# Patient Record
Sex: FEMALE | Race: WHITE | NOT HISPANIC OR LATINO | Employment: OTHER | URBAN - METROPOLITAN AREA
[De-identification: names, ages, dates, MRNs, and addresses within clinical notes are randomized per-mention and may not be internally consistent; named-entity substitution may affect disease eponyms.]

---

## 2017-01-05 ENCOUNTER — ANTICOAGULATION MONITORING (OUTPATIENT)
Dept: VASCULAR LAB | Facility: MEDICAL CENTER | Age: 77
End: 2017-01-05
Attending: NURSE PRACTITIONER
Payer: MEDICARE

## 2017-01-05 DIAGNOSIS — Z86.711 PERSONAL HISTORY OF PULMONARY EMBOLISM: ICD-10-CM

## 2017-01-05 LAB
INR BLD: 2.4 (ref 0.9–1.2)
INR PPP: 2.4 (ref 2–3.5)

## 2017-01-05 PROCEDURE — 99211 OFF/OP EST MAY X REQ PHY/QHP: CPT

## 2017-01-05 PROCEDURE — 85610 PROTHROMBIN TIME: CPT

## 2017-01-05 NOTE — PROGRESS NOTES
Anticoagulation Summary as of 1/5/2017     INR goal 2.0-3.0   Selected INR 2.4 (1/5/2017)   Maintenance plan 15 mg (5 mg x 3) every day   Weekly total 105 mg   Plan last modified Kwasi Lui, PHARMD (12/19/2016)   Next INR check    Target end date     Indications   Personal history of pulmonary embolism [Z86.711]         Anticoagulation Episode Summary     INR check location     Preferred lab     Send INR reminders to     Comments       Anticoagulation Care Providers     Provider Role Specialty Phone number    Ninfa Marcial M.D. Referring Internal Medicine [phone]    [facility] Anticoagulation Services Responsible  [phone]        Pt is therapeutic today. Pt is to continue with current warfarin dosing regimen.  Pt denies any unusual s/s of bleeding, bruising, clotting or any changes to diet or medications.  Follow up in 2 weeks.    Maty Perkins, PHARMD

## 2017-01-05 NOTE — MR AVS SNAPSHOT
Natalie Anaya   2017 11:45 AM   Anticoagulation Monitoring   MRN: [id number]    Department:  Vascular Medicine   Dept Phone:  [phone]    Description:  Female : 1940   Provider:  [facility] EXAM 4           Allergies as of 2017     Allergen Noted Reactions    Tape 10/21/2015   Rash    Adhesive band aids cause a rash  Paper tape ok      You were diagnosed with     Personal history of pulmonary embolism   [V12.55.ICD-9-CM]         Vital Signs     Smoking Status                   Never Smoker            Basic Information     Date Of Birth Sex Race Ethnicity Preferred Language    1940 Female White Non- English      Your appointments     2017 11:30 AM   Established Patient with [facility] EXAM 5   [facility]  (--)    [address] [facility] [id number] [phone]            2017 12:15 PM   LOWER EXTREMITY VENOUS DUPLEX with VASCULAR LAB [facility], [facility] EXAM 6   NON-INVASIVE LAB [facility] ([facility])    [address] [facility] [id number] [phone]           No prep            Mar 15, 2017 11:00 AM   Established Patient with Ninfa Marcial M.D.   [facility] [address] [place] [address] ([facility])    [address] [facility] [address] [phone]           You will be receiving a confirmation call a few days before your appointment from our automated call confirmation system.              Problem List              ICD-10-CM Priority Class Noted - Resolved    Chronic insomnia F51.04   2013 - Present    HTN (hypertension), benign I10 Low  2013 - Present    History of cataract removal with insertion of prosthetic lens Z98.49, Z96.1   7/15/2014 - Present    Seizure disorder (HCC) G40.909 Low  10/21/2015 - Present    Other specified hypothyroidism E03.8   2016 - Present    Personal history of pulmonary embolism Z86.711   2016 - Present    Iron deficiency anemia D50.9   3/4/2016 - Present    Diastolic  dysfunction I51.9   3/4/2016 - Present    Hiatal hernia K44.9   3/4/2016 - Present    Closed fracture of multiple ribs of left side S22.42XA   9/26/2016 - Present    Slow transit constipation K59.01   9/26/2016 - Present    Closed left hip fracture (HCC) S72.002A Medium  10/5/2016 - Present    Frequent falls R29.6 Medium  10/7/2016 - Present    Hyperglycemia R73.9 Medium  10/7/2016 - Present    Recurrent pulmonary embolism (HCC) I26.99 Medium  11/2/2016 - Present    Hip hematoma, left S70.02XA   11/17/2016 - Present    Hospital discharge follow-up Z09   11/30/2016 - Present    Presence of IVC filter Z95.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   12/30/2016 - Present      Health Maintenance        Date Due Completion Dates    BONE DENSITY 10/1/2019 10/1/2014 (Declined)    Override on 10/1/2014: Patient Declined    IMM DTaP/Tdap/Td Vaccine (2 - Td) 10/21/2025 10/21/2015    COLONOSCOPY 4/29/2026 4/29/2016            Results     POCT Protime      Component    INR    2.4                        Current Immunizations     13-VALENT PCV PREVNAR 8/1/2016    Hepatitis A Vaccine, Ped/Adol 10/18/2010    Hepatitis B Vaccine Non-Recombivax (Ped/Adol) 10/18/2010    INFLUENZA VACCINE H1N1 10/9/2011, 2/22/2010    Influenza TIV (IM) 9/30/2014, 9/12/2012, 10/18/2010    Influenza Vaccine Adult HD 11/22/2016  4:16 PM, 10/7/2016 12:37 PM    Influenza Vaccine Quad Inj (Pf) 9/19/2015, 9/19/2015    Pneumococcal Vaccine (UF)Historical Data 10/9/2011    Pneumococcal polysaccharide vaccine (PPSV-23) 10/18/2010    SHINGLES VACCINE 12/31/2010    Tdap Vaccine 10/21/2015      Below and/or attached are the medications your provider expects you to take. Review all of your home medications and newly ordered medications with your provider and/or pharmacist. Follow medication instructions as directed by your provider and/or pharmacist. Please keep your medication list with you and share with your provider. Update the information when medications are discontinued, doses are changed, or new  medications (including over-the-counter products) are added; and carry medication information at all times in the event of emergency situations     Allergies:  TAPE - Rash               Medications  Valid as of: January 05, 2017 - 11:45 AM    Generic Name Brand Name Tablet Size Instructions for use    Acetaminophen (Tab) TYLENOL 500 MG Take 2 Tabs by mouth every 6 hours.        Carvedilol (Tab) COREG 6.25 MG Take 6.25 mg by mouth 2 times a day, with meals.        Cholecalciferol (Cap) Vitamin D-3 1000 UNITS Take 1,000 Units by mouth every day.        Ferrous Sulfate (Tab) ferrous sulfate 325 (65 FE) MG Take 1 Tab by mouth 2 Times a Day.        Furosemide (Tab) LASIX 40 MG Take 1 Tab by mouth every day.        Levothyroxine Sodium (Tab) SYNTHROID 100 MCG Take 100 mcg by mouth Every morning on an empty stomach.        Melatonin (TABLET DISPERSIBLE) Melatonin 500 MCG Take 1,000-2,000 mcg by mouth at bedtime as needed (sleep).        Multiple Vitamins-Minerals (Tab) WOMENS MULTI VITAMIN & MINERAL  Take 1 Tab by mouth every day.        NON SPECIFIED   Take 3 Tabs by mouth every day. Unknown Potassium that she obtain from Dr. Rausch office for supplementation        Potassium Chloride Torrie CR (Tab CR) K-DUR 20 MEQ Take 1 Tab by mouth 2 times a day.        Primidone (Tab) MYSOLINE 250 MG Take 250-500 mg by mouth 2 Times a Day. 250 mg in the AM and 500 mg at HS        Sennosides-Docusate Sodium (Tab) SENOKOT-S 8.6-50 MG Take 1-2 Tabs by mouth 2 times a day as needed (constipation).        Warfarin Sodium (Tab) COUMADIN 5 MG Take 10 mg by mouth every day. 15 mg on Sat and Sun  10 mg on all other days        .                 Medicines prescribed today were sent to:     [facility]ARMACY #[id number] - [place], [place] - [address]    [address] [place] [address]    Phone: [phone] Fax: [phone]    Open 24 Hours?: No      Medication refill instructions:       If your prescription bottle indicates you have medication  refills left, it is not necessary to call your provider’s office. Please contact your pharmacy and they will refill your medication.    If your prescription bottle indicates you do not have any refills left, you may request refills at any time through one of the following ways: The online [employer] system (except Urgent Care), by calling your provider’s office, or by asking your pharmacy to contact your provider’s office with a refill request. Medication refills are processed only during regular business hours and may not be available until the next business day. Your provider may request additional information or to have a follow-up visit with you prior to refilling your medication.   *Please Note: Medication refills are assigned a new Rx number when refilled electronically. Your pharmacy may indicate that no refills were authorized even though a new prescription for the same medication is available at the pharmacy. Please request the medicine by name with the pharmacy before contacting your provider for a refill.        Other Notes About Your Plan     Please assess for the following diagnosis:    -this patient has been on Ambien for insomnia since 7/2010. ? 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 ?other convulsions, code R56.9                   Warfarin Dosing Calendar   January 2017 Details    Sun Mon Tue Wed Thu Fri Sat     1               2               3               4               5   2.4   15 mg   See details      6      15 mg         7      15 mg           8      15 mg         9      15 mg         10      15 mg         11      15 mg         12      15 mg         13      15 mg         14      15 mg           15      15 mg         16      15 mg         17      15 mg         18      15 mg         19      15 mg         20      15 mg         21      15 mg           22      15 mg         23      15 mg          24      15 mg         25      15 mg         26      15 mg         27      15 mg         28      15 mg           29      15 mg         30      15 mg         31      15 mg              Date Details   01/05 This INR check   INR: 2.4      Date of next INR: No date specified         How to take your warfarin dose     To take:  15 mg Take 3 of the 5 mg tablets.              [employer] Access Code: Activation code not generated  Current [employer] Status: Active

## 2017-01-19 ENCOUNTER — ANTICOAGULATION VISIT (OUTPATIENT)
Dept: VASCULAR LAB | Facility: MEDICAL CENTER | Age: 77
End: 2017-01-19
Attending: NURSE PRACTITIONER
Payer: MEDICARE

## 2017-01-19 ENCOUNTER — HOSPITAL ENCOUNTER (OUTPATIENT)
Dept: RADIOLOGY | Facility: MEDICAL CENTER | Age: 77
End: 2017-01-19
Attending: INTERNAL MEDICINE
Payer: MEDICARE

## 2017-01-19 DIAGNOSIS — Z86.711 PERSONAL HISTORY OF PULMONARY EMBOLISM: ICD-10-CM

## 2017-01-19 DIAGNOSIS — Z95.828 PRESENCE OF IVC FILTER: ICD-10-CM

## 2017-01-19 DIAGNOSIS — Z86.711 HISTORY OF PULMONARY EMBOLISM: ICD-10-CM

## 2017-01-19 LAB — INR PPP: 1.9 (ref 2–3.5)

## 2017-01-19 PROCEDURE — 93970 EXTREMITY STUDY: CPT | Mod: 26 | Performed by: SURGERY

## 2017-01-19 PROCEDURE — 85610 PROTHROMBIN TIME: CPT

## 2017-01-19 PROCEDURE — 99212 OFFICE O/P EST SF 10 MIN: CPT | Mod: 25

## 2017-01-19 RX ORDER — WARFARIN SODIUM 5 MG/1
TABLET ORAL
Qty: 270 TAB | Refills: 1 | Status: SHIPPED | OUTPATIENT
Start: 2017-01-19 | End: 2017-03-14 | Stop reason: SDUPTHER

## 2017-01-19 NOTE — PROGRESS NOTES
Anticoagulation Summary as of 1/19/2017     INR goal 2.0-3.0   Selected INR 1.9! (1/19/2017)   Maintenance plan 15 mg (5 mg x 3) every day   Weekly total 105 mg   Plan last modified Kwasi Lui, PHARMD (12/19/2016)   Next INR check 2/2/2017   Target end date     Indications   Personal history of pulmonary embolism [Z86.711]         Anticoagulation Episode Summary     INR check location     Preferred lab     Send INR reminders to     Comments       Anticoagulation Care Providers     Provider Role Specialty Phone number    Ninfa Marcial M.D. Referring Internal Medicine [phone]    [facility] Anticoagulation Services Responsible  [phone]        Pt missed a dose last week and is sub therapeutic today.  Will bolus dose with 20mg tonight, then resume current warfarin dosing regimen. .Pt denies any unusual s/s of bleeding, bruising, clotting or any changes to diet or medications.  Follow up in 2 weeks.    Maty Perkins, PHARMD

## 2017-01-19 NOTE — MR AVS SNAPSHOT
Natalie Anaya   2017 11:30 AM   Anticoagulation Visit   MRN: [id number]    Department:  Vascular Medicine   Dept Phone:  [phone]    Description:  Female : 1940   Provider:  [facility] EXAM 5           Allergies as of 2017     Allergen Noted Reactions    Tape 10/21/2015   Rash    Adhesive band aids cause a rash  Paper tape ok      You were diagnosed with     Personal history of pulmonary embolism   [V12.55.ICD-9-CM]         Vital Signs     Smoking Status                   Never Smoker            Basic Information     Date Of Birth Sex Race Ethnicity Preferred Language    1940 Female White Non- English      Your appointments     2017 11:30 AM   Established Patient with [facility] EXAM 5   [facility]  (--)    [address] [facility] [address]   [phone]            2017 12:15 PM   LOWER EXTREMITY VENOUS DUPLEX with VASCULAR LAB [facility], V EXAM 6   NON-INVASIVE LAB [facility] ([facility])    [address] [facility] [address]   [phone]           No prep            2017  9:30 AM   Established Patient with [facility] EXAM 4   [facility]  (--)    [address] [facility] [address]   [phone]            Mar 15, 2017 11:00 AM   Established Patient with Ninfa Marcial M.D.   [facility] [address] [place] [id number] [phone]           You will be receiving a confirmation call a few days before your appointment from our automated call confirmation system.              Problem List              ICD-10-CM Priority Class Noted - Resolved    Chronic insomnia F51.04   2013 - Present    HTN (hypertension), benign I10 Low  2013 - Present    History of cataract removal with insertion of prosthetic lens Z98.49, Z96.1   7/15/2014 - Present    Seizure disorder (HCC) G40.909 Low  10/21/2015 - Present    Other specified hypothyroidism E03.8   1/8/2016 - Present    Personal history of pulmonary embolism Z86.711   1/8/2016 - Present    Iron deficiency anemia D50.9   3/4/2016 - Present    Diastolic dysfunction I51.9   3/4/2016 - Present    Hiatal hernia K44.9   3/4/2016 - Present    Closed fracture of multiple ribs of left side S22.42XA   9/26/2016 - Present    Slow transit constipation K59.01   9/26/2016 - Present    Closed left hip fracture (CMS-HCC) S72.002A Medium  10/5/2016 - Present    Frequent falls R29.6 Medium  10/7/2016 - Present    Hyperglycemia R73.9 Medium  10/7/2016 - Present    Recurrent pulmonary embolism (CMS-HCC) I26.99 Medium  11/2/2016 - Present    Hip hematoma, left S70.02XA   11/17/2016 - Present    Hospital discharge follow-up Z09   11/30/2016 - Present    Presence of IVC filter Z95.828   12/30/2016 - Present      Health Maintenance        Date Due Completion Dates    BONE DENSITY 10/1/2019 10/1/2014 (Declined)    Override on 10/1/2014: Patient Declined    IMM DTaP/Tdap/Td Vaccine (2 - Td) 10/21/2025 10/21/2015    COLONOSCOPY 4/29/2026 4/29/2016            Results     POCT Protime      Component    INR    1.9                        Current Immunizations     13-VALENT PCV PREVNAR 8/1/2016    Hepatitis A Vaccine, Ped/Adol 10/18/2010    Hepatitis B Vaccine Non-Recombivax (Ped/Adol) 10/18/2010    INFLUENZA VACCINE H1N1 10/9/2011, 2/22/2010    Influenza TIV (IM) 9/30/2014, 9/12/2012, 10/18/2010    Influenza Vaccine Adult HD 11/22/2016  4:16 PM, 10/7/2016 12:37 PM    Influenza Vaccine Quad Inj (Pf) 9/19/2015, 9/19/2015    Pneumococcal Vaccine (UF)Historical Data 10/9/2011    Pneumococcal polysaccharide vaccine (PPSV-23) 10/18/2010    SHINGLES VACCINE 12/31/2010    Tdap Vaccine 10/21/2015      Below and/or attached are the medications your provider expects you to take. Review all of your home medications and newly ordered medications with your provider and/or pharmacist. Follow medication instructions  as directed by your provider and/or pharmacist. Please keep your medication list with you and share with your provider. Update the information when medications are discontinued, doses are changed, or new medications (including over-the-counter products) are added; and carry medication information at all times in the event of emergency situations     Allergies:  TAPE - Rash               Medications  Valid as of: January 19, 2017 - 11:06 AM    Generic Name Brand Name Tablet Size Instructions for use    Acetaminophen (Tab) TYLENOL 500 MG Take 2 Tabs by mouth every 6 hours.        Carvedilol (Tab) COREG 6.25 MG Take 6.25 mg by mouth 2 times a day, with meals.        Cholecalciferol (Cap) Vitamin D-3 1000 UNITS Take 1,000 Units by mouth every day.        Ferrous Sulfate (Tab) ferrous sulfate 325 (65 FE) MG Take 1 Tab by mouth 2 Times a Day.        Furosemide (Tab) LASIX 40 MG Take 1 Tab by mouth every day.        Levothyroxine Sodium (Tab) SYNTHROID 100 MCG Take 100 mcg by mouth Every morning on an empty stomach.        Melatonin (TABLET DISPERSIBLE) Melatonin 500 MCG Take 1,000-2,000 mcg by mouth at bedtime as needed (sleep).        Multiple Vitamins-Minerals (Tab) WOMENS MULTI VITAMIN & MINERAL  Take 1 Tab by mouth every day.        NON SPECIFIED   Take 3 Tabs by mouth every day. Unknown Potassium that she obtain from Dr. Rausch office for supplementation        Potassium Chloride Torrie CR (Tab CR) K-DUR 20 MEQ Take 1 Tab by mouth 2 times a day.        Primidone (Tab) MYSOLINE 250 MG Take 250-500 mg by mouth 2 Times a Day. 250 mg in the AM and 500 mg at HS        Sennosides-Docusate Sodium (Tab) SENOKOT-S 8.6-50 MG Take 1-2 Tabs by mouth 2 times a day as needed (constipation).        Warfarin Sodium (Tab) COUMADIN 5 MG Take 10 mg by mouth every day. 15 mg on Sat and Sun  10 mg on all other days        .                 Medicines prescribed today were sent to:     [facility]/PHARM[facility]Y #[id number] - [place], [place] - [address]     [address] [place] [address]    Phone: [phone] Fax: [phone]    Open 24 Hours?: No      Medication refill instructions:       If your prescription bottle indicates you have medication refills left, it is not necessary to call your provider’s office. Please contact your pharmacy and they will refill your medication.    If your prescription bottle indicates you do not have any refills left, you may request refills at any time through one of the following ways: The online [employer] system (except Urgent Care), by calling your provider’s office, or by asking your pharmacy to contact your provider’s office with a refill request. Medication refills are processed only during regular business hours and may not be available until the next business day. Your provider may request additional information or to have a follow-up visit with you prior to refilling your medication.   *Please Note: Medication refills are assigned a new Rx number when refilled electronically. Your pharmacy may indicate that no refills were authorized even though a new prescription for the same medication is available at the pharmacy. Please request the medicine by name with the pharmacy before contacting your provider for a refill.        Other Notes About Your Plan     Please assess for the following diagnosis:    -this patient has been on Ambien for insomnia since 7/2010. ? 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 ?other convulsions, code R56.9                   Warfarin Dosing Calendar   January 2017 Details    Sun Mon Tue Wed Thu Fri Sat     1               2               3               4               5               6               7                 8               9               10               11               12               13               14                 15               16               17               18                 19   1.9   20 mg   See details      20      15 mg         21      15 mg           22      15 mg         23      15 mg         24      15 mg         25      15 mg         26      15 mg         27      15 mg         28      15 mg           29      15 mg         30      15 mg         31      15 mg              Date Details   01/19 This INR check   INR: 1.9               How to take your warfarin dose     To take:  15 mg Take 3 of the 5 mg tablets.    To take:  20 mg Take 4 of the 5 mg tablets.           Warfarin Dosing Calendar   February 2017 Details    Sun Mon Tue Wed Thu Fri Sat        1      15 mg         2      15 mg         3               4                 5               6               7               8               9               10               11                 12               13               14               15               16               17               18                 19               20               21               22               23               24               25                 26               27               28                    Date Details   No additional details    Date of next INR:  2/2/2017         How to take your warfarin dose     To take:  15 mg Take 3 of the 5 mg tablets.              [employer]im[employer] devices Access Code: Activation code not generated  Current Minim[employer] devices Status: Active

## 2017-01-20 LAB — INR BLD: 1.9 (ref 0.9–1.2)

## 2017-01-24 ENCOUNTER — TELEPHONE (OUTPATIENT)
Dept: VASCULAR LAB | Facility: MEDICAL CENTER | Age: 77
End: 2017-01-24

## 2017-01-24 DIAGNOSIS — Z95.828 PRESENCE OF IVC FILTER: ICD-10-CM

## 2017-01-24 NOTE — TELEPHONE ENCOUNTER
Order placed for IVC filter removal in system.  Attempted to reach pt over the phone however the line was busy.  Will attempt again later today.    Cally CELESTE

## 2017-01-27 ENCOUNTER — TELEPHONE (OUTPATIENT)
Dept: VASCULAR LAB | Facility: MEDICAL CENTER | Age: 77
End: 2017-01-27

## 2017-01-27 NOTE — TELEPHONE ENCOUNTER
Left msg for pt regarding holding warfarin for her upcoming IVC filter procedure on 2/15/17.  Pt has appt on [facility]17 [facility]-- will discuss holding warfarin then.  She will need bridging instructions as she has a history of multiple PE's.      Cally CELESTE

## 2017-01-29 ENCOUNTER — PATIENT MESSAGE (OUTPATIENT)
Dept: MEDICAL GROUP | Age: 77
End: 2017-01-29

## 2017-01-29 DIAGNOSIS — I51.89 DIASTOLIC DYSFUNCTION: ICD-10-CM

## 2017-01-30 ENCOUNTER — ANTICOAGULATION VISIT (OUTPATIENT)
Dept: VASCULAR LAB | Facility: MEDICAL CENTER | Age: 77
End: 2017-01-30
Attending: NURSE PRACTITIONER
Payer: MEDICARE

## 2017-01-30 DIAGNOSIS — Z86.711 PERSONAL HISTORY OF PULMONARY EMBOLISM: ICD-10-CM

## 2017-01-30 LAB — INR PPP: 2 (ref 2–3.5)

## 2017-01-30 PROCEDURE — 85610 PROTHROMBIN TIME: CPT

## 2017-01-30 PROCEDURE — 99213 OFFICE O/P EST LOW 20 MIN: CPT | Performed by: NURSE PRACTITIONER

## 2017-01-30 RX ORDER — FUROSEMIDE 40 MG/1
40 TABLET ORAL DAILY
Qty: 90 TAB | Refills: 1 | Status: SHIPPED | OUTPATIENT
Start: 2017-01-30 | End: 2017-07-02 | Stop reason: SDUPTHER

## 2017-01-30 RX ORDER — POTASSIUM CHLORIDE 20 MEQ/1
20 TABLET, EXTENDED RELEASE ORAL 2 TIMES DAILY
Qty: 180 TAB | Refills: 1 | Status: SHIPPED | OUTPATIENT
Start: 2017-01-30 | End: 2018-01-17

## 2017-01-30 NOTE — MR AVS SNAPSHOT
Natalie Anaya   2017 10:00 AM   Anticoagulation Visit   MRN: [id number]    Department:  Vascular Medicine   Dept Phone:  [phone]    Description:  Female : 1940   Provider:  [facility] EXAM 1           Allergies as of 2017     Allergen Noted Reactions    Tape 10/21/2015   Rash    Adhesive band aids cause a rash  Paper tape ok      You were diagnosed with     Personal history of pulmonary embolism   [V12.55.ICD-9-CM]         Vital Signs     Smoking Status                   Never Smoker            Basic Information     Date Of Birth Sex Race Ethnicity Preferred Language    1940 Female White Non- English      Your appointments     Feb 15, 2017 10:00 AM   INTERVENTIONAL EXAM-2 HOURS with [facility] IR 2, RADIOLOGIST, INTERVENTIONAL, [facility], NURSE, IR IMAGING(INTERVENTIONAL)   [facility] IMAGING - INTERVENTIONAL - [facility] MEDICAL [facility])    [address] [id number] [phone]            2017  2:30 PM   Established Patient with [facility] EXAM 5   [facility]  (--)    [address] [place] [id number]   [phone]            Mar 15, 2017 11:00 AM   Established Patient with Ninfa Marcial M.D.   [facility] [address] [place] [id number] [phone]           You will be receiving a confirmation call a few days before your appointment from our automated call confirmation system.              Problem List              ICD-10-CM Priority Class Noted - Resolved    Chronic insomnia F51.04   2013 - Present    HTN (hypertension), benign I10 Low  2013 - Present    History of cataract removal with insertion of prosthetic lens Z98.49, Z96.1   7/15/2014 - Present    Seizure disorder (HCC) G40.909 Low  10/21/2015 - Present    Other specified hypothyroidism E03.8   2016 - Present    Personal history of pulmonary embolism Z86.711    1/8/2016 - Present    Iron deficiency anemia D50.9   3/4/2016 - Present    Diastolic dysfunction I51.9   3/4/2016 - Present    Hiatal hernia K44.9   3/4/2016 - Present    Closed fracture of multiple ribs of left side S22.42XA   9/26/2016 - Present    Slow transit constipation K59.01   9/26/2016 - Present    Closed left hip fracture (CMS-HCC) S72.002A Medium  10/5/2016 - Present    Frequent falls R29.6 Medium  10/7/2016 - Present    Hyperglycemia R73.9 Medium  10/7/2016 - Present    Recurrent pulmonary embolism (CMS-[facility]) I26.99 Medium  11/2/2016 - Present    Hip hematoma, left S70.02XA   11/17/2016 - Present    Hospital discharge follow-up Z09   11/30/2016 - Present    Presence of IVC filter Z95.828   12/30/2016 - Present      Health Maintenance        Date Due Completion Dates    BONE DENSITY 10/1/2019 10/1/2014 (Declined)    Override on 10/1/2014: Patient Declined    IMM DTaP/Tdap/Td Vaccine (2 - Td) 10/21/2025 10/21/2015    COLONOSCOPY 4/29/2026 4/29/2016, 4/29/2016            Results     POCT Protime      Component    INR    2.0                        Current Immunizations     13-VALENT PCV PREVNAR 8/1/2016    Hepatitis A Vaccine, Ped/Adol 10/18/2010    Hepatitis B Vaccine Non-Recombivax (Ped/Adol) 10/18/2010    INFLUENZA VACCINE H1N1 10/9/2011, 2/22/2010    Influenza TIV (IM) 9/30/2014, 9/12/2012, 10/18/2010    Influenza Vaccine Adult HD 11/22/2016  4:16 PM, 10/7/2016 12:37 PM    Influenza Vaccine Quad Inj (Pf) 9/19/2015, 9/19/2015    Pneumococcal Vaccine (UF)Historical Data 10/9/2011    Pneumococcal polysaccharide vaccine (PPSV-23) 10/18/2010    SHINGLES VACCINE 12/31/2010    Tdap Vaccine 10/21/2015      Below and/or attached are the medications your provider expects you to take. Review all of your home medications and newly ordered medications with your provider and/or pharmacist. Follow medication instructions as directed by your provider and/or pharmacist. Please keep your medication list with you and  share with your provider. Update the information when medications are discontinued, doses are changed, or new medications (including over-the-counter products) are added; and carry medication information at all times in the event of emergency situations     Allergies:  TAPE - Rash               Medications  Valid as of: January 30, 2017 - 10:22 AM    Generic Name Brand Name Tablet Size Instructions for use    Acetaminophen (Tab) TYLENOL 500 MG Take 2 Tabs by mouth every 6 hours.        Carvedilol (Tab) COREG 6.25 MG Take 6.25 mg by mouth 2 times a day, with meals.        Cholecalciferol (Cap) Vitamin D-3 1000 UNITS Take 1,000 Units by mouth every day.        Enoxaparin Sodium (Solution) LOVENOX 150 MG/ML Inject 150 mg as instructed every day.        Ferrous Sulfate (Tab) ferrous sulfate 325 (65 FE) MG Take 1 Tab by mouth 2 Times a Day.        Furosemide (Tab) LASIX 40 MG Take 1 Tab by mouth every day.        Levothyroxine Sodium (Tab) SYNTHROID 100 MCG Take 100 mcg by mouth Every morning on an empty stomach.        Melatonin (TABLET DISPERSIBLE) Melatonin 500 MCG Take 1,000-2,000 mcg by mouth at bedtime as needed (sleep).        Multiple Vitamins-Minerals (Tab) WOMENS MULTI VITAMIN & MINERAL  Take 1 Tab by mouth every day.        NON SPECIFIED   Take 3 Tabs by mouth every day. Unknown Potassium that she obtain from Dr. Rausch office for supplementation        Potassium Chloride Torrie CR (Tab CR) Kdur 20 MEQ Take 1 Tab by mouth 2 times a day.        Primidone (Tab) MYSOLINE 250 MG Take 250-500 mg by mouth 2 Times a Day. 250 mg in the AM and 500 mg at HS        Sennosides-Docusate Sodium (Tab) SENOKOT-S 8.6-50 MG Take 1-2 Tabs by mouth 2 times a day as needed (constipation).        Warfarin Sodium (Tab) COUMADIN 5 MG Take three tablets daily as directed by coumadin clininc        .                 Medicines prescribed today were sent to:     [id number] - [place], [place] - [address] [address] AT [facility]  & [address]    [address] [facility] [address]    Phone: [phone] Fax: [phone]    Open 24 Hours?: No      Medication refill instructions:       If your prescription bottle indicates you have medication refills left, it is not necessary to call your provider’s office. Please contact your pharmacy and they will refill your medication.    If your prescription bottle indicates you do not have any refills left, you may request refills at any time through one of the following ways: The online [employer] system (except Urgent Care), by calling your provider’s office, or by asking your pharmacy to contact your provider’s office with a refill request. Medication refills are processed only during regular business hours and may not be available until the next business day. Your provider may request additional information or to have a follow-up visit with you prior to refilling your medication.   *Please Note: Medication refills are assigned a new Rx number when refilled electronically. Your pharmacy may indicate that no refills were authorized even though a new prescription for the same medication is available at the pharmacy. Please request the medicine by name with the pharmacy before contacting your provider for a refill.        Other Notes About Your Plan     Please assess for the following diagnosis:    -this patient has been on Ambien for insomnia since 7/2010. ? 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 ?other convulsions, code R56.9                   Warfarin Dosing Calendar January 2017 Details    Sun Mon Tue Wed Thu Fri Sat     1               2               3               4               5               6               7                 8               9               10               11               12               13               14                 15               16               17                 18               19               20               21                 22               23               24               25               26               27               28                 29               30   2.0   15 mg   See details      31      15 mg              Date Details   01/30 This INR check   INR: 2.0               How to take your warfarin dose     To take:  15 mg Take 3 of the 5 mg tablets.           Warfarin Dosing Calendar   February 2017 Details    Sun Mon Tue Wed Thu Fri Sat        1      15 mg         2      15 mg         3      15 mg         4      15 mg           5      15 mg         6      15 mg         7      15 mg         8      15 mg         9      15 mg         10      Hold         11      Hold   See details        12      Hold   See details      13      Hold   See details      14      Hold         15      20 mg         16      20 mg   See details      17      15 mg   See details      18      15 mg   See details        19      15 mg   See details      20      15 mg   See details      21      15 mg         22               23               24               25                 26               27               28                    Date Details   02/11 Hold dose   Lovenox 150 mg injection to abdomen daily      02/12 Hold dose   Lovenox 150 mg injection to abdomen daily      02/13 Hold dose   Lovenox 150 mg injection to abdomen daily      02/16 Take 20 mg (5 mg tablets x 4)   Lovenox 150 mg injection to abdomen daily      02/17 Lovenox 150 mg injection to abdomen daily      02/18 Lovenox 150 mg injection to abdomen daily      02/19 Lovenox 150 mg injection to abdomen daily      02/20 Lovenox 150 mg injection to abdomen daily       Date of next INR:  2/21/2017         How to take your warfarin dose     To take:  15 mg Take 3 of the 5 mg tablets.    To take:  20 mg Take 4 of the 5 mg tablets.    Hold Do not take your warfarin dose. See the Details table to the right for  additional instructions.                   [employer] Access Code: Activation code not generated  Current [employer] Status: Active

## 2017-01-30 NOTE — PROGRESS NOTES
Anticoagulation Summary as of 1/30/2017     INR goal 2.0-3.0   Selected INR 2.0 (1/30/2017)   Maintenance plan 15 mg (5 mg x 3) every day   Weekly total 105 mg   Plan last modified Kwasi Lui, PHARMD (12/19/2016)   Next INR check 2/21/2017   Target end date     Indications   Personal history of pulmonary embolism [Z86.711]         Anticoagulation Episode Summary     INR check location     Preferred lab     Send INR reminders to     Comments       Anticoagulation Care Providers     Provider Role Specialty Phone number    Ninfa Marcial M.D. Referring Internal Medicine [phone]    [facility] Anticoagulation Services Responsible  [phone]        Patient is therapeutic today. Scheduled for IVC filter removal on 2/15/17 and needs to bridge with Lovenox. Wt 95 kg, cr 0.60, cr cl 83. Will dose with Lovenox 1.5 mg/kg which is 150 mg injection daily. Rx sent to [facility]. Denies any medication or diet changes. No current symptoms of bleeding or thrombosis reported. Will have pt follow bridging instructions below. Follow up in 1 week after surgery.    2/09/17 - last dose of warfarin  2/10/17 - STOP warfarin  2/11/17 - Lovenox 150 mg injection to abdomen daily  2/12/17 - Lovenox 150 mg injection to abdomen daily  2/13/17 - Lovenox 150 mg injection to abdomen daily  2/14/17 - no injection  2/15/17 (day of procedure) - restart warfarin if ok with MD - take 20 mg  2/16/17 - Lovenox 150 mg injection to abdomen daily AND take warfarin 20 mg  2/17/17 - Lovenox 150 mg injection to abdomen daily AND warfarin 15 mg  2/18/17 - Lovenox 150 mg injection to abdomen daily AND warfarin 15 mg  2/19/17 - Lovenox 150 mg injection to abdomen daily AND warfarin 15 mg  2/20/17 - Lovenox 150 mg injection to abdomen daily AND warfarin 15 mg  2/21/17 - INR check at 2:30 pm    Next Appointment: Tuesday, February 21, 2017 at 2:30 pm.     Yesika CELESTE

## 2017-01-31 LAB — INR BLD: 2 (ref 0.9–1.2)

## 2017-02-01 ENCOUNTER — TELEPHONE (OUTPATIENT)
Dept: VASCULAR LAB | Facility: MEDICAL CENTER | Age: 77
End: 2017-02-01

## 2017-02-14 DIAGNOSIS — Z01.812 PRE-PROCEDURAL LABORATORY EXAMINATION: ICD-10-CM

## 2017-02-14 LAB
CREAT SERPL-MCNC: 0.55 MG/DL (ref 0.5–1.4)
GFR SERPL CREATININE-BSD FRML MDRD: >60 ML/MIN/1.73 M 2
INR PPP: 1.08 (ref 0.87–1.13)
PLATELET # BLD AUTO: 259 K/UL (ref 164–446)
PROTHROMBIN TIME: 14.3 SEC (ref 12–14.6)

## 2017-02-14 PROCEDURE — 36415 COLL VENOUS BLD VENIPUNCTURE: CPT

## 2017-02-14 PROCEDURE — 85049 AUTOMATED PLATELET COUNT: CPT

## 2017-02-14 PROCEDURE — 82565 ASSAY OF CREATININE: CPT

## 2017-02-14 PROCEDURE — 85610 PROTHROMBIN TIME: CPT

## 2017-02-14 RX ORDER — YEAST,DRIED (S. CEREVISIAE)
1 POWDER (GRAM) ORAL DAILY
COMMUNITY

## 2017-02-14 RX ORDER — NAPROXEN SODIUM 220 MG
220 TABLET ORAL 2 TIMES DAILY WITH MEALS
COMMUNITY
End: 2017-07-20

## 2017-02-15 ENCOUNTER — HOSPITAL ENCOUNTER (OUTPATIENT)
Facility: MEDICAL CENTER | Age: 77
End: 2017-02-15
Attending: INTERNAL MEDICINE | Admitting: INTERNAL MEDICINE
Payer: MEDICARE

## 2017-02-15 ENCOUNTER — APPOINTMENT (OUTPATIENT)
Dept: RADIOLOGY | Facility: MEDICAL CENTER | Age: 77
End: 2017-02-15
Attending: INTERNAL MEDICINE
Payer: MEDICARE

## 2017-02-15 VITALS
RESPIRATION RATE: 14 BRPM | TEMPERATURE: 98 F | SYSTOLIC BLOOD PRESSURE: 112 MMHG | HEART RATE: 66 BPM | HEIGHT: 65 IN | WEIGHT: 151.68 LBS | DIASTOLIC BLOOD PRESSURE: 61 MMHG | BODY MASS INDEX: 25.27 KG/M2 | OXYGEN SATURATION: 92 %

## 2017-02-15 DIAGNOSIS — Z95.828 PRESENCE OF IVC FILTER: ICD-10-CM

## 2017-02-15 PROCEDURE — 700111 HCHG RX REV CODE 636 W/ 250 OVERRIDE (IP): Performed by: RADIOLOGY

## 2017-02-15 PROCEDURE — 99153 MOD SED SAME PHYS/QHP EA: CPT

## 2017-02-15 PROCEDURE — 37193 REM ENDOVAS VENA CAVA FILTER: CPT

## 2017-02-15 PROCEDURE — 700111 HCHG RX REV CODE 636 W/ 250 OVERRIDE (IP)

## 2017-02-15 PROCEDURE — 37191 INS ENDOVAS VENA CAVA FILTR: CPT

## 2017-02-15 PROCEDURE — 700117 HCHG RX CONTRAST REV CODE 255: Performed by: RADIOLOGY

## 2017-02-15 RX ORDER — ONDANSETRON 2 MG/ML
4 INJECTION INTRAMUSCULAR; INTRAVENOUS PRN
Status: DISCONTINUED | OUTPATIENT
Start: 2017-02-15 | End: 2017-02-15 | Stop reason: HOSPADM

## 2017-02-15 RX ORDER — MIDAZOLAM HYDROCHLORIDE 1 MG/ML
INJECTION INTRAMUSCULAR; INTRAVENOUS
Status: COMPLETED
Start: 2017-02-15 | End: 2017-02-15

## 2017-02-15 RX ORDER — MIDAZOLAM HYDROCHLORIDE 1 MG/ML
.5-2 INJECTION INTRAMUSCULAR; INTRAVENOUS PRN
Status: DISCONTINUED | OUTPATIENT
Start: 2017-02-15 | End: 2017-02-15 | Stop reason: HOSPADM

## 2017-02-15 RX ORDER — OXYCODONE HYDROCHLORIDE 5 MG/1
2.5 TABLET ORAL
Status: DISCONTINUED | OUTPATIENT
Start: 2017-02-15 | End: 2017-02-15 | Stop reason: HOSPADM

## 2017-02-15 RX ORDER — SODIUM CHLORIDE 9 MG/ML
INJECTION, SOLUTION INTRAVENOUS
Status: DISCONTINUED | OUTPATIENT
Start: 2017-02-15 | End: 2017-02-15 | Stop reason: HOSPADM

## 2017-02-15 RX ORDER — SODIUM CHLORIDE 9 MG/ML
500 INJECTION, SOLUTION INTRAVENOUS PRN
Status: DISCONTINUED | OUTPATIENT
Start: 2017-02-15 | End: 2017-02-15 | Stop reason: HOSPADM

## 2017-02-15 RX ADMIN — MIDAZOLAM HYDROCHLORIDE 1 MG: 1 INJECTION, SOLUTION INTRAMUSCULAR; INTRAVENOUS at 09:57

## 2017-02-15 RX ADMIN — SODIUM CHLORIDE: 9 INJECTION, SOLUTION INTRAVENOUS at 09:24

## 2017-02-15 RX ADMIN — IOHEXOL 68 ML: 300 INJECTION, SOLUTION INTRAVENOUS at 10:41

## 2017-02-15 RX ADMIN — FENTANYL CITRATE 25 MCG: 50 INJECTION, SOLUTION INTRAMUSCULAR; INTRAVENOUS at 10:28

## 2017-02-15 RX ADMIN — FENTANYL CITRATE 50 MCG: 50 INJECTION, SOLUTION INTRAMUSCULAR; INTRAVENOUS at 09:57

## 2017-02-15 RX ADMIN — FENTANYL CITRATE 25 MCG: 50 INJECTION, SOLUTION INTRAMUSCULAR; INTRAVENOUS at 10:10

## 2017-02-15 RX ADMIN — MIDAZOLAM HYDROCHLORIDE 1 MG: 1 INJECTION, SOLUTION INTRAMUSCULAR; INTRAVENOUS at 10:09

## 2017-02-15 RX ADMIN — MIDAZOLAM 1 MG: 1 INJECTION INTRAMUSCULAR; INTRAVENOUS at 09:57

## 2017-02-15 ASSESSMENT — PAIN SCALES - GENERAL
PAINLEVEL_OUTOF10: 0

## 2017-02-15 NOTE — IP AVS SNAPSHOT
2/15/2017          Natalie Anaya  [address] [facility]  [place] [address]    Dear Natalie:    [facility] wants to ensure your discharge home is safe and you or your loved ones have had all your questions answered regarding your care after you leave the hospital.    You may receive a telephone call within two days of your discharge.  This call is to make certain you understand your discharge instructions as well as ensure we provided you with the best care possible during your stay with us.     The call will only last approximately 3-5 minutes and will be done by a nurse.    Once again, we want to ensure your discharge home is safe and that you have a clear understanding of any next steps in your care.  If you have any questions or concerns, please do not hesitate to contact us, we are here for you.  Thank you for choosing [facility] for your healthcare needs.    Sincerely,    Francisco Go    [facility]

## 2017-02-15 NOTE — OR NURSING
1100: Patient from cath lab to PPU via gurney. Patient is awake. VSS.  R IJ incision site soft and dressing clean, dry and intact. Vascular access care management per MD order (see assessment). No c/o pain or nausea at this time. Will monitor closely.  1115: VSS. Patient wide awake.  at bedside. R IJ site soft, CDI.   1130: DC instructions given. Questions answered. Family at bedside. R IJ soft,CDI. No c/o pain or nausea. Patient wide awake. VSS. Patient met criteria for discharge.

## 2017-02-15 NOTE — DISCHARGE INSTRUCTIONS
ACTIVITY: Rest and take it easy for the first 24 hours.  A responsible adult is recommended to remain with you during that time.  It is normal to feel sleepy.  We encourage you to not do anything that requires balance, judgment or coordination.    MILD FLU-LIKE SYMPTOMS ARE NORMAL. YOU MAY EXPERIENCE GENERALIZED MUSCLE ACHES, THROAT IRRITATION, HEADACHE AND/OR SOME NAUSEA.    FOR 24 HOURS DO NOT:  Drive, operate machinery or run household appliances.  Drink beer or alcoholic beverages.   Make important decisions or sign legal documents.      DIET: To avoid nausea, slowly advance diet as tolerated, avoiding spicy or greasy foods for the first day.  Add more substantial food to your diet according to your physician's instructions.  Babies can be fed formula or breast milk as soon as they are hungry.  INCREASE FLUIDS AND FIBER TO AVOID CONSTIPATION.    SURGICAL DRESSING/BATHING:     Keep the dressing clean and dry for 24 hours.  May remove dressing tomorrow at 12 noon and shower.  Do not submerge for 1 week.  No heavy lifting.    FOLLOW-UP APPOINTMENT:  A follow-up appointment should be arranged with your doctor; call to schedule.    You should CALL YOUR PHYSICIAN if you develop:  Fever greater than 101 degrees F.  Pain not relieved by medication, or persistent nausea or vomiting.  Excessive bleeding (blood soaking through dressing) or unexpected drainage from the wound.  Extreme redness or swelling around the incision site, drainage of pus or foul smelling drainage.  Inability to urinate or empty your bladder within 8 hours.  Problems with breathing or chest pain.    You should call 911 if you develop problems with breathing or chest pain.  If you are unable to contact your doctor or surgical center, you should go to the nearest emergency room or urgent care center.      Physician's telephone #: [phone]    If any questions arise, call your doctor.  If your doctor is not available, please feel free to call the  Surgical Center at [phone].  The Center is open Monday through Friday from 7AM to 7PM.  You can also call the HEALTH HOTLINE open 24 hours/day, 7 days/week and speak to a nurse at [phone], or toll free at [phone].    A registered nurse may call you a few days after your surgery to see how you are doing after your procedure.    MEDICATIONS: Resume taking daily medication.  Take prescribed pain medication with food.  If no medication is prescribed, you may take non-aspirin pain medication if needed.  PAIN MEDICATION CAN BE VERY CONSTIPATING.  Take a stool softener or laxative such as senokot, pericolace, or milk of magnesia if needed.      If your physician has prescribed pain medication that includes Acetaminophen (Tylenol), do not take additional Acetaminophen (Tylenol) while taking the prescribed medication.    Depression / Suicide Risk    As you are discharged from this [facility] facility, it is important to learn how to keep safe from harming yourself.    Recognize the warning signs:  · Abrupt changes in personality, positive or negative- including increase in energy   · Giving away possessions  · Change in eating patterns- significant weight changes-  positive or negative  · Change in sleeping patterns- unable to sleep or sleeping all the time   · Unwillingness or inability to communicate  · Depression  · Unusual sadness, discouragement and loneliness  · Talk of wanting to die  · Neglect of personal appearance   · Rebelliousness- reckless behavior  · Withdrawal from people/activities they love  · Confusion- inability to concentrate     If you or a loved one observes any of these behaviors or has concerns about self-harm, here's what you can do:  · Talk about it- your feelings and reasons for harming yourself  · Remove any means that you might use to hurt yourself (examples: pills, rope, extension cords, firearm)  · Get professional help from the community (Mental Health, Substance Abuse,  psychological counseling)  · Do not be alone:Call your Safe Contact- someone whom you trust who will be there for you.  · Call your local CRISIS HOTLINE 404-0916 or 944-918-9555  · Call your local Children's Mobile Crisis Response Team [place] (906) 715-3885 or www.Supponor  · Call the toll free National Suicide Prevention Hotlines   · National Suicide Prevention Lifeline 005-465-QOIY (9136)  · [employer] Line Network 800-SUICIDE (462-9260)    Inferior Vena Cava Filter Removal  AFTER THE PROCEDURE    · You will stay in a recovery area until any sedation medicine you were given has worn off. Your blood pressure and  pulse will be checked.  · If there are no problems, you should be able to go home after the procedure.  · You may feel sore at the area of the needle insertion for a few days.     This information is not intended to replace advice given to you by your health care provider. Make sure you discuss any questions you have with your health care provider.

## 2017-02-15 NOTE — PROGRESS NOTES
IR NOTE:  IVC FILTER REMOVED VIA RIGHT IJ VEIN BY DR. CERVANTES, PT TOLERATED WELL AND HEMODYNAMICALLY STABLE THROUGHOUT ON 6L OXYMASK, NOW ON 2L NC, REPORT GIVEN TO PPU LAINA DEE.

## 2017-02-15 NOTE — OR SURGEON
Immediate Post- Operative Note        PostOp Diagnosis: NO LONGER REQUIRES IVC FILTER, HX PE AFTER HIP FX      Procedure(s): RIGHT INTERNAL JUGULAR PUNCTURE WITH ULTRASOUND GUIDANCE, IVC GRAM, REMOVAL OF RETRIEVABLE IVC FILTER WITH FLUOROSCOPIC GUIDANCE      Estimated Blood Loss: <20CC (FLUSHES)        Complications: NONE        2/15/2017     10:47 AM     Walt Alvarado

## 2017-02-15 NOTE — IP AVS SNAPSHOT
" Home Care Instructions                                                                                                                Name:Natalie Anaya  Medical Record Number:[id number]  CSN: [id number]    YOB: 1940   Age: 76 y.o.  Sex: female  HT:1.651 m (5' 5\") WT: 68.8 kg (151 lb 10.8 oz)          Admit Date: 2/15/2017     Discharge Date: 2/15/2017  Today's Date: 2/15/2017  Attending Doctor:  No att. providers found                  Allergies:  Tape                Discharge Instructions         ACTIVITY: Rest and take it easy for the first 24 hours.  A responsible adult is recommended to remain with you during that time.  It is normal to feel sleepy.  We encourage you to not do anything that requires balance, judgment or coordination.    MILD FLU-LIKE SYMPTOMS ARE NORMAL. YOU MAY EXPERIENCE GENERALIZED MUSCLE ACHES, THROAT IRRITATION, HEADACHE AND/OR SOME NAUSEA.    FOR 24 HOURS DO NOT:  Drive, operate machinery or run household appliances.  Drink beer or alcoholic beverages.   Make important decisions or sign legal documents.      DIET: To avoid nausea, slowly advance diet as tolerated, avoiding spicy or greasy foods for the first day.  Add more substantial food to your diet according to your physician's instructions.  Babies can be fed formula or breast milk as soon as they are hungry.  INCREASE FLUIDS AND FIBER TO AVOID CONSTIPATION.    SURGICAL DRESSING/BATHING:     Keep the dressing clean and dry for 24 hours.  May remove dressing tomorrow at 12 noon and shower.  Do not submerge for 1 week.  No heavy lifting.    FOLLOW-UP APPOINTMENT:  A follow-up appointment should be arranged with your doctor; call to schedule.    You should CALL YOUR PHYSICIAN if you develop:  Fever greater than 101 degrees F.  Pain not relieved by medication, or persistent nausea or vomiting.  Excessive bleeding (blood soaking through dressing) or unexpected drainage from the wound.  Extreme redness or swelling around " the incision site, drainage of pus or foul smelling drainage.  Inability to urinate or empty your bladder within 8 hours.  Problems with breathing or chest pain.    You should call 911 if you develop problems with breathing or chest pain.  If you are unable to contact your doctor or surgical center, you should go to the nearest emergency room or urgent care center.      Physician's telephone #: [phone]    If any questions arise, call your doctor.  If your doctor is not available, please feel free to call the Surgical Center at [phone].  The Center is open Monday through Friday from 7AM to 7PM.  You can also call the HEALTH HOTLINE open 24 hours/day, 7 days/week and speak to a nurse at [phone], or toll free at [phone].    A registered nurse may call you a few days after your surgery to see how you are doing after your procedure.    MEDICATIONS: Resume taking daily medication.  Take prescribed pain medication with food.  If no medication is prescribed, you may take non-aspirin pain medication if needed.  PAIN MEDICATION CAN BE VERY CONSTIPATING.  Take a stool softener or laxative such as senokot, pericolace, or milk of magnesia if needed.      If your physician has prescribed pain medication that includes Acetaminophen (Tylenol), do not take additional Acetaminophen (Tylenol) while taking the prescribed medication.    Depression / Suicide Risk    As you are discharged from this Critical access hospital facility, it is important to learn how to keep safe from harming yourself.    Recognize the warning signs:  · Abrupt changes in personality, positive or negative- including increase in energy   · Giving away possessions  · Change in eating patterns- significant weight changes-  positive or negative  · Change in sleeping patterns- unable to sleep or sleeping all the time   · Unwillingness or inability to communicate  · Depression  · Unusual sadness, discouragement and loneliness  · Talk of wanting to  die  · Neglect of personal appearance   · Rebelliousness- reckless behavior  · Withdrawal from people/activities they love  · Confusion- inability to concentrate     If you or a loved one observes any of these behaviors or has concerns about self-harm, here's what you can do:  · Talk about it- your feelings and reasons for harming yourself  · Remove any means that you might use to hurt yourself (examples: pills, rope, extension cords, firearm)  · Get professional help from the community (Mental Health, Substance Abuse, psychological counseling)  · Do not be alone:Call your Safe Contact- someone whom you trust who will be there for you.  · Call your local CRISIS HOTLINE 005-2663 or 196-650-0211  · Call your local Children's Mobile Crisis Response Team [place] (317) 567-0420 or www.[employer]  · Call the toll free National Suicide Prevention Hotlines   · National Suicide Prevention Lifeline 522-397-TEPV (8164)  · [employer] Line Network 800-SUICIDE (976-9672)    Inferior Vena Cava Filter Removal  AFTER THE PROCEDURE    · You will stay in a recovery area until any sedation medicine you were given has worn off. Your blood pressure and  pulse will be checked.  · If there are no problems, you should be able to go home after the procedure.  · You may feel sore at the area of the needle insertion for a few days.     This information is not intended to replace advice given to you by your health care provider. Make sure you discuss any questions you have with your health care provider.              Medication List      CHANGE how you take these medications        Instructions    ferrous sulfate 325 (65 FE) MG tablet   What changed:  when to take this    Take 1 Tab by mouth 2 Times a Day.   Dose:  325 mg       potassium chloride SA 20 MEQ Tbcr   What changed:  when to take this   Commonly known as:  Kdur    Take 1 Tab by mouth 2 times a day.   Dose:  20 mEq         CONTINUE taking these medications         Instructions    ALEVE 220 MG tablet   Generic drug:  naproxen    Take 220 mg by mouth 2 times a day, with meals.   Dose:  220 mg       carvedilol 6.25 MG Tabs   Commonly known as:  COREG    Take 6.25 mg by mouth 2 times a day, with meals.   Dose:  6.25 mg       enoxaparin 150 MG/ML Soln   Commonly known as:  LOVENOX    Inject 150 mg as instructed every day.   Dose:  150 mg       furosemide 40 MG Tabs   Commonly known as:  LASIX    Take 1 Tab by mouth every day.   Dose:  40 mg       levothyroxine 100 MCG Tabs   Commonly known as:  SYNTHROID    Take 100 mcg by mouth Every morning on an empty stomach.   Dose:  100 mcg       Melatonin 500 MCG Tbdp    Take 1,000-2,000 mcg by mouth at bedtime as needed (sleep).   Dose:  8032-9515 mcg       MOVE FREE JOINT HEALTH ADVANCE Tabs    Take  by mouth every day.       primidone 250 MG Tabs   Commonly known as:  MYSOLINE    Take 250-500 mg by mouth 2 Times a Day. 250 mg in the AM and 500 mg at HS   Dose:  250-500 mg       PROBIOTIC DAILY PO    Take  by mouth every day.       sennosides-docusate sodium 8.6-50 MG tablet   Commonly known as:  SENOKOT-S    Take 1-2 Tabs by mouth 2 times a day as needed (constipation).   Dose:  1-2 Tab       Vitamin D-3 1000 UNITS Caps    Take 1,000 Units by mouth every day.   Dose:  1000 Units       warfarin 5 MG Tabs   Commonly known as:  COUMADIN    Doctor's comments:  This rx was submitted by a pharmacist working under a collaborative practice agreement.   Take three tablets daily as directed by coumadin clininc       WOMENS MULTI VITAMIN & MINERAL Tabs    Take 1 Tab by mouth every day.   Dose:  1 Tab               Medication Information     Above and/or attached are the medications your physician expects you to take upon discharge. Review all of your home medications and newly ordered medications with your doctor and/or pharmacist. Follow medication instructions as directed by your doctor and/or pharmacist. Please keep your medication list with you  and share with your physician. Update the information when medications are discontinued, doses are changed, or new medications (including over-the-counter products) are added; and carry medication information at all times in the event of emergency situations.        Resources     Quit Smoking / Tobacco Use:    I understand the use of any tobacco products increases my chance of suffering from future heart disease or stroke and could cause other illnesses which may shorten my life. Quitting the use of tobacco products is the single most important thing I can do to improve my health. For further information on smoking / tobacco cessation call a Toll Free Quit Line at 1-293.208.9694 (*National Cancer [place]) or 1-396.421.1239 (American Lung Association) or you can access the web based program at www.lungSitemasher.org.    [place] Tobacco Users Help Line:  (142) 602-5031       Toll Free: 1-893.900.4214  Quit Tobacco Program [facility] (219)214-8192    Crisis Hotline:    [place] Crisis Hotline:  3-057-VJZJVLP or 1-963.677.6370    [place] Crisis Hotline:    1-634.516.3065 or 037-016-3829    Discharge Survey:   Thank you for choosing [facility]. We hope we did everything we could to make your hospital stay a pleasant one. You may be receiving a survey and we would appreciate your time and participation in answering the questions. Your input is very valuable to us in our efforts to improve our service to our patients and their families.            Signatures     My signature on this form indicates that:    1. I acknowledge receipt and understanding of these Home Care Instruction.  2. My questions regarding this information have been answered to my satisfaction.  3. I have formulated a plan with my discharge nurse to obtain my prescribed medications for home.    __________________________________      __________________________________                   Patient Signature                                  Guardian/Responsible Adult Signature      __________________________________                 __________       ________                       Nurse Signature                                               Date                 Time

## 2017-02-17 ENCOUNTER — TELEPHONE (OUTPATIENT)
Dept: VASCULAR LAB | Facility: MEDICAL CENTER | Age: 77
End: 2017-02-17

## 2017-02-17 ENCOUNTER — TELEPHONE (OUTPATIENT)
Dept: MEDICAL GROUP | Age: 77
End: 2017-02-17

## 2017-02-18 NOTE — TELEPHONE ENCOUNTER
Spoke with patient over the phone regarding nocturnal oxymetry result. Patient has decreased oxygen saturation at night. Patient stated that she already has oxygen at home and she will continue to use oxygen at night. She stated that she has IVC filter removed on 2/15/17 and she is doing well. I will see her again on 3/15/17.    Ninfa Marcial M.D.

## 2017-02-21 ENCOUNTER — ANTICOAGULATION VISIT (OUTPATIENT)
Dept: VASCULAR LAB | Facility: MEDICAL CENTER | Age: 77
End: 2017-02-21
Attending: INTERNAL MEDICINE
Payer: MEDICARE

## 2017-02-21 DIAGNOSIS — Z86.711 PERSONAL HISTORY OF PULMONARY EMBOLISM: ICD-10-CM

## 2017-02-21 LAB — INR PPP: 1.8 (ref 2–3.5)

## 2017-02-21 PROCEDURE — 99212 OFFICE O/P EST SF 10 MIN: CPT

## 2017-02-21 PROCEDURE — 85610 PROTHROMBIN TIME: CPT

## 2017-02-21 NOTE — PROGRESS NOTES
Anticoagulation Summary as of 2/21/2017     INR goal 2.0-3.0   Selected INR 1.8! (2/21/2017)   Maintenance plan 15 mg (5 mg x 3) every day   Weekly total 105 mg   Plan last modified MARGARET BauerD (12/19/2016)   Next INR check 2/23/2017   Target end date     Indications   Personal history of pulmonary embolism [Z86.711]         Anticoagulation Episode Summary     INR check location     Preferred lab     Send INR reminders to     Comments       Anticoagulation Care Providers     Provider Role Specialty Phone number    Ninfa Marcial M.D. Referring Internal Medicine [phone]    [facility] Responsible  [phone]        Anticoagulation Patient Findings   Negatives Missed Doses, Extra Doses, Medication Changes, Antibiotic Use, Diet Changes, Dental/Other Procedures, Hospitalization, Bleeding Gums, Nose Bleeds, Blood in Urine, Blood in Stool, Any Bruising, Other Complaints        Patient seen in clinic today, and INR was SUB-therapeutic at 1.8.  Confirmed the current warfarin dosing regimen and patient compliance.  Patient just at tail end of bridging from a procedure. Patient denies any interval changes to diet and/or medications. Patient denies any signs/symptoms of bleeding or clotting. Patient will use last shot of enoxaparin, along with bolus with 20mg TONIGHT ONLY, then will resume her normal regimen of 15mg QD. Patient will follow up in 48 hrs in [facility].    Next appt Thurs Feb 23, 2017 8:45am    Natividad GranadosD

## 2017-02-21 NOTE — MR AVS SNAPSHOT
Natalie Anaya   2017 2:30 PM   Anticoagulation Visit   MRN: [id number]    Department:  Vascular Medicine   Dept Phone:  [phone]    Description:  Female : 1940   Provider:  [facility] EXAM 5           Allergies as of 2017     Allergen Noted Reactions    Tape 10/21/2015   Rash    Adhesive band aids cause a rash  Paper tape ok      You were diagnosed with     Personal history of pulmonary embolism   [V12.55.ICD-9-CM]         Vital Signs     Smoking Status                   Never Smoker            Basic Information     Date Of Birth Sex Race Ethnicity Preferred Language    1940 Female White Non- English      Your appointments     2017  2:30 PM   Established Patient with [facility] EXAM 5   [facility]  (--)    [address] [place] [address]   [phone]            2017  8:45 AM   Anti-Coag Routine with [facility] PHARMACIST   [facility] [place] [facility]    [address]  [address]  [place] [address] [phone]            Mar 15, 2017 11:00 AM   Established Patient with Ninfa Marcial M.D.   [facility]    [address]  [place] [address] [phone]           You will be receiving a confirmation call a few days before your appointment from our automated call confirmation system.              Problem List              ICD-10-CM Priority Class Noted - Resolved    Chronic insomnia F51.04   2013 - Present    HTN (hypertension), benign I10 Low  2013 - Present    History of cataract removal with insertion of prosthetic lens Z98.49, Z96.1   7/15/2014 - Present    Seizure disorder (HCC) G40.909 Low  10/21/2015 - Present    Other specified hypothyroidism E03.8   2016 - Present    Personal history of pulmonary embolism Z86.711   2016 - Present    Iron deficiency anemia D50.9   3/4/2016 - Present    Diastolic  dysfunction I51.9   3/4/2016 - Present    Hiatal hernia K44.9   3/4/2016 - Present    Closed fracture of multiple ribs of left side S22.42XA   9/26/2016 - Present    Slow transit constipation K59.01   9/26/2016 - Present    Closed left hip fracture (CMS-HCC) S72.002A Medium  10/5/2016 - Present    Frequent falls R29.6 Medium  10/7/2016 - Present    Hyperglycemia R73.9 Medium  10/7/2016 - Present    Recurrent pulmonary embolism (CMS-[facility]) I26.99 Medium  11/2/2016 - Present    Hip hematoma, left S70.02XA   11/17/2016 - Present    Hospital discharge follow-up Z09   11/30/2016 - Present    Presence of IVC filter Z95.828   12/30/2016 - Present      Health Maintenance        Date Due Completion Dates    BONE DENSITY 10/1/2019 10/1/2014 (Declined)    Override on 10/1/2014: Patient Declined    IMM DTaP/Tdap/Td Vaccine (2 - Td) 10/21/2025 10/21/2015    COLONOSCOPY 4/29/2026 4/29/2016, 4/29/2016            Results     POCT Protime      Component    INR    1.8                        Current Immunizations     13-VALENT PCV PREVNAR 8/1/2016    Hepatitis A Vaccine, Ped/Adol 10/18/2010    Hepatitis B Vaccine Non-Recombivax (Ped/Adol) 10/18/2010    INFLUENZA VACCINE H1N1 10/9/2011, 2/22/2010    Influenza TIV (IM) 9/30/2014, 9/12/2012, 10/18/2010    Influenza Vaccine Adult HD 11/22/2016  4:16 PM, 10/7/2016 12:37 PM    Influenza Vaccine Quad Inj (Pf) 9/19/2015, 9/19/2015    Pneumococcal Vaccine (UF)Historical Data 10/9/2011    Pneumococcal polysaccharide vaccine (PPSV-23) 10/18/2010    SHINGLES VACCINE 12/31/2010    Tdap Vaccine 10/21/2015      Below and/or attached are the medications your provider expects you to take. Review all of your home medications and newly ordered medications with your provider and/or pharmacist. Follow medication instructions as directed by your provider and/or pharmacist. Please keep your medication list with you and share with your provider. Update the information when medications are discontinued, doses  are changed, or new medications (including over-the-counter products) are added; and carry medication information at all times in the event of emergency situations     Allergies:  TAPE - Rash               Medications  Valid as of: February 21, 2017 -  2:26 PM    Generic Name Brand Name Tablet Size Instructions for use    Carvedilol (Tab) COREG 6.25 MG Take 6.25 mg by mouth 2 times a day, with meals.        Cholecalciferol (Cap) Vitamin D-3 1000 UNITS Take 1,000 Units by mouth every day.        Enoxaparin Sodium (Solution) LOVENOX 150 MG/ML Inject 150 mg as instructed every day.        Ferrous Sulfate (Tab) ferrous sulfate 325 (65 FE) MG Take 1 Tab by mouth 2 Times a Day.        Furosemide (Tab) LASIX 40 MG Take 1 Tab by mouth every day.        Glucos-Chond-Hyal Ac-Ca Fructo (Tab) MOVE FREE JOINT HEALTH ADVANCE  Take  by mouth every day.        Levothyroxine Sodium (Tab) SYNTHROID 100 MCG Take 100 mcg by mouth Every morning on an empty stomach.        Melatonin (TABLET DISPERSIBLE) Melatonin 500 MCG Take 1,000-2,000 mcg by mouth at bedtime as needed (sleep).        Multiple Vitamins-Minerals (Tab) WOMENS MULTI VITAMIN & MINERAL  Take 1 Tab by mouth every day.        Naproxen Sodium (Tab) ANAPROX 220 MG Take 220 mg by mouth 2 times a day, with meals.        Potassium Chloride Torrie CR (Tab CR) Kdur 20 MEQ Take 1 Tab by mouth 2 times a day.        Primidone (Tab) MYSOLINE 250 MG Take 250-500 mg by mouth 2 Times a Day. 250 mg in the AM and 500 mg at HS        Probiotic Product   Take  by mouth every day.        Sennosides-Docusate Sodium (Tab) SENOKOT-S 8.6-50 MG Take 1-2 Tabs by mouth 2 times a day as needed (constipation).        Warfarin Sodium (Tab) COUMADIN 5 MG Take three tablets daily as directed by coumadin clininc        .                 Medicines prescribed today were sent to:     [id number] - [place] - [address] AT [address] [address] [facility] [address] [facility]    [address]  [address]    Phone: [phone] Fax: [phone]    Open 24 Hours?: No      Medication refill instructions:       If your prescription bottle indicates you have medication refills left, it is not necessary to call your provider’s office. Please contact your pharmacy and they will refill your medication.    If your prescription bottle indicates you do not have any refills left, you may request refills at any time through one of the following ways: The online [employer] system (except Urgent Care), by calling your provider’s office, or by asking your pharmacy to contact your provider’s office with a refill request. Medication refills are processed only during regular business hours and may not be available until the next business day. Your provider may request additional information or to have a follow-up visit with you prior to refilling your medication.   *Please Note: Medication refills are assigned a new Rx number when refilled electronically. Your pharmacy may indicate that no refills were authorized even though a new prescription for the same medication is available at the pharmacy. Please request the medicine by name with the pharmacy before contacting your provider for a refill.        Other Notes About Your Plan     Please assess for the following diagnosis:    -this patient has been on Ambien for insomnia since 7/2010. ? 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 ?other convulsions, code R56.9                   Warfarin Dosing Calendar   February 2017 Details    Sun Mon Tue Wed Thu Fri Sat        1               2               3               4                 5               6               7               8               9               10               11                 12               13               14               15               16               17               18                 19                20               21   1.8   20 mg   See details      22      15 mg         23      15 mg         24               25                 26               27               28                    Date Details   02/21 This INR check   INR: 1.8       Date of next INR:  2/23/2017         How to take your warfarin dose     To take:  15 mg Take 3 of the 5 mg tablets.    To take:  20 mg Take 4 of the 5 mg tablets.              [employer] Access Code: Activation code not generated  Current [employer] Status: Active

## 2017-02-23 ENCOUNTER — ANTICOAGULATION VISIT (OUTPATIENT)
Dept: MEDICAL GROUP | Facility: MEDICAL CENTER | Age: 77
End: 2017-02-23
Payer: MEDICARE

## 2017-02-23 DIAGNOSIS — Z86.711 PERSONAL HISTORY OF PULMONARY EMBOLISM: ICD-10-CM

## 2017-02-23 DIAGNOSIS — Z79.01 CHRONIC ANTICOAGULATION: ICD-10-CM

## 2017-02-23 LAB
INR BLD: 1.8 (ref 0.9–1.2)
INR PPP: 1.7 (ref 2–3.5)

## 2017-02-23 PROCEDURE — 85610 PROTHROMBIN TIME: CPT | Performed by: FAMILY MEDICINE

## 2017-02-23 NOTE — PROGRESS NOTES
Anticoagulation Summary as of 2/23/2017     INR goal 2.0-3.0   Selected INR 1.7! (2/23/2017)   Maintenance plan 20 mg (5 mg x 4) on Thu, Sat; 15 mg (5 mg x 3) all other days   Weekly total 115 mg   Plan last modified Maty Perkins, PHARMD (2/23/2017)   Next INR check 3/1/2017   Target end date     Indications   Personal history of pulmonary embolism [Z86.711]         Anticoagulation Episode Summary     INR check location     Preferred lab     Send INR reminders to     Comments       Anticoagulation Care Providers     Provider Role Specialty Phone number    Ninfa Marcial M.D. Referring Internal Medicine [phone]    [facility] Responsible  [phone]        Anticoagulation Patient Findings   Negatives Missed Doses, Extra Doses, Medication Changes, Antibiotic Use, Diet Changes, Dental/Other Procedures, Hospitalization, Bleeding Gums, Nose Bleeds, Blood in Urine, Blood in Stool, Any Bruising, Other Complaints        Pt is sub therapeutic today, despite bolus dosing.  Will increase weekly dose by 10%. Pt denies any unusual s/s of bleeding, bruising, clotting or any changes to diet or medications.    Follow up in 1 weeks.    Maty Perkins, PHARMD

## 2017-02-23 NOTE — MR AVS SNAPSHOT
Natalie Anaya   2017 8:45 AM   Anticoagulation Visit   MRN: [id number]    Department:  [facility]   Dept Phone:  [phone]    Description:  Female : 1940   Provider:  Maty KEENE Filter           Allergies as of 2017     Allergen Noted Reactions    Tape 10/21/2015   Rash    Adhesive band aids cause a rash  Paper tape ok      You were diagnosed with     Personal history of pulmonary embolism   [V12.55.ICD-9-CM]         Vital Signs     Smoking Status                   Never Smoker            Basic Information     Date Of Birth Sex Race Ethnicity Preferred Language    1940 Female White Non- English      Your appointments     2017  8:45 AM   Anti-Coag Routine with Maty KEENE Filter   [facility]    [address]  [address]  [facility] [address] [phone]            Mar 01, 2017  9:45 AM   Established Patient with [facility] EXAM 4   [facility]  (--)    [address] [facility] [address] [phone]            Mar 15, 2017 11:00 AM   Established Patient with Ninfa Marcial M.D.   [address] ([facility])    [address]  [facility] [address] [phone]           You will be receiving a confirmation call a few days before your appointment from our automated call confirmation system.              Problem List              ICD-10-CM Priority Class Noted - Resolved    Chronic insomnia F51.04   2013 - Present    HTN (hypertension), benign I10 Low  2013 - Present    History of cataract removal with insertion of prosthetic lens Z98.49, Z96.1   7/15/2014 - Present    Seizure disorder (HCC) G40.909 Low  10/21/2015 - Present    Other specified hypothyroidism E03.8   2016 - Present    Personal history of pulmonary embolism Z86.711   2016 - Present    Iron deficiency anemia D50.9   3/4/2016 - Present    Diastolic dysfunction  I51.9   3/4/2016 - Present    Hiatal hernia K44.9   3/4/2016 - Present    Closed fracture of multiple ribs of left side S22.42XA   9/26/2016 - Present    Slow transit constipation K59.01   9/26/2016 - Present    Closed left hip fracture (CMS-HCC) S72.002A Medium  10/5/2016 - Present    Frequent falls R29.6 Medium  10/7/2016 - Present    Hyperglycemia R73.9 Medium  10/7/2016 - Present    Recurrent pulmonary embolism (CMS-[facility]) I26.99 Medium  11/2/2016 - Present    Hip hematoma, left S70.02XA   11/17/2016 - Present    Hospital discharge follow-up Z09   11/30/2016 - Present    Presence of IVC filter Z95.828   12/30/2016 - Present      Health Maintenance        Date Due Completion Dates    BONE DENSITY 10/1/2019 10/1/2014 (Declined)    Override on 10/1/2014: Patient Declined    IMM DTaP/Tdap/Td Vaccine (2 - Td) 10/21/2025 10/21/2015    COLONOSCOPY 4/29/2026 4/29/2016, 4/29/2016            Results     POCT Protime      Component    INR    1.7    Comment:     144 580 11 exp 01/2017 ic valid                        Current Immunizations     13-VALENT PCV PREVNAR 8/1/2016    Hepatitis A Vaccine, Ped/Adol 10/18/2010    Hepatitis B Vaccine Non-Recombivax (Ped/Adol) 10/18/2010    INFLUENZA VACCINE H1N1 10/9/2011, 2/22/2010    Influenza TIV (IM) 9/30/2014, 9/12/2012, 10/18/2010    Influenza Vaccine Adult HD 11/22/2016  4:16 PM, 10/7/2016 12:37 PM    Influenza Vaccine Quad Inj (Pf) 9/19/2015, 9/19/2015    Pneumococcal Vaccine (UF)Historical Data 10/9/2011    Pneumococcal polysaccharide vaccine (PPSV-23) 10/18/2010    SHINGLES VACCINE 12/31/2010    Tdap Vaccine 10/21/2015      Below and/or attached are the medications your provider expects you to take. Review all of your home medications and newly ordered medications with your provider and/or pharmacist. Follow medication instructions as directed by your provider and/or pharmacist. Please keep your medication list with you and share with your provider. Update the information when  medications are discontinued, doses are changed, or new medications (including over-the-counter products) are added; and carry medication information at all times in the event of emergency situations     Allergies:  TAPE - Rash               Medications  Valid as of: February 23, 2017 -  8:40 AM    Generic Name Brand Name Tablet Size Instructions for use    Carvedilol (Tab) COREG 6.25 MG Take 6.25 mg by mouth 2 times a day, with meals.        Cholecalciferol (Cap) Vitamin D-3 1000 UNITS Take 1,000 Units by mouth every day.        Enoxaparin Sodium (Solution) LOVENOX 150 MG/ML Inject 150 mg as instructed every day.        Ferrous Sulfate (Tab) ferrous sulfate 325 (65 FE) MG Take 1 Tab by mouth 2 Times a Day.        Furosemide (Tab) LASIX 40 MG Take 1 Tab by mouth every day.        Glucos-Chond-Hyal Ac-Ca Fructo (Tab) MOVE FREE JOINT HEALTH ADVANCE  Take  by mouth every day.        Levothyroxine Sodium (Tab) SYNTHROID 100 MCG Take 100 mcg by mouth Every morning on an empty stomach.        Melatonin (TABLET DISPERSIBLE) Melatonin 500 MCG Take 1,000-2,000 mcg by mouth at bedtime as needed (sleep).        Multiple Vitamins-Minerals (Tab) WOMENS MULTI VITAMIN & MINERAL  Take 1 Tab by mouth every day.        Naproxen Sodium (Tab) ANAPROX 220 MG Take 220 mg by mouth 2 times a day, with meals.        Potassium Chloride Torrie CR (Tab CR) Kdur 20 MEQ Take 1 Tab by mouth 2 times a day.        Primidone (Tab) MYSOLINE 250 MG Take 250-500 mg by mouth 2 Times a Day. 250 mg in the AM and 500 mg at HS        Probiotic Product   Take  by mouth every day.        Sennosides-Docusate Sodium (Tab) SENOKOT-S 8.6-50 MG Take 1-2 Tabs by mouth 2 times a day as needed (constipation).        Warfarin Sodium (Tab) COUMADIN 5 MG Take three tablets daily as directed by coumadin clininc        .                 Medicines prescribed today were sent to:     [id number] - [place], [place] - [address] [facility]  [address]    [address] [address]    Phone: [phone] Fax: [phone]    Open 24 Hours?: No      Medication refill instructions:       If your prescription bottle indicates you have medication refills left, it is not necessary to call your provider’s office. Please contact your pharmacy and they will refill your medication.    If your prescription bottle indicates you do not have any refills left, you may request refills at any time through one of the following ways: The online [employer] system (except Urgent Care), by calling your provider’s office, or by asking your pharmacy to contact your provider’s office with a refill request. Medication refills are processed only during regular business hours and may not be available until the next business day. Your provider may request additional information or to have a follow-up visit with you prior to refilling your medication.   *Please Note: Medication refills are assigned a new Rx number when refilled electronically. Your pharmacy may indicate that no refills were authorized even though a new prescription for the same medication is available at the pharmacy. Please request the medicine by name with the pharmacy before contacting your provider for a refill.        Other Notes About Your Plan     Please assess for the following diagnosis:    -this patient has been on Ambien for insomnia since 7/2010. ? 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 ?other convulsions, code R56.9                   Warfarin Dosing Calendar   February 2017 Details    Sun Mon Tue Wed Thu Fri Sat        1               2               3               4                 5               6               7               8               9               10               11                 12               13               14               15               16               17                  18                 19               20               21               22               23   1.7   20 mg   See details      24      15 mg         25      20 mg           26      15 mg         27      15 mg         28      15 mg              Date Details   02/23 This INR check   INR: 1.7   144 580 11 exp 01/2017 ic valid               How to take your warfarin dose     To take:  15 mg Take 3 of the 5 mg tablets.    To take:  20 mg Take 4 of the 5 mg tablets.           Warfarin Dosing Calendar   March 2017 Details    Sun Mon Tue Wed Thu Fri Sat        1      15 mg         2               3               4                 5               6               7               8               9               10               11                 12               13               14               15               16               17               18                 19               20               21               22               23               24               25                 26               27               28               29               30               31                 Date Details   No additional details    Date of next INR:  3/1/2017         How to take your warfarin dose     To take:  15 mg Take 3 of the 5 mg tablets.              [employer] Access Code: Activation code not generated  Current [employer] Status: Active

## 2017-03-01 ENCOUNTER — ANTICOAGULATION VISIT (OUTPATIENT)
Dept: VASCULAR LAB | Facility: MEDICAL CENTER | Age: 77
End: 2017-03-01
Attending: INTERNAL MEDICINE
Payer: MEDICARE

## 2017-03-01 DIAGNOSIS — Z86.711 PERSONAL HISTORY OF PULMONARY EMBOLISM: ICD-10-CM

## 2017-03-01 DIAGNOSIS — Z79.01 CHRONIC ANTICOAGULATION: ICD-10-CM

## 2017-03-01 LAB
INR BLD: 1.9 (ref 0.9–1.2)
INR PPP: 1.9 (ref 2–3.5)

## 2017-03-01 PROCEDURE — 99212 OFFICE O/P EST SF 10 MIN: CPT | Performed by: NURSE PRACTITIONER

## 2017-03-01 PROCEDURE — 85610 PROTHROMBIN TIME: CPT

## 2017-03-01 NOTE — PROGRESS NOTES
Anticoagulation Summary as of 3/1/2017     INR goal 2.0-3.0   Selected INR 1.9! (3/1/2017)   Maintenance plan 15 mg (5 mg x 3) on Mon, Wed, Fri; 20 mg (5 mg x 4) all other days   Weekly total 125 mg   Plan last modified ED MartinsP.CAITIE (3/1/2017)   Next INR check 3/8/2017   Target end date     Indications   Personal history of pulmonary embolism [Z86.711]  Chronic anticoagulation [Z79.01]         Anticoagulation Episode Summary     INR check location     Preferred lab     Send INR reminders to     Comments       Anticoagulation Care Providers     Provider Role Specialty Phone number    Ninfa Marcial M.D. Referring Internal Medicine [phone]    [facility] Responsible  [phone]        Patient is slightly subtherapeutic today. Ran out of Lovenox.  Denies any medication or diet changes. No current symptoms of bleeding or thrombosis reported. Declines Lovenox. Will increase warfarin regimen. Follow up in 1 week.    Next Appointment: Tuesday, March 7, 2017 at 2:00 pm Arnulfo CELESTE

## 2017-03-01 NOTE — MR AVS SNAPSHOT
Natalie Anaya   3/1/2017 9:45 AM   Anticoagulation Visit   MRN: [id number]    Department:  Vascular Medicine   Dept Phone:  [phone]    Description:  Female : 1940   Provider:  [facility] EXAM 4           Allergies as of 3/1/2017     Allergen Noted Reactions    Tape 10/21/2015   Rash    Adhesive band aids cause a rash  Paper tape ok      You were diagnosed with     Chronic anticoagulation   [386347]       Personal history of pulmonary embolism   [V12.55.ICD-9-CM]         Vital Signs     Smoking Status                   Never Smoker            Basic Information     Date Of Birth Sex Race Ethnicity Preferred Language    1940 Female White Non- English      Your appointments     Mar 07, 2017  2:00 PM   Anti-Coag Routine with [facility] PAV PHARMACIST   [facility] ([place])    [address]  [address]  [place] [id number] [phone]            Mar 15, 2017 11:00 AM   Established Patient with Ninfa Marcial M.D.   [facility] [address] [place] [address])    [address]  [place] [address] [phone]           You will be receiving a confirmation call a few days before your appointment from our automated call confirmation system.              Problem List              ICD-10-CM Priority Class Noted - Resolved    Chronic insomnia F51.04   2013 - Present    HTN (hypertension), benign I10 Low  2013 - Present    History of cataract removal with insertion of prosthetic lens Z98.49, Z96.1   7/15/2014 - Present    Seizure disorder (HCC) G40.909 Low  10/21/2015 - Present    Other specified hypothyroidism E03.8   2016 - Present    Personal history of pulmonary embolism Z86.711   2016 - Present    Iron deficiency anemia D50.9   3/4/2016 - Present    Diastolic dysfunction I51.9   3/4/2016 - Present    Hiatal hernia K44.9   3/4/2016 - Present    Closed fracture of multiple ribs of left side S22.42XA   2016 - Present    Slow transit constipation K59.01   9/26/2016 - Present    Closed left hip fracture (CMS-HCC) S72.002A Medium  10/5/2016 - Present    Frequent falls R29.6 Medium  10/7/2016 - Present    Hyperglycemia R73.9 Medium  10/7/2016 - Present    Recurrent pulmonary embolism (CMS-HCC) I26.99 Medium  11/2/2016 - Present    Hip hematoma, left S70.02XA   11/17/2016 - Present    Hospital discharge follow-up Z09   11/30/2016 - Present    Presence of IVC filter Z95.828   12/30/2016 - Present    Chronic anticoagulation Z79.01   2/23/2017 - Present      Health Maintenance        Date Due Completion Dates    BONE DENSITY 10/1/2019 10/1/2014 (Declined)    Override on 10/1/2014: Patient Declined    IMM DTaP/Tdap/Td Vaccine (2 - Td) 10/21/2025 10/21/2015    COLONOSCOPY 4/29/2026 4/29/2016, 4/29/2016            Results     POCT Protime      Component    INR    1.9                        Current Immunizations     13-VALENT PCV PREVNAR 8/1/2016    Hepatitis A Vaccine, Ped/Adol 10/18/2010    Hepatitis B Vaccine Non-Recombivax (Ped/Adol) 10/18/2010    INFLUENZA VACCINE H1N1 10/9/2011, 2/22/2010    Influenza TIV (IM) 9/30/2014, 9/12/2012, 10/18/2010    Influenza Vaccine Adult HD 11/22/2016  4:16 PM, 10/7/2016 12:37 PM    Influenza Vaccine Quad Inj (Pf) 9/19/2015, 9/19/2015    Pneumococcal Vaccine (UF)Historical Data 10/9/2011    Pneumococcal polysaccharide vaccine (PPSV-23) 10/18/2010    SHINGLES VACCINE 12/31/2010    Tdap Vaccine 10/21/2015      Below and/or attached are the medications your provider expects you to take. Review all of your home medications and newly ordered medications with your provider and/or pharmacist. Follow medication instructions as directed by your provider and/or pharmacist. Please keep your medication list with you and share with your provider. Update the information when medications are discontinued, doses are changed, or new medications (including over-the-counter products) are added; and carry medication  information at all times in the event of emergency situations     Allergies:  TAPE - Rash               Medications  Valid as of: March 01, 2017 - 10:04 AM    Generic Name Brand Name Tablet Size Instructions for use    Carvedilol (Tab) COREG 6.25 MG Take 6.25 mg by mouth 2 times a day, with meals.        Cholecalciferol (Cap) Vitamin D-3 1000 UNITS Take 1,000 Units by mouth every day.        Enoxaparin Sodium (Solution) LOVENOX 150 MG/ML Inject 150 mg as instructed every day.        Ferrous Sulfate (Tab) ferrous sulfate 325 (65 FE) MG Take 1 Tab by mouth 2 Times a Day.        Furosemide (Tab) LASIX 40 MG Take 1 Tab by mouth every day.        Glucos-Chond-Hyal Ac-Ca Fructo (Tab) MOVE FREE JOINT HEALTH ADVANCE  Take  by mouth every day.        Levothyroxine Sodium (Tab) SYNTHROID 100 MCG Take 100 mcg by mouth Every morning on an empty stomach.        Melatonin (TABLET DISPERSIBLE) Melatonin 500 MCG Take 1,000-2,000 mcg by mouth at bedtime as needed (sleep).        Multiple Vitamins-Minerals (Tab) WOMENS MULTI VITAMIN & MINERAL  Take 1 Tab by mouth every day.        Naproxen Sodium (Tab) ANAPROX 220 MG Take 220 mg by mouth 2 times a day, with meals.        Potassium Chloride Torrie CR (Tab CR) Kdur 20 MEQ Take 1 Tab by mouth 2 times a day.        Primidone (Tab) MYSOLINE 250 MG Take 250-500 mg by mouth 2 Times a Day. 250 mg in the AM and 500 mg at HS        Probiotic Product   Take  by mouth every day.        Sennosides-Docusate Sodium (Tab) SENOKOT-S 8.6-50 MG Take 1-2 Tabs by mouth 2 times a day as needed (constipation).        Warfarin Sodium (Tab) COUMADIN 5 MG Take three tablets daily as directed by coumadin clininc        .                 Medicines prescribed today were sent to:     [facility] [address] [place] [address] [place] - [address] AT [facility] & [facility]    [address] [facility] [address]    Phone: [phone] Fax: [phone]    Open 24 Hours?: No      Medication refill  instructions:       If your prescription bottle indicates you have medication refills left, it is not necessary to call your provider’s office. Please contact your pharmacy and they will refill your medication.    If your prescription bottle indicates you do not have any refills left, you may request refills at any time through one of the following ways: The online [employer] system (except Urgent Care), by calling your provider’s office, or by asking your pharmacy to contact your provider’s office with a refill request. Medication refills are processed only during regular business hours and may not be available until the next business day. Your provider may request additional information or to have a follow-up visit with you prior to refilling your medication.   *Please Note: Medication refills are assigned a new Rx number when refilled electronically. Your pharmacy may indicate that no refills were authorized even though a new prescription for the same medication is available at the pharmacy. Please request the medicine by name with the pharmacy before contacting your provider for a refill.        Other Notes About Your Plan     Please assess for the following diagnosis:    -this patient has been on Ambien for insomnia since 7/2010. ? 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 ?other convulsions, code R56.9                   Warfarin Dosing Calendar   March 2017 Details    Sun Mon Tue Wed Thu Fri Sat        1   1.9   20 mg   See details      2      20 mg         3      15 mg         4      20 mg           5      20 mg         6      15 mg         7               8               9               10               11                 12               13               14               15               16               17               18                 19               20               21               22               23                24               25                 26               27               28               29               30               31                 Date Details   03/01 This INR check   INR: 1.9       Date of next INR:  3/6/2017         How to take your warfarin dose     To take:  15 mg Take 3 of the 5 mg tablets.    To take:  20 mg Take 4 of the 5 mg tablets.              [employer] Access Code: Activation code not generated  Current [employer] Status: Active

## 2017-03-07 ENCOUNTER — ANTICOAGULATION VISIT (OUTPATIENT)
Dept: MEDICAL GROUP | Facility: MEDICAL CENTER | Age: 77
End: 2017-03-07
Payer: MEDICARE

## 2017-03-07 DIAGNOSIS — Z86.711 PERSONAL HISTORY OF PULMONARY EMBOLISM: ICD-10-CM

## 2017-03-07 DIAGNOSIS — Z79.01 CHRONIC ANTICOAGULATION: ICD-10-CM

## 2017-03-07 LAB — INR PPP: 2.6 (ref 2–3.5)

## 2017-03-07 PROCEDURE — 85610 PROTHROMBIN TIME: CPT | Performed by: PHYSICIAN ASSISTANT

## 2017-03-07 NOTE — PROGRESS NOTES
Anticoagulation Summary as of 3/7/2017     INR goal 2.0-3.0   Selected INR 2.6 (3/7/2017)   Maintenance plan 15 mg (5 mg x 3) on Mon, Wed, Fri; 20 mg (5 mg x 4) all other days   Weekly total 125 mg   Plan last modified CARLIE Martins (3/1/2017)   Next INR check 3/14/2017   Target end date     Indications   Personal history of pulmonary embolism [Z86.711]  Chronic anticoagulation [Z79.01]         Anticoagulation Episode Summary     INR check location     Preferred lab     Send INR reminders to     Comments       Anticoagulation Care Providers     Provider Role Specialty Phone number    Ninfa Marcial M.D. Referring Internal Medicine [phone]    [facility] Responsible  [phone]        Anticoagulation Patient Findings    Pt is therapeutic today.  Pt is to continue with current warfarin dosing regimen.  Pt denies any unusual s/s of bleeding, bruising, clotting or any changes to diet or medications.  Follow up in 1 weeks.    Maty Perkins, PHARMD

## 2017-03-07 NOTE — MR AVS SNAPSHOT
Natalie Tete Héctor   3/7/2017 2:00 PM   Anticoagulation Visit   MRN: [id number]    Department:  [facility]   Dept Phone:  [phone]    Description:  Female : 1940   Provider:  Maty KEENE Filter           Allergies as of 3/7/2017     Allergen Noted Reactions    Tape 10/21/2015   Rash    Adhesive band aids cause a rash  Paper tape ok      You were diagnosed with     Chronic anticoagulation   [668040]       Personal history of pulmonary embolism   [V12.55.ICD-9-CM]         Vital Signs     Smoking Status                   Never Smoker            Basic Information     Date Of Birth Sex Race Ethnicity Preferred Language    1940 Female White Non- English      Your appointments     Mar 14, 2017  8:45 AM   Established Patient with [facility] EXAM 5   [facility]  (--)    [address] [facility] [address] [phone]            Mar 15, 2017 11:00 AM   Established Patient with Ninfa Marcial M.D.   [facility] [address] [place] [address] [phone]           You will be receiving a confirmation call a few days before your appointment from our automated call confirmation system.              Problem List              ICD-10-CM Priority Class Noted - Resolved    Chronic insomnia F51.04   2013 - Present    HTN (hypertension), benign I10 Low  2013 - Present    History of cataract removal with insertion of prosthetic lens Z98.49, Z96.1   7/15/2014 - Present    Seizure disorder (HCC) G40.909 Low  10/21/2015 - Present    Other specified hypothyroidism E03.8   2016 - Present    Personal history of pulmonary embolism Z86.711   2016 - Present    Iron deficiency anemia D50.9   3/4/2016 - Present    Diastolic dysfunction I51.9   3/4/2016 - Present    Hiatal hernia K44.9   3/4/2016 - Present    Closed fracture of multiple ribs of left side S22.42XA   2016 - Present    Slow  transit constipation K59.01   9/26/2016 - Present    Closed left hip fracture (CMS-HCC) S72.002A Medium  10/5/2016 - Present    Frequent falls R29.6 Medium  10/7/2016 - Present    Hyperglycemia R73.9 Medium  10/7/2016 - Present    Recurrent pulmonary embolism (CMS-HCC) I26.99 Medium  11/2/2016 - Present    Hip hematoma, left S70.02XA   11/17/2016 - Present    Hospital discharge follow-up Z09   11/30/2016 - Present    Presence of IVC filter Z95.828   12/30/2016 - Present    Chronic anticoagulation Z79.01   2/23/2017 - Present      Health Maintenance        Date Due Completion Dates    BONE DENSITY 10/1/2019 10/1/2014 (Declined)    Override on 10/1/2014: Patient Declined    IMM DTaP/Tdap/Td Vaccine (2 - Td) 10/21/2025 10/21/2015    COLONOSCOPY 4/29/2026 4/29/2016, 4/29/2016            Results     POCT Protime      Component    INR    2.6    Comment:     144 80 11 exp 01/2018 ic valid                        Current Immunizations     13-VALENT PCV PREVNAR 8/1/2016    Hepatitis A Vaccine, Ped/Adol 10/18/2010    Hepatitis B Vaccine Non-Recombivax (Ped/Adol) 10/18/2010    INFLUENZA VACCINE H1N1 10/9/2011, 2/22/2010    Influenza TIV (IM) 9/30/2014, 9/12/2012, 10/18/2010    Influenza Vaccine Adult HD 11/22/2016  4:16 PM, 10/7/2016 12:37 PM    Influenza Vaccine Quad Inj (Pf) 9/19/2015, 9/19/2015    Pneumococcal Vaccine (UF)Historical Data 10/9/2011    Pneumococcal polysaccharide vaccine (PPSV-23) 10/18/2010    SHINGLES VACCINE 12/31/2010    Tdap Vaccine 10/21/2015      Below and/or attached are the medications your provider expects you to take. Review all of your home medications and newly ordered medications with your provider and/or pharmacist. Follow medication instructions as directed by your provider and/or pharmacist. Please keep your medication list with you and share with your provider. Update the information when medications are discontinued, doses are changed, or new medications (including over-the-counter products)  are added; and carry medication information at all times in the event of emergency situations     Allergies:  TAPE - Rash               Medications  Valid as of: March 07, 2017 -  2:03 PM    Generic Name Brand Name Tablet Size Instructions for use    Carvedilol (Tab) COREG 6.25 MG Take 6.25 mg by mouth 2 times a day, with meals.        Cholecalciferol (Cap) Vitamin D-3 1000 UNITS Take 1,000 Units by mouth every day.        Ferrous Sulfate (Tab) ferrous sulfate 325 (65 FE) MG Take 1 Tab by mouth 2 Times a Day.        Furosemide (Tab) LASIX 40 MG Take 1 Tab by mouth every day.        Glucos-Chond-Hyal Ac-Ca Fructo (Tab) MOVE FREE JOINT HEALTH ADVANCE  Take  by mouth every day.        Levothyroxine Sodium (Tab) SYNTHROID 100 MCG Take 100 mcg by mouth Every morning on an empty stomach.        Melatonin (TABLET DISPERSIBLE) Melatonin 500 MCG Take 1,000-2,000 mcg by mouth at bedtime as needed (sleep).        Multiple Vitamins-Minerals (Tab) WOMENS MULTI VITAMIN & MINERAL  Take 1 Tab by mouth every day.        Naproxen Sodium (Tab) ANAPROX 220 MG Take 220 mg by mouth 2 times a day, with meals.        Potassium Chloride Torrie CR (Tab CR) Kdur 20 MEQ Take 1 Tab by mouth 2 times a day.        Primidone (Tab) MYSOLINE 250 MG Take 250-500 mg by mouth 2 Times a Day. 250 mg in the AM and 500 mg at HS        Probiotic Product   Take  by mouth every day.        Sennosides-Docusate Sodium (Tab) SENOKOT-S 8.6-50 MG Take 1-2 Tabs by mouth 2 times a day as needed (constipation).        Warfarin Sodium (Tab) COUMADIN 5 MG Take three tablets daily as directed by coumadin clininc        .                 Medicines prescribed today were sent to:     [facility] [address] [place] [address] [place], [place] - [address] [facility] AT [facility]    [address] [facility] [address]    Phone: [phone] Fax: [phone]    Open 24 Hours?: No      Medication refill instructions:       If your prescription bottle indicates you have  medication refills left, it is not necessary to call your provider’s office. Please contact your pharmacy and they will refill your medication.    If your prescription bottle indicates you do not have any refills left, you may request refills at any time through one of the following ways: The online [employer] system (except Urgent Care), by calling your provider’s office, or by asking your pharmacy to contact your provider’s office with a refill request. Medication refills are processed only during regular business hours and may not be available until the next business day. Your provider may request additional information or to have a follow-up visit with you prior to refilling your medication.   *Please Note: Medication refills are assigned a new Rx number when refilled electronically. Your pharmacy may indicate that no refills were authorized even though a new prescription for the same medication is available at the pharmacy. Please request the medicine by name with the pharmacy before contacting your provider for a refill.        Other Notes About Your Plan     Please assess for the following diagnosis:    -this patient has been on Ambien for insomnia since 7/2010. ? 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 ?other convulsions, code R56.9                   Warfarin Dosing Calendar   March 2017 Details    Sun Mon Tue Wed Thu Fri Sat        1               2               3               4                 5               6               7   2.6   20 mg   See details      8      15 mg         9      20 mg         10      15 mg         11      20 mg           12      20 mg         13      15 mg         14      20 mg         15               16               17               18                 19               20               21               22               23               24               25                 26                 27               28               29               30               31                 Date Details   03/07 This INR check   INR: 2.6   144 80 11 exp 01/2018 ic valid       Date of next INR:  3/14/2017         How to take your warfarin dose     To take:  15 mg Take 3 of the 5 mg tablets.    To take:  20 mg Take 4 of the 5 mg tablets.              [employer] Access Code: Activation code not generated  Current [employer] Status: Active

## 2017-03-08 ENCOUNTER — HOSPITAL ENCOUNTER (OUTPATIENT)
Dept: LAB | Facility: MEDICAL CENTER | Age: 77
End: 2017-03-08
Attending: INTERNAL MEDICINE
Payer: MEDICARE

## 2017-03-08 DIAGNOSIS — Z13.220 SCREENING FOR LIPID DISORDERS: ICD-10-CM

## 2017-03-08 DIAGNOSIS — I10 HTN (HYPERTENSION), BENIGN: ICD-10-CM

## 2017-03-08 DIAGNOSIS — E03.8 OTHER SPECIFIED HYPOTHYROIDISM: ICD-10-CM

## 2017-03-08 LAB
ALBUMIN SERPL BCP-MCNC: 3.9 G/DL (ref 3.2–4.9)
ALBUMIN/GLOB SERPL: 1.3 G/DL
ALP SERPL-CCNC: 145 U/L (ref 30–99)
ALT SERPL-CCNC: 18 U/L (ref 2–50)
ANION GAP SERPL CALC-SCNC: 7 MMOL/L (ref 0–11.9)
AST SERPL-CCNC: 18 U/L (ref 12–45)
BASOPHILS # BLD AUTO: 0.04 K/UL (ref 0–0.12)
BASOPHILS NFR BLD AUTO: 1 % (ref 0–1.8)
BILIRUB SERPL-MCNC: 0.3 MG/DL (ref 0.1–1.5)
BUN SERPL-MCNC: 26 MG/DL (ref 8–22)
CALCIUM SERPL-MCNC: 9.4 MG/DL (ref 8.5–10.5)
CHLORIDE SERPL-SCNC: 100 MMOL/L (ref 96–112)
CHOLEST SERPL-MCNC: 161 MG/DL (ref 100–199)
CO2 SERPL-SCNC: 34 MMOL/L (ref 20–33)
CREAT SERPL-MCNC: 0.59 MG/DL (ref 0.5–1.4)
EOSINOPHIL # BLD: 0.09 K/UL (ref 0–0.51)
EOSINOPHIL NFR BLD AUTO: 2.3 % (ref 0–6.9)
ERYTHROCYTE [DISTWIDTH] IN BLOOD BY AUTOMATED COUNT: 50.8 FL (ref 35.9–50)
GLOBULIN SER CALC-MCNC: 3.1 G/DL (ref 1.9–3.5)
GLUCOSE SERPL-MCNC: 92 MG/DL (ref 65–99)
HCT VFR BLD AUTO: 46 % (ref 37–47)
HDLC SERPL-MCNC: 47 MG/DL
HGB BLD-MCNC: 14.9 G/DL (ref 12–16)
IMM GRANULOCYTES # BLD AUTO: 0.01 K/UL (ref 0–0.11)
IMM GRANULOCYTES NFR BLD AUTO: 0.3 % (ref 0–0.9)
LDLC SERPL CALC-MCNC: 90 MG/DL
LYMPHOCYTES # BLD: 0.85 K/UL (ref 1–4.8)
LYMPHOCYTES NFR BLD AUTO: 21.6 % (ref 22–41)
MCH RBC QN AUTO: 32.2 PG (ref 27–33)
MCHC RBC AUTO-ENTMCNC: 32.4 G/DL (ref 33.6–35)
MCV RBC AUTO: 99.4 FL (ref 81.4–97.8)
MONOCYTES # BLD: 0.27 K/UL (ref 0–0.85)
MONOCYTES NFR BLD AUTO: 6.9 % (ref 0–13.4)
NEUTROPHILS # BLD: 2.67 K/UL (ref 2–7.15)
NEUTROPHILS NFR BLD AUTO: 67.9 % (ref 44–72)
NRBC # BLD AUTO: 0 K/UL
NRBC BLD-RTO: 0 /100 WBC
PLATELET # BLD AUTO: 310 K/UL (ref 164–446)
PMV BLD AUTO: 9.3 FL (ref 9–12.9)
POTASSIUM SERPL-SCNC: 4 MMOL/L (ref 3.6–5.5)
PROT SERPL-MCNC: 7 G/DL (ref 6–8.2)
RBC # BLD AUTO: 4.63 M/UL (ref 4.2–5.4)
SODIUM SERPL-SCNC: 141 MMOL/L (ref 135–145)
T4 FREE SERPL-MCNC: 0.96 NG/DL (ref 0.53–1.43)
TRIGL SERPL-MCNC: 118 MG/DL (ref 0–149)
TSH SERPL DL<=0.005 MIU/L-ACNC: 1.62 UIU/ML (ref 0.3–3.7)
WBC # BLD AUTO: 3.9 K/UL (ref 4.8–10.8)

## 2017-03-08 PROCEDURE — 85025 COMPLETE CBC W/AUTO DIFF WBC: CPT

## 2017-03-08 PROCEDURE — 36415 COLL VENOUS BLD VENIPUNCTURE: CPT

## 2017-03-08 PROCEDURE — 84443 ASSAY THYROID STIM HORMONE: CPT

## 2017-03-08 PROCEDURE — 84439 ASSAY OF FREE THYROXINE: CPT

## 2017-03-08 PROCEDURE — 80061 LIPID PANEL: CPT

## 2017-03-08 PROCEDURE — 80053 COMPREHEN METABOLIC PANEL: CPT

## 2017-03-13 RX ORDER — FERROUS SULFATE 325(65) MG
TABLET ORAL
Qty: 60 TAB | Refills: 0 | OUTPATIENT
Start: 2017-03-13

## 2017-03-13 RX ORDER — FUROSEMIDE 40 MG/1
TABLET ORAL
Refills: 0 | OUTPATIENT
Start: 2017-03-13

## 2017-03-13 NOTE — TELEPHONE ENCOUNTER
Refill for ferrous declined. We will hold off iron supplement as CBC and ferritin normal.    Ninfa Marcial M.D.

## 2017-03-14 ENCOUNTER — ANTICOAGULATION VISIT (OUTPATIENT)
Dept: VASCULAR LAB | Facility: MEDICAL CENTER | Age: 77
End: 2017-03-14
Attending: INTERNAL MEDICINE
Payer: MEDICARE

## 2017-03-14 DIAGNOSIS — Z86.711 PERSONAL HISTORY OF PULMONARY EMBOLISM: ICD-10-CM

## 2017-03-14 DIAGNOSIS — Z79.01 CHRONIC ANTICOAGULATION: ICD-10-CM

## 2017-03-14 DIAGNOSIS — Z86.711 HISTORY OF PULMONARY EMBOLISM: ICD-10-CM

## 2017-03-14 LAB — INR PPP: 2.3 (ref 2–3.5)

## 2017-03-14 PROCEDURE — 85610 PROTHROMBIN TIME: CPT

## 2017-03-14 PROCEDURE — 99211 OFF/OP EST MAY X REQ PHY/QHP: CPT

## 2017-03-14 RX ORDER — WARFARIN SODIUM 5 MG/1
TABLET ORAL
Qty: 360 TAB | Refills: 1 | Status: SHIPPED | OUTPATIENT
Start: 2017-03-14 | End: 2017-08-28 | Stop reason: SDUPTHER

## 2017-03-14 NOTE — MR AVS SNAPSHOT
Natalie Benoitgeorge DomingoHéctor   3/14/2017 8:45 AM   Anticoagulation Visit   MRN: [id number]    Department:  Vascular Medicine   Dept Phone:  [phone]    Description:  Female : 1940   Provider:  [facility] EXAM 5           Allergies as of 3/14/2017     Allergen Noted Reactions    Tape 10/21/2015   Rash    Adhesive band aids cause a rash  Paper tape ok      You were diagnosed with     Chronic anticoagulation   [308157]       Personal history of pulmonary embolism   [V12.55.ICD-9-CM]         Vital Signs     Smoking Status                   Never Smoker            Basic Information     Date Of Birth Sex Race Ethnicity Preferred Language    1940 Female White Non- English      Your appointments     Mar 14, 2017  8:45 AM   Established Patient with [facility] EXAM 5   [facility] [place] [facility]  (--)    [address] [facility] [address] [phone]            Mar 15, 2017 11:00 AM   Established Patient with Ninfa Marcial M.D.   [address]    [address] [facility] [address] [phone]           You will be receiving a confirmation call a few days before your appointment from our automated call confirmation system.            Mar 23, 2017  3:15 PM   Anti-Coag Routine with [facility] PHARMACIST   [facility]    [address]  [address]  [facility] [address] [phone]              Problem List              ICD-10-CM Priority Class Noted - Resolved    Chronic insomnia F51.04   2013 - Present    HTN (hypertension), benign I10 Low  2013 - Present    History of cataract removal with insertion of prosthetic lens Z98.49, Z96.1   7/15/2014 - Present    Seizure disorder (HCC) G40.909 Low  10/21/2015 - Present    Other specified hypothyroidism E03.8   2016 - Present    Personal history of pulmonary embolism Z86.711   2016 - Present    Iron deficiency anemia D50.9    3/4/2016 - Present    Diastolic dysfunction I51.9   3/4/2016 - Present    Hiatal hernia K44.9   3/4/2016 - Present    Closed fracture of multiple ribs of left side S22.42XA   9/26/2016 - Present    Slow transit constipation K59.01   9/26/2016 - Present    Closed left hip fracture (CMS-HCC) S72.002A Medium  10/5/2016 - Present    Frequent falls R29.6 Medium  10/7/2016 - Present    Hyperglycemia R73.9 Medium  10/7/2016 - Present    Recurrent pulmonary embolism (CMS-HCC) I26.99 Medium  11/2/2016 - Present    Hip hematoma, left S70.02XA   11/17/2016 - Present    Hospital discharge follow-up Z09   11/30/2016 - Present    Presence of IVC filter Z95.828   12/30/2016 - Present    Chronic anticoagulation Z79.01   2/23/2017 - Present      Health Maintenance        Date Due Completion Dates    BONE DENSITY 10/1/2019 10/1/2014 (Declined)    Override on 10/1/2014: Patient Declined    IMM DTaP/Tdap/Td Vaccine (2 - Td) 10/21/2025 10/21/2015    COLONOSCOPY 4/29/2026 4/29/2016, 4/29/2016            Results     POCT Protime      Component    INR    2.3                        Current Immunizations     13-VALENT PCV PREVNAR 8/1/2016    Hepatitis A Vaccine, Ped/Adol 10/18/2010    Hepatitis B Vaccine Non-Recombivax (Ped/Adol) 10/18/2010    INFLUENZA VACCINE H1N1 10/9/2011, 2/22/2010    Influenza TIV (IM) 9/30/2014, 9/12/2012, 10/18/2010    Influenza Vaccine Adult HD 11/22/2016  4:16 PM, 10/7/2016 12:37 PM    Influenza Vaccine Quad Inj (Pf) 9/19/2015, 9/19/2015    Pneumococcal Vaccine (UF)Historical Data 10/9/2011    Pneumococcal polysaccharide vaccine (PPSV-23) 10/18/2010    SHINGLES VACCINE 12/31/2010    Tdap Vaccine 10/21/2015      Below and/or attached are the medications your provider expects you to take. Review all of your home medications and newly ordered medications with your provider and/or pharmacist. Follow medication instructions as directed by your provider and/or pharmacist. Please keep your medication list with you and  share with your provider. Update the information when medications are discontinued, doses are changed, or new medications (including over-the-counter products) are added; and carry medication information at all times in the event of emergency situations     Allergies:  TAPE - Rash               Medications  Valid as of: March 14, 2017 -  8:44 AM    Generic Name Brand Name Tablet Size Instructions for use    Carvedilol (Tab) COREG 6.25 MG Take 6.25 mg by mouth 2 times a day, with meals.        Cholecalciferol (Cap) Vitamin D-3 1000 UNITS Take 1,000 Units by mouth every day.        Ferrous Sulfate (Tab) ferrous sulfate 325 (65 FE) MG Take 1 Tab by mouth 2 Times a Day.        Furosemide (Tab) LASIX 40 MG Take 1 Tab by mouth every day.        Glucos-Chond-Hyal Ac-Ca Fructo (Tab) MOVE FREE JOINT HEALTH ADVANCE  Take  by mouth every day.        Levothyroxine Sodium (Tab) SYNTHROID 100 MCG Take 100 mcg by mouth Every morning on an empty stomach.        Melatonin (TABLET DISPERSIBLE) Melatonin 500 MCG Take 1,000-2,000 mcg by mouth at bedtime as needed (sleep).        Multiple Vitamins-Minerals (Tab) WOMENS MULTI VITAMIN & MINERAL  Take 1 Tab by mouth every day.        Naproxen Sodium (Tab) ANAPROX 220 MG Take 220 mg by mouth 2 times a day, with meals.        Potassium Chloride Torrie CR (Tab CR) Kdur 20 MEQ Take 1 Tab by mouth 2 times a day.        Primidone (Tab) MYSOLINE 250 MG Take 250-500 mg by mouth 2 Times a Day. 250 mg in the AM and 500 mg at HS        Probiotic Product   Take  by mouth every day.        Sennosides-Docusate Sodium (Tab) SENOKOT-S 8.6-50 MG Take 1-2 Tabs by mouth 2 times a day as needed (constipation).        Warfarin Sodium (Tab) COUMADIN 5 MG Take three tablets daily as directed by coumadin clininc        .                 Medicines prescribed today were sent to:     [id number] - [place] - [address] [facility] AT [facility]    [address] [facility]  [address]    Phone: [phone] Fax: [phone]    Open 24 Hours?: No    [id number] - [place], [place] - [address]    [address] [place] [address]    Phone: [phone] Fax: [phone]    Open 24 Hours?: No      Medication refill instructions:       If your prescription bottle indicates you have medication refills left, it is not necessary to call your provider’s office. Please contact your pharmacy and they will refill your medication.    If your prescription bottle indicates you do not have any refills left, you may request refills at any time through one of the following ways: The online [employer] system (except Urgent Care), by calling your provider’s office, or by asking your pharmacy to contact your provider’s office with a refill request. Medication refills are processed only during regular business hours and may not be available until the next business day. Your provider may request additional information or to have a follow-up visit with you prior to refilling your medication.   *Please Note: Medication refills are assigned a new Rx number when refilled electronically. Your pharmacy may indicate that no refills were authorized even though a new prescription for the same medication is available at the pharmacy. Please request the medicine by name with the pharmacy before contacting your provider for a refill.        Other Notes About Your Plan     Please assess for the following diagnosis:    -this patient has been on Ambien for insomnia since 7/2010. ? 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 ?other convulsions, code R56.9                   Warfarin Dosing Calendar   March 2017 Details    Sun Mon Tue Wed Thu Fri Sat        1               2               3               4                 5               6               7               8               9               10                11                 12               13               14   2.3   20 mg   See details      15      15 mg         16      20 mg         17      15 mg         18      20 mg           19      20 mg         20      15 mg         21      20 mg         22      15 mg         23               24               25                 26               27               28               29               30               31                 Date Details   03/14 This INR check   INR: 2.3       Date of next INR:  3/22/2017         How to take your warfarin dose     To take:  15 mg Take 3 of the 5 mg tablets.    To take:  20 mg Take 4 of the 5 mg tablets.              [employer] Access Code: Activation code not generated  Current [employer] Status: Active

## 2017-03-14 NOTE — PROGRESS NOTES
Anticoagulation Summary as of 3/14/2017     INR goal 2.0-3.0   Selected INR 2.3 (3/14/2017)   Maintenance plan 15 mg (5 mg x 3) on Mon, Wed, Fri; 20 mg (5 mg x 4) all other days   Weekly total 125 mg   Plan last modified CARLIE Martins (3/1/2017)   Next INR check 3/23/2017   Target end date     Indications   Personal history of pulmonary embolism [Z86.711]  Chronic anticoagulation [Z79.01]         Anticoagulation Episode Summary     INR check location     Preferred lab     Send INR reminders to     Comments       Anticoagulation Care Providers     Provider Role Specialty Phone number    Ninfa Marcial M.D. Referring Internal Medicine [phone]    [facility] Responsible  [phone]        Anticoagulation Patient Findings      Natalie Anaya seen in clinic today  INR  therapeutic.    Denies signs/symptoms of bleeding and/or thrombosis.    Denies changes to diet or medications.   Follow up appointment in 1 week(s).    Continue weekly warfarin dose as noted    Anders Fallon, PHARMD

## 2017-03-15 ENCOUNTER — OFFICE VISIT (OUTPATIENT)
Dept: MEDICAL GROUP | Age: 77
End: 2017-03-15
Payer: MEDICARE

## 2017-03-15 VITALS
HEIGHT: 65 IN | BODY MASS INDEX: 36.22 KG/M2 | SYSTOLIC BLOOD PRESSURE: 128 MMHG | TEMPERATURE: 97.5 F | DIASTOLIC BLOOD PRESSURE: 70 MMHG | WEIGHT: 217.4 LBS | HEART RATE: 80 BPM | OXYGEN SATURATION: 90 %

## 2017-03-15 DIAGNOSIS — I10 HTN (HYPERTENSION), BENIGN: ICD-10-CM

## 2017-03-15 DIAGNOSIS — I26.99 RECURRENT PULMONARY EMBOLISM (HCC): ICD-10-CM

## 2017-03-15 DIAGNOSIS — G40.909 SEIZURE DISORDER (HCC): ICD-10-CM

## 2017-03-15 DIAGNOSIS — I27.20 PULMONARY HYPERTENSION (HCC): ICD-10-CM

## 2017-03-15 DIAGNOSIS — K44.9 HIATAL HERNIA: ICD-10-CM

## 2017-03-15 DIAGNOSIS — E03.8 OTHER SPECIFIED HYPOTHYROIDISM: ICD-10-CM

## 2017-03-15 DIAGNOSIS — D50.9 IRON DEFICIENCY ANEMIA, UNSPECIFIED IRON DEFICIENCY ANEMIA TYPE: ICD-10-CM

## 2017-03-15 DIAGNOSIS — Z12.31 VISIT FOR SCREENING MAMMOGRAM: ICD-10-CM

## 2017-03-15 DIAGNOSIS — Z78.0 POSTMENOPAUSAL: ICD-10-CM

## 2017-03-15 LAB — INR BLD: 2.3 (ref 0.9–1.2)

## 2017-03-15 PROCEDURE — 99215 OFFICE O/P EST HI 40 MIN: CPT | Performed by: INTERNAL MEDICINE

## 2017-03-15 PROCEDURE — 3288F FALL RISK ASSESSMENT DOCD: CPT | Performed by: INTERNAL MEDICINE

## 2017-03-15 PROCEDURE — 1036F TOBACCO NON-USER: CPT | Performed by: INTERNAL MEDICINE

## 2017-03-15 PROCEDURE — G8482 FLU IMMUNIZE ORDER/ADMIN: HCPCS | Performed by: INTERNAL MEDICINE

## 2017-03-15 PROCEDURE — 1100F PTFALLS ASSESS-DOCD GE2>/YR: CPT | Performed by: INTERNAL MEDICINE

## 2017-03-15 PROCEDURE — G8432 DEP SCR NOT DOC, RNG: HCPCS | Performed by: INTERNAL MEDICINE

## 2017-03-15 PROCEDURE — 4040F PNEUMOC VAC/ADMIN/RCVD: CPT | Performed by: INTERNAL MEDICINE

## 2017-03-15 PROCEDURE — G8419 CALC BMI OUT NRM PARAM NOF/U: HCPCS | Performed by: INTERNAL MEDICINE

## 2017-03-15 PROCEDURE — 0518F FALL PLAN OF CARE DOCD: CPT | Mod: 8P | Performed by: INTERNAL MEDICINE

## 2017-03-15 RX ORDER — OMEPRAZOLE 20 MG/1
20 CAPSULE, DELAYED RELEASE ORAL DAILY
COMMUNITY
End: 2017-07-03

## 2017-03-15 NOTE — ASSESSMENT & PLAN NOTE
Patient is taking levothyroxine 100 µg daily. She is in euthyroid state. Recent thyroid function tests within normal.  Results for DIVINA ZUNIGA (MRN [id number]) as of 3/15/2017 12:26   Ref. Range 3/8/2017 09:21   TSH Latest Ref Range: 0.300-3.700 uIU/mL 1.620   Free T-4 Latest Ref Range: 0.53-1.43 ng/dL 0.96

## 2017-03-15 NOTE — ASSESSMENT & PLAN NOTE
Patient is taking primidone to 50 mg in a.m. and 500 mg in p.m. She denies side effects from taking primidone and she has not had any recurrent symptoms with current regimens. She has not had seizure for many years. We discussed to discontinue medication. She still wanted to keep the primidone as she is concerning of recurrent seizure if she stopped taking it.

## 2017-03-15 NOTE — ASSESSMENT & PLAN NOTE
Patient was diagnosed with recurrent pulmonary embolus and in October 2016 after left hip fracture due to ground level fall. She has history of pulmonary embolus and in 2003. She was treated with Coumadin for 6 months at the time. Because of the recurrent pulmonary embolism, she is taking Coumadin chronically since October 2016. She follows with Coumadin clinic. She denied abnormal bleeding or bloody bowel movement from taking Coumadin.

## 2017-03-15 NOTE — ASSESSMENT & PLAN NOTE
Patient has chronic pulmonary hypertension. She is using 1-2 L oxygen at my as she has nocturnal hypoxia. She also has recurrent pulmonary embolisms. We did discuss lung function tests last year and the test was ordered. However, patient failed to do the test as she was hospitalized for hip fracture and pulmonary embolus. Patient wants to follow with pulmonologist for her pulmonary hypertension and sleep study. She denied short of breath or chest pain. She is taking Lasix 40 mg daily and carvedilol 6.25 mg twice a day.

## 2017-03-15 NOTE — ASSESSMENT & PLAN NOTE
CT scan chest on 9/23/16 showed Hiatal hernia. Patient reported that she is taking omeprazole 20 mg daily. She has acid reflux symptoms in 3 AM or in the midnight. She try omeprazole to control her symptom. Symptoms resolved with omeprazole, so she wants to keep taking omeprazole for now.

## 2017-03-15 NOTE — PROGRESS NOTES
Subjective:   Natalie Zuniga is a 76 y.o. female here today for evaluation and management of:      Recurrent pulmonary embolism (CMS-HCC)  Patient was diagnosed with recurrent pulmonary embolus and in October 2016 after left hip fracture due to ground level fall. She has history of pulmonary embolus and in 2003. She was treated with Coumadin for 6 months at the time. Because of the recurrent pulmonary embolism, she is taking Coumadin chronically since October 2016. She follows with Coumadin clinic. She denied abnormal bleeding or bloody bowel movement from taking Coumadin.    Other specified hypothyroidism  Patient is taking levothyroxine 100 µg daily. She is in euthyroid state. Recent thyroid function tests within normal.  Results for NATALIE ZUNIGA (MRN [id number]) as of 3/15/2017 12:26   Ref. Range 3/8/2017 09:21   TSH Latest Ref Range: 0.300-3.700 uIU/mL 1.620   Free T-4 Latest Ref Range: 0.53-1.43 ng/dL 0.96         Iron deficiency anemia  Patient has history of iron deficiency anemia and was treated with iron supplements. She was evaluated with EGD and colonoscopy with GI consultant, Dr. Stanton on 4/29/16. She has benign hyperplastic poly in the stomach, mild chronic inflammation and reactive epithelial cell at gastric antrum. H. pylori was negative. Colonoscopy showed polyps on ascending colon and diverticulosis. Patient has not done capsule endoscope. She has not seen Dr. Stanton for follow-up. Reviewed the GI consultant's office note with patient. GI consult and discussed with patient regarding the stomach polyp that has 1-20% risks of malignancy, so she is advised to repeat biopsy or remove the stomach polyp with surgeon.   Patient denied blood in the stool and hemoglobin level improved iron panel in December 2016 is back to normal. She is taking iron supplement once a day. We discussed to stop taking iron supplements and reassess blood test with iron panel in 3-4 months.    Pulmonary hypertension  (CMS-[facility])  Patient has chronic pulmonary hypertension. She is using 1-2 L oxygen at my as she has nocturnal hypoxia. She also has recurrent pulmonary embolisms. We did discuss lung function tests last year and the test was ordered. However, patient failed to do the test as she was hospitalized for hip fracture and pulmonary embolus. Patient wants to follow with pulmonologist for her pulmonary hypertension and sleep study. She denied short of breath or chest pain. She is taking Lasix 40 mg daily and carvedilol 6.25 mg twice a day.    HTN (hypertension), benign  Patient is taking carvedilol 6.25 mg twice a day and Lasix 40 mg daily with potassium 20 mEq daily. Her blood pressure is well controlled with current regimens. She still has leg swelling. On exam, she admits that she is not compliance with compression stocking. She agreed to retry compression stocking. Lung sounds clear and no signs of pulmonary edema.    Seizure disorder ([facility])  Patient is taking primidone to 50 mg in a.m. and 500 mg in p.m. She denies side effects from taking primidone and she has not had any recurrent symptoms with current regimens. She has not had seizure for many years. We discussed to discontinue medication. She still wanted to keep the primidone as she is concerning of recurrent seizure if she stopped taking it.    Hiatal hernia  CT scan chest on 9/23/16 showed Hiatal hernia. Patient reported that she is taking omeprazole 20 mg daily. She has acid reflux symptoms in 3 AM or in the midnight. She try omeprazole to control her symptom. Symptoms resolved with omeprazole, so she wants to keep taking omeprazole for now.           Current medicines (including changes today)  Current Outpatient Prescriptions   Medication Sig Dispense Refill   • omeprazole (PRILOSEC) 20 MG delayed-release capsule Take 20 mg by mouth every day.     • warfarin (COUMADIN) 5 MG Tab Take 4 tablets daily as directed by coumadin clininc 360 Tab 1   • naproxen (ALEVE)  "220 MG tablet Take 220 mg by mouth 2 times a day, with meals.     • Probiotic Product (PROBIOTIC DAILY PO) Take  by mouth every day.     • Glucos-Chond-Hyal Ac-Ca Fructo (MOVE FREE JOINT HEALTH ADVANCE) Tab Take  by mouth every day.     • potassium chloride SA (KDUR) 20 MEQ Tab CR Take 1 Tab by mouth 2 times a day. (Patient taking differently: Take 20 mEq by mouth every day.) 180 Tab 1   • furosemide (LASIX) 40 MG Tab Take 1 Tab by mouth every day. 90 Tab 1   • Multiple Vitamins-Minerals (WOMENS MULTI VITAMIN & MINERAL) Tab Take 1 Tab by mouth every day.     • sennosides-docusate sodium (SENOKOT-S) 8.6-50 MG tablet Take 1-2 Tabs by mouth 2 times a day as needed (constipation).     • Melatonin 500 MCG TABLET DISPERSIBLE Take 1,000-2,000 mcg by mouth at bedtime as needed (sleep).     • Cholecalciferol (VITAMIN D-3) 1000 UNITS Cap Take 1,000 Units by mouth every day.     • carvedilol (COREG) 6.25 MG Tab Take 6.25 mg by mouth 2 times a day, with meals.     • levothyroxine (SYNTHROID) 100 MCG Tab Take 100 mcg by mouth Every morning on an empty stomach.     • primidone (MYSOLINE) 250 MG Tab Take 250-500 mg by mouth 2 Times a Day. 250 mg in the AM and 500 mg at HS       No current facility-administered medications for this visit.     She  has a past medical history of Hypothyroid; Personal history of venous thrombosis and embolism; Seizure (CMS-HCC) (1982); Pain; Bowel habit changes; Cataract; Hypertension; Hiatus hernia syndrome; Breath shortness; and Arthritis.    ROS   No chest pain, no shortness of breath, no abdominal pain       Objective:     Blood pressure 128/70, pulse 80, temperature 36.4 °C (97.5 °F), height 1.651 m (5' 5\"), weight 98.612 kg (217 lb 6.4 oz), SpO2 90 %. Body mass index is 36.18 kg/(m^2).   Physical Exam:  General: Alert, oriented and no acute distress.  Eye contact is good, speech goal directed, affect calm  HEENT: conjunctiva non-injected, sclera non-icteric.  Oral mucous membranes pink and moist " with no lesions.  Pinna normal.   Lungs: Normal respiratory effort, clear to auscultation bilaterally with good excursion.  CV: regular rate and rhythm. No murmurs.   Abdomen: soft, non distended, nontender, Bowel sound normal.  Ext: 1+ pitting edema on bilateral LE, color normal, vascularity normal, temperature normal        Assessment and Plan:   The following treatment plan was discussed     1. Other specified hypothyroidism  - Well-controlled. Continue current regimens. Recheck lab 1-2 weeks before next follow up visit.  - CBC WITH DIFFERENTIAL; Future  - TSH; Future  - FREE THYROXINE; Future    2. HTN (hypertension), benign  - Well-controlled. Continue current regimens. Recheck lab 1-2 weeks before next follow up visit. She can continue Lasix 40 mg daily with potassium supplements 20 meq daily. Reviewed the use 6 and side effect of lasix with patient  - Recommend to wake compression stocking every day and elevate legs as possible at rest. Encourage walking exercise.    - CBC WITH DIFFERENTIAL; Future  - COMP METABOLIC PANEL; Future    3. Seizure disorder (HCC)  - Well-controlled. Continue current regimens. Recheck lab 1-2 weeks before next follow up visit.    4. Recurrent pulmonary embolism (CMS-HCC)  - Continue coumadin and follows with coumadin clinic.   - REFERRAL TO PULMONOLOGY    5. Pulmonary hypertension (CMS-HCC)  - Continue oxygen 1-2 L every night and continue lasix 40 mg daily with potassium 20 meq daily.   - Patient needs sleep study to evaluate for sleep apnea.  - Referred to pulmonologist.  - REFERRAL TO PULMONOLOGY    6. Iron deficiency anemia, unspecified iron deficiency anemia type  - Patient is advised to stop taking iron supplement and continue multivitamins. Patient has EGD and colonoscopy with GI consultant in 4/2016 and she will follow up with GI consultant, Dr Stanton  - Will recheck CBC, iron panel.   - IRON/TOTAL IRON BIND; Future  - FERRITIN; Future    7. Postmenopausal  - Ordered Dexa  scan for bone density evaluation given recent hx of left hip fracture and rib fracture last year with ground level fall.   - DS-BONE DENSITY STUDY (DEXA); Future    8. Visit for screening mammogram  - Ordered screening mammogram.   - MA-SCREEN MAMMO W/CAD-BILAT; Future    9. Hiatal hernia  - will continue omeprazole 20 mg daily.  - Advised to elevate head of bed, avoid eating late in the evening.   - Advised to follow up with GI consultant, Dr Stanton for gastric polyp.     Face-to-face time spent 40 minutes with patient and more than half of that time spent for counseling and cooperating of care for medical problems listed above.       Followup: Return in about 4 months (around 7/15/2017), or if symptoms worsen or fail to improve, for hypothyroid, hypertension, pulmonary hypertension, iron deficiency, Lab review.      Please note that this dictation was created using voice recognition software. I have made every reasonable attempt to correct obvious errors, but I expect that there may have unintended errors in text, spelling, punctuation, or grammar that I did not discover.

## 2017-03-15 NOTE — ASSESSMENT & PLAN NOTE
Patient is taking carvedilol 6.25 mg twice a day and Lasix 40 mg daily with potassium 20 mEq daily. Her blood pressure is well controlled with current regimens. She still has leg swelling. On exam, she admits that she is not compliance with compression stocking. She agreed to retry compression stocking. Lung sounds clear and no signs of pulmonary edema.

## 2017-03-15 NOTE — MR AVS SNAPSHOT
"        Natalie Anaya   3/15/2017 11:00 AM   Office Visit   MRN: [id number]    Department:  [address] [place] [address]   Dept Phone:  [phone]    Description:  Female : 1940   Provider:  Ninfa Marcial M.D.           Reason for Visit     Hyperglycemia evaluation of lab work results      Allergies as of 3/15/2017     Allergen Noted Reactions    Tape 10/21/2015   Rash    Adhesive band aids cause a rash  Paper tape ok      You were diagnosed with     Other specified hypothyroidism   [4216031]       HTN (hypertension), benign   [842826]       Seizure disorder (CMS-HCC)   [146512]       Recurrent pulmonary embolism (CMS-HCC)   [017457]       Pulmonary hypertension (CMS-HCC)   [792824]       Postmenopausal   [934169]       Visit for screening mammogram   [767954]       Iron deficiency anemia, unspecified iron deficiency anemia type   [2201747]         Vital Signs     Blood Pressure Pulse Temperature Height Weight Body Mass Index    128/70 mmHg 80 36.4 °C (97.5 °F) 1.651 m (5' 5\") 98.612 kg (217 lb 6.4 oz) 36.18 kg/m2    Oxygen Saturation Smoking Status                90% Never Smoker           Basic Information     Date Of Birth Sex Race Ethnicity Preferred Language    1940 Female White Non- English      Your appointments     Mar 23, 2017  3:15 PM   Anti-Coag Routine with [facility] PAV PHARMACIST   [facility]    [address]  [address]  [facility] [address] [phone]            2017  9:45 AM   Established Patient with [facility] EXAM 4   [facility]  (--)    [address] [facility] [address]   [phone]            2017 10:40 AM   Established Patient with Ninfa Marcial M.D.   [address]    [address] [facility] [address] [phone]           You will be receiving a confirmation call a few days before your appointment from our " automated call confirmation system.              Problem List              ICD-10-CM Priority Class Noted - Resolved    Chronic insomnia F51.04   1/9/2013 - Present    HTN (hypertension), benign I10 Low  1/9/2013 - Present    History of cataract removal with insertion of prosthetic lens Z98.49, Z96.1   7/15/2014 - Present    Seizure disorder (HCC) G40.909 Low  10/21/2015 - Present    Other specified hypothyroidism E03.8   1/8/2016 - Present    Personal history of pulmonary embolism Z86.711   1/8/2016 - Present    Iron deficiency anemia D50.9   3/4/2016 - Present    Diastolic dysfunction I51.9   3/4/2016 - Present    Hiatal hernia K44.9   3/4/2016 - Present    Closed fracture of multiple ribs of left side S22.42XA   9/26/2016 - Present    Slow transit constipation K59.01   9/26/2016 - Present    Closed left hip fracture (CMS-HCC) S72.002A Medium  10/5/2016 - Present    Frequent falls R29.6 Medium  10/7/2016 - Present    Recurrent pulmonary embolism (CMS-HCC) I26.99 Medium  11/2/2016 - Present    Chronic anticoagulation Z79.01   2/23/2017 - Present    Pulmonary hypertension (CMS-HCC) I27.2   3/15/2017 - Present      Health Maintenance        Date Due Completion Dates    BONE DENSITY 10/1/2019 10/1/2014 (Declined)    Override on 10/1/2014: Patient Declined    IMM DTaP/Tdap/Td Vaccine (2 - Td) 10/21/2025 10/21/2015    COLONOSCOPY 4/29/2026 4/29/2016, 4/29/2016            Current Immunizations     13-VALENT PCV PREVNAR 8/1/2016    Hepatitis A Vaccine, Ped/Adol 10/18/2010    Hepatitis B Vaccine Non-Recombivax (Ped/Adol) 10/18/2010    INFLUENZA VACCINE H1N1 10/9/2011, 2/22/2010    Influenza TIV (IM) 9/30/2014, 9/12/2012, 10/18/2010    Influenza Vaccine Adult HD 11/22/2016  4:16 PM, 10/7/2016 12:37 PM    Influenza Vaccine Quad Inj (Pf) 9/19/2015, 9/19/2015    Pneumococcal Vaccine (UF)Historical Data 10/9/2011    Pneumococcal polysaccharide vaccine (PPSV-23) 10/18/2010    SHINGLES VACCINE 12/31/2010    Tdap Vaccine  10/21/2015      Below and/or attached are the medications your provider expects you to take. Review all of your home medications and newly ordered medications with your provider and/or pharmacist. Follow medication instructions as directed by your provider and/or pharmacist. Please keep your medication list with you and share with your provider. Update the information when medications are discontinued, doses are changed, or new medications (including over-the-counter products) are added; and carry medication information at all times in the event of emergency situations     Allergies:  TAPE - Rash               Medications  Valid as of: March 15, 2017 - 12:23 PM    Generic Name Brand Name Tablet Size Instructions for use    Carvedilol (Tab) COREG 6.25 MG Take 6.25 mg by mouth 2 times a day, with meals.        Cholecalciferol (Cap) Vitamin D-3 1000 UNITS Take 1,000 Units by mouth every day.        Furosemide (Tab) LASIX 40 MG Take 1 Tab by mouth every day.        Glucos-Chond-Hyal Ac-Ca Fructo (Tab) MOVE FREE JOINT HEALTH ADVANCE  Take  by mouth every day.        Levothyroxine Sodium (Tab) SYNTHROID 100 MCG Take 100 mcg by mouth Every morning on an empty stomach.        Melatonin (TABLET DISPERSIBLE) Melatonin 500 MCG Take 1,000-2,000 mcg by mouth at bedtime as needed (sleep).        Multiple Vitamins-Minerals (Tab) WOMENS MULTI VITAMIN & MINERAL  Take 1 Tab by mouth every day.        Naproxen Sodium (Tab) ANAPROX 220 MG Take 220 mg by mouth 2 times a day, with meals.        Potassium Chloride Torrie CR (Tab CR) Kdur 20 MEQ Take 1 Tab by mouth 2 times a day.        Primidone (Tab) MYSOLINE 250 MG Take 250-500 mg by mouth 2 Times a Day. 250 mg in the AM and 500 mg at HS        Probiotic Product   Take  by mouth every day.        Sennosides-Docusate Sodium (Tab) SENOKOT-S 8.6-50 MG Take 1-2 Tabs by mouth 2 times a day as needed (constipation).        Warfarin Sodium (Tab) COUMADIN 5 MG Take 4 tablets daily as directed by  coumadin clininc        .                 Medicines prescribed today were sent to:     [id number] - [place], [place] - [address] AT [facility]    [address] [place] [address]    Phone: [phone] Fax: [phone]    Open 24 Hours?: No    [id number] - [place], [place] - [address]    [address] [place] [address]    Phone: [phone] Fax: [phone]    Open 24 Hours?: No      Medication refill instructions:       If your prescription bottle indicates you have medication refills left, it is not necessary to call your provider’s office. Please contact your pharmacy and they will refill your medication.    If your prescription bottle indicates you do not have any refills left, you may request refills at any time through one of the following ways: The online Notice Kiosk system (except Urgent Care), by calling your provider’s office, or by asking your pharmacy to contact your provider’s office with a refill request. Medication refills are processed only during regular business hours and may not be available until the next business day. Your provider may request additional information or to have a follow-up visit with you prior to refilling your medication.   *Please Note: Medication refills are assigned a new Rx number when refilled electronically. Your pharmacy may indicate that no refills were authorized even though a new prescription for the same medication is available at the pharmacy. Please request the medicine by name with the pharmacy before contacting your provider for a refill.        Your To Do List     Future Labs/Procedures Complete By Expires    CBC WITH DIFFERENTIAL  As directed 3/16/2018    COMP METABOLIC PANEL  As directed 3/16/2018    DS-BONE DENSITY STUDY (DEXA)  As directed 9/15/2017    FERRITIN  As directed 3/16/2018    FREE THYROXINE  As directed 3/16/2018    IRON/TOTAL IRON BIND  As directed 3/16/2018    MA-SCREEN MAMMO  W/CAD-BILAT  As directed 4/16/2018    TSH  As directed 3/16/2018      Referral     A referral request has been sent to our patient care coordination department. Please allow 3-5 business days for us to process this request and contact you either by phone or mail. If you do not hear from us by the 5th business day, please call us at [phone].        Other Notes About Your Plan     Please assess for the following diagnosis:    -this patient has been on Ambien for insomnia since 7/2010. ? 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 ?other convulsions, code R56.9                      MyChart Access Code: Activation code not generated  Current [place] Status: Active

## 2017-03-23 ENCOUNTER — ANTICOAGULATION VISIT (OUTPATIENT)
Dept: MEDICAL GROUP | Facility: MEDICAL CENTER | Age: 77
End: 2017-03-23
Payer: MEDICARE

## 2017-03-23 VITALS — HEART RATE: 75 BPM | DIASTOLIC BLOOD PRESSURE: 66 MMHG | SYSTOLIC BLOOD PRESSURE: 132 MMHG

## 2017-03-23 DIAGNOSIS — Z86.711 PERSONAL HISTORY OF PULMONARY EMBOLISM: ICD-10-CM

## 2017-03-23 DIAGNOSIS — Z79.01 CHRONIC ANTICOAGULATION: ICD-10-CM

## 2017-03-23 LAB — INR PPP: 2.4 (ref 2–3.5)

## 2017-03-23 PROCEDURE — 85610 PROTHROMBIN TIME: CPT | Performed by: PHYSICIAN ASSISTANT

## 2017-03-23 NOTE — MR AVS SNAPSHOT
Natalie Anaya   3/23/2017 3:15 PM   Anticoagulation Visit   MRN: [id number]    Department:  [place] [address]   Dept Phone:  [phone]    Description:  Female : 1940   Provider:  Maty KEENE Filter           Allergies as of 3/23/2017     Allergen Noted Reactions    Tape 10/21/2015   Rash    Adhesive band aids cause a rash  Paper tape ok      You were diagnosed with     Chronic anticoagulation   [750563]       Personal history of pulmonary embolism   [V12.55.ICD-9-CM]         Vital Signs     Blood Pressure Pulse Smoking Status             132/66 mmHg 75 Never Smoker          Basic Information     Date Of Birth Sex Race Ethnicity Preferred Language    1940 Female White Non- English      Your appointments     2017  8:45 AM   Established Patient with IHVH EXAM 5   [facility]  (--)    [address] [address]   [phone]            2017  9:45 AM   Established Patient with IHVH EXAM 4   [facility]  (--)    [address] [address]   [phone]            May 17, 2017 11:15 AM   BONE DENSITY (DEXASCAN) with S CHRYSTALAN BD 1   [facility]    [facility] [place]  [address]  [address]  [facility] [id number] [phone]           No calcium supplements 24 hours prior to exam, including antacids or multivitamins w/calcium.  Pt may eat and drink normally.  No injection procedures prior to Dexa on the same day.  No barium contrast, no CTs with IV or oral contrast, no Pet/CTs and no Nuc Med injections for 7 days prior to a Dexa (including Barium Swallow, Upper GI, Small Bowel Series).  If scheduling a Dexa on the same day as a CT with IV or oral contrast, any test with barium, Pet/CT or a Nuc Med with injection, always schedule the Dexa before the other study.            May 17, 2017 11:50 AM   MA  SCRN10 with S LENNY MG 1   [facility] ([place])    [address]  [place] [address] [phone]           No deodorant, powder, perfume or lotion under the arm or breast area.            Jul 06, 2017 10:00 AM   Pulmonary Function Test with PFT-RM3   [facility] (--)    [address]  [address]  [place] [address] [phone]            Jul 06, 2017 11:00 AM   New Patient Pulmonary with A Rotation   [facility] (--)    [address]  [address]  [place] [address] [phone]            Jul 14, 2017 10:40 AM   Established Patient with Ninfa Marcial M.D.   [facility] [address] ([facility])    [address]  [place] [address] [phone]           You will be receiving a confirmation call a few days before your appointment from our automated call confirmation system.              Problem List              ICD-10-CM Priority Class Noted - Resolved    Chronic insomnia F51.04   1/9/2013 - Present    HTN (hypertension), benign I10 Low  1/9/2013 - Present    History of cataract removal with insertion of prosthetic lens Z98.49, Z96.1   7/15/2014 - Present    Seizure disorder (HCC) G40.909 Low  10/21/2015 - Present    Other specified hypothyroidism E03.8   1/8/2016 - Present    Personal history of pulmonary embolism Z86.711   1/8/2016 - Present    Iron deficiency anemia D50.9   3/4/2016 - Present    Diastolic dysfunction I51.9   3/4/2016 - Present    Hiatal hernia K44.9   3/4/2016 - Present    Closed fracture of multiple ribs of left side S22.42XA   9/26/2016 - Present    Slow transit constipation K59.01   9/26/2016 - Present    History of fracture of left hip Z87.81 Medium  10/5/2016 - Present    Recurrent pulmonary embolism (CMS-HCC) I26.99 Medium  11/2/2016 - Present    Chronic anticoagulation Z79.01   2/23/2017 - Present    Pulmonary hypertension (CMS-HCC) I27.2   3/15/2017 - Present        Health Maintenance        Date Due Completion Dates    BONE DENSITY 10/1/2019 10/1/2014 (Declined)    Override on 10/1/2014: Patient Declined    IMM DTaP/Tdap/Td Vaccine (2 - Td) 10/21/2025 10/21/2015    COLONOSCOPY 4/29/2026 4/29/2016, 4/29/2016            Results     POCT Protime      Component    INR    2.4    Comment:     139 816 11 exp 11/2017 ic valid                        Current Immunizations     13-VALENT PCV PREVNAR 8/1/2016    Hepatitis A Vaccine, Ped/Adol 10/18/2010    Hepatitis B Vaccine Non-Recombivax (Ped/Adol) 10/18/2010    INFLUENZA VACCINE H1N1 10/9/2011, 2/22/2010    Influenza TIV (IM) 9/30/2014, 9/12/2012, 10/18/2010    Influenza Vaccine Adult HD 11/22/2016  4:16 PM, 10/7/2016 12:37 PM    Influenza Vaccine Quad Inj (Pf) 9/19/2015, 9/19/2015    Pneumococcal Vaccine (UF)Historical Data 10/9/2011    Pneumococcal polysaccharide vaccine (PPSV-23) 10/18/2010    SHINGLES VACCINE 12/31/2010    Tdap Vaccine 10/21/2015      Below and/or attached are the medications your provider expects you to take. Review all of your home medications and newly ordered medications with your provider and/or pharmacist. Follow medication instructions as directed by your provider and/or pharmacist. Please keep your medication list with you and share with your provider. Update the information when medications are discontinued, doses are changed, or new medications (including over-the-counter products) are added; and carry medication information at all times in the event of emergency situations     Allergies:  TAPE - Rash               Medications  Valid as of: March 23, 2017 -  3:20 PM    Generic Name Brand Name Tablet Size Instructions for use    Carvedilol (Tab) COREG 6.25 MG Take 6.25 mg by mouth 2 times a day, with meals.        Cholecalciferol (Cap) Vitamin D-3 1000 UNITS Take 1,000 Units by mouth every day.        Furosemide (Tab) LASIX 40 MG Take 1 Tab by mouth every day.        Glucos-Chond-Hyal Ac-Ca Fructo  (Tab) MOVE FREE JOINT HEALTH ADVANCE  Take  by mouth every day.        Levothyroxine Sodium (Tab) SYNTHROID 100 MCG Take 100 mcg by mouth Every morning on an empty stomach.        Melatonin (TABLET DISPERSIBLE) Melatonin 500 MCG Take 1,000-2,000 mcg by mouth at bedtime as needed (sleep).        Multiple Vitamins-Minerals (Tab) WOMENS MULTI VITAMIN & MINERAL  Take 1 Tab by mouth every day.        Naproxen Sodium (Tab) ANAPROX 220 MG Take 220 mg by mouth 2 times a day, with meals.        Omeprazole (CAPSULE DELAYED RELEASE) PRILOSEC 20 MG Take 20 mg by mouth every day.        Potassium Chloride Torrie CR (Tab CR) Kdur 20 MEQ Take 1 Tab by mouth 2 times a day.        Primidone (Tab) MYSOLINE 250 MG Take 250-500 mg by mouth 2 Times a Day. 250 mg in the AM and 500 mg at HS        Probiotic Product   Take  by mouth every day.        Sennosides-Docusate Sodium (Tab) SENOKOT-S 8.6-50 MG Take 1-2 Tabs by mouth 2 times a day as needed (constipation).        Warfarin Sodium (Tab) COUMADIN 5 MG Take 4 tablets daily as directed by coumadin clininc        .                 Medicines prescribed today were sent to:     [facility] [address] [place] [address] [address] [facility]    [address] [address]    Phone: [phone] Fax: [phone]    Open 24 Hours?: No    [id number] - [place], [place] - [address]    [address] [facility] [address]    Phone: [phone] Fax: [phone]    Open 24 Hours?: No      Medication refill instructions:       If your prescription bottle indicates you have medication refills left, it is not necessary to call your provider’s office. Please contact your pharmacy and they will refill your medication.    If your prescription bottle indicates you do not have any refills left, you may request refills at any time through one of the following ways: The online [employer] system (except Urgent Care), by calling your provider’s office, or  by asking your pharmacy to contact your provider’s office with a refill request. Medication refills are processed only during regular business hours and may not be available until the next business day. Your provider may request additional information or to have a follow-up visit with you prior to refilling your medication.   *Please Note: Medication refills are assigned a new Rx number when refilled electronically. Your pharmacy may indicate that no refills were authorized even though a new prescription for the same medication is available at the pharmacy. Please request the medicine by name with the pharmacy before contacting your provider for a refill.        Other Notes About Your Plan     Please assess for the following diagnosis:    -this patient has been on Ambien for insomnia since 7/2010. ? 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 ?other convulsions, code R56.9                   Warfarin Dosing Calendar   March 2017 Details    Sun Mon Tue Wed Thu Fri Sat        1               2               3               4                 5               6               7               8               9               10               11                 12               13               14               15               16               17               18                 19               20               21               22               23   2.4   20 mg   See details      24      15 mg         25      20 mg           26      20 mg         27      15 mg         28      20 mg         29      15 mg         30      20 mg         31      15 mg           Date Details   03/23 This INR check   INR: 2.4   139 816 11 exp 11/2017 ic valid               How to take your warfarin dose     To take:  15 mg Take 3 of the 5 mg tablets.    To take:  20 mg Take 4 of the 5 mg tablets.           Warfarin Dosing Calendar   April 2017  Details    Sun Mon Tue Wed Thu Fri Sat           1      20 mg           2      20 mg         3      15 mg         4               5               6               7               8                 9               10               11               12               13               14               15                 16               17               18               19               20               21               22                 23               24               25               26               27               28               29                 30                      Date Details   No additional details    Date of next INR:  4/3/2017         How to take your warfarin dose     To take:  15 mg Take 3 of the 5 mg tablets.    To take:  20 mg Take 4 of the 5 mg tablets.              [employer] Access Code: Activation code not generated  Current [employer] Status: Active

## 2017-03-23 NOTE — PROGRESS NOTES
Anticoagulation Summary as of 3/23/2017     INR goal 2.0-3.0   Selected INR 2.4 (3/23/2017)   Maintenance plan 15 mg (5 mg x 3) on Mon, Wed, Fri; 20 mg (5 mg x 4) all other days   Weekly total 125 mg   Plan last modified CARLIE Martins (3/1/2017)   Next INR check 4/3/2017   Target end date     Indications   Personal history of pulmonary embolism [Z86.711]  Chronic anticoagulation [Z79.01]         Anticoagulation Episode Summary     INR check location     Preferred lab     Send INR reminders to     Comments       Anticoagulation Care Providers     Provider Role Specialty Phone number    Ninfa Marcial M.D. Referring Internal Medicine [phone]    [facility] Responsible  [phone]        Anticoagulation Patient Findings   Negatives Missed Doses, Extra Doses, Medication Changes, Antibiotic Use, Diet Changes, Dental/Other Procedures, Hospitalization, Bleeding Gums, Nose Bleeds, Blood in Urine, Blood in Stool, Any Bruising, Other Complaints        Pt remains therapeutic today. Pt is to continue with current warfarin dosing regimen.  Pt denies any unusual s/s of bleeding, bruising, clotting or any changes to diet or medications.  Follow up in 2 weeks.    Maty Perkins, PHARMD

## 2017-04-04 ENCOUNTER — ANTICOAGULATION VISIT (OUTPATIENT)
Dept: VASCULAR LAB | Facility: MEDICAL CENTER | Age: 77
End: 2017-04-04
Attending: INTERNAL MEDICINE
Payer: MEDICARE

## 2017-04-04 DIAGNOSIS — Z79.01 CHRONIC ANTICOAGULATION: ICD-10-CM

## 2017-04-04 DIAGNOSIS — Z86.711 PERSONAL HISTORY OF PULMONARY EMBOLISM: ICD-10-CM

## 2017-04-04 LAB — INR PPP: 2 (ref 2–3.5)

## 2017-04-04 PROCEDURE — 99212 OFFICE O/P EST SF 10 MIN: CPT

## 2017-04-04 PROCEDURE — 85610 PROTHROMBIN TIME: CPT

## 2017-04-04 NOTE — PROGRESS NOTES
Anticoagulation Summary as of 4/4/2017     INR goal 2.0-3.0   Selected INR 2.0 (4/4/2017)   Maintenance plan 15 mg (5 mg x 3) on Mon, Fri; 20 mg (5 mg x 4) all other days   Weekly total 130 mg   Plan last modified Anders Fallon, PHARMD (4/4/2017)   Next INR check 4/25/2017   Target end date     Indications   Personal history of pulmonary embolism [Z86.711]  Chronic anticoagulation [Z79.01]         Anticoagulation Episode Summary     INR check location     Preferred lab     Send INR reminders to     Comments       Anticoagulation Care Providers     Provider Role Specialty Phone number    Ninfa Marcial M.D. Referring Internal Medicine [phone]    [facility] Responsible  [phone]        Anticoagulation Patient Findings      Natalie Anaya seen in clinic today  INR  therapeutic.    Denies signs/symptoms of bleeding and/or thrombosis.    Denies changes to diet or medications.   Follow up appointment in 3 week(s).    Increase weekly warfarin dose as noted per pt.     Anders Fallon, PHARMD

## 2017-04-04 NOTE — MR AVS SNAPSHOT
Natalie Benoitgeorge DomingoBombay   2017 8:45 AM   Anticoagulation Visit   MRN: [id number]    Department:  Vascular Medicine   Dept Phone:  [phone]    Description:  Female : 1940   Provider:  [facility] EXAM 5           Allergies as of 2017     Allergen Noted Reactions    Tape 10/21/2015   Rash    Adhesive band aids cause a rash  Paper tape ok      You were diagnosed with     Chronic anticoagulation   [238769]       Personal history of pulmonary embolism   [V12.55.ICD-9-CM]         Vital Signs     Smoking Status                   Never Smoker            Basic Information     Date Of Birth Sex Race Ethnicity Preferred Language    1940 Female White Non- English      Your appointments     2017  9:45 AM   Established Patient with V EXAM 4   [facility]  (--)    [address] [facility] [address]   [phone]            2017 10:00 AM   Established Patient with IHV EXAM 5   [facility]  (--)    [address] [facility] [address]   [phone]            May 17, 2017 11:15 AM   BONE DENSITY (DEXASCAN) with S JOSEARRAN BD 1   [facility]    [facility] [place]  [address]  [address]  [facility] [address] [phone]           No calcium supplements 24 hours prior to exam, including antacids or multivitamins w/calcium.  Pt may eat and drink normally.  No injection procedures prior to Dexa on the same day.  No barium contrast, no CTs with IV or oral contrast, no Pet/CTs and no Nuc Med injections for 7 days prior to a Dexa (including Barium Swallow, Upper GI, Small Bowel Series).  If scheduling a Dexa on the same day as a CT with IV or oral contrast, any test with barium, Pet/CT or a Nuc Med with injection, always schedule the Dexa before the other study.            May 17, 2017 11:50 AM   MA SCRN10 with S MCCARRAN MG 1   [facility] ([place])    [address]  [place] [address] [phone]           No deodorant, powder, perfume or lotion under the arm or breast area.            Jul 06, 2017 10:00 AM   Pulmonary Function Test with PFT-RM3   [facility] (--)    [address]  [address]  [place] [address] [phone]            Jul 06, 2017 11:00 AM   New Patient Pulmonary with A Rotation   [facility] (--)    [address]  [address]  [place] [address] [phone]            Jul 14, 2017 10:40 AM   Established Patient with Ninfa Marcial M.D.   [facility] [address] ([facility])    [address]  [place] [address] [phone]           You will be receiving a confirmation call a few days before your appointment from our automated call confirmation system.              Problem List              ICD-10-CM Priority Class Noted - Resolved    Chronic insomnia F51.04   1/9/2013 - Present    HTN (hypertension), benign I10 Low  1/9/2013 - Present    History of cataract removal with insertion of prosthetic lens Z98.49, Z96.1   7/15/2014 - Present    Seizure disorder (HCC) G40.909 Low  10/21/2015 - Present    Other specified hypothyroidism E03.8   1/8/2016 - Present    Personal history of pulmonary embolism Z86.711   1/8/2016 - Present    Iron deficiency anemia D50.9   3/4/2016 - Present    Diastolic dysfunction I51.9   3/4/2016 - Present    Hiatal hernia K44.9   3/4/2016 - Present    Closed fracture of multiple ribs of left side S22.42XA   9/26/2016 - Present    Slow transit constipation K59.01   9/26/2016 - Present    History of fracture of left hip Z87.81 Medium  10/5/2016 - Present    Recurrent pulmonary embolism (CMS-HCC) I26.99 Medium  11/2/2016 - Present    Chronic anticoagulation Z79.01   2/23/2017 - Present    Pulmonary hypertension (CMS-HCC) I27.2   3/15/2017 - Present      Health Maintenance        Date  Due Completion Dates    BONE DENSITY 10/1/2019 10/1/2014 (Declined)    Override on 10/1/2014: Patient Declined    IMM DTaP/Tdap/Td Vaccine (2 - Td) 10/21/2025 10/21/2015    COLONOSCOPY 4/29/2026 4/29/2016, 4/29/2016            Results     POCT Protime      Component    INR    2.0                        Current Immunizations     13-VALENT PCV PREVNAR 8/1/2016    Hepatitis A Vaccine, Ped/Adol 10/18/2010    Hepatitis B Vaccine Non-Recombivax (Ped/Adol) 10/18/2010    INFLUENZA VACCINE H1N1 10/9/2011, 2/22/2010    Influenza TIV (IM) 9/30/2014, 9/12/2012, 10/18/2010    Influenza Vaccine Adult HD 11/22/2016  4:16 PM, 10/7/2016 12:37 PM    Influenza Vaccine Quad Inj (Pf) 9/19/2015, 9/19/2015    Pneumococcal Vaccine (UF)Historical Data 10/9/2011    Pneumococcal polysaccharide vaccine (PPSV-23) 10/18/2010    SHINGLES VACCINE 12/31/2010    Tdap Vaccine 10/21/2015      Below and/or attached are the medications your provider expects you to take. Review all of your home medications and newly ordered medications with your provider and/or pharmacist. Follow medication instructions as directed by your provider and/or pharmacist. Please keep your medication list with you and share with your provider. Update the information when medications are discontinued, doses are changed, or new medications (including over-the-counter products) are added; and carry medication information at all times in the event of emergency situations     Allergies:  TAPE - Rash               Medications  Valid as of: April 04, 2017 -  9:14 AM    Generic Name Brand Name Tablet Size Instructions for use    Carvedilol (Tab) COREG 6.25 MG Take 6.25 mg by mouth 2 times a day, with meals.        Cholecalciferol (Cap) Vitamin D-3 1000 UNITS Take 1,000 Units by mouth every day.        Furosemide (Tab) LASIX 40 MG Take 1 Tab by mouth every day.        Glucos-Chond-Hyal Ac-Ca Fructo (Tab) MOVE FREE JOINT HEALTH ADVANCE  Take  by mouth every day.        Levothyroxine  Sodium (Tab) SYNTHROID 100 MCG Take 100 mcg by mouth Every morning on an empty stomach.        Melatonin (TABLET DISPERSIBLE) Melatonin 500 MCG Take 1,000-2,000 mcg by mouth at bedtime as needed (sleep).        Multiple Vitamins-Minerals (Tab) WOMENS MULTI VITAMIN & MINERAL  Take 1 Tab by mouth every day.        Naproxen Sodium (Tab) ANAPROX 220 MG Take 220 mg by mouth 2 times a day, with meals.        Omeprazole (CAPSULE DELAYED RELEASE) PRILOSEC 20 MG Take 20 mg by mouth every day.        Potassium Chloride Torrie CR (Tab CR) Kdur 20 MEQ Take 1 Tab by mouth 2 times a day.        Primidone (Tab) MYSOLINE 250 MG Take 250-500 mg by mouth 2 Times a Day. 250 mg in the AM and 500 mg at HS        Probiotic Product   Take  by mouth every day.        Sennosides-Docusate Sodium (Tab) SENOKOT-S 8.6-50 MG Take 1-2 Tabs by mouth 2 times a day as needed (constipation).        Warfarin Sodium (Tab) COUMADIN 5 MG Take 4 tablets daily as directed by coumadin clininc        .                 Medicines prescribed today were sent to:     [facility] [address] [place], [place] - [address] [facility]    [address] [address]    Phone: [phone] Fax: [phone]    Open 24 Hours?: No    [id number] - [place], [place] - [address]    [address] [facility] [address]    Phone: [phone] Fax: [phone]    Open 24 Hours?: No      Medication refill instructions:       If your prescription bottle indicates you have medication refills left, it is not necessary to call your provider’s office. Please contact your pharmacy and they will refill your medication.    If your prescription bottle indicates you do not have any refills left, you may request refills at any time through one of the following ways: The online [employer] system (except Urgent Care), by calling your provider’s office, or by asking your pharmacy to contact your provider’s office with a refill request.  Medication refills are processed only during regular business hours and may not be available until the next business day. Your provider may request additional information or to have a follow-up visit with you prior to refilling your medication.   *Please Note: Medication refills are assigned a new Rx number when refilled electronically. Your pharmacy may indicate that no refills were authorized even though a new prescription for the same medication is available at the pharmacy. Please request the medicine by name with the pharmacy before contacting your provider for a refill.        Other Notes About Your Plan     Please assess for the following diagnosis:    -this patient has been on Ambien for insomnia since 7/2010. ? 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 ?other convulsions, code R56.9                   Warfarin Dosing Calendar   April 2017 Details    Sun Mon Tue Wed Thu Fri Sat           1                 2               3               4   2.0   20 mg   See details      5      20 mg         6      20 mg         7      15 mg         8      20 mg           9      20 mg         10      15 mg         11      20 mg         12      20 mg         13      20 mg         14      15 mg         15      20 mg           16      20 mg         17      15 mg         18      20 mg         19      20 mg         20      20 mg         21      15 mg         22      20 mg           23      20 mg         24      15 mg         25      20 mg         26               27               28               29                 30                      Date Details   04/04 This INR check   INR: 2.0       Date of next INR:  4/25/2017         How to take your warfarin dose     To take:  15 mg Take 3 of the 5 mg tablets.    To take:  20 mg Take 4 of the 5 mg tablets.              [employer] Access Code: Activation code not generated  Current [employer]  Status: Active

## 2017-04-07 LAB — INR BLD: 2 (ref 0.9–1.2)

## 2017-04-11 ENCOUNTER — ANTICOAGULATION VISIT (OUTPATIENT)
Dept: VASCULAR LAB | Facility: MEDICAL CENTER | Age: 77
End: 2017-04-11
Attending: INTERNAL MEDICINE
Payer: MEDICARE

## 2017-04-11 VITALS — HEART RATE: 65 BPM | SYSTOLIC BLOOD PRESSURE: 135 MMHG | DIASTOLIC BLOOD PRESSURE: 61 MMHG

## 2017-04-11 DIAGNOSIS — Z79.01 LONG TERM (CURRENT) USE OF ANTICOAGULANTS: ICD-10-CM

## 2017-04-11 DIAGNOSIS — Z79.01 CHRONIC ANTICOAGULATION: ICD-10-CM

## 2017-04-11 DIAGNOSIS — Z86.711 PERSONAL HISTORY OF PULMONARY EMBOLISM: ICD-10-CM

## 2017-04-11 LAB — INR PPP: 1.9 (ref 2–3.5)

## 2017-04-11 PROCEDURE — 99212 OFFICE O/P EST SF 10 MIN: CPT

## 2017-04-11 PROCEDURE — 85610 PROTHROMBIN TIME: CPT

## 2017-04-11 NOTE — MR AVS SNAPSHOT
Natalie Benoitgeorge DomingoVallejo   2017 9:45 AM   Anticoagulation Visit   MRN: [id number]    Department:  Vascular Medicine   Dept Phone:  [phone]    Description:  Female : 1940   Provider:  [facility] EXAM 4           Allergies as of 2017     Allergen Noted Reactions    Tape 10/21/2015   Rash    Adhesive band aids cause a rash  Paper tape ok      You were diagnosed with     Chronic anticoagulation   [326216]       Personal history of pulmonary embolism   [V12.55.ICD-9-CM]       Long term (current) use of anticoagulants   [V58.61.ICD-9-CM]         Vital Signs     Blood Pressure Pulse Smoking Status             135/61 mmHg 65 Never Smoker          Basic Information     Date Of Birth Sex Race Ethnicity Preferred Language    1940 Female White Non- English      Your appointments     2017  9:45 AM   Established Patient with [facility] EXAM 4   [facility]  (--)    [address] [facility] [address]   [phone]            2017 10:00 AM   Established Patient with V EXAM 5   [facility]  (--)    [address] [facility] [address]   [phone]            May 17, 2017 11:15 AM   BONE DENSITY (DEXASCAN) with S LENNY BD 1   [facility]    [facility] [place]  [address] [place] [address] [address]vd  [address]  [facility] [address]   [phone]           No calcium supplements 24 hours prior to exam, including antacids or multivitamins w/calcium.  Pt may eat and drink normally.  No injection procedures prior to Dexa on the same day.  No barium contrast, no CTs with IV or oral contrast, no Pet/CTs and no Nuc Med injections for 7 days prior to a Dexa (including Barium Swallow, Upper GI, Small Bowel Series).  If scheduling a Dexa on the same day as a CT with IV or oral contrast, any test with barium, Pet/CT or a Nuc Med with injection, always schedule the Dexa  before the other study.            May 17, 2017 11:50 AM   MA SCRN10 with S LENNY MG 1   [facility] IM[facility] MAMMOGRAPHY ([place])    [address]  [place] [address] [phone]           No deodorant, powder, perfume or lotion under the arm or breast area.            Jul 06, 2017 10:00 AM   Pulmonary Function Test with PFT-RM3   [facility] Pulmonary Medicine (--)    [address]  [address]  [place] [id number] [phone]            Jul 06, 2017 11:00 AM   New Patient Pulmonary with A Rotation   [facility] (--)    [address]  [address]  [place] [address] [phone]            Jul 14, 2017 10:40 AM   Established Patient with Ninfa Marcial M.D.   [facility] [address] ([facility])    [address]  [place] [address] [phone]           You will be receiving a confirmation call a few days before your appointment from our automated call confirmation system.              Problem List              ICD-10-CM Priority Class Noted - Resolved    Chronic insomnia F51.04   1/9/2013 - Present    HTN (hypertension), benign I10 Low  1/9/2013 - Present    History of cataract removal with insertion of prosthetic lens Z98.49, Z96.1   7/15/2014 - Present    Seizure disorder (HCC) G40.909 Low  10/21/2015 - Present    Other specified hypothyroidism E03.8   1/8/2016 - Present    Personal history of pulmonary embolism Z86.711   1/8/2016 - Present    Iron deficiency anemia D50.9   3/4/2016 - Present    Diastolic dysfunction I51.9   3/4/2016 - Present    Hiatal hernia K44.9   3/4/2016 - Present    Closed fracture of multiple ribs of left side S22.42XA   9/26/2016 - Present    Slow transit constipation K59.01   9/26/2016 - Present    History of fracture of left hip Z87.81 Medium  10/5/2016 - Present    Recurrent pulmonary embolism (CMS-HCC) I26.99 Medium  11/2/2016 - Present    Chronic anticoagulation Z79.01   2/23/2017 - Present     Pulmonary hypertension (CMS-[facility]) I27.2   3/15/2017 - Present    Long term (current) use of anticoagulants [Z79.01] Z79.01   4/11/2017 - Present      Health Maintenance        Date Due Completion Dates    BONE DENSITY 10/1/2019 10/1/2014 (Declined)    Override on 10/1/2014: Patient Declined    IMM DTaP/Tdap/Td Vaccine (2 - Td) 10/21/2025 10/21/2015    COLONOSCOPY 4/29/2026 4/29/2016, 4/29/2016            Results     POCT Protime      Component    INR    1.9                        Current Immunizations     13-VALENT PCV PREVNAR 8/1/2016    Hepatitis A Vaccine, Ped/Adol 10/18/2010    Hepatitis B Vaccine Non-Recombivax (Ped/Adol) 10/18/2010    INFLUENZA VACCINE H1N1 10/9/2011, 2/22/2010    Influenza TIV (IM) 9/30/2014, 9/12/2012, 10/18/2010    Influenza Vaccine Adult HD 11/22/2016  4:16 PM, 10/7/2016 12:37 PM    Influenza Vaccine Quad Inj (Pf) 9/19/2015, 9/19/2015    Pneumococcal Vaccine (UF)Historical Data 10/9/2011    Pneumococcal polysaccharide vaccine (PPSV-23) 10/18/2010    SHINGLES VACCINE 12/31/2010    Tdap Vaccine 10/21/2015      Below and/or attached are the medications your provider expects you to take. Review all of your home medications and newly ordered medications with your provider and/or pharmacist. Follow medication instructions as directed by your provider and/or pharmacist. Please keep your medication list with you and share with your provider. Update the information when medications are discontinued, doses are changed, or new medications (including over-the-counter products) are added; and carry medication information at all times in the event of emergency situations     Allergies:  TAPE - Rash               Medications  Valid as of: April 11, 2017 -  9:39 AM    Generic Name Brand Name Tablet Size Instructions for use    Carvedilol (Tab) COREG 6.25 MG Take 6.25 mg by mouth 2 times a day, with meals.        Cholecalciferol (Cap) Vitamin D-3 1000 UNITS Take 1,000 Units by mouth every day.         Furosemide (Tab) LASIX 40 MG Take 1 Tab by mouth every day.        Glucos-Chond-Hyal Ac-Ca Fructo (Tab) MOVE FREE JOINT HEALTH ADVANCE  Take  by mouth every day.        Levothyroxine Sodium (Tab) SYNTHROID 100 MCG Take 100 mcg by mouth Every morning on an empty stomach.        Melatonin (TABLET DISPERSIBLE) Melatonin 500 MCG Take 1,000-2,000 mcg by mouth at bedtime as needed (sleep).        Multiple Vitamins-Minerals (Tab) WOMENS MULTI VITAMIN & MINERAL  Take 1 Tab by mouth every day.        Naproxen Sodium (Tab) ANAPROX 220 MG Take 220 mg by mouth 2 times a day, with meals.        Omeprazole (CAPSULE DELAYED RELEASE) PRILOSEC 20 MG Take 20 mg by mouth every day.        Potassium Chloride Torrie CR (Tab CR) Kdur 20 MEQ Take 1 Tab by mouth 2 times a day.        Primidone (Tab) MYSOLINE 250 MG Take 250-500 mg by mouth 2 Times a Day. 250 mg in the AM and 500 mg at HS        Probiotic Product   Take  by mouth every day.        Sennosides-Docusate Sodium (Tab) SENOKOT-S 8.6-50 MG Take 1-2 Tabs by mouth 2 times a day as needed (constipation).        Warfarin Sodium (Tab) COUMADIN 5 MG Take 4 tablets daily as directed by coumadin clininc        .                 Medicines prescribed today were sent to:     [id number]  [place] - [address] [facility]    [address] [facility] [address]    Phone: [phone] Fax: [phone]    Open 24 Hours?: No    [id number] [place] [facility] [address]    [address] [facility] [address]    Phone: [phone] Fax: [phone]    Open 24 Hours?: No      Medication refill instructions:       If your prescription bottle indicates you have medication refills left, it is not necessary to call your provider’s office. Please contact your pharmacy and they will refill your medication.    If your prescription bottle indicates you do not have any refills left, you may request refills at any time through one of the  following ways: The online IPPLEX system (except Urgent Care), by calling your provider’s office, or by asking your pharmacy to contact your provider’s office with a refill request. Medication refills are processed only during regular business hours and may not be available until the next business day. Your provider may request additional information or to have a follow-up visit with you prior to refilling your medication.   *Please Note: Medication refills are assigned a new Rx number when refilled electronically. Your pharmacy may indicate that no refills were authorized even though a new prescription for the same medication is available at the pharmacy. Please request the medicine by name with the pharmacy before contacting your provider for a refill.        Other Notes About Your Plan     Please assess for the following diagnosis:    -this patient has been on Ambien for insomnia since 7/2010. ? 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 ?other convulsions, code R56.9                   Warfarin Dosing Calendar   April 2017 Details    Sun Mon Tue Wed Thu Fri Sat           1                 2               3               4               5               6               7               8                 9               10               11   1.9   20 mg   See details      12      20 mg         13      20 mg         14      20 mg         15      20 mg           16      20 mg         17      20 mg         18      20 mg         19      20 mg         20      20 mg         21      20 mg         22      20 mg           23      20 mg         24      20 mg         25      20 mg         26               27               28               29                 30                      Date Details   04/11 This INR check   INR: 1.9       Date of next INR:  4/25/2017         How to take your warfarin dose     To take:  20 mg Take 4  of the 5 mg tablets.              [employer] Access Code: Activation code not generated  Current [employer] Status: Active

## 2017-04-11 NOTE — PROGRESS NOTES
Anticoagulation Summary as of 4/11/2017     INR goal 2.0-3.0   Selected INR 1.9! (4/11/2017)   Maintenance plan 20 mg (5 mg x 4) every day   Weekly total 140 mg   Plan last modified Anders Fallon, PHARMD (4/11/2017)   Next INR check 4/25/2017   Target end date     Indications   Personal history of pulmonary embolism [Z86.711]  Chronic anticoagulation [Z79.01]         Anticoagulation Episode Summary     INR check location     Preferred lab     Send INR reminders to     Comments       Anticoagulation Care Providers     Provider Role Specialty Phone number    Ninfa Marcial M.D. Referring Internal Medicine [phone]    [facility] Responsible  [phone]        Anticoagulation Patient Findings      Natalie Anaya seen in clinic today  INR  sub-therapeutic.    Denies signs/symptoms of bleeding and/or thrombosis.    Denies changes to diet or medications.   Follow up appointment in 2 week(s).    Increase weekly warfarin dose as noted    Anders Fallon, PHARMD

## 2017-04-12 LAB — INR BLD: 1.9 (ref 0.9–1.2)

## 2017-04-25 ENCOUNTER — ANTICOAGULATION VISIT (OUTPATIENT)
Dept: VASCULAR LAB | Facility: MEDICAL CENTER | Age: 77
End: 2017-04-25
Attending: INTERNAL MEDICINE
Payer: MEDICARE

## 2017-04-25 DIAGNOSIS — Z79.01 LONG TERM (CURRENT) USE OF ANTICOAGULANTS: ICD-10-CM

## 2017-04-25 DIAGNOSIS — Z79.01 CHRONIC ANTICOAGULATION: ICD-10-CM

## 2017-04-25 DIAGNOSIS — Z86.711 PERSONAL HISTORY OF PULMONARY EMBOLISM: ICD-10-CM

## 2017-04-25 LAB — INR PPP: 2.6 (ref 2–3.5)

## 2017-04-25 PROCEDURE — 85610 PROTHROMBIN TIME: CPT

## 2017-04-25 PROCEDURE — 99211 OFF/OP EST MAY X REQ PHY/QHP: CPT

## 2017-04-25 NOTE — PROGRESS NOTES
Anticoagulation Summary as of 4/25/2017     INR goal 2.0-3.0   Selected INR 2.6 (4/25/2017)   Maintenance plan 20 mg (5 mg x 4) every day   Weekly total 140 mg   Plan last modified Anders Fallon, PHARMD (4/11/2017)   Next INR check 5/17/2017   Target end date     Indications   Chronic anticoagulation [Z79.01]  Personal history of pulmonary embolism [Z86.711]  Long term (current) use of anticoagulants [Z79.01] [Z79.01]         Anticoagulation Episode Summary     INR check location     Preferred lab     Send INR reminders to     Comments       Anticoagulation Care Providers     Provider Role Specialty Phone number    Ninfa Marcial M.D. Referring Internal Medicine [phone]    [facility] Anticoagulation Services Responsible  [phone]        Anticoagulation Patient Findings      Natalie Anaya seen in clinic today  INR  therapeutic.    Denies signs/symptoms of bleeding and/or thrombosis.    Denies changes to diet or medications.   Follow up appointment in 3 week(s).      Continue weekly warfarin dose as noted    Anders Fallon, PHARMD

## 2017-04-25 NOTE — MR AVS SNAPSHOT
Natalie Tete Anaya   2017 10:00 AM   Anticoagulation Visit   MRN: [id number]    Department:  Vascular Medicine   Dept Phone:  [phone]    Description:  Female : 1940   Provider:  [facility] EXAM 5           Allergies as of 2017     Allergen Noted Reactions    Tape 10/21/2015   Rash    Adhesive band aids cause a rash  Paper tape ok      You were diagnosed with     Long term (current) use of anticoagulants   [V58.61.ICD-9-CM]       Chronic anticoagulation   [284225]       Personal history of pulmonary embolism   [V12.55.ICD-9-CM]         Vital Signs     Smoking Status                   Never Smoker            Basic Information     Date Of Birth Sex Race Ethnicity Preferred Language    1940 Female White Non- English      Your appointments     2017 10:00 AM   Established Patient with [facility] EXAM 5   [facility] for Heart and Vascular Health  (--)    [address] [facility] [address]   [phone]            May 17, 2017 11:15 AM   BONE DENSITY (DEXASCAN) with SANTO GALVEZ BD 1   [facility] ([place])    [facility]  [address]  [address]  [facility] [address] [phone]           No calcium supplements 24 hours prior to exam, including antacids or multivitamins w/calcium.  Pt may eat and drink normally.  No injection procedures prior to Dexa on the same day.  No barium contrast, no CTs with IV or oral contrast, no Pet/CTs and no Nuc Med injections for 7 days prior to a Dexa (including Barium Swallow, Upper GI, Small Bowel Series).  If scheduling a Dexa on the same day as a CT with IV or oral contrast, any test with barium, Pet/CT or a Nuc Med with injection, always schedule the Dexa before the other study.            May 17, 2017 11:50 AM   MA SCRN10 with SANTO GALVEZ MG 1   [facility] MAMMOGRAPHY ([place])    [address]  [facility] [address] [phone]           No deodorant, powder, perfume or lotion under the arm or breast area.            May 18, 2017 10:15 AM   Established Patient with [facility] EXAM 5   [facility] for [facility] and Vascular Health  (--)    [address]  [place] [address]   [phone]            Jul 06, 2017 10:00 AM   Pulmonary Function Test with PFT-RM3   [facility] (--)    [address]  [address]  [place] [address] [phone]            Jul 06, 2017 11:00 AM   New Patient Pulmonary with A Rotation   [facility] (--)    [address]  [address]  [place] [address] [phone]            Jul 14, 2017 10:40 AM   Established Patient with Ninfa Marcial M.D.   [facility] [address] ([facility])    [address]  [place] [address] [phone]           You will be receiving a confirmation call a few days before your appointment from our automated call confirmation system.              Problem List              ICD-10-CM Priority Class Noted - Resolved    Chronic insomnia F51.04   1/9/2013 - Present    HTN (hypertension), benign I10 Low  1/9/2013 - Present    History of cataract removal with insertion of prosthetic lens Z98.49, Z96.1   7/15/2014 - Present    Seizure disorder (HCC) G40.909 Low  10/21/2015 - Present    Other specified hypothyroidism E03.8   1/8/2016 - Present    Personal history of pulmonary embolism Z86.711   1/8/2016 - Present    Iron deficiency anemia D50.9   3/4/2016 - Present    Diastolic dysfunction I51.9   3/4/2016 - Present    Hiatal hernia K44.9   3/4/2016 - Present    Closed fracture of multiple ribs of left side S22.42XA   9/26/2016 - Present    Slow transit constipation K59.01   9/26/2016 - Present    History of fracture of left hip Z87.81 Medium  10/5/2016 - Present    Recurrent pulmonary embolism (CMS-HCC) I26.99 Medium  11/2/2016 - Present    Chronic anticoagulation Z79.01   2/23/2017 - Present    Pulmonary hypertension  (CMS-[facility]) I27.2   3/15/2017 - Present    Long term (current) use of anticoagulants [Z79.01] Z79.01   4/11/2017 - Present      Health Maintenance        Date Due Completion Dates    BONE DENSITY 10/1/2019 10/1/2014 (Declined)    Override on 10/1/2014: Patient Declined    IMM DTaP/Tdap/Td Vaccine (2 - Td) 10/21/2025 10/21/2015    COLONOSCOPY 4/29/2026 4/29/2016, 4/29/2016            Results     POCT Protime      Component    INR    2.6                        Current Immunizations     13-VALENT PCV PREVNAR 8/1/2016    Hepatitis A Vaccine, Ped/Adol 10/18/2010    Hepatitis B Vaccine Non-Recombivax (Ped/Adol) 10/18/2010    INFLUENZA VACCINE H1N1 10/9/2011, 2/22/2010    Influenza TIV (IM) 9/30/2014, 9/12/2012, 10/18/2010    Influenza Vaccine Adult HD 11/22/2016  4:16 PM, 10/7/2016 12:37 PM    Influenza Vaccine Quad Inj (Pf) 9/19/2015, 9/19/2015    Pneumococcal Vaccine (UF)Historical Data 10/9/2011    Pneumococcal polysaccharide vaccine (PPSV-23) 10/18/2010    SHINGLES VACCINE 12/31/2010    Tdap Vaccine 10/21/2015      Below and/or attached are the medications your provider expects you to take. Review all of your home medications and newly ordered medications with your provider and/or pharmacist. Follow medication instructions as directed by your provider and/or pharmacist. Please keep your medication list with you and share with your provider. Update the information when medications are discontinued, doses are changed, or new medications (including over-the-counter products) are added; and carry medication information at all times in the event of emergency situations     Allergies:  TAPE - Rash               Medications  Valid as of: April 25, 2017 -  9:56 AM    Generic Name Brand Name Tablet Size Instructions for use    Carvedilol (Tab) COREG 6.25 MG Take 6.25 mg by mouth 2 times a day, with meals.        Cholecalciferol (Cap) Vitamin D-3 1000 UNITS Take 1,000 Units by mouth every day.        Furosemide (Tab) LASIX 40 MG  Take 1 Tab by mouth every day.        Glucos-Chond-Hyal Ac-Ca Fructo (Tab) MOVE FREE JOINT HEALTH ADVANCE  Take  by mouth every day.        Levothyroxine Sodium (Tab) SYNTHROID 100 MCG Take 100 mcg by mouth Every morning on an empty stomach.        Melatonin (TABLET DISPERSIBLE) Melatonin 500 MCG Take 1,000-2,000 mcg by mouth at bedtime as needed (sleep).        Multiple Vitamins-Minerals (Tab) WOMENS MULTI VITAMIN & MINERAL  Take 1 Tab by mouth every day.        Naproxen Sodium (Tab) ANAPROX 220 MG Take 220 mg by mouth 2 times a day, with meals.        Omeprazole (CAPSULE DELAYED RELEASE) PRILOSEC 20 MG Take 20 mg by mouth every day.        Potassium Chloride Torrie CR (Tab CR) Kdur 20 MEQ Take 1 Tab by mouth 2 times a day.        Primidone (Tab) MYSOLINE 250 MG Take 250-500 mg by mouth 2 Times a Day. 250 mg in the AM and 500 mg at HS        Probiotic Product   Take  by mouth every day.        Sennosides-Docusate Sodium (Tab) SENOKOT-S 8.6-50 MG Take 1-2 Tabs by mouth 2 times a day as needed (constipation).        Warfarin Sodium (Tab) COUMADIN 5 MG Take 4 tablets daily as directed by coumadin clininc        .                 Medicines prescribed today were sent to:     [email or web address] [id number] [place] [address] [facility]    [address] [facility] [address]    Phone: [phone] Fax: [phone]    Open 24 Hours?: No    [id number] - [place] - [address]    [address] [facility] [address]    Phone: [phone] Fax: [phone]    Open 24 Hours?: No      Medication refill instructions:       If your prescription bottle indicates you have medication refills left, it is not necessary to call your provider’s office. Please contact your pharmacy and they will refill your medication.    If your prescription bottle indicates you do not have any refills left, you may request refills at any time through one of the following ways: The online [employer]  system (except Urgent Care), by calling your provider’s office, or by asking your pharmacy to contact your provider’s office with a refill request. Medication refills are processed only during regular business hours and may not be available until the next business day. Your provider may request additional information or to have a follow-up visit with you prior to refilling your medication.   *Please Note: Medication refills are assigned a new Rx number when refilled electronically. Your pharmacy may indicate that no refills were authorized even though a new prescription for the same medication is available at the pharmacy. Please request the medicine by name with the pharmacy before contacting your provider for a refill.        Other Notes About Your Plan     Please assess for the following diagnosis:    -this patient has been on Ambien for insomnia since 7/2010. ? 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 ?other convulsions, code R56.9                   Warfarin Dosing Calendar   April 2017 Details    Sun Mon Tue Wed Thu Fri Sat           1                 2               3               4               5               6               7               8                 9               10               11               12               13               14               15                 16               17               18               19               20               21               22                 23               24               25   2.6   20 mg   See details      26      20 mg         27      20 mg         28      20 mg         29      20 mg           30      20 mg                Date Details   04/25 This INR check   INR: 2.6               How to take your warfarin dose     To take:  20 mg Take 4 of the 5 mg tablets.           Warfarin Dosing Calendar   May 2017 Details    Sun Mon Tue Wed Thu Fri Sat       1      20 mg         2      20 mg         3      20 mg         4      20 mg         5      20 mg         6      20 mg           7      20 mg         8      20 mg         9      20 mg         10      20 mg         11      20 mg         12      20 mg         13      20 mg           14      20 mg         15      20 mg         16      20 mg         17      20 mg         18      20 mg         19               20                 21               22               23               24               25               26               27                 28               29               30               31                   Date Details   No additional details    Date of next INR:  5/18/2017         How to take your warfarin dose     To take:  20 mg Take 4 of the 5 mg tablets.              [email or web address] Access Code: Activation code not generated  Current [employer] Status: Active

## 2017-04-27 ENCOUNTER — HOSPITAL ENCOUNTER (EMERGENCY)
Facility: MEDICAL CENTER | Age: 77
End: 2017-04-27
Attending: EMERGENCY MEDICINE
Payer: MEDICARE

## 2017-04-27 ENCOUNTER — HOSPITAL ENCOUNTER (EMERGENCY)
Facility: MEDICAL CENTER | Age: 77
End: 2017-04-27
Payer: MEDICARE

## 2017-04-27 ENCOUNTER — APPOINTMENT (OUTPATIENT)
Dept: RADIOLOGY | Facility: MEDICAL CENTER | Age: 77
End: 2017-04-27
Attending: EMERGENCY MEDICINE
Payer: MEDICARE

## 2017-04-27 VITALS
BODY MASS INDEX: 37.05 KG/M2 | WEIGHT: 217 LBS | DIASTOLIC BLOOD PRESSURE: 65 MMHG | TEMPERATURE: 99.9 F | SYSTOLIC BLOOD PRESSURE: 152 MMHG | HEART RATE: 63 BPM | OXYGEN SATURATION: 98 % | HEIGHT: 64 IN | RESPIRATION RATE: 14 BRPM

## 2017-04-27 DIAGNOSIS — S70.02XA HIP HEMATOMA, LEFT, INITIAL ENCOUNTER: ICD-10-CM

## 2017-04-27 LAB
ALBUMIN SERPL BCP-MCNC: 3.7 G/DL (ref 3.2–4.9)
ALBUMIN/GLOB SERPL: 1.4 G/DL
ALP SERPL-CCNC: 109 U/L (ref 30–99)
ALT SERPL-CCNC: 23 U/L (ref 2–50)
ANION GAP SERPL CALC-SCNC: 6 MMOL/L (ref 0–11.9)
AST SERPL-CCNC: 19 U/L (ref 12–45)
BASOPHILS # BLD AUTO: 0.4 % (ref 0–1.8)
BASOPHILS # BLD: 0.03 K/UL (ref 0–0.12)
BILIRUB SERPL-MCNC: 0.3 MG/DL (ref 0.1–1.5)
BUN SERPL-MCNC: 28 MG/DL (ref 8–22)
CALCIUM SERPL-MCNC: 8.8 MG/DL (ref 8.5–10.5)
CHLORIDE SERPL-SCNC: 104 MMOL/L (ref 96–112)
CO2 SERPL-SCNC: 31 MMOL/L (ref 20–33)
CREAT SERPL-MCNC: 0.5 MG/DL (ref 0.5–1.4)
EOSINOPHIL # BLD AUTO: 0.07 K/UL (ref 0–0.51)
EOSINOPHIL NFR BLD: 0.9 % (ref 0–6.9)
ERYTHROCYTE [DISTWIDTH] IN BLOOD BY AUTOMATED COUNT: 47.1 FL (ref 35.9–50)
GFR SERPL CREATININE-BSD FRML MDRD: >60 ML/MIN/1.73 M 2
GLOBULIN SER CALC-MCNC: 2.7 G/DL (ref 1.9–3.5)
GLUCOSE SERPL-MCNC: 96 MG/DL (ref 65–99)
HCT VFR BLD AUTO: 39.5 % (ref 37–47)
HGB BLD-MCNC: 12.9 G/DL (ref 12–16)
IMM GRANULOCYTES # BLD AUTO: 0.03 K/UL (ref 0–0.11)
IMM GRANULOCYTES NFR BLD AUTO: 0.4 % (ref 0–0.9)
INR BLD: 2.6 (ref 0.9–1.2)
INR PPP: 2.47 (ref 0.87–1.13)
LYMPHOCYTES # BLD AUTO: 0.73 K/UL (ref 1–4.8)
LYMPHOCYTES NFR BLD: 9.3 % (ref 22–41)
MCH RBC QN AUTO: 31.9 PG (ref 27–33)
MCHC RBC AUTO-ENTMCNC: 32.7 G/DL (ref 33.6–35)
MCV RBC AUTO: 97.5 FL (ref 81.4–97.8)
MONOCYTES # BLD AUTO: 0.48 K/UL (ref 0–0.85)
MONOCYTES NFR BLD AUTO: 6.1 % (ref 0–13.4)
NEUTROPHILS # BLD AUTO: 6.51 K/UL (ref 2–7.15)
NEUTROPHILS NFR BLD: 82.9 % (ref 44–72)
NRBC # BLD AUTO: 0 K/UL
NRBC BLD AUTO-RTO: 0 /100 WBC
PLATELET # BLD AUTO: 255 K/UL (ref 164–446)
PMV BLD AUTO: 8.9 FL (ref 9–12.9)
POTASSIUM SERPL-SCNC: 4.4 MMOL/L (ref 3.6–5.5)
PROT SERPL-MCNC: 6.4 G/DL (ref 6–8.2)
PROTHROMBIN TIME: 27.5 SEC (ref 12–14.6)
RBC # BLD AUTO: 4.05 M/UL (ref 4.2–5.4)
SODIUM SERPL-SCNC: 141 MMOL/L (ref 135–145)
WBC # BLD AUTO: 7.9 K/UL (ref 4.8–10.8)

## 2017-04-27 PROCEDURE — 85025 COMPLETE CBC W/AUTO DIFF WBC: CPT

## 2017-04-27 PROCEDURE — 99284 EMERGENCY DEPT VISIT MOD MDM: CPT

## 2017-04-27 PROCEDURE — 72193 CT PELVIS W/DYE: CPT

## 2017-04-27 PROCEDURE — 85610 PROTHROMBIN TIME: CPT

## 2017-04-27 PROCEDURE — 700117 HCHG RX CONTRAST REV CODE 255: Performed by: EMERGENCY MEDICINE

## 2017-04-27 PROCEDURE — 73701 CT LOWER EXTREMITY W/DYE: CPT | Mod: LT

## 2017-04-27 PROCEDURE — 80053 COMPREHEN METABOLIC PANEL: CPT

## 2017-04-27 RX ADMIN — IOHEXOL 100 ML: 350 INJECTION, SOLUTION INTRAVENOUS at 16:57

## 2017-04-27 ASSESSMENT — PAIN SCALES - GENERAL: PAINLEVEL_OUTOF10: 4

## 2017-04-27 NOTE — ED AVS SNAPSHOT
Froont Access Code: Activation code not generated  Current Froont Status: Active    Dashwirehart  A secure, online tool to manage your health information     [employer]’s Froont® is a secure, online tool that connects you to your personalized health information from the privacy of your home -- day or night - making it very easy for you to manage your healthcare. Once the activation process is completed, you can even access your medical information using the Froont wili, which is available for free in the Apple Wili store or Google Play store.     Froont provides the following levels of access (as shown below):   My Chart Features   [facility] Primary Care Doctor [facility]  Specialists [facility]  Urgent  Care Non-[facility]  Primary Care  Doctor   Email your healthcare team securely and privately 24/7 X X X X   Manage appointments: schedule your next appointment; view details of past/upcoming appointments X      Request prescription refills. X      View recent personal medical records, including lab and immunizations X X X X   View health record, including health history, allergies, medications X X X X   Read reports about your outpatient visits, procedures, consult and ER notes X X X X   See your discharge summary, which is a recap of your hospital and/or ER visit that includes your diagnosis, lab results, and care plan. X X       How to register for Froont:  1. Go to  [email or web address].  2. Click on the Sign Up Now box, which takes you to the New Member Sign Up page. You will need to provide the following information:  a. Enter your Froont Access Code exactly as it appears at the top of this page. (You will not need to use this code after you’ve completed the sign-up process. If you do not sign up before the expiration date, you must request a new code.)   b. Enter your date of birth.   c. Enter your home email address.   d. Click Submit, and follow the next screen’s instructions.  3. Create a Froont ID. This will  be your GlobalLogic login ID and cannot be changed, so think of one that is secure and easy to remember.  4. Create a GlobalLogic password. You can change your password at any time.  5. Enter your Password Reset Question and Answer. This can be used at a later time if you forget your password.   6. Enter your e-mail address. This allows you to receive e-mail notifications when new information is available in GlobalLogic.  7. Click Sign Up. You can now view your health information.    For assistance activating your GlobalLogic account, call [phone]

## 2017-04-27 NOTE — ED AVS SNAPSHOT
Home Care Instructions                                                                                                                Natalie Anaya   MRN: [id number]    Department:  [facility], Emergency Dept   Date of Visit:  4/27/2017            [facility], Emergency Dept    [address]    [facility] [address]    Phone:  [phone]      You were seen by     Castillo Funes M.D.      Your Diagnosis Was     Hip hematoma, left, initial encounter     S70.02XA       These are the medications you received during your hospitalization from 04/27/2017 1322 to 04/27/2017 1810     Date/Time Order Dose Route Action    04/27/2017 1657 iohexol (OMNIPAQUE) 350 mg/mL 100 mL Intravenous Given      Follow-up Information     1. Follow up with Walt Nguyen M.D. In 1 week.    Specialty:  Orthopaedics    Why:  for follow up    Contact information    [address]  [address]  [facility] [address]  [phone]        Medication Information     Review all of your home medications and newly ordered medications with your primary doctor and/or pharmacist as soon as possible. Follow medication instructions as directed by your doctor and/or pharmacist.     Please keep your complete medication list with you and share with your physician. Update the information when medications are discontinued, doses are changed, or new medications (including over-the-counter products) are added; and carry medication information at all times in the event of emergency situations.               Medication List      ASK your doctor about these medications        Instructions    Morning Afternoon Evening Bedtime    ALEVE 220 MG tablet   Generic drug:  naproxen        Take 220 mg by mouth 2 times a day, with meals.   Dose:  220 mg                        carvedilol 6.25 MG Tabs   Commonly known as:  COREG        Take 6.25 mg by mouth 2 times a day, with meals.   Dose:  6.25 mg                        furosemide 40 MG  Tabs   Commonly known as:  LASIX        Take 1 Tab by mouth every day.   Dose:  40 mg                        levothyroxine 100 MCG Tabs   Commonly known as:  SYNTHROID        Take 100 mcg by mouth Every morning on an empty stomach.   Dose:  100 mcg                        Melatonin 500 MCG Tbdp        Take 1,000-2,000 mcg by mouth at bedtime as needed (sleep).   Dose:  0079-9613 mcg                        MOVE FREE JOINT HEALTH ADVANCE Tabs        Take  by mouth every day.                        omeprazole 20 MG delayed-release capsule   Commonly known as:  PRILOSEC        Take 20 mg by mouth every day.   Dose:  20 mg                        potassium chloride SA 20 MEQ Tbcr   Commonly known as:  Kdur        Take 1 Tab by mouth 2 times a day.   Dose:  20 mEq                        primidone 250 MG Tabs   Commonly known as:  MYSOLINE        Take 250-500 mg by mouth 2 Times a Day. 250 mg in the AM and 500 mg at HS   Dose:  250-500 mg                        PROBIOTIC DAILY PO        Take  by mouth every day.                        sennosides-docusate sodium 8.6-50 MG tablet   Commonly known as:  SENOKOT-S        Take 1-2 Tabs by mouth 2 times a day as needed (constipation).   Dose:  1-2 Tab                        Vitamin D-3 1000 UNITS Caps        Take 1,000 Units by mouth every day.   Dose:  1000 Units                        warfarin 5 MG Tabs   Commonly known as:  COUMADIN        Doctor's comments:  This rx was submitted by a pharmacist working under a collaborative practice agreement.   Take 4 tablets daily as directed by coumadin clininc                        WOMENS MULTI VITAMIN & MINERAL Tabs        Take 1 Tab by mouth every day.   Dose:  1 Tab                                Procedures and tests performed during your visit     CBC WITH DIFFERENTIAL    COMP METABOLIC PANEL    CONSENT FOR CONTRAST INJECTION    CT-HIP WITH PLUS RECONS No    CT-PELVIS WITH    ESTIMATED GFR    PROTHROMBIN TIME    SALINE LOCK             Discharge Instructions       Hematoma  A hematoma is a collection of blood. The collection of blood can turn into a hard, painful lump under the skin. Your skin may turn blue or yellow if the hematoma is close to the surface of the skin. Most hematomas get better in a few days to weeks. Some hematomas are serious and need medical care. Hematomas can be very small or very big.  HOME CARE  · Apply ice to the injured area:  ¨ Put ice in a plastic bag.  ¨ Place a towel between your skin and the bag.  ¨ Leave the ice on for 20 minutes, 2-3 times a day for the first 1 to 2 days.  · After the first 2 days, switch to using warm packs on the injured area.  · Raise (elevate) the injured area to lessen pain and puffiness (swelling). You may also wrap the area with an elastic bandage. Make sure the bandage is not wrapped too tight.  · If you have a painful hematoma on your leg or foot, you may use crutches for a couple days.  · Only take medicines as told by your doctor.  GET HELP RIGHT AWAY IF:   · Your pain gets worse.  · Your pain is not controlled with medicine.  · You have a fever.  · Your puffiness gets worse.  · Your skin turns more blue or yellow.  · Your skin over the hematoma breaks or starts bleeding.  · Your hematoma is in your chest or belly (abdomen) and you are short of breath, feel weak, or have a change in consciousness.  · Your hematoma is on your scalp and you have a headache that gets worse or a change in alertness or consciousness.  MAKE SURE YOU:   · Understand these instructions.  · Will watch your condition.  · Will get help right away if you are not doing well or get worse.     This information is not intended to replace advice given to you by your health care provider. Make sure you discuss any questions you have with your health care provider.     Document Released: 01/25/2006 Document Revised: 08/20/2014 Document Reviewed: 05/28/2014  Elsevier Interactive Patient Education ©2016 Elsevier  Inc.            Patient Information     Patient Information    Following emergency treatment: all patient requiring follow-up care must return either to a private physician or a clinic if your condition worsens before you are able to obtain further medical attention, please return to the emergency room.     Billing Information    At [facility], we work to make the billing process streamlined for our patients.  Our Representatives are here to answer any questions you may have regarding your hospital bill.  If you have insurance coverage and have supplied your insurance information to us, we will submit a claim to your insurer on your behalf.  Should you have any questions regarding your bill, we can be reached online or by phone as follows:  Online: You are able pay your bills online or live chat with our representatives about any billing questions you may have. We are here to help Monday - Friday from 8:00am to 7:30pm and 9:00am - 12:00pm on Saturdays.  Please visit [email or web address]  for more information.   Phone:  [phone] or [phone]    Please note that your emergency physician, surgeon, pathologist, radiologist, anesthesiologist, and other specialists are not employed by [facility] and will therefore bill separately for their services.  Please contact them directly for any questions concerning their bills at the numbers below:     Emergency Physician Services:  [phone]  [facility]:  [phone]  [facility]:  [phone]  [facility]:  [phone]    1. Your final bill may vary from the amount quoted upon discharge if all procedures are not complete at that time, or if your doctor has additional procedures of which we are not aware. You will receive an additional bill if you return to the Emergency Department at [facility] for suture removal regardless of the facility of which the sutures were placed.      2. Please arrange for settlement of this account at the emergency registration.    3. All self-pay accounts are due in full at the time of treatment.  If you are unable to meet this obligation then payment is expected within 4-5 days.     4. If you have had radiology studies (CT, X-ray, Ultrasound, MRI), you have received a preliminary result during your emergency department visit. Please contact the radiology department [phone] to receive a copy of your final result. Please discuss the Final result with your primary physician or with the follow up physician provided.     Crisis Hotline:  [place] Crisis Hotline:  9-873-GXDSGTI or 1-255.275.6155  [place] Crisis Hotline:    1-863.175.7418 or 774-183-7504         ED Discharge Follow Up Questions    1. In order to provide you with very good care, we would like to follow up with a phone call in the next few days.  May we have your permission to contact you?     YES /  NO    2. What is the best phone number to call you? (       )_____-__________    3. What is the best time to call you?      Morning  /  Afternoon  /  Evening                   Patient Signature:  ____________________________________________________________    Date:  ____________________________________________________________      Your appointments     May 17, 2017 11:15 AM   BONE DENSITY (DEXASCAN) with SANTO BRENNER BD 1   [facility]    [facility]  [address] [facility]  [address]  [place] [address]   [phone]           No calcium supplements 24 hours prior to exam, including antacids or multivitamins w/calcium.  Pt may eat and drink normally.  No injection procedures prior to Dexa on the same day.  No barium contrast, no CTs with IV or oral contrast, no Pet/CTs and no Nuc Med injections for 7 days prior to a Dexa (including Barium Swallow, Upper GI, Small Bowel Series).  If scheduling a Dexa on the same day as a CT with IV or oral contrast, any test  with barium, Pet/CT or a Nuc Med with injection, always schedule the Dexa before the other study.            May 17, 2017 11:50 AM   MA SCRN10 with S ELIDIA MG 1   [facility] ([place])    [address]  [facility] [id number] [phone]           No deodorant, powder, perfume or lotion under the arm or breast area.            May 18, 2017 10:15 AM   Established Patient with [facility] EXAM 5   [facility]  (--)    [address] [facility] [address]   [phone]            Jul 06, 2017 10:00 AM   Pulmonary Function Test with PFT-RM3   [facility] (--)    [address] [facility] [address]  [facility] [address] [phone]            Jul 06, 2017 11:00 AM   New Patient Pulmonary with A Rotation   [facility] (--)    [address] [place] [address] [address]  [facility] [id number] [phone]            Jul 14, 2017 10:40 AM   Established Patient with Ninfa Marcial M.D.   [facility] [address] ([facility])    [address]  [facility] [address] [phone]           You will be receiving a confirmation call a few days before your appointment from our automated call confirmation system.

## 2017-04-27 NOTE — ED NOTES
Lab from Lab called with critical result of CO2-46 at 1540. Critical lab result read back to Lab.   Dr. Ley notified of critical lab result at 1540.  Critical lab result read back by Dr. Ley.

## 2017-04-27 NOTE — ED NOTES
Pt c/o left hip pain and buttocks pain. Hx of hip fx in the past. Pt states she has been complaining at PT about her pain and they told her it was muscular. Pt was able to walk with a walker.

## 2017-04-27 NOTE — ED AVS SNAPSHOT
4/27/2017    Natalie Anaya  [address] [place] [address]    Dear Natalie:    [facility] wants to ensure your discharge home is safe and you or your loved ones have had all of your questions answered regarding your care after you leave the hospital.    Below is a list of resources and contact information should you have any questions regarding your hospital stay, follow-up instructions, or active medical symptoms.    Questions or Concerns Regarding… Contact   Medical Questions Related to Your Discharge  (7 days a week, 8am-5pm) Contact a Nurse Care Coordinator   [phone]   Medical Questions Not Related to Your Discharge  (24 hours a day / 7 days a week)  Contact the Nurse Health Line   [phone]    Medications or Discharge Instructions Refer to your discharge packet   or contact your [facility] Primary Care Provider   [phone]   Follow-up Appointment(s) Schedule your appointment via [email or web address]   or contact Scheduling [phone]   Billing Review your statement via [email or web address]  or contact Billing [phone]   Medical Records Review your records via [email or web address]   or contact Medical Records [phone]     You may receive a telephone call within two days of discharge. This call is to make certain you understand your discharge instructions and have the opportunity to have any questions answered. You can also easily access your medical information, test results and upcoming appointments via the [email or web address] free online health management tool. You can learn more and sign up at [email or web address]. For assistance setting up your [email or web address] account, please call [phone].    Once again, we want to ensure your discharge home is safe and that you have a clear understanding of any next steps in your care. If you have any questions or concerns, please do not hesitate to contact us, we are here for you. Thank you for choosing [facility] for your healthcare needs.    Sincerely,    Your [facility] Healthcare Team

## 2017-04-27 NOTE — ED NOTES
[facility] Pt bears full weight in bilateral legs. Reports right leg pain. Pt in hospital gown and on monitor. Chart up for ERP.

## 2017-04-28 NOTE — DISCHARGE INSTRUCTIONS
Hematoma  A hematoma is a collection of blood. The collection of blood can turn into a hard, painful lump under the skin. Your skin may turn blue or yellow if the hematoma is close to the surface of the skin. Most hematomas get better in a few days to weeks. Some hematomas are serious and need medical care. Hematomas can be very small or very big.  HOME CARE  · Apply ice to the injured area:  ¨ Put ice in a plastic bag.  ¨ Place a towel between your skin and the bag.  ¨ Leave the ice on for 20 minutes, 2-3 times a day for the first 1 to 2 days.  · After the first 2 days, switch to using warm packs on the injured area.  · Raise (elevate) the injured area to lessen pain and puffiness (swelling). You may also wrap the area with an elastic bandage. Make sure the bandage is not wrapped too tight.  · If you have a painful hematoma on your leg or foot, you may use crutches for a couple days.  · Only take medicines as told by your doctor.  GET HELP RIGHT AWAY IF:   · Your pain gets worse.  · Your pain is not controlled with medicine.  · You have a fever.  · Your puffiness gets worse.  · Your skin turns more blue or yellow.  · Your skin over the hematoma breaks or starts bleeding.  · Your hematoma is in your chest or belly (abdomen) and you are short of breath, feel weak, or have a change in consciousness.  · Your hematoma is on your scalp and you have a headache that gets worse or a change in alertness or consciousness.  MAKE SURE YOU:   · Understand these instructions.  · Will watch your condition.  · Will get help right away if you are not doing well or get worse.     This information is not intended to replace advice given to you by your health care provider. Make sure you discuss any questions you have with your health care provider.     Document Released: 01/25/2006 Document Revised: 08/20/2014 Document Reviewed: 05/28/2014  [employer] Interactive Patient Education ©2016 [employer]

## 2017-04-28 NOTE — ED NOTES
IV D/C'd.  Discharge instructions provided to pt.  Pt states understanding.  Pt states all questions have been answered.  Copy of discharge provided to pt.  Signed copy in chart. Pt states that all personal belongings are in possession.  Pt escorted off unit with assistance from 20 in  without incident.

## 2017-05-01 ENCOUNTER — HOSPITAL ENCOUNTER (OUTPATIENT)
Dept: LAB | Facility: MEDICAL CENTER | Age: 77
End: 2017-05-01
Attending: ORTHOPAEDIC SURGERY
Payer: MEDICARE

## 2017-05-01 LAB
CRP SERPL HS-MCNC: 0.28 MG/DL (ref 0–0.75)
ERYTHROCYTE [SEDIMENTATION RATE] IN BLOOD BY WESTERGREN METHOD: 17 MM/HOUR (ref 0–30)

## 2017-05-01 PROCEDURE — 86140 C-REACTIVE PROTEIN: CPT

## 2017-05-01 PROCEDURE — 36415 COLL VENOUS BLD VENIPUNCTURE: CPT

## 2017-05-01 PROCEDURE — 85652 RBC SED RATE AUTOMATED: CPT

## 2017-05-17 ENCOUNTER — HOSPITAL ENCOUNTER (OUTPATIENT)
Dept: RADIOLOGY | Facility: MEDICAL CENTER | Age: 77
End: 2017-05-17
Attending: INTERNAL MEDICINE
Payer: MEDICARE

## 2017-05-17 DIAGNOSIS — Z12.31 VISIT FOR SCREENING MAMMOGRAM: ICD-10-CM

## 2017-05-17 DIAGNOSIS — Z78.0 POSTMENOPAUSAL: ICD-10-CM

## 2017-05-17 PROCEDURE — 77080 DXA BONE DENSITY AXIAL: CPT

## 2017-05-17 PROCEDURE — 77063 BREAST TOMOSYNTHESIS BI: CPT

## 2017-05-17 NOTE — PROGRESS NOTES
Quick Note:    The result was discussed with patient via My Chart.    Ninfa Marcial M.D.    ______

## 2017-05-18 ENCOUNTER — ANTICOAGULATION VISIT (OUTPATIENT)
Dept: VASCULAR LAB | Facility: MEDICAL CENTER | Age: 77
End: 2017-05-18
Attending: INTERNAL MEDICINE
Payer: MEDICARE

## 2017-05-18 DIAGNOSIS — Z86.711 PERSONAL HISTORY OF PULMONARY EMBOLISM: ICD-10-CM

## 2017-05-18 DIAGNOSIS — Z79.01 CHRONIC ANTICOAGULATION: ICD-10-CM

## 2017-05-18 DIAGNOSIS — Z79.01 LONG TERM (CURRENT) USE OF ANTICOAGULANTS: ICD-10-CM

## 2017-05-18 LAB
INR BLD: 3.1 (ref 0.9–1.2)
INR PPP: 3.1 (ref 2–3.5)

## 2017-05-18 PROCEDURE — 99212 OFFICE O/P EST SF 10 MIN: CPT

## 2017-05-18 NOTE — MR AVS SNAPSHOT
Natalie Anaya   2017 10:15 AM   Anticoagulation Visit   MRN: [id number]    Department:  Vascular Medicine   Dept Phone:  [phone]    Description:  Female : 1940   Provider:  [facility]           Allergies as of 2017     Allergen Noted Reactions    Tape 10/21/2015   Rash    Adhesive band aids cause a rash  Paper tape ok      You were diagnosed with     Long term (current) use of anticoagulants   [V58.61.ICD-9-CM]       Chronic anticoagulation   [449232]       Personal history of pulmonary embolism   [V12.55.ICD-9-CM]         Vital Signs     Smoking Status                   Never Smoker            Basic Information     Date Of Birth Sex Race Ethnicity Preferred Language    1940 Female White Non- English      Your appointments     May 30, 2017  4:00 PM   MR SP WO 30 with [facility]   [facility]    [address]  [place] [address] [phone]            May 30, 2017  4:30 PM   MR PELVIS WITH/WITHOUT 60 with [facility]   [facility]    [address]  [place] [address] [phone]            2017 10:30 AM   Anti-Coag Routine with [facility] PAV PHARMACIST   [facility]    [address]  [address]  [place] [address] [phone]            2017 10:00 AM   Pulmonary Function Test with PFT-RM3   [facility] (--)    [address]  [address]  [place] [address] [phone]            2017 11:00 AM   New Patient Pulmonary with A Rotation   [facility] (--)    [address]  [address]  [place] [address] [phone]            2017 10:40 AM   Established Patient with Ninfa Marcial M.D.   [facility] [address] [place] [address]    [address] [facility] [address] [phone]           You will be receiving a confirmation call a few days before your appointment from our automated call  confirmation system.              Problem List              ICD-10-CM Priority Class Noted - Resolved    Chronic insomnia F51.04   1/9/2013 - Present    HTN (hypertension), benign I10 Low  1/9/2013 - Present    History of cataract removal with insertion of prosthetic lens Z98.49, Z96.1   7/15/2014 - Present    Seizure disorder (HCC) G40.909 Low  10/21/2015 - Present    Other specified hypothyroidism E03.8   1/8/2016 - Present    Personal history of pulmonary embolism Z86.711   1/8/2016 - Present    Iron deficiency anemia D50.9   3/4/2016 - Present    Diastolic dysfunction I51.9   3/4/2016 - Present    Hiatal hernia K44.9   3/4/2016 - Present    Closed fracture of multiple ribs of left side S22.42XA   9/26/2016 - Present    Slow transit constipation K59.01   9/26/2016 - Present    History of fracture of left hip Z87.81 Medium  10/5/2016 - Present    Recurrent pulmonary embolism (CMS-HCC) I26.99 Medium  11/2/2016 - Present    Chronic anticoagulation Z79.01   2/23/2017 - Present    Pulmonary hypertension (CMS-HCC) I27.2   3/15/2017 - Present    Long term (current) use of anticoagulants [Z79.01] Z79.01   4/11/2017 - Present      Health Maintenance        Date Due Completion Dates    BONE DENSITY 5/17/2022 5/17/2017, 10/1/2014 (Declined)    Override on 10/1/2014: Patient Declined    IMM DTaP/Tdap/Td Vaccine (2 - Td) 10/21/2025 10/21/2015    COLONOSCOPY 4/29/2026 4/29/2016, 4/29/2016            Results     PROTHROMBIN TIME      Component    INR    3.1                        Current Immunizations     13-VALENT PCV PREVNAR 8/1/2016    Hepatitis A Vaccine, Ped/Adol 10/18/2010    Hepatitis B Vaccine Non-Recombivax (Ped/Adol) 10/18/2010    INFLUENZA VACCINE H1N1 10/9/2011, 2/22/2010    Influenza TIV (IM) 9/30/2014, 9/12/2012, 10/18/2010    Influenza Vaccine Adult HD 11/22/2016  4:16 PM, 10/7/2016 12:37 PM    Influenza Vaccine Quad Inj (Pf) 9/19/2015, 9/19/2015    Pneumococcal Vaccine (UF)Historical Data 10/9/2011     Pneumococcal polysaccharide vaccine (PPSV-23) 10/18/2010    SHINGLES VACCINE 12/31/2010    Tdap Vaccine 10/21/2015      Below and/or attached are the medications your provider expects you to take. Review all of your home medications and newly ordered medications with your provider and/or pharmacist. Follow medication instructions as directed by your provider and/or pharmacist. Please keep your medication list with you and share with your provider. Update the information when medications are discontinued, doses are changed, or new medications (including over-the-counter products) are added; and carry medication information at all times in the event of emergency situations     Allergies:  TAPE - Rash               Medications  Valid as of: May 18, 2017 - 10:27 AM    Generic Name Brand Name Tablet Size Instructions for use    Carvedilol (Tab) COREG 6.25 MG Take 6.25 mg by mouth 2 times a day, with meals.        Cholecalciferol (Cap) Vitamin D-3 1000 UNITS Take 1,000 Units by mouth every day.        Furosemide (Tab) LASIX 40 MG Take 1 Tab by mouth every day.        Glucos-Chond-Hyal Ac-Ca Fructo (Tab) MOVE FREE JOINT HEALTH ADVANCE  Take  by mouth every day.        Levothyroxine Sodium (Tab) SYNTHROID 100 MCG Take 100 mcg by mouth Every morning on an empty stomach.        Melatonin (TABLET DISPERSIBLE) Melatonin 500 MCG Take 1,000-2,000 mcg by mouth at bedtime as needed (sleep).        Multiple Vitamins-Minerals (Tab) WOMENS MULTI VITAMIN & MINERAL  Take 1 Tab by mouth every day.        Naproxen Sodium (Tab) ANAPROX 220 MG Take 220 mg by mouth 2 times a day, with meals.        Omeprazole (CAPSULE DELAYED RELEASE) PRILOSEC 20 MG Take 20 mg by mouth every day.        Potassium Chloride Torrie CR (Tab CR) Kdur 20 MEQ Take 1 Tab by mouth 2 times a day.        Primidone (Tab) MYSOLINE 250 MG Take 250-500 mg by mouth 2 Times a Day. 250 mg in the AM and 500 mg at HS        Probiotic Product   Take  by mouth every day.         Sennosides-Docusate Sodium (Tab) SENOKOT-S 8.6-50 MG Take 1-2 Tabs by mouth 2 times a day as needed (constipation).        Warfarin Sodium (Tab) COUMADIN 5 MG Take 4 tablets daily as directed by coumadin clininc        .                 Medicines prescribed today were sent to:     [id number] - [place], [place] - [address] AT [facility] & [facility]    [address] [facility] [place] [address]    Phone: [phone] Fax: [phone]    Open 24 Hours?: No    [id number] [place] [place] - [address]    [address] [place] [address]    Phone: [phone] Fax: [phone]    Open 24 Hours?: No      Medication refill instructions:       If your prescription bottle indicates you have medication refills left, it is not necessary to call your provider’s office. Please contact your pharmacy and they will refill your medication.    If your prescription bottle indicates you do not have any refills left, you may request refills at any time through one of the following ways: The online [employer] system (except Urgent Care), by calling your provider’s office, or by asking your pharmacy to contact your provider’s office with a refill request. Medication refills are processed only during regular business hours and may not be available until the next business day. Your provider may request additional information or to have a follow-up visit with you prior to refilling your medication.   *Please Note: Medication refills are assigned a new Rx number when refilled electronically. Your pharmacy may indicate that no refills were authorized even though a new prescription for the same medication is available at the pharmacy. Please request the medicine by name with the pharmacy before contacting your provider for a refill.        Other Notes About Your Plan     Please assess for the following diagnosis:    -this patient has been on Ambien for insomnia since 7/2010. ? 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 ?other convulsions, code R56.9                   Warfarin Dosing Calendar   May 2017 Details    Sun Mon Tue Wed Thu Fri Sat      1               2               3               4               5               6                 7               8               9               10               11               12               13                 14               15               16               17               18   3.1   15 mg   See details      19      20 mg         20      20 mg           21      20 mg         22      20 mg         23      20 mg         24      20 mg         25      20 mg         26      20 mg         27      20 mg           28      20 mg         29      20 mg         30      20 mg         31      20 mg             Date Details   05/18 This INR check   INR: 3.1      Date of next INR: No date specified         How to take your warfarin dose     To take:  15 mg Take 3 of the 5 mg tablets.    To take:  20 mg Take 4 of the 5 mg tablets.              [employer] Access Code: Activation code not generated  Current [employer] Status: Active

## 2017-05-18 NOTE — PROGRESS NOTES
OP Anticoagulation Service Note    Date: 5/18/2017    Anticoagulation Summary as of 5/18/2017     INR goal 2.0-3.0   Selected INR 3.1! (5/18/2017)   Maintenance plan 20 mg (5 mg x 4) every day   Weekly total 140 mg   Plan last modified Anders Fallon, PHARMD (4/11/2017)   Next INR check 6/8/2017   Target end date     Indications   Chronic anticoagulation [Z79.01]  Personal history of pulmonary embolism [Z86.711]  Long term (current) use of anticoagulants [Z79.01] [Z79.01]         Anticoagulation Episode Summary     INR check location     Preferred lab     Send INR reminders to     Comments       Anticoagulation Care Providers     Provider Role Specialty Phone number    Ninfa Marcial M.D. Referring Internal Medicine [phone]    [facility] Anticoagulation Services Responsible  [phone]        Anticoagulation Patient Findings      Plan:  INR is high today. Confirmed dosing regimen. No missed or extra doses taken. Patient denies sign/symptoms of bleeding/clotting. No recent medication changes and patient has been eating a consistent diet. Instructed pt to call clinic with any concerns of bleeding or thrombosis. Instructed pt to take 15 mg tonight only then resume usual regimen as outlined.  Follow up in 3 weeks      Moira Sheridan, MARGARETD

## 2017-05-30 ENCOUNTER — HOSPITAL ENCOUNTER (OUTPATIENT)
Dept: RADIOLOGY | Facility: MEDICAL CENTER | Age: 77
End: 2017-05-30
Attending: ORTHOPAEDIC SURGERY
Payer: MEDICARE

## 2017-05-30 DIAGNOSIS — M25.552 LEFT HIP PAIN: ICD-10-CM

## 2017-05-30 PROCEDURE — A9579 GAD-BASE MR CONTRAST NOS,1ML: HCPCS | Performed by: ORTHOPAEDIC SURGERY

## 2017-05-30 PROCEDURE — 72148 MRI LUMBAR SPINE W/O DYE: CPT

## 2017-05-30 PROCEDURE — 72197 MRI PELVIS W/O & W/DYE: CPT

## 2017-05-30 PROCEDURE — 700117 HCHG RX CONTRAST REV CODE 255: Performed by: ORTHOPAEDIC SURGERY

## 2017-05-30 RX ADMIN — GADODIAMIDE 20 ML: 287 INJECTION INTRAVENOUS at 17:38

## 2017-06-07 DIAGNOSIS — G40.909 SEIZURE DISORDER (HCC): ICD-10-CM

## 2017-06-07 RX ORDER — PRIMIDONE 250 MG/1
TABLET ORAL
Qty: 270 TAB | Refills: 3 | Status: SHIPPED | OUTPATIENT
Start: 2017-06-07 | End: 2018-05-25 | Stop reason: SDUPTHER

## 2017-06-08 ENCOUNTER — ANTICOAGULATION VISIT (OUTPATIENT)
Dept: MEDICAL GROUP | Facility: MEDICAL CENTER | Age: 77
End: 2017-06-08
Payer: MEDICARE

## 2017-06-08 VITALS — DIASTOLIC BLOOD PRESSURE: 66 MMHG | HEART RATE: 75 BPM | SYSTOLIC BLOOD PRESSURE: 119 MMHG

## 2017-06-08 DIAGNOSIS — Z86.711 PERSONAL HISTORY OF PULMONARY EMBOLISM: ICD-10-CM

## 2017-06-08 DIAGNOSIS — Z79.01 LONG TERM (CURRENT) USE OF ANTICOAGULANTS: ICD-10-CM

## 2017-06-08 DIAGNOSIS — Z79.01 CHRONIC ANTICOAGULATION: ICD-10-CM

## 2017-06-08 LAB — INR PPP: 2.6 (ref 2–3.5)

## 2017-06-08 NOTE — MR AVS SNAPSHOT
Natalie Tete DomingoHéctor   2017 10:30 AM   Anticoagulation Visit   MRN: [id number]    Department:  [facility]   Dept Phone:  [phone]    Description:  Female : 1940   Provider:  [facility] PHARMACIST           Allergies as of 2017     Allergen Noted Reactions    Tape 10/21/2015   Rash    Adhesive band aids cause a rash  Paper tape ok      You were diagnosed with     Long term (current) use of anticoagulants   [V58.61.ICD-9-CM]       Chronic anticoagulation   [102359]       Personal history of pulmonary embolism   [V12.55.ICD-9-CM]         Vital Signs     Blood Pressure Pulse Smoking Status             119/66 mmHg 75 Never Smoker          Basic Information     Date Of Birth Sex Race Ethnicity Preferred Language    1940 Female White Non- English      Your appointments     2017 10:00 AM   Pulmonary Function Test with PFT-RM3   [facility] (--)    [address]  [facility] [address] [phone]            2017 11:00 AM   New Patient Pulmonary with A Rotation   [facility] (--)    [address]  [address]  [facility] [address] [phone]            2017 10:45 AM   Anti-Coag Routine with [facility] PHARMACIST   [facility]    [address]  [address]  [facility] [address] [phone]            2017  1:40 PM   Established Patient with Ninfa Marcial M.D.   [facility] [address] [place] [address] [phone]           You will be receiving a confirmation call a few days before your appointment from our automated call confirmation system.              Problem List              ICD-10-CM Priority Class Noted - Resolved    Chronic insomnia F51.04   2013 - Present    HTN (hypertension), benign I10 Low  2013 - Present    History of cataract removal with insertion of  prosthetic lens Z98.49, Z96.1   7/15/2014 - Present    Seizure disorder (HCC) G40.909 Low  10/21/2015 - Present    Other specified hypothyroidism E03.8   1/8/2016 - Present    Personal history of pulmonary embolism Z86.711   1/8/2016 - Present    Iron deficiency anemia D50.9   3/4/2016 - Present    Diastolic dysfunction I51.9   3/4/2016 - Present    Hiatal hernia K44.9   3/4/2016 - Present    Closed fracture of multiple ribs of left side S22.42XA   9/26/2016 - Present    Slow transit constipation K59.01   9/26/2016 - Present    History of fracture of left hip Z87.81 Medium  10/5/2016 - Present    Recurrent pulmonary embolism (CMS-HCC) I26.99 Medium  11/2/2016 - Present    Chronic anticoagulation Z79.01   2/23/2017 - Present    Pulmonary hypertension (CMS-HCC) I27.2   3/15/2017 - Present    Long term (current) use of anticoagulants [Z79.01] Z79.01   4/11/2017 - Present      Health Maintenance        Date Due Completion Dates    BONE DENSITY 5/17/2022 5/17/2017, 5/17/2017, 10/1/2014 (Declined)    Override on 10/1/2014: Patient Declined    IMM DTaP/Tdap/Td Vaccine (2 - Td) 10/21/2025 10/21/2015    COLONOSCOPY 4/29/2026 4/29/2016, 4/29/2016            Results     POCT Protime      Component    INR    2.6                        Current Immunizations     13-VALENT PCV PREVNAR 8/1/2016    Hepatitis A Vaccine, Ped/Adol 10/18/2010    Hepatitis B Vaccine Non-Recombivax (Ped/Adol) 10/18/2010    INFLUENZA VACCINE H1N1 10/9/2011, 2/22/2010    Influenza TIV (IM) 9/30/2014, 9/12/2012, 10/18/2010    Influenza Vaccine Adult HD 11/22/2016  4:16 PM, 10/7/2016 12:37 PM    Influenza Vaccine Quad Inj (Pf) 9/19/2015, 9/19/2015    Pneumococcal Vaccine (UF)Historical Data 10/9/2011    Pneumococcal polysaccharide vaccine (PPSV-23) 10/18/2010    SHINGLES VACCINE 12/31/2010    Tdap Vaccine 10/21/2015      Below and/or attached are the medications your provider expects you to take. Review all of your home medications and newly ordered  medications with your provider and/or pharmacist. Follow medication instructions as directed by your provider and/or pharmacist. Please keep your medication list with you and share with your provider. Update the information when medications are discontinued, doses are changed, or new medications (including over-the-counter products) are added; and carry medication information at all times in the event of emergency situations     Allergies:  TAPE - Rash               Medications  Valid as of: June 08, 2017 - 10:41 AM    Generic Name Brand Name Tablet Size Instructions for use    Carvedilol (Tab) COREG 6.25 MG Take 6.25 mg by mouth 2 times a day, with meals.        Cholecalciferol (Cap) Vitamin D-3 1000 UNITS Take 1,000 Units by mouth every day.        Furosemide (Tab) LASIX 40 MG Take 1 Tab by mouth every day.        Glucos-Chond-Hyal Ac-Ca Fructo (Tab) MOVE FREE JOINT HEALTH ADVANCE  Take  by mouth every day.        Levothyroxine Sodium (Tab) SYNTHROID 100 MCG Take 100 mcg by mouth Every morning on an empty stomach.        Melatonin (TABLET DISPERSIBLE) Melatonin 500 MCG Take 1,000-2,000 mcg by mouth at bedtime as needed (sleep).        Multiple Vitamins-Minerals (Tab) WOMENS MULTI VITAMIN & MINERAL  Take 1 Tab by mouth every day.        Naproxen Sodium (Tab) ANAPROX 220 MG Take 220 mg by mouth 2 times a day, with meals.        Omeprazole (CAPSULE DELAYED RELEASE) PRILOSEC 20 MG Take 20 mg by mouth every day.        Potassium Chloride Torrie CR (Tab CR) Kdur 20 MEQ Take 1 Tab by mouth 2 times a day.        Primidone (Tab) MYSOLINE 250 MG TAKE 1 TABLET BY MOUTH THREE TIMES DAILY        Probiotic Product   Take  by mouth every day.        Sennosides-Docusate Sodium (Tab) SENOKOT-S 8.6-50 MG Take 1-2 Tabs by mouth 2 times a day as needed (constipation).        Warfarin Sodium (Tab) COUMADIN 5 MG Take 4 tablets daily as directed by coumadin clininc        .                 Medicines prescribed today were sent to:      [id number] - [place], [place] - [address] AT [facility] & [facility]    [address] [place] [address]    Phone: [phone] Fax: [phone]    Open 24 Hours?: No    [id number] - [place] [place] - [address]    [address] [place] [address]    Phone: [phone] Fax: [phone]    Open 24 Hours?: No      Medication refill instructions:       If your prescription bottle indicates you have medication refills left, it is not necessary to call your provider’s office. Please contact your pharmacy and they will refill your medication.    If your prescription bottle indicates you do not have any refills left, you may request refills at any time through one of the following ways: The online [facility] system (except Urgent Care), by calling your provider’s office, or by asking your pharmacy to contact your provider’s office with a refill request. Medication refills are processed only during regular business hours and may not be available until the next business day. Your provider may request additional information or to have a follow-up visit with you prior to refilling your medication.   *Please Note: Medication refills are assigned a new Rx number when refilled electronically. Your pharmacy may indicate that no refills were authorized even though a new prescription for the same medication is available at the pharmacy. Please request the medicine by name with the pharmacy before contacting your provider for a refill.        Other Notes About Your Plan     Please assess for the following diagnosis:    -this patient has been on Ambien for insomnia since 7/2010. ? 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 ?other convulsions, code R56.9                   Warfarin Dosing Calendar   June 2017 Details    Sun Mon Tue Wed Thu Fri Sat         1               2                3                 4               5               6               7               8   2.6   20 mg   See details      9      20 mg         10      20 mg           11      20 mg         12      20 mg         13      20 mg         14      20 mg         15      20 mg         16      20 mg         17      20 mg           18      20 mg         19      20 mg         20      20 mg         21      20 mg         22      20 mg         23      20 mg         24      20 mg           25      20 mg         26      20 mg         27      20 mg         28      20 mg         29      20 mg         30      20 mg           Date Details   06/08 This INR check   INR: 2.6               How to take your warfarin dose     To take:  20 mg Take 4 of the 5 mg tablets.           Warfarin Dosing Calendar   July 2017 Details    Sun Mon Tue Wed Thu Fri Sat           1      20 mg           2      20 mg         3      20 mg         4      20 mg         5      20 mg         6      20 mg         7               8                 9               10               11               12               13               14               15                 16               17               18               19               20               21               22                 23               24               25               26               27               28               29                 30               31                     Date Details   No additional details    Date of next INR:  7/6/2017         How to take your warfarin dose     To take:  20 mg Take 4 of the 5 mg tablets.              [employer] Access Code: Activation code not generated  Current [employer] Status: Active

## 2017-06-08 NOTE — PROGRESS NOTES
OP Anticoagulation Service Note    Date: 6/8/2017    Anticoagulation Summary as of 6/8/2017     INR goal 2.0-3.0   Selected INR 2.6 (6/8/2017)   Maintenance plan 20 mg (5 mg x 4) every day   Weekly total 140 mg   Plan last modified Anders Fallon, PHARMD (4/11/2017)   Next INR check 7/6/2017   Target end date     Indications   Chronic anticoagulation [Z79.01]  Personal history of pulmonary embolism [Z86.711]  Long term (current) use of anticoagulants [Z79.01] [Z79.01]         Anticoagulation Episode Summary     INR check location     Preferred lab     Send INR reminders to     Comments       Anticoagulation Care Providers     Provider Role Specialty Phone number    Ninfa Marcial M.D. Referring Internal Medicine [phone]    [facility] Anticoagulation Services Responsible  [phone]        Anticoagulation Patient Findings      Plan:  Patient is therapeutic today. Confirmed dosing regimen. Patient denies any changes in medications or diet. Patient denies any signs or symptoms of bleeding or clotting. Instructed patient to call clinic if any unusual bleeding or bruising occurs. Will continue dosing as outlined. Will follow-up with patient in 4 week(s).      Moira Sheridan, MARGARETD

## 2017-06-13 ENCOUNTER — TELEPHONE (OUTPATIENT)
Dept: VASCULAR LAB | Facility: MEDICAL CENTER | Age: 77
End: 2017-06-13

## 2017-06-13 DIAGNOSIS — Z86.711 HISTORY OF PULMONARY EMBOLISM: ICD-10-CM

## 2017-06-13 NOTE — TELEPHONE ENCOUNTER
Spoke with Natalie to discuss Lovenox bridging for upcoming invasive procedure in IR.  Stated weight 215 lbs  ClCr 41-54ml/min  Lovenox dose 150mg sub q daily    June 14- last dose of warfarin   Maren 15 - NO Warfarin, Lovenox 150mg sub q at dinner time  June 16- NO Warfarin, Lovenox 150mg sub q at dinner time  June 17- NO Warfarin, Lovenox 150mg sub q at dinner time  June 18-  No blood thinners  June 19-  Procedure day - may begin warfarin AFTER procedure  June 20 - Warfarin 20 mg AND Lovenox 150mg sub q at dinner time  June 21- Warfarin 20 mg AND Lovenox 150mg sub q at dinner time  June 22- test INR    Maty Perkins, MARGARETD

## 2017-06-15 DIAGNOSIS — Z01.812 PRE-OPERATIVE LABORATORY EXAMINATION: ICD-10-CM

## 2017-06-15 LAB
INR PPP: 2.71 (ref 0.87–1.13)
PLATELET # BLD AUTO: 336 K/UL (ref 164–446)
PROTHROMBIN TIME: 29.6 SEC (ref 12–14.6)

## 2017-06-15 PROCEDURE — 85610 PROTHROMBIN TIME: CPT

## 2017-06-15 PROCEDURE — 85049 AUTOMATED PLATELET COUNT: CPT

## 2017-06-15 PROCEDURE — 36415 COLL VENOUS BLD VENIPUNCTURE: CPT

## 2017-06-19 ENCOUNTER — APPOINTMENT (OUTPATIENT)
Dept: RADIOLOGY | Facility: MEDICAL CENTER | Age: 77
End: 2017-06-19
Attending: ORTHOPAEDIC SURGERY
Payer: MEDICARE

## 2017-06-19 ENCOUNTER — HOSPITAL ENCOUNTER (OUTPATIENT)
Facility: MEDICAL CENTER | Age: 77
End: 2017-06-19
Attending: ORTHOPAEDIC SURGERY | Admitting: ORTHOPAEDIC SURGERY
Payer: MEDICARE

## 2017-06-19 VITALS
DIASTOLIC BLOOD PRESSURE: 47 MMHG | RESPIRATION RATE: 18 BRPM | HEART RATE: 60 BPM | SYSTOLIC BLOOD PRESSURE: 113 MMHG | TEMPERATURE: 98.6 F | WEIGHT: 215 LBS | HEIGHT: 65 IN | OXYGEN SATURATION: 92 % | BODY MASS INDEX: 35.82 KG/M2

## 2017-06-19 DIAGNOSIS — M25.552 LEFT HIP PAIN: ICD-10-CM

## 2017-06-19 LAB
APPEARANCE FLD: NORMAL
BODY FLD TYPE: NORMAL
COLOR FLD: NORMAL
CSF COMMENTS 1658: NORMAL
EOSINOPHIL NFR FLD: 2 %
INR PPP: 1.05 (ref 0.87–1.13)
LYMPHOCYTES NFR FLD: 27 %
MONONUC CELLS NFR FLD: 10 %
NEUTROPHILS NFR FLD: 61 %
PROTHROMBIN TIME: 14 SEC (ref 12–14.6)
RBC # FLD: NORMAL CELLS/UL
WBC # FLD: 183 CELLS/UL

## 2017-06-19 PROCEDURE — 87075 CULTR BACTERIA EXCEPT BLOOD: CPT

## 2017-06-19 PROCEDURE — 87070 CULTURE OTHR SPECIMN AEROBIC: CPT

## 2017-06-19 PROCEDURE — 160002 HCHG RECOVERY MINUTES (STAT)

## 2017-06-19 PROCEDURE — 20610 DRAIN/INJ JOINT/BURSA W/O US: CPT

## 2017-06-19 PROCEDURE — 700111 HCHG RX REV CODE 636 W/ 250 OVERRIDE (IP)

## 2017-06-19 PROCEDURE — 85610 PROTHROMBIN TIME: CPT

## 2017-06-19 PROCEDURE — 87205 SMEAR GRAM STAIN: CPT

## 2017-06-19 PROCEDURE — 89051 BODY FLUID CELL COUNT: CPT

## 2017-06-19 RX ORDER — OXYCODONE HYDROCHLORIDE 5 MG/1
5 TABLET ORAL
Status: DISCONTINUED | OUTPATIENT
Start: 2017-06-19 | End: 2017-06-19 | Stop reason: HOSPADM

## 2017-06-19 RX ORDER — OXYCODONE HYDROCHLORIDE 5 MG/1
2.5 TABLET ORAL
Status: DISCONTINUED | OUTPATIENT
Start: 2017-06-19 | End: 2017-06-19

## 2017-06-19 RX ORDER — SODIUM CHLORIDE 9 MG/ML
500 INJECTION, SOLUTION INTRAVENOUS
Status: DISCONTINUED | OUTPATIENT
Start: 2017-06-19 | End: 2017-06-19 | Stop reason: HOSPADM

## 2017-06-19 RX ORDER — OXYCODONE HYDROCHLORIDE 5 MG/1
2.5 TABLET ORAL
Status: DISCONTINUED | OUTPATIENT
Start: 2017-06-19 | End: 2017-06-19 | Stop reason: HOSPADM

## 2017-06-19 RX ORDER — MIDAZOLAM HYDROCHLORIDE 1 MG/ML
.5-2 INJECTION INTRAMUSCULAR; INTRAVENOUS PRN
Status: DISCONTINUED | OUTPATIENT
Start: 2017-06-19 | End: 2017-06-19 | Stop reason: HOSPADM

## 2017-06-19 RX ORDER — NALOXONE HYDROCHLORIDE 0.4 MG/ML
INJECTION, SOLUTION INTRAMUSCULAR; INTRAVENOUS; SUBCUTANEOUS
Status: COMPLETED
Start: 2017-06-19 | End: 2017-06-19

## 2017-06-19 RX ORDER — ONDANSETRON 2 MG/ML
4 INJECTION INTRAMUSCULAR; INTRAVENOUS PRN
Status: DISCONTINUED | OUTPATIENT
Start: 2017-06-19 | End: 2017-06-19 | Stop reason: HOSPADM

## 2017-06-19 RX ORDER — OXYCODONE HYDROCHLORIDE 5 MG/1
5 TABLET ORAL
Status: DISCONTINUED | OUTPATIENT
Start: 2017-06-19 | End: 2017-06-19

## 2017-06-19 RX ORDER — MIDAZOLAM HYDROCHLORIDE 1 MG/ML
INJECTION INTRAMUSCULAR; INTRAVENOUS
Status: COMPLETED
Start: 2017-06-19 | End: 2017-06-19

## 2017-06-19 RX ADMIN — MIDAZOLAM HYDROCHLORIDE 2 MG: 1 INJECTION INTRAMUSCULAR; INTRAVENOUS at 12:22

## 2017-06-19 RX ADMIN — MIDAZOLAM 2 MG: 1 INJECTION INTRAMUSCULAR; INTRAVENOUS at 12:22

## 2017-06-19 RX ADMIN — FENTANYL CITRATE 25 MCG: 50 INJECTION, SOLUTION INTRAMUSCULAR; INTRAVENOUS at 12:22

## 2017-06-19 ASSESSMENT — PAIN SCALES - GENERAL
PAINLEVEL_OUTOF10: 0

## 2017-06-19 NOTE — IP AVS SNAPSHOT
6/19/2017    Natalie Anaya  [address] [place] [address]    Dear Natalie:    [facility] wants to ensure your discharge home is safe and you or your loved ones have had all of your questions answered regarding your care after you leave the hospital.    Below is a list of resources and contact information should you have any questions regarding your hospital stay, follow-up instructions, or active medical symptoms.    Questions or Concerns Regarding… Contact   Medical Questions Related to Your Discharge  (7 days a week, 8am-5pm) Contact a Nurse Care Coordinator   [phone]   Medical Questions Not Related to Your Discharge  (24 hours a day / 7 days a week)  Contact the Nurse Health Line   [phone]    Medications or Discharge Instructions Refer to your discharge packet   or contact your [facility] Primary Care Provider   [phone]   Follow-up Appointment(s) Schedule your appointment via [employer]   or contact Scheduling [phone]   Billing Review your statement via [employer]  or contact Billing [phone]   Medical Records Review your records via [employer]   or contact Medical Records [phone]     You may receive a telephone call within two days of discharge. This call is to make certain you understand your discharge instructions and have the opportunity to have any questions answered. You can also easily access your medical information, test results and upcoming appointments via the [employer] free online health management tool. You can learn more and sign up at [employer]/Inks[employer]. For assistance setting up your [employer] account, please call [phone].    Once again, we want to ensure your discharge home is safe and that you have a clear understanding of any next steps in your care. If you have any questions or concerns, please do not hesitate to contact us, we are here for you. Thank you for choosing [facility] for your healthcare needs.    Sincerely,    Your [facility] Healthcare Team

## 2017-06-19 NOTE — IP AVS SNAPSHOT
" Home Care Instructions                                                                                                                Name:Natalie Anaya  Medical Record Number:[id number]  CSN: [id number]    YOB: 1940   Age: 76 y.o.  Sex: female  HT:1.651 m (5' 5\") WT: 97.523 kg (215 lb)          Admit Date: 6/19/2017     Discharge Date:   Today's Date: 6/19/2017  Attending Doctor:  Walt Nguyen M.D.                  Allergies:  Tape                Discharge Instructions         ACTIVITY: Rest and take it easy for the first 24 hours.  A responsible adult is recommended to remain with you during that time.  It is normal to feel sleepy.  We encourage you to not do anything that requires balance, judgment or coordination.    MILD FLU-LIKE SYMPTOMS ARE NORMAL. YOU MAY EXPERIENCE GENERALIZED MUSCLE ACHES, THROAT IRRITATION, HEADACHE AND/OR SOME NAUSEA.    FOR 24 HOURS DO NOT:  Drive, operate machinery or run household appliances.  Drink beer or alcoholic beverages.   Make important decisions or sign legal documents.      SPECIAL INSTRUCTIONS: FNA - FINE NEEDLE ASPIRATION  1. You may experience bruising or tenderness in the biopsy area.  This may last for a few days.  2. Follow up with your doctor.  Your results will come from your physician who ordered the test.  3. Resume regular diet and medications.      DIET: To avoid nausea, slowly advance diet as tolerated, avoiding spicy or greasy foods for the first day.  Add more substantial food to your diet according to your physician's instructions.  Babies can be fed formula or breast milk as soon as they are hungry.  INCREASE FLUIDS AND FIBER TO AVOID CONSTIPATION.    SURGICAL DRESSING/BATHING: May remove bandaid in 24 hours. May shower in 24 hours. Do not submerge site for 3 days.     FOLLOW-UP APPOINTMENT:  A follow-up appointment should be arranged with your doctor; call to schedule.    You should CALL YOUR PHYSICIAN if you develop:  Fever greater " than 101 degrees F.  Pain not relieved by medication, or persistent nausea or vomiting.  Excessive bleeding (blood soaking through dressing) or unexpected drainage from the wound.  Extreme redness or swelling around the incision site, drainage of pus or foul smelling drainage.  Inability to urinate or empty your bladder within 8 hours.  Problems with breathing or chest pain.    You should call 911 if you develop problems with breathing or chest pain.  If you are unable to contact your doctor or surgical center, you should go to the nearest emergency room or urgent care center.  Physician's telephone #: [phone]    If any questions arise, call your doctor.  If your doctor is not available, please feel free to call the Surgical Center at [phone].  The Center is open Monday through Friday from 7AM to 7PM.  You can also call the [facility] HOTLINE open 24 hours/day, 7 days/week and speak to a nurse at [phone], or toll free at [phone].    A registered nurse may call you a few days after your surgery to see how you are doing after your procedure.    MEDICATIONS: Resume taking daily medication.  Take prescribed pain medication with food.  If no medication is prescribed, you may take non-aspirin pain medication if needed.  PAIN MEDICATION CAN BE VERY CONSTIPATING.  Take a stool softener or laxative such as senokot, pericolace, or milk of magnesia if needed.    If your physician has prescribed pain medication that includes Acetaminophen (Tylenol), do not take additional Acetaminophen (Tylenol) while taking the prescribed medication.    Depression / Suicide Risk    As you are discharged from this [facility] facility, it is important to learn how to keep safe from harming yourself.    Recognize the warning signs:  · Abrupt changes in personality, positive or negative- including increase in energy   · Giving away possessions  · Change in eating patterns- significant weight changes-  positive or  negative  · Change in sleeping patterns- unable to sleep or sleeping all the time   · Unwillingness or inability to communicate  · Depression  · Unusual sadness, discouragement and loneliness  · Talk of wanting to die  · Neglect of personal appearance   · Rebelliousness- reckless behavior  · Withdrawal from people/activities they love  · Confusion- inability to concentrate     If you or a loved one observes any of these behaviors or has concerns about self-harm, here's what you can do:  · Talk about it- your feelings and reasons for harming yourself  · Remove any means that you might use to hurt yourself (examples: pills, rope, extension cords, firearm)  · Get professional help from the community (Mental Health, Substance Abuse, psychological counseling)  · Do not be alone:Call your Safe Contact- someone whom you trust who will be there for you.  · Call your local CRISIS HOTLINE 978-0012 or 484-889-0920  · Call your local Children's Mobile Crisis Response Team [place] (797) 409-8231 or www.[place]  · Call the toll free National Suicide Prevention Hotlines   · National Suicide Prevention Lifeline 417-607-TLOO (5126)  · National Hope Line Network 800-SUICIDE (141-3666)        I acknowledge receipt and understanding of these Home Care Instructions.           Medication List      CHANGE how you take these medications        Instructions    Morning Afternoon Evening Bedtime    potassium chloride SA 20 MEQ Tbcr   What changed:  when to take this   Commonly known as:  Kdur        Take 1 Tab by mouth 2 times a day.   Dose:  20 mEq                          CONTINUE taking these medications        Instructions    Morning Afternoon Evening Bedtime    ALEVE 220 MG tablet   Generic drug:  naproxen        Take 220 mg by mouth 2 times a day, with meals.   Dose:  220 mg                        carvedilol 6.25 MG Tabs   Commonly known as:  COREG        Take 6.25 mg by mouth 2 times a day, with meals.   Dose:  6.25 mg                         enoxaparin 150 MG/ML Soln   Commonly known as:  LOVENOX        Doctor's comments:  This rx was submitted by a pharmacist working under a collaborative practice agreement.   Inject 150 mg as instructed every day.   Dose:  150 mg                        furosemide 40 MG Tabs   Commonly known as:  LASIX        Take 1 Tab by mouth every day.   Dose:  40 mg                        levothyroxine 100 MCG Tabs   Commonly known as:  SYNTHROID        Take 100 mcg by mouth Every morning on an empty stomach.   Dose:  100 mcg                        Melatonin 500 MCG Tbdp        Take 1,000-2,000 mcg by mouth at bedtime as needed (sleep).   Dose:  6043-4114 mcg                        MOVE FREE JOINT HEALTH ADVANCE Tabs        Take  by mouth every day.                        omeprazole 20 MG delayed-release capsule   Commonly known as:  PRILOSEC        Take 20 mg by mouth every day.   Dose:  20 mg                        primidone 250 MG Tabs   Commonly known as:  MYSOLINE        TAKE 1 TABLET BY MOUTH THREE TIMES DAILY                        PROBIOTIC DAILY PO        Take  by mouth every day.                        Vitamin D-3 1000 UNITS Caps        Take 1,000 Units by mouth every day.   Dose:  1000 Units                        warfarin 5 MG Tabs   Commonly known as:  COUMADIN        Doctor's comments:  This rx was submitted by a pharmacist working under a collaborative practice agreement.   Take 4 tablets daily as directed by coumadin clininc                        WOMENS MULTI VITAMIN & MINERAL Tabs        Take 1 Tab by mouth every day.   Dose:  1 Tab                                Medication Information     Above and/or attached are the medications your physician expects you to take upon discharge. Review all of your home medications and newly ordered medications with your doctor and/or pharmacist. Follow medication instructions as directed by your doctor and/or pharmacist. Please keep your medication list  with you and share with your physician. Update the information when medications are discontinued, doses are changed, or new medications (including over-the-counter products) are added; and carry medication information at all times in the event of emergency situations.        Resources     Quit Smoking / Tobacco Use:    I understand the use of any tobacco products increases my chance of suffering from future heart disease or stroke and could cause other illnesses which may shorten my life. Quitting the use of tobacco products is the single most important thing I can do to improve my health. For further information on smoking / tobacco cessation call a Toll Free Quit Line at 1-513.341.9534 (*National Cancer [place]) or 1-508.116.4528 (American Lung Association) or you can access the web based program at www.lungusa.org.    [place] Tobacco Users Help Line:  (573) 720-6469       Toll Free: 1-565.276.1086  Quit Tobacco Program [facility] (511)360-9191    Crisis Hotline:    [place] Crisis Hotline:  1-837-WQGWRAX or 1-766.346.4125    [place] Crisis Hotline:    1-443.456.2414 or 131-665-8085    Discharge Survey:   Thank you for choosing [facility]. We hope we did everything we could to make your hospital stay a pleasant one. You may be receiving a survey and we would appreciate your time and participation in answering the questions. Your input is very valuable to us in our efforts to improve our service to our patients and their families.            Signatures     My signature on this form indicates that:    1. I acknowledge receipt and understanding of these Home Care Instruction.  2. My questions regarding this information have been answered to my satisfaction.  3. I have formulated a plan with my discharge nurse to obtain my prescribed medications for home.    __________________________________      __________________________________                   Patient Signature                                  Guardian/Responsible Adult Signature      __________________________________                 __________       ________                       Nurse Signature                                               Date                 Time

## 2017-06-19 NOTE — DISCHARGE INSTRUCTIONS
ACTIVITY: Rest and take it easy for the first 24 hours.  A responsible adult is recommended to remain with you during that time.  It is normal to feel sleepy.  We encourage you to not do anything that requires balance, judgment or coordination.    MILD FLU-LIKE SYMPTOMS ARE NORMAL. YOU MAY EXPERIENCE GENERALIZED MUSCLE ACHES, THROAT IRRITATION, HEADACHE AND/OR SOME NAUSEA.    FOR 24 HOURS DO NOT:  Drive, operate machinery or run household appliances.  Drink beer or alcoholic beverages.   Make important decisions or sign legal documents.      SPECIAL INSTRUCTIONS: FNA - FINE NEEDLE ASPIRATION  1. You may experience bruising or tenderness in the biopsy area.  This may last for a few days.  2. Follow up with your doctor.  Your results will come from your physician who ordered the test.  3. Resume regular diet and medications.      DIET: To avoid nausea, slowly advance diet as tolerated, avoiding spicy or greasy foods for the first day.  Add more substantial food to your diet according to your physician's instructions.  Babies can be fed formula or breast milk as soon as they are hungry.  INCREASE FLUIDS AND FIBER TO AVOID CONSTIPATION.    SURGICAL DRESSING/BATHING: May remove bandaid in 24 hours. May shower in 24 hours. Do not submerge site for 3 days.     FOLLOW-UP APPOINTMENT:  A follow-up appointment should be arranged with your doctor; call to schedule.    You should CALL YOUR PHYSICIAN if you develop:  Fever greater than 101 degrees F.  Pain not relieved by medication, or persistent nausea or vomiting.  Excessive bleeding (blood soaking through dressing) or unexpected drainage from the wound.  Extreme redness or swelling around the incision site, drainage of pus or foul smelling drainage.  Inability to urinate or empty your bladder within 8 hours.  Problems with breathing or chest pain.    You should call 911 if you develop problems with breathing or chest pain.  If you are unable to contact your doctor or  surgical center, you should go to the nearest emergency room or urgent care center.  Physician's telephone #: [phone]    If any questions arise, call your doctor.  If your doctor is not available, please feel free to call the Surgical Center at [phone].  The Center is open Monday through Friday from 7AM to 7PM.  You can also call the HEALTH HOTLINE open 24 hours/day, 7 days/week and speak to a nurse at [phone], or toll free at [phone].    A registered nurse may call you a few days after your surgery to see how you are doing after your procedure.    MEDICATIONS: Resume taking daily medication.  Take prescribed pain medication with food.  If no medication is prescribed, you may take non-aspirin pain medication if needed.  PAIN MEDICATION CAN BE VERY CONSTIPATING.  Take a stool softener or laxative such as senokot, pericolace, or milk of magnesia if needed.    If your physician has prescribed pain medication that includes Acetaminophen (Tylenol), do not take additional Acetaminophen (Tylenol) while taking the prescribed medication.    Depression / Suicide Risk    As you are discharged from this [facility] facility, it is important to learn how to keep safe from harming yourself.    Recognize the warning signs:  · Abrupt changes in personality, positive or negative- including increase in energy   · Giving away possessions  · Change in eating patterns- significant weight changes-  positive or negative  · Change in sleeping patterns- unable to sleep or sleeping all the time   · Unwillingness or inability to communicate  · Depression  · Unusual sadness, discouragement and loneliness  · Talk of wanting to die  · Neglect of personal appearance   · Rebelliousness- reckless behavior  · Withdrawal from people/activities they love  · Confusion- inability to concentrate     If you or a loved one observes any of these behaviors or has concerns about self-harm, here's what you can do:  · Talk about it-  your feelings and reasons for harming yourself  · Remove any means that you might use to hurt yourself (examples: pills, rope, extension cords, firearm)  · Get professional help from the community (Mental Health, Substance Abuse, psychological counseling)  · Do not be alone:Call your Safe Contact- someone whom you trust who will be there for you.  · Call your local CRISIS HOTLINE 929-7169 or 485-862-3829  · Call your local Children's Mobile Crisis Response Team [place] (936) 864-2583 or wwwAudiam  · Call the toll free National Suicide Prevention Hotlines   · National Suicide Prevention Lifeline 364-885-NSZT (2403)  · National Hope Line Network 800-SUICIDE (576-2698)        I acknowledge receipt and understanding of these Home Care Instructions.

## 2017-06-19 NOTE — PROGRESS NOTES
Report received and Pt admitted to [facility] 6 s/p left hip fluid aspiration. Site CDI with no s/s of bleeding or hematoma. Pt attached to all leads and monitors. VSS with no complaints of pain or nausea. Fluids and food offered. Orders reviewed.  Family at bedside.

## 2017-06-19 NOTE — PROGRESS NOTES
Pt. Left hip aspiration site CDI with no s/s of bleeding or hematoma. Pt. Meets discharge criteria. Pt. And responsible adult given discharge instructions. Pt. And responsible adult educated and all questions answered. Signed copy on chart. All lines and drains DC'd. Pt. Belongings with Pt and Pt. Transported via wheel chair to car with escort.

## 2017-06-19 NOTE — PROGRESS NOTES
Patient underwent a left hip aspiration by Dr. Verdin. Pt remained hemodynamically stable during procedure. Report called to Jennifer MARINA. Pt transported by gurney to PPU. 5cc bloody aspirate hand delivered to lab.

## 2017-06-19 NOTE — OR SURGEON
Immediate Post- Operative Note        PostOp Diagnosis: Left greater trochanteric fluid collection    Procedure(s): CT guided aspiration      Estimated Blood Loss: Less than 5 ml        Complications: None            6/19/2017     12:33 PM     Ajay Dyer

## 2017-06-20 LAB
GRAM STN SPEC: NORMAL
SIGNIFICANT IND 70042: NORMAL
SITE SITE: NORMAL
SOURCE SOURCE: NORMAL

## 2017-06-22 ENCOUNTER — ANTICOAGULATION VISIT (OUTPATIENT)
Dept: MEDICAL GROUP | Facility: MEDICAL CENTER | Age: 77
End: 2017-06-22
Payer: MEDICARE

## 2017-06-22 DIAGNOSIS — Z79.01 LONG TERM (CURRENT) USE OF ANTICOAGULANTS: ICD-10-CM

## 2017-06-22 DIAGNOSIS — Z79.01 CHRONIC ANTICOAGULATION: ICD-10-CM

## 2017-06-22 DIAGNOSIS — Z86.711 PERSONAL HISTORY OF PULMONARY EMBOLISM: ICD-10-CM

## 2017-06-22 LAB
BACTERIA WND AEROBE CULT: NORMAL
GRAM STN SPEC: NORMAL
INR PPP: 1.5 (ref 2–3.5)
SIGNIFICANT IND 70042: NORMAL
SITE SITE: NORMAL
SOURCE SOURCE: NORMAL

## 2017-06-22 NOTE — PROGRESS NOTES
OP Anticoagulation Service Note    Date: 6/22/2017    Anticoagulation Summary as of 6/22/2017     INR goal 2.0-3.0   Selected INR 1.5! (6/22/2017)   Maintenance plan 20 mg (5 mg x 4) every day   Weekly total 140 mg   Plan last modified Anders Fallon, PHARMD (4/11/2017)   Next INR check 6/26/2017   Target end date     Indications   Chronic anticoagulation [Z79.01]  Personal history of pulmonary embolism [Z86.711]  Long term (current) use of anticoagulants [Z79.01] [Z79.01]         Anticoagulation Episode Summary     INR check location     Preferred lab     Send INR reminders to     Comments       Anticoagulation Care Providers     Provider Role Specialty Phone number    Ninfa Marcial M.D. Referring Internal Medicine [phone]    [facility] Anticoagulation Services Responsible  [phone]        Plan:  INR is subtherapeutic. Pt is currently bridging post procedure. Instructed pt to continue Lovenox and her usual dose of warfarin as INR is trending up. Follow up 4 days.       Moira Sheridan, MARGARETD

## 2017-06-22 NOTE — MR AVS SNAPSHOT
Natalie Tete Anaya   2017 10:45 AM   Anticoagulation Visit   MRN: [id number]    Department:  [facility]   Dept Phone:  [phone]    Description:  Female : 1940   Provider:  [facility]           Allergies as of 2017     Allergen Noted Reactions    Tape 10/21/2015   Rash    Adhesive band aids cause a rash  Paper tape ok      You were diagnosed with     Long term (current) use of anticoagulants   [V58.61.ICD-9-CM]       Chronic anticoagulation   [435750]       Personal history of pulmonary embolism   [V12.55.ICD-9-CM]         Vital Signs     Smoking Status                   Never Smoker            Basic Information     Date Of Birth Sex Race Ethnicity Preferred Language    1940 Female White Non- English      Your appointments     2017 11:45 AM   Anti-Coag Routine with [facility] PHARMACIST   [facility]    [address]  [address]  [facility] [id number] [phone]            2017 10:00 AM   Pulmonary Function Test with PFT-RM3   [facility] (--)    [address]  [address]  [place] [address] [phone]            2017 11:00 AM   New Patient Pulmonary with A Rotation   [facility] (--)    [address]  [address]  [place] [address]   [phone]            2017  1:40 PM   Established Patient with Ninfa Marcial M.D.   [address] [address] [phone]           You will be receiving a confirmation call a few days before your appointment from our automated call confirmation system.              Problem List              ICD-10-CM Priority Class Noted - Resolved    Chronic insomnia F51.04   2013 - Present    HTN (hypertension), benign I10 Low  2013 - Present    History of cataract removal with insertion of prosthetic lens Z98.49, Z96.1    7/15/2014 - Present    Seizure disorder (HCC) G40.909 Low  10/21/2015 - Present    Other specified hypothyroidism E03.8   1/8/2016 - Present    Personal history of pulmonary embolism Z86.711   1/8/2016 - Present    Iron deficiency anemia D50.9   3/4/2016 - Present    Diastolic dysfunction I51.9   3/4/2016 - Present    Hiatal hernia K44.9   3/4/2016 - Present    Closed fracture of multiple ribs of left side S22.42XA   9/26/2016 - Present    Slow transit constipation K59.01   9/26/2016 - Present    History of fracture of left hip Z87.81 Medium  10/5/2016 - Present    Recurrent pulmonary embolism (CMS-HCC) I26.99 Medium  11/2/2016 - Present    Chronic anticoagulation Z79.01   2/23/2017 - Present    Pulmonary hypertension (CMS-HCC) I27.2   3/15/2017 - Present    Long term (current) use of anticoagulants [Z79.01] Z79.01   4/11/2017 - Present    Left hip pain M25.552   6/19/2017 - Present      Health Maintenance        Date Due Completion Dates    BONE DENSITY 5/17/2022 5/17/2017, 5/17/2017, 10/1/2014 (Declined)    Override on 10/1/2014: Patient Declined    IMM DTaP/Tdap/Td Vaccine (2 - Td) 10/21/2025 10/21/2015    COLONOSCOPY 4/29/2026 4/29/2016, 4/29/2016            Results     POCT Protime      Component    INR    1.5                        Current Immunizations     13-VALENT PCV PREVNAR 8/1/2016    Hepatitis A Vaccine, Ped/Adol 10/18/2010    Hepatitis B Vaccine Non-Recombivax (Ped/Adol) 10/18/2010    INFLUENZA VACCINE H1N1 10/9/2011, 2/22/2010    Influenza TIV (IM) 9/30/2014, 9/12/2012, 10/18/2010    Influenza Vaccine Adult HD 11/22/2016  4:16 PM, 10/7/2016 12:37 PM    Influenza Vaccine Quad Inj (Pf) 9/19/2015, 9/19/2015    Pneumococcal Vaccine (UF)Historical Data 10/9/2011    Pneumococcal polysaccharide vaccine (PPSV-23) 10/18/2010    SHINGLES VACCINE 12/31/2010    Tdap Vaccine 10/21/2015      Below and/or attached are the medications your provider expects you to take. Review all of your home medications and newly  ordered medications with your provider and/or pharmacist. Follow medication instructions as directed by your provider and/or pharmacist. Please keep your medication list with you and share with your provider. Update the information when medications are discontinued, doses are changed, or new medications (including over-the-counter products) are added; and carry medication information at all times in the event of emergency situations     Allergies:  TAPE - Rash               Medications  Valid as of: June 22, 2017 - 10:55 AM    Generic Name Brand Name Tablet Size Instructions for use    Carvedilol (Tab) COREG 6.25 MG Take 6.25 mg by mouth 2 times a day, with meals.        Cholecalciferol (Cap) Vitamin D-3 1000 UNITS Take 1,000 Units by mouth every day.        Enoxaparin Sodium (Solution) LOVENOX 150 MG/ML Inject 150 mg as instructed every day.        Furosemide (Tab) LASIX 40 MG Take 1 Tab by mouth every day.        Glucos-Chond-Hyal Ac-Ca Fructo (Tab) MOVE FREE JOINT HEALTH ADVANCE  Take  by mouth every day.        Levothyroxine Sodium (Tab) SYNTHROID 100 MCG Take 100 mcg by mouth Every morning on an empty stomach.        Melatonin (TABLET DISPERSIBLE) Melatonin 500 MCG Take 1,000-2,000 mcg by mouth at bedtime as needed (sleep).        Multiple Vitamins-Minerals (Tab) WOMENS MULTI VITAMIN & MINERAL  Take 1 Tab by mouth every day.        Naproxen Sodium (Tab) ANAPROX 220 MG Take 220 mg by mouth 2 times a day, with meals.        Omeprazole (CAPSULE DELAYED RELEASE) PRILOSEC 20 MG Take 20 mg by mouth every day.        Potassium Chloride Torrie CR (Tab CR) Kdur 20 MEQ Take 1 Tab by mouth 2 times a day.        Primidone (Tab) MYSOLINE 250 MG TAKE 1 TABLET BY MOUTH THREE TIMES DAILY        Probiotic Product   Take  by mouth every day.        Warfarin Sodium (Tab) COUMADIN 5 MG Take 4 tablets daily as directed by coumadin clininc        .                 Medicines prescribed today were sent to:     [facility] [id number]  - [place], [place] - [address] AT [facility]    [address] [place] [address]    Phone: [phone] Fax: [phone]    Open 24 Hours?: No      Medication refill instructions:       If your prescription bottle indicates you have medication refills left, it is not necessary to call your provider’s office. Please contact your pharmacy and they will refill your medication.    If your prescription bottle indicates you do not have any refills left, you may request refills at any time through one of the following ways: The online [employer] system (except Urgent Care), by calling your provider’s office, or by asking your pharmacy to contact your provider’s office with a refill request. Medication refills are processed only during regular business hours and may not be available until the next business day. Your provider may request additional information or to have a follow-up visit with you prior to refilling your medication.   *Please Note: Medication refills are assigned a new Rx number when refilled electronically. Your pharmacy may indicate that no refills were authorized even though a new prescription for the same medication is available at the pharmacy. Please request the medicine by name with the pharmacy before contacting your provider for a refill.        Other Notes About Your Plan     Please assess for the following diagnosis:    -this patient has been on Ambien for insomnia since 7/2010. ? 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 ?other convulsions, code R56.9                   Warfarin Dosing Calendar   June 2017 Details    Sun Mon Tue Wed Thu Fri Sat         1               2               3                 4               5               6               7               8               9               10                 11               12               13                14               15               16               17                 18               19               20               21               22   1.5   20 mg   See details      23      20 mg         24      20 mg           25      20 mg         26      20 mg         27               28               29               30                 Date Details   06/22 This INR check   Continue Lovenox   INR: 1.5       Date of next INR:  6/26/2017         How to take your warfarin dose     To take:  20 mg Take 4 of the 5 mg tablets.              [email or web address] Access Code: Activation code not generated  Current [email or web address] Status: Active

## 2017-06-24 LAB
BACTERIA SPEC ANAEROBE CULT: NORMAL
SIGNIFICANT IND 70042: NORMAL
SITE SITE: NORMAL
SOURCE SOURCE: NORMAL

## 2017-06-26 ENCOUNTER — ANTICOAGULATION VISIT (OUTPATIENT)
Dept: MEDICAL GROUP | Facility: MEDICAL CENTER | Age: 77
End: 2017-06-26
Payer: MEDICARE

## 2017-06-26 ENCOUNTER — TELEPHONE (OUTPATIENT)
Dept: SLEEP MEDICINE | Facility: MEDICAL CENTER | Age: 77
End: 2017-06-26

## 2017-06-26 VITALS — HEART RATE: 75 BPM | DIASTOLIC BLOOD PRESSURE: 70 MMHG | SYSTOLIC BLOOD PRESSURE: 132 MMHG

## 2017-06-26 DIAGNOSIS — Z79.01 LONG TERM (CURRENT) USE OF ANTICOAGULANTS: ICD-10-CM

## 2017-06-26 DIAGNOSIS — Z86.711 PERSONAL HISTORY OF PULMONARY EMBOLISM: ICD-10-CM

## 2017-06-26 DIAGNOSIS — R06.02 SOB (SHORTNESS OF BREATH): ICD-10-CM

## 2017-06-26 DIAGNOSIS — Z79.01 CHRONIC ANTICOAGULATION: ICD-10-CM

## 2017-06-26 LAB — INR PPP: 2 (ref 2–3.5)

## 2017-06-26 NOTE — TELEPHONE ENCOUNTER
Need order for PFT       Next OV: 07/06/2017 - A rotation    Please sign for PFT for NP appointment.

## 2017-06-26 NOTE — PROGRESS NOTES
Anticoagulation Summary as of 6/26/2017     INR goal 2.0-3.0   Selected INR 2.0 (6/26/2017)   Maintenance plan 20 mg (5 mg x 4) every day   Weekly total 140 mg   Plan last modified Anders Fallon, PHARMD (4/11/2017)   Next INR check 7/10/2017   Target end date     Indications   Chronic anticoagulation [Z79.01]  Personal history of pulmonary embolism [Z86.711]  Long term (current) use of anticoagulants [Z79.01] [Z79.01]         Anticoagulation Episode Summary     INR check location     Preferred lab     Send INR reminders to     Comments       Anticoagulation Care Providers     Provider Role Specialty Phone number    Ninfa Marcial M.D. Referring Internal Medicine [phone]    [facility] Responsible  [phone]        Anticoagulation Patient Findings      Current Outpatient Prescriptions on File Prior to Visit   Medication Sig Dispense Refill   • enoxaparin (LOVENOX) 150 MG/ML Solution Inject 150 mg as instructed every day. 10 Syringe 1   • primidone (MYSOLINE) 250 MG Tab TAKE 1 TABLET BY MOUTH THREE TIMES DAILY 270 Tab 3   • omeprazole (PRILOSEC) 20 MG delayed-release capsule Take 20 mg by mouth every day.     • warfarin (COUMADIN) 5 MG Tab Take 4 tablets daily as directed by coumadin clininc 360 Tab 1   • naproxen (ALEVE) 220 MG tablet Take 220 mg by mouth 2 times a day, with meals.     • Probiotic Product (PROBIOTIC DAILY PO) Take  by mouth every day.     • Glucos-Chond-Hyal Ac-Ca Fructo (MOVE FREE [facility] ADVANCE) Tab Take  by mouth every day.     • potassium chloride SA (KDUR) 20 MEQ Tab CR Take 1 Tab by mouth 2 times a day. (Patient taking differently: Take 20 mEq by mouth every day.) 180 Tab 1   • furosemide (LASIX) 40 MG Tab Take 1 Tab by mouth every day. 90 Tab 1   • Multiple Vitamins-Minerals (WOMENS MULTI VITAMIN & MINERAL) Tab Take 1 Tab by mouth every day.     • Melatonin 500 MCG TABLET DISPERSIBLE Take 1,000-2,000 mcg by mouth at bedtime as needed (sleep).     •  Cholecalciferol (VITAMIN D-3) 1000 UNITS Cap Take 1,000 Units by mouth every day.     • carvedilol (COREG) 6.25 MG Tab Take 6.25 mg by mouth 2 times a day, with meals.     • levothyroxine (SYNTHROID) 100 MCG Tab Take 100 mcg by mouth Every morning on an empty stomach.       No current facility-administered medications on file prior to visit.       Lab Results   Component Value Date/Time    SODIUM 141 04/27/2017 02:42 PM    POTASSIUM 4.4 04/27/2017 02:42 PM    CHLORIDE 104 04/27/2017 02:42 PM    CO2 31 04/27/2017 02:42 PM    GLUCOSE 96 04/27/2017 02:42 PM    BUN 28* 04/27/2017 02:42 PM    CREATININE 0.50 04/27/2017 02:42 PM          Natalie Anaya seen in clinic today  INR  therapeutic.    Denies signs/symptoms of bleeding and/or thrombosis.    Denies changes to diet or medications.   Follow up appointment in 2 week(s).      Continue weekly warfarin dose as noted       Anders Fallon, MARGARETD

## 2017-06-26 NOTE — MR AVS SNAPSHOT
Natalie Benoite Héctor   2017 11:45 AM   Anticoagulation Visit   MRN: [id number]    Department:  [facility]   Dept Phone:  [phone]    Description:  Female : 1940   Provider:  [facility] PHARMACIST           Allergies as of 2017     Allergen Noted Reactions    Tape 10/21/2015   Rash    Adhesive band aids cause a rash  Paper tape ok      You were diagnosed with     Long term (current) use of anticoagulants   [V58.61.ICD-9-CM]       Chronic anticoagulation   [447419]       Personal history of pulmonary embolism   [V12.55.ICD-9-CM]         Vital Signs     Smoking Status                   Never Smoker            Basic Information     Date Of Birth Sex Race Ethnicity Preferred Language    1940 Female White Non- English      Your appointments     2017 11:45 AM   Anti-Coag Routine with [facility] PHARMACIST   [facility]    [address]  [address]  [place] [id number] [phone]            2017 10:00 AM   Pulmonary Function Test with PFT-RM3   [facility] (--)    [address]  [place] [address] [phone]            2017 11:00 AM   New Patient Pulmonary with A Rotation   [facility] (--)    [address]  [address]  [place] [address]   [phone]            2017  9:15 AM   Anti-Coag Routine with [facility] PAV PHARMACIST   [facility] ([place])    [address]  [address]  [place] [id number] [phone]            2017  1:40 PM   Established Patient with Ninfa Mracial M.D.   [facility]    [address]  [place] [address] [phone]           You will be receiving a confirmation call a few days before your appointment from our automated call confirmation system.              Problem List              ICD-10-CM Priority  Class Noted - Resolved    Chronic insomnia F51.04   1/9/2013 - Present    HTN (hypertension), benign I10 Low  1/9/2013 - Present    History of cataract removal with insertion of prosthetic lens Z98.49, Z96.1   7/15/2014 - Present    Seizure disorder (HCC) G40.909 Low  10/21/2015 - Present    Other specified hypothyroidism E03.8   1/8/2016 - Present    Personal history of pulmonary embolism Z86.711   1/8/2016 - Present    Iron deficiency anemia D50.9   3/4/2016 - Present    Diastolic dysfunction I51.9   3/4/2016 - Present    Hiatal hernia K44.9   3/4/2016 - Present    Closed fracture of multiple ribs of left side S22.42XA   9/26/2016 - Present    Slow transit constipation K59.01   9/26/2016 - Present    History of fracture of left hip Z87.81 Medium  10/5/2016 - Present    Recurrent pulmonary embolism (CMS-HCC) I26.99 Medium  11/2/2016 - Present    Chronic anticoagulation Z79.01   2/23/2017 - Present    Pulmonary hypertension (CMS-HCC) I27.2   3/15/2017 - Present    Long term (current) use of anticoagulants [Z79.01] Z79.01   4/11/2017 - Present    Left hip pain M25.552   6/19/2017 - Present      Health Maintenance        Date Due Completion Dates    BONE DENSITY 5/17/2022 5/17/2017, 5/17/2017, 10/1/2014 (Declined)    Override on 10/1/2014: Patient Declined    IMM DTaP/Tdap/Td Vaccine (2 - Td) 10/21/2025 10/21/2015    COLONOSCOPY 4/29/2026 4/29/2016, 4/29/2016            Results     POCT Protime      Component    INR    2.0    Comment:     ic valid [id number] 160 exp 03/2018                        Current Immunizations     13-VALENT PCV PREVNAR 8/1/2016    Hepatitis A Vaccine, Ped/Adol 10/18/2010    Hepatitis B Vaccine Non-Recombivax (Ped/Adol) 10/18/2010    INFLUENZA VACCINE H1N1 10/9/2011, 2/22/2010    Influenza TIV (IM) 9/30/2014, 9/12/2012, 10/18/2010    Influenza Vaccine Adult HD 11/22/2016  4:16 PM, 10/7/2016 12:37 PM    Influenza Vaccine Quad Inj (Pf) 9/19/2015, 9/19/2015    Pneumococcal Vaccine (UF)Historical  Data 10/9/2011    Pneumococcal polysaccharide vaccine (PPSV-23) 10/18/2010    SHINGLES VACCINE 12/31/2010    Tdap Vaccine 10/21/2015      Below and/or attached are the medications your provider expects you to take. Review all of your home medications and newly ordered medications with your provider and/or pharmacist. Follow medication instructions as directed by your provider and/or pharmacist. Please keep your medication list with you and share with your provider. Update the information when medications are discontinued, doses are changed, or new medications (including over-the-counter products) are added; and carry medication information at all times in the event of emergency situations     Allergies:  TAPE - Rash               Medications  Valid as of: June 26, 2017 - 11:43 AM    Generic Name Brand Name Tablet Size Instructions for use    Carvedilol (Tab) COREG 6.25 MG Take 6.25 mg by mouth 2 times a day, with meals.        Cholecalciferol (Cap) Vitamin D-3 1000 UNITS Take 1,000 Units by mouth every day.        Enoxaparin Sodium (Solution) LOVENOX 150 MG/ML Inject 150 mg as instructed every day.        Furosemide (Tab) LASIX 40 MG Take 1 Tab by mouth every day.        Glucos-Chond-Hyal Ac-Ca Fructo (Tab) MOVE FREE JOINT HEALTH ADVANCE  Take  by mouth every day.        Levothyroxine Sodium (Tab) SYNTHROID 100 MCG Take 100 mcg by mouth Every morning on an empty stomach.        Melatonin (TABLET DISPERSIBLE) Melatonin 500 MCG Take 1,000-2,000 mcg by mouth at bedtime as needed (sleep).        Multiple Vitamins-Minerals (Tab) WOMENS MULTI VITAMIN & MINERAL  Take 1 Tab by mouth every day.        Naproxen Sodium (Tab) ANAPROX 220 MG Take 220 mg by mouth 2 times a day, with meals.        Omeprazole (CAPSULE DELAYED RELEASE) PRILOSEC 20 MG Take 20 mg by mouth every day.        Potassium Chloride Torrie CR (Tab CR) Kdur 20 MEQ Take 1 Tab by mouth 2 times a day.        Primidone (Tab) MYSOLINE 250 MG TAKE 1 TABLET BY MOUTH  THREE TIMES DAILY        Probiotic Product   Take  by mouth every day.        Warfarin Sodium (Tab) COUMADIN 5 MG Take 4 tablets daily as directed by coumadin clininc        .                 Medicines prescribed today were sent to:     [id number] - [place], [place] - [address] AT [facility]    [address] [place] [address]    Phone: [phone] Fax: [phone]    Open 24 Hours?: No      Medication refill instructions:       If your prescription bottle indicates you have medication refills left, it is not necessary to call your provider’s office. Please contact your pharmacy and they will refill your medication.    If your prescription bottle indicates you do not have any refills left, you may request refills at any time through one of the following ways: The online [employer] system (except Urgent Care), by calling your provider’s office, or by asking your pharmacy to contact your provider’s office with a refill request. Medication refills are processed only during regular business hours and may not be available until the next business day. Your provider may request additional information or to have a follow-up visit with you prior to refilling your medication.   *Please Note: Medication refills are assigned a new Rx number when refilled electronically. Your pharmacy may indicate that no refills were authorized even though a new prescription for the same medication is available at the pharmacy. Please request the medicine by name with the pharmacy before contacting your provider for a refill.        Other Notes About Your Plan     Please assess for the following diagnosis:    -this patient has been on Ambien for insomnia since 7/2010. ? 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 ?other convulsions, code R56.9                   Warfarin Dosing Calendar   June 2017  Details    Sun Mon Tue Wed Thu Fri Sat         1               2               3                 4               5               6               7               8               9               10                 11               12               13               14               15               16               17                 18               19               20               21               22               23               24                 25               26   2.0   20 mg   See details      27      20 mg         28      20 mg         29      20 mg         30      20 mg           Date Details   06/26 This INR check   INR: 2.0   ic valid [id number] 160 exp 03/2018               How to take your warfarin dose     To take:  20 mg Take 4 of the 5 mg tablets.           Warfarin Dosing Calendar   July 2017 Details    Sun Mon Tue Wed Thu Fri Sat           1      20 mg           2      20 mg         3      20 mg         4      20 mg         5      20 mg         6      20 mg         7      20 mg         8      20 mg           9      20 mg         10      20 mg         11      20 mg         12      20 mg         13      20 mg         14               15                 16               17               18               19               20               21               22                 23               24               25               26               27               28               29                 30               31                     Date Details   No additional details    Date of next INR:  7/13/2017         How to take your warfarin dose     To take:  20 mg Take 4 of the 5 mg tablets.              [employer] Access Code: Activation code not generated  Current [employer] Status: Active

## 2017-07-02 DIAGNOSIS — I51.89 DIASTOLIC DYSFUNCTION: ICD-10-CM

## 2017-07-02 DIAGNOSIS — K21.9 GASTROESOPHAGEAL REFLUX DISEASE WITHOUT ESOPHAGITIS: ICD-10-CM

## 2017-07-03 RX ORDER — FUROSEMIDE 40 MG/1
TABLET ORAL
Qty: 90 TAB | Refills: 1 | Status: SHIPPED | OUTPATIENT
Start: 2017-07-03 | End: 2018-01-12 | Stop reason: SDUPTHER

## 2017-07-03 RX ORDER — OMEPRAZOLE 40 MG/1
CAPSULE, DELAYED RELEASE ORAL
Qty: 90 CAP | Refills: 1 | Status: SHIPPED | OUTPATIENT
Start: 2017-07-03 | End: 2017-12-25 | Stop reason: SDUPTHER

## 2017-07-06 ENCOUNTER — OFFICE VISIT (OUTPATIENT)
Dept: PULMONOLOGY | Facility: HOSPICE | Age: 77
End: 2017-07-06
Payer: MEDICARE

## 2017-07-06 ENCOUNTER — NON-PROVIDER VISIT (OUTPATIENT)
Dept: PULMONOLOGY | Facility: HOSPICE | Age: 77
End: 2017-07-06
Payer: MEDICARE

## 2017-07-06 VITALS — HEIGHT: 62 IN | WEIGHT: 215 LBS | BODY MASS INDEX: 39.56 KG/M2

## 2017-07-06 VITALS
OXYGEN SATURATION: 93 % | SYSTOLIC BLOOD PRESSURE: 140 MMHG | WEIGHT: 215 LBS | BODY MASS INDEX: 39.56 KG/M2 | HEIGHT: 62 IN | TEMPERATURE: 98.3 F | HEART RATE: 70 BPM | DIASTOLIC BLOOD PRESSURE: 90 MMHG

## 2017-07-06 DIAGNOSIS — I26.99 RECURRENT PULMONARY EMBOLISM (HCC): ICD-10-CM

## 2017-07-06 DIAGNOSIS — I27.20 PULMONARY HYPERTENSION (HCC): ICD-10-CM

## 2017-07-06 DIAGNOSIS — G47.33 OSA (OBSTRUCTIVE SLEEP APNEA): ICD-10-CM

## 2017-07-06 DIAGNOSIS — Z79.01 CHRONIC ANTICOAGULATION: ICD-10-CM

## 2017-07-06 DIAGNOSIS — R06.02 SOB (SHORTNESS OF BREATH): ICD-10-CM

## 2017-07-06 DIAGNOSIS — R09.02 HYPOXEMIA: ICD-10-CM

## 2017-07-06 DIAGNOSIS — S22.42XS CLOSED FRACTURE OF MULTIPLE RIBS OF LEFT SIDE, SEQUELA: ICD-10-CM

## 2017-07-06 PROCEDURE — 94726 PLETHYSMOGRAPHY LUNG VOLUMES: CPT | Performed by: INTERNAL MEDICINE

## 2017-07-06 PROCEDURE — 94060 EVALUATION OF WHEEZING: CPT | Performed by: INTERNAL MEDICINE

## 2017-07-06 PROCEDURE — 94729 DIFFUSING CAPACITY: CPT | Performed by: INTERNAL MEDICINE

## 2017-07-06 PROCEDURE — 99204 OFFICE O/P NEW MOD 45 MIN: CPT | Performed by: INTERNAL MEDICINE

## 2017-07-06 ASSESSMENT — PULMONARY FUNCTION TESTS
FVC: 1.67
FEV1/FVC_PERCENT_CHANGE: 0
FEV1/FVC_PERCENT_PREDICTED: 75
FEV1_PERCENT_PREDICTED: 68
FEV1/FVC: 83
FEV1_PERCENT_CHANGE: 0
FVC_PREDICTED: 2.71
FEV1_PREDICTED: 2.02
FVC_PERCENT_PREDICTED: 61
FVC: 1.67
FVC_PERCENT_PREDICTED: 61
FEV1_PERCENT_CHANGE: 0
FEV1/FVC: 82.63
FEV1/FVC_PERCENT_PREDICTED: 111
FEV1_PERCENT_PREDICTED: 68
FEV1/FVC_PERCENT_PREDICTED: 111
FEV1: 1.38
FEV1: 1.38

## 2017-07-06 NOTE — MR AVS SNAPSHOT
"        Natalie Anaya   2017 10:00 AM   Non-Provider Visit   MRN: [id number]    Department:  [facility]   Dept Phone:  [phone]    Description:  Female : 1940   Provider:  Franc Bower M.D.           Reason for Visit     Shortness of Breath           Allergies as of 2017     Allergen Noted Reactions    Tape 10/21/2015   Rash    Adhesive band aids cause a rash  Paper tape ok      You were diagnosed with     SOB (shortness of breath)   [818474]         Vital Signs     Height Weight Body Mass Index Smoking Status          1.575 m (5' 2.01\") 97.523 kg (215 lb) 39.31 kg/m2 Never Smoker         Basic Information     Date Of Birth Sex Race Ethnicity Preferred Language    1940 Female White Non- English      Your appointments     2017 11:00 AM   New Patient Pulmonary with A Rotation   [facility] (--)    [address]  [address]  [place] [address] [phone]            2017  9:15 AM   Anti-Coag Routine with [facility] PAV PHARMACIST   [facility]    [address]  [address]  [place] [address] [phone]            2017  1:40 PM   Established Patient with Ninfa Marcial M.D.   [facility] [address] [place] [address]    [address]  [facility] [address] [phone]           You will be receiving a confirmation call a few days before your appointment from our automated call confirmation system.              Problem List              ICD-10-CM Priority Class Noted - Resolved    Chronic insomnia F51.04   2013 - Present    HTN (hypertension), benign I10 Low  2013 - Present    History of cataract removal with insertion of prosthetic lens Z98.49, Z96.1   7/15/2014 - Present    Seizure disorder (HCC) G40.909 Low  10/21/2015 - Present    Other specified hypothyroidism E03.8   2016 - Present    Personal history of pulmonary embolism Z86.711   2016 " - Present    Iron deficiency anemia D50.9   3/4/2016 - Present    Diastolic dysfunction I51.9   3/4/2016 - Present    Hiatal hernia K44.9   3/4/2016 - Present    Closed fracture of multiple ribs of left side S22.42XA   9/26/2016 - Present    Slow transit constipation K59.01   9/26/2016 - Present    History of fracture of left hip Z87.81 Medium  10/5/2016 - Present    Recurrent pulmonary embolism (CMS-HCC) I26.99 Medium  11/2/2016 - Present    Chronic anticoagulation Z79.01   2/23/2017 - Present    Pulmonary hypertension (CMS-HCC) I27.2   3/15/2017 - Present    Long term (current) use of anticoagulants [Z79.01] Z79.01   4/11/2017 - Present    Left hip pain M25.552   6/19/2017 - Present      Health Maintenance        Date Due Completion Dates    IMM INFLUENZA (1) 9/1/2017 11/22/2016, 10/7/2016, 9/19/2015, 9/30/2014, 9/12/2012, 10/18/2010    BONE DENSITY 5/17/2022 5/17/2017, 5/17/2017, 10/1/2014 (Declined)    Override on 10/1/2014: Patient Declined    IMM DTaP/Tdap/Td Vaccine (2 - Td) 10/21/2025 10/21/2015    COLONOSCOPY 4/29/2026 4/29/2016, 4/29/2016            Current Immunizations     13-VALENT PCV PREVNAR 8/1/2016    Hepatitis A Vaccine, Ped/Adol 10/18/2010    Hepatitis B Vaccine Non-Recombivax (Ped/Adol) 10/18/2010    INFLUENZA VACCINE H1N1 10/9/2011, 2/22/2010    Influenza TIV (IM) 9/30/2014, 9/12/2012, 10/18/2010    Influenza Vaccine Adult HD 11/22/2016  4:16 PM, 10/7/2016 12:37 PM    Influenza Vaccine Quad Inj (Pf) 9/19/2015, 9/19/2015    Pneumococcal Vaccine (UF)Historical Data 10/9/2011    Pneumococcal polysaccharide vaccine (PPSV-23) 10/18/2010    SHINGLES VACCINE 12/31/2010    Tdap Vaccine 10/21/2015      Below and/or attached are the medications your provider expects you to take. Review all of your home medications and newly ordered medications with your provider and/or pharmacist. Follow medication instructions as directed by your provider and/or pharmacist. Please keep your medication list with you and  share with your provider. Update the information when medications are discontinued, doses are changed, or new medications (including over-the-counter products) are added; and carry medication information at all times in the event of emergency situations     Allergies:  TAPE - Rash               Medications  Valid as of: July 06, 2017 - 10:39 AM    Generic Name Brand Name Tablet Size Instructions for use    Carvedilol (Tab) COREG 6.25 MG Take 6.25 mg by mouth 2 times a day, with meals.        Cholecalciferol (Cap) Vitamin D-3 1000 UNITS Take 1,000 Units by mouth every day.        Enoxaparin Sodium (Solution) LOVENOX 150 MG/ML Inject 150 mg as instructed every day.        Furosemide (Tab) LASIX 40 MG TAKE 1 TABLET BY MOUTH EVERY DAY        Glucos-Chond-Hyal Ac-Ca Fructo (Tab) MOVE FREE JOINT HEALTH ADVANCE  Take  by mouth every day.        Levothyroxine Sodium (Tab) SYNTHROID 100 MCG Take 100 mcg by mouth Every morning on an empty stomach.        Melatonin (TABLET DISPERSIBLE) Melatonin 500 MCG Take 1,000-2,000 mcg by mouth at bedtime as needed (sleep).        Multiple Vitamins-Minerals (Tab) WOMENS MULTI VITAMIN & MINERAL  Take 1 Tab by mouth every day.        Naproxen Sodium (Tab) ANAPROX 220 MG Take 220 mg by mouth 2 times a day, with meals.        Omeprazole (CAPSULE DELAYED RELEASE) PRILOSEC 40 MG TAKE 1 CAPSULE BY MOUTH EVERY DAY        Potassium Chloride Torrie CR (Tab CR) Kdur 20 MEQ Take 1 Tab by mouth 2 times a day.        Primidone (Tab) MYSOLINE 250 MG TAKE 1 TABLET BY MOUTH THREE TIMES DAILY        Probiotic Product   Take  by mouth every day.        Warfarin Sodium (Tab) COUMADIN 5 MG Take 4 tablets daily as directed by coumadin clininc        .                 Medicines prescribed today were sent to:     [id number] - [place], [place] - [address] [facility]    [address] [address]    Phone: [phone] Fax: [phone]    Open 24 Hours?: No        Medication refill instructions:       If your prescription bottle indicates you have medication refills left, it is not necessary to call your provider’s office. Please contact your pharmacy and they will refill your medication.    If your prescription bottle indicates you do not have any refills left, you may request refills at any time through one of the following ways: The online [employer] system (except Urgent Care), by calling your provider’s office, or by asking your pharmacy to contact your provider’s office with a refill request. Medication refills are processed only during regular business hours and may not be available until the next business day. Your provider may request additional information or to have a follow-up visit with you prior to refilling your medication.   *Please Note: Medication refills are assigned a new Rx number when refilled electronically. Your pharmacy may indicate that no refills were authorized even though a new prescription for the same medication is available at the pharmacy. Please request the medicine by name with the pharmacy before contacting your provider for a refill.        Other Notes About Your Plan     Please assess for the following diagnosis:    -this patient has been on Ambien for insomnia since 7/2010. ? 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 ?other convulsions, code R56.9                      [employer] Access Code: Activation code not generated  Current [employer] Status: Active

## 2017-07-06 NOTE — MR AVS SNAPSHOT
"        aNtalie Anaya   2017 11:00 AM   Office Visit   MRN: [id number]    Department:  [facility]   Dept Phone:  [phone]    Description:  Female : 1940   Provider:  Franc Bower M.D.           Reason for Visit     New Patient Pulmonary Hypertension      Allergies as of 2017     Allergen Noted Reactions    Tape 10/21/2015   Rash    Adhesive band aids cause a rash  Paper tape ok      You were diagnosed with     Pulmonary hypertension (CMS-HCC)   [621462]       Recurrent pulmonary embolism (CMS-HCC)   [887799]       Chronic anticoagulation   [148105]       Closed fracture of multiple ribs of left side, sequela   [9007024]       AILYN (obstructive sleep apnea)   [256897]       Hypoxemia   [799.02.ICD-9-CM]         Vital Signs     Blood Pressure Pulse Temperature Height Weight Body Mass Index    140/90 mmHg 70 36.8 °C (98.3 °F) 1.577 m (5' 2.09\") 97.523 kg (215 lb) 39.21 kg/m2    Oxygen Saturation Smoking Status                93% Never Smoker           Basic Information     Date Of Birth Sex Race Ethnicity Preferred Language    1940 Female White Non- English      Your appointments     2017  9:15 AM   Anti-Coag Routine with [facility] PAV PHARMACIST   [facility]    [address]  [address]  [facility] [address] [phone]            2017  1:40 PM   Established Patient with Ninfa Marcial M.D.   [address] [address] [phone]           You will be receiving a confirmation call a few days before your appointment from our automated call confirmation system.            Sep 13, 2017 12:40 PM   Established Patient Pul with A Rotation   [facility] (--)    [address]  [address]  [facility] [address] [phone]              Problem List              ICD-10-CM Priority Class Noted - Resolved    Chronic insomnia " F51.04   1/9/2013 - Present    HTN (hypertension), benign I10 Low  1/9/2013 - Present    History of cataract removal with insertion of prosthetic lens Z98.49, Z96.1   7/15/2014 - Present    Seizure disorder (HCC) G40.909 Low  10/21/2015 - Present    Other specified hypothyroidism E03.8   1/8/2016 - Present    Personal history of pulmonary embolism Z86.711   1/8/2016 - Present    Iron deficiency anemia D50.9   3/4/2016 - Present    Diastolic dysfunction I51.9   3/4/2016 - Present    Hiatal hernia K44.9   3/4/2016 - Present    Closed fracture of multiple ribs of left side S22.42XA   9/26/2016 - Present    Slow transit constipation K59.01   9/26/2016 - Present    History of fracture of left hip Z87.81 Medium  10/5/2016 - Present    Recurrent pulmonary embolism (CMS-HCC) I26.99 Medium  11/2/2016 - Present    Chronic anticoagulation Z79.01   2/23/2017 - Present    Pulmonary hypertension (CMS-HCC) I27.2   3/15/2017 - Present    Long term (current) use of anticoagulants [Z79.01] Z79.01   4/11/2017 - Present    Left hip pain M25.552   6/19/2017 - Present      Health Maintenance        Date Due Completion Dates    IMM INFLUENZA (1) 9/1/2017 11/22/2016, 10/7/2016, 9/19/2015, 9/30/2014, 9/12/2012, 10/18/2010    BONE DENSITY 5/17/2022 5/17/2017, 5/17/2017, 10/1/2014 (Declined)    Override on 10/1/2014: Patient Declined    IMM DTaP/Tdap/Td Vaccine (2 - Td) 10/21/2025 10/21/2015    COLONOSCOPY 4/29/2026 4/29/2016, 4/29/2016            Current Immunizations     13-VALENT PCV PREVNAR 8/1/2016    Hepatitis A Vaccine, Ped/Adol 10/18/2010    Hepatitis B Vaccine Non-Recombivax (Ped/Adol) 10/18/2010    INFLUENZA VACCINE H1N1 10/9/2011, 2/22/2010    Influenza TIV (IM) 9/30/2014, 9/12/2012, 10/18/2010    Influenza Vaccine Adult HD 11/22/2016  4:16 PM, 10/7/2016 12:37 PM    Influenza Vaccine Quad Inj (Pf) 9/19/2015, 9/19/2015    Pneumococcal Vaccine (UF)Historical Data 10/9/2011    Pneumococcal polysaccharide vaccine (PPSV-23) 10/18/2010       SHINGLES VACCINE 12/31/2010    Tdap Vaccine 10/21/2015      Below and/or attached are the medications your provider expects you to take. Review all of your home medications and newly ordered medications with your provider and/or pharmacist. Follow medication instructions as directed by your provider and/or pharmacist. Please keep your medication list with you and share with your provider. Update the information when medications are discontinued, doses are changed, or new medications (including over-the-counter products) are added; and carry medication information at all times in the event of emergency situations     Allergies:  TAPE - Rash               Medications  Valid as of: July 06, 2017 - 11:59 AM    Generic Name Brand Name Tablet Size Instructions for use    Carvedilol (Tab) COREG 6.25 MG Take 6.25 mg by mouth 2 times a day, with meals.        Cholecalciferol (Cap) Vitamin D-3 1000 UNITS Take 1,000 Units by mouth every day.        Enoxaparin Sodium (Solution) LOVENOX 150 MG/ML Inject 150 mg as instructed every day.        Furosemide (Tab) LASIX 40 MG TAKE 1 TABLET BY MOUTH EVERY DAY        Glucos-Chond-Hyal Ac-Ca Fructo (Tab) MOVE FREE JOINT HEALTH ADVANCE  Take  by mouth every day.        Levothyroxine Sodium (Tab) SYNTHROID 100 MCG Take 100 mcg by mouth Every morning on an empty stomach.        Melatonin (TABLET DISPERSIBLE) Melatonin 500 MCG Take 1,000-2,000 mcg by mouth at bedtime as needed (sleep).        Multiple Vitamins-Minerals (Tab) WOMENS MULTI VITAMIN & MINERAL  Take 1 Tab by mouth every day.        Naproxen Sodium (Tab) ANAPROX 220 MG Take 220 mg by mouth 2 times a day, with meals.        Omeprazole (CAPSULE DELAYED RELEASE) PRILOSEC 40 MG TAKE 1 CAPSULE BY MOUTH EVERY DAY        Potassium Chloride Torrie CR (Tab CR) Kdur 20 MEQ Take 1 Tab by mouth 2 times a day.        Primidone (Tab) MYSOLINE 250 MG TAKE 1 TABLET BY MOUTH THREE TIMES DAILY        Probiotic Product   Take  by mouth every day.         Warfarin Sodium (Tab) COUMADIN 5 MG Take 4 tablets daily as directed by coumadin clininc        .                 Medicines prescribed today were sent to:     [id number] - [place], [place] - [address] [facility]    [address] [place] [address]    Phone: [phone] Fax: [phone]    Open 24 Hours?: No      Medication refill instructions:       If your prescription bottle indicates you have medication refills left, it is not necessary to call your provider’s office. Please contact your pharmacy and they will refill your medication.    If your prescription bottle indicates you do not have any refills left, you may request refills at any time through one of the following ways: The online [employer] system (except Urgent Care), by calling your provider’s office, or by asking your pharmacy to contact your provider’s office with a refill request. Medication refills are processed only during regular business hours and may not be available until the next business day. Your provider may request additional information or to have a follow-up visit with you prior to refilling your medication.   *Please Note: Medication refills are assigned a new Rx number when refilled electronically. Your pharmacy may indicate that no refills were authorized even though a new prescription for the same medication is available at the pharmacy. Please request the medicine by name with the pharmacy before contacting your provider for a refill.        Your To Do List     Future Labs/Procedures Complete By Expires    DX-CHEST-2 VIEWS  As directed 7/6/2018    ECHOCARDIOGRAM COMP W/O CONT  As directed 7/6/2018    Comments:    Dyspnea on exertion, eval for pulmonary hypertension    NM-LUNG VENT/PERF IMAGING  As directed 7/6/2018    OVERNIGHT OXIMETRY  As directed 7/6/2018    Comments:    O2 2LPM      Other Notes About Your Plan     Please assess for the following diagnosis:    -this  patient has been on Ambien for insomnia since 7/2010. ? 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 ?other convulsions, code R56.9                      MyChart Access Code: Activation code not generated  Current MyChart Status: Active

## 2017-07-06 NOTE — PROGRESS NOTES
Chief Complaint   Patient presents with   • New Patient     Pulmonary Hypertension       HPI:  The patient is a 76-year-old woman who had a history of fall that resulted in multiple rib fractures on the left side in September 2016. Because of pain issues she was placed on oxycodone. She fell again which caused a left intertrochanteric hip fracture. When she presented to the emergency room she was short of breath. A CT angiogram diagnosed bilateral pulmonary emboli. Her echocardiogram showed right heart strain. She did have a right ventricular systolic pressure of 65 mmHg. Of note in April 2003 the patient had an echocardiogram which demonstrated an RVSP of 60 mmHg. The patient also had evidence of diastolic dysfunction grade 2. The patient did have a remote pulmonary embolism many years ago. Because of her right ventricular strain the patient had a inferior vena cava filter placed. The filter was removed in January 2017. The patient continues on warfarin for anticoagulation. The patient does have chronic dyspnea. Her CT scan and echocardiograms were reviewed. The patient does have a BMI of 39.21. Pulmonary function testing today shows an FEV1 of 1.38 L which is 68% of predicted. The FEV1 FVC ratio is 83%. There is no response to inhaled bronchodilator. Total lung capacity is 79% of predicted. However, her DLCO is normal at 106% of predicted. Her restriction may be due to her elevated BMI. Currently she is on supplemental oxygen at night at 2 L/m. Overnight oximetry performed in January 2017 showed a baseline saturation of 78.6% with almost continuous saturations less than 90% and a low saturation of 56%. The patient has been on supplemental oxygen at night at 2 L/m. In the office she is 93% on room air. The patient's lower extremity ultrasound in January 2017 did show a left lower extremity chronic venous thrombosis.    Past Medical History   Diagnosis Date   • Hypothyroid    • Pain      knees   • Bowel habit  changes      Constipation   • Cataract      Bilat IOL   • Hypertension    • Hiatus hernia syndrome    • Breath shortness    • Hemorrhagic disorder (CMS-HCC)    • Arthritis      Knees.   • Seizure (CMS-HCC) 1982   • Personal history of venous thrombosis and embolism      PE from Right Shouler FX 2003.   • Hyperthyroidism    • Eye drainage    • Ringing in ears    • Constipation    • Sore muscles    • Epilepsy (CMS-HCC)    • Influenza        ROS:   Constitutional: Denies fevers, chills, night sweats, fatigue or weight loss  Eyes: Denies vision loss, pain, drainage, double vision  Ears, Nose, Throat: Denies earache, tinnitus, hoarseness  Cardiovascular: Denies chest pain, tightness, palpitations  Respiratory: See history of present illness  Sleep: Denies, snoring, apnea. On oxygen at night.  GI: Denies abdominal pain, nausea, vomiting, diarrhea  : Denies frequent urination, hematuria, painful urination  Musculoskeletal: Denies back pain, painful joints, sore muscles  Neurological: Denies headaches, seizures  Skin: Denies rashes, color changes  Psychiatric: Denies depression or thoughts of suicide  Hematologic: Chronic anticoagulation  Allergic/Immunologic: Denies rhinitis, skin sensitivity    Social History     Social History   • Marital Status:      Spouse Name: N/A   • Number of Children: N/A   • Years of Education: N/A     Occupational History   • Not on file.     Social History Main Topics   • Smoking status: Never Smoker    • Smokeless tobacco: Never Used   • Alcohol Use: Yes      Comment: Very Very Very Rarely   • Drug Use: No   • Sexual Activity: No     Other Topics Concern   • Not on file     Social History Narrative     Tape  Current Outpatient Prescriptions on File Prior to Visit   Medication Sig Dispense Refill   • furosemide (LASIX) 40 MG Tab TAKE 1 TABLET BY MOUTH EVERY DAY 90 Tab 1   • omeprazole (PRILOSEC) 40 MG delayed-release capsule TAKE 1 CAPSULE BY MOUTH EVERY DAY 90 Cap 1   • primidone  "(MYSOLINE) 250 MG Tab TAKE 1 TABLET BY MOUTH THREE TIMES DAILY 270 Tab 3   • warfarin (COUMADIN) 5 MG Tab Take 4 tablets daily as directed by coumadin clininc 360 Tab 1   • naproxen (ALEVE) 220 MG tablet Take 220 mg by mouth 2 times a day, with meals.     • Probiotic Product (PROBIOTIC DAILY PO) Take  by mouth every day.     • Glucos-Chond-Hyal Ac-Ca Fructo (MOVE FREE JOINT HEALTH ADVANCE) Tab Take  by mouth every day.     • potassium chloride SA (KDUR) 20 MEQ Tab CR Take 1 Tab by mouth 2 times a day. (Patient taking differently: Take 20 mEq by mouth every day.) 180 Tab 1   • Multiple Vitamins-Minerals (WOMENS MULTI VITAMIN & MINERAL) Tab Take 1 Tab by mouth every day.     • Melatonin 500 MCG TABLET DISPERSIBLE Take 1,000-2,000 mcg by mouth at bedtime as needed (sleep).     • Cholecalciferol (VITAMIN D-3) 1000 UNITS Cap Take 1,000 Units by mouth every day.     • carvedilol (COREG) 6.25 MG Tab Take 6.25 mg by mouth 2 times a day, with meals.     • levothyroxine (SYNTHROID) 100 MCG Tab Take 100 mcg by mouth Every morning on an empty stomach.     • enoxaparin (LOVENOX) 150 MG/ML Solution Inject 150 mg as instructed every day. (Patient not taking: Reported on 7/6/2017) 10 Syringe 1     No current facility-administered medications on file prior to visit.     Blood pressure 140/90, pulse 70, temperature 36.8 °C (98.3 °F), height 1.577 m (5' 2.09\"), weight 97.523 kg (215 lb), SpO2 93 %.  Family History   Problem Relation Age of Onset   • Cancer Mother 67     Ovarian   • Alcohol/Drug Father 60     Appenditis   • Heart Disease Father    • Cancer Maternal Grandmother      colon cancer   • Heart Disease Maternal Grandfather    • Cancer Daughter 44     melonoma       Physical Exam:   BMI is 39.21  HEENT: PERRLA, EOMI, no scleral icterus, no nasal or oral lesions  Neck: No thyromegaly, no adenopathy, no bruits  Mallampatti: Grade III  Lungs: Equal breath sounds, no wheezes or crackles  Heart: Regular rate and rhythm, no gallops " or murmurs  Abdomen: Soft, benign, no organomegaly  Extremities: No clubbing, cyanosis, or edema  Neurologic: Cranial nerve, motor, and sensory exam are normal    1. Pulmonary hypertension (CMS-HCC)    2. Recurrent pulmonary embolism (CMS-HCC)    3. Chronic anticoagulation    4. Closed fracture of multiple ribs of left side, sequela    5. AILYN (obstructive sleep apnea)    6. Hypoxemia        This woman has evidence of pulmonary hypertension dating back to 2003. She most likely has chronic hypoxemia at night and underlying obstructive sleep apnea.  In addition she may have a component of chronic thromboembolic disease.  She is having trouble tolerating her oxygen at night I doubt that she would tolerate a CPAP mask.  We will hold off on the sleep study for now.  We will repeat overnight oximetry to make sure that 2 L/m is adequate for her needs.  We will also repeat her echocardiogram to see if it has improved at all now that she is on anticoagulation and oxygen.  We will also obtain a VQ scan to see if there is a component of unresolved thromboembolic disease. However, at her age I do not think she will be a candidate for pulmonary thromboendarterectomy.  She will return in several weeks after the above testing.  Her pulmonary function tests are consistent with her BMI. The normal DLCO makes chronic thromboembolic disease much less likely.

## 2017-07-06 NOTE — PROCEDURES
Technician: SHARON Longo    Technician Comment:  Good patient effort & cooperation.  The results of this test meet the ATS/ERS standards for acceptability & reproducibility.  Test was performed on the [facility] Body Plethysmograph-Elite DX system.  Predicted values were [facility]-3 for spirometry, [facility] for DLCO, ITS for Lung Volumes.  The DLCO was uncorrected for Hgb.  A bronchodilator of Ventolin HFA -2puffs via spacer administered.    Interpretation:    FEV1 is 1.38 L which is reduced at 68% of predicted. FEV1 FVC ratio was 83%. MVV is mildly reduced at 71% of predicted.  There is no response to an inhaled bronchodilator.  DLCO is normal at 106% of predicted.  Airways resistance is normal    The reduction in FEV1, normal FEV1 FVC ratio, and mild reduction in total lung capacity indicating a restrictive process. This may be consistent with the patient's BMI of 39.21.  Normal diffusing capacity also  suggests that the patient's BMI may be the cause of the restriction.

## 2017-07-12 ENCOUNTER — TELEPHONE (OUTPATIENT)
Dept: MEDICAL GROUP | Age: 77
End: 2017-07-12

## 2017-07-12 DIAGNOSIS — I27.20 PULMONARY HYPERTENSION (HCC): ICD-10-CM

## 2017-07-12 DIAGNOSIS — R09.02 HYPOXEMIA: ICD-10-CM

## 2017-07-12 DIAGNOSIS — I26.99 RECURRENT PULMONARY EMBOLISM (HCC): ICD-10-CM

## 2017-07-12 NOTE — TELEPHONE ENCOUNTER
Phone Number Called: [phone] (home)     Message: Spoke with patient and she stated she uses an oxygen concentrator 2 L, only at night time.     Left Message for patient to call back: no

## 2017-07-12 NOTE — TELEPHONE ENCOUNTER
Regarding: Prescription Question  Contact: [phone]  ----- Message from Kenneth Shin Ass't sent at 7/11/2017  4:23 PM PDT -----       ----- Message from Natalie Anaya to Ninfa Marcial M.D. sent at 7/11/2017  1:45 PM -----   [facility] has dropped [facility] and contracted with [facility].  IN Speaking with them today, I was told they need a new prescription for the concentrator.  Your assistance is appreciated, inasmuch they need to deliver the overnight oximetry.

## 2017-07-12 NOTE — TELEPHONE ENCOUNTER
Please confirm with patient what kind of device she needs and how much oxygen she is using. For example 3 L oxygen at night and 2 L oxygen at daytime. Please also see and attach Dr Bower,Pulmonologist's note on 7/6/17 regarding oxygen requirement.    Thanks!    Ninfa Marcial M.D.

## 2017-07-13 ENCOUNTER — ANTICOAGULATION VISIT (OUTPATIENT)
Dept: MEDICAL GROUP | Facility: MEDICAL CENTER | Age: 77
End: 2017-07-13
Payer: MEDICARE

## 2017-07-13 DIAGNOSIS — Z79.01 CHRONIC ANTICOAGULATION: ICD-10-CM

## 2017-07-13 DIAGNOSIS — Z86.711 PERSONAL HISTORY OF PULMONARY EMBOLISM: ICD-10-CM

## 2017-07-13 DIAGNOSIS — Z79.01 LONG TERM (CURRENT) USE OF ANTICOAGULANTS: ICD-10-CM

## 2017-07-13 LAB — INR PPP: 2.4 (ref 2–3.5)

## 2017-07-13 NOTE — TELEPHONE ENCOUNTER
Please fax prescription of oxygen concentrator, 2 L at night to [facility]. Please also fax pulmonologist, Dr Bower's note on 7/5/17 and our office visit note from 3/15/17.    Thanks!  Ninfa Marcial M.D.

## 2017-07-13 NOTE — MR AVS SNAPSHOT
Natalie Tete Anaya   2017 9:15 AM   Anticoagulation Visit   MRN: [id number]    Department:  [facility]   Dept Phone:  [phone]    Description:  Female : 1940   Provider:  [facility] PHARMACIST           Allergies as of 2017     Allergen Noted Reactions    Tape 10/21/2015   Rash    Adhesive band aids cause a rash  Paper tape ok      You were diagnosed with     Long term (current) use of anticoagulants   [V58.61.ICD-9-CM]       Chronic anticoagulation   [661797]       Personal history of pulmonary embolism   [V12.55.ICD-9-CM]         Vital Signs     Smoking Status                   Never Smoker            Basic Information     Date Of Birth Sex Race Ethnicity Preferred Language    1940 Female White Non- English      Your appointments     2017 10:15 AM   Adult Draw/Collection with [facility]   [facility])    [facility] [address]   [phone]            2017  1:40 PM   Established Patient with Ninfa Marcial M.D.   [address]    [address] [facility] [address] [phone]           You will be receiving a confirmation call a few days before your appointment from our automated call confirmation system.            2017  1:00 PM   NM AEROSOL/PERFUSION with [facility] NM ECAM   [facility] IMAGING - [facility])    [address]  [place] [address]   [phone]            2017  2:45 PM   ECHO with ECHO [facility] RM2   ECHOCARDIOLOGY [facility])    [address]  [place] [address]   [phone]            Aug 10, 2017 10:00 AM   Anti-Coag Routine with [facility] PHARMACIST   [facility]    [address]  [address]  [place] [address] [phone]            Sep 13, 2017 12:40 PM   Established Patient Pul with A Rotation   [facility] (--)    [address]  [address]  [place] [address]   [phone]              Problem List              ICD-10-CM Priority Class Noted - Resolved    Chronic insomnia F51.04   1/9/2013 - Present    HTN (hypertension), benign I10 Low  1/9/2013 - Present    History of cataract removal with insertion of prosthetic lens Z98.49, Z96.1   7/15/2014 - Present    Seizure disorder (HCC) G40.909 Low  10/21/2015 - Present    Other specified hypothyroidism E03.8   1/8/2016 - Present    Personal history of pulmonary embolism Z86.711   1/8/2016 - Present    Iron deficiency anemia D50.9   3/4/2016 - Present    Diastolic dysfunction I51.9   3/4/2016 - Present    Hiatal hernia K44.9   3/4/2016 - Present    Closed fracture of multiple ribs of left side S22.42XA   9/26/2016 - Present    Slow transit constipation K59.01   9/26/2016 - Present    History of fracture of left hip Z87.81 Medium  10/5/2016 - Present    Recurrent pulmonary embolism (CMS-HCC) I26.99 Medium  11/2/2016 - Present    Chronic anticoagulation Z79.01   2/23/2017 - Present    Pulmonary hypertension (CMS-HCC) I27.2   3/15/2017 - Present    Long term (current) use of anticoagulants [Z79.01] Z79.01   4/11/2017 - Present    Left hip pain M25.552   6/19/2017 - Present      Health Maintenance        Date Due Completion Dates    IMM INFLUENZA (1) 9/1/2017 11/22/2016, 10/7/2016, 9/19/2015, 9/30/2014, 9/12/2012, 10/18/2010    BONE DENSITY 5/17/2022 5/17/2017, 5/17/2017, 10/1/2014 (Declined)    Override on 10/1/2014: Patient Declined    IMM DTaP/Tdap/Td Vaccine (2 - Td) 10/21/2025 10/21/2015    COLONOSCOPY 4/29/2026 4/29/2016, 4/29/2016            Results     POCT Protime      Component    INR    2.4                        Current Immunizations     13-VALENT PCV PREVNAR 8/1/2016    Hepatitis A Vaccine, Ped/Adol 10/18/2010    Hepatitis B Vaccine Non-Recombivax (Ped/Adol) 10/18/2010    INFLUENZA VACCINE H1N1 10/9/2011, 2/22/2010    Influenza TIV (IM) 9/30/2014, 9/12/2012, 10/18/2010    Influenza Vaccine  Adult HD 11/22/2016  4:16 PM, 10/7/2016 12:37 PM    Influenza Vaccine Quad Inj (Pf) 9/19/2015, 9/19/2015    Pneumococcal Vaccine (UF)Historical Data 10/9/2011    Pneumococcal polysaccharide vaccine (PPSV-23) 10/18/2010    SHINGLES VACCINE 12/31/2010    Tdap Vaccine 10/21/2015      Below and/or attached are the medications your provider expects you to take. Review all of your home medications and newly ordered medications with your provider and/or pharmacist. Follow medication instructions as directed by your provider and/or pharmacist. Please keep your medication list with you and share with your provider. Update the information when medications are discontinued, doses are changed, or new medications (including over-the-counter products) are added; and carry medication information at all times in the event of emergency situations     Allergies:  TAPE - Rash               Medications  Valid as of: July 13, 2017 - 10:37 AM    Generic Name Brand Name Tablet Size Instructions for use    Carvedilol (Tab) COREG 6.25 MG Take 6.25 mg by mouth 2 times a day, with meals.        Cholecalciferol (Cap) Vitamin D-3 1000 UNITS Take 1,000 Units by mouth every day.        Enoxaparin Sodium (Solution) LOVENOX 150 MG/ML Inject 150 mg as instructed every day.        Furosemide (Tab) LASIX 40 MG TAKE 1 TABLET BY MOUTH EVERY DAY        Glucos-Chond-Hyal Ac-Ca Fructo (Tab) MOVE FREE JOINT HEALTH ADVANCE  Take  by mouth every day.        Levothyroxine Sodium (Tab) SYNTHROID 100 MCG Take 100 mcg by mouth Every morning on an empty stomach.        Melatonin (TABLET DISPERSIBLE) Melatonin 500 MCG Take 1,000-2,000 mcg by mouth at bedtime as needed (sleep).        Misc. Devices (Misc) Misc. Devices  Please provide the oxygen concentrator 2 L at night time.        Multiple Vitamins-Minerals (Tab) WOMENS MULTI VITAMIN & MINERAL  Take 1 Tab by mouth every day.        Naproxen Sodium (Tab) ANAPROX 220 MG Take 220 mg by mouth 2 times a day, with  meals.        Omeprazole (CAPSULE DELAYED RELEASE) PRILOSEC 40 MG TAKE 1 CAPSULE BY MOUTH EVERY DAY        Potassium Chloride Torrie CR (Tab CR) Kdur 20 MEQ Take 1 Tab by mouth 2 times a day.        Primidone (Tab) MYSOLINE 250 MG TAKE 1 TABLET BY MOUTH THREE TIMES DAILY        Probiotic Product   Take  by mouth every day.        Warfarin Sodium (Tab) COUMADIN 5 MG Take 4 tablets daily as directed by coumadin clininc        .                 Medicines prescribed today were sent to:     [facility] [address] [place] [address] [facility]    [address] [facility] [place] [address]    Phone: [phone] Fax: [phone]    Open 24 Hours?: No      Medication refill instructions:       If your prescription bottle indicates you have medication refills left, it is not necessary to call your provider’s office. Please contact your pharmacy and they will refill your medication.    If your prescription bottle indicates you do not have any refills left, you may request refills at any time through one of the following ways: The online [employer] system (except Urgent Care), by calling your provider’s office, or by asking your pharmacy to contact your provider’s office with a refill request. Medication refills are processed only during regular business hours and may not be available until the next business day. Your provider may request additional information or to have a follow-up visit with you prior to refilling your medication.   *Please Note: Medication refills are assigned a new Rx number when refilled electronically. Your pharmacy may indicate that no refills were authorized even though a new prescription for the same medication is available at the pharmacy. Please request the medicine by name with the pharmacy before contacting your provider for a refill.        Other Notes About Your Plan     Please assess for the following diagnosis:    -this patient has been on Ambien for  insomnia since 7/2010. ? 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 ?other convulsions, code R56.9                   Warfarin Dosing Calendar   July 2017 Details    Sun Mon Tue Wed Thu Fri Sat           1                 2               3               4               5               6               7               8                 9               10               11               12               13   2.4   20 mg   See details      14      20 mg         15      20 mg           16      20 mg         17      20 mg         18      20 mg         19      20 mg         20      20 mg         21      20 mg         22      20 mg           23      20 mg         24      20 mg         25      20 mg         26      20 mg         27      20 mg         28      20 mg         29      20 mg           30      20 mg         31      20 mg               Date Details   07/13 This INR check   INR: 2.4               How to take your warfarin dose     To take:  20 mg Take 4 of the 5 mg tablets.           Warfarin Dosing Calendar   August 2017 Details    Sun Mon Tue Wed Thu Fri Sat       1      20 mg         2      20 mg         3      20 mg         4      20 mg         5      20 mg           6      20 mg         7      20 mg         8      20 mg         9      20 mg         10      20 mg         11               12                 13               14               15               16               17               18               19                 20               21               22               23               24               25               26                 27               28               29               30               31                  Date Details   No additional details    Date of next INR:  8/10/2017         How to take your warfarin dose     To take:  20 mg Take 4 of the 5 mg tablets.              MyChart Access  Code: Activation code not generated  Current MyChart Status: Active

## 2017-07-13 NOTE — PROGRESS NOTES
OP Anticoagulation Service Note    Date: 7/13/2017    Anticoagulation Summary as of 7/13/2017     INR goal 2.0-3.0   Selected INR 2.4 (7/13/2017)   Maintenance plan 20 mg (5 mg x 4) every day   Weekly total 140 mg   Plan last modified MARGARET LoboD (4/11/2017)   Next INR check 8/10/2017   Target end date     Indications   Chronic anticoagulation [Z79.01]  Personal history of pulmonary embolism [Z86.711]  Long term (current) use of anticoagulants [Z79.01] [Z79.01]         Anticoagulation Episode Summary     INR check location     Preferred lab     Send INR reminders to     Comments       Anticoagulation Care Providers     Provider Role Specialty Phone number    Ninfa Marcial M.D. Referring Internal Medicine [phone]    [facility] Anticoagulation Services Responsible  [phone]        Anticoagulation Patient Findings      Plan:  Patient is therapeutic today. Confirmed dosing regimen. Patient denies any changes in medications or diet. She does report she has been taking Aleve twice daily for months. Discussed with pt to stop Aleve d/t increased risk of bleeding, she will try APAP and if this does not help will discuss Celebrex at upcoming visit.  Patient denies any signs or symptoms of bleeding or clotting. Instructed patient to call clinic if any unusual bleeding or bruising occurs. Will continue dosing as outlined. Will follow-up with patient in 4 week(s).      Moira Sheridan, Pharm D

## 2017-07-14 ENCOUNTER — HOSPITAL ENCOUNTER (OUTPATIENT)
Dept: LAB | Facility: MEDICAL CENTER | Age: 77
End: 2017-07-14
Attending: INTERNAL MEDICINE
Payer: MEDICARE

## 2017-07-14 DIAGNOSIS — E03.8 OTHER SPECIFIED HYPOTHYROIDISM: ICD-10-CM

## 2017-07-14 DIAGNOSIS — I10 HTN (HYPERTENSION), BENIGN: ICD-10-CM

## 2017-07-14 DIAGNOSIS — D50.9 IRON DEFICIENCY ANEMIA, UNSPECIFIED IRON DEFICIENCY ANEMIA TYPE: ICD-10-CM

## 2017-07-14 LAB
ALBUMIN SERPL BCP-MCNC: 4 G/DL (ref 3.2–4.9)
ALBUMIN/GLOB SERPL: 1.4 G/DL
ALP SERPL-CCNC: 111 U/L (ref 30–99)
ALT SERPL-CCNC: 31 U/L (ref 2–50)
ANION GAP SERPL CALC-SCNC: 5 MMOL/L (ref 0–11.9)
AST SERPL-CCNC: 21 U/L (ref 12–45)
BASOPHILS # BLD AUTO: 1 % (ref 0–1.8)
BASOPHILS # BLD: 0.04 K/UL (ref 0–0.12)
BILIRUB SERPL-MCNC: 0.3 MG/DL (ref 0.1–1.5)
BUN SERPL-MCNC: 21 MG/DL (ref 8–22)
CALCIUM SERPL-MCNC: 9.6 MG/DL (ref 8.5–10.5)
CHLORIDE SERPL-SCNC: 103 MMOL/L (ref 96–112)
CO2 SERPL-SCNC: 32 MMOL/L (ref 20–33)
CREAT SERPL-MCNC: 0.56 MG/DL (ref 0.5–1.4)
EOSINOPHIL # BLD AUTO: 0.06 K/UL (ref 0–0.51)
EOSINOPHIL NFR BLD: 1.5 % (ref 0–6.9)
ERYTHROCYTE [DISTWIDTH] IN BLOOD BY AUTOMATED COUNT: 47 FL (ref 35.9–50)
FERRITIN SERPL-MCNC: 20.3 NG/ML (ref 10–291)
GFR SERPL CREATININE-BSD FRML MDRD: >60 ML/MIN/1.73 M 2
GLOBULIN SER CALC-MCNC: 2.8 G/DL (ref 1.9–3.5)
GLUCOSE SERPL-MCNC: 90 MG/DL (ref 65–99)
HCT VFR BLD AUTO: 42.4 % (ref 37–47)
HGB BLD-MCNC: 14.3 G/DL (ref 12–16)
IMM GRANULOCYTES # BLD AUTO: 0 K/UL (ref 0–0.11)
IMM GRANULOCYTES NFR BLD AUTO: 0 % (ref 0–0.9)
IRON SATN MFR SERPL: 29 % (ref 15–55)
IRON SERPL-MCNC: 85 UG/DL (ref 40–170)
LYMPHOCYTES # BLD AUTO: 0.98 K/UL (ref 1–4.8)
LYMPHOCYTES NFR BLD: 23.9 % (ref 22–41)
MCH RBC QN AUTO: 33.3 PG (ref 27–33)
MCHC RBC AUTO-ENTMCNC: 33.7 G/DL (ref 33.6–35)
MCV RBC AUTO: 98.6 FL (ref 81.4–97.8)
MONOCYTES # BLD AUTO: 0.33 K/UL (ref 0–0.85)
MONOCYTES NFR BLD AUTO: 8 % (ref 0–13.4)
NEUTROPHILS # BLD AUTO: 2.69 K/UL (ref 2–7.15)
NEUTROPHILS NFR BLD: 65.6 % (ref 44–72)
NRBC # BLD AUTO: 0 K/UL
NRBC BLD AUTO-RTO: 0 /100 WBC
PLATELET # BLD AUTO: 294 K/UL (ref 164–446)
PMV BLD AUTO: 9.2 FL (ref 9–12.9)
POTASSIUM SERPL-SCNC: 4 MMOL/L (ref 3.6–5.5)
PROT SERPL-MCNC: 6.8 G/DL (ref 6–8.2)
RBC # BLD AUTO: 4.3 M/UL (ref 4.2–5.4)
SODIUM SERPL-SCNC: 140 MMOL/L (ref 135–145)
T4 FREE SERPL-MCNC: 1.19 NG/DL (ref 0.53–1.43)
TIBC SERPL-MCNC: 297 UG/DL (ref 250–450)
TSH SERPL DL<=0.005 MIU/L-ACNC: 0.56 UIU/ML (ref 0.3–3.7)
WBC # BLD AUTO: 4.1 K/UL (ref 4.8–10.8)

## 2017-07-14 PROCEDURE — 82728 ASSAY OF FERRITIN: CPT

## 2017-07-14 PROCEDURE — 80053 COMPREHEN METABOLIC PANEL: CPT

## 2017-07-14 PROCEDURE — 84439 ASSAY OF FREE THYROXINE: CPT

## 2017-07-14 PROCEDURE — 85025 COMPLETE CBC W/AUTO DIFF WBC: CPT

## 2017-07-14 PROCEDURE — 83540 ASSAY OF IRON: CPT

## 2017-07-14 PROCEDURE — 36415 COLL VENOUS BLD VENIPUNCTURE: CPT

## 2017-07-14 PROCEDURE — 83550 IRON BINDING TEST: CPT

## 2017-07-14 PROCEDURE — 84443 ASSAY THYROID STIM HORMONE: CPT

## 2017-07-16 NOTE — PROGRESS NOTES
Quick Note:    I will discuss the result in upcoming appointment on 7/20/17.     Ninfa Marcial MD    ______

## 2017-07-19 ENCOUNTER — TELEPHONE (OUTPATIENT)
Dept: MEDICAL GROUP | Age: 77
End: 2017-07-19

## 2017-07-19 NOTE — TELEPHONE ENCOUNTER
ESTABLISHED PATIENT PRE-VISIT PLANNING     Note: Patient will not be contacted if there is no indication to call.     1.  Reviewed notes from the last few office visits within the medical group: Yes    2.  If any orders were placed at last visit or intended to be done for this visit (i.e. 6 mos follow-up), do we have Results/Consult Notes?        •  Labs - Labs ordered, completed and results are in chart.       •  Imaging - Imaging was not ordered at last office visit.       •  Referrals - No referrals were ordered at last office visit.    3. Is this appointment scheduled as a Hospital Follow-Up? No    4.  Immunizations were updated in [employer] using [employer]?: Yes       •  [employer] Recommendations: HEPATITIS A , HEPATITIS B and TD    5.  Patient is due for the following Health Maintenance Topics:   There are no preventive care reminders to display for this patient.    - Patient has completed PNEUMOVAX (PPSV23), PREVNAR (PCV13) , TDAP and ZOSTAVAX (Shingles) Immunization(s) per Web[employer]. Chart has been updated.    6.  Patient was NOT informed to arrive 15 min prior to their scheduled appointment and bring in their medication bottles.

## 2017-07-20 ENCOUNTER — OFFICE VISIT (OUTPATIENT)
Dept: MEDICAL GROUP | Age: 77
End: 2017-07-20
Payer: MEDICARE

## 2017-07-20 VITALS
TEMPERATURE: 98.1 F | BODY MASS INDEX: 39.01 KG/M2 | SYSTOLIC BLOOD PRESSURE: 130 MMHG | HEART RATE: 82 BPM | HEIGHT: 62 IN | WEIGHT: 212 LBS | OXYGEN SATURATION: 94 % | DIASTOLIC BLOOD PRESSURE: 70 MMHG

## 2017-07-20 DIAGNOSIS — E03.9 ACQUIRED HYPOTHYROIDISM: ICD-10-CM

## 2017-07-20 DIAGNOSIS — I27.20 PULMONARY HYPERTENSION (HCC): ICD-10-CM

## 2017-07-20 DIAGNOSIS — M85.80 OSTEOPENIA WITH HIGH RISK OF FRACTURE: ICD-10-CM

## 2017-07-20 DIAGNOSIS — E66.9 OBESITY (BMI 35.0-39.9 WITHOUT COMORBIDITY): ICD-10-CM

## 2017-07-20 DIAGNOSIS — D50.9 IRON DEFICIENCY ANEMIA, UNSPECIFIED IRON DEFICIENCY ANEMIA TYPE: ICD-10-CM

## 2017-07-20 DIAGNOSIS — I10 HTN (HYPERTENSION), BENIGN: ICD-10-CM

## 2017-07-20 PROCEDURE — 99215 OFFICE O/P EST HI 40 MIN: CPT | Performed by: INTERNAL MEDICINE

## 2017-07-20 RX ORDER — ALENDRONATE SODIUM 70 MG/1
70 TABLET ORAL
Qty: 12 TAB | Refills: 3 | Status: SHIPPED | OUTPATIENT
Start: 2017-07-20 | End: 2018-02-22

## 2017-07-20 ASSESSMENT — PAIN SCALES - GENERAL: PAINLEVEL: NO PAIN

## 2017-07-20 NOTE — PATIENT INSTRUCTIONS
Current outpatient prescriptions:   •  alendronate, 70 mg, Oral, Q7 DAYS  •  Misc. Devices, Please provide the oxygen concentrator 2 L at night time.  •  furosemide, TAKE 1 TABLET BY MOUTH EVERY DAY  •  omeprazole, TAKE 1 CAPSULE BY MOUTH EVERY DAY  •  primidone, TAKE 1 TABLET BY MOUTH THREE TIMES DAILY  •  warfarin, Take 4 tablets daily as directed by coumadin clininc, 20mg QHS  •  Probiotic Product (PROBIOTIC DAILY PO), Take  by mouth every day.  •  MOVE FREE JOINT HEALTH ADVANCE, Take  by mouth every day.  •  potassium chloride SA, 20 mEq, Oral, BID (Patient taking differently: 20 mEq, Oral, DAILY)  •  WOMENS MULTI VITAMIN & MINERAL, 1 Tab, Oral, DAILY  •  Melatonin, 1,000-2,000 mcg, Oral, QHS PRN  •  Vitamin D-3, 1,000 Units, Oral, DAILY  •  carvedilol, 6.25 mg, Oral, BID WITH MEALS  •  levothyroxine, 100 mcg, Oral, AM ES

## 2017-07-20 NOTE — Clinical Note
July 20, 2017        Natalie Anaya  [address] [facility] [address]        Dear Natalie:      Current outpatient prescriptions:   •  alendronate, 70 mg, Oral, Q7 DAYS  •  Misc. Devices, Please provide the oxygen concentrator 2 L at night time.  •  furosemide, TAKE 1 TABLET BY MOUTH EVERY DAY  •  omeprazole, TAKE 1 CAPSULE BY MOUTH EVERY DAY  •  primidone, TAKE 1 TABLET BY MOUTH THREE TIMES DAILY  •  warfarin, Take 4 tablets daily as directed by coumadin clininc, 20mg QHS  •  Probiotic Product (PROBIOTIC DAILY PO), Take  by mouth every day.  •  MOVE FREE JOINT HEALTH ADVANCE, Take  by mouth every day.  •  potassium chloride SA, 20 mEq, Oral, BID (Patient taking differently: 20 mEq, Oral, DAILY)  •  WOMENS MULTI VITAMIN & MINERAL, 1 Tab, Oral, DAILY  •  Melatonin, 1,000-2,000 mcg, Oral, QHS PRN  •  Vitamin D-3, 1,000 Units, Oral, DAILY  •  carvedilol, 6.25 mg, Oral, BID WITH MEALS  •  levothyroxine, 100 mcg, Oral, AM ES

## 2017-07-20 NOTE — MR AVS SNAPSHOT
"        Natalie Anaya   2017 1:40 PM   Office Visit   MRN: [id number]    Department:  [address] [place] [address]   Dept Phone:  [phone]    Description:  Female : 1940   Provider:  Ninfa Marcial M.D.           Reason for Visit     Hypertension lab review      Allergies as of 2017     Allergen Noted Reactions    Tape 10/21/2015   Rash    Adhesive band aids cause a rash  Paper tape ok      You were diagnosed with     HTN (hypertension), benign   [992030]       Acquired hypothyroidism   [0107730]       Osteopenia with high risk of fracture   [3779923]       Obesity (BMI 35.0-39.9 without comorbidity) ([facility])   [010998]         Vital Signs     Blood Pressure Pulse Temperature Height Weight Body Mass Index    130/70 mmHg 82 36.7 °C (98.1 °F) 1.575 m (5' 2\") 96.163 kg (212 lb) 38.77 kg/m2    Oxygen Saturation Breastfeeding? Smoking Status             94% No Never Smoker          Basic Information     Date Of Birth Sex Race Ethnicity Preferred Language    1940 Female White Non- English      Your appointments     2017  1:00 PM   NM AEROSOL/PERFUSION with [facility] NM ECAM   [facility] - [facility] - [facility]    [address]  [place] [address] [phone]            2017  2:45 PM   ECHO with ECHO [facility] RM2   ECHOCARDIOLOGY [facility] ([facility])    [address]  [place] [address]   [phone]            Aug 10, 2017 10:00 AM   Anti-Coag Routine with [facility] PAV PHARMACIST   [facility] ([place])    [address]  [address]  [place] [address] [phone]            Sep 13, 2017 12:40 PM   Established Patient Pul with A Rotation   [facility] Pulmonary Medicine (--)    [address]  [address]  [place] [id number] [phone]            2018  1:40 PM   Established Patient with Ninfa Marcial M.D.   [address]    [address]  [place] [address] "   [phone]           You will be receiving a confirmation call a few days before your appointment from our automated call confirmation system.              Problem List              ICD-10-CM Priority Class Noted - Resolved    Chronic insomnia F51.04   1/9/2013 - Present    HTN (hypertension), benign I10 Low  1/9/2013 - Present    History of cataract removal with insertion of prosthetic lens Z98.49, Z96.1   7/15/2014 - Present    Seizure disorder (HCC) G40.909 Low  10/21/2015 - Present    Acquired hypothyroidism E03.9   1/8/2016 - Present    Personal history of pulmonary embolism Z86.711   1/8/2016 - Present    Iron deficiency anemia D50.9   3/4/2016 - Present    Diastolic dysfunction I51.9   3/4/2016 - Present    Hiatal hernia K44.9   3/4/2016 - Present    Closed fracture of multiple ribs of left side S22.42XA   9/26/2016 - Present    History of fracture of left hip Z87.81 Medium  10/5/2016 - Present    Recurrent pulmonary embolism (CMS-HCC) I26.99 Medium  11/2/2016 - Present    Chronic anticoagulation Z79.01   2/23/2017 - Present    Pulmonary hypertension (CMS-HCC) I27.2   3/15/2017 - Present    Long term (current) use of anticoagulants [Z79.01] Z79.01   4/11/2017 - Present    Left hip pain M25.552   6/19/2017 - Present    Osteopenia with high risk of fracture M85.80   7/20/2017 - Present      Health Maintenance        Date Due Completion Dates    IMM INFLUENZA (1) 9/1/2017 11/22/2016, 10/7/2016, 9/19/2015, 9/30/2014, 9/12/2012, 10/18/2010    BONE DENSITY 5/17/2022 5/17/2017, 5/17/2017, 10/1/2014 (Declined)    Override on 10/1/2014: Patient Declined    IMM DTaP/Tdap/Td Vaccine (2 - Td) 10/21/2025 10/21/2015    COLONOSCOPY 4/29/2026 4/29/2016, 4/29/2016            Current Immunizations     13-VALENT PCV PREVNAR 8/1/2016    Hepatitis A Vaccine, Ped/Adol 10/18/2010    Hepatitis B Vaccine Non-Recombivax (Ped/Adol) 10/18/2010    INFLUENZA VACCINE H1N1 10/9/2011, 2/22/2010    Influenza TIV (IM) 9/30/2014, 9/12/2012,  10/18/2010    Influenza Vaccine Adult HD 11/22/2016  4:16 PM, 10/7/2016 12:37 PM    Influenza Vaccine Quad Inj (Pf) 9/19/2015, 9/19/2015    Pneumococcal Vaccine (UF)Historical Data 10/9/2011    Pneumococcal polysaccharide vaccine (PPSV-23) 10/18/2010    SHINGLES VACCINE 12/31/2010    Tdap Vaccine 10/21/2015      Below and/or attached are the medications your provider expects you to take. Review all of your home medications and newly ordered medications with your provider and/or pharmacist. Follow medication instructions as directed by your provider and/or pharmacist. Please keep your medication list with you and share with your provider. Update the information when medications are discontinued, doses are changed, or new medications (including over-the-counter products) are added; and carry medication information at all times in the event of emergency situations     Allergies:  TAPE - Rash               Medications  Valid as of: July 20, 2017 -  3:23 PM    Generic Name Brand Name Tablet Size Instructions for use    Alendronate Sodium (Tab) FOSAMAX 70 MG Take 1 Tab by mouth every 7 days.        Carvedilol (Tab) COREG 6.25 MG Take 6.25 mg by mouth 2 times a day, with meals.        Cholecalciferol (Cap) Vitamin D-3 1000 UNITS Take 1,000 Units by mouth every day.        Furosemide (Tab) LASIX 40 MG TAKE 1 TABLET BY MOUTH EVERY DAY        Glucos-Chond-Hyal Ac-Ca Fructo (Tab) MOVE FREE JOINT HEALTH ADVANCE  Take  by mouth every day.        Levothyroxine Sodium (Tab) SYNTHROID 100 MCG Take 100 mcg by mouth Every morning on an empty stomach.        Melatonin (TABLET DISPERSIBLE) Melatonin 500 MCG Take 1,000-2,000 mcg by mouth at bedtime as needed (sleep).        Misc. Devices (Misc) Misc. Devices  Please provide the oxygen concentrator 2 L at night time.        Multiple Vitamins-Minerals (Tab) WOMENS MULTI VITAMIN & MINERAL  Take 1 Tab by mouth every day.        Omeprazole (CAPSULE DELAYED RELEASE) PRILOSEC 40 MG TAKE 1  CAPSULE BY MOUTH EVERY DAY        Potassium Chloride Torrie CR (Tab CR) Kdur 20 MEQ Take 1 Tab by mouth 2 times a day.        Primidone (Tab) MYSOLINE 250 MG TAKE 1 TABLET BY MOUTH THREE TIMES DAILY        Probiotic Product   Take  by mouth every day.        Warfarin Sodium (Tab) COUMADIN 5 MG Take 4 tablets daily as directed by coumadin clininc        .                 Medicines prescribed today were sent to:     [facility] [address] [place] [address] [place] - [address] [facility]    [address] [facility] [place] [address]    Phone: [phone] Fax: [phone]    Open 24 Hours?: No      Medication refill instructions:       If your prescription bottle indicates you have medication refills left, it is not necessary to call your provider’s office. Please contact your pharmacy and they will refill your medication.    If your prescription bottle indicates you do not have any refills left, you may request refills at any time through one of the following ways: The online [employer] system (except Urgent Care), by calling your provider’s office, or by asking your pharmacy to contact your provider’s office with a refill request. Medication refills are processed only during regular business hours and may not be available until the next business day. Your provider may request additional information or to have a follow-up visit with you prior to refilling your medication.   *Please Note: Medication refills are assigned a new Rx number when refilled electronically. Your pharmacy may indicate that no refills were authorized even though a new prescription for the same medication is available at the pharmacy. Please request the medicine by name with the pharmacy before contacting your provider for a refill.        Your To Do List     Future Labs/Procedures Complete By Expires    CBC WITH DIFFERENTIAL  As directed 7/21/2018    COMP METABOLIC PANEL  As directed 7/21/2018    FREE THYROXINE  As directed  7/21/2018    LIPID PROFILE  As directed 7/21/2018    TSH  As directed 7/21/2018      Instructions      Current outpatient prescriptions:   •  alendronate, 70 mg, Oral, Q7 DAYS  •  Misc. Devices, Please provide the oxygen concentrator 2 L at night time.  •  furosemide, TAKE 1 TABLET BY MOUTH EVERY DAY  •  omeprazole, TAKE 1 CAPSULE BY MOUTH EVERY DAY  •  primidone, TAKE 1 TABLET BY MOUTH THREE TIMES DAILY  •  warfarin, Take 4 tablets daily as directed by coumadin clininc, 20mg QHS  •  Probiotic Product (PROBIOTIC DAILY PO), Take  by mouth every day.  •  MOVE FREE JOINT HEALTH ADVANCE, Take  by mouth every day.  •  potassium chloride SA, 20 mEq, Oral, BID (Patient taking differently: 20 mEq, Oral, DAILY)  •  WOMENS MULTI VITAMIN & MINERAL, 1 Tab, Oral, DAILY  •  Melatonin, 1,000-2,000 mcg, Oral, QHS PRN  •  Vitamin D-3, 1,000 Units, Oral, DAILY  •  carvedilol, 6.25 mg, Oral, BID WITH MEALS  •  levothyroxine, 100 mcg, Oral, AM ES         Other Notes About Your Plan     Please assess for the following diagnosis:    -this patient has been on Ambien for insomnia since 7/2010. ? 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 ?other convulsions, code R56.9                      MyChart Access Code: Activation code not generated  Current [place] Status: Active

## 2017-07-21 NOTE — ASSESSMENT & PLAN NOTE
Patient has osteopenia with increased fracture risk on recent DEXA scan done in May 2017. She already has left hip fracture and left side multiple ribs fracture. We discussed to take bisphosphonates to prevent progression of osteopenia. Patient agreed to take medication. We discussed to take Fosamax. Patient has denture appointment coming up in a few week. I advised patient to discuss with her dentist and do detailed dental exam before taking Fosamax. Patient understands the risks of Fosamax and agrees with the plan.

## 2017-07-21 NOTE — ASSESSMENT & PLAN NOTE
Patient is taking levothyroxine 100 µg every morning. She denies side effects from taking levothyroxine. Recent thyroid function tests within normal.    Results for NADIADIVINA DANILO (MRN [id number]) as of 7/20/2017 19:30   Ref. Range 7/14/2017 09:55   TSH Latest Ref Range: 0.300-3.700 uIU/mL 0.560   Free T-4 Latest Ref Range: 0.53-1.43 ng/dL 1.19

## 2017-07-21 NOTE — ASSESSMENT & PLAN NOTE
Patient is taking carvedilol 6.25 mg twice a day and Lasix 40 mg daily with potassium 20 mEq daily. Her blood pressure is well controlled with current regimen. She still has mild leg swelling which is much improved with Lasix. Her kidney function and electrolytes on 7/14 the 17th are within normal.

## 2017-07-21 NOTE — PROGRESS NOTES
Subjective:   Natalie Zuniga is a 76 y.o. female here today for evaluation and management of:      Osteopenia with high risk of fracture  Patient has osteopenia with increased fracture risk on recent DEXA scan done in May 2017. She already has left hip fracture and left side multiple ribs fracture. We discussed to take bisphosphonates to prevent progression of osteopenia. Patient agreed to take medication. We discussed to take Fosamax. Patient has denture appointment coming up in a few week. I advised patient to discuss with her dentist and do detailed dental exam before taking Fosamax. Patient understands the risks of Fosamax and agrees with the plan.    Pulmonary hypertension (CMS-HCC)  Patient is using 2 L oxygen at night. She is taking Lasix 40 mg daily and carvedilol 6.25 mg twice a day currently. She does not have shortness of breath or chest pain. She has VQ scan, chest x-ray, echocardiogram ordered by pulmonologist and she will follow with pulmonologist in September after the tests.    HTN (hypertension), benign  Patient is taking carvedilol 6.25 mg twice a day and Lasix 40 mg daily with potassium 20 mEq daily. Her blood pressure is well controlled with current regimen. She still has mild leg swelling which is much improved with Lasix. Her kidney function and electrolytes on 7/14 the 17th are within normal.    Acquired hypothyroidism  Patient is taking levothyroxine 100 µg every morning. She denies side effects from taking levothyroxine. Recent thyroid function tests within normal.    Results for NATALIE ZUNIGA (MRN [id number]) as of 7/20/2017 19:30   Ref. Range 7/14/2017 09:55   TSH Latest Ref Range: 0.300-3.700 uIU/mL 0.560   Free T-4 Latest Ref Range: 0.53-1.43 ng/dL 1.19       Iron deficiency anemia  Patient does not have anemia anymore. Hemoglobin back to normal. Ferritin is normal on recent blood tests. She is taking multivitamins. She does not require to take iron supplement anymore.          Current medicines (including changes today)  Current Outpatient Prescriptions   Medication Sig Dispense Refill   • alendronate (FOSAMAX) 70 MG Tab Take 1 Tab by mouth every 7 days. 12 Tab 3   • Misc. Devices Misc Please provide the oxygen concentrator 2 L at night time. 1 Device 0   • furosemide (LASIX) 40 MG Tab TAKE 1 TABLET BY MOUTH EVERY DAY 90 Tab 1   • omeprazole (PRILOSEC) 40 MG delayed-release capsule TAKE 1 CAPSULE BY MOUTH EVERY DAY 90 Cap 1   • primidone (MYSOLINE) 250 MG Tab TAKE 1 TABLET BY MOUTH THREE TIMES DAILY 270 Tab 3   • warfarin (COUMADIN) 5 MG Tab Take 4 tablets daily as directed by coumadin clininc 360 Tab 1   • Probiotic Product (PROBIOTIC DAILY PO) Take  by mouth every day.     • Glucos-Chond-Hyal Ac-Ca Fructo (MOVE FREE JOINT [facility] ADVANCE) Tab Take  by mouth every day.     • potassium chloride SA (KDUR) 20 MEQ Tab CR Take 1 Tab by mouth 2 times a day. (Patient taking differently: Take 20 mEq by mouth every day.) 180 Tab 1   • Multiple Vitamins-Minerals (WOMENS MULTI VITAMIN & MINERAL) Tab Take 1 Tab by mouth every day.     • Melatonin 500 MCG TABLET DISPERSIBLE Take 1,000-2,000 mcg by mouth at bedtime as needed (sleep).     • Cholecalciferol (VITAMIN D-3) 1000 UNITS Cap Take 1,000 Units by mouth every day.     • carvedilol (COREG) 6.25 MG Tab Take 6.25 mg by mouth 2 times a day, with meals.     • levothyroxine (SYNTHROID) 100 MCG Tab Take 100 mcg by mouth Every morning on an empty stomach.       No current facility-administered medications for this visit.     She  has a past medical history of Hypothyroid; Pain; Bowel habit changes; Cataract; Hypertension; Hiatus hernia syndrome; Breath shortness; Hemorrhagic disorder (CMS-[facility]); Arthritis; Seizure (CMS-[facility]) (1982); Personal history of venous thrombosis and embolism; Hyperthyroidism; Eye drainage; Ringing in ears; Constipation; Sore muscles; Epilepsy (CMS-[facility]); and Influenza.    ROS   No chest pain, no shortness of breath, no  "abdominal pain       Objective:     Blood pressure 130/70, pulse 82, temperature 36.7 °C (98.1 °F), height 1.575 m (5' 2\"), weight 96.163 kg (212 lb), SpO2 94 %, not currently breastfeeding. Body mass index is 38.77 kg/(m^2).   Physical Exam:  General: Alert, oriented and no acute distress.  Eye contact is good, speech goal directed, affect calm  HEENT: conjunctiva non-injected, sclera non-icteric.  Oral mucous membranes pink and moist with no lesions.  Pinna normal.   Lungs: Normal respiratory effort, clear to auscultation bilaterally with good excursion.  CV: regular rate and rhythm. No murmurs.  Abdomen: soft, non distended, nontender, Bowel sound normal.  Ext: mild pitting edema, color normal, vascularity normal, temperature normal        Assessment and Plan:   The following treatment plan was discussed     1. HTN (hypertension), benign  - Well-controlled. Continue current regimens with carvedilol 6.25 mg twice a day and Lasix 40 mg daily with potassium 20 mEq once a day. Recheck lab 1-2 weeks before next follow up visit.  - Recommend to monitor blood pressure and heart rate at home.  - CBC WITH DIFFERENTIAL; Future  - COMP METABOLIC PANEL; Future  - LIPID PROFILE; Future    2. Acquired hypothyroidism  - Well-controlled. Continue current regimen, levothyroxine 100 µg every morning. Recheck lab 1-2 weeks before next follow up visit.  - LIPID PROFILE; Future  - TSH; Future  - FREE THYROXINE; Future    3. Osteopenia with high risk of fracture  - Patient agreed to try Fosamax. She will have a dental appointment and has detailed dental exam before starting taking Fosamax.  - Counseling for side effect of Fosamax.  - Advised to take Fosamax with plenty of water and to stay upright for at least 2 hour after taking medication to prevent esophagitis.   - alendronate (FOSAMAX) 70 MG Tab; Take 1 Tab by mouth every 7 days.  Dispense: 12 Tab; Refill: 3  - CBC WITH DIFFERENTIAL; Future  - COMP METABOLIC PANEL; Future    4. " Pulmonary hypertension (CMS-[facility])  - Continue to use oxygen 2 L at home. She received the new oxygen concentrator. She will follow with pulmonologist in September after finishing the tests ordered by pulmonologist.    5. Iron deficiency anemia, unspecified iron deficiency anemia type  - Iron level and hemoglobin are back to normal even after stopping iron supplement. She will continue multivitamin.    6. Obesity (BMI 35.0-39.9 without comorbidity) ([facility])  - Counseled for healthy diet and regular physical exercise to lose weight.   - Patient identified as having weight management issue.  Appropriate orders and counseling given.    7. Patient reported that she has generalized aching and pain on her joint. She has regular orthopedist, and she was told that she may need to do knee replacement on the right knee in the future. She is taking Aleve from over-the-counter. She is taking Coumadin chronically for DVT and PE. I discussed with patient not to take NSAIDs as she is on Coumadin. Patient stated that she tried Tylenol without significant improvement on her pain. We discussed to try Tylenol plus topical anti-inflammation. Patient stated that she wants to try topical icy hot. I also suggest her to try Blue stop gel. Patient agreed not to take NSAIDs as she is concerning of bleeding risks. I also advised patient to do gentle stretching exercises and ice pack as needed. Patient does not want to take any narcotic as she has side effects of severe dizziness and constipation from taking narcotic pain medication. Patient stated that she has injection on the right knee and pain seems to improve after right knee injection.      Face-to-face time spent 40 minutes with patient and more than half of that time spent for counseling and cooperating of care for medical problems listed above.       Followup: Return in about 6 months (around 1/20/2018), or if symptoms worsen or fail to improve, for hypertension, pulmonary hypertension,  osteopenia with high wrist fracture, hypothyroid, lab review.      Please note that this dictation was created using voice recognition software. I have made every reasonable attempt to correct obvious errors, but I expect that there may have unintended errors in text, spelling, punctuation, or grammar that I did not discover.

## 2017-07-21 NOTE — ASSESSMENT & PLAN NOTE
Patient does not have anemia anymore. Hemoglobin back to normal. Ferritin is normal on recent blood tests. She is taking multivitamins. She does not require to take iron supplement anymore.

## 2017-07-21 NOTE — ASSESSMENT & PLAN NOTE
Patient is using 2 L oxygen at night. She is taking Lasix 40 mg daily and carvedilol 6.25 mg twice a day currently. She does not have shortness of breath or chest pain. She has VQ scan, chest x-ray, echocardiogram ordered by pulmonologist and she will follow with pulmonologist in September after the tests.

## 2017-07-27 ENCOUNTER — HOSPITAL ENCOUNTER (OUTPATIENT)
Dept: RADIOLOGY | Facility: MEDICAL CENTER | Age: 77
End: 2017-07-27
Attending: INTERNAL MEDICINE
Payer: MEDICARE

## 2017-07-27 ENCOUNTER — HOSPITAL ENCOUNTER (OUTPATIENT)
Dept: CARDIOLOGY | Facility: MEDICAL CENTER | Age: 77
End: 2017-07-27
Attending: INTERNAL MEDICINE
Payer: MEDICARE

## 2017-07-27 DIAGNOSIS — I27.20 PULMONARY HYPERTENSION (HCC): ICD-10-CM

## 2017-07-27 LAB
LV EJECT FRACT  99904: 60
LV EJECT FRACT MOD 2C 99903: 54.24
LV EJECT FRACT MOD 4C 99902: 40.34
LV EJECT FRACT MOD BP 99901: 44.56

## 2017-07-27 PROCEDURE — 93306 TTE W/DOPPLER COMPLETE: CPT

## 2017-07-27 PROCEDURE — 71020 DX-CHEST-2 VIEWS: CPT

## 2017-07-27 PROCEDURE — A9567 TECHNETIUM TC-99M AEROSOL: HCPCS

## 2017-08-10 ENCOUNTER — ANTICOAGULATION VISIT (OUTPATIENT)
Dept: MEDICAL GROUP | Facility: MEDICAL CENTER | Age: 77
End: 2017-08-10
Payer: MEDICARE

## 2017-08-10 VITALS — SYSTOLIC BLOOD PRESSURE: 115 MMHG | DIASTOLIC BLOOD PRESSURE: 50 MMHG | HEART RATE: 76 BPM

## 2017-08-10 DIAGNOSIS — Z86.711 PERSONAL HISTORY OF PULMONARY EMBOLISM: ICD-10-CM

## 2017-08-10 DIAGNOSIS — Z79.01 LONG TERM (CURRENT) USE OF ANTICOAGULANTS: ICD-10-CM

## 2017-08-10 DIAGNOSIS — Z79.01 CHRONIC ANTICOAGULATION: ICD-10-CM

## 2017-08-10 LAB — INR PPP: 2.5 (ref 2–3.5)

## 2017-08-10 NOTE — PROGRESS NOTES
OP Anticoagulation Service Note    Date: 8/10/2017    Anticoagulation Summary as of 8/10/2017     INR goal 2.0-3.0   Selected INR 2.5 (8/10/2017)   Maintenance plan 20 mg (5 mg x 4) every day   Weekly total 140 mg   Plan last modified Anders Fallon, PHARMD (4/11/2017)   Next INR check 9/21/2017   Target end date     Indications   Chronic anticoagulation [Z79.01]  Personal history of pulmonary embolism [Z86.711]  Long term (current) use of anticoagulants [Z79.01] [Z79.01]         Anticoagulation Episode Summary     INR check location     Preferred lab     Send INR reminders to     Comments       Anticoagulation Care Providers     Provider Role Specialty Phone number    Ninfa Marcial M.D. Referring Internal Medicine [phone]    [facility] Responsible  [phone]        Anticoagulation Patient Findings      Plan:  Patient is therapeutic today. Confirmed dosing regimen. Patient denies any changes in medications or diet. Patient denies any signs or symptoms of bleeding or clotting. Instructed patient to call clinic if any unusual bleeding or bruising occurs. Will continue dosing as outlined. Will follow-up with patient in 6 week(s).      Moira Sheridan, Pharm D

## 2017-08-10 NOTE — MR AVS SNAPSHOT
Natalie Anaya   8/10/2017 10:00 AM   Anticoagulation Visit   MRN: [id number]    Department:  [facility]   Dept Phone:  [phone]    Description:  Female : 1940   Provider:  [facility]           Allergies as of 8/10/2017     Allergen Noted Reactions    Tape 10/21/2015   Rash    Adhesive band aids cause a rash  Paper tape ok      You were diagnosed with     Long term (current) use of anticoagulants   [V58.61.ICD-9-CM]       Chronic anticoagulation   [793180]       Personal history of pulmonary embolism   [V12.55.ICD-9-CM]         Vital Signs     Blood Pressure Pulse Smoking Status             115/50 mmHg 76 Never Smoker          Basic Information     Date Of Birth Sex Race Ethnicity Preferred Language    1940 Female White Non- English      Your appointments     Sep 13, 2017 12:40 PM   Established Patient Pul with A Rotation   [facility] (--)    [address]  [address]  [place] [address] [phone]            Sep 21, 2017 10:15 AM   Anti-Coag Routine with [facility]ARMAC[facility]   [facility]    [address]  [address]  [place] [address] [phone]            2018  1:40 PM   Established Patient with Ninfa Marcial M.D.   [facility] [address] [place] [address] [phone]           You will be receiving a confirmation call a few days before your appointment from our automated call confirmation system.              Problem List              ICD-10-CM Priority Class Noted - Resolved    Chronic insomnia F51.04   2013 - Present    HTN (hypertension), benign I10 Low  2013 - Present    History of cataract removal with insertion of prosthetic lens Z98.49, Z96.1   7/15/2014 - Present    Seizure disorder (HCC) G40.909 Low  10/21/2015 - Present    Acquired hypothyroidism E03.9   2016 - Present    Personal  history of pulmonary embolism Z86.711   1/8/2016 - Present    Iron deficiency anemia D50.9   3/4/2016 - Present    Diastolic dysfunction I51.9   3/4/2016 - Present    Hiatal hernia K44.9   3/4/2016 - Present    Closed fracture of multiple ribs of left side S22.42XA   9/26/2016 - Present    History of fracture of left hip Z87.81 Medium  10/5/2016 - Present    Recurrent pulmonary embolism (CMS-HCC) I26.99 Medium  11/2/2016 - Present    Chronic anticoagulation Z79.01   2/23/2017 - Present    Pulmonary hypertension (CMS-HCC) I27.2   3/15/2017 - Present    Long term (current) use of anticoagulants [Z79.01] Z79.01   4/11/2017 - Present    Left hip pain M25.552   6/19/2017 - Present    Osteopenia with high risk of fracture M85.80   7/20/2017 - Present      Health Maintenance        Date Due Completion Dates    IMM INFLUENZA (1) 9/1/2017 11/22/2016, 10/7/2016, 9/19/2015, 9/30/2014, 9/12/2012, 10/18/2010    BONE DENSITY 5/17/2022 5/17/2017, 5/17/2017, 10/1/2014 (Declined)    Override on 10/1/2014: Patient Declined    IMM DTaP/Tdap/Td Vaccine (2 - Td) 10/21/2025 10/21/2015    COLONOSCOPY 4/29/2026 4/29/2016, 4/29/2016            Results     POCT Protime      Component    INR    2.5                        Current Immunizations     13-VALENT PCV PREVNAR 8/1/2016    Hepatitis A Vaccine, Ped/Adol 10/18/2010    Hepatitis B Vaccine Non-Recombivax (Ped/Adol) 10/18/2010    INFLUENZA VACCINE H1N1 10/9/2011, 2/22/2010    Influenza TIV (IM) 9/30/2014, 9/12/2012, 10/18/2010    Influenza Vaccine Adult HD 11/22/2016  4:16 PM, 10/7/2016 12:37 PM    Influenza Vaccine Quad Inj (Pf) 9/19/2015, 9/19/2015    Pneumococcal Vaccine (UF)Historical Data 10/9/2011    Pneumococcal polysaccharide vaccine (PPSV-23) 10/18/2010    SHINGLES VACCINE 12/31/2010    Tdap Vaccine 10/21/2015      Below and/or attached are the medications your provider expects you to take. Review all of your home medications and newly ordered medications with your provider and/or  pharmacist. Follow medication instructions as directed by your provider and/or pharmacist. Please keep your medication list with you and share with your provider. Update the information when medications are discontinued, doses are changed, or new medications (including over-the-counter products) are added; and carry medication information at all times in the event of emergency situations     Allergies:  TAPE - Rash               Medications  Valid as of: August 10, 2017 - 10:11 AM    Generic Name Brand Name Tablet Size Instructions for use    Alendronate Sodium (Tab) FOSAMAX 70 MG Take 1 Tab by mouth every 7 days.        Carvedilol (Tab) COREG 6.25 MG Take 6.25 mg by mouth 2 times a day, with meals.        Cholecalciferol (Cap) Vitamin D-3 1000 UNITS Take 1,000 Units by mouth every day.        Furosemide (Tab) LASIX 40 MG TAKE 1 TABLET BY MOUTH EVERY DAY        Glucos-Chond-Hyal Ac-Ca Fructo (Tab) MOVE FREE JOINT HEALTH ADVANCE  Take  by mouth every day.        Levothyroxine Sodium (Tab) SYNTHROID 100 MCG Take 100 mcg by mouth Every morning on an empty stomach.        Melatonin (TABLET DISPERSIBLE) Melatonin 500 MCG Take 1,000-2,000 mcg by mouth at bedtime as needed (sleep).        Misc. Devices (Misc) Misc. Devices  Please provide the oxygen concentrator 2 L at night time.        Multiple Vitamins-Minerals (Tab) WOMENS MULTI VITAMIN & MINERAL  Take 1 Tab by mouth every day.        Omeprazole (CAPSULE DELAYED RELEASE) PRILOSEC 40 MG TAKE 1 CAPSULE BY MOUTH EVERY DAY        Potassium Chloride Torrie CR (Tab CR) Kdur 20 MEQ Take 1 Tab by mouth 2 times a day.        Primidone (Tab) MYSOLINE 250 MG TAKE 1 TABLET BY MOUTH THREE TIMES DAILY        Probiotic Product   Take  by mouth every day.        Warfarin Sodium (Tab) COUMADIN 5 MG Take 4 tablets daily as directed by coumadin clininc        .                 Medicines prescribed today were sent to:     [id number] - [place], [place] - [address] AT [facility] OF  [facility]    [address] [facility] [place] [address]    Phone: [phone] Fax: [phone]    Open 24 Hours?: No      Medication refill instructions:       If your prescription bottle indicates you have medication refills left, it is not necessary to call your provider’s office. Please contact your pharmacy and they will refill your medication.    If your prescription bottle indicates you do not have any refills left, you may request refills at any time through one of the following ways: The online Bearch system (except Urgent Care), by calling your provider’s office, or by asking your pharmacy to contact your provider’s office with a refill request. Medication refills are processed only during regular business hours and may not be available until the next business day. Your provider may request additional information or to have a follow-up visit with you prior to refilling your medication.   *Please Note: Medication refills are assigned a new Rx number when refilled electronically. Your pharmacy may indicate that no refills were authorized even though a new prescription for the same medication is available at the pharmacy. Please request the medicine by name with the pharmacy before contacting your provider for a refill.        Other Notes About Your Plan     Please assess for the following diagnosis:    -this patient has been on Ambien for insomnia since 7/2010. ? 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 ?other convulsions, code R56.9                   Warfarin Dosing Calendar August 2017 Details    Sun Mon Tue Wed Thu Fri Sat       1               2               3               4               5                 6               7               8               9               10   2.5   20 mg   See details      11      20 mg         12      20 mg           13      20 mg         14      20  mg         15      20 mg         16      20 mg         17      20 mg         18      20 mg         19      20 mg           20      20 mg         21      20 mg         22      20 mg         23      20 mg         24      20 mg         25      20 mg         26      20 mg           27      20 mg         28      20 mg         29      20 mg         30      20 mg         31      20 mg            Date Details   08/10 This INR check   INR: 2.5               How to take your warfarin dose     To take:  20 mg Take 4 of the 5 mg tablets.           Warfarin Dosing Calendar   September 2017 Details    Sun Mon Tue Wed Thu Fri Sat          1      20 mg         2      20 mg           3      20 mg         4      20 mg         5      20 mg         6      20 mg         7      20 mg         8      20 mg         9      20 mg           10      20 mg         11      20 mg         12      20 mg         13      20 mg         14      20 mg         15      20 mg         16      20 mg           17      20 mg         18      20 mg         19      20 mg         20      20 mg         21      20 mg         22               23                 24               25               26               27               28               29               30                Date Details   No additional details    Date of next INR:  9/21/2017         How to take your warfarin dose     To take:  20 mg Take 4 of the 5 mg tablets.              Medifyhart Access Code: Activation code not generated  Current MEDOP SERVICESt Status: Active

## 2017-08-28 DIAGNOSIS — Z86.711 HISTORY OF PULMONARY EMBOLISM: ICD-10-CM

## 2017-08-28 RX ORDER — WARFARIN SODIUM 5 MG/1
TABLET ORAL
Qty: 360 TAB | Refills: 1 | Status: SHIPPED | OUTPATIENT
Start: 2017-08-28 | End: 2018-02-22

## 2017-09-07 ENCOUNTER — PATIENT OUTREACH (OUTPATIENT)
Dept: HEALTH INFORMATION MANAGEMENT | Facility: OTHER | Age: 77
End: 2017-09-07

## 2017-09-13 ENCOUNTER — OFFICE VISIT (OUTPATIENT)
Dept: PULMONOLOGY | Facility: HOSPICE | Age: 77
End: 2017-09-13
Payer: MEDICARE

## 2017-09-13 VITALS
OXYGEN SATURATION: 90 % | BODY MASS INDEX: 39.56 KG/M2 | TEMPERATURE: 98.1 F | DIASTOLIC BLOOD PRESSURE: 75 MMHG | SYSTOLIC BLOOD PRESSURE: 122 MMHG | WEIGHT: 215 LBS | HEIGHT: 62 IN | RESPIRATION RATE: 15 BRPM | HEART RATE: 81 BPM

## 2017-09-13 DIAGNOSIS — Z86.711 PERSONAL HISTORY OF PULMONARY EMBOLISM: ICD-10-CM

## 2017-09-13 DIAGNOSIS — G47.33 OBSTRUCTIVE SLEEP APNEA: ICD-10-CM

## 2017-09-13 DIAGNOSIS — Z79.01 CHRONIC ANTICOAGULATION: ICD-10-CM

## 2017-09-13 DIAGNOSIS — I26.99 RECURRENT PULMONARY EMBOLISM (HCC): ICD-10-CM

## 2017-09-13 DIAGNOSIS — I27.20 PULMONARY HYPERTENSION (HCC): ICD-10-CM

## 2017-09-13 PROCEDURE — 99214 OFFICE O/P EST MOD 30 MIN: CPT | Performed by: INTERNAL MEDICINE

## 2017-09-13 NOTE — PATIENT INSTRUCTIONS
Recent VQ scan did not show suspicion for new pulmonary emboli.  Echocardiogram shows elevated right heart pressures but improved from prior   Her overnight oxygen tested show clustered desaturation and saturations below 90 the latter part of the night suggesting there could be sleep-disordered breathing perhaps REM related, may require further evaluation  Chest x-ray does not show any acute process  Long term and lifetime anticoagulation for pulmonary emboli, inferior vena cava filter, chronic left lower extremity venous thrombosis

## 2017-09-13 NOTE — PROGRESS NOTES
Natalie Anaya is a 76 y.o. female here for pulmonary hypertension with chronic thromboembolic disease and suspicion of sleep-disordered breathing . Patient was referred by her primary care doctor.    History of Present Illness:      This lady is here for evaluation of pulmonary hypertension, chronic thromboembolic disease, recurrent emboli. Recent workups include a repeat V/Q lung scan with low probability for new process. Recent echocardiogram showed improvement in the right heart pressures. She continues to wear oxygen at night. There is no new swelling and the ecchymosis from Coumadin was aggravated by a recent move, she relocated from [facility], but this has settled back down. She understands the need for lifetime Coumadin.    I reviewed the overnight oximetry done on room air, it shows a clustered pattern and in the latter portion of the night she had desaturations. Her son has sleep apnea and wears CPAP. She states she would be unable to spend the night in the sleep center, cares for her elderly , and we will try a home sleep study, and decide if home auto CPAP would be appropriate. Follow-up after this is accomplished. She would prefer to return to the main pulmonary office, given logistics and this will be accommodated.    Constitutional ROS: No unexpected change in weight, No unexplained fevers  Eyes: No change in vision or blurring or double vision  Mouth/Throat ROS: No sore throat, No recent change in voice or hoarseness  Pulmonary ROS: See present history for pertinent positives  Cardiovascular ROS: No chest pain to suggest acute coronary syndrome  Gastrointestinal ROS: No abdominal pain to suggest peptic disease  Musculoskeletal/Extremities ROS: no acute artritis or unusual swelling  Hematologic/Lymphatic ROS: No easy bleeding or unusual lymph node swelling  Neurologic ROS: No new or unusual weakness  Psychiatric ROS: No hallucinations  Allergic/Immunologic: No  urticaria or allergic  rash      Current Outpatient Prescriptions   Medication Sig Dispense Refill   • warfarin (COUMADIN) 5 MG Tab Take 4 tablets daily as directed by coumadin clininc 360 Tab 1   • alendronate (FOSAMAX) 70 MG Tab Take 1 Tab by mouth every 7 days. 12 Tab 3   • Misc. Devices Misc Please provide the oxygen concentrator 2 L at night time. 1 Device 0   • furosemide (LASIX) 40 MG Tab TAKE 1 TABLET BY MOUTH EVERY DAY 90 Tab 1   • omeprazole (PRILOSEC) 40 MG delayed-release capsule TAKE 1 CAPSULE BY MOUTH EVERY DAY 90 Cap 1   • primidone (MYSOLINE) 250 MG Tab TAKE 1 TABLET BY MOUTH THREE TIMES DAILY 270 Tab 3   • Probiotic Product (PROBIOTIC DAILY PO) Take  by mouth every day.     • Glucos-Chond-Hyal Ac-Ca Fructo (MOVE FREE JOINT HEALTH ADVANCE) Tab Take  by mouth every day.     • potassium chloride SA (KDUR) 20 MEQ Tab CR Take 1 Tab by mouth 2 times a day. (Patient taking differently: Take 20 mEq by mouth every day.) 180 Tab 1   • Multiple Vitamins-Minerals (WOMENS MULTI VITAMIN & MINERAL) Tab Take 1 Tab by mouth every day.     • Melatonin 500 MCG TABLET DISPERSIBLE Take 1,000-2,000 mcg by mouth at bedtime as needed (sleep).     • Cholecalciferol (VITAMIN D-3) 1000 UNITS Cap Take 1,000 Units by mouth every day.     • carvedilol (COREG) 6.25 MG Tab Take 6.25 mg by mouth 2 times a day, with meals.     • levothyroxine (SYNTHROID) 100 MCG Tab Take 100 mcg by mouth Every morning on an empty stomach.       No current facility-administered medications for this visit.        Social History   Substance Use Topics   • Smoking status: Never Smoker   • Smokeless tobacco: Never Used   • Alcohol use Yes      Comment: Very Very Very Rarely        Past Medical History:   Diagnosis Date   • Seizure (CMS-[facility]) 1982   • Arthritis     Knees.   • Bowel habit changes     Constipation   • Breath shortness    • Cataract     Bilat IOL   • Constipation    • Epilepsy (CMS-HCC)    • Eye drainage    • Hemorrhagic disorder (CMS-[facility])    • Hiatus hernia  "syndrome    • Hypertension    • Hyperthyroidism    • Hypothyroid    • Influenza    • Pain     knees   • Personal history of venous thrombosis and embolism     PE from Right Shouler FX 2003.   • Ringing in ears    • Sore muscles        Past Surgical History:   Procedure Laterality Date   • IRRIGATION & DEBRIDEMENT HIP Left 11/17/2016    Procedure: IRRIGATION & DEBRIDEMENT HIP;  Surgeon: Maximo Garnica M.D.;  Location: SURGERY [facility];  Service:    • HIP NAILING INTRAMEDULLARY Left 10/6/2016    Procedure: HIP NAILING INTRAMEDULLARY;  Surgeon: Walt Nguyen M.D.;  Location: SURGERY [facility];  Service:    • GASTROSCOPY-ENDO N/A 4/29/2016    Procedure: GASTROSCOPY-ENDO;  Surgeon: Mikey Stanton M.D.;  Location: [facility];  Service:    • COLONOSCOPY-FLEXIBLE N/A 4/29/2016    Procedure: COLONOSCOPY-FLEXIBLE;  Surgeon: Mikey Stanton M.D.;  Location: [facility];  Service:    • CATARACT PHACO WITH IOL  8/5/2014    Performed by Lorenzo Bello M.D. at [facility]   • CATARACT PHACO WITH IOL  7/15/2014    Performed by Lorenzo Bello M.D. at [facility]   • KNEE ARTHROPLASTY TOTAL  7/9/2012    Performed by DENIZ FREDERICK at [facility]   • JOSE BY LAPAROSCOPY  9/30/2011    Performed by JOHNATHAN CRISOSTOMO at [facility]   • ARTHROSCOPY, KNEE     • CARPAL TUNNEL RELEASE     • PB REMV 2ND CATARACT,CORN-SCLER SECTN     • TONSILLECTOMY     • TUBAL LIGATION         Allergies: Tape    Family History   Problem Relation Age of Onset   • Cancer Mother 67     Ovarian   • Alcohol/Drug Father 60     Appenditis   • Heart Disease Father    • Cancer Maternal Grandmother      colon cancer   • Heart Disease Maternal Grandfather    • Cancer Daughter 44     melonoma       Physical Examination    Vitals:    09/13/17 1336   Height: 1.575 m (5' 2\")   Weight: 97.5 kg (215 lb)   Weight % change since last entry.: 0 %   BP: 122/75   Pulse: 81   BMI " (Calculated): 39.32   Resp: 15   Temp: 36.7 °C (98.1 °F)       General Appearance: alert, no distress  Skin: Skin color, texture, turgor normal. No rashes or lesions.  Eyes: negative  Oropharynx: Lips, mucosa, and tongue normal. Teeth and gums normal. Oropharynx moist and without lesion  Lungs: positive findings:Remarkably clear  Heart: negative. RRR without murmur, gallop, or rubs.  No ectopy.  Abdomen: Abdomen soft, non-tender. . No masses,  No organomegaly  Extremities:  No deformities, edema, left lower extremity is chronic  Joints: No acute arthritis  Peripheral Pulses:perfused  Neurologic: intact grossly  Ecchymoses on her forearms    III (soft palate, base of uvula visible)    Imaging: Described above    PFTS: Previously noted      Assessment and Plan  1. Chronic anticoagulation    2. Personal history of pulmonary embolism    3. Pulmonary hypertension (CMS-HCC)  - OVERNIGHT HOME SLEEP STUDY; Future    4. Recurrent pulmonary embolism (CMS-HCC)    5. Obstructive sleep apnea, presumptive with nocturnal hypoxemia  - OVERNIGHT HOME SLEEP STUDY; Future      Followup Return in about 6 weeks (around 10/25/2017) for follow up visit with pulmonary physician.

## 2017-09-18 NOTE — PROGRESS NOTES
Outcome: No Answer    Please transfer to Patient Outreach Team at [phone] when patient returns call.    Attempt # 2

## 2017-09-19 NOTE — PROGRESS NOTES
Attempt #:3    WebIZ Checked & Epic Updated: yes  HealthConnect Verified: yes  Verify PCP: yes    Communication Preference Obtained: yes     Review Care Team: yes    Annual Wellness Visit Scheduling  1. Scheduling Status:Scheduled     Care Gap Scheduling (Attempt to Schedule EACH Overdue Care Gap!)     Health Maintenance Due   Topic Date Due   • IMM INFLUENZA (1) Informed         My eStore App Activation: already active  My eStore App Wili: no  Virtual Visits: no  Opt In to Text Messages: no

## 2017-09-21 ENCOUNTER — ANTICOAGULATION VISIT (OUTPATIENT)
Dept: MEDICAL GROUP | Facility: MEDICAL CENTER | Age: 77
End: 2017-09-21
Payer: MEDICARE

## 2017-09-21 VITALS — SYSTOLIC BLOOD PRESSURE: 131 MMHG | DIASTOLIC BLOOD PRESSURE: 65 MMHG | HEART RATE: 81 BPM

## 2017-09-21 DIAGNOSIS — Z79.01 LONG TERM (CURRENT) USE OF ANTICOAGULANTS: ICD-10-CM

## 2017-09-21 DIAGNOSIS — Z79.01 CHRONIC ANTICOAGULATION: ICD-10-CM

## 2017-09-21 DIAGNOSIS — Z86.711 PERSONAL HISTORY OF PULMONARY EMBOLISM: ICD-10-CM

## 2017-09-21 LAB — INR PPP: 2.9 (ref 2–3.5)

## 2017-09-21 PROCEDURE — 99211 OFF/OP EST MAY X REQ PHY/QHP: CPT | Performed by: FAMILY MEDICINE

## 2017-09-21 PROCEDURE — 85610 PROTHROMBIN TIME: CPT | Performed by: FAMILY MEDICINE

## 2017-09-21 NOTE — PROGRESS NOTES
OP Anticoagulation Service Note    Date: 9/21/2017  Vitals:    09/21/17 1024   BP: 131/65   Pulse: 81       Anticoagulation Summary  As of 9/21/2017    INR goal:   2.0-3.0   TTR:   64.5 % (10.2 mo)   Today's INR:   2.9   Maintenance plan:   20 mg (5 mg x 4) every day   Weekly total:   140 mg   Plan last modified:   Anders Fallon, PharmD (4/11/2017)   Next INR check:   11/2/2017   Target end date:       Indications    Chronic anticoagulation [Z79.01]  Personal history of pulmonary embolism [Z86.711]  Long term (current) use of anticoagulants [Z79.01] [Z79.01]             Anticoagulation Episode Summary     INR check location:       Preferred lab:       Send INR reminders to:       Comments:         Anticoagulation Care Providers     Provider Role Specialty Phone number    Ninfa Marcial M.D. Referring Internal Medicine [phone]    [facility] Responsible  [phone]        Anticoagulation Patient Findings  Patient Findings     Positives:   Bruising    Negatives:   Signs/symptoms of thrombosis, Signs/symptoms of bleeding, Laboratory test error suspected, Change in health, Change in alcohol use, Change in activity, Upcoming invasive procedure, Emergency department visit, Upcoming dental procedure, Missed doses, Extra doses, Change in medications, Change in diet/appetite, Hospital admission, Other complaints    Comments:   Pt fell about 3 weeks ago. Has some bruising. She did not hit her head. She is having some pain in her hip.           HPI:   Natalie [place] seen in clinic today, on anticoagulation therapy with warfarin for hx of PE  Confirmed warfarin dosing regimen  Changes to current medical/health status since last appt:   She fell since last visit, see above for details  Denies any interval changes to diet  Denies any interval changes to medications since last appt.   Denies any complications or cost restrictions with current therapy.       A/P   INR  therapeutic.   Continue  current regimen    Follow up appointment in 6 week(s).  Moira Sheridan, Pharm D

## 2017-10-03 DIAGNOSIS — E03.9 ACQUIRED HYPOTHYROIDISM: ICD-10-CM

## 2017-10-03 RX ORDER — LEVOTHYROXINE SODIUM 0.1 MG/1
TABLET ORAL
Qty: 90 TAB | Refills: 3 | Status: SHIPPED | OUTPATIENT
Start: 2017-10-03 | End: 2018-10-13 | Stop reason: SDUPTHER

## 2017-10-04 ENCOUNTER — HOME STUDY (OUTPATIENT)
Dept: SLEEP MEDICINE | Facility: MEDICAL CENTER | Age: 77
End: 2017-10-04
Attending: INTERNAL MEDICINE
Payer: MEDICARE

## 2017-10-04 DIAGNOSIS — I27.20 PULMONARY HYPERTENSION (HCC): ICD-10-CM

## 2017-10-04 DIAGNOSIS — G47.33 OBSTRUCTIVE SLEEP APNEA: ICD-10-CM

## 2017-10-04 PROCEDURE — G0399 HOME SLEEP TEST/TYPE 3 PORTA: HCPCS | Performed by: FAMILY MEDICINE

## 2017-10-09 NOTE — PROCEDURES
DIAGNOSTIC HOME SLEEP TEST (HST) REPORT       PATIENT ID:  NAME:  Natalie Anaya  MRN:               [id number]  YOB: 1940  DATE OF STUDY: 10/4/17      Impression:     This study shows evidence of:    1.Sever obstructive sleep apnea with  Respiratory Event Index (POLO) of 39.7 per hour . These findings are based on the recording time (flow evaluation time). It is not possible with this device to determine a traditional apnea+hypopnea index (AHI) for total sleep time since EEG channels are not available.   2. Sleep Hypoxia: O2 Sat. debbi was 54% and mean O2 sat was 79% and baseline O2 at 91 %. O2 sat was below 88% for 100% of the flow evaluation time. Oxygen Desaturation (>=4%) Index was elevated at 37.2/hr.       General sleep summary: . Total recording time is 8 hours and 36 minutes and total flow evaluation time is 8 hours and 24 minutes. The patient spent 8 hours and 24 minutes in the supine position and 0 hours and 0 minutes in the nonsupine position.    Recommendations:    1.    Auto CPAP vs CPAP titration study. Meanwhile supplemental oxygen can be ordered at 2L/min to improve sleep related hypoxia.   2.   In general patients with sleep apnea are advised to avoid alcohol and sedatives and to not operate a motor vehicle while drowsy. In some cases alternative treatment options may prove effective in resolving sleep apnea in these options include upper airway surgery, the use of a dental orthotic or weight loss and positional therapy. Clinical correlation is required.         Please see the attached report for further details.     Chandana Castillo MD                                  HOME SLEEP TEST REPORT          TEST PERFORMED: Diagnostic home sleep test     INDICATION: Overnight oximetry performed in January 2017 showed a baseline saturation of 78.6% with almost continuous saturations less than 90% and a low saturation of 56%. The patient has been on supplemental oxygen at night at 2  L/m      TECHNICAL DESCRIPTION:  ResMed Device used was a type-III home study device. Home sleep study recording included: Airflow recording by nasal pressure transducer; Respiratory Effort by abdominal Respiratory Bands; O2 by finger oximetry. A position sensor and a snore channel was also used.    Scoring Criteria: A modification of the the AASM Manual for the Scoring of Sleep and Associated Events, 2012, was used.   Obstructive apnea was scored by cessation of airflow for at least 10 seconds with continuing respiratory effort.  Central apnea was scored by cessation of airflow for at least 10 seconds with no effort.  Hypopnea was scored by a 30% or more reduction in airflow for at least 10 seconds accompanied by an arterial oxygen desaturation of 3% or more.  (For Medicare patients, hypopneas were scored by a 30% or more reduction in airflow for at least 10 seconds accompanied by an arterial oxygen saturation of 4% or more, as required by their insurance, CMS.    SUMMARY:    Total recording time: 8 hr 386 min   Flow evaluation time: 8 hr 24 min  Respiratory events:     Apneas: 142 (Obstructive apnea index 16.5/hr, Central apnea index 0.4 /hr, mixed 0 /hour)  Hypopneas: 192  apnea+hypopnea or Respiratory Event Index (POLO): 39.7    O2 Sat. debbi was 54 % and mean O2 sat was 79% and baseline O2 at 91%. O2 sat was below 90% for 100% of the flow evaluation time. Oxygen Desaturation (>=4%) Index was 37 hr.       Heart rate:    Average: 81bpm  Minimum: 56bpm  Maximum: 139 bpm        Chandana Castillo MD

## 2017-10-17 ENCOUNTER — TELEPHONE (OUTPATIENT)
Dept: PULMONOLOGY | Facility: HOSPICE | Age: 77
End: 2017-10-17

## 2017-10-17 ENCOUNTER — OFFICE VISIT (OUTPATIENT)
Dept: PULMONOLOGY | Facility: HOSPICE | Age: 77
End: 2017-10-17
Payer: MEDICARE

## 2017-10-17 VITALS
HEIGHT: 62 IN | TEMPERATURE: 98.5 F | RESPIRATION RATE: 16 BRPM | DIASTOLIC BLOOD PRESSURE: 80 MMHG | HEART RATE: 74 BPM | BODY MASS INDEX: 39.2 KG/M2 | WEIGHT: 213 LBS | SYSTOLIC BLOOD PRESSURE: 140 MMHG | OXYGEN SATURATION: 94 %

## 2017-10-17 DIAGNOSIS — I26.99 RECURRENT PULMONARY EMBOLISM (HCC): ICD-10-CM

## 2017-10-17 DIAGNOSIS — Z79.01 CHRONIC ANTICOAGULATION: ICD-10-CM

## 2017-10-17 DIAGNOSIS — G47.33 OBSTRUCTIVE SLEEP APNEA: ICD-10-CM

## 2017-10-17 DIAGNOSIS — I27.20 PULMONARY HYPERTENSION (HCC): ICD-10-CM

## 2017-10-17 PROCEDURE — 99214 OFFICE O/P EST MOD 30 MIN: CPT | Performed by: INTERNAL MEDICINE

## 2017-10-17 RX ORDER — ZOLPIDEM TARTRATE 5 MG/1
5 TABLET ORAL NIGHTLY PRN
Qty: 3 TAB | Refills: 0 | Status: SHIPPED | OUTPATIENT
Start: 2017-10-17 | End: 2017-12-14

## 2017-10-17 RX ORDER — INFLUENZA A VIRUS A/MICHIGAN/45/2015 X-275 (H1N1) ANTIGEN (FORMALDEHYDE INACTIVATED), INFLUENZA A VIRUS A/SINGAPORE/INFIMH-16-0019/2016 IVR-186 (H3N2) ANTIGEN (FORMALDEHYDE INACTIVATED), AND INFLUENZA B VIRUS B/MARYLAND/15/2016 BX-69A (A B/COLORADO/6/2017-LIKE VIRUS) ANTIGEN (FORMALDEHYDE INACTIVATED) 60; 60; 60 UG/.5ML; UG/.5ML; UG/.5ML
INJECTION, SUSPENSION INTRAMUSCULAR
COMMUNITY
End: 2017-12-14

## 2017-10-17 NOTE — TELEPHONE ENCOUNTER
Called rx ambien into the local pharmacy on file, spoke w/ Angel.  Pt notified to bring ambien to sleep study in 1/2/18.

## 2017-10-17 NOTE — PROGRESS NOTES
"Natalie Anaya is a 77 y.o. female here for hypertension with recurrent pulmonary emboli and suspected sleep apnea. Patient was referred by her primary care doctor.    History of Present Illness:      This lady has significant pulmonary hypertension, recurrent pulmonary emboli, and is been on oxygen at night for over a year.    At our last visit, we were concerned that she might have sleep-disordered breathing, and did a home sleep study. Results were dramatic, her apnea-hypopnea index is 40, there is marked clustered desaturation, and saturations are below 90% entire time monitored. They reach the 70s frequently. I show this to her and her  Marlo, they've been  62 years. He does indicate that she snores, and sometimes \"mumbles\" at night. I explained to her the need for airway pressurization, and I strongly suspect she will need oxygen bleed in. In view of this, she will need an in-house titration, CPAP plus oxygen very likely needed given her a BMI of 39, significant oropharyngeal crowding and she agrees to proceed. We did give her Ambien to help initiate sleep during the study, and I've asked her  to drive her home in the morning.    She is a nonsmoker, is counseled to never start smoking, and laughingly agrees to this. Also she has had both the Prevnar 13, Pneumovax 23, and flu vaccine was administered 3 days ago.    We will see her back in the sleep center once the titration and oxygen bleed in titration is accomplished    Constitutional ROS: No unexpected change in weight, No unexplained fevers  Eyes: No change in vision or blurring or double vision  Mouth/Throat ROS: No sore throat, No recent change in voice or hoarseness  Pulmonary ROS: See present history for pertinent positives  Cardiovascular ROS: No chest pain to suggest acute coronary syndrome  Gastrointestinal ROS: No abdominal pain to suggest peptic disease  Musculoskeletal/Extremities ROS: no acute artritis or unusual " swelling  Hematologic/Lymphatic ROS: No easy bleeding or unusual lymph node swelling  Neurologic ROS: No new or unusual weakness  Psychiatric ROS: No hallucinations  Allergic/Immunologic: No  urticaria or allergic rash      Current Outpatient Prescriptions   Medication Sig Dispense Refill   • zolpidem (AMBIEN) 5 MG Tab Take 1 Tab by mouth at bedtime as needed for Sleep. 3 Tab 0   • levothyroxine (SYNTHROID) 100 MCG Tab TAKE 1 TABLET BY MOUTH EVERY DAY 90 Tab 3   • warfarin (COUMADIN) 5 MG Tab Take 4 tablets daily as directed by coumadin clininc 360 Tab 1   • furosemide (LASIX) 40 MG Tab TAKE 1 TABLET BY MOUTH EVERY DAY 90 Tab 1   • omeprazole (PRILOSEC) 40 MG delayed-release capsule TAKE 1 CAPSULE BY MOUTH EVERY DAY 90 Cap 1   • primidone (MYSOLINE) 250 MG Tab TAKE 1 TABLET BY MOUTH THREE TIMES DAILY 270 Tab 3   • Probiotic Product (PROBIOTIC DAILY PO) Take  by mouth every day.     • Glucos-Chond-Hyal Ac-Ca Fructo (MOVE FREE [facility] HEALTH ADVANCE) Tab Take  by mouth every day.     • potassium chloride SA (KDUR) 20 MEQ Tab CR Take 1 Tab by mouth 2 times a day. (Patient taking differently: Take 20 mEq by mouth every day.) 180 Tab 1   • Multiple Vitamins-Minerals (WOMENS MULTI VITAMIN & MINERAL) Tab Take 1 Tab by mouth every day.     • Melatonin 500 MCG TABLET DISPERSIBLE Take 1,000-2,000 mcg by mouth at bedtime as needed (sleep).     • Cholecalciferol (VITAMIN D-3) 1000 UNITS Cap Take 1,000 Units by mouth every day.     • carvedilol (COREG) 6.25 MG Tab Take 6.25 mg by mouth 2 times a day, with meals.     • FLUZONE HIGH-DOSE 0.5 ML Suspension Prefilled Syringe injection ADM 0.5ML IM UTD     • alendronate (FOSAMAX) 70 MG Tab Take 1 Tab by mouth every 7 days. 12 Tab 3   • Misc. Devices Misc Please provide the oxygen concentrator 2 L at night time. 1 Device 0     No current facility-administered medications for this visit.        Social History   Substance Use Topics   • Smoking status: Never Smoker   • Smokeless tobacco:  Never Used   • Alcohol use Yes      Comment: Very Very Very Rarely        Past Medical History:   Diagnosis Date   • Seizure (CMS-HCC) 1982   • Arthritis     Knees.   • Bowel habit changes     Constipation   • Breath shortness    • Cataract     Bilat IOL   • Constipation    • Epilepsy (CMS-HCC)    • Eye drainage    • Hemorrhagic disorder (CMS-HCC)    • Hiatus hernia syndrome    • Hypertension    • Hyperthyroidism    • Hypothyroid    • Influenza    • Pain     knees   • Personal history of venous thrombosis and embolism     PE from Right Shouler FX 2003.   • Ringing in ears    • Sore muscles        Past Surgical History:   Procedure Laterality Date   • IRRIGATION & DEBRIDEMENT HIP Left 11/17/2016    Procedure: IRRIGATION & DEBRIDEMENT HIP;  Surgeon: Maximo Garnica M.D.;  Location: SURGERY [facility];  Service:    • HIP NAILING INTRAMEDULLARY Left 10/6/2016    Procedure: HIP NAILING INTRAMEDULLARY;  Surgeon: Walt Nguyen M.D.;  Location: [facility];  Service:    • GASTROSCOPY-ENDO N/A 4/29/2016    Procedure: GASTROSCOPY-ENDO;  Surgeon: Mikey Stanton M.D.;  Location: [facility];  Service:    • COLONOSCOPY-FLEXIBLE N/A 4/29/2016    Procedure: COLONOSCOPY-FLEXIBLE;  Surgeon: Mikey Stanton M.D.;  Location: [facility];  Service:    • CATARACT PHACO WITH IOL  8/5/2014    Performed by Lorenzo Bello M.D. at [facility]   • CATARACT PHACO WITH IOL  7/15/2014    Performed by Loernzo Bello M.D. at [facility]   • KNEE ARTHROPLASTY TOTAL  7/9/2012    Performed by DENIZ FREDERICK at [facility]   • JOSE BY LAPAROSCOPY  9/30/2011    Performed by JOHNATHAN CRISOSTOMO at [facility]   • ARTHROSCOPY, KNEE     • CARPAL TUNNEL RELEASE     • PB REMV 2ND CATARACT,CORN-SCLER SECTN     • TONSILLECTOMY     • TUBAL LIGATION         Allergies: Tape    Family History   Problem Relation Age of Onset   • Cancer Mother 67     Ovarian   •  "Alcohol/Drug Father 60     Appenditis   • Heart Disease Father    • Cancer Maternal Grandmother      colon cancer   • Heart Disease Maternal Grandfather    • Cancer Daughter 44     melonoma       Physical Examination    Vitals:    10/17/17 1043   Height: 1.575 m (5' 2\")   Weight: 96.6 kg (213 lb)   Weight % change since last entry.: 0 %   BP: 140/80   Pulse: 74   BMI (Calculated): 38.96   Resp: 16   Temp: 36.9 °C (98.5 °F)       General Appearance: alert, no distress  Skin: Skin color, texture, turgor normal. No rashes or lesions.  Eyes: negative  Oropharynx: Lips, mucosa, and tongue normal. Teeth and gums normal. Oropharynx moist and without lesion  Lungs: positive findings:Clear and quiet  Heart: negative. RRR without murmur, gallop, or rubs.  No ectopy.  Abdomen: Abdomen soft, non-tender. . No masses,  No organomegaly  Extremities:  No deformities, edema, or skin discoloration  Joints: No acute arthritis  Peripheral Pulses:perfused  Neurologic: intact grossly  Marked oropharyngeal crowding without pathology    III (soft palate, base of uvula visible)    Imaging: None presently    PFTS: None presently      Assessment and Plan  1. Recurrent pulmonary embolism (CMS-HCC)  - POLYSOMNOGRAPHY TITRATION; Future    2. Pulmonary hypertension  - POLYSOMNOGRAPHY TITRATION; Future    3. Obstructive sleep apnea, severe with nocturnal hypoxemia AHI 40 ,sats under 90 entire night  - POLYSOMNOGRAPHY TITRATION; Future    4. Chronic anticoagulation    This lady has significant pulmonary hypertension, recurrent pulmonary emboli, and is been on oxygen at night for over a year.    At our last visit, we were concerned that she might have sleep-disordered breathing, and did a home sleep study. Results were dramatic, her apnea-hypopnea index is 40, there is marked clustered desaturation, and saturations are below 90% entire time monitored. They reach the 70s frequently. I show this to her and her  Marlo, they've been  62 " "years. He does indicate that she snores, and sometimes \"mumbles\" at night. I explained to her the need for airway pressurization, and I strongly suspect she will need oxygen bleed in. In view of this, she will need an in-house titration, CPAP plus oxygen very likely needed given her a BMI of 39, significant oropharyngeal crowding and she agrees to proceed. We did give her Ambien to help initiate sleep during the study, and I've asked her  to drive her home in the morning.    She is a nonsmoker, is counseled to never start smoking, and laughingly agrees to this. Also she has had both the Prevnar 13, Pneumovax 23, and flu vaccine was administered 3 days ago.    We will see her back in the sleep center once the titration and oxygen bleed in titration is accomplished  Followup Return in about 6 weeks (around 11/28/2017) for follow up visit with sleep provider, after sleep study.    "

## 2017-10-17 NOTE — PATIENT INSTRUCTIONS
"This lady has significant pulmonary hypertension, recurrent pulmonary emboli, and is been on oxygen at night for over a year.    At our last visit, we were concerned that she might have sleep-disordered breathing, and did a home sleep study. Results were dramatic, her apnea-hypopnea index is 40, there is marked clustered desaturation, and saturations are below 90% entire time monitored. They reach the 70s frequently. I show this to her and her  Marlo, they've been  62 years. He does indicate that she snores, and sometimes \"mumbles\" at night. I explained to her the need for airway pressurization, and I strongly suspect she will need oxygen bleed in. In view of this, she will need an in-house titration, CPAP plus oxygen very likely needed given her a BMI of 39, significant oropharyngeal crowding and she agrees to proceed. We did give her Ambien to help initiate sleep during the study, and I've asked her  to drive her home in the morning.    She is a nonsmoker, is counseled to never start smoking, and laughingly agrees to this. Also she has had both the Prevnar 13, Pneumovax 23, and flu vaccine was administered 3 days ago.    We will see her back in the sleep center once the titration and oxygen bleed in titration is accomplished  "

## 2017-10-20 DIAGNOSIS — I10 HTN (HYPERTENSION), BENIGN: ICD-10-CM

## 2017-10-21 RX ORDER — CARVEDILOL 6.25 MG/1
TABLET ORAL
Qty: 360 TAB | Refills: 3 | Status: SHIPPED | OUTPATIENT
Start: 2017-10-21 | End: 2019-01-07 | Stop reason: SDUPTHER

## 2017-11-02 ENCOUNTER — ANTICOAGULATION VISIT (OUTPATIENT)
Dept: MEDICAL GROUP | Facility: MEDICAL CENTER | Age: 77
End: 2017-11-02
Payer: MEDICARE

## 2017-11-02 VITALS — HEART RATE: 79 BPM | DIASTOLIC BLOOD PRESSURE: 66 MMHG | SYSTOLIC BLOOD PRESSURE: 132 MMHG

## 2017-11-02 DIAGNOSIS — Z79.01 CHRONIC ANTICOAGULATION: ICD-10-CM

## 2017-11-02 DIAGNOSIS — Z79.01 LONG TERM CURRENT USE OF ANTICOAGULANT THERAPY: ICD-10-CM

## 2017-11-02 DIAGNOSIS — Z86.711 PERSONAL HISTORY OF PULMONARY EMBOLISM: ICD-10-CM

## 2017-11-02 LAB — INR PPP: 4.4 (ref 2–3.5)

## 2017-11-02 PROCEDURE — 85610 PROTHROMBIN TIME: CPT | Performed by: FAMILY MEDICINE

## 2017-11-02 PROCEDURE — 99211 OFF/OP EST MAY X REQ PHY/QHP: CPT | Performed by: FAMILY MEDICINE

## 2017-11-02 NOTE — PROGRESS NOTES
OP Anticoagulation Service Note    Date: 11/2/2017  Vitals:    11/02/17 1036   BP: 132/66   Pulse: 79       Anticoagulation Summary  As of 11/2/2017    INR goal:   2.0-3.0   TTR:   57.5 % (11.6 mo)   Today's INR:   4.4!   Maintenance plan:   15 mg (5 mg x 3) on Sun, Thu; 20 mg (5 mg x 4) all other days   Weekly total:   130 mg   Plan last modified:   Moira Sheridan, PharmD (11/2/2017)   Next INR check:   11/16/2017   Target end date:       Indications    Chronic anticoagulation [Z79.01]  Personal history of pulmonary embolism [Z86.711]  Long term (current) use of anticoagulants [Z79.01] [Z79.01]             Anticoagulation Episode Summary     INR check location:       Preferred lab:       Send INR reminders to:       Comments:         Anticoagulation Care Providers     Provider Role Specialty Phone number    Ninfa Marcial M.D. Referring Internal Medicine [phone]    [facility] Anticoagulation Services Responsible  [phone]        Anticoagulation Patient Findings      HPI:   Natalie Tete Anaya seen in clinic today, on anticoagulation therapy with warfarin for PE   Confirmed warfarin dosing regimen  Changes to current medical/health status since last appt:   Denies signs/symptoms of bleeding and/or thrombosis since the last appt.     she does report weight loss, no intentional and no change to her diet.   Denies any interval changes to medications since last appt.   Denies any complications or cost restrictions with current therapy.       A/P   INR  supra-therapeutic.   Tonight no warfarin then begin reduced weekly regimen    Follow up appointment in 2 week(s).  Moira Sheridan, DarwinD

## 2017-11-09 ENCOUNTER — TELEPHONE (OUTPATIENT)
Dept: VASCULAR LAB | Facility: MEDICAL CENTER | Age: 77
End: 2017-11-09

## 2017-11-09 DIAGNOSIS — Z86.711 PERSONAL HISTORY OF PULMONARY EMBOLISM: ICD-10-CM

## 2017-11-16 ENCOUNTER — ANTICOAGULATION VISIT (OUTPATIENT)
Dept: MEDICAL GROUP | Facility: MEDICAL CENTER | Age: 77
End: 2017-11-16
Payer: MEDICARE

## 2017-11-16 DIAGNOSIS — Z79.01 LONG TERM CURRENT USE OF ANTICOAGULANT THERAPY: ICD-10-CM

## 2017-11-16 DIAGNOSIS — Z86.711 PERSONAL HISTORY OF PULMONARY EMBOLISM: ICD-10-CM

## 2017-11-16 DIAGNOSIS — Z79.01 CHRONIC ANTICOAGULATION: ICD-10-CM

## 2017-11-16 LAB — INR PPP: 2 (ref 2–3.5)

## 2017-11-16 PROCEDURE — 99211 OFF/OP EST MAY X REQ PHY/QHP: CPT | Performed by: FAMILY MEDICINE

## 2017-11-16 PROCEDURE — 85610 PROTHROMBIN TIME: CPT | Performed by: FAMILY MEDICINE

## 2017-11-16 NOTE — PROGRESS NOTES
OP Anticoagulation Service Note    Date: 11/16/2017  There were no vitals filed for this visit.    Anticoagulation Summary  As of 11/16/2017    INR goal:   2.0-3.0   TTR:   56.9 % (1 y)   Today's INR:   2.0   Maintenance plan:   15 mg (5 mg x 3) on Sun, Thu; 20 mg (5 mg x 4) all other days   Weekly total:   130 mg   Plan last modified:   Darwin CouchD (11/2/2017)   Next INR check:   12/14/2017   Target end date:       Indications    Chronic anticoagulation [Z79.01]  Personal history of pulmonary embolism [Z86.711]  Long term (current) use of anticoagulants [Z79.01] [Z79.01]             Anticoagulation Episode Summary     INR check location:       Preferred lab:       Send INR reminders to:       Comments:         Anticoagulation Care Providers     Provider Role Specialty Phone number    Ninfa Marcial M.D. Referring Internal Medicine [phone]    [facility] Anticoagulation Services Responsible  [phone]        Anticoagulation Patient Findings      HPI:   Natalie Anaya seen in clinic today, on anticoagulation therapy with warfarin (a high risk medication) for pulmonary embolism      Pt is here today for INR check as previous INR was supratherapeutic.  j  Previous INR was 4.4 on 11-2-17,     Confirmed warfarin dosing regimen, she missed her dose on Sunday  Diet has been consistent with foods rich in vitamin K: Yes  Changes in ETOH:  No  Changes in smoking status: No  Changes in medication: No   Cost restriction: No  S/s of bleeding:  No    Signs/symptoms  thrombosis since the last appt: No    A/P   INR  therapeutic today, will require closer follow up as previous INR was out of range.  Continue current regimen     Pt educated to contact our clinic with any changes in medications or s/s of bleeding or thrombosis    Follow up appointment in 4 week(s) to reduce risk of adverse events from warfarin   Moira Sheridan, PharmD

## 2017-12-06 ENCOUNTER — SLEEP STUDY (OUTPATIENT)
Dept: SLEEP MEDICINE | Facility: MEDICAL CENTER | Age: 77
End: 2017-12-06
Attending: INTERNAL MEDICINE
Payer: MEDICARE

## 2017-12-06 DIAGNOSIS — I26.99 RECURRENT PULMONARY EMBOLISM (HCC): ICD-10-CM

## 2017-12-06 DIAGNOSIS — I27.20 PULMONARY HYPERTENSION (HCC): ICD-10-CM

## 2017-12-06 DIAGNOSIS — G47.33 OBSTRUCTIVE SLEEP APNEA: ICD-10-CM

## 2017-12-06 PROCEDURE — 95811 POLYSOM 6/>YRS CPAP 4/> PARM: CPT | Performed by: INTERNAL MEDICINE

## 2017-12-07 ENCOUNTER — TELEPHONE (OUTPATIENT)
Dept: MEDICAL GROUP | Age: 77
End: 2017-12-07

## 2017-12-07 NOTE — PROCEDURES
Clinical Comments:  The patient underwent a comprehensive polysomnogram using the standard montage for measurement of parameters of sleep, respiratory events, movement abnormalities, heart rate and rhythm. A microphone was used to monitor snoring.      INTERPRETATION:  The total recording time was 488.0 minutes with a sleep period of 411.7 minutes and the total sleep time was 325.7 minutes with a sleep efficiency of 66.7%.  The sleep latency was 76.3 minutes, and REM latency was 208.0 minutes.  The patient experienced 84 arousals in total, for an arousal index of 15.5    RESPIRATORY: The patient had 17 apneas in total.  Of these, 7 were obstructive apneas, and 10 were central apneas.  This resulted in an apnea index (AI) of 3.1.  The patient had 73 hypopneas, for a hypopnea index of 13.4.  The overall AHI was 16.6, while the AHI during Stage R sleep was 25.9.  AHI while supine was 21.7.    OXIMETRY: Oxygen saturation monitoring showed a mean SpO2 of 87.8%, with a minimum oxygen saturation of 68.0%.  Oxygen saturations were less than or = 89% for 215.9 minutes of sleep time.    CARDIAC: The highest heart rate during the recording was 143.0 beats per minute.  The average heart rate during sleep was 76.3 bpm.    LIMB MOVEMENTS: There were a total of 0 PLMs during sleep, of which 0 were PLMs arousals.  This resulted in a PLMS index of 0.0.    CPAP was tried from 5 to 12 cm H2O.    The sleep efficiency on CPAP with 67%. Sleep architecture showed reduced stage III sleep with preserved stage I, stage II and REM sleep. The sleep latency was prolonged at 76 minutes.    No EKG or EMG abnormalities were observed.    CPAP was titrated from 5 cm of water to 13 cm of water. CPAP at 12 cm of water resulted in AHI 1.6 with mean oximetry 90%.    Interpretation:  Successful CPAP titration at 12 cm of water, with resolved AILYN and hypoxia.    Recommendations:  CPAP: 12 cm of water using a small Simplus fullface mask with heated  humidification.  Weight loss recommended secondary to BMI of 39.  Avoidance of alcohol, sedatives, muscle relaxants, as these can exacerbate sleep disordered breathing.

## 2017-12-07 NOTE — TELEPHONE ENCOUNTER
ANNUAL WELLNESS VISIT PRE-VISIT PLANNING     1.  Reviewed note from last office visit with PCP: YES    2.  If any orders were placed at last visit, do we have Results/Consult Notes?        •  Labs - Labs were not ordered at last office visit.   Note: If patient appointment is for lab review and patient did not complete labs, check with provider if OK to reschedule patient until labs completed.       •  Imaging - Imaging was not ordered at last office visit.       •  Referrals - Referral ordered, patient was seen and consult notes are in chart. Care Teams updated  YES.    3.  Immunizations were updated in Epic using WebIZ?: Epic matches WebIZ       •  WebIZ Recommendations: Patient is up to date on all vaccines       •  Is patient due for Tdap? NO       •  Is patient due for Shingles? NO     4.  Patient is due for the following Health Maintenance Topics:   There are no preventive care reminders to display for this patient.    - Patient is up-to-date on all Health Maintenance topics. No records have been requested at this time.    5.  Reviewed/Updated the following with patient:       •   Preferred Pharmacy? YES       •   Preferred Lab? YES       •   Preferred Communication? YES       •   Allergies? YES       •   Medications? YES. Was Abstract Encounter opened and chart updated? YES       •   Social History? YES. Was Abstract Encounter opened and chart updated? YES       •   Family History (document living status of immediate family members and if + hx of cancer, diabetes, hypertension, hyperlipidemia, heart attack, stroke) YES. Was Abstract Encounter opened and chart updated? YES    6.  Care Team Updated:       •   DME Company (gait device, O2, CPAP, etc.): YES       •   Other Specialists (eye doctor, derm, GYN, cardiology, endo, etc): YES    7.  Patient has the following Care Path diagnoses on Problem List:  NONE    8.  Specialty Comments was updated with diagnosis information provided by [facility]: YES    9.  Patient was  advised: “This is a free wellness visit. The provider will screen for medical conditions to help you stay healthy. If you have other concerns to address you may be asked to discuss these at a separate visit or there may be an additional fee.”      6.  Patient was informed to arrive 15 min prior to their scheduled appointment and bring in their medication bottles.        11/30/17 This patient has an appt on 12-, but I was not notified. Last AWV was 12-.     In your medical judgement, are the following conditions valid-present for 2017? If so, would you please assess and document?     Should BMI be <40, this is consistent with morbid obesity and BMI is a secondary supporting code.   Patient has been on long term oxygen for nocturnal use, this is consistent with chronic respiratory failure: J96.1X.   Previously coded with hemorrhagic condition by Dr. Cisneros: D69.9.

## 2017-12-13 PROBLEM — J96.11 CHRONIC RESPIRATORY FAILURE WITH HYPOXIA (HCC): Status: ACTIVE | Noted: 2017-12-13

## 2017-12-14 ENCOUNTER — ANTICOAGULATION VISIT (OUTPATIENT)
Dept: MEDICAL GROUP | Facility: MEDICAL CENTER | Age: 77
End: 2017-12-14
Payer: MEDICARE

## 2017-12-14 ENCOUNTER — OFFICE VISIT (OUTPATIENT)
Dept: MEDICAL GROUP | Age: 77
End: 2017-12-14
Payer: MEDICARE

## 2017-12-14 VITALS
BODY MASS INDEX: 37.56 KG/M2 | DIASTOLIC BLOOD PRESSURE: 58 MMHG | OXYGEN SATURATION: 94 % | TEMPERATURE: 98.8 F | WEIGHT: 212 LBS | HEIGHT: 63 IN | SYSTOLIC BLOOD PRESSURE: 112 MMHG | HEART RATE: 74 BPM

## 2017-12-14 VITALS — HEART RATE: 76 BPM | DIASTOLIC BLOOD PRESSURE: 64 MMHG | SYSTOLIC BLOOD PRESSURE: 131 MMHG

## 2017-12-14 DIAGNOSIS — I51.89 DIASTOLIC DYSFUNCTION: ICD-10-CM

## 2017-12-14 DIAGNOSIS — Z79.01 LONG TERM CURRENT USE OF ANTICOAGULANT THERAPY: ICD-10-CM

## 2017-12-14 DIAGNOSIS — I27.20 PULMONARY HYPERTENSION (HCC): ICD-10-CM

## 2017-12-14 DIAGNOSIS — G40.909 SEIZURE DISORDER (HCC): ICD-10-CM

## 2017-12-14 DIAGNOSIS — Z86.711 PERSONAL HISTORY OF PULMONARY EMBOLISM: ICD-10-CM

## 2017-12-14 DIAGNOSIS — Z79.01 CHRONIC ANTICOAGULATION: ICD-10-CM

## 2017-12-14 DIAGNOSIS — F51.04 CHRONIC INSOMNIA: ICD-10-CM

## 2017-12-14 DIAGNOSIS — D50.9 IRON DEFICIENCY ANEMIA, UNSPECIFIED IRON DEFICIENCY ANEMIA TYPE: ICD-10-CM

## 2017-12-14 DIAGNOSIS — K44.9 HIATAL HERNIA: ICD-10-CM

## 2017-12-14 DIAGNOSIS — Z00.00 MEDICARE ANNUAL WELLNESS VISIT, SUBSEQUENT: ICD-10-CM

## 2017-12-14 DIAGNOSIS — G47.33 OBSTRUCTIVE SLEEP APNEA: ICD-10-CM

## 2017-12-14 DIAGNOSIS — M85.80 OSTEOPENIA WITH HIGH RISK OF FRACTURE: ICD-10-CM

## 2017-12-14 DIAGNOSIS — Z91.81 RISK FOR FALLS: ICD-10-CM

## 2017-12-14 DIAGNOSIS — E03.9 ACQUIRED HYPOTHYROIDISM: ICD-10-CM

## 2017-12-14 DIAGNOSIS — I26.99 RECURRENT PULMONARY EMBOLISM (HCC): ICD-10-CM

## 2017-12-14 DIAGNOSIS — I10 HTN (HYPERTENSION), BENIGN: ICD-10-CM

## 2017-12-14 DIAGNOSIS — J96.11 CHRONIC RESPIRATORY FAILURE WITH HYPOXIA (HCC): ICD-10-CM

## 2017-12-14 PROBLEM — M25.552 LEFT HIP PAIN: Status: RESOLVED | Noted: 2017-06-19 | Resolved: 2017-12-14

## 2017-12-14 LAB — INR PPP: 3.5 (ref 2–3.5)

## 2017-12-14 PROCEDURE — G0439 PPPS, SUBSEQ VISIT: HCPCS | Performed by: INTERNAL MEDICINE

## 2017-12-14 PROCEDURE — 85610 PROTHROMBIN TIME: CPT | Performed by: INTERNAL MEDICINE

## 2017-12-14 ASSESSMENT — PATIENT HEALTH QUESTIONNAIRE - PHQ9: CLINICAL INTERPRETATION OF PHQ2 SCORE: 0

## 2017-12-14 NOTE — PROGRESS NOTES
OP Anticoagulation Service Note    Date: 12/14/2017  Vitals:    12/14/17 1116   BP: 131/64   Pulse: 76       Anticoagulation Summary  As of 12/14/2017    INR goal:   2.0-3.0   TTR:   57.6 % (1.1 y)   Today's INR:   3.5!   Maintenance plan:   15 mg (5 mg x 3) on Sun, Thu; 20 mg (5 mg x 4) all other days   Weekly total:   130 mg   Plan last modified:   Moira Sheridan, PharmD (11/2/2017)   Next INR check:   1/11/2018   Target end date:       Indications    Chronic anticoagulation [Z79.01]  Personal history of pulmonary embolism [Z86.711]  Long term (current) use of anticoagulants [Z79.01] [Z79.01]             Anticoagulation Episode Summary     INR check location:       Preferred lab:       Send INR reminders to:       Comments:         Anticoagulation Care Providers     Provider Role Specialty Phone number    Ninfa Marcial M.D. Referring Internal Medicine [phone]    [facility] Anticoagulation Services Responsible  [phone]        Anticoagulation Patient Findings  Patient Findings     Positives:   Change in health    Negatives:   Signs/symptoms of thrombosis, Signs/symptoms of bleeding, Laboratory test error suspected, Change in alcohol use, Change in activity, Upcoming invasive procedure, Emergency department visit, Upcoming dental procedure, Missed doses, Extra doses, Change in medications, Change in diet/appetite, Hospital admission, Bruising, Other complaints          HPI:   Natalie Anaya seen in clinic today, on anticoagulation therapy with warfarin (a high risk medication) for PE      Pt is here today to evaluate anticoagulation therapy    Previous INR was 2.0 on 11-16-17, pt was instructed to continue current regime as she was in range    Confirmed warfarin dosing regimen, denies missed or extra doses of coumadin.   Diet has been consistent with foods rich in vitamin K: Yes  Changes in ETOH:  No  Changes in smoking status: No  Changes in medication: No   Cost restriction: No  S/s of bleeding:   No  Signs/symptoms  thrombosis since the last appt: No  Pt has had diarrhea x 5 days which is likely why INR is high today. She reports she is feeling better.   A/P   INR  supra-therapeutic today, will require dose adjust ment today to prevent bleeding complications  and closer follow up.   Tonight take 5 mg then resume usual regimen     Pt educated to contact our clinic with any changes in medications or s/s of bleeding or thrombosis    Follow up appointment in 3 week(s) to reduce risk of adverse events from warfarin   Moira Sheridan, Pharm D

## 2017-12-14 NOTE — PROGRESS NOTES
Chief Complaint   Patient presents with   • Annual Wellness Visit     SCP         HPI:  Natalie is a 77 y.o. here for Medicare Annual Wellness Visit        Patient Active Problem List    Diagnosis Date Noted   • Recurrent pulmonary embolism (CMS-HCC) 11/02/2016     Priority: Medium   • History of fracture of left hip 10/05/2016     Priority: Medium   • Seizure disorder (HCC) 10/21/2015     Priority: Low   • HTN (hypertension), benign 01/09/2013     Priority: Low   • Class 2 obesity due to excess calories with serious comorbidity and body mass index (BMI) of 38.0 to 38.9 in adult 12/14/2017   • Chronic respiratory failure with hypoxia (CMS-HCC) 12/13/2017   • Obstructive sleep apnea, presumptive with nocturnal hypoxemia 09/13/2017   • Osteopenia with high risk of fracture 07/20/2017   • Long term (current) use of anticoagulants [Z79.01] 04/11/2017   • Pulmonary hypertension 03/15/2017   • Chronic anticoagulation 02/23/2017   • History of left rib fracture 09/26/2016   • Iron deficiency anemia 03/04/2016   • Diastolic dysfunction 03/04/2016   • Hiatal hernia 03/04/2016   • Acquired hypothyroidism 01/08/2016   • Personal history of pulmonary embolism 01/08/2016   • History of cataract removal with insertion of prosthetic lens 07/15/2014   • Chronic insomnia 01/09/2013       Current Outpatient Prescriptions   Medication Sig Dispense Refill   • carvedilol (COREG) 6.25 MG Tab TAKE 1 TABLET BY MOUTH TWICE DAILY 360 Tab 3   • levothyroxine (SYNTHROID) 100 MCG Tab TAKE 1 TABLET BY MOUTH EVERY DAY 90 Tab 3   • warfarin (COUMADIN) 5 MG Tab Take 4 tablets daily as directed by coumadin clininc 360 Tab 1   • alendronate (FOSAMAX) 70 MG Tab Take 1 Tab by mouth every 7 days. 12 Tab 3   • Misc. Devices Misc Please provide the oxygen concentrator 2 L at night time. 1 Device 0   • furosemide (LASIX) 40 MG Tab TAKE 1 TABLET BY MOUTH EVERY DAY 90 Tab 1   • omeprazole (PRILOSEC) 40 MG delayed-release capsule TAKE 1 CAPSULE BY MOUTH EVERY  DAY 90 Cap 1   • primidone (MYSOLINE) 250 MG Tab TAKE 1 TABLET BY MOUTH THREE TIMES DAILY 270 Tab 3   • Probiotic Product (PROBIOTIC DAILY PO) Take  by mouth every day.     • Glucos-Chond-Hyal Ac-Ca Fructo (MOVE FREE JOINT HEALTH ADVANCE) Tab Take  by mouth every day.     • potassium chloride SA (KDUR) 20 MEQ Tab CR Take 1 Tab by mouth 2 times a day. (Patient taking differently: Take 20 mEq by mouth every day.) 180 Tab 1   • Multiple Vitamins-Minerals (WOMENS MULTI VITAMIN & MINERAL) Tab Take 1 Tab by mouth every day.     • Melatonin 500 MCG TABLET DISPERSIBLE Take 1,000-2,000 mcg by mouth at bedtime as needed (sleep).     • Cholecalciferol (VITAMIN D-3) 1000 UNITS Cap Take 1,000 Units by mouth every day.       No current facility-administered medications for this visit.         Patient is taking medications as noted in medication list.  Current supplements as per medication list.     Allergies: Tape    Current social contact/activities: go out to dinner/bridge club     Is patient current with immunizations? Yes.    She  reports that she has never smoked. She has never used smokeless tobacco. She reports that she drinks alcohol. She reports that she does not use drugs.  Counseling given: Yes        DPA/Advanced directive: Patient has Advanced Directive on file.     ROS:    Gait: Uses a cane   Ostomy: no   Other tubes: no   Amputations: no   Chronic oxygen use yes   2 l nasal  Last eye exam 2017   Wears hearing aids: no   : Reports urinary leakage during the last 6 months that has not interfered at all with their daily activities or sleep.      Screening:        Depression Screening    Little interest or pleasure in doing things?  0 - not at all  Feeling down, depressed, or hopeless? 0 - not at all  Patient Health Questionnaire Score: 0    If depressive symptoms identified deferred to follow up visit unless specifically addressed in assessment and plan.    Interpretation of PHQ-9 Total Score   Score Severity   1-4  No Depression   5-9 Mild Depression   10-14 Moderate Depression   15-19 Moderately Severe Depression   20-27 Severe Depression    Screening for Cognitive Impairment    Three Minute Recall (apple, watch, favio)  2/3 Table leadership sunset  2/3  Draw clock face with all 12 numbers set to the hand to show 10 minutes past 11 o'clock  0 4/5  If cognitive concerns identified, deferred for follow up unless specifically addressed in assessment and plan.    Fall Risk Assessment    Has the patient had two or more falls in the last year or any fall with injury in the last year?  Yes  If fall risk identified, deferred for follow up unless specifically addressed in assessment and plan.    Safety Assessment    Throw rugs on floor.  No  Handrails on all stairs.  No  Good lighting in all hallways.  Yes  Difficulty hearing.  No  Patient counseled about all safety risks that were identified.    Functional Assessment ADLs    Are there any barriers preventing you from cooking for yourself or meeting nutritional needs?  No.    Are there any barriers preventing you from driving safely or obtaining transportation?  No.    Are there any barriers preventing you from using a telephone or calling for help?  No.    Are there any barriers preventing you from shopping?  No.    Are there any barriers preventing you from taking care of your own finances?  No.    Are there any barriers preventing you from managing your medications?  No.    Are you currently engaging any exercise or physical activity?  No.       Health Maintenance Summary                Annual Wellness Visit Next Due 12/16/2017      Done 12/15/2016 Visit Dx: Medicare annual wellness visit, subsequent     Patient has more history with this topic...    BONE DENSITY Next Due 5/17/2022      Done 5/17/2017 DS-BONE DENSITY STUDY (DEXA)     Patient has more history with this topic...    IMM DTaP/Tdap/Td Vaccine Next Due 10/21/2025      Done 10/21/2015 Imm Admin: Tdap Vaccine     COLONOSCOPY Next Due 4/29/2026      Done 4/29/2016 AMB REFERRAL TO GI FOR COLONOSCOPY     Patient has more history with this topic...          Patient Care Team:  Ninfa Marcial M.D. as PCP - General (Internal Medicine)  [facility] as Consulting Physician (Ophthalmology)  Walt Nguyen M.D. (Orthopaedics)  [facility]  Isabella Irvin M.D. as Consulting Physician (Pulmonary Medicine)  Isabella Irvin M.D. as Consulting Physician (Pulmonary Medicine)  Michael J Bloch, M.D. as Consulting Physician (Internal Medicine)    Social History   Substance Use Topics   • Smoking status: Never Smoker   • Smokeless tobacco: Never Used   • Alcohol use Yes      Comment: Very Very Very Rarely     Family History   Problem Relation Age of Onset   • Cancer Mother 67     Ovarian   • Alcohol/Drug Father 60     Appenditis   • Heart Disease Father    • Cancer Maternal Grandmother      colon cancer   • Heart Disease Maternal Grandfather    • Cancer Daughter 44     melonoma     She  has a past medical history of Arthritis; Bowel habit changes; Breath shortness; Cataract; Constipation; Epilepsy (CMS-[facility]); Eye drainage; Hemorrhagic disorder (CMS-[facility]); Hiatus hernia syndrome; Hypertension; Hyperthyroidism; Hypothyroid; Influenza; Pain; Personal history of venous thrombosis and embolism; Ringing in ears; Seizure (CMS-HCC) (1982); and Sore muscles.   Past Surgical History:   Procedure Laterality Date   • IRRIGATION & DEBRIDEMENT HIP Left 11/17/2016    Procedure: IRRIGATION & DEBRIDEMENT HIP;  Surgeon: Maximo Garnica M.D.;  Location: [facility];  Service:    • HIP NAILING INTRAMEDULLARY Left 10/6/2016    Procedure: HIP NAILING INTRAMEDULLARY;  Surgeon: Walt Nguyen M.D.;  Location: [facility];  Service:    • GASTROSCOPY-ENDO N/A 4/29/2016    Procedure: GASTROSCOPY-ENDO;  Surgeon: Mikey Stanton M.D.;  Location: [facility];  Service:    • COLONOSCOPY-FLEXIBLE N/A 4/29/2016    Procedure:  "COLONOSCOPY-FLEXIBLE;  Surgeon: Mikey Stanton M.D.;  Location: [facility];  Service:    • CATARACT PHACO WITH IOL  8/5/2014    Performed by Lorenzo Bello M.D. at [facility]   • CATARACT PHACO WITH IOL  7/15/2014    Performed by Lorenzo Bello M.D. at [facility]   • KNEE ARTHROPLASTY TOTAL  7/9/2012    Performed by DENIZ FREDERICK at [facility]   • JOSE BY LAPAROSCOPY  9/30/2011    Performed by JOHNATHAN CRISOSTOMO at [facility]   • ARTHROSCOPY, KNEE     • CARPAL TUNNEL RELEASE     • PB REMV 2ND CATARACT,CORN-SCLER SECTN     • TONSILLECTOMY     • TUBAL LIGATION             Exam:     Blood pressure 112/58, pulse 74, temperature 37.1 °C (98.8 °F), height 1.588 m (5' 2.5\"), weight 96.2 kg (212 lb), SpO2 94 %. Body mass index is 38.16 kg/m².    Hearing good.    Dentition good  Alert, oriented in no acute distress.  Eye contact is good, speech goal directed, affect calm      Assessment and Plan. The following treatment and monitoring plan is recommended:    1. Medicare annual wellness visit, subsequent       2. Chronic respiratory failure with hypoxia (CMS-HCC)      Patient is using oxygen 2L at night. She has underlying pulmonary hypertension, possible due to obstructive sleep apnea and recurrent pulmonary embolusim.     3. Seizure disorder (HCC)      Well-controlled. No recurrent seizure. Continue primidone 250 mg in a.m. and 500 mg in p.m. Reviewed potential side effects of primidone with patient.     4. Recurrent pulmonary embolism (CMS-HCC)      Stable. Continue Coumadin as managed by anticoagulation clinic. Patient denied abnormal bleeding or blood in stool from taking Coumadin. She does not have any bleeding disorder currently.     5. Pulmonary hypertension      Stable. Continue carvedilol 6.25 mg twice a day and Lasix 40 mg daily with potassium supplement.]     6. Osteopenia with high risk of fracture      Stable. Advised to start taking " Fosamax 70 mg once a week. Advised to continue vitamin D and calcium supplements daily.  Counseling for side effect of Fosamax.  Advised to take Fosamax with plenty of water and to stay upright for at least 2 hour after taking medication to prevent esophagitis.      7. Obstructive sleep apnea, presumptive with nocturnal hypoxemia      Patient refused to use CPAP as she is not able to tolerate CPAP machine. She is using oxygen 2 L at night.     8. Long term (current) use of anticoagulants [Z79.01]      Patient follows with anticoagulation clinic for chronic anticoagulation treatment for recurrent pulmonary embolism. She is taking Coumadin as managed by coumadin clinic. Patient does not have any bleeding disorder currently.      9. Iron deficiency anemia, unspecified iron deficiency anemia type      Iron panel and Hemoglobin improved to normal. Discontinue iron treatment. Patient had EGD and colonoscopy workup with GI. No active bleeding was found on EGD and colonoscopy. Continue to monitor.     10. HTN (hypertension), benign      Well-controlled. Continue current regimens, carvedilol 6.25 mg twice a day and Lasix 40 mg daily. Recheck lab 1-2 weeks before next follow up visit.     11. Hiatal hernia      Well-controlled. Continue omeprazole 40 mg daily. Reviewed potential side effects from taking omeprazole. Advised to take vitamin D, magnesium, and vitamin B12.     12. Diastolic dysfunction      Stable. Continue carvedilol 6.25 mg twice a day and Lasix 40 mg daily with potassium supplements daily. Advised to check blood pressure and heart rate at home.     13. Chronic insomnia      Chronic and stable. Patient is taking melatonin at bedtime. She is doing well with melatonin and sleeps better. Reviewed potential side effects of Melatonin with patient.     14. Acquired hypothyroidism      Well-controlled. Continue levothyroxine 100 µg every morning. Advised to take Levothyroxine with empty stomach.  Patient will have  follow-up blood test in January 2018 before her follow-up visit in office.      15. Class 2 obesity due to excess calories with serious comorbidity and body mass index (BMI) of 38.0 to 38.9 in adult      Counseled for healthy diet and regular physical exercise to lose weight.      16. Risk for falls  Patient identified as fall risk.  Appropriate orders and counseling given.    Counseling for use a cane all the time, getting up slowly, turn on the light when she gets up at night, installing nightlight at home.  Review side effects of medications with patient. Recommend to avoid sedating medication as much as possible.           Services suggested: No services needed at this time  Health Care Screening recommendations as per orders if indicated.  Referrals offered: PT/OT/Nutrition counseling/Behavioral Health/Smoking cessation as per orders if indicated.    Discussion today about general wellness and lifestyle habits:    · Prevent falls and reduce trip hazards; Cautioned about securing or removing rugs.  · Have a working fire alarm and carbon monoxide detector;   · Engage in regular physical activity and social activities       Follow-up: Return in about 4 weeks (around 1/11/2018), or if symptoms worsen or fail to improve, for hypothyroid, hypertension, osteopenia, seizure, lab review.

## 2017-12-25 DIAGNOSIS — K21.9 GASTROESOPHAGEAL REFLUX DISEASE WITHOUT ESOPHAGITIS: ICD-10-CM

## 2017-12-26 RX ORDER — OMEPRAZOLE 40 MG/1
CAPSULE, DELAYED RELEASE ORAL
Qty: 90 CAP | Refills: 0 | Status: SHIPPED | OUTPATIENT
Start: 2017-12-26 | End: 2018-01-17 | Stop reason: CLARIF

## 2018-01-04 ENCOUNTER — APPOINTMENT (OUTPATIENT)
Dept: MEDICAL GROUP | Facility: MEDICAL CENTER | Age: 78
End: 2018-01-04
Payer: MEDICARE

## 2018-01-05 ENCOUNTER — PATIENT OUTREACH (OUTPATIENT)
Dept: HEALTH INFORMATION MANAGEMENT | Facility: OTHER | Age: 78
End: 2018-01-05

## 2018-01-05 NOTE — PROGRESS NOTES
Outbound call to Natalie for medication review. Patient declines to complete review. She states that she was in recently to see PCP and has follow-up scheduled. She denies any questions/ concerns regarding her medications.     Shari Dey, PharmD

## 2018-01-11 ENCOUNTER — ANTICOAGULATION VISIT (OUTPATIENT)
Dept: MEDICAL GROUP | Facility: MEDICAL CENTER | Age: 78
End: 2018-01-11
Payer: MEDICARE

## 2018-01-11 DIAGNOSIS — Z86.711 PERSONAL HISTORY OF PULMONARY EMBOLISM: ICD-10-CM

## 2018-01-11 DIAGNOSIS — Z79.01 CHRONIC ANTICOAGULATION: ICD-10-CM

## 2018-01-11 DIAGNOSIS — Z79.01 LONG TERM CURRENT USE OF ANTICOAGULANT THERAPY: ICD-10-CM

## 2018-01-11 LAB — INR PPP: 2.5 (ref 2–3.5)

## 2018-01-11 PROCEDURE — 99999 PR NO CHARGE: CPT | Performed by: INTERNAL MEDICINE

## 2018-01-11 PROCEDURE — 85610 PROTHROMBIN TIME: CPT | Performed by: INTERNAL MEDICINE

## 2018-01-12 DIAGNOSIS — I51.89 DIASTOLIC DYSFUNCTION: ICD-10-CM

## 2018-01-12 NOTE — PROGRESS NOTES
Anticoagulation Summary  As of 1/11/2018    INR goal:   2.0-3.0   TTR:   57.1 % (1.1 y)   Today's INR:   2.5   Maintenance plan:   10 mg (5 mg x 2) on Sun, Thu; 20 mg (5 mg x 4) all other days   Weekly total:   120 mg   Plan last modified:   Maty Perkins PharmD (1/11/2018)   Next INR check:   2/5/2018   Target end date:       Indications    Chronic anticoagulation [Z79.01]  Personal history of pulmonary embolism [Z86.711]  Long term (current) use of anticoagulants [Z79.01] [Z79.01]             Anticoagulation Episode Summary     INR check location:       Preferred lab:       Send INR reminders to:       Comments:         Anticoagulation Care Providers     Provider Role Specialty Phone number    Ninfa Marcial M.D. Referring Internal Medicine [phone]    [facility] Responsible  [phone]        Anticoagulation Patient Findings   History of Present Illness: follow up appointment for chronic anticoagulation with the high risk medication, warfarin for pe.    Last INR was out of range, dosage adjusted: PT dose was reduced.  Pt is now therapeutic today. Pt is to continue with current warfarin dosing regimen.  Follow up in 4 weeks, to reduce risk of adverse events related to this high risk medication,  Warfarin.    Maty Perkins PharmD    Pt declines vitals

## 2018-01-15 ENCOUNTER — HOSPITAL ENCOUNTER (OUTPATIENT)
Dept: LAB | Facility: MEDICAL CENTER | Age: 78
End: 2018-01-15
Attending: INTERNAL MEDICINE
Payer: MEDICARE

## 2018-01-15 DIAGNOSIS — M85.80 OSTEOPENIA WITH HIGH RISK OF FRACTURE: ICD-10-CM

## 2018-01-15 DIAGNOSIS — E03.9 ACQUIRED HYPOTHYROIDISM: ICD-10-CM

## 2018-01-15 DIAGNOSIS — I10 HTN (HYPERTENSION), BENIGN: ICD-10-CM

## 2018-01-15 LAB
ALBUMIN SERPL BCP-MCNC: 3.9 G/DL (ref 3.2–4.9)
ALBUMIN/GLOB SERPL: 1.4 G/DL
ALP SERPL-CCNC: 101 U/L (ref 30–99)
ALT SERPL-CCNC: 32 U/L (ref 2–50)
ANION GAP SERPL CALC-SCNC: 6 MMOL/L (ref 0–11.9)
AST SERPL-CCNC: 24 U/L (ref 12–45)
BASOPHILS # BLD AUTO: 1.2 % (ref 0–1.8)
BASOPHILS # BLD: 0.05 K/UL (ref 0–0.12)
BILIRUB SERPL-MCNC: 0.2 MG/DL (ref 0.1–1.5)
BUN SERPL-MCNC: 14 MG/DL (ref 8–22)
CALCIUM SERPL-MCNC: 8.2 MG/DL (ref 8.5–10.5)
CHLORIDE SERPL-SCNC: 104 MMOL/L (ref 96–112)
CHOLEST SERPL-MCNC: 145 MG/DL (ref 100–199)
CO2 SERPL-SCNC: 30 MMOL/L (ref 20–33)
CREAT SERPL-MCNC: 0.59 MG/DL (ref 0.5–1.4)
EOSINOPHIL # BLD AUTO: 0.09 K/UL (ref 0–0.51)
EOSINOPHIL NFR BLD: 2.2 % (ref 0–6.9)
ERYTHROCYTE [DISTWIDTH] IN BLOOD BY AUTOMATED COUNT: 44.7 FL (ref 35.9–50)
GLOBULIN SER CALC-MCNC: 2.7 G/DL (ref 1.9–3.5)
GLUCOSE SERPL-MCNC: 104 MG/DL (ref 65–99)
HCT VFR BLD AUTO: 37.1 % (ref 37–47)
HDLC SERPL-MCNC: 48 MG/DL
HGB BLD-MCNC: 12.1 G/DL (ref 12–16)
IMM GRANULOCYTES # BLD AUTO: 0.01 K/UL (ref 0–0.11)
IMM GRANULOCYTES NFR BLD AUTO: 0.2 % (ref 0–0.9)
LDLC SERPL CALC-MCNC: 78 MG/DL
LYMPHOCYTES # BLD AUTO: 1.05 K/UL (ref 1–4.8)
LYMPHOCYTES NFR BLD: 25.9 % (ref 22–41)
MCH RBC QN AUTO: 30.6 PG (ref 27–33)
MCHC RBC AUTO-ENTMCNC: 32.6 G/DL (ref 33.6–35)
MCV RBC AUTO: 93.7 FL (ref 81.4–97.8)
MONOCYTES # BLD AUTO: 0.32 K/UL (ref 0–0.85)
MONOCYTES NFR BLD AUTO: 7.9 % (ref 0–13.4)
NEUTROPHILS # BLD AUTO: 2.53 K/UL (ref 2–7.15)
NEUTROPHILS NFR BLD: 62.6 % (ref 44–72)
NRBC # BLD AUTO: 0 K/UL
NRBC BLD-RTO: 0 /100 WBC
PLATELET # BLD AUTO: 314 K/UL (ref 164–446)
PMV BLD AUTO: 9 FL (ref 9–12.9)
POTASSIUM SERPL-SCNC: 4.2 MMOL/L (ref 3.6–5.5)
PROT SERPL-MCNC: 6.6 G/DL (ref 6–8.2)
RBC # BLD AUTO: 3.96 M/UL (ref 4.2–5.4)
SODIUM SERPL-SCNC: 140 MMOL/L (ref 135–145)
T4 FREE SERPL-MCNC: 0.88 NG/DL (ref 0.53–1.43)
TRIGL SERPL-MCNC: 97 MG/DL (ref 0–149)
TSH SERPL DL<=0.005 MIU/L-ACNC: 1.38 UIU/ML (ref 0.38–5.33)
WBC # BLD AUTO: 4.1 K/UL (ref 4.8–10.8)

## 2018-01-15 PROCEDURE — 80053 COMPREHEN METABOLIC PANEL: CPT

## 2018-01-15 PROCEDURE — 80061 LIPID PANEL: CPT

## 2018-01-15 PROCEDURE — 36415 COLL VENOUS BLD VENIPUNCTURE: CPT

## 2018-01-15 PROCEDURE — 84443 ASSAY THYROID STIM HORMONE: CPT

## 2018-01-15 PROCEDURE — 84439 ASSAY OF FREE THYROXINE: CPT

## 2018-01-15 PROCEDURE — 85025 COMPLETE CBC W/AUTO DIFF WBC: CPT

## 2018-01-16 ENCOUNTER — TELEPHONE (OUTPATIENT)
Dept: MEDICAL GROUP | Age: 78
End: 2018-01-16

## 2018-01-16 RX ORDER — FUROSEMIDE 40 MG/1
TABLET ORAL
Qty: 90 TAB | Refills: 3 | Status: SHIPPED | OUTPATIENT
Start: 2018-01-16 | End: 2019-01-07 | Stop reason: SDUPTHER

## 2018-01-16 NOTE — TELEPHONE ENCOUNTER
ESTABLISHED PATIENT PRE-VISIT PLANNING     Note: Patient will not be contacted if there is no indication to call.     1.  Reviewed notes from the last few office visits within the medical group: Yes    2.  If any orders were placed at last visit or intended to be done for this visit (i.e. 6 mos follow-up), do we have Results/Consult Notes?        •  Labs - Labs ordered, completed on 1/15/18 and results are in chart.   Note: If patient appointment is for lab review and patient did not complete labs, check with provider if OK to reschedule patient until labs completed.       •  Imaging - Imaging was not ordered at last office visit.       •  Referrals - Referral ordered, patient has NOT been seen.    3. Is this appointment scheduled as a Hospital Follow-Up? No    4.  Immunizations were updated in Epic using WebIZ?: Epic matches WebIZ       •  Web Iz Recommendations: Patient is up to date on all vaccines    5.  Patient is due for the following Health Maintenance Topics:   There are no preventive care reminders to display for this patient.    - Patient is up-to-date on all Health Maintenance topics. No records have been requested at this time.    6.  Patient was NOT informed to arrive 15 min prior to their scheduled appointment and bring in their medication bottles.

## 2018-01-17 ENCOUNTER — OFFICE VISIT (OUTPATIENT)
Dept: MEDICAL GROUP | Age: 78
End: 2018-01-17
Payer: MEDICARE

## 2018-01-17 VITALS
DIASTOLIC BLOOD PRESSURE: 60 MMHG | HEART RATE: 60 BPM | OXYGEN SATURATION: 91 % | HEIGHT: 63 IN | WEIGHT: 216.2 LBS | SYSTOLIC BLOOD PRESSURE: 126 MMHG | TEMPERATURE: 97.7 F | BODY MASS INDEX: 38.31 KG/M2

## 2018-01-17 DIAGNOSIS — M85.80 OSTEOPENIA WITH HIGH RISK OF FRACTURE: ICD-10-CM

## 2018-01-17 DIAGNOSIS — I26.99 RECURRENT PULMONARY EMBOLISM (HCC): ICD-10-CM

## 2018-01-17 DIAGNOSIS — J96.11 CHRONIC RESPIRATORY FAILURE WITH HYPOXIA (HCC): ICD-10-CM

## 2018-01-17 DIAGNOSIS — I10 HTN (HYPERTENSION), BENIGN: ICD-10-CM

## 2018-01-17 DIAGNOSIS — R73.01 IFG (IMPAIRED FASTING GLUCOSE): ICD-10-CM

## 2018-01-17 DIAGNOSIS — I51.89 DIASTOLIC DYSFUNCTION: ICD-10-CM

## 2018-01-17 DIAGNOSIS — G47.33 OBSTRUCTIVE SLEEP APNEA: ICD-10-CM

## 2018-01-17 DIAGNOSIS — E03.9 ACQUIRED HYPOTHYROIDISM: ICD-10-CM

## 2018-01-17 DIAGNOSIS — G40.909 SEIZURE DISORDER (HCC): ICD-10-CM

## 2018-01-17 DIAGNOSIS — K21.9 GASTROESOPHAGEAL REFLUX DISEASE WITHOUT ESOPHAGITIS: ICD-10-CM

## 2018-01-17 PROCEDURE — 99215 OFFICE O/P EST HI 40 MIN: CPT | Performed by: INTERNAL MEDICINE

## 2018-01-17 RX ORDER — POTASSIUM CHLORIDE 20 MEQ/1
20 TABLET, EXTENDED RELEASE ORAL DAILY
Qty: 90 TAB | Refills: 3 | Status: SHIPPED | OUTPATIENT
Start: 2018-01-17 | End: 2019-02-06 | Stop reason: SDUPTHER

## 2018-01-17 RX ORDER — OMEPRAZOLE 20 MG/1
20 CAPSULE, DELAYED RELEASE ORAL DAILY
Qty: 90 CAP | Refills: 3 | Status: SHIPPED | OUTPATIENT
Start: 2018-01-17 | End: 2019-03-16 | Stop reason: SDUPTHER

## 2018-01-17 NOTE — ASSESSMENT & PLAN NOTE
Patient is taking Fosamax 70 mg every weekly and vitamin D 1000 units daily. She has slightly low calcium at 8.2 on recent blood test on 1/15/18. We discussed to take calcium carbonate 1000 mg twice a day. We also discussed to take Metamucil or stool softener if she has constipation from taking calcium.

## 2018-01-17 NOTE — ASSESSMENT & PLAN NOTE
Patient is taking carvedilol 6.25 mg twice a day and Lasix 40 mg daily with potassium chloride 20 mEq daily. Her recent CMP shows stable kidney function and normal potassium.

## 2018-01-17 NOTE — ASSESSMENT & PLAN NOTE
Patient does not have any shortness of breath or chest pain. She is taking Coumadin as managed by Coumadin clinic. Patient is on Coumadin, which is restarted since October 2016.. She denied abnormal bleeding or blood in stool or blood in urine from taking Coumadin.

## 2018-01-17 NOTE — ASSESSMENT & PLAN NOTE
Patient has follow-up appointment with pulmonologist for sleep apnea. She has not with his CPAP machine yet. She stated that if she needs to use CPAP machine, she will consider it.

## 2018-01-17 NOTE — ASSESSMENT & PLAN NOTE
Patient is taking omeprazole 40 mg daily. She used to have constant dry cough, which is improved with omeprazole. She has EGD in 2016 and showed that she has hiatal hernia. We discussed to consider decreased the dose of omeprazole to 20 mg daily and patient agreed with the plan. I discussed the risks and benefits of chronic use of omeprazole or other PPI. Patient is advised to take vitamin B12, magnesium, calcium, vitamin D, if she needs to continue PPI chronically for her symptom. Patient understands and agrees.

## 2018-01-17 NOTE — ASSESSMENT & PLAN NOTE
Patient is taking primidone 250 mg in the morning and 500 mg in the evening. She denies side effects from taking medication and she has not had any recurrent seizure for about 20 years. Patient would like to keep taking primidone.

## 2018-01-17 NOTE — PROGRESS NOTES
Subjective:   Natalie Zuniga is a 77 y.o. female here today for evaluation and management of:      HTN (hypertension), benign  Patient is taking carvedilol 6.25 mg twice a day and Lasix 40 mg daily with potassium chloride 20 mEq daily. Her recent CMP shows stable kidney function and normal potassium.    Acquired hypothyroidism  She is taking levothyroxine and her it micrograms every morning. Her thyroid function test was stable.    Results for NATALIE ZUNIGA (MRN [id number]) as of 1/16/2018 21:00   Ref. Range 1/15/2018 08:38   TSH Latest Ref Range: 0.380 - 5.330 uIU/mL 1.380   Free T-4 Latest Ref Range: 0.53 - 1.43 ng/dL 0.88       Osteopenia with high risk of fracture  Patient is taking Fosamax 70 mg every weekly and vitamin D 1000 units daily. She has slightly low calcium at 8.2 on recent blood test on 1/15/18. We discussed to take calcium carbonate 1000 mg twice a day. We also discussed to take Metamucil or stool softener if she has constipation from taking calcium.    Obstructive sleep apnea, presumptive with nocturnal hypoxemia  Patient has follow-up appointment with pulmonologist for sleep apnea. She has not with his CPAP machine yet. She stated that if she needs to use CPAP machine, she will consider it.    Diastolic dysfunction  Patient does not have shortness of breath or chest pain or palpitation. Her leg swelling improved with Lasix 40 mg daily and potassium chloride 20 mg daily. Her recent kidney function and electrolytes are within normal. She still has mild pitting swelling on both lower extremities. Patient is advised to use compression stocking, but she is noncompliance with compression stocking.    Gastroesophageal reflux disease without esophagitis  Patient is taking omeprazole 40 mg daily. She used to have constant dry cough, which is improved with omeprazole. She has EGD in 2016 and showed that she has hiatal hernia. We discussed to consider decreased the dose of omeprazole to 20 mg  daily and patient agreed with the plan. I discussed the risks and benefits of chronic use of omeprazole or other PPI. Patient is advised to take vitamin B12, magnesium, calcium, vitamin D, if she needs to continue PPI chronically for her symptom. Patient understands and agrees.    Recurrent pulmonary embolism (CMS-HCC)  Patient does not have any shortness of breath or chest pain. She is taking Coumadin as managed by Coumadin clinic. Patient is on Coumadin, which is restarted since October 2016.. She denied abnormal bleeding or blood in stool or blood in urine from taking Coumadin.    Seizure disorder (HCC)  Patient is taking primidone 250 mg in the morning and 500 mg in the evening. She denies side effects from taking medication and she has not had any recurrent seizure for about 20 years. Patient would like to keep taking primidone.    Chronic respiratory failure with hypoxia (CMS-HCC)  Patient has recurrent pulmonary embolism and pulmonary hypertension. Her pulmonary hypertension likely related to sleep apnea and recurrent pulmonary embolism. Patient has appointment to follow with pulmonologist for sleep apnea treatment. She is using oxygen 1 L at night. She does not have shortness of breath or chest pain. Her symptom is stable and well-controlled currently.         Current medicines (including changes today)  Current Outpatient Prescriptions   Medication Sig Dispense Refill   • potassium chloride SA (KDUR) 20 MEQ Tab CR Take 1 Tab by mouth every day. 90 Tab 3   • omeprazole (PRILOSEC) 20 MG delayed-release capsule Take 1 Cap by mouth every day. 90 Cap 3   • furosemide (LASIX) 40 MG Tab TAKE 1 TABLET BY MOUTH EVERY DAY 90 Tab 3   • carvedilol (COREG) 6.25 MG Tab TAKE 1 TABLET BY MOUTH TWICE DAILY 360 Tab 3   • levothyroxine (SYNTHROID) 100 MCG Tab TAKE 1 TABLET BY MOUTH EVERY DAY 90 Tab 3   • warfarin (COUMADIN) 5 MG Tab Take 4 tablets daily as directed by coumadin clininc 360 Tab 1   • alendronate (FOSAMAX) 70 MG  "Tab Take 1 Tab by mouth every 7 days. 12 Tab 3   • Misc. Devices Misc Please provide the oxygen concentrator 2 L at night time. 1 Device 0   • primidone (MYSOLINE) 250 MG Tab TAKE 1 TABLET BY MOUTH THREE TIMES DAILY 270 Tab 3   • Probiotic Product (PROBIOTIC DAILY PO) Take  by mouth every day.     • Glucos-Chond-Hyal Ac-Ca Fructo (MOVE FREE JOINT HEALTH ADVANCE) Tab Take  by mouth every day.     • Multiple Vitamins-Minerals (WOMENS MULTI VITAMIN & MINERAL) Tab Take 1 Tab by mouth every day.     • Melatonin 500 MCG TABLET DISPERSIBLE Take 1,000-2,000 mcg by mouth at bedtime as needed (sleep).     • Cholecalciferol (VITAMIN D-3) 1000 UNITS Cap Take 2,000 Units by mouth every day.       No current facility-administered medications for this visit.      She  has a past medical history of Arthritis; Bowel habit changes; Breath shortness; Cataract; Constipation; Epilepsy (CMS-HCC); Eye drainage; Hemorrhagic disorder (CMS-HCC); Hiatus hernia syndrome; Hypertension; Hyperthyroidism; Hypothyroid; Influenza; Pain; Personal history of venous thrombosis and embolism; Ringing in ears; Seizure (CMS-[facility]) (1982); and Sore muscles.    ROS   No chest pain, no shortness of breath, no abdominal pain       Objective:     Blood pressure 126/60, pulse 60, temperature 36.5 °C (97.7 °F), height 1.588 m (5' 2.5\"), weight 98.1 kg (216 lb 3.2 oz), SpO2 91 %. Body mass index is 38.91 kg/m².   Physical Exam:  General: Alert, oriented and no acute distress.  Eye contact is good, speech goal directed, affect calm  HEENT: conjunctiva non-injected, sclera non-icteric.  Oral mucous membranes pink and moist with no lesions.  Pinna normal.   Lungs: Normal respiratory effort, clear to auscultation bilaterally with good excursion.  CV: regular rate and rhythm. No murmurs.   Abdomen: soft, non distended, nontender, No CVAT, Bowel sound normal.  Ext: Mild edema on both lower extremities, color normal, vascularity normal, temperature normal        Assessment " and Plan:   The following treatment plan was discussed     1. HTN (hypertension), benign  - Well-controlled. Continue current regimens, Lasix 40 mg daily and Coreg 6.25 mg daily. Recheck lab 1-2 weeks before next follow up visit.  - Recommend to monitor blood pressure and heart rate at home.  - COMP METABOLIC PANEL; Future    2. Acquired hypothyroidism  - Well-controlled. Continue current regimens. Recheck lab 1-2 weeks before next follow up visit.  - TSH; Future  - FREE THYROXINE; Future    3. Osteopenia with high risk of fracture  - Continue Fosamax once a week. Counseling for side effect of Fosamax.  - Advised to take Fosamax with plenty of water and to stay upright for at least 2 hour after taking medication to prevent esophagitis.   - Recommend to take calcium 1000 mg twice a day. Advised to take Metamucil, high-fiber diet and adequate water intake to prevent constipation. Patient can try Colace if she has constipation.  - VITAMIN D,25 HYDROXY; Future    4. Diastolic dysfunction  - Well-controlled. Continue current regimens, Coreg 6.25 mg twice a day Lasix 40 mg daily and potassium 20 mEq daily. Recheck lab 1-2 weeks before next follow up visit.  - potassium chloride SA (KDUR) 20 MEQ Tab CR; Take 1 Tab by mouth every day.  Dispense: 90 Tab; Refill: 3  - COMP METABOLIC PANEL; Future    5. Gastroesophageal reflux disease without esophagitis  - Well-controlled. We discussed to cut down omeprazole to 20 mg daily. Advised to take vitamin D, calcium, magnesium and vitamin B12 supplements. Reviewed the use and side effects of omeprazole.  - omeprazole (PRILOSEC) 20 MG delayed-release capsule; Take 1 Cap by mouth every day.  Dispense: 90 Cap; Refill: 3    6. Obstructive sleep apnea, presumptive with nocturnal hypoxemia  - Counseling for importance of treatment of sleep apnea and complications of sleep apnea.  - Patient is advised to follow with pulmonologist as scheduled.    7. Recurrent pulmonary embolism (CMS-HCC)  -  Well-controlled. Continue Coumadin as managed by anticoagulation clinic.    8. Seizure disorder (HCC)  - Well-controlled. Continue current regimens. Recheck lab 1-2 weeks before next follow up visit.  - Review potential side effects of primidone with patient.    9. Chronic respiratory failure with hypoxia (CMS-HCC)  - Chronic and stable well-controlled. Patient will follow with pulmonologist.    10. IFG (impaired fasting glucose)  - Counseling  for Compliance with low carbohydrate diet and encouraged to exercise regularly.  - COMP METABOLIC PANEL; Future  - HEMOGLOBIN A1C; Future    11. Class 2 obesity due to excess calories with serious comorbidity and body mass index (BMI) of 38.0 to 38.9 in adult  - Counseled for healthy diet and regular physical exercise to lose weight.   - Patient identified as having weight management issue.  Appropriate orders and counseling given.    12. Health Maintenance   - Immunizations are up-to-date.    Face-to-face time spent 40 minutes with patient and more than half of that time spent for counseling and cooperating of care for medical problems listed above.       Followup: Return in about 6 months (around 7/17/2018), or if symptoms worsen or fail to improve, for hypertension, GERD, diastolic dysfunction, osteopenia, hypothyroid, lab review.      Please note that this dictation was created using voice recognition software. I have made every reasonable attempt to correct obvious errors, but I expect that there may have unintended errors in text, spelling, punctuation, or grammar that I did not discover.

## 2018-01-17 NOTE — ASSESSMENT & PLAN NOTE
Patient does not have shortness of breath or chest pain or palpitation. Her leg swelling improved with Lasix 40 mg daily and potassium chloride 20 mg daily. Her recent kidney function and electrolytes are within normal. She still has mild pitting swelling on both lower extremities. Patient is advised to use compression stocking, but she is noncompliance with compression stocking.

## 2018-01-17 NOTE — ASSESSMENT & PLAN NOTE
Patient has recurrent pulmonary embolism and pulmonary hypertension. Her pulmonary hypertension likely related to sleep apnea and recurrent pulmonary embolism. Patient has appointment to follow with pulmonologist for sleep apnea treatment. She is using oxygen 1 L at night. She does not have shortness of breath or chest pain. Her symptom is stable and well-controlled currently.

## 2018-01-17 NOTE — ASSESSMENT & PLAN NOTE
She is taking levothyroxine and her it micrograms every morning. Her thyroid function test was stable.    Results for DIVINA ZUNIGA (MRN [id number]) as of 1/16/2018 21:00   Ref. Range 1/15/2018 08:38   TSH Latest Ref Range: 0.380 - 5.330 uIU/mL 1.380   Free T-4 Latest Ref Range: 0.53 - 1.43 ng/dL 0.88

## 2018-01-24 ENCOUNTER — OFFICE VISIT (OUTPATIENT)
Dept: PULMONOLOGY | Facility: HOSPICE | Age: 78
End: 2018-01-24
Payer: MEDICARE

## 2018-01-24 VITALS
DIASTOLIC BLOOD PRESSURE: 80 MMHG | OXYGEN SATURATION: 93 % | HEART RATE: 74 BPM | SYSTOLIC BLOOD PRESSURE: 148 MMHG | WEIGHT: 213 LBS | RESPIRATION RATE: 16 BRPM | BODY MASS INDEX: 37.74 KG/M2 | HEIGHT: 63 IN | TEMPERATURE: 97.9 F

## 2018-01-24 DIAGNOSIS — G47.33 OBSTRUCTIVE SLEEP APNEA: ICD-10-CM

## 2018-01-24 DIAGNOSIS — I26.99 RECURRENT PULMONARY EMBOLISM (HCC): ICD-10-CM

## 2018-01-24 DIAGNOSIS — Z79.01 LONG TERM CURRENT USE OF ANTICOAGULANT THERAPY: ICD-10-CM

## 2018-01-24 DIAGNOSIS — I27.20 PULMONARY HYPERTENSION (HCC): ICD-10-CM

## 2018-01-24 PROCEDURE — 99214 OFFICE O/P EST MOD 30 MIN: CPT | Performed by: INTERNAL MEDICINE

## 2018-01-24 NOTE — PATIENT INSTRUCTIONS
This lady has significant nocturnal hypoxemia, prior pulmonary emboli, documented sleep apnea and underwent a sleep study that demonstrated good response to CPAP at 12 cm. Oxygen desaturation was still evident in spite of correcting the sleep apnea, airway pressures A patient's settings were identified at 12 cm and she will need the supplemental oxygen continued at 2 L.    I reviewed expectations with her, her , arranged follow-up in the sleep center with chip downloaded compliance data to be reviewed. It may be valuable in the future to monitor home oximetry on CPAP plus oxygen to make certain we have fully corrected her sleep-disordered breathing as well as nocturnal hypoxemia. Prior pulmonary emboli and pulmonary hypertension are noted with lifetime anticoagulation planned

## 2018-01-24 NOTE — PROGRESS NOTES
Natalie Anaya is a 77 y.o. female here for hypoxemia with pulmonary emboli, hypertension, and sleep apnea with CPAP titration. Patient was referred by her primary care doctor and cardiologist.    History of Present Illness:      This lady has significant nocturnal hypoxemia, prior pulmonary emboli, documented sleep apnea and underwent a sleep study that demonstrated good response to CPAP at 12 cm. Oxygen desaturation was still evident in spite of correcting the sleep apnea, airway pressures A patient's settings were identified at 12 cm and she will need the supplemental oxygen continued at 2 L.    I reviewed expectations with her, her , arranged follow-up in the sleep center with chip downloaded compliance data to be reviewed. It may be valuable in the future to monitor home oximetry on CPAP plus oxygen to make certain we have fully corrected her sleep-disordered breathing as well as nocturnal hypoxemia. Prior pulmonary emboli and pulmonary hypertension are noted with lifetime anticoagulation planned    Constitutional ROS: No unexpected change in weight, No unexplained fevers  Eyes: No change in vision or blurring or double vision  Mouth/Throat ROS: No sore throat, No recent change in voice or hoarseness  Pulmonary ROS: See present history for pertinent positives  Cardiovascular ROS: No chest pain to suggest acute coronary syndrome  Gastrointestinal ROS: No abdominal pain to suggest peptic disease  Musculoskeletal/Extremities ROS: no acute artritis or unusual swelling  Hematologic/Lymphatic ROS: No easy bleeding or unusual lymph node swelling  Neurologic ROS: No new or unusual weakness  Psychiatric ROS: No hallucinations  Allergic/Immunologic: No  urticaria or allergic rash      Current Outpatient Prescriptions   Medication Sig Dispense Refill   • furosemide (LASIX) 40 MG Tab TAKE 1 TABLET BY MOUTH EVERY DAY 90 Tab 3   • carvedilol (COREG) 6.25 MG Tab TAKE 1 TABLET BY MOUTH TWICE DAILY 360 Tab 3   •  levothyroxine (SYNTHROID) 100 MCG Tab TAKE 1 TABLET BY MOUTH EVERY DAY 90 Tab 3   • warfarin (COUMADIN) 5 MG Tab Take 4 tablets daily as directed by coumadin clininc 360 Tab 1   • alendronate (FOSAMAX) 70 MG Tab Take 1 Tab by mouth every 7 days. 12 Tab 3   • Misc. Devices Misc Please provide the oxygen concentrator 2 L at night time. 1 Device 0   • primidone (MYSOLINE) 250 MG Tab TAKE 1 TABLET BY MOUTH THREE TIMES DAILY 270 Tab 3   • Probiotic Product (PROBIOTIC DAILY PO) Take  by mouth every day.     • Glucos-Chond-Hyal Ac-Ca Fructo (MOVE FREE JOINT HEALTH ADVANCE) Tab Take  by mouth every day.     • Multiple Vitamins-Minerals (WOMENS MULTI VITAMIN & MINERAL) Tab Take 1 Tab by mouth every day.     • Melatonin 500 MCG TABLET DISPERSIBLE Take 1,000-2,000 mcg by mouth at bedtime as needed (sleep).     • Cholecalciferol (VITAMIN D-3) 1000 UNITS Cap Take 2,000 Units by mouth every day.     • potassium chloride SA (KDUR) 20 MEQ Tab CR Take 1 Tab by mouth every day. 90 Tab 3   • omeprazole (PRILOSEC) 20 MG delayed-release capsule Take 1 Cap by mouth every day. 90 Cap 3     No current facility-administered medications for this visit.        Social History   Substance Use Topics   • Smoking status: Never Smoker   • Smokeless tobacco: Never Used   • Alcohol use Yes      Comment: Very Very Very Rarely        Past Medical History:   Diagnosis Date   • Arthritis     Knees.   • Bowel habit changes     Constipation   • Breath shortness    • Cataract     Bilat IOL   • Constipation    • Epilepsy (CMS-HCC)    • Eye drainage    • Hemorrhagic disorder (CMS-HCC)    • Hiatus hernia syndrome    • Hypertension    • Hyperthyroidism    • Hypothyroid    • Influenza    • Pain     knees   • Personal history of venous thrombosis and embolism     PE from Right Shouler FX 2003.   • Ringing in ears    • Seizure (CMS-HCC) 1982   • Sore muscles        Past Surgical History:   Procedure Laterality Date   • IRRIGATION & DEBRIDEMENT HIP Left 11/17/2016  "   Procedure: IRRIGATION & DEBRIDEMENT HIP;  Surgeon: Maximo Garnica M.D.;  Location: SURGERY [facility];  Service:    • HIP NAILING INTRAMEDULLARY Left 10/6/2016    Procedure: HIP NAILING INTRAMEDULLARY;  Surgeon: Walt Nguyen M.D.;  Location: SURGERY [facility];  Service:    • GASTROSCOPY-ENDO N/A 4/29/2016    Procedure: GASTROSCOPY-ENDO;  Surgeon: Mikey Stanton M.D.;  Location: [facility];  Service:    • COLONOSCOPY-FLEXIBLE N/A 4/29/2016    Procedure: COLONOSCOPY-FLEXIBLE;  Surgeon: Mikey Stanton M.D.;  Location: [facility];  Service:    • CATARACT PHACO WITH IOL  8/5/2014    Performed by Lorenzo Bello M.D. at [facility]   • CATARACT PHACO WITH IOL  7/15/2014    Performed by Lorenzo Bello M.D. at [facility]   • KNEE ARTHROPLASTY TOTAL  7/9/2012    Performed by DENIZ FREDERICK at [facility]   • JOSE BY LAPAROSCOPY  9/30/2011    Performed by JOHNATHAN CRISOSTOMO at [facility]   • ARTHROSCOPY, KNEE     • CARPAL TUNNEL RELEASE     • PB REMV 2ND CATARACT,CORN-SCLER SECTN     • TONSILLECTOMY     • TUBAL LIGATION         Allergies: Tape    Family History   Problem Relation Age of Onset   • Cancer Mother 67     Ovarian   • Alcohol/Drug Father 60     Appenditis   • Heart Disease Father    • Cancer Maternal Grandmother      colon cancer   • Heart Disease Maternal Grandfather    • Cancer Daughter 44     melonoma       Physical Examination    Vitals:    01/24/18 1048   Height: 1.588 m (5' 2.5\")   Weight: 96.6 kg (213 lb)   Weight % change since last entry.: 0 %   BP: 148/80   Pulse: 74   BMI (Calculated): 38.34   Resp: 16   Temp: 36.6 °C (97.9 °F)       General Appearance: alert, no distress  Skin: Skin color, texture, turgor normal. No rashes or lesions.  Eyes: negative  Oropharynx: Lips, mucosa, and tongue normal. Teeth and gums normal. Oropharynx moist and without lesion  Lungs: positive findings: Clear  Heart: " negative. RRR without murmur, gallop, or rubs.  No ectopy.  Abdomen: Abdomen soft, non-tender. . No masses,  No organomegaly  Extremities:  No deformities, edema, or skin discoloration  Joints: No acute arthritis  Peripheral Pulses:perfused  Neurologic: intact grossly  No oral pharyngeal pathology    III (soft palate, base of uvula visible)    Imaging: None presently    PFTS: None presently      Assessment and Plan  1. Recurrent pulmonary embolism (CMS-HCC)    2. Pulmonary hypertension  - DME CPAP    3. Obstructive sleep apnea, presumptive with nocturnal hypoxemia  - DME CPAP    4. Long term (current) use of anticoagulants [Z79.01]    This lady has significant nocturnal hypoxemia, prior pulmonary emboli, documented sleep apnea and underwent a sleep study that demonstrated good response to CPAP at 12 cm. Oxygen desaturation was still evident in spite of correcting the sleep apnea, airway pressures A patient's settings were identified at 12 cm and she will need the supplemental oxygen continued at 2 L.    I reviewed expectations with her, her , arranged follow-up in the sleep center with chip downloaded compliance data to be reviewed. It may be valuable in the future to monitor home oximetry on CPAP plus oxygen to make certain we have fully corrected her sleep-disordered breathing as well as nocturnal hypoxemia. Prior pulmonary emboli and pulmonary hypertension are noted with lifetime anticoagulation planned  Followup Return in about 6 weeks (around 3/7/2018) for follow up visit with sleep provider, With MD or APRN.

## 2018-02-05 ENCOUNTER — ANTICOAGULATION VISIT (OUTPATIENT)
Dept: MEDICAL GROUP | Facility: MEDICAL CENTER | Age: 78
End: 2018-02-05
Payer: MEDICARE

## 2018-02-05 VITALS — SYSTOLIC BLOOD PRESSURE: 141 MMHG | DIASTOLIC BLOOD PRESSURE: 60 MMHG | HEART RATE: 77 BPM

## 2018-02-05 DIAGNOSIS — Z86.711 PERSONAL HISTORY OF PULMONARY EMBOLISM: ICD-10-CM

## 2018-02-05 DIAGNOSIS — Z79.01 LONG TERM CURRENT USE OF ANTICOAGULANT THERAPY: ICD-10-CM

## 2018-02-05 DIAGNOSIS — Z79.01 CHRONIC ANTICOAGULATION: ICD-10-CM

## 2018-02-05 LAB — INR PPP: 2.5 (ref 2–3.5)

## 2018-02-05 PROCEDURE — 99999 PR NO CHARGE: CPT | Performed by: INTERNAL MEDICINE

## 2018-02-05 PROCEDURE — 85610 PROTHROMBIN TIME: CPT | Performed by: INTERNAL MEDICINE

## 2018-02-06 NOTE — PROGRESS NOTES
OP Anticoagulation Service Note    Date: 2/5/2018  Vitals:    02/05/18 1559   BP: 141/60   Pulse: 77       Anticoagulation Summary  As of 2/5/2018    INR goal:   2.0-3.0   TTR:   59.5 % (1.2 y)   Today's INR:   2.5   Maintenance plan:   10 mg (5 mg x 2) on Sun, Thu; 20 mg (5 mg x 4) all other days   Weekly total:   120 mg   Plan last modified:   Darwin BurtD (1/11/2018)   Next INR check:   3/5/2018   Target end date:       Indications    Chronic anticoagulation [Z79.01]  Personal history of pulmonary embolism [Z86.711]  Long term (current) use of anticoagulants [Z79.01] [Z79.01]             Anticoagulation Episode Summary     INR check location:       Preferred lab:       Send INR reminders to:       Comments:         Anticoagulation Care Providers     Provider Role Specialty Phone number    Ninfa Marcial M.D. Referring Internal Medicine [phone]    [facility] Responsible  [phone]        Anticoagulation Patient Findings      HPI:   Natalie Anaya seen in clinic today, on anticoagulation therapy with warfarin (a high risk medication) for hx of PE      Pt is here today to evaluate anticoagulation therapy    Previous INR was 2.5 on 1-11-18, pt was instructed to continue current warfarin regimen    Confirmed warfarin dosing regimen, denies missed or extra doses of coumadin.   Diet has been consistent with foods rich in vitamin K: Yes  Changes in ETOH:  No  Changes in smoking status: No  Changes in medication: No   Cost restriction: No  S/s of bleeding:  No  Signs/symptoms  thrombosis since the last appt: No    A/P   INR  therapeutic today,    Continue current warfarin regimen     Pt educated to contact our clinic with any changes in medications or s/s of bleeding or thrombosis    Follow up appointment in 4 week(s) to reduce risk of adverse events from warfarin  Moira Sheridan, Pharm D

## 2018-02-22 ENCOUNTER — OFFICE VISIT (OUTPATIENT)
Dept: MEDICAL GROUP | Age: 78
End: 2018-02-22
Payer: MEDICARE

## 2018-02-22 ENCOUNTER — HOSPITAL ENCOUNTER (EMERGENCY)
Facility: MEDICAL CENTER | Age: 78
End: 2018-02-22
Attending: EMERGENCY MEDICINE
Payer: MEDICARE

## 2018-02-22 VITALS
BODY MASS INDEX: 40.3 KG/M2 | OXYGEN SATURATION: 95 % | HEIGHT: 62 IN | WEIGHT: 219 LBS | TEMPERATURE: 98.7 F | HEART RATE: 65 BPM | RESPIRATION RATE: 18 BRPM | SYSTOLIC BLOOD PRESSURE: 121 MMHG | DIASTOLIC BLOOD PRESSURE: 60 MMHG

## 2018-02-22 DIAGNOSIS — T21.22XA: ICD-10-CM

## 2018-02-22 DIAGNOSIS — R69 DIAGNOSIS UNKNOWN: ICD-10-CM

## 2018-02-22 PROCEDURE — 700102 HCHG RX REV CODE 250 W/ 637 OVERRIDE(OP)

## 2018-02-22 PROCEDURE — 99284 EMERGENCY DEPT VISIT MOD MDM: CPT

## 2018-02-22 PROCEDURE — A9270 NON-COVERED ITEM OR SERVICE: HCPCS

## 2018-02-22 RX ORDER — ALENDRONATE SODIUM 70 MG/1
70 TABLET ORAL
Status: SHIPPED | COMMUNITY
End: 2018-07-30

## 2018-02-22 RX ORDER — CALCIUM POLYCARBOPHIL 625 MG 625 MG/1
625 TABLET ORAL DAILY
Status: SHIPPED | COMMUNITY
End: 2018-02-22

## 2018-02-22 RX ORDER — WARFARIN SODIUM 10 MG/1
10-20 TABLET ORAL DAILY
Status: SHIPPED | COMMUNITY
End: 2018-07-20

## 2018-02-22 RX ORDER — CEPHALEXIN 500 MG/1
500 CAPSULE ORAL 3 TIMES DAILY
Qty: 21 CAP | Refills: 0 | Status: SHIPPED | OUTPATIENT
Start: 2018-02-22 | End: 2018-03-01

## 2018-02-22 RX ADMIN — SILVER SULFADIAZINE 1 G: 10 CREAM TOPICAL at 10:00

## 2018-02-22 RX ADMIN — Medication 1 G: at 10:00

## 2018-02-22 ASSESSMENT — PAIN SCALES - GENERAL: PAINLEVEL_OUTOF10: 7

## 2018-02-22 NOTE — ED NOTES
"Chief Complaint   Patient presents with   • Burn     spilled coffee on lab last Friday     /63   Pulse 82   Temp 37.1 °C (98.7 °F)   Resp 16   Ht 1.575 m (5' 2\")   Wt 99.3 kg (219 lb)   SpO2 93%   BMI 40.06 kg/m²     "

## 2018-02-22 NOTE — ED NOTES
Silvadene applied and wound dressed with gauze. Pt discharged in good condition with 2 prescriptions and follow up instructions. Pt verbalizes understanding, will follow up with PMD on Monday as scheduled and will return for any signs of infection.

## 2018-02-22 NOTE — DISCHARGE INSTRUCTIONS
Burn Care  Your skin is a natural barrier to infection. It is the largest organ of your body. Burns damage this natural protection. To help prevent infection, it is very important to follow your caregiver's instructions in the care of your burn.  Burns are classified as:  · First degree. There is only redness of the skin (erythema). No scarring is expected.  · Second degree. There is blistering of the skin. Scarring may occur with deeper burns.  · Third degree. All layers of the skin are injured, and scarring is expected.  HOME CARE INSTRUCTIONS   · Wash your hands well before changing your bandage.  · Change your bandage as often as directed by your caregiver.  ¨ Remove the old bandage. If the bandage sticks, you may soak it off with cool, clean water.  ¨ Cleanse the burn thoroughly but gently with mild soap and water.  ¨ Pat the area dry with a clean, dry cloth.  ¨ Apply a thin layer of antibacterial cream to the burn.  ¨ Apply a clean bandage as instructed by your caregiver.  ¨ Keep the bandage as clean and dry as possible.  · Elevate the affected area for the first 24 hours, then as instructed by your caregiver.  · Only take over-the-counter or prescription medicines for pain, discomfort, or fever as directed by your caregiver.  SEEK IMMEDIATE MEDICAL CARE IF:   · You develop excessive pain.  · You develop redness, tenderness, swelling, or red streaks near the burn.  · The burned area develops yellowish-white fluid (pus) or a bad smell.  · You have a fever.  MAKE SURE YOU:   · Understand these instructions.  · Will watch your condition.  · Will get help right away if you are not doing well or get worse.     This information is not intended to replace advice given to you by your health care provider. Make sure you discuss any questions you have with your health care provider.     Document Released: 12/18/2006 Document Revised: 03/11/2013 Document Reviewed: 05/09/2012  [employer] Interactive Patient Education  ©2016 [employer]  Second-Degree Burn  A second-degree burn affects the 2 outer layers of skin. The outer layer (epidermis) and the layer underneath it (dermis) are both burned. Another name for this type of burn is a partial thickness burn. A second-degree burn may be called minor or major. This depends on the size of the burn. It also depends on what parts of the skin are burned. Minor burns may be treated with first aid. Major burns are a medical emergency.  A second-degree burn is worse than a first-degree burn, but not as bad as a third-degree burn. A first-degree burn affects only the epidermis. A third-degree burn goes through all the layers of skin. A second-degree burn usually heals in 3 to 4 weeks. A minor second-degree burn usually does not leave a scar. Deeper second-degree burns may lead to scarring of the skin or contractures over joints. Contractures are scars that form over joints and may lead to reduced mobility at those joints.  CAUSES  · Heat (thermal) injury. This happens when skin comes in contact with something very hot. It could be a flame, a hot object, hot liquid, or steam. Most second-degree burns are thermal injuries.  · Radiation. Sunlight is one type of radiation that can burn the skin. Another type of radiation is used to heat food. Radiation is also used to treat some diseases, such as cancer. All types of radiation can burn the skin. Sunlight usually causes a first-degree burn. Radiation used for heating food or treating a disease can cause a second-degree burn.  · Electricity. Electrical burns can cause more damage under the skin than on the surface. They should always be treated as major burns.  · Chemicals. Many chemicals can burn the skin. The burn should be flushed with cool water and checked by an emergency caregiver.  SYMPTOMS  Symptoms of second-degree burns include:  · Severe pain.  · Extreme tenderness.  · Deep redness.  · Blistered skin.  · Skin that has changed color. It  might look blotchy, wet, or shiny.  · Swelling.  TREATMENT  Some second-degree burns may need to be treated in a hospital. These include major burns, electrical burns, and chemical burns. Many other second-degree burns can be treated with regular first aid, such as:  · Cooling the burn. Use cool, germ-free (sterile) salt water. Place the burned area of skin into a tub of water, or cover the burned area with clean, wet towels.  · Taking pain medicine.  · Removing the dead skin from broken blisters. A trained caregiver may do this. Do not pop blisters.  · Gently washing your skin with mild soap.  · Covering the burned area with a cream. Silver sulfadiazine is a cream for burns. An antibiotic cream, such as bacitracin, may also be used to fight infection. Do not use other ointments or creams unless your caregiver says it is okay.  · Protecting the burn with a sterile, non-sticky bandage.  · Bandaging fingers and toes separately. This keeps them from sticking together.  · Taking an antibiotic. This can help prevent infection.  · Getting a tetanus shot.  HOME CARE INSTRUCTIONS  Medication  · Take any medicine prescribed by your caregiver. Follow the directions carefully.  · Ask your caregiver if you can take over-the-counter medicine to relieve pain and swelling. Do not give aspirin to children.  · Make sure your caregiver knows about all other medicines you take. This includes over-the-counter medicines.  Burn care  · You will need to change the bandage on your burn. You may need to do this 2 or 3 times each day.  ¨ Gently clean the burned area.  ¨ Put ointment on it.  ¨ Cover the burn with a sterile bandage.  · For some deeper burns or burns that cover a large area, compression garments may be prescribed. These garments can help minimize scarring and protect your mobility.  · Do not put butter or oil on your skin. Use only the cream prescribed by your caregiver.  · Do not put ice on your burn.  · Do not break blisters  on your skin.  · Keep the bandaged area dry. You might need to take a sponge bath for awhile. Ask your caregiver when you can take a shower or a tub bath again.  · Do not scratch an itchy burn. Your caregiver may give you medicine to relieve very bad itching.  · Infection is a big danger after a second-degree burn. Tell your caregiver right away if you have signs of infection, such as:  ¨ Redness or changing color in the burned area.  ¨ Fluid leaking from the burn.  ¨ Swelling in the burn area.  ¨ A bad smell coming from the wound.  Follow-up  · Keep all follow-up appointments. This is important. This is how your caregiver can tell if your treatment is working.  · Protect your burn from sunlight. Use sunscreen whenever you go outside. Burned areas may be sensitive to the sun for up to 1 year. Exposure to the sun may also cause permanent darkening of scars.  SEEK MEDICAL CARE IF:  · You have any questions about medicines.  · You have any questions about your treatment.  · You wonder if it is okay to do a particular activity.  · You develop a fever of more than 100.5° F (38.1° C).  SEEK IMMEDIATE MEDICAL CARE IF:  · You think your burn might be infected. It may change color, become red, leak fluid, swell, or smell bad.  · You develop a fever of more than 102° F (38.9° C).     This information is not intended to replace advice given to you by your health care provider. Make sure you discuss any questions you have with your health care provider.     Document Released: 05/21/2012 Document Revised: 03/11/2013 Document Reviewed: 05/21/2012  [employer] Interactive Patient Education ©2016 [employer]
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
## 2018-02-23 ENCOUNTER — PATIENT OUTREACH (OUTPATIENT)
Dept: HEALTH INFORMATION MANAGEMENT | Facility: OTHER | Age: 78
End: 2018-02-23

## 2018-02-24 PROBLEM — T21.22XA: Status: ACTIVE | Noted: 2018-02-24
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
## 2018-02-26 ENCOUNTER — OFFICE VISIT (OUTPATIENT)
Dept: MEDICAL GROUP | Age: 78
End: 2018-02-26
Payer: MEDICARE

## 2018-02-26 VITALS
SYSTOLIC BLOOD PRESSURE: 145 MMHG | TEMPERATURE: 98.5 F | HEART RATE: 93 BPM | BODY MASS INDEX: 36.96 KG/M2 | DIASTOLIC BLOOD PRESSURE: 70 MMHG | HEIGHT: 65 IN | OXYGEN SATURATION: 90 % | WEIGHT: 221.8 LBS

## 2018-02-26 DIAGNOSIS — T21.22XS: ICD-10-CM

## 2018-02-26 DIAGNOSIS — Z91.81 RISK FOR FALLS: ICD-10-CM

## 2018-02-26 PROCEDURE — 99214 OFFICE O/P EST MOD 30 MIN: CPT | Performed by: INTERNAL MEDICINE

## 2018-02-26 ASSESSMENT — ACTIVITIES OF DAILY LIVING (ADL): BATHING_REQUIRES_ASSISTANCE: 0

## 2018-02-26 ASSESSMENT — PATIENT HEALTH QUESTIONNAIRE - PHQ9: CLINICAL INTERPRETATION OF PHQ2 SCORE: 0
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
## 2018-02-26 NOTE — ASSESSMENT & PLAN NOTE
Patient is advised to try her best to eat better and walking exercise if she feels better on her wound to lose weight. Patient agrees with the plan.

## 2018-02-28 ENCOUNTER — TELEPHONE (OUTPATIENT)
Dept: PULMONOLOGY | Facility: HOSPICE | Age: 78
End: 2018-02-28

## 2018-02-28 NOTE — TELEPHONE ENCOUNTER
Pt called in regards to her Cpap  Order. She has not heard from [facility] and her order was sent 1/24/18. I called [facility] and they stated that they had attempted to call pt multiple times. I verified the phone number. One of which was incorrect. They have stated they will call again. I have called pt back and relayed message. I also gave her their number if they dont hear back from them by the end of the day

## 2018-03-05 ENCOUNTER — ANTICOAGULATION VISIT (OUTPATIENT)
Dept: MEDICAL GROUP | Facility: MEDICAL CENTER | Age: 78
End: 2018-03-05
Payer: MEDICARE

## 2018-03-05 ENCOUNTER — PATIENT MESSAGE (OUTPATIENT)
Dept: PULMONOLOGY | Facility: HOSPICE | Age: 78
End: 2018-03-05

## 2018-03-05 VITALS — DIASTOLIC BLOOD PRESSURE: 59 MMHG | HEART RATE: 79 BPM | HEIGHT: 65 IN | SYSTOLIC BLOOD PRESSURE: 131 MMHG

## 2018-03-05 DIAGNOSIS — Z86.711 PERSONAL HISTORY OF PULMONARY EMBOLISM: ICD-10-CM

## 2018-03-05 DIAGNOSIS — Z79.01 LONG TERM CURRENT USE OF ANTICOAGULANT THERAPY: ICD-10-CM

## 2018-03-05 DIAGNOSIS — Z79.01 CHRONIC ANTICOAGULATION: ICD-10-CM

## 2018-03-05 LAB — INR PPP: 1.8 (ref 2–3.5)

## 2018-03-05 PROCEDURE — 85610 PROTHROMBIN TIME: CPT | Performed by: INTERNAL MEDICINE

## 2018-03-05 PROCEDURE — 99211 OFF/OP EST MAY X REQ PHY/QHP: CPT | Performed by: INTERNAL MEDICINE

## 2018-03-05 NOTE — TELEPHONE ENCOUNTER
From: Natalie Anaya  To: Isabella Irvin M.D.  Sent: 3/5/2018 2:19 PM PST  Subject: Procedure Question    I HAVE BEEN UNABLE TO GET IN TOUCH WITH [facility]. I SAW YOU THE 14TH OF JANUARY AND HAVE NOT HEARD FROM THEM. IF I NEED A CPAC MACHINE, SHOULDN`T I HAVE IT BY NOW. PLEASE GIVE THEM MY HOME PHONE NUMBER [phone]. I DO NOT USE MY CELL PHONE. THANK YOU.

## 2018-03-05 NOTE — PROGRESS NOTES
Anticoagulation Summary  As of 3/5/2018    INR goal:   2.0-3.0   TTR:   60.2 % (1.3 y)   Today's INR:   1.8!   Maintenance plan:   10 mg (5 mg x 2) on Sun, Thu; 20 mg (5 mg x 4) all other days   Weekly total:   120 mg   Plan last modified:   Darwin BurtD (1/11/2018)   Next INR check:   4/2/2018   Target end date:       Indications    Chronic anticoagulation [Z79.01]  Personal history of pulmonary embolism [Z86.711]  Long term (current) use of anticoagulants [Z79.01] [Z79.01]             Anticoagulation Episode Summary     INR check location:       [facility]:       Send INR reminders to:       Comments:         Anticoagulation Care Providers     Provider Role Specialty Phone number    Ninfa Marcial M.D. Referring Internal Medicine [phone]    [facility] Anticoagulation Services Responsible  [phone]        Anticoagulation Patient Findings    History of Present Illness: follow up appointment for chronic anticoagulation with the high risk medication, warfarin for pulmonary embolism.    Pt was at goal, she may have missed a dose, so we will bolus does with 25mg tonight, then resume current dosing regimen. Follow up in 4 weeks, to reduce risk of adverse events related to this high risk medication,  Warfarin.    Maty Perkins, DarwinD

## 2018-03-07 ENCOUNTER — TELEPHONE (OUTPATIENT)
Dept: PULMONOLOGY | Facility: HOSPICE | Age: 78
End: 2018-03-07

## 2018-03-07 DIAGNOSIS — G47.33 OBSTRUCTIVE SLEEP APNEA: ICD-10-CM

## 2018-03-08 NOTE — TELEPHONE ENCOUNTER
Order faxed to [facility]  Notified patient via [employer] that [facility] now has everything they need including the correct contact #   Also gave her [facility] # to call if she does not hear anything

## 2018-03-21 ENCOUNTER — APPOINTMENT (OUTPATIENT)
Dept: WOUND CARE | Facility: MEDICAL CENTER | Age: 78
End: 2018-03-21
Attending: INTERNAL MEDICINE
Payer: MEDICARE

## 2018-04-02 ENCOUNTER — ANTICOAGULATION VISIT (OUTPATIENT)
Dept: MEDICAL GROUP | Facility: MEDICAL CENTER | Age: 78
End: 2018-04-02
Payer: MEDICARE

## 2018-04-02 VITALS — SYSTOLIC BLOOD PRESSURE: 138 MMHG | DIASTOLIC BLOOD PRESSURE: 56 MMHG | HEART RATE: 75 BPM

## 2018-04-02 DIAGNOSIS — Z79.01 CHRONIC ANTICOAGULATION: ICD-10-CM

## 2018-04-02 DIAGNOSIS — Z86.711 PERSONAL HISTORY OF PULMONARY EMBOLISM: ICD-10-CM

## 2018-04-02 DIAGNOSIS — Z79.01 LONG TERM CURRENT USE OF ANTICOAGULANT THERAPY: ICD-10-CM

## 2018-04-02 LAB — INR PPP: 2.5 (ref 2–3.5)

## 2018-04-02 PROCEDURE — 85610 PROTHROMBIN TIME: CPT | Performed by: FAMILY MEDICINE

## 2018-04-02 PROCEDURE — 99211 OFF/OP EST MAY X REQ PHY/QHP: CPT | Performed by: FAMILY MEDICINE

## 2018-04-02 NOTE — PROGRESS NOTES
OP Anticoagulation Service Note    Date: 4/2/2018  Vitals:    04/02/18 1527   BP: 138/56   Pulse: 75         Anticoagulation Summary  As of 4/2/2018    INR goal:   2.0-3.0   TTR:   60.9 % (1.4 y)   Today's INR:   2.5   Maintenance plan:   10 mg (5 mg x 2) on Sun, Thu; 20 mg (5 mg x 4) all other days   Weekly total:   120 mg   Plan last modified:   Darwin BurtD (1/11/2018)   Next INR check:   5/7/2018   Target end date:       Indications    Chronic anticoagulation [Z79.01]  Personal history of pulmonary embolism [Z86.711]  Long term (current) use of anticoagulants [Z79.01] [Z79.01]             Anticoagulation Episode Summary     INR check location:       Preferred lab:       Send INR reminders to:       Comments:         Anticoagulation Care Providers     Provider Role Specialty Phone number    Ninfa Marcial M.D. Referring Internal Medicine [phone]    [facility] Anticoagulation Services Responsible  [phone]        Anticoagulation Patient Findings      HPI:   Natalie Anaya seen in clinic today, on anticoagulation therapy with warfarin (a high risk medication) for hx of PE      Pt is here today to evaluate anticoagulation therapy    Previous INR was  1.8 on 3-5-18    Pt was instructed to  Increase x 1 dose then increase weekly regimen    Confirmed warfarin dosing regimen, denies missed or extra doses of coumadin.   Diet has been consistent with foods rich in vitamin K: Yes  Changes in ETOH:  No  Changes in smoking status: No  Changes in medication: No   Cost restriction: No  S/s of bleeding:  No  Signs/symptoms  thrombosis since the last appt: No    A/P   INR  therapeutic today, will require close follow up as previous INR was sub-therapeutic   Continue current warfarin regimen        Pt educated to contact our clinic with any changes in medications or s/s of bleeding or thrombosis    Follow up appointment in 4 week(s) to reduce risk of adverse events from warfarin  Moira Sheridan, Pharm  D

## 2018-04-12 DIAGNOSIS — Z86.711 HISTORY OF PULMONARY EMBOLISM: ICD-10-CM

## 2018-04-12 RX ORDER — WARFARIN SODIUM 5 MG/1
TABLET ORAL
Qty: 360 TAB | Refills: 0 | Status: SHIPPED | OUTPATIENT
Start: 2018-04-12 | End: 2018-07-20 | Stop reason: SDUPTHER

## 2018-05-07 ENCOUNTER — ANTICOAGULATION VISIT (OUTPATIENT)
Dept: MEDICAL GROUP | Facility: MEDICAL CENTER | Age: 78
End: 2018-05-07
Payer: MEDICARE

## 2018-05-07 DIAGNOSIS — Z79.01 CHRONIC ANTICOAGULATION: ICD-10-CM

## 2018-05-07 DIAGNOSIS — Z79.01 LONG TERM CURRENT USE OF ANTICOAGULANT THERAPY: Primary | ICD-10-CM

## 2018-05-07 DIAGNOSIS — Z86.711 PERSONAL HISTORY OF PULMONARY EMBOLISM: ICD-10-CM

## 2018-05-07 LAB — INR PPP: 2.2 (ref 2–3.5)

## 2018-05-07 PROCEDURE — 99999 PR NO CHARGE: CPT | Performed by: FAMILY MEDICINE

## 2018-05-07 PROCEDURE — 85610 PROTHROMBIN TIME: CPT | Performed by: FAMILY MEDICINE

## 2018-05-07 NOTE — PROGRESS NOTES
Anticoagulation Summary  As of 5/7/2018    INR goal:   2.0-3.0   TTR:   --   Today's INR:   2.2   Warfarin maintenance plan:   10 mg (5 mg x 2) on Sun, Thu; 20 mg (5 mg x 4) all other days   Weekly warfarin total:   120 mg   Plan last modified:   Maty Perkins, PharmD (1/11/2018)   Next INR check:   5/21/2018   Target end date:       Indications    Chronic anticoagulation [Z79.01]  Personal history of pulmonary embolism [Z86.711]  Long term (current) use of anticoagulants [Z79.01] [Z79.01]             Anticoagulation Episode Summary     INR check location:       Preferred lab:       Send INR reminders to:       Comments:         Anticoagulation Care Providers     Provider Role Specialty Phone number    Ninfa Marcial M.D. Referring Internal Medicine [phone]    [facility] Responsible  [phone]        Anticoagulation Patient Findings      HPI:  Natalie Anaya seen in clinic today, on anticoagulation therapy with warfarin for Hx of PE  Reason for today's visit (per our collaborative practice policy) is because their last INR was 2.5 on 4/2/18. Intervention at the last visit: continue with same dosing  Changes to current medical/health status since last appt:  denies  No - signs/symptoms of bleeding and/or thrombosis since the last appt.    No - interval changes to diet or any interval changes to medications since last appt.   No - complications or cost restrictions with current therapy.   BP declined.     A/P   INR  is-therapeutic.   Possible reason(s) INR is not in range today: NA  Will have pt continue with the same warfarin dosing.     Follow up appointment in 2 weeks to reduce risk of adverse events related to this high risk medication, Warfarin.      Other info:  Pt educated to contact our clinic with any changes in medications or s/s of bleeding or thrombosis  CHEST guidelines recommend frequent INR monitoring at regular intervals (a few days up to a max of 12 weeks) to  ensure they are on the proper dose of warfarin and not having any complications from therapy. INRs can dramatically change over a short time period due to diet, medications, and medical conditions.     Jesica Muir, PharmD

## 2018-05-10 NOTE — PROGRESS NOTES
1. Attempt #: FINAL    2. [employer] Verified: no    3. Verify PCP: yes    4. Care Team Updated:       •   DME Company (gait device, O2, CPAP, etc.): YES/[facility]        •   Other Specialists (eye doctor, derm, GYN, cardiology, endo, etc): YES    5.  Reviewed/Updated the following with patient:       •   Communication Preference Obtained? YES       •   Preferred Pharmacy? YES       •   Preferred Lab? YES       •   Family History (document living status of immediate family members and if + hx of cancer, diabetes, hypertension, hyperlipidemia, heart attack, stroke) YES. Was Abstract Encounter opened and chart updated? NO    6. [employer] Activation: already active    7. [employer] Wili: yes    8. Annual Wellness Visit Scheduling  Scheduling Status:Scheduled      9. Care Gap Scheduling (Attempt to Schedule EACH Overdue Care Gap!)     There are no preventive care reminders to display for this patient.     Scheduled patient for Annual Wellness Visit    10. Patient was advised: “This is a free wellness visit. The provider will screen for medical conditions to help you stay healthy. If you have other concerns to address you may be asked to discuss these at a separate visit or there may be an additional fee.”     11. Patient was informed to arrive 15 min prior to their scheduled appointment and bring in their medication bottles.

## 2018-05-11 ENCOUNTER — SLEEP CENTER VISIT (OUTPATIENT)
Dept: SLEEP MEDICINE | Facility: MEDICAL CENTER | Age: 78
End: 2018-05-11
Payer: MEDICARE

## 2018-05-11 VITALS
BODY MASS INDEX: 36.49 KG/M2 | HEIGHT: 65 IN | HEART RATE: 66 BPM | RESPIRATION RATE: 15 BRPM | SYSTOLIC BLOOD PRESSURE: 128 MMHG | OXYGEN SATURATION: 95 % | DIASTOLIC BLOOD PRESSURE: 78 MMHG | WEIGHT: 219 LBS

## 2018-05-11 DIAGNOSIS — I26.99 RECURRENT PULMONARY EMBOLISM (HCC): ICD-10-CM

## 2018-05-11 DIAGNOSIS — Z79.01 CHRONIC ANTICOAGULATION: ICD-10-CM

## 2018-05-11 DIAGNOSIS — J96.11 CHRONIC RESPIRATORY FAILURE WITH HYPOXIA (HCC): ICD-10-CM

## 2018-05-11 DIAGNOSIS — G47.33 OSA (OBSTRUCTIVE SLEEP APNEA): ICD-10-CM

## 2018-05-11 PROCEDURE — 99213 OFFICE O/P EST LOW 20 MIN: CPT | Performed by: NURSE PRACTITIONER

## 2018-05-11 NOTE — PROGRESS NOTES
"Chief Complaint   Patient presents with   • Follow-Up     1st Compliance Check          HPI: This patient is a 77 y.o. female, who presents for follow-up of AILYN.     Home sleep study October 2017 indicates severe obstructive sleep apnea with an POLO of 39, minimum oxygen saturation of 79 %. she is compliant with CPAP 12 cm H2O with 2 L O2 bleed.  She was initiated on this after her last visit in January.  She tells me today she is doing \"terrible\" with her machine compliance download over the past 30 days indicates 100 % compliance, average use of 6 hours 55 minutes per night, AHI of 1.6.  Her mask and headgear are uncomfortable.  She is tolerating the pressure but frequently has to use the ramp-up function although this is improving.  She has not noticed any improvement in the quality of her sleep.  She feels more fatigued with CPAP use than without it.  She reports that she is fighting with the mask all night long.  She reports dry mouth.    She has a history of prior PE ×2 now chronically anticoagulated.  She tells me that both of her PE occurred after falls.  Former PFT indicate FEV1 of 1.38 L 68% of predicted, FEV1 FVC ratio of 83.  TLC 79% predicted.  DLCO is normal at 106% predicted.  Mild restrictive changes thought related to BMI.  She is scheduled to see our pulmonary physicians May 23.    Past Medical History:   Diagnosis Date   • Arthritis     Knees.   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   • Ringing in ears    • Seizure (HCC) 1982   • Sore muscles        Social History   Substance Use Topics   • Smoking status: Never Smoker   • Smokeless tobacco: Never Used   • " Alcohol use Yes       Family History   Problem Relation Age of Onset   • Cancer Mother 67     Ovarian   • Alcohol/Drug Father 60     Appenditis   • Heart Disease Father    • Cancer Maternal Grandmother      colon cancer   • Heart Disease Maternal Grandfather    • Cancer Daughter 44     melonoma       Current medications as of today   Current Outpatient Prescriptions   Medication Sig Dispense Refill   • warfarin (COUMADIN) 5 MG Tab TAKE 4 TABLETS BY MOUTH DAILY AS DIRECTED BY COUMADIN CLINIC 360 Tab 0   • silver sulfADIAZINE (SILVADENE) 1 % Cream Apply to the affected area 1 Each 3   • alendronate (FOSAMAX) 70 MG Tab Take 70 mg by mouth every Saturday.     • silver sulfADIAZINE (SILVADENE) 1 % Cream Apply to the affected area 1 Each 3   • CALCIUM PO Take 1,000 mg by mouth every day.     • warfarin (COUMADIN) 10 MG Tab Take 10-20 mg by mouth every day. 10 mg Thursday Saturday  20 mg Monday Tuesday Wednesday Friday Sunday     • potassium chloride SA (KDUR) 20 MEQ Tab CR Take 1 Tab by mouth every day. 90 Tab 3   • omeprazole (PRILOSEC) 20 MG delayed-release capsule Take 1 Cap by mouth every day. 90 Cap 3   • furosemide (LASIX) 40 MG Tab TAKE 1 TABLET BY MOUTH EVERY DAY 90 Tab 3   • carvedilol (COREG) 6.25 MG Tab TAKE 1 TABLET BY MOUTH TWICE DAILY 360 Tab 3   • levothyroxine (SYNTHROID) 100 MCG Tab TAKE 1 TABLET BY MOUTH EVERY DAY 90 Tab 3   • primidone (MYSOLINE) 250 MG Tab TAKE 1 TABLET BY MOUTH THREE TIMES DAILY 270 Tab 3   • Probiotic Product (PROBIOTIC DAILY PO) Take  by mouth every day.     • Glucos-Chond-Hyal Ac-Ca Fructo (MOVE FREE JOINT HEALTH ADVANCE) Tab Take 1 Tab by mouth every day.     • Multiple Vitamins-Minerals (WOMENS MULTI VITAMIN & MINERAL) Tab Take 1 Tab by mouth every day.     • Melatonin 500 MCG TABLET DISPERSIBLE Take 1,000-2,000 mcg by mouth at bedtime as needed (sleep).     • Cholecalciferol (VITAMIN D-3) 1000 UNITS Cap Take 2,000 Units by mouth every day.       No current facility-administered  "medications for this visit.        Allergies: Tape    Blood pressure 128/78, pulse 66, resp. rate 15, height 1.651 m (5' 5\"), weight 99.3 kg (219 lb), SpO2 95 %.      ROS: As per HPI and otherwise negative if not stated.      Physical exam:   Constitutional: Well-nourished, well-developed, in no acute distress  Eyes: PERRL  Neck: supple, trachea midline  Respiratory: no intercostal retractions or accessory muscle use   Musculoskeletal: no clubbing or cyanosis, unsteady gait uses cane  Neuro: No focal deficit noted  Psychiatric: Oriented to time, person and place.     Diagnosis:  1. Recurrent pulmonary embolism (HCC)     2. Chronic anticoagulation     3. Chronic respiratory failure with hypoxia (HCC)     4. AILYN (obstructive sleep apnea)  DME OTHER       Plan:  I reviewed with the patient the pathophysiology of obstructive sleep apnea, as well as potential cardiac and neurologic risks associated with untreated sleep apnea.  She is encouraged to continue with CPAP, she understands it can take some time to acclimate.  However if she remains intolerant or if CPAP is more disruptive than beneficial she may consider resuming nocturnal O2 alone.    1.  Mask fitting has been completed, will order a dreamwear mask  2.  She feels her current pressures are comfortable and declines pressure adjustment today.  3.  Continue CPAP nightly, may use while awake to help acclimate  4.  Follow-up in May as scheduled      "

## 2018-05-21 ENCOUNTER — ANTICOAGULATION VISIT (OUTPATIENT)
Dept: MEDICAL GROUP | Facility: MEDICAL CENTER | Age: 78
End: 2018-05-21
Payer: MEDICARE

## 2018-05-21 VITALS — SYSTOLIC BLOOD PRESSURE: 131 MMHG | HEART RATE: 80 BPM | DIASTOLIC BLOOD PRESSURE: 44 MMHG

## 2018-05-21 DIAGNOSIS — Z79.01 CHRONIC ANTICOAGULATION: ICD-10-CM

## 2018-05-21 DIAGNOSIS — Z79.01 LONG TERM CURRENT USE OF ANTICOAGULANT THERAPY: Primary | ICD-10-CM

## 2018-05-21 DIAGNOSIS — Z86.711 PERSONAL HISTORY OF PULMONARY EMBOLISM: ICD-10-CM

## 2018-05-21 LAB — INR PPP: 2.4 (ref 2–3.5)

## 2018-05-21 PROCEDURE — 85610 PROTHROMBIN TIME: CPT | Performed by: FAMILY MEDICINE

## 2018-05-21 PROCEDURE — 99999 PR NO CHARGE: CPT | Performed by: FAMILY MEDICINE

## 2018-05-22 NOTE — PROGRESS NOTES
OP Anticoagulation Service Note    Date: 5/21/2018  Vitals:    05/21/18 1542   BP: 131/44   Pulse: 80         Anticoagulation Summary  As of 5/21/2018    INR goal:   2.0-3.0   TTR:   64.4 % (1.5 y)   Today's INR:   2.4   Warfarin maintenance plan:   10 mg (5 mg x 2) on Sun, Thu; 20 mg (5 mg x 4) all other days   Weekly warfarin total:   120 mg   Plan last modified:   Maty Perkins, PharmD (1/11/2018)   Next INR check:   6/18/2018   Target end date:       Indications    Chronic anticoagulation [Z79.01]  Personal history of pulmonary embolism [Z86.711]  Long term (current) use of anticoagulants [Z79.01] [Z79.01]             Anticoagulation Episode Summary     INR check location:       Preferred lab:       Send INR reminders to:       Comments:         Anticoagulation Care Providers     Provider Role Specialty Phone number    Ninfa Marcial M.D. Referring Internal Medicine [phone]    [facility] Anticoagulation Services Responsible  [phone]        Anticoagulation Patient Findings      HPI:   Natalie Anaya seen in clinic today, on anticoagulation therapy with warfarin (a high risk medication) for hx of PE       Pt is here today to evaluate anticoagulation therapy    Previous INR was  2.2 on 5-7-18    Pt was instructed to Continue current warfarin regimen       Confirmed warfarin dosing regimen, denies missed or extra doses of coumadin.   Diet has been consistent with foods rich in vitamin K: Yes  Changes in ETOH:  No  Changes in smoking status: No  Changes in medication: No   Cost restriction: No  S/s of bleeding:  No  Signs/symptoms  thrombosis since the last appt: No    A/P   INR  therapeutic today,   Continue current warfarin regimen          Pt educated to contact our clinic with any changes in medications or s/s of bleeding or thrombosis. Pt is aware to seek immediate medical attention for falls, head injury or deep cuts    Follow up appointment in 4 week(s) to reduce risk of adverse events from  warfarin  Moira Sheridan, Pharm D

## 2018-05-23 ENCOUNTER — OFFICE VISIT (OUTPATIENT)
Dept: PULMONOLOGY | Facility: HOSPICE | Age: 78
End: 2018-05-23
Payer: MEDICARE

## 2018-05-23 VITALS
BODY MASS INDEX: 36.32 KG/M2 | RESPIRATION RATE: 16 BRPM | DIASTOLIC BLOOD PRESSURE: 80 MMHG | OXYGEN SATURATION: 96 % | SYSTOLIC BLOOD PRESSURE: 138 MMHG | TEMPERATURE: 98.7 F | HEIGHT: 65 IN | HEART RATE: 76 BPM | WEIGHT: 218 LBS

## 2018-05-23 DIAGNOSIS — Z86.711 PERSONAL HISTORY OF PULMONARY EMBOLISM: ICD-10-CM

## 2018-05-23 DIAGNOSIS — I27.20 PULMONARY HYPERTENSION (HCC): ICD-10-CM

## 2018-05-23 DIAGNOSIS — I26.99 RECURRENT PULMONARY EMBOLISM (HCC): ICD-10-CM

## 2018-05-23 DIAGNOSIS — J96.11 CHRONIC RESPIRATORY FAILURE WITH HYPOXIA (HCC): ICD-10-CM

## 2018-05-23 DIAGNOSIS — G47.33 OSA (OBSTRUCTIVE SLEEP APNEA): ICD-10-CM

## 2018-05-23 PROCEDURE — 99214 OFFICE O/P EST MOD 30 MIN: CPT | Performed by: INTERNAL MEDICINE

## 2018-05-23 NOTE — PROGRESS NOTES
Natalie Anaya is a 77 y.o. female here for pulmonary emboli, chronic hypoxemia, sleep apnea on complex treatment. Patient was referred by her primary care doctor.    History of Present Illness:      This lady was recently in the sleep center, they adjusted her mask to dream wear, she complains of significant leak. She wants to give up the system entirely.    I spent an  extensive amount of time discussing this with her and her , her hypoxemia is not corrected with overnight oxygen alone. She has polycythemia and prior pulmonary emboli. I explained to her that it is extremely important that she developed tolerance to the current system, 6 hours of sleep with an index of 1.6 were identified on recent download and compliance data.    She now agrees to try again, will visit the sleep center for mask fitting, the current complaint of leak will need to be assessed with her CHP, but also an adjustment in the mask is important. Her prior pulmonary history of pulmonary emboli as noted, remote, pulmonary hypertension are significant and she remains on lifetime anticoagulation, revisit the sleep center and we will follow her pulmonary status, hopefully the sleep center can achieve some balance with her CPAP oxygen and mask fitting      Constitutional ROS: No unexpected change in weight, No unexplained fevers  Eyes: No change in vision or blurring or double vision  Mouth/Throat ROS: No sore throat, No recent change in voice or hoarseness  Pulmonary ROS: See present history for pertinent positives  Cardiovascular ROS: No chest pain to suggest acute coronary syndrome  Gastrointestinal ROS: No abdominal pain to suggest peptic disease  Musculoskeletal/Extremities ROS: no acute artritis or unusual swelling  Hematologic/Lymphatic ROS: No easy bleeding or unusual lymph node swelling  Neurologic ROS: No new or unusual weakness  Psychiatric ROS: No hallucinations  Allergic/Immunologic: No  urticaria or allergic  rash      Current Outpatient Prescriptions   Medication Sig Dispense Refill   • warfarin (COUMADIN) 5 MG Tab TAKE 4 TABLETS BY MOUTH DAILY AS DIRECTED BY COUMADIN CLINIC 360 Tab 0   • silver sulfADIAZINE (SILVADENE) 1 % Cream Apply to the affected area 1 Each 3   • alendronate (FOSAMAX) 70 MG Tab Take 70 mg by mouth every Saturday.     • silver sulfADIAZINE (SILVADENE) 1 % Cream Apply to the affected area 1 Each 3   • CALCIUM PO Take 1,000 mg by mouth every day.     • warfarin (COUMADIN) 10 MG Tab Take 10-20 mg by mouth every day. 10 mg Thursday Saturday  20 mg Monday Tuesday Wednesday Friday Sunday     • potassium chloride SA (KDUR) 20 MEQ Tab CR Take 1 Tab by mouth every day. 90 Tab 3   • omeprazole (PRILOSEC) 20 MG delayed-release capsule Take 1 Cap by mouth every day. 90 Cap 3   • furosemide (LASIX) 40 MG Tab TAKE 1 TABLET BY MOUTH EVERY DAY 90 Tab 3   • carvedilol (COREG) 6.25 MG Tab TAKE 1 TABLET BY MOUTH TWICE DAILY 360 Tab 3   • levothyroxine (SYNTHROID) 100 MCG Tab TAKE 1 TABLET BY MOUTH EVERY DAY 90 Tab 3   • primidone (MYSOLINE) 250 MG Tab TAKE 1 TABLET BY MOUTH THREE TIMES DAILY 270 Tab 3   • Probiotic Product (PROBIOTIC DAILY PO) Take  by mouth every day.     • Glucos-Chond-Hyal Ac-Ca Fructo (MOVE FREE JOINT HEALTH ADVANCE) Tab Take 1 Tab by mouth every day.     • Multiple Vitamins-Minerals (WOMENS MULTI VITAMIN & MINERAL) Tab Take 1 Tab by mouth every day.     • Melatonin 500 MCG TABLET DISPERSIBLE Take 1,000-2,000 mcg by mouth at bedtime as needed (sleep).     • Cholecalciferol (VITAMIN D-3) 1000 UNITS Cap Take 2,000 Units by mouth every day.       No current facility-administered medications for this visit.        Social History   Substance Use Topics   • Smoking status: Never Smoker   • Smokeless tobacco: Never Used   • Alcohol use Yes        Past Medical History:   Diagnosis Date   • Arthritis     Knees.   • Bowel habit changes     Constipation   • Breath shortness    • Cataract     Bilat IOL   •  Constipation    • Epilepsy (HCC)    • Eye drainage    • Hemorrhagic disorder (HCC)    • Hiatus hernia syndrome    • Hypertension    • Hyperthyroidism    • Hypothyroid    • Influenza    • Pain     knees   • Personal history of venous thrombosis and embolism     PE from Right Shouler FX 2003.   • Ringing in ears    • Seizure (HCC) 1982   • Sore muscles        Past Surgical History:   Procedure Laterality Date   • IRRIGATION & DEBRIDEMENT HIP Left 11/17/2016    Procedure: IRRIGATION & DEBRIDEMENT HIP;  Surgeon: Maximo Garnica M.D.;  Location: SURGERY [facility];  Service:    • HIP NAILING INTRAMEDULLARY Left 10/6/2016    Procedure: HIP NAILING INTRAMEDULLARY;  Surgeon: Walt Nguyen M.D.;  Location: SURGERY [facility];  Service:    • GASTROSCOPY-ENDO N/A 4/29/2016    Procedure: GASTROSCOPY-ENDO;  Surgeon: Mikey Stanton M.D.;  Location: [facility];  Service:    • COLONOSCOPY-FLEXIBLE N/A 4/29/2016    Procedure: COLONOSCOPY-FLEXIBLE;  Surgeon: Mikey Stanton M.D.;  Location: [facility];  Service:    • CATARACT PHACO WITH IOL  8/5/2014    Performed by Lorenzo Bello M.D. at [facility]   • CATARACT PHACO WITH IOL  7/15/2014    Performed by Lorenzo Bello M.D. at [facility]   • KNEE ARTHROPLASTY TOTAL  7/9/2012    Performed by DENIZ FREDERICK at [facility]   • JOSE BY LAPAROSCOPY  9/30/2011    Performed by JOHNATHAN CRISOSTOMO at [facility]   • ARTHROSCOPY, KNEE     • CARPAL TUNNEL RELEASE     • PB REMV 2ND CATARACT,CORN-SCLER SECTN     • TONSILLECTOMY     • TUBAL LIGATION         Allergies: Tape    Family History   Problem Relation Age of Onset   • Cancer Mother 67     Ovarian   • Alcohol/Drug Father 60     Appenditis   • Heart Disease Father    • Cancer Maternal Grandmother      colon cancer   • Heart Disease Maternal Grandfather    • Cancer Daughter 44     melonoma       Physical Examination    Vitals:    05/23/18 1437  "  Height: 1.651 m (5' 5\")   Weight: 98.9 kg (218 lb)   Weight % change since last entry.: 0 %   BP: 138/80   Pulse: 76   BMI (Calculated): 36.28   Resp: 16   Temp: 37.1 °C (98.7 °F)       General Appearance: alert, no distress  Skin: Skin color, texture, turgor normal. No rashes or lesions.  Eyes: negative  Oropharynx: Lips, mucosa, and tongue normal. Teeth and gums normal. Oropharynx moist and without lesion  Lungs: positive findings: Clear and quiet  Heart: negative. RRR without murmur, gallop, or rubs.  No ectopy.  Abdomen: Abdomen soft, non-tender. . No masses,  No organomegaly  Extremities:  No deformities, edema, or skin discoloration  Joints: No acute arthritis  Peripheral Pulses:perfused  Neurologic: intact grossly  No clubbing or cyanosis    III (soft palate, base of uvula visible)    Imaging: Described above    PFTS: None presently      Assessment and Plan  1. Recurrent pulmonary embolism (HCC)    2. Pulmonary hypertension (HCC)    3. Personal history of pulmonary embolism    4. AILYN (obstructive sleep apnea)  - MASK FITTING; Future    5. Chronic respiratory failure with hypoxia (HCC)  - MASK FITTING; Future    6. BMI 36.0-36.9,adult  Portion control and gentle exercise advised  This lady was recently in the sleep center, they adjusted her mask to dream wear, she complains of significant leak. She wants to give up the system entirely.    I spent an  extensive amount of time discussing this with her and her , her hypoxemia is not corrected with overnight oxygen alone. She has polycythemia and prior pulmonary emboli. I explained to her that it is extremely important that she developed tolerance to the current system, 6 hours of sleep with an index of 1.6 were identified on recent download and compliance data.    She now agrees to try again, will visit the sleep center for mask fitting, the current complaint of leak will need to be assessed with her CHP, but also an adjustment in the mask is important. " Her prior pulmonary history of pulmonary emboli as noted, remote, pulmonary hypertension are significant and she remains on lifetime anticoagulation, revisit the sleep center and we will follow her pulmonary status, hopefully the sleep center can achieve some balance with her CPAP oxygen and mask fitting      Followup Return in about 6 months (around 11/23/2018) for follow up visit with Dr. Isabella Irvin.

## 2018-05-23 NOTE — PATIENT INSTRUCTIONS
This lady was recently in the sleep center, they adjusted her mask to dream wear, she complains of significant leak. She wants to give up the system entirely.    I spent an  extensive amount of time discussing this with her and her , her hypoxemia is not corrected with overnight oxygen alone. She has polycythemia and prior pulmonary emboli. I explained to her that it is extremely important that she developed tolerance to the current system, 6 hours of sleep with an index of 1.6 were identified on recent download and compliance data.    She now agrees to try again, will visit the sleep center for mask fitting, the current complaint of leak will need to be assessed with her CHP, but also an adjustment in the mask is important. Her prior pulmonary history of pulmonary emboli as noted, remote, pulmonary hypertension are significant and she remains on lifetime anticoagulation, revisit the sleep center and we will follow her pulmonary status, hopefully the sleep center can achieve some balance with her CPAP oxygen and mask fitting

## 2018-05-25 DIAGNOSIS — G40.909 SEIZURE DISORDER (HCC): ICD-10-CM

## 2018-05-30 RX ORDER — PRIMIDONE 250 MG/1
TABLET ORAL
Qty: 90 TAB | Refills: 0 | Status: SHIPPED | OUTPATIENT
Start: 2018-05-30 | End: 2018-07-26 | Stop reason: SDUPTHER

## 2018-06-18 ENCOUNTER — ANTICOAGULATION VISIT (OUTPATIENT)
Dept: MEDICAL GROUP | Facility: MEDICAL CENTER | Age: 78
End: 2018-06-18
Payer: MEDICARE

## 2018-06-18 ENCOUNTER — TELEPHONE (OUTPATIENT)
Dept: SLEEP MEDICINE | Facility: MEDICAL CENTER | Age: 78
End: 2018-06-18

## 2018-06-18 VITALS — DIASTOLIC BLOOD PRESSURE: 40 MMHG | SYSTOLIC BLOOD PRESSURE: 131 MMHG

## 2018-06-18 DIAGNOSIS — Z86.711 PERSONAL HISTORY OF PULMONARY EMBOLISM: ICD-10-CM

## 2018-06-18 DIAGNOSIS — Z79.01 LONG TERM CURRENT USE OF ANTICOAGULANT THERAPY: ICD-10-CM

## 2018-06-18 DIAGNOSIS — G47.33 OSA (OBSTRUCTIVE SLEEP APNEA): ICD-10-CM

## 2018-06-18 DIAGNOSIS — Z79.01 CHRONIC ANTICOAGULATION: Primary | ICD-10-CM

## 2018-06-18 LAB — INR PPP: 1.8 (ref 2–3.5)

## 2018-06-18 PROCEDURE — 99211 OFF/OP EST MAY X REQ PHY/QHP: CPT | Performed by: FAMILY MEDICINE

## 2018-06-18 PROCEDURE — 85610 PROTHROMBIN TIME: CPT | Performed by: FAMILY MEDICINE

## 2018-06-18 NOTE — TELEPHONE ENCOUNTER
M for pt to give us a call back so we can discussed what is going on with her mask.  Pt stated on the VM she left at the [facility] that she was in need of an new order for supplies. Pt also stated that both her masks are leaking and are irritating her nose.

## 2018-06-18 NOTE — PROGRESS NOTES
OP Anticoagulation Service Note    Date: 6/18/2018  Vitals:    06/18/18 1540   BP: 131/40         Anticoagulation Summary  As of 6/18/2018    INR goal:   2.0-3.0   TTR:   64.5 % (1.6 y)   Today's INR:   1.8!   Warfarin maintenance plan:   10 mg (5 mg x 2) on Sun, Thu; 20 mg (5 mg x 4) all other days   Weekly warfarin total:   120 mg   Plan last modified:   Maty Perkins, PharmD (1/11/2018)   Next INR check:   7/2/2018   Target end date:       Indications    Chronic anticoagulation [Z79.01]  Personal history of pulmonary embolism [Z86.711]  Long term (current) use of anticoagulants [Z79.01] [Z79.01]             Anticoagulation Episode Summary     INR check location:       Preferred lab:       Send INR reminders to:       Comments:         Anticoagulation Care Providers     Provider Role Specialty Phone number    Ninfa Marcial M.D. Referring Internal Medicine [phone]    [facility] Anticoagulation Services Responsible  [phone]        Anticoagulation Patient Findings      HPI:   Natalie Anaya seen in clinic today, on anticoagulation therapy with warfarin (a high risk medication) for hx of PE     Pt is here today to evaluate anticoagulation therapy    Previous INR was  2.4 on 5-21-18    Pt was instructed to Continue current warfarin regimen       Confirmed warfarin dosing regimen, Reports missed dose about 2 weeks ago  Diet has been consistent with foods rich in vitamin K: Yes  Changes in ETOH:  No  Changes in smoking status: No  Changes in medication: No   Cost restriction: No  S/s of bleeding:  No  Signs/symptoms  thrombosis since the last appt: No    A/P   INR  sub-therapeutic today, will require dose adjust ment today to prevent recurrence of thrombosis  and closer follow up.   Tonight take 25 mg then resume usual regimen        check referral    Pt educated to contact our clinic with any changes in medications or s/s of bleeding or thrombosis. Pt is aware to seek immediate medical attention  for falls, head injury or deep cuts    Follow up appointment in 2 week(s) to reduce risk of adverse events from warfarin  Moira Sheridan, PharmD

## 2018-06-22 NOTE — TELEPHONE ENCOUNTER
Called pt and spoke to her  Darryn. I just wanted to her know that I faxed over the order for her cushion to DME:  [facility] / ph [phone] / fax [phone].

## 2018-07-02 ENCOUNTER — ANTICOAGULATION VISIT (OUTPATIENT)
Dept: MEDICAL GROUP | Facility: MEDICAL CENTER | Age: 78
End: 2018-07-02
Payer: MEDICARE

## 2018-07-02 VITALS — DIASTOLIC BLOOD PRESSURE: 49 MMHG | SYSTOLIC BLOOD PRESSURE: 119 MMHG | HEART RATE: 83 BPM

## 2018-07-02 DIAGNOSIS — Z86.711 PERSONAL HISTORY OF PULMONARY EMBOLISM: ICD-10-CM

## 2018-07-02 DIAGNOSIS — Z79.01 LONG TERM CURRENT USE OF ANTICOAGULANT THERAPY: ICD-10-CM

## 2018-07-02 DIAGNOSIS — Z79.01 CHRONIC ANTICOAGULATION: Primary | ICD-10-CM

## 2018-07-02 LAB — INR PPP: 2.3 (ref 2–3.5)

## 2018-07-02 PROCEDURE — 99999 PR NO CHARGE: CPT | Performed by: PHYSICIAN ASSISTANT

## 2018-07-02 PROCEDURE — 85610 PROTHROMBIN TIME: CPT | Performed by: PHYSICIAN ASSISTANT

## 2018-07-02 NOTE — PROGRESS NOTES
OP Anticoagulation Service Note    Date: 7/2/2018  Vitals:    07/02/18 1536   BP: 119/49   Pulse: 83       Anticoagulation Summary  As of 7/2/2018    INR goal:   2.0-3.0   TTR:   64.4 % (1.6 y)   Today's INR:   2.3   Warfarin maintenance plan:   10 mg (5 mg x 2) on Sun, Thu; 20 mg (5 mg x 4) all other days   Weekly warfarin total:   120 mg   Plan last modified:   Maty Perkins, PharmD (1/11/2018)   Next INR check:   7/30/2018   Target end date:       Indications    Chronic anticoagulation [Z79.01]  Personal history of pulmonary embolism [Z86.711]  Long term (current) use of anticoagulants [Z79.01] [Z79.01]             Anticoagulation Episode Summary     INR check location:       Preferred lab:       Send INR reminders to:       Comments:         Anticoagulation Care Providers     Provider Role Specialty Phone number    Ninfa Marcial M.D. Referring Internal Medicine [phone]    [facility] Anticoagulation Services Responsible  [phone]        Anticoagulation Patient Findings      HPI:   Natalie Anaya seen in clinic today, on anticoagulation therapy with warfarin (a high risk medication) for hx of PE      Pt is here today to evaluate anticoagulation therapy    Previous INR was  1.8 on 6-    Pt was instructed to increase x 1 dose then resume usual regimen    Confirmed warfarin dosing regimen, denies missed or extra doses of coumadin.   Diet has been consistent with foods rich in vitamin K: Yes  Changes in ETOH:  No  Changes in smoking status: No  Changes in medication: No   Cost restriction: No  S/s of bleeding:  No  Signs/symptoms  thrombosis since the last appt: No    A/P   INR  therapeutic today, will require close follow up as previous INR was sub-therapeutic   Continue current warfarin regimen           check referral    Pt educated to contact our clinic with any changes in medications or s/s of bleeding or thrombosis. Pt is aware to seek immediate medical attention for falls, head  injury or deep cuts    Follow up appointment in 4 week(s) to reduce risk of adverse events from warfarin   Moira Sheridan, PharmD

## 2018-07-16 ENCOUNTER — HOSPITAL ENCOUNTER (OUTPATIENT)
Dept: LAB | Facility: MEDICAL CENTER | Age: 78
End: 2018-07-16
Attending: INTERNAL MEDICINE
Payer: MEDICARE

## 2018-07-16 ENCOUNTER — OFFICE VISIT (OUTPATIENT)
Dept: MEDICAL GROUP | Age: 78
End: 2018-07-16
Payer: MEDICARE

## 2018-07-16 VITALS
HEART RATE: 65 BPM | WEIGHT: 220 LBS | BODY MASS INDEX: 38.98 KG/M2 | HEIGHT: 63 IN | DIASTOLIC BLOOD PRESSURE: 78 MMHG | SYSTOLIC BLOOD PRESSURE: 128 MMHG | OXYGEN SATURATION: 96 % | TEMPERATURE: 98.7 F

## 2018-07-16 DIAGNOSIS — R19.5 DARK STOOLS: ICD-10-CM

## 2018-07-16 DIAGNOSIS — I27.21 SECONDARY PULMONARY ARTERIAL HYPERTENSION (HCC): ICD-10-CM

## 2018-07-16 DIAGNOSIS — R19.5 LOOSE STOOLS: ICD-10-CM

## 2018-07-16 DIAGNOSIS — J96.11 CHRONIC RESPIRATORY FAILURE WITH HYPOXIA (HCC): ICD-10-CM

## 2018-07-16 DIAGNOSIS — S32.010D COMPRESSION FRACTURE OF FIRST LUMBAR VERTEBRA WITH ROUTINE HEALING: ICD-10-CM

## 2018-07-16 DIAGNOSIS — G40.909 SEIZURE DISORDER (HCC): ICD-10-CM

## 2018-07-16 DIAGNOSIS — S22.000D CLOSED COMPRESSION FRACTURE OF THORACIC VERTEBRA WITH ROUTINE HEALING, SUBSEQUENT ENCOUNTER: ICD-10-CM

## 2018-07-16 DIAGNOSIS — G47.33 OBSTRUCTIVE SLEEP APNEA: ICD-10-CM

## 2018-07-16 DIAGNOSIS — I26.99 RECURRENT PULMONARY EMBOLISM (HCC): ICD-10-CM

## 2018-07-16 PROBLEM — S22.000A CLOSED COMPRESSION FRACTURE OF THORACIC VERTEBRA (HCC): Status: ACTIVE | Noted: 2018-07-16

## 2018-07-16 PROBLEM — I27.29 OTHER SECONDARY PULMONARY HYPERTENSION (HCC): Status: ACTIVE | Noted: 2017-03-15

## 2018-07-16 PROCEDURE — 36415 COLL VENOUS BLD VENIPUNCTURE: CPT

## 2018-07-16 PROCEDURE — 80048 BASIC METABOLIC PNL TOTAL CA: CPT

## 2018-07-16 PROCEDURE — 85025 COMPLETE CBC W/AUTO DIFF WBC: CPT

## 2018-07-16 PROCEDURE — 99214 OFFICE O/P EST MOD 30 MIN: CPT | Performed by: INTERNAL MEDICINE

## 2018-07-17 ENCOUNTER — TELEPHONE (OUTPATIENT)
Dept: MEDICAL GROUP | Age: 78
End: 2018-07-17

## 2018-07-17 DIAGNOSIS — D64.9 NORMOCYTIC ANEMIA: ICD-10-CM

## 2018-07-17 DIAGNOSIS — R19.5 LOOSE STOOLS: ICD-10-CM

## 2018-07-17 DIAGNOSIS — R19.5 DARK STOOLS: ICD-10-CM

## 2018-07-17 LAB
ANION GAP SERPL CALC-SCNC: 10 MMOL/L (ref 0–11.9)
BASOPHILS # BLD AUTO: 0.9 % (ref 0–1.8)
BASOPHILS # BLD: 0.04 K/UL (ref 0–0.12)
BUN SERPL-MCNC: 15 MG/DL (ref 8–22)
CALCIUM SERPL-MCNC: 9 MG/DL (ref 8.5–10.5)
CHLORIDE SERPL-SCNC: 99 MMOL/L (ref 96–112)
CO2 SERPL-SCNC: 30 MMOL/L (ref 20–33)
CREAT SERPL-MCNC: 0.55 MG/DL (ref 0.5–1.4)
EOSINOPHIL # BLD AUTO: 0.14 K/UL (ref 0–0.51)
EOSINOPHIL NFR BLD: 3.2 % (ref 0–6.9)
ERYTHROCYTE [DISTWIDTH] IN BLOOD BY AUTOMATED COUNT: 46.8 FL (ref 35.9–50)
GLUCOSE SERPL-MCNC: 99 MG/DL (ref 65–99)
HCT VFR BLD AUTO: 32.8 % (ref 37–47)
HGB BLD-MCNC: 10.5 G/DL (ref 12–16)
IMM GRANULOCYTES # BLD AUTO: 0.01 K/UL (ref 0–0.11)
IMM GRANULOCYTES NFR BLD AUTO: 0.2 % (ref 0–0.9)
LYMPHOCYTES # BLD AUTO: 1.12 K/UL (ref 1–4.8)
LYMPHOCYTES NFR BLD: 25.6 % (ref 22–41)
MCH RBC QN AUTO: 26.7 PG (ref 27–33)
MCHC RBC AUTO-ENTMCNC: 32 G/DL (ref 33.6–35)
MCV RBC AUTO: 83.5 FL (ref 81.4–97.8)
MONOCYTES # BLD AUTO: 0.4 K/UL (ref 0–0.85)
MONOCYTES NFR BLD AUTO: 9.2 % (ref 0–13.4)
NEUTROPHILS # BLD AUTO: 2.66 K/UL (ref 2–7.15)
NEUTROPHILS NFR BLD: 60.9 % (ref 44–72)
NRBC # BLD AUTO: 0 K/UL
NRBC BLD-RTO: 0 /100 WBC
PLATELET # BLD AUTO: 355 K/UL (ref 164–446)
PMV BLD AUTO: 8.9 FL (ref 9–12.9)
POTASSIUM SERPL-SCNC: 3.8 MMOL/L (ref 3.6–5.5)
RBC # BLD AUTO: 3.93 M/UL (ref 4.2–5.4)
SODIUM SERPL-SCNC: 139 MMOL/L (ref 135–145)
WBC # BLD AUTO: 4.4 K/UL (ref 4.8–10.8)

## 2018-07-17 NOTE — TELEPHONE ENCOUNTER
Phone Number Called: [phone] (home)       Message: Patient informed.    Left Message for patient to call back: no

## 2018-07-17 NOTE — ASSESSMENT & PLAN NOTE
Patient stated that she used CPAP machine since March 2018.  She did not like CPAP machine but she tries to wear that every night.  She also uses oxygen 2 L at night with CPAP machine as prescribed by pulmonologist.

## 2018-07-17 NOTE — ASSESSMENT & PLAN NOTE
Patient has old first lumbar vertebral fracture found in MRI lumbar spine on 5/30/17.  She does not have any acute pain currently.  She takes Tylenol 500 mg 2 tablets daily as needed if she has pain.

## 2018-07-17 NOTE — PROGRESS NOTES
Subjective:   Natalie Anaya is a 77 y.o. female here today for evaluation and management of:      Loose stools  Patient reported that she started having watery stool since last Wednesday.  She took amoxicillin 500 mg 4 tablets prior to dental procedure couple weeks ago.  She denied traveling out of state or eating unusual food or sick contacts at home.  She stated that she used to constipate as she started having loose stool 3 times a day for about a week.  She denied abdominal pain or nausea or vomiting or decreased appetite.  She reported that her stool color is very dark black but she did not notice bright red blood.  She is taking Coumadin for recurrent pulmonary embolisms.  She denied dizziness or low blood pressure or palpitation or chest pain or shortness of breath.  She tried Pepto-Bismol from over-the-counter but her symptoms did not improve.  She is advised to stop taking Pepto-Bismol as she is taking Coumadin.    Seizure disorder (HCC)  Patient is taking primidone 250 mg daily.  She does not have any recurrent seizure.  She denies side effects from taking primidone.    Recurrent pulmonary embolism (CMS-HCC)  Patient has recurrent pulmonary embolus and she needs to take Coumadin chronically.  Patient denied side effects from taking Coumadin.  She has loose stool started 1 week ago with dark black color.  We discussed to check stool fit test to see any blood inside her stool.    Secondary pulmonary arterial hypertension (HCC)  Patient is taking Lasix 40 mg daily and carvedilol 6.25 mg twice daily.  Her symptom is stable and well controlled.  She denied shortness of breath or chest discomfort.    Obstructive sleep apnea, presumptive with nocturnal hypoxemia  Patient stated that she used CPAP machine since March 2018.  She did not like CPAP machine but she tries to wear that every night.  She also uses oxygen 2 L at night with CPAP machine as prescribed by pulmonologist.    Class 2 obesity due to  excess calories with serious comorbidity and body mass index (BMI) of 38.0 to 38.9 in adult  Patient is not able to do regular physical exercise.  She stated that she tried to eat healthy but is not very compliance with diet.  She is advised to do walking exercise with a walker or a cane as tolerated.    Chronic respiratory failure with hypoxia (CMS-HCC) (HCC)  Patient has pulmonary hypertension and chronic respiratory failure with hypoxia.  She is using oxygen 2 L every night as prescribed by pulmonologist.  She denied shortness of breath or chest tightness or chest pain or chest congestion.    Compression fracture of first lumbar vertebra with routine healing  Patient has old first lumbar vertebral fracture found in MRI lumbar spine on 5/30/17.  She does not have any acute pain currently.  She takes Tylenol 500 mg 2 tablets daily as needed if she has pain.    Closed compression fracture of thoracic vertebra ([facility])  Patient has old wedge thoracic spine fracture found on CT chest done on 9/23/16.  She does not have any acute pain currently.  Patient takes Tylenol 2 tablets once a day as needed  if she has joint pain or back pain.         Current medicines (including changes today)  Current Outpatient Prescriptions   Medication Sig Dispense Refill   • primidone (MYSOLINE) 250 MG Tab TAKE 1 TABLET BY MOUTH THREE TIMES DAILY 90 Tab 0   • warfarin (COUMADIN) 5 MG Tab TAKE 4 TABLETS BY MOUTH DAILY AS DIRECTED BY COUMADIN CLINIC 360 Tab 0   • alendronate (FOSAMAX) 70 MG Tab Take 70 mg by mouth every Sunday.     • CALCIUM PO Take 1,000 mg by mouth every day.     • warfarin (COUMADIN) 10 MG Tab Take 10-20 mg by mouth every day. 10 mg Thursday Saturday  20 mg Monday Tuesday Wednesday Friday Sunday     • potassium chloride SA (KDUR) 20 MEQ Tab CR Take 1 Tab by mouth every day. 90 Tab 3   • omeprazole (PRILOSEC) 20 MG delayed-release capsule Take 1 Cap by mouth every day. 90 Cap 3   • furosemide (LASIX) 40 MG Tab TAKE 1 TABLET  "BY MOUTH EVERY DAY 90 Tab 3   • carvedilol (COREG) 6.25 MG Tab TAKE 1 TABLET BY MOUTH TWICE DAILY 360 Tab 3   • levothyroxine (SYNTHROID) 100 MCG Tab TAKE 1 TABLET BY MOUTH EVERY DAY 90 Tab 3   • Probiotic Product (PROBIOTIC DAILY PO) Take  by mouth every day.     • Glucos-Chond-Hyal Ac-Ca Fructo (MOVE FREE JOINT HEALTH ADVANCE) Tab Take 1 Tab by mouth every day.     • Multiple Vitamins-Minerals (WOMENS MULTI VITAMIN & MINERAL) Tab Take 1 Tab by mouth every day.     • Melatonin 500 MCG TABLET DISPERSIBLE Take 1,000-2,000 mcg by mouth at bedtime as needed (sleep).     • Cholecalciferol (VITAMIN D-3) 1000 UNITS Cap Take 2,000 Units by mouth every day.       No current facility-administered medications for this visit.      She  has a past medical history of Arthritis; Bowel habit changes; Breath shortness; Cataract; Constipation; Epilepsy (HCC); Eye drainage; Hemorrhagic disorder (HCC); Hiatus hernia syndrome; Hypertension; Hyperthyroidism; Hypothyroid; Influenza; Pain; Personal history of venous thrombosis and embolism; Ringing in ears; Seizure ([facility]) (1982); and Sore muscles.    ROS   No chest pain, no shortness of breath, no abdominal pain       Objective:     Blood pressure 128/78, pulse 65, temperature 37.1 °C (98.7 °F), height 1.608 m (5' 3.3\"), weight 99.8 kg (220 lb), SpO2 96 %. Body mass index is 38.6 kg/m².   Physical Exam:  General: Alert, oriented and no acute distress.  Eye contact is good, speech goal directed, affect calm  HEENT: conjunctiva non-injected, sclera non-icteric.  Oral mucous membranes pink and moist with no lesions.  Pinna normal.  Lungs: Normal respiratory effort, clear to auscultation bilaterally with good excursion.  CV: regular rate and rhythm. No murmurs.  Abdomen: soft, non distended, nontender, Bowel sound normal.  Ext: no edema, color normal, vascularity normal, temperature normal        Assessment and Plan:   The following treatment plan was discussed     1. Loose stools  - No signs " of acute abdomen on exam.  Recommend to stop taking Pepto-Bismol.  Advised to try BRAT diet.  Recommend to increase water intake and try Pedialyte to keep electrolytes balanced and hydration.  - Ordered stool test for further evaluation.  Advised to do CBC and BMP to follow-up on electrolytes and kidney function as well as to rule out anemia or leukocytosis.  - Patient is advised to go to ER if she has worsening diarrhea or abdominal pain or blood in stool or fever or chills or nausea or vomiting.  - Will advise for blood test results and stool test results.-   - CDIFF BY PCR; Future  - CULTURE STOOL; Future  - STOOL WBC'S; Future  - OCCULT BLOOD FECES IMMUNOASSAY; Future  - CBC WITH DIFFERENTIAL; Future  - BASIC METABOLIC PANEL; Future    2. Dark stools  - Patient reported having dark black stool.  She is taking Coumadin currently.  She denies taking NSAIDs.  She denied abdominal pain.  Ordered stool fit test to evaluate for GI bleed.  - OCCULT BLOOD FECES IMMUNOASSAY; Future  - CBC WITH DIFFERENTIAL; Future  - BASIC METABOLIC PANEL; Future    3. Obstructive sleep apnea, presumptive with nocturnal hypoxemia  -Stable.  Continue CPAP machine at home.    4. Chronic respiratory failure with hypoxia (HCC)  - Chronic and stable.  Continue oxygen 2 L at night as prescribed by pulmonologist.    5. Seizure disorder (HCC)  - Well-controlled. Continue current regimens, primidone 250 mg once a day.    6. Class 2 obesity due to excess calories with serious comorbidity and body mass index (BMI) of 38.0 to 38.9 in adult  - Counseled for healthy diet and regular physical exercise to lose weight.  - Patient identified as having weight management issue.  Appropriate orders and counseling given.    7. Recurrent pulmonary embolism (HCC)  - Chronic and stable.  Continue Coumadin as instructed by anticoagulation clinic.     8. Secondary pulmonary arterial hypertension (HCC)  - Well-controlled.  Continue carvedilol 6.25 mg twice daily  and Lasix 40 mg daily.  Advised to stop taking Lasix if she has low blood pressure or worsening diarrhea.  Patient states that she still want to continue Lasix as she has normal blood pressure and she still has swelling on the lower extremity.  Patient is advised to take potassium 20 mEq twice a day.  Patient is advised to keep well hydration with fluid, Pedialyte and water.    9. Compression fracture of first lumbar vertebra with routine healing  - Chronic and asymptomatic.  Continue to monitor.  Patient can take Tylenol arthritis 1 tablet to 2 tablets once a day as needed.    10. Closed compression fracture of thoracic vertebra with routine healing, subsequent encounter  - Chronic and asymptomatic.  Continue to monitor.  Patient may take Tylenol arthritis 1-2 tablets once a day as needed.    Face-to-face time spent 30 minutes with patient and more than half of that time spent for counseling and cooperating of care for medical problems listed above.     Followup: Return in about 2 weeks (around 8/1/2018) for Diarrhea, hypertension,.      Please note that this dictation was created using voice recognition software. I have made every reasonable attempt to correct obvious errors, but I expect that there may have unintended errors in text, spelling, punctuation, or grammar that I did not discover.

## 2018-07-17 NOTE — TELEPHONE ENCOUNTER
----- Message from Ninfa Marcial M.D. sent at 7/17/2018  9:13 AM PDT -----  Please call to inform patient that her recent blood tests showed that she has slightly anemia with hemoglobin at 10.5.  She has dark blood stool for 1 week and she is taking Coumadin.  I concerned that she I may have bleeding in her stomach.  I will refer her to GI.  Please advise patient to do stool study as ordered.  Please advise patient to go to ER if she feels worsening diarrhea or low blood pressure or dizziness or chest pain or shortness of breath or bright red blood per rectum.  It is better to go to ER quickly if she has any worsening symptoms or concerning of blood in stool or bleeding from stomach.    Referral to GI was placed gently.  She will receive a call from referral coordinator in 1 week.    I will recommend her to repeat CBC to follow-up on her anemia in 1-2 weeks.      Ninfa Marcial M.D.

## 2018-07-17 NOTE — ASSESSMENT & PLAN NOTE
Patient is not able to do regular physical exercise.  She stated that she tried to eat healthy but is not very compliance with diet.  She is advised to do walking exercise with a walker or a cane as tolerated.

## 2018-07-17 NOTE — ASSESSMENT & PLAN NOTE
Patient is taking primidone 250 mg daily.  She does not have any recurrent seizure.  She denies side effects from taking primidone.

## 2018-07-17 NOTE — ASSESSMENT & PLAN NOTE
Patient has old wedge thoracic spine fracture found on CT chest done on 9/23/16.  She does not have any acute pain currently.  Patient takes Tylenol 2 tablets once a day as needed  if she has joint pain or back pain.

## 2018-07-17 NOTE — ASSESSMENT & PLAN NOTE
Patient reported that she started having watery stool since last Wednesday.  She took amoxicillin 500 mg 4 tablets prior to dental procedure couple weeks ago.  She denied traveling out of state or eating unusual food or sick contacts at home.  She stated that she used to constipate as she started having loose stool 3 times a day for about a week.  She denied abdominal pain or nausea or vomiting or decreased appetite.  She reported that her stool color is very dark black but she did not notice bright red blood.  She is taking Coumadin for recurrent pulmonary embolisms.  She denied dizziness or low blood pressure or palpitation or chest pain or shortness of breath.  She tried Pepto-Bismol from over-the-counter but her symptoms did not improve.  She is advised to stop taking Pepto-Bismol as she is taking Coumadin.

## 2018-07-17 NOTE — ASSESSMENT & PLAN NOTE
Patient has pulmonary hypertension and chronic respiratory failure with hypoxia.  She is using oxygen 2 L every night as prescribed by pulmonologist.  She denied shortness of breath or chest tightness or chest pain or chest congestion.

## 2018-07-17 NOTE — ASSESSMENT & PLAN NOTE
Patient has recurrent pulmonary embolus and she needs to take Coumadin chronically.  Patient denied side effects from taking Coumadin.  She has loose stool started 1 week ago with dark black color.  We discussed to check stool fit test to see any blood inside her stool.

## 2018-07-17 NOTE — ASSESSMENT & PLAN NOTE
Patient is taking Lasix 40 mg daily and carvedilol 6.25 mg twice daily.  Her symptom is stable and well controlled.  She denied shortness of breath or chest discomfort.

## 2018-07-17 NOTE — TELEPHONE ENCOUNTER
Phone Number Called: [phone] (home)       Message: Left message with  to have her call us back at [phone].    Left Message for patient to call back: yes

## 2018-07-19 ENCOUNTER — HOSPITAL ENCOUNTER (OUTPATIENT)
Facility: MEDICAL CENTER | Age: 78
End: 2018-07-19
Attending: INTERNAL MEDICINE
Payer: MEDICARE

## 2018-07-19 DIAGNOSIS — R19.5 LOOSE STOOLS: ICD-10-CM

## 2018-07-19 LAB
C DIFF DNA SPEC QL NAA+PROBE: NEGATIVE
C DIFF TOX GENS STL QL NAA+PROBE: NEGATIVE
WBC STL QL MICRO: NORMAL

## 2018-07-19 PROCEDURE — 87045 FECES CULTURE AEROBIC BACT: CPT

## 2018-07-19 PROCEDURE — 87899 AGENT NOS ASSAY W/OPTIC: CPT

## 2018-07-19 PROCEDURE — 82274 ASSAY TEST FOR BLOOD FECAL: CPT

## 2018-07-19 PROCEDURE — 89055 LEUKOCYTE ASSESSMENT FECAL: CPT

## 2018-07-19 PROCEDURE — 87046 STOOL CULTR AEROBIC BACT EA: CPT

## 2018-07-19 PROCEDURE — 87493 C DIFF AMPLIFIED PROBE: CPT

## 2018-07-20 DIAGNOSIS — Z86.711 HISTORY OF PULMONARY EMBOLISM: ICD-10-CM

## 2018-07-20 LAB
E COLI SXT1+2 STL IA: NORMAL
SIGNIFICANT IND 70042: NORMAL
SITE SITE: NORMAL
SOURCE SOURCE: NORMAL

## 2018-07-20 RX ORDER — WARFARIN SODIUM 5 MG/1
TABLET ORAL
Qty: 360 TAB | Refills: 1 | Status: SHIPPED | OUTPATIENT
Start: 2018-07-20 | End: 2018-08-27

## 2018-07-22 DIAGNOSIS — R19.5 LOOSE STOOLS: ICD-10-CM

## 2018-07-22 DIAGNOSIS — R19.5 DARK STOOLS: ICD-10-CM

## 2018-07-22 LAB
BACTERIA STL CULT: NORMAL
E COLI SXT1+2 STL IA: NORMAL
SIGNIFICANT IND 70042: NORMAL
SITE SITE: NORMAL
SOURCE SOURCE: NORMAL

## 2018-07-23 LAB — HEMOCCULT STL QL IA: NEGATIVE

## 2018-07-24 ENCOUNTER — TELEPHONE (OUTPATIENT)
Dept: MEDICAL GROUP | Age: 78
End: 2018-07-24

## 2018-07-24 DIAGNOSIS — D64.9 NORMOCYTIC ANEMIA: ICD-10-CM

## 2018-07-24 DIAGNOSIS — D50.9 IRON DEFICIENCY ANEMIA, UNSPECIFIED IRON DEFICIENCY ANEMIA TYPE: ICD-10-CM

## 2018-07-24 NOTE — TELEPHONE ENCOUNTER
----- Message from Ninfa Marcial M.D. sent at 7/23/2018  2:03 PM PDT -----  Please inform patient that her stool tests are negative for infection.   Stool fit test is still pending. So we do not know that she has blood in stool or not.  Recent blood test showed anemia as I concerned that she may bleed in bowel. If  She still has black stool or dark stool, please see GI as soon as possible. I also placed a referral to GI consultant. She can call [phone] to schedule with GI consultant.   If she has worsening symptoms or abdominal pain or blood in stool, she needs to go to ER as she is taking coumadin for blood thinner.    Thanks!  Ninfa Marcial M.D.

## 2018-07-24 NOTE — TELEPHONE ENCOUNTER
Phone Number Called: [phone] (home)       Message[facility]    Left Message for patient to call back: no

## 2018-07-26 DIAGNOSIS — G40.909 SEIZURE DISORDER (HCC): ICD-10-CM

## 2018-07-26 RX ORDER — PRIMIDONE 250 MG/1
TABLET ORAL
Qty: 90 TAB | Refills: 3 | Status: SHIPPED | OUTPATIENT
Start: 2018-07-26 | End: 2018-11-14 | Stop reason: SDUPTHER

## 2018-07-27 ENCOUNTER — HOSPITAL ENCOUNTER (OUTPATIENT)
Dept: LAB | Facility: MEDICAL CENTER | Age: 78
End: 2018-07-27
Attending: INTERNAL MEDICINE
Payer: MEDICARE

## 2018-07-27 DIAGNOSIS — R19.5 LOOSE STOOLS: ICD-10-CM

## 2018-07-27 DIAGNOSIS — I51.89 DIASTOLIC DYSFUNCTION: ICD-10-CM

## 2018-07-27 DIAGNOSIS — R73.01 IFG (IMPAIRED FASTING GLUCOSE): ICD-10-CM

## 2018-07-27 DIAGNOSIS — E03.9 ACQUIRED HYPOTHYROIDISM: ICD-10-CM

## 2018-07-27 DIAGNOSIS — D50.9 IRON DEFICIENCY ANEMIA, UNSPECIFIED IRON DEFICIENCY ANEMIA TYPE: ICD-10-CM

## 2018-07-27 DIAGNOSIS — I10 HTN (HYPERTENSION), BENIGN: ICD-10-CM

## 2018-07-27 DIAGNOSIS — R19.5 DARK STOOLS: ICD-10-CM

## 2018-07-27 DIAGNOSIS — D64.9 NORMOCYTIC ANEMIA: ICD-10-CM

## 2018-07-27 DIAGNOSIS — M85.80 OSTEOPENIA WITH HIGH RISK OF FRACTURE: ICD-10-CM

## 2018-07-27 LAB
25(OH)D3 SERPL-MCNC: 38 NG/ML (ref 30–100)
ALBUMIN SERPL BCP-MCNC: 4.3 G/DL (ref 3.2–4.9)
ALBUMIN/GLOB SERPL: 1.7 G/DL
ALP SERPL-CCNC: 71 U/L (ref 30–99)
ALT SERPL-CCNC: 24 U/L (ref 2–50)
ANION GAP SERPL CALC-SCNC: 8 MMOL/L (ref 0–11.9)
AST SERPL-CCNC: 17 U/L (ref 12–45)
BILIRUB SERPL-MCNC: 0.3 MG/DL (ref 0.1–1.5)
BUN SERPL-MCNC: 13 MG/DL (ref 8–22)
CALCIUM SERPL-MCNC: 9.2 MG/DL (ref 8.5–10.5)
CHLORIDE SERPL-SCNC: 97 MMOL/L (ref 96–112)
CO2 SERPL-SCNC: 30 MMOL/L (ref 20–33)
CREAT SERPL-MCNC: 0.58 MG/DL (ref 0.5–1.4)
ERYTHROCYTE [DISTWIDTH] IN BLOOD BY AUTOMATED COUNT: 47.3 FL (ref 35.9–50)
FERRITIN SERPL-MCNC: 8.5 NG/ML (ref 10–291)
FOLATE SERPL-MCNC: 7.3 NG/ML
GLOBULIN SER CALC-MCNC: 2.6 G/DL (ref 1.9–3.5)
GLUCOSE SERPL-MCNC: 105 MG/DL (ref 65–99)
HCT VFR BLD AUTO: 34.4 % (ref 37–47)
HGB BLD-MCNC: 10.9 G/DL (ref 12–16)
HGB RETIC QN AUTO: 25.4 PG/CELL (ref 29–35)
IMM RETICS NFR: 24.8 % (ref 9.3–17.4)
IRON SATN MFR SERPL: 8 % (ref 15–55)
IRON SERPL-MCNC: 30 UG/DL (ref 40–170)
LDH SERPL L TO P-CCNC: 192 U/L (ref 107–266)
MCH RBC QN AUTO: 26 PG (ref 27–33)
MCHC RBC AUTO-ENTMCNC: 31.7 G/DL (ref 33.6–35)
MCV RBC AUTO: 82.1 FL (ref 81.4–97.8)
PLATELET # BLD AUTO: 439 K/UL (ref 164–446)
PMV BLD AUTO: 8.6 FL (ref 9–12.9)
POTASSIUM SERPL-SCNC: 4.2 MMOL/L (ref 3.6–5.5)
PROT SERPL-MCNC: 6.9 G/DL (ref 6–8.2)
RBC # BLD AUTO: 4.19 M/UL (ref 4.2–5.4)
RETICS # AUTO: 0.06 M/UL (ref 0.04–0.06)
RETICS/RBC NFR: 1.4 % (ref 0.8–2.1)
SODIUM SERPL-SCNC: 135 MMOL/L (ref 135–145)
T4 FREE SERPL-MCNC: 1.01 NG/DL (ref 0.53–1.43)
TIBC SERPL-MCNC: 357 UG/DL (ref 250–450)
TSH SERPL DL<=0.005 MIU/L-ACNC: 0.72 UIU/ML (ref 0.38–5.33)
VIT B12 SERPL-MCNC: 337 PG/ML (ref 211–911)
WBC # BLD AUTO: 4.4 K/UL (ref 4.8–10.8)

## 2018-07-27 PROCEDURE — 85046 RETICYTE/HGB CONCENTRATE: CPT

## 2018-07-27 PROCEDURE — 82728 ASSAY OF FERRITIN: CPT

## 2018-07-27 PROCEDURE — 82306 VITAMIN D 25 HYDROXY: CPT

## 2018-07-27 PROCEDURE — 83615 LACTATE (LD) (LDH) ENZYME: CPT

## 2018-07-27 PROCEDURE — 82607 VITAMIN B-12: CPT

## 2018-07-27 PROCEDURE — 83550 IRON BINDING TEST: CPT

## 2018-07-27 PROCEDURE — 85027 COMPLETE CBC AUTOMATED: CPT

## 2018-07-27 PROCEDURE — 82746 ASSAY OF FOLIC ACID SERUM: CPT

## 2018-07-27 PROCEDURE — 80053 COMPREHEN METABOLIC PANEL: CPT

## 2018-07-27 PROCEDURE — 83036 HEMOGLOBIN GLYCOSYLATED A1C: CPT

## 2018-07-27 PROCEDURE — 84439 ASSAY OF FREE THYROXINE: CPT

## 2018-07-27 PROCEDURE — 84443 ASSAY THYROID STIM HORMONE: CPT

## 2018-07-27 PROCEDURE — 83540 ASSAY OF IRON: CPT

## 2018-07-27 PROCEDURE — 36415 COLL VENOUS BLD VENIPUNCTURE: CPT

## 2018-07-28 DIAGNOSIS — M85.80 OSTEOPENIA WITH HIGH RISK OF FRACTURE: ICD-10-CM

## 2018-07-30 ENCOUNTER — ANTICOAGULATION VISIT (OUTPATIENT)
Dept: MEDICAL GROUP | Facility: MEDICAL CENTER | Age: 78
End: 2018-07-30
Payer: MEDICARE

## 2018-07-30 VITALS — SYSTOLIC BLOOD PRESSURE: 115 MMHG | HEART RATE: 64 BPM | DIASTOLIC BLOOD PRESSURE: 61 MMHG

## 2018-07-30 DIAGNOSIS — Z79.01 CHRONIC ANTICOAGULATION: ICD-10-CM

## 2018-07-30 DIAGNOSIS — Z79.01 LONG TERM (CURRENT) USE OF ANTICOAGULANTS: Primary | ICD-10-CM

## 2018-07-30 DIAGNOSIS — Z86.711 PERSONAL HISTORY OF PULMONARY EMBOLISM: ICD-10-CM

## 2018-07-30 LAB — INR PPP: 2.5 (ref 2–3.5)

## 2018-07-30 PROCEDURE — 85610 PROTHROMBIN TIME: CPT | Performed by: PHYSICIAN ASSISTANT

## 2018-07-30 PROCEDURE — 99999 PR NO CHARGE: CPT | Performed by: PHYSICIAN ASSISTANT

## 2018-07-30 RX ORDER — ALENDRONATE SODIUM 70 MG/1
TABLET ORAL
Qty: 12 TAB | Refills: 3 | Status: SHIPPED | OUTPATIENT
Start: 2018-07-30 | End: 2019-07-24 | Stop reason: SDUPTHER

## 2018-07-30 NOTE — PROGRESS NOTES
OP Anticoagulation Service Note    Date: 7/30/2018  Vitals:    07/30/18 1537   BP: 115/61   Pulse: 64       Anticoagulation Summary  As of 7/30/2018    INR goal:   2.0-3.0   TTR:   66.0 % (1.7 y)   Today's INR:   2.5   Warfarin maintenance plan:   10 mg (5 mg x 2) on Sun, Thu; 20 mg (5 mg x 4) all other days   Weekly warfarin total:   120 mg   Plan last modified:   Maty Perkins, PharmD (1/11/2018)   Next INR check:   8/27/2018   Target end date:       Indications    Chronic anticoagulation [Z79.01]  Personal history of pulmonary embolism [Z86.711]  Long term (current) use of anticoagulants [Z79.01] [Z79.01]             Anticoagulation Episode Summary     INR check location:       Preferred lab:       Send INR reminders to:       Comments:         Anticoagulation Care Providers     Provider Role Specialty Phone number    Ninfa Marcial M.D. Referring Internal Medicine [phone]    [facility] Anticoagulation Services Responsible  [phone]        Anticoagulation Patient Findings      HPI:   Natalie Anaya seen in clinic today, on anticoagulation therapy with warfarin (a high risk medication) for hx of PE       Pt is here today to evaluate anticoagulation therapy    Previous INR was  2.3 on 7-2-208    Pt was instructed to Continue current warfarin regimen       Confirmed warfarin dosing regimen, denies missed or extra doses of coumadin.   Diet has been consistent with foods rich in vitamin K: Yes  Changes in ETOH:  No  Changes in smoking status: No  Changes in medication: No   Cost restriction: No  S/s of bleeding:  No  Signs/symptoms  thrombosis since the last appt: No    A/P   INR  therapeutic today,  Continue current warfarin regimen        check referral    Pt educated to contact our clinic with any changes in medications or s/s of bleeding or thrombosis. Pt is aware to seek immediate medical attention for falls, head injury or deep cuts    Follow up appointment in 4 week(s) to reduce risk of  adverse events from warfarin  Moira Sheridan, PharmD

## 2018-07-30 NOTE — TELEPHONE ENCOUNTER
Was the patient seen in the last year in this department? Yes    Does patient have an active prescription for medications requested? No     Received Request Via: Pharmacy

## 2018-07-31 ENCOUNTER — TELEPHONE (OUTPATIENT)
Dept: MEDICAL GROUP | Age: 78
End: 2018-07-31

## 2018-07-31 NOTE — TELEPHONE ENCOUNTER
Future Appointments       Provider Department Center    8/1/2018 1:40 PM Ninfa Marcial M.D. [facility] Ortiz    8/27/2018 3:45 PM [facility] PHARMACIST [facility] [place]    9/7/2018 11:20 AM KAI [facility]and [facility] Sleep Medicine     9/12/2018 1:40 PM Ninfa Marcial M.D.; [facility]  [facility] Cristel Ortiz    11/14/2018 2:30 PM [facility] Pulmonary Medicine         ESTABLISHED PATIENT PRE-VISIT PLANNING     Note: Patient will not be contacted if there is no indication to call.     1.  Reviewed notes from the last few office visits within the medical group: Yes    2.  If any orders were placed at last visit or intended to be done for this visit (i.e. 6 mos follow-up), do we have Results/Consult Notes?        •  Labs - Labs ordered, completed on 7/27/18 and results are in chart.   Note: If patient appointment is for lab review and patient did not complete labs, check with provider if OK to reschedule patient until labs completed.       •  Imaging - Imaging was not ordered at last office visit.       •  Referrals - Referral ordered, patient was seen and consult notes are in chart. Care Teams updated  YES.    3. Is this appointment scheduled as a Hospital Follow-Up? No    4.  Immunizations were updated in Epic using WebIZ?: Epic matches WebIZ       •  Web Iz Recommendations: ZOSTAVAX (Shingles)    5.  Patient is due for the following Health Maintenance Topics:   Health Maintenance Due   Topic Date Due   • IMM ZOSTER VACCINES (2 of 3) 02/25/2011       - Patient is up-to-date on all Health Maintenance topics. No records have been requested at this time.    6.  MDX printed for Provider? NO    7.  Patient was NOT informed to arrive 15 min prior to their scheduled appointment and bring in their medication bottles.

## 2018-08-01 ENCOUNTER — OFFICE VISIT (OUTPATIENT)
Dept: MEDICAL GROUP | Age: 78
End: 2018-08-01
Payer: MEDICARE

## 2018-08-01 VITALS
SYSTOLIC BLOOD PRESSURE: 142 MMHG | WEIGHT: 221.8 LBS | BODY MASS INDEX: 39.3 KG/M2 | DIASTOLIC BLOOD PRESSURE: 70 MMHG | HEART RATE: 68 BPM | TEMPERATURE: 98.4 F | OXYGEN SATURATION: 96 % | HEIGHT: 63 IN

## 2018-08-01 DIAGNOSIS — K21.9 GASTROESOPHAGEAL REFLUX DISEASE WITHOUT ESOPHAGITIS: ICD-10-CM

## 2018-08-01 DIAGNOSIS — D50.9 IRON DEFICIENCY ANEMIA, UNSPECIFIED IRON DEFICIENCY ANEMIA TYPE: ICD-10-CM

## 2018-08-01 DIAGNOSIS — E03.9 ACQUIRED HYPOTHYROIDISM: ICD-10-CM

## 2018-08-01 DIAGNOSIS — M85.80 OSTEOPENIA WITH HIGH RISK OF FRACTURE: ICD-10-CM

## 2018-08-01 DIAGNOSIS — I10 HTN (HYPERTENSION), BENIGN: ICD-10-CM

## 2018-08-01 LAB
EST. AVERAGE GLUCOSE BLD GHB EST-MCNC: 126 MG/DL
HBA1C MFR BLD: 6 % (ref 0–5.6)

## 2018-08-01 PROCEDURE — 99214 OFFICE O/P EST MOD 30 MIN: CPT | Performed by: INTERNAL MEDICINE

## 2018-08-01 RX ORDER — LANOLIN ALCOHOL/MO/W.PET/CERES
325 CREAM (GRAM) TOPICAL 2 TIMES DAILY WITH MEALS
Qty: 180 TAB | Refills: 3 | Status: SHIPPED | OUTPATIENT
Start: 2018-08-01 | End: 2019-02-27

## 2018-08-01 NOTE — ASSESSMENT & PLAN NOTE
Patient has chronic anemia with iron deficiency in the past.  She used to take iron supplements daily and she stopped taking iron for almost a year.  She was seen in clinic on 7/16/18 for loose stools and dark stool.  Stool studies showed negative for C. difficile infection and negative for WBC.  Stool fit test was also negative.  She did not have any abdominal pain and stated that her bowel movement are back to normal and constipated again.  She usually has bowel movement once every 2 days.  Patient denied taking NSAIDs.  She did not notice any bright red blood in her stool.  She is taking Coumadin for pulmonary embolus.     Her hemoglobin decreased to 10.5-10.9 recently.  Patient has follow-up appointment with GI consultant in next week.

## 2018-08-01 NOTE — ASSESSMENT & PLAN NOTE
Stable. Currently taking levothyroxine 100 mcg daily as prescribed.  Denies palpitations, skin changes, temperature intolerance, or changes in bowel habits    Results for NADIAKIMBERLEYSTAN CARRASCO (MRN [id number]) as of 8/1/2018 15:57   Ref. Range 7/27/2018 09:03   TSH Latest Ref Range: 0.380 - 5.330 uIU/mL 0.720   Free T-4 Latest Ref Range: 0.53 - 1.43 ng/dL 1.01

## 2018-08-01 NOTE — ASSESSMENT & PLAN NOTE
Patient is taking omeprazole 20 mg 1 tablets daily.  She denies side effects from taking it.  Her acid reflux is stable and well controlled.

## 2018-08-01 NOTE — ASSESSMENT & PLAN NOTE
Patient is taking Fosamax 70 mg once a week and she is taking vitamin D and calcium supplements daily.  She denies side effects from taking Fosamax.

## 2018-08-01 NOTE — ASSESSMENT & PLAN NOTE
Patient is taking carvedilol 6.25 mg twice daily and Lasix 40 mg daily with potassium 20 mEq daily.  She denies side effects from taking Lasix and potassium.

## 2018-08-01 NOTE — PROGRESS NOTES
Subjective:   Natalie Zuniga is a 77 y.o. female here today for evaluation and management of:      Iron deficiency anemia  Patient has chronic anemia with iron deficiency in the past.  She used to take iron supplements daily and she stopped taking iron for almost a year.  She was seen in clinic on 7/16/18 for loose stools and dark stool.  Stool studies showed negative for C. difficile infection and negative for WBC.  Stool fit test was also negative.  She did not have any abdominal pain and stated that her bowel movement are back to normal and constipated again.  She usually has bowel movement once every 2 days.  Patient denied taking NSAIDs.  She did not notice any bright red blood in her stool.  She is taking Coumadin for pulmonary embolus.     Her hemoglobin decreased to 10.5-10.9 recently.  Patient has follow-up appointment with GI consultant in next week.    Osteopenia with high risk of fracture  Patient is taking Fosamax 70 mg once a week and she is taking vitamin D and calcium supplements daily.  She denies side effects from taking Fosamax.       HTN (hypertension), benign  Patient is taking carvedilol 6.25 mg twice daily and Lasix 40 mg daily with potassium 20 mEq daily.  She denies side effects from taking Lasix and potassium.    Gastroesophageal reflux disease without esophagitis  Patient is taking omeprazole 20 mg 1 tablets daily.  She denies side effects from taking it.  Her acid reflux is stable and well controlled.    Acquired hypothyroidism  Stable. Currently taking levothyroxine 100 mcg daily as prescribed.  Denies palpitations, skin changes, temperature intolerance, or changes in bowel habits    Results for NATALIE ZUNIGA (MRN [id number]) as of 8/1/2018 15:57   Ref. Range 7/27/2018 09:03   TSH Latest Ref Range: 0.380 - 5.330 uIU/mL 0.720   Free T-4 Latest Ref Range: 0.53 - 1.43 ng/dL 1.01          Current medicines (including changes today)  Current Outpatient Prescriptions   Medication  Sig Dispense Refill   • ferrous sulfate 325 (65 Fe) MG EC tablet Take 1 Tab by mouth 2 times a day, with meals. 180 Tab 3   • alendronate (FOSAMAX) 70 MG Tab TAKE 1 TABLET BY MOUTH EVERY 7 DAYS 12 Tab 3   • primidone (MYSOLINE) 250 MG Tab TAKE 1 TABLET BY MOUTH THREE TIMES DAILY 90 Tab 3   • warfarin (COUMADIN) 5 MG Tab Take 2-4 tablets by mouth daily as directed by Anticoagulation Program 360 Tab 1   • CALCIUM PO Take 1,000 mg by mouth every day.     • potassium chloride SA (KDUR) 20 MEQ Tab CR Take 1 Tab by mouth every day. 90 Tab 3   • omeprazole (PRILOSEC) 20 MG delayed-release capsule Take 1 Cap by mouth every day. 90 Cap 3   • furosemide (LASIX) 40 MG Tab TAKE 1 TABLET BY MOUTH EVERY DAY 90 Tab 3   • carvedilol (COREG) 6.25 MG Tab TAKE 1 TABLET BY MOUTH TWICE DAILY 360 Tab 3   • levothyroxine (SYNTHROID) 100 MCG Tab TAKE 1 TABLET BY MOUTH EVERY DAY 90 Tab 3   • Probiotic Product (PROBIOTIC DAILY PO) Take  by mouth every day.     • Glucos-Chond-Hyal Ac-Ca Fructo (MOVE FREE JOINT HEALTH ADVANCE) Tab Take 1 Tab by mouth every day.     • Multiple Vitamins-Minerals (WOMENS MULTI VITAMIN & MINERAL) Tab Take 1 Tab by mouth every day.     • Melatonin 500 MCG TABLET DISPERSIBLE Take 1,000-2,000 mcg by mouth at bedtime as needed (sleep).     • Cholecalciferol (VITAMIN D-3) 1000 UNITS Cap Take 2,000 Units by mouth every day.       No current facility-administered medications for this visit.      She  has a past medical history of Arthritis; Bowel habit changes; Breath shortness; Cataract; Constipation; Epilepsy ([facility]); Eye drainage; Hemorrhagic disorder ([facility]); Hiatus hernia syndrome; Hypertension; Hyperthyroidism; Hypothyroid; Influenza; Pain; Personal history of venous thrombosis and embolism; Ringing in ears; Seizure ([facility]) (1982); and Sore muscles.    ROS   No chest pain, no shortness of breath, no abdominal pain       Objective:     Blood pressure 142/70, pulse 68, temperature 36.9 °C (98.4 °F), height 1.608 m (5'  "3.3\"), weight 100.6 kg (221 lb 12.8 oz), SpO2 96 %, not currently breastfeeding. Body mass index is 38.92 kg/m².   Physical Exam:  General: Alert, oriented and no acute distress.  Eye contact is good, speech goal directed, affect calm  HEENT: conjunctiva non-injected, sclera non-icteric.  Oral mucous membranes pink and moist with no lesions.  Pinna normal.  Lungs: Normal respiratory effort, clear to auscultation bilaterally with good excursion.  CV: regular rate and rhythm. No murmurs.   Abdomen: soft, non distended, nontender, Bowel sound normal.  Ext: Bilateral pitting edema, color normal, vascularity normal, temperature normal      Assessment and Plan:   The following treatment plan was discussed     1. Iron deficiency anemia, unspecified iron deficiency anemia type  - Recommend to restart ferrous sulfate 325 mg 1 tablets twice daily with meal.  Advised to increase fiber intake and take stool softener as needed if she has constipation from taking iron.  Patient is advised to follow-up with GI as scheduled.  - ferrous sulfate 325 (65 Fe) MG EC tablet; Take 1 Tab by mouth 2 times a day, with meals.  Dispense: 180 Tab; Refill: 3  - CBC WITH DIFFERENTIAL; Future  - FERRITIN; Future  - IRON/TOTAL IRON BIND; Future    2. Osteopenia with high risk of fracture  - Continue Fosamax 70 mg 1 tablets once a week.  Continue vitamin D and calcium supplements daily.  Continue weightbearing exercise regularly.  - Counseling for side effect of Fosamax.  - Advised to take Fosamax with plenty of water and to stay upright for at least 2 hour after taking medication to prevent esophagitis.    3. HTN (hypertension), benign  - Well-controlled. Continue current regimens, carvedilol 6.25 mg twice daily and Lasix 40 mg daily with potassium 20 mEq daily. Recheck lab 1-2 weeks before next follow up visit.  - Reviewed the risks and benefits as well as potential side effects of medications with patient.  - Discussed to eat low-sodium diet and " encouraged to do regular physical exercise.  - Recommend to monitor blood pressure and heart rate at home.  - CBC WITH DIFFERENTIAL; Future  - COMP METABOLIC PANEL; Future    4. Gastroesophageal reflux disease without esophagitis  - Stable.  Continue omeprazole 20 mg 1 tablets every morning.  Advised to avoid acidic food, spicy food, alcohol, NSAIDs.    5. Acquired hypothyroidism  - Well-controlled. Continue current regimens. Recheck lab 1-2 weeks before next follow up visit.      Followup: Return in about 3 months (around 11/1/2018), or if symptoms worsen or fail to improve, for Anemia, Hypertension, Hypothyroid, Lab review.      Please note that this dictation was created using voice recognition software. I have made every reasonable attempt to correct obvious errors, but I expect that there may have unintended errors in text, spelling, punctuation, or grammar that I did not discover.

## 2018-08-27 ENCOUNTER — ANTICOAGULATION VISIT (OUTPATIENT)
Dept: MEDICAL GROUP | Facility: MEDICAL CENTER | Age: 78
End: 2018-08-27
Payer: MEDICARE

## 2018-08-27 VITALS — SYSTOLIC BLOOD PRESSURE: 128 MMHG | DIASTOLIC BLOOD PRESSURE: 51 MMHG | HEART RATE: 66 BPM

## 2018-08-27 DIAGNOSIS — Z86.711 PERSONAL HISTORY OF PULMONARY EMBOLISM: ICD-10-CM

## 2018-08-27 DIAGNOSIS — Z79.01 CHRONIC ANTICOAGULATION: ICD-10-CM

## 2018-08-27 DIAGNOSIS — Z79.01 LONG TERM (CURRENT) USE OF ANTICOAGULANTS: Primary | ICD-10-CM

## 2018-08-27 LAB — INR PPP: 1.7 (ref 2–3.5)

## 2018-08-27 PROCEDURE — 85610 PROTHROMBIN TIME: CPT | Performed by: FAMILY MEDICINE

## 2018-08-27 PROCEDURE — 99211 OFF/OP EST MAY X REQ PHY/QHP: CPT | Performed by: FAMILY MEDICINE

## 2018-08-27 RX ORDER — WARFARIN SODIUM 10 MG/1
TABLET ORAL
Qty: 180 TAB | Refills: 1 | Status: SHIPPED | OUTPATIENT
Start: 2018-08-27 | End: 2019-06-02 | Stop reason: SDUPTHER

## 2018-08-27 NOTE — PROGRESS NOTES
OP Anticoagulation Service Note    Date: 8/27/2018  Vitals:    08/27/18 1544   BP: 128/51   Pulse: 66         Anticoagulation Summary  As of 8/27/2018    INR goal:   2.0-3.0   TTR:   65.8 % (1.8 y)   Today's INR:   1.7!   Warfarin maintenance plan:   10 mg (10 mg x 1) on Sun, Thu; 20 mg (10 mg x 2) all other days   Weekly warfarin total:   120 mg   Plan last modified:   Moira Sheridan, PharmD (8/27/2018)   Next INR check:   9/24/2018   Target end date:       Indications    Chronic anticoagulation [Z79.01]  Personal history of pulmonary embolism [Z86.711]  Long term (current) use of anticoagulants [Z79.01] [Z79.01]             Anticoagulation Episode Summary     INR check location:       Preferred lab:       Send INR reminders to:       Comments:         Anticoagulation Care Providers     Provider Role Specialty Phone number    Ninfa Marcial M.D. Referring Internal Medicine [phone]    [facility] Anticoagulation [facility] Responsible  [phone]        Anticoagulation Patient Findings      HPI:   Natalie Anaya seen in clinic today, on anticoagulation therapy with warfarin (a high risk medication) for hx of PE    Pt is here today to evaluate anticoagulation therapy    Previous INR was  2.5 on 7-    Pt was instructed to Continue current warfarin regimen       Confirmed warfarin dosing regimen, denies missed or extra doses of coumadin.   Diet has been consistent with foods rich in vitamin K: Yes  Changes in ETOH:  No  Changes in smoking status: No  Changes in medication: No   Cost restriction: No  S/s of bleeding:  No  Signs/symptoms  thrombosis since the last appt: No    A/P   INR  therapeutic today, will require dose adjust ment today to stroke and closer follow up.   Tonight 25 mg then resume usual regimen    Pt would like to switch warfarin 10 mg tablets. Sent in new rx.         check referral    Pt educated to contact our clinic with any changes in medications or s/s of bleeding or  thrombosis. Pt is aware to seek immediate medical attention for falls, head injury or deep cuts    Follow up appointment in 4 week(s) to reduce risk of adverse events from warfarin  Moira Sheridan, PharmD

## 2018-09-06 ENCOUNTER — TELEPHONE (OUTPATIENT)
Dept: PULMONOLOGY | Facility: HOSPICE | Age: 78
End: 2018-09-06

## 2018-09-06 DIAGNOSIS — I27.20 PULMONARY HYPERTENSION (HCC): ICD-10-CM

## 2018-09-06 DIAGNOSIS — G47.33 OSA (OBSTRUCTIVE SLEEP APNEA): ICD-10-CM

## 2018-09-07 ENCOUNTER — SLEEP CENTER VISIT (OUTPATIENT)
Dept: SLEEP MEDICINE | Facility: MEDICAL CENTER | Age: 78
End: 2018-09-07
Payer: MEDICARE

## 2018-09-07 VITALS
DIASTOLIC BLOOD PRESSURE: 76 MMHG | RESPIRATION RATE: 16 BRPM | BODY MASS INDEX: 37.73 KG/M2 | HEART RATE: 62 BPM | SYSTOLIC BLOOD PRESSURE: 130 MMHG | OXYGEN SATURATION: 91 % | HEIGHT: 64 IN | WEIGHT: 221 LBS | TEMPERATURE: 97.5 F

## 2018-09-07 DIAGNOSIS — G47.33 OBSTRUCTIVE SLEEP APNEA: ICD-10-CM

## 2018-09-07 DIAGNOSIS — J96.11 CHRONIC RESPIRATORY FAILURE WITH HYPOXIA (HCC): ICD-10-CM

## 2018-09-07 DIAGNOSIS — G47.00 INSOMNIA, UNSPECIFIED TYPE: ICD-10-CM

## 2018-09-07 PROCEDURE — 99214 OFFICE O/P EST MOD 30 MIN: CPT | Performed by: NURSE PRACTITIONER

## 2018-09-07 ASSESSMENT — LIFESTYLE VARIABLES: SUBSTANCE_ABUSE: 0

## 2018-09-07 ASSESSMENT — ENCOUNTER SYMPTOMS
CARDIOVASCULAR NEGATIVE: 1
MUSCULOSKELETAL NEGATIVE: 1
BRUISES/BLEEDS EASILY: 0
GASTROINTESTINAL NEGATIVE: 1
MEMORY LOSS: 0
CHILLS: 0
DIAPHORESIS: 0
NERVOUS/ANXIOUS: 0
WEIGHT LOSS: 0
HALLUCINATIONS: 0
EYE PAIN: 0
INSOMNIA: 1
RESPIRATORY NEGATIVE: 1
NEUROLOGICAL NEGATIVE: 1
FEVER: 0
EYE DISCHARGE: 0
WEAKNESS: 0

## 2018-09-07 NOTE — PROGRESS NOTES
Chief Complaint   Patient presents with   • Apnea     last seen 5/23/18          HPI: This patient is a 77 y.o. female, who presents for follow-up of AILYN and compliance check.    She was last seen with Dr. Irvin May 23.  Please see his note for details.    To reiterate, home sleep study October 2017 indicates severe obstructive sleep apnea with an POLO of 39, minimum oxygen saturation of 79 %. She is using with CPAP 12 cm H2O with 2 L O2 bleed.   She has had a very difficult time adjusting to therapy.  Compliance report shows 100% use however, average use 6 hours 5 minutes per night with a normal AHI of 0.8.  Patient was fit for a new full facemask today at her request.  She continues to feel CPAP has interfered with her sleep more than it has helped.  She understands the importance of therapy and will continue with this.  She has a difficult time falling asleep and staying asleep.  She reports she had problems with this prior to starting CPAP but not nearly as bad as it is now.  She uses melatonin before sleep which does help.     She has a history of prior PE ×2 now chronically anticoagulated.  She tells me that both of her PE occurred after falls.  Former PFT indicate FEV1 of 1.38 L 68% of predicted, FEV1 FVC ratio of 83.  TLC 79% predicted.  DLCO is normal at 106% predicted.  Mild restrictive changes thought related to BMI.        Past Medical History:   Diagnosis Date   • Arthritis     Knees.   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   • Ringing in ears    • Seizure (HCC) 1982   • Sore muscles        Social History   Substance Use Topics   • Smoking status: Never Smoker   •  Smokeless tobacco: Never Used   • Alcohol use Yes       Family History   Problem Relation Age of Onset   • Cancer Mother 67        Ovarian   • Alcohol/Drug Father 60        Appenditis   • Heart Disease Father    • Cancer Maternal Grandmother         colon cancer   • Heart Disease Maternal Grandfather    • Cancer Daughter 44        melonoma       Immunization History   Administered Date(s) Administered   • Hepatitis A Vaccine, Adult 10/18/2010   • Hepatitis A Vaccine, Ped/Adol 10/18/2010   • Hepatitis B Vaccine Non-Recombivax (Ped/Adol) 10/18/2010   • Hepatitis B Vaccine Recombivax (Adol/Adult) 10/18/2010   • Influenza (IM) Preservative Free 08/02/2011, 09/19/2015   • Influenza Seasonal Injectable 09/12/2012, 09/05/2013, 10/15/2017   • Influenza TIV (IM) 10/18/2010, 09/12/2012, 10/15/2017   • Influenza Vaccine Adult HD 10/07/2016, 11/22/2016, 10/15/2017   • Influenza Vaccine H1N1 02/22/2010, 10/09/2011   • Influenza Vaccine Pediatric Split 10/18/2010   • Influenza Vaccine Quad Inj (Pf) 09/19/2015, 09/19/2015   • Pneumococcal Conjugate Vaccine (Prevnar/PCV-13) 08/01/2016   • Pneumococcal Vaccine (UF)Historical Data 10/09/2011   • Pneumococcal polysaccharide vaccine (PPSV-23) 10/18/2010   • Tdap Vaccine 10/21/2015   • Zoster Vaccine Live (ZVL) (Zostavax) 12/31/2010       Current medications as of today   Current Outpatient Prescriptions   Medication Sig Dispense Refill   • warfarin (COUMADIN) 10 MG Tab Take 1 to 2 tablets by mouth daily as directed by the coumadin clinic (Patient taking differently: Take 20 mg by mouth every day. Take 1 to 2 tablets by mouth daily as directed by the coumadin clinic) 180 Tab 1   • ferrous sulfate 325 (65 Fe) MG EC tablet Take 1 Tab by mouth 2 times a day, with meals. 180 Tab 3   • primidone (MYSOLINE) 250 MG Tab TAKE 1 TABLET BY MOUTH THREE TIMES DAILY 90 Tab 3   • potassium chloride SA (KDUR) 20 MEQ Tab CR Take 1 Tab by mouth every day. 90 Tab 3   • omeprazole (PRILOSEC) 20 MG  "delayed-release capsule Take 1 Cap by mouth every day. 90 Cap 3   • furosemide (LASIX) 40 MG Tab TAKE 1 TABLET BY MOUTH EVERY DAY 90 Tab 3   • carvedilol (COREG) 6.25 MG Tab TAKE 1 TABLET BY MOUTH TWICE DAILY 360 Tab 3   • levothyroxine (SYNTHROID) 100 MCG Tab TAKE 1 TABLET BY MOUTH EVERY DAY 90 Tab 3   • Probiotic Product (PROBIOTIC DAILY PO) Take  by mouth every day.     • Glucos-Chond-Hyal Ac-Ca Fructo (MOVE FREE JOINT HEALTH ADVANCE) Tab Take 1 Tab by mouth every day.     • Multiple Vitamins-Minerals (WOMENS MULTI VITAMIN & MINERAL) Tab Take 1 Tab by mouth every day.     • Melatonin 500 MCG TABLET DISPERSIBLE Take 1,000-2,000 mcg by mouth at bedtime as needed (sleep).     • Cholecalciferol (VITAMIN D-3) 1000 UNITS Cap Take 2,000 Units by mouth every day.     • alendronate (FOSAMAX) 70 MG Tab TAKE 1 TABLET BY MOUTH EVERY 7 DAYS 12 Tab 3   • CALCIUM PO Take 1,000 mg by mouth every day.       No current facility-administered medications for this visit.        Allergies: Tape    Blood pressure 130/76, pulse 62, temperature 36.4 °C (97.5 °F), resp. rate 16, height 1.626 m (5' 4\"), weight 100.2 kg (221 lb), SpO2 91 %.      Review of Systems   Constitutional: Positive for malaise/fatigue. Negative for chills, diaphoresis, fever and weight loss.   HENT: Negative.    Eyes: Negative for pain and discharge.   Respiratory: Negative.    Cardiovascular: Negative.    Gastrointestinal: Negative.    Musculoskeletal: Negative.    Skin: Negative.    Neurological: Negative.  Negative for weakness.   Endo/Heme/Allergies: Negative for environmental allergies. Does not bruise/bleed easily.   Psychiatric/Behavioral: Negative for hallucinations, memory loss, substance abuse and suicidal ideas. The patient has insomnia. The patient is not nervous/anxious.        Physical Exam   Constitutional: She is oriented to person, place, and time and well-developed, well-nourished, and in no distress.   HENT:   Head: Normocephalic and atraumatic. "   Eyes: Pupils are equal, round, and reactive to light.   Neck: Normal range of motion. Neck supple. No tracheal deviation present.   Cardiovascular: Normal rate, regular rhythm and normal heart sounds.    Pulmonary/Chest: Effort normal and breath sounds normal.   Musculoskeletal: Normal range of motion.   Neurological: She is alert and oriented to person, place, and time.   Skin: Skin is warm and dry.   Psychiatric: Mood, memory, affect and judgment normal.   Vitals reviewed.      Diagnoses/Plan:    1. Obstructive sleep apnea  Continue CPAP therapy nightly.  Mask fitting performed today.  I will send an order for dream wear fullface mask to preferred home care.    - MASK FITTING; Future  - DME MASK AND SUPPLIES    2. Chronic respiratory failure with hypoxia (HCC)  Continue oxygen with CPAP therapy at night    3. Insomnia, unspecified type  Continue melatonin nightly as needed for insomnia.     F/U here in 4 months    F/U at pulmonary as scheduled in November    This dictation was created using voice recognition software. The accuracy of the dictation is limited to the abilities of the software. I expect there may be some errors of grammar and possibly content.

## 2018-09-24 ENCOUNTER — ANTICOAGULATION VISIT (OUTPATIENT)
Dept: MEDICAL GROUP | Facility: MEDICAL CENTER | Age: 78
End: 2018-09-24
Payer: MEDICARE

## 2018-09-24 VITALS — SYSTOLIC BLOOD PRESSURE: 135 MMHG | DIASTOLIC BLOOD PRESSURE: 56 MMHG | HEART RATE: 72 BPM

## 2018-09-24 DIAGNOSIS — Z79.01 CHRONIC ANTICOAGULATION: ICD-10-CM

## 2018-09-24 DIAGNOSIS — Z86.711 PERSONAL HISTORY OF PULMONARY EMBOLISM: ICD-10-CM

## 2018-09-24 DIAGNOSIS — Z79.01 LONG TERM (CURRENT) USE OF ANTICOAGULANTS: Primary | ICD-10-CM

## 2018-09-24 LAB — INR PPP: 2.6 (ref 2–3.5)

## 2018-09-24 PROCEDURE — G0008 ADMIN INFLUENZA VIRUS VAC: HCPCS | Performed by: FAMILY MEDICINE

## 2018-09-24 PROCEDURE — 90662 IIV NO PRSV INCREASED AG IM: CPT | Performed by: FAMILY MEDICINE

## 2018-09-24 PROCEDURE — 85610 PROTHROMBIN TIME: CPT | Performed by: FAMILY MEDICINE

## 2018-09-24 PROCEDURE — 99999 PR NO CHARGE: CPT | Performed by: FAMILY MEDICINE

## 2018-09-24 NOTE — PROGRESS NOTES
OP Anticoagulation Service Note    Date: 9/24/2018  Vitals:    09/24/18 1541   BP: 135/56   Pulse: 72       Anticoagulation Summary  As of 9/24/2018    INR goal:   2.0-3.0   TTR:   65.9 % (1.8 y)   Today's INR:   2.6   Warfarin maintenance plan:   10 mg (10 mg x 1) on Sun, Thu; 20 mg (10 mg x 2) all other days   Weekly warfarin total:   120 mg   Plan last modified:   Moira Sheridan, PharmD (8/27/2018)   Next INR check:   11/5/2018   Target end date:       Indications    Chronic anticoagulation [Z79.01]  Personal history of pulmonary embolism [Z86.711]  Long term (current) use of anticoagulants [Z79.01] [Z79.01]             Anticoagulation Episode Summary     INR check location:       Preferred lab:       Send INR reminders to:       Comments:         Anticoagulation Care Providers     Provider Role Specialty Phone number    Ninfa Marcial M.D. Referring Internal Medicine [phone]    [facility] Anticoagulation Services Responsible  [phone]        Anticoagulation Patient Findings      HPI:   Natalie Anaya seen in clinic today, on anticoagulation therapy with warfarin (a high risk medication) for hx of PE     Pt is here today to evaluate anticoagulation therapy    Previous INR was  1.7 on 8-    Pt was instructed to take 25 mg then resume usual regimen    Confirmed warfarin dosing regimen, denies missed or extra doses of coumadin.   Diet has been consistent with foods rich in vitamin K: Yes  Changes in ETOH:  No  Changes in smoking status: No  Changes in medication: No   Cost restriction: No  S/s of bleeding:  No  Signs/symptoms  thrombosis since the last appt: No    A/P   INR  therapeutic today,  Continue current warfarin regimen           check referral    Pt educated to contact our clinic with any changes in medications or s/s of bleeding or thrombosis. Pt is aware to seek immediate medical attention for falls, head injury or deep cuts    Follow up appointment in 6 week(s) to reduce risk  of adverse events from warfarin   Moira Sheridan, PharmD

## 2018-10-10 ENCOUNTER — PATIENT OUTREACH (OUTPATIENT)
Dept: HEALTH INFORMATION MANAGEMENT | Facility: OTHER | Age: 78
End: 2018-10-10

## 2018-10-10 ENCOUNTER — TELEPHONE (OUTPATIENT)
Dept: HEALTH INFORMATION MANAGEMENT | Facility: OTHER | Age: 78
End: 2018-10-10

## 2018-10-10 NOTE — TELEPHONE ENCOUNTER
Hi Dr. Marcial,    I spoke to this patient today. She stated she did not know her 3 mo follow up visit had been canceled for November 7th. She would like to know if she needs to reschedule this appointment. She states she was to completed labs before November 7.    Please advice.    Thank you

## 2018-10-10 NOTE — PROGRESS NOTES
Outcome: Scheduled Appointment - unable to verify any other information pt had to leave for an appt    Please transfer to Patient Outreach Team at [phone] when patient returns call.    WebIZ Checked & Epic Updated:  yes    [facility] Verified: yes    Attempt # 1

## 2018-10-11 NOTE — TELEPHONE ENCOUNTER
Phone Number Called: [phone] (home)       Message: called patient and informed her that the November appointment was to check up on her anemia. We didn't know why her appointment was originally rescheduled.    Left Message for patient to call back: no

## 2018-10-11 NOTE — TELEPHONE ENCOUNTER
Please reschedule patient in November 2018 for follow-up appointment.  I did not know why her November appointment was canceled.  She has appointment in February 6, 2019, but we can switch to November 2018.  She can do her fasting blood test 1 week before next follow-up in November.    Ninfa Marcial M.D.

## 2018-10-13 DIAGNOSIS — E03.9 ACQUIRED HYPOTHYROIDISM: ICD-10-CM

## 2018-10-15 RX ORDER — LEVOTHYROXINE SODIUM 0.1 MG/1
TABLET ORAL
Qty: 90 TAB | Refills: 3 | Status: SHIPPED | OUTPATIENT
Start: 2018-10-15 | End: 2019-09-26 | Stop reason: SDUPTHER

## 2018-10-31 ENCOUNTER — HOSPITAL ENCOUNTER (OUTPATIENT)
Dept: LAB | Facility: MEDICAL CENTER | Age: 78
End: 2018-10-31
Attending: INTERNAL MEDICINE
Payer: MEDICARE

## 2018-10-31 DIAGNOSIS — I10 HTN (HYPERTENSION), BENIGN: ICD-10-CM

## 2018-10-31 DIAGNOSIS — D50.9 IRON DEFICIENCY ANEMIA, UNSPECIFIED IRON DEFICIENCY ANEMIA TYPE: ICD-10-CM

## 2018-10-31 LAB
BASOPHILS # BLD AUTO: 0.8 % (ref 0–1.8)
BASOPHILS # BLD: 0.04 K/UL (ref 0–0.12)
EOSINOPHIL # BLD AUTO: 0.1 K/UL (ref 0–0.51)
EOSINOPHIL NFR BLD: 2 % (ref 0–6.9)
ERYTHROCYTE [DISTWIDTH] IN BLOOD BY AUTOMATED COUNT: 64.6 FL (ref 35.9–50)
HCT VFR BLD AUTO: 42.8 % (ref 37–47)
HGB BLD-MCNC: 14 G/DL (ref 12–16)
IMM GRANULOCYTES # BLD AUTO: 0.01 K/UL (ref 0–0.11)
IMM GRANULOCYTES NFR BLD AUTO: 0.2 % (ref 0–0.9)
LYMPHOCYTES # BLD AUTO: 1.24 K/UL (ref 1–4.8)
LYMPHOCYTES NFR BLD: 24.9 % (ref 22–41)
MCH RBC QN AUTO: 30.8 PG (ref 27–33)
MCHC RBC AUTO-ENTMCNC: 32.7 G/DL (ref 33.6–35)
MCV RBC AUTO: 94.3 FL (ref 81.4–97.8)
MONOCYTES # BLD AUTO: 0.4 K/UL (ref 0–0.85)
MONOCYTES NFR BLD AUTO: 8 % (ref 0–13.4)
NEUTROPHILS # BLD AUTO: 3.18 K/UL (ref 2–7.15)
NEUTROPHILS NFR BLD: 64.1 % (ref 44–72)
NRBC # BLD AUTO: 0 K/UL
NRBC BLD-RTO: 0 /100 WBC
PLATELET # BLD AUTO: 281 K/UL (ref 164–446)
PMV BLD AUTO: 9.3 FL (ref 9–12.9)
RBC # BLD AUTO: 4.54 M/UL (ref 4.2–5.4)
WBC # BLD AUTO: 5 K/UL (ref 4.8–10.8)

## 2018-10-31 PROCEDURE — 80053 COMPREHEN METABOLIC PANEL: CPT

## 2018-10-31 PROCEDURE — 85025 COMPLETE CBC W/AUTO DIFF WBC: CPT

## 2018-10-31 PROCEDURE — 82728 ASSAY OF FERRITIN: CPT

## 2018-10-31 PROCEDURE — 83540 ASSAY OF IRON: CPT

## 2018-10-31 PROCEDURE — 36415 COLL VENOUS BLD VENIPUNCTURE: CPT

## 2018-10-31 PROCEDURE — 83550 IRON BINDING TEST: CPT

## 2018-11-01 LAB
ALBUMIN SERPL BCP-MCNC: 3.9 G/DL (ref 3.2–4.9)
ALBUMIN/GLOB SERPL: 1.4 G/DL
ALP SERPL-CCNC: 71 U/L (ref 30–99)
ALT SERPL-CCNC: 21 U/L (ref 2–50)
ANION GAP SERPL CALC-SCNC: 5 MMOL/L (ref 0–11.9)
AST SERPL-CCNC: 18 U/L (ref 12–45)
BILIRUB SERPL-MCNC: 0.3 MG/DL (ref 0.1–1.5)
BUN SERPL-MCNC: 21 MG/DL (ref 8–22)
CALCIUM SERPL-MCNC: 9.2 MG/DL (ref 8.5–10.5)
CHLORIDE SERPL-SCNC: 107 MMOL/L (ref 96–112)
CO2 SERPL-SCNC: 30 MMOL/L (ref 20–33)
CREAT SERPL-MCNC: 0.59 MG/DL (ref 0.5–1.4)
FERRITIN SERPL-MCNC: 20.2 NG/ML (ref 10–291)
GLOBULIN SER CALC-MCNC: 2.8 G/DL (ref 1.9–3.5)
GLUCOSE SERPL-MCNC: 102 MG/DL (ref 65–99)
IRON SATN MFR SERPL: 48 % (ref 15–55)
IRON SERPL-MCNC: 125 UG/DL (ref 40–170)
POTASSIUM SERPL-SCNC: 4.3 MMOL/L (ref 3.6–5.5)
PROT SERPL-MCNC: 6.7 G/DL (ref 6–8.2)
SODIUM SERPL-SCNC: 142 MMOL/L (ref 135–145)
TIBC SERPL-MCNC: 262 UG/DL (ref 250–450)

## 2018-11-05 ENCOUNTER — APPOINTMENT (OUTPATIENT)
Dept: MEDICAL GROUP | Facility: MEDICAL CENTER | Age: 78
End: 2018-11-05

## 2018-11-07 ENCOUNTER — OFFICE VISIT (OUTPATIENT)
Dept: MEDICAL GROUP | Age: 78
End: 2018-11-07
Payer: MEDICARE

## 2018-11-07 VITALS
TEMPERATURE: 98.4 F | HEART RATE: 70 BPM | WEIGHT: 224.2 LBS | BODY MASS INDEX: 39.73 KG/M2 | DIASTOLIC BLOOD PRESSURE: 54 MMHG | OXYGEN SATURATION: 92 % | SYSTOLIC BLOOD PRESSURE: 116 MMHG | HEIGHT: 63 IN

## 2018-11-07 DIAGNOSIS — M85.80 OSTEOPENIA WITH HIGH RISK OF FRACTURE: ICD-10-CM

## 2018-11-07 DIAGNOSIS — J96.11 CHRONIC RESPIRATORY FAILURE WITH HYPOXIA (HCC): ICD-10-CM

## 2018-11-07 DIAGNOSIS — I10 HTN (HYPERTENSION), BENIGN: ICD-10-CM

## 2018-11-07 DIAGNOSIS — E03.9 ACQUIRED HYPOTHYROIDISM: ICD-10-CM

## 2018-11-07 DIAGNOSIS — D50.9 IRON DEFICIENCY ANEMIA, UNSPECIFIED IRON DEFICIENCY ANEMIA TYPE: ICD-10-CM

## 2018-11-07 PROBLEM — R19.5 LOOSE STOOLS: Status: RESOLVED | Noted: 2018-07-16 | Resolved: 2018-11-07

## 2018-11-07 PROCEDURE — 99214 OFFICE O/P EST MOD 30 MIN: CPT | Performed by: INTERNAL MEDICINE

## 2018-11-07 RX ORDER — FERROUS SULFATE 325(65) MG
TABLET ORAL
Refills: 3 | COMMUNITY
Start: 2018-10-13 | End: 2018-11-07

## 2018-11-07 NOTE — ASSESSMENT & PLAN NOTE
Patient has chronic respiratory failure with hypoxia and sleep apnea.  She is using oxygen with CPAP machine 2 L every night.  She needs to have a letter to carry oxygen concentrator on a flight to [place].

## 2018-11-07 NOTE — ASSESSMENT & PLAN NOTE
She is taking Lasix 40 mg daily and carvedilol 6.25 mg twice daily.  Her blood pressure and pulse are stable and well controlled.  She denies side effects from taking Lasix and carvedilol.

## 2018-11-07 NOTE — ASSESSMENT & PLAN NOTE
She is taking Fosamax 70 mg once a week, vitamin D and calcium supplement daily.  She denies side effects from taking Fosamax.  Vitamin D level is adequate with current supplements.

## 2018-11-07 NOTE — ASSESSMENT & PLAN NOTE
Her anemia level improved on recent blood test and iron panel showed improvement as well.  I discussed blood test result with her in clinic today.  Patient is currently taking ferrous sulfate 325 mg twice daily with meals.  She denies side effects from taking iron.  She denies abdominal pain or black tarry stool or blood in stool.    Results for DIVINA ZUNIGA (MRN [id number]) as of 11/7/2018 15:20   Ref. Range 7/16/2018 16:22 7/27/2018 09:03 10/31/2018 08:39   WBC Latest Ref Range: 4.8 - 10.8 K/uL 4.4 (L) 4.4 (L) 5.0   RBC Latest Ref Range: 4.20 - 5.40 M/uL 3.93 (L) 4.19 (L) 4.54   Hemoglobin Latest Ref Range: 12.0 - 16.0 g/dL 10.5 (L) 10.9 (L) 14.0   Hematocrit Latest Ref Range: 37.0 - 47.0 % 32.8 (L) 34.4 (L) 42.8   MCV Latest Ref Range: 81.4 - 97.8 fL 83.5 82.1 94.3   MCH Latest Ref Range: 27.0 - 33.0 pg 26.7 (L) 26.0 (L) 30.8   MCHC Latest Ref Range: 33.6 - 35.0 g/dL 32.0 (L) 31.7 (L) 32.7 (L)   RDW Latest Ref Range: 35.9 - 50.0 fL 46.8 47.3 64.6 (H)   Platelet Count Latest Ref Range: 164 - 446 K/uL 355 439 281   MPV Latest Ref Range: 9.0 - 12.9 fL 8.9 (L) 8.6 (L) 9.3     Results for DIVINA ZUNIGA (MRN [id number]) as of 11/7/2018 15:20   Ref. Range 7/27/2018 09:03 10/31/2018 08:39   Iron Latest Ref Range: 40 - 170 ug/dL 30 (L) 125   Total Iron Binding Latest Ref Range: 250 - 450 ug/dL 357 262   % Saturation Latest Ref Range: 15 - 55 % 8 (L) 48   Results for DIVINA ZUNIGA (MRN [id number]) as of 11/7/2018 15:20   Ref. Range 7/27/2018 09:03 10/31/2018 08:39   Ferritin Latest Ref Range: 10.0 - 291.0 ng/mL 8.5 (L) 20.2

## 2018-11-07 NOTE — ASSESSMENT & PLAN NOTE
She is taking levothyroxine 100 mcg daily.  She tolerates medication without side effects.  Last thyroid function test on 7/27/2018 was normal.  She does not have any signs or symptoms of hypo-or hyperthyroid.

## 2018-11-07 NOTE — LETTER
November 7, 2018      Re:  Natalie Anaya  [address] [facility] [address]        To whom it may concern,    Mrs. Natalie Anaya is using oxygen concentrator 2 L every night with CPAP machine.       If you have any questions or concerns, please don't hesitate to call.        Sincerely,        Ninfa Marcial M.D.    Electronically Signed

## 2018-11-08 ENCOUNTER — ANTICOAGULATION VISIT (OUTPATIENT)
Dept: MEDICAL GROUP | Facility: MEDICAL CENTER | Age: 78
End: 2018-11-08
Payer: MEDICARE

## 2018-11-08 DIAGNOSIS — Z79.01 LONG TERM (CURRENT) USE OF ANTICOAGULANTS: ICD-10-CM

## 2018-11-08 DIAGNOSIS — Z86.711 PERSONAL HISTORY OF PULMONARY EMBOLISM: ICD-10-CM

## 2018-11-08 DIAGNOSIS — Z79.01 CHRONIC ANTICOAGULATION: ICD-10-CM

## 2018-11-08 LAB — INR PPP: 4.8 (ref 2–3.5)

## 2018-11-08 PROCEDURE — 99211 OFF/OP EST MAY X REQ PHY/QHP: CPT | Performed by: PHYSICIAN ASSISTANT

## 2018-11-08 PROCEDURE — 85610 PROTHROMBIN TIME: CPT | Performed by: PHYSICIAN ASSISTANT

## 2018-11-08 NOTE — PROGRESS NOTES
Anticoagulation Summary  As of 11/8/2018    INR goal:   2.0-3.0   TTR:   62.9 % (2 y)   Today's INR:   4.8!   Warfarin maintenance plan:   10 mg (10 mg x 1) on Sun, Thu; 20 mg (10 mg x 2) all other days   Weekly warfarin total:   120 mg   Plan last modified:   Moira Sheridan, PharmD (8/27/2018)   Next INR check:      Target end date:       Indications    Chronic anticoagulation [Z79.01]  Personal history of pulmonary embolism [Z86.711]  Long term (current) use of anticoagulants [Z79.01] [Z79.01]             Anticoagulation Episode Summary     INR check location:       Preferred lab:       Send INR reminders to:       Comments:         Anticoagulation Care Providers     Provider Role Specialty Phone number    Ninfa Marcial M.D. Referring Internal Medicine [phone]    [facility] Anticoagulation Services Responsible  [phone]        Anticoagulation Patient Findings   History of Present Illness: follow up appointment for chronic anticoagulation with the high risk medication, warfarin for PE    Last INR was out of range, dosage adjusted: pt is now supra therapeutic,  Will HOLD warfarin for 2 days. Continue current dosing regimen.  Follow up in 1 weeks, to reduce the risk of adverse events related to this high risk medication, warfarin.    Maty Perkins, Clinical Pharmacist  Pt declines vitals today

## 2018-11-08 NOTE — PROGRESS NOTES
Subjective:   Natalie Zuniga is a 78 y.o. female here today for evaluation and management of:      Iron deficiency anemia  Her anemia level improved on recent blood test and iron panel showed improvement as well.  I discussed blood test result with her in clinic today.  Patient is currently taking ferrous sulfate 325 mg twice daily with meals.  She denies side effects from taking iron.  She denies abdominal pain or black tarry stool or blood in stool.    Results for NATALIE ZUNIGA (MRN [id number]) as of 11/7/2018 15:20   Ref. Range 7/16/2018 16:22 7/27/2018 09:03 10/31/2018 08:39   WBC Latest Ref Range: 4.8 - 10.8 K/uL 4.4 (L) 4.4 (L) 5.0   RBC Latest Ref Range: 4.20 - 5.40 M/uL 3.93 (L) 4.19 (L) 4.54   Hemoglobin Latest Ref Range: 12.0 - 16.0 g/dL 10.5 (L) 10.9 (L) 14.0   Hematocrit Latest Ref Range: 37.0 - 47.0 % 32.8 (L) 34.4 (L) 42.8   MCV Latest Ref Range: 81.4 - 97.8 fL 83.5 82.1 94.3   MCH Latest Ref Range: 27.0 - 33.0 pg 26.7 (L) 26.0 (L) 30.8   MCHC Latest Ref Range: 33.6 - 35.0 g/dL 32.0 (L) 31.7 (L) 32.7 (L)   RDW Latest Ref Range: 35.9 - 50.0 fL 46.8 47.3 64.6 (H)   Platelet Count Latest Ref Range: 164 - 446 K/uL 355 439 281   MPV Latest Ref Range: 9.0 - 12.9 fL 8.9 (L) 8.6 (L) 9.3     Results for NATALIE ZUNIGA (MRN [id number]) as of 11/7/2018 15:20   Ref. Range 7/27/2018 09:03 10/31/2018 08:39   Iron Latest Ref Range: 40 - 170 ug/dL 30 (L) 125   Total Iron Binding Latest Ref Range: 250 - 450 ug/dL 357 262   % Saturation Latest Ref Range: 15 - 55 % 8 (L) 48   Results for NATALIE ZUNIGA (MRN [id number]) as of 11/7/2018 15:20   Ref. Range 7/27/2018 09:03 10/31/2018 08:39   Ferritin Latest Ref Range: 10.0 - 291.0 ng/mL 8.5 (L) 20.2       HTN (hypertension), benign  She is taking Lasix 40 mg daily and carvedilol 6.25 mg twice daily.  Her blood pressure and pulse are stable and well controlled.  She denies side effects from taking Lasix and carvedilol.    Acquired hypothyroidism  She is  taking levothyroxine 100 mcg daily.  She tolerates medication without side effects.  Last thyroid function test on 7/27/2018 was normal.  She does not have any signs or symptoms of hypo-or hyperthyroid.    Chronic respiratory failure with hypoxia (CMS-HCC) (HCC)  Patient has chronic respiratory failure with hypoxia and sleep apnea.  She is using oxygen with CPAP machine 2 L every night.  She needs to have a letter to carry oxygen concentrator on a flight to [place].    Osteopenia with high risk of fracture  She is taking Fosamax 70 mg once a week, vitamin D and calcium supplement daily.  She denies side effects from taking Fosamax.  Vitamin D level is adequate with current supplements.         Current medicines (including changes today)  Current Outpatient Prescriptions   Medication Sig Dispense Refill   • levothyroxine (SYNTHROID) 100 MCG Tab TAKE 1 TABLET BY MOUTH EVERY DAY 90 Tab 3   • warfarin (COUMADIN) 10 MG Tab Take 1 to 2 tablets by mouth daily as directed by the coumadin clinic (Patient taking differently: Take 20 mg by mouth every day. Take 1 to 2 tablets by mouth daily as directed by the coumadin clinic) 180 Tab 1   • ferrous sulfate 325 (65 Fe) MG EC tablet Take 1 Tab by mouth 2 times a day, with meals. 180 Tab 3   • alendronate (FOSAMAX) 70 MG Tab TAKE 1 TABLET BY MOUTH EVERY 7 DAYS 12 Tab 3   • primidone (MYSOLINE) 250 MG Tab TAKE 1 TABLET BY MOUTH THREE TIMES DAILY 90 Tab 3   • CALCIUM PO Take 1,000 mg by mouth every day.     • potassium chloride SA (KDUR) 20 MEQ Tab CR Take 1 Tab by mouth every day. 90 Tab 3   • omeprazole (PRILOSEC) 20 MG delayed-release capsule Take 1 Cap by mouth every day. 90 Cap 3   • furosemide (LASIX) 40 MG Tab TAKE 1 TABLET BY MOUTH EVERY DAY 90 Tab 3   • carvedilol (COREG) 6.25 MG Tab TAKE 1 TABLET BY MOUTH TWICE DAILY 360 Tab 3   • Probiotic Product (PROBIOTIC DAILY PO) Take  by mouth every day.     • Glucos-Chond-Hyal Ac-Ca Fructo (MOVE FREE [facility] ADVANCE) Tab  "Take 1 Tab by mouth every day.     • Multiple Vitamins-Minerals (WOMENS MULTI VITAMIN & MINERAL) Tab Take 1 Tab by mouth every day.     • Melatonin 500 MCG TABLET DISPERSIBLE Take 1,000-2,000 mcg by mouth at bedtime as needed (sleep).     • Cholecalciferol (VITAMIN D-3) 1000 UNITS Cap Take 2,000 Units by mouth every day.       No current facility-administered medications for this visit.      She  has a past medical history of Arthritis; Bowel habit changes; Breath shortness; Cataract; Constipation; Epilepsy (HCC); Eye drainage; Hemorrhagic disorder (HCC); Hiatus hernia syndrome; Hypertension; Hyperthyroidism; Hypothyroid; Influenza; Pain; Personal history of venous thrombosis and embolism; Ringing in ears; Seizure (HCC) (1982); and Sore muscles.    ROS   No chest pain, no shortness of breath, no abdominal pain       Objective:     Blood pressure 116/54, pulse 70, temperature 36.9 °C (98.4 °F), temperature source Temporal, height 1.608 m (5' 3.3\"), weight 101.7 kg (224 lb 3.2 oz), SpO2 92 %, not currently breastfeeding. Body mass index is 39.34 kg/m².   Physical Exam:  General: Alert, oriented and no acute distress.  Eye contact is good, speech goal directed, affect calm  HEENT: conjunctiva non-injected, sclera non-icteric.  Oral mucous membranes pink and moist with no lesions.  Pinna normal.   Lungs: Normal respiratory effort, clear to auscultation bilaterally with good excursion.  CV: regular rate and rhythm. No murmurs.   Abdomen: soft, non distended, nontender, Bowel sound normal.  Ext: no edema, color normal, vascularity normal, temperature normal        Assessment and Plan:   The following treatment plan was discussed     1. Iron deficiency anemia, unspecified iron deficiency anemia type  - Improved.  Continue ferrous sulfate 325 mg twice daily.  Advised to increase water intake and fiber intake to prevent constipation.  - CBC WITH DIFFERENTIAL; Future  - COMP METABOLIC PANEL; Future    2. HTN (hypertension), " benign  - Well-controlled. Continue current regimens, carvedilol 6.25 mg twice daily and Lasix 40 mg daily with potassium chloride 20 mEq daily. Recheck lab 1-2 weeks before next follow up visit.  - CBC WITH DIFFERENTIAL; Future  - COMP METABOLIC PANEL; Future    3. Acquired hypothyroidism  - Well-controlled. Continue current regimens, levothyroxine 100 mcg every morning on empty stomach. Recheck lab 1-2 weeks before next follow up visit.  - TSH; Future  - FREE THYROXINE; Future    4. Osteopenia with high risk of fracture  - Well-controlled. Continue current regimens, Fosamax 70 mg once a week and vitamin D and calcium supplements daily.  Advised to do regular weightbearing exercise as tolerated.  Recheck lab 1-2 weeks before next follow up visit.  - Counseling for side effect of Fosamax.  - Advised to take Fosamax with plenty of water and to stay upright for at least 2 hour after taking medication to prevent esophagitis.  - VITAMIN D,25 HYDROXY; Future    5. Chronic respiratory failure with hypoxia (HCC)  - Well-controlled.  Continue oxygen 2 L at night with CPAP machine.  Provide a letter for patient to carry oxygen on a flight to [place].        Followup: Return in about 3 months (around 2/7/2019), or if symptoms worsen or fail to improve, for Hypertension, Anemia, Osteopenia, Hypothyroid, Lab review.      Please note that this dictation was created using voice recognition software. I have made every reasonable attempt to correct obvious errors, but I expect that there may have unintended errors in text, spelling, punctuation, or grammar that I did not discover.

## 2018-11-12 ENCOUNTER — ANTICOAGULATION VISIT (OUTPATIENT)
Dept: MEDICAL GROUP | Facility: MEDICAL CENTER | Age: 78
End: 2018-11-12
Payer: MEDICARE

## 2018-11-12 DIAGNOSIS — Z86.711 PERSONAL HISTORY OF PULMONARY EMBOLISM: ICD-10-CM

## 2018-11-12 DIAGNOSIS — Z79.01 LONG TERM (CURRENT) USE OF ANTICOAGULANTS: Primary | ICD-10-CM

## 2018-11-12 DIAGNOSIS — Z79.01 CHRONIC ANTICOAGULATION: ICD-10-CM

## 2018-11-12 LAB — INR PPP: 1.4 (ref 2–3.5)

## 2018-11-12 PROCEDURE — 85610 PROTHROMBIN TIME: CPT | Performed by: INTERNAL MEDICINE

## 2018-11-12 PROCEDURE — 99211 OFF/OP EST MAY X REQ PHY/QHP: CPT | Performed by: INTERNAL MEDICINE

## 2018-11-12 NOTE — PROGRESS NOTES
OP Anticoagulation Service Note    Date: 11/12/2018  There were no vitals filed for this visit.  The pt declined vitals today    Anticoagulation Summary  As of 11/12/2018    INR goal:   2.0-3.0   TTR:   62.7 % (2 y)   Today's INR:   1.4!   Warfarin maintenance plan:   10 mg (10 mg x 1) on Sun, Thu; 20 mg (10 mg x 2) all other days   Weekly warfarin total:   120 mg   Plan last modified:   Moira Sheridan, PharmD (8/27/2018)   Next INR check:   11/26/2018   Target end date:       Indications    Chronic anticoagulation [Z79.01]  Personal history of pulmonary embolism [Z86.711]  Long term (current) use of anticoagulants [Z79.01] [Z79.01]             Anticoagulation Episode Summary     INR check location:       Preferred lab:       Send INR reminders to:       Comments:         Anticoagulation Care Providers     Provider Role Specialty Phone number    Ninfa Marcial M.D. Referring Internal Medicine [phone]    [facility] Anticoagulation Services Responsible  [phone]        Anticoagulation Patient Findings      HPI:   Natalie Benoite Héctor seen in clinic today, on anticoagulation therapy with warfarin (a high risk medication) for PE..    Pt is here today to evaluate anticoagulation therapy    Previous INR was  4.8 on 11/8/18    Pt was instructed to HOLD x 2 doses, then resume her current regimen.    Confirmed warfarin dosing regimen, denies any missed doses, but she did take only 10mg on Sat because she had diarrhea again.  Diet has been consistent with foods rich in vitamin K: Yes  Changes in ETOH:  No  Changes in smoking status: No  Changes in medication: No   Cost restriction: No  S/s of bleeding:  No  Signs/symptoms  thrombosis since the last appt: No    A/P   INR is SUB-therapeutic today, AT 1.4, which will require dose adjustment today to prevent recurrence of thrombosis or stroke.  Patient will bolus tonight, then resume her current regimen. Patient will follow up again in 2 weeks.    11/2018 check  referral    Pt educated to contact our clinic with any changes in medications or s/s of bleeding or thrombosis. Pt is aware to seek immediate medical attention for falls, head injury or deep cuts    Follow up appointment in 2 week(s) to reduce risk of adverse events from warfarin    Next appt: Monday Nov 26, 2018 3:45pm    Natividad Capellan PharmD

## 2018-11-14 ENCOUNTER — PATIENT MESSAGE (OUTPATIENT)
Dept: MEDICAL GROUP | Age: 78
End: 2018-11-14

## 2018-11-14 ENCOUNTER — OFFICE VISIT (OUTPATIENT)
Dept: PULMONOLOGY | Facility: HOSPICE | Age: 78
End: 2018-11-14
Payer: MEDICARE

## 2018-11-14 VITALS
SYSTOLIC BLOOD PRESSURE: 122 MMHG | RESPIRATION RATE: 16 BRPM | HEART RATE: 87 BPM | HEIGHT: 63 IN | BODY MASS INDEX: 38.45 KG/M2 | WEIGHT: 217 LBS | DIASTOLIC BLOOD PRESSURE: 70 MMHG | TEMPERATURE: 98.6 F | OXYGEN SATURATION: 93 %

## 2018-11-14 DIAGNOSIS — G47.33 OBSTRUCTIVE SLEEP APNEA: ICD-10-CM

## 2018-11-14 DIAGNOSIS — J96.11 CHRONIC RESPIRATORY FAILURE WITH HYPOXIA (HCC): ICD-10-CM

## 2018-11-14 DIAGNOSIS — I27.21 SECONDARY PULMONARY ARTERIAL HYPERTENSION (HCC): ICD-10-CM

## 2018-11-14 DIAGNOSIS — G40.909 SEIZURE DISORDER (HCC): ICD-10-CM

## 2018-11-14 DIAGNOSIS — I26.99 RECURRENT PULMONARY EMBOLISM (HCC): ICD-10-CM

## 2018-11-14 DIAGNOSIS — Z79.01 LONG TERM (CURRENT) USE OF ANTICOAGULANTS: ICD-10-CM

## 2018-11-14 PROCEDURE — 99214 OFFICE O/P EST MOD 30 MIN: CPT | Performed by: INTERNAL MEDICINE

## 2018-11-14 RX ORDER — PRIMIDONE 250 MG/1
250 TABLET ORAL 3 TIMES DAILY
Qty: 270 TAB | Refills: 3 | Status: SHIPPED | OUTPATIENT
Start: 2018-11-14 | End: 2019-06-12

## 2018-11-14 NOTE — PROGRESS NOTES
Natalie Anaya is a 78 y.o. female here for pulmonary emboli and sleep apnea. Patient was referred by her primary care doctor.    History of Present Illness:      This lady has a remarkable history of pulmonary emboli pulmonary hypertension and is on lifetime anticoagulation.  INR levels have been somewhat variable but are carefully monitored.    Elevated body mass index at 38, portion control and gentle exercise recommended, she is quite active and loves to travel.    Her sleep apnea is problematic, mask fit is problematic, but download and compliance data looks very good.  Her apnea-hypopnea index was only 0.5, CPAP is at 12 cm, average usage is over 6 hours.  Although she is encouraged by the results and clinically feels good, she is not certain that it makes a huge difference in her daytime function    She is also having trouble with tasks and just approached her durable medical equipment company about changing one.  She is working with them on the guidelines for replacement    I will see her in 6 months on a pulmonary basis, she has follow-up in the sleep center in January as well    Constitutional ROS: No unexpected change in weight, No unexplained fevers  Eyes: No change in vision or blurring or double vision  Mouth/Throat ROS: No sore throat, No recent change in voice or hoarseness  Pulmonary ROS: See present history for pertinent positives  Cardiovascular ROS: No chest pain to suggest acute coronary syndrome  Gastrointestinal ROS: No abdominal pain to suggest peptic disease  Musculoskeletal/Extremities ROS: no acute artritis or unusual swelling  Hematologic/Lymphatic ROS: No easy bleeding or unusual lymph node swelling  Neurologic ROS: No new or unusual weakness  Psychiatric ROS: No hallucinations  Allergic/Immunologic: No  urticaria or allergic rash      Current Outpatient Prescriptions   Medication Sig Dispense Refill   • primidone (MYSOLINE) 250 MG Tab Take 1 Tab by mouth 3 times a day. 270 Tab 3    • levothyroxine (SYNTHROID) 100 MCG Tab TAKE 1 TABLET BY MOUTH EVERY DAY 90 Tab 3   • warfarin (COUMADIN) 10 MG Tab Take 1 to 2 tablets by mouth daily as directed by the coumadin clinic (Patient taking differently: Take 20 mg by mouth every day. Take 1 to 2 tablets by mouth daily as directed by the coumadin clinic) 180 Tab 1   • ferrous sulfate 325 (65 Fe) MG EC tablet Take 1 Tab by mouth 2 times a day, with meals. 180 Tab 3   • alendronate (FOSAMAX) 70 MG Tab TAKE 1 TABLET BY MOUTH EVERY 7 DAYS 12 Tab 3   • CALCIUM PO Take 1,000 mg by mouth every day.     • potassium chloride SA (KDUR) 20 MEQ Tab CR Take 1 Tab by mouth every day. 90 Tab 3   • omeprazole (PRILOSEC) 20 MG delayed-release capsule Take 1 Cap by mouth every day. 90 Cap 3   • furosemide (LASIX) 40 MG Tab TAKE 1 TABLET BY MOUTH EVERY DAY 90 Tab 3   • carvedilol (COREG) 6.25 MG Tab TAKE 1 TABLET BY MOUTH TWICE DAILY 360 Tab 3   • Probiotic Product (PROBIOTIC DAILY PO) Take  by mouth every day.     • Glucos-Chond-Hyal Ac-Ca Fructo (MOVE FREE [facility] ADVANCE) Tab Take 1 Tab by mouth every day.     • Multiple Vitamins-Minerals (WOMENS MULTI VITAMIN & MINERAL) Tab Take 1 Tab by mouth every day.     • Melatonin 500 MCG TABLET DISPERSIBLE Take 1,000-2,000 mcg by mouth at bedtime as needed (sleep).     • Cholecalciferol (VITAMIN D-3) 1000 UNITS Cap Take 2,000 Units by mouth every day.       No current facility-administered medications for this visit.        Social History   Substance Use Topics   • Smoking status: Never Smoker   • Smokeless tobacco: Never Used   • Alcohol use Yes        Past Medical History:   Diagnosis Date   • Arthritis     Knees.   • Bowel habit changes     Constipation   • Breath shortness    • Cataract     Bilat IOL   • Constipation    • Epilepsy (HCC)    • Eye drainage    • Hemorrhagic disorder (HCC)    • Hiatus hernia syndrome    • Hypertension    • Hyperthyroidism    • Hypothyroid    • Influenza    • Pain     knees   • Personal  "history of venous thrombosis and embolism     PE from Right Shouler FX 2003.   • Ringing in ears    • Seizure (HCC) 1982   • Sore muscles        Past Surgical History:   Procedure Laterality Date   • IRRIGATION & DEBRIDEMENT HIP Left 11/17/2016    Procedure: IRRIGATION & DEBRIDEMENT HIP;  Surgeon: Maximo Garnica M.D.;  Location: SURGERY [facility];  Service:    • HIP NAILING INTRAMEDULLARY Left 10/6/2016    Procedure: HIP NAILING INTRAMEDULLARY;  Surgeon: Walt Nguyen M.D.;  Location: SURGERY [facility];  Service:    • GASTROSCOPY-ENDO N/A 4/29/2016    Procedure: GASTROSCOPY-ENDO;  Surgeon: Mikey Stanton M.D.;  Location: [facility];  Service:    • COLONOSCOPY-FLEXIBLE N/A 4/29/2016    Procedure: COLONOSCOPY-FLEXIBLE;  Surgeon: Mikey Stanton M.D.;  Location: [facility];  Service:    • CATARACT PHACO WITH IOL  8/5/2014    Performed by Lorenzo Bello M.D. at [facility]   • CATARACT PHACO WITH IOL  7/15/2014    Performed by Lorenzo Bello M.D. at [facility]   • KNEE ARTHROPLASTY TOTAL  7/9/2012    Performed by DENIZ FREDERICK at [facility]   • JOSE BY LAPAROSCOPY  9/30/2011    Performed by JOHNATHAN CRISOSTOMO at [facility]   • ARTHROSCOPY, KNEE     • CARPAL TUNNEL RELEASE     • PB REMV 2ND CATARACT,CORN-SCLER SECTN     • TONSILLECTOMY     • TUBAL LIGATION         Allergies: Tape    Family History   Problem Relation Age of Onset   • Cancer Mother 67        Ovarian   • Alcohol/Drug Father 60        Appenditis   • Heart Disease Father    • Cancer Maternal Grandmother         colon cancer   • Heart Disease Maternal Grandfather    • Cancer Daughter 44        melonoma       Physical Examination    Vitals:    11/14/18 1425   Height: 1.6 m (5' 3\")   Weight: 98.4 kg (217 lb)   Weight % change since last entry.: 0 %   BP: 122/70   Pulse: 87   BMI (Calculated): 38.44   Resp: 16   Temp: 37 °C (98.6 °F)       General Appearance: " alert, no distress  Skin: Skin color, texture, turgor normal. No rashes or lesions.  Eyes: negative  Oropharynx: Lips, mucosa, and tongue normal. Teeth and gums normal. Oropharynx moist and without lesion  Lungs: positive findings: Clear and quiet without wheezes or rhonchi  Heart: negative. RRR without murmur, gallop, or rubs.  No ectopy.  Abdomen: Abdomen soft, non-tender. . No masses,  No organomegaly  Extremities:  No deformities, edema, or skin discoloration  Joints: No acute arthritis  Peripheral Pulses:perfused  Neurologic: intact grossly  No pretibial edema    III (soft palate, base of uvula visible)    Imaging: None presently    PFTS: None presently      Assessment and Plan  1. Recurrent pulmonary embolism (HCC)  Lifetime anticoagulation    2. Secondary pulmonary arterial hypertension (HCC)  Prevent pulmonary emboli and correct sleep apnea    3. Long term (current) use of anticoagulants [Z79.01]    4. Chronic respiratory failure with hypoxia (HCC)    5. BMI 39.0-39.9,adult  - OBESITY COUNSELING (No Charge): Patient identified as having weight management issue.  Appropriate orders and counseling given.    6. Obstructive sleep apnea    This lady has a remarkable history of pulmonary emboli pulmonary hypertension and is on lifetime anticoagulation.  INR levels have been somewhat variable but are carefully monitored.    Elevated body mass index at 38, portion control and gentle exercise recommended, she is quite active and loves to travel.    Her sleep apnea is problematic, mask fit is problematic, but download and compliance data looks very good.  Her apnea-hypopnea index was only 0.5, CPAP is at 12 cm, average usage is over 6 hours.  Although she is encouraged by the results and clinically feels good, she is not certain that it makes a huge difference in her daytime function    She is also having trouble with tasks and just approached her durable medical equipment company about changing one.  She is working  with them on the guidelines for replacement    I will see her in 6 months on a pulmonary basis, she has follow-up in the sleep center in January as well  Followup Return in about 6 months (around 5/14/2019) for follow up visit with Dr. Isaeblla Irvin.

## 2018-11-14 NOTE — PATIENT INSTRUCTIONS
This lady has a remarkable history of pulmonary emboli pulmonary hypertension and is on lifetime anticoagulation.  INR levels have been somewhat variable but are carefully monitored.    Elevated body mass index at 38, portion control and gentle exercise recommended, she is quite active and loves to travel.    Her sleep apnea is problematic, mask fit is problematic, but download and compliance data looks very good.  Her apnea-hypopnea index was only 0.5, CPAP is at 12 cm, average usage is over 6 hours.  Although she is encouraged by the results and clinically feels good, she is not certain that it makes a huge difference in her daytime function    She is also having trouble with tasks and just approached her durable medical equipment company about changing one.  She is working with them on the guidelines for replacement    I will see her in 6 months on a pulmonary basis, she has follow-up in the sleep center in January as well

## 2018-11-26 ENCOUNTER — ANTICOAGULATION VISIT (OUTPATIENT)
Dept: MEDICAL GROUP | Facility: MEDICAL CENTER | Age: 78
End: 2018-11-26
Payer: MEDICARE

## 2018-11-26 VITALS — SYSTOLIC BLOOD PRESSURE: 127 MMHG | DIASTOLIC BLOOD PRESSURE: 63 MMHG | HEART RATE: 75 BPM

## 2018-11-26 DIAGNOSIS — Z86.711 PERSONAL HISTORY OF PULMONARY EMBOLISM: ICD-10-CM

## 2018-11-26 DIAGNOSIS — Z79.01 LONG TERM (CURRENT) USE OF ANTICOAGULANTS: Primary | ICD-10-CM

## 2018-11-26 DIAGNOSIS — Z79.01 CHRONIC ANTICOAGULATION: ICD-10-CM

## 2018-11-26 LAB — INR PPP: 2.1 (ref 2–3.5)

## 2018-11-26 PROCEDURE — 85610 PROTHROMBIN TIME: CPT | Performed by: FAMILY MEDICINE

## 2018-11-26 PROCEDURE — 99999 PR NO CHARGE: CPT | Performed by: FAMILY MEDICINE

## 2018-11-27 NOTE — PROGRESS NOTES
OP Anticoagulation Service Note    Date: 11/26/2018  Vitals:    11/26/18 1541   BP: 127/63   Pulse: 75     Anticoagulation Summary  As of 11/26/2018    INR goal:   2.0-3.0   TTR:   61.8 % (2 y)   Today's INR:   2.1   Warfarin maintenance plan:   10 mg (10 mg x 1) on Sun, Thu; 20 mg (10 mg x 2) all other days   Weekly warfarin total:   120 mg   Plan last modified:   Moira Sheridan, PharmD (8/27/2018)   Next INR check:   12/10/2018   Target end date:       Indications    Chronic anticoagulation [Z79.01]  Personal history of pulmonary embolism [Z86.711]  Long term (current) use of anticoagulants [Z79.01] [Z79.01]             Anticoagulation Episode Summary     INR check location:       Preferred lab:       Send INR reminders to:       Comments:         Anticoagulation Care Providers     Provider Role Specialty Phone number    Ninfa Marcial M.D. Referring Internal Medicine [phone]    [facility] Anticoagulation Services Responsible  [phone]        Anticoagulation Patient Findings  Patient Findings     Negatives:   Signs/symptoms of thrombosis, Signs/symptoms of bleeding, Laboratory test error suspected, Change in health, Change in alcohol use, Change in activity, Upcoming invasive procedure, Emergency department visit, Upcoming dental procedure, Missed doses, Extra doses, Change in medications, Change in diet/appetite, Hospital admission, Bruising, Other complaints          HPI:   Natalie Anaya seen in clinic today, on anticoagulation therapy with warfarin (a high risk medication) for hx of PE    Pt is here today to evaluate anticoagulation therapy    Previous INR was  1.4 on 11/12/18    Pt was instructed to bolus, then resume her current regimen.     Confirmed warfarin dosing regimen, 0 missed nor extra doses of coumadin.   Diet has been consistent with foods rich in vitamin K: Yes  Changes in ETOH:  No  Changes in smoking status: No  Changes in medication: No   Cost restriction: No  S/s of bleeding:   No  Signs/symptoms  thrombosis since the last appt: No    A/P   INR therapeutic today, at 2.1          Patient instructed to continue with the current warfarin dosing regimen, and asked to follow up again in 2 weeks.    11/2019 check referral    Pt educated to contact our clinic with any changes in medications or s/s of bleeding or thrombosis. Pt is aware to seek immediate medical attention for falls, head injury or deep cuts    Follow up appointment in 2 week(s) to reduce risk of adverse events from warfarin    Next appt: Monday, Dec 10, 2018 3:45pm    Natividad Capellan PharmD

## 2018-12-05 ENCOUNTER — OFFICE VISIT (OUTPATIENT)
Dept: MEDICAL GROUP | Age: 78
End: 2018-12-05

## 2018-12-05 VITALS
BODY MASS INDEX: 39.48 KG/M2 | OXYGEN SATURATION: 92 % | HEIGHT: 63 IN | HEART RATE: 62 BPM | WEIGHT: 222.8 LBS | DIASTOLIC BLOOD PRESSURE: 64 MMHG | TEMPERATURE: 98.5 F | SYSTOLIC BLOOD PRESSURE: 124 MMHG

## 2018-12-05 DIAGNOSIS — J96.11 CHRONIC RESPIRATORY FAILURE WITH HYPOXIA (HCC): ICD-10-CM

## 2018-12-05 DIAGNOSIS — K21.9 GASTROESOPHAGEAL REFLUX DISEASE WITHOUT ESOPHAGITIS: ICD-10-CM

## 2018-12-05 DIAGNOSIS — M85.80 OSTEOPENIA WITH HIGH RISK OF FRACTURE: ICD-10-CM

## 2018-12-05 DIAGNOSIS — S32.010D COMPRESSION FRACTURE OF FIRST LUMBAR VERTEBRA WITH ROUTINE HEALING: ICD-10-CM

## 2018-12-05 DIAGNOSIS — I51.89 DIASTOLIC DYSFUNCTION: ICD-10-CM

## 2018-12-05 DIAGNOSIS — G47.33 OBSTRUCTIVE SLEEP APNEA: ICD-10-CM

## 2018-12-05 DIAGNOSIS — I27.21 SECONDARY PULMONARY ARTERIAL HYPERTENSION (HCC): ICD-10-CM

## 2018-12-05 DIAGNOSIS — G40.909 SEIZURE DISORDER (HCC): ICD-10-CM

## 2018-12-05 DIAGNOSIS — S22.000D CLOSED COMPRESSION FRACTURE OF THORACIC VERTEBRA WITH ROUTINE HEALING, SUBSEQUENT ENCOUNTER: ICD-10-CM

## 2018-12-05 DIAGNOSIS — E03.9 ACQUIRED HYPOTHYROIDISM: ICD-10-CM

## 2018-12-05 DIAGNOSIS — I26.99 RECURRENT PULMONARY EMBOLISM (HCC): ICD-10-CM

## 2018-12-05 DIAGNOSIS — Z00.00 MEDICARE ANNUAL WELLNESS VISIT, SUBSEQUENT: ICD-10-CM

## 2018-12-05 DIAGNOSIS — D50.9 IRON DEFICIENCY ANEMIA, UNSPECIFIED IRON DEFICIENCY ANEMIA TYPE: ICD-10-CM

## 2018-12-05 DIAGNOSIS — F51.04 CHRONIC INSOMNIA: ICD-10-CM

## 2018-12-05 DIAGNOSIS — I10 HTN (HYPERTENSION), BENIGN: ICD-10-CM

## 2018-12-05 DIAGNOSIS — K44.9 HIATAL HERNIA: ICD-10-CM

## 2018-12-05 PROBLEM — T21.22XA: Status: RESOLVED | Noted: 2018-02-24 | Resolved: 2018-12-05

## 2018-12-05 ASSESSMENT — ACTIVITIES OF DAILY LIVING (ADL): BATHING_REQUIRES_ASSISTANCE: 0

## 2018-12-05 ASSESSMENT — PATIENT HEALTH QUESTIONNAIRE - PHQ9: CLINICAL INTERPRETATION OF PHQ2 SCORE: 0

## 2018-12-05 ASSESSMENT — ENCOUNTER SYMPTOMS: GENERAL WELL-BEING: GOOD

## 2018-12-05 NOTE — PROGRESS NOTES
Chief Complaint   Patient presents with   • Annual Wellness Visit         HPI:  Natalie is a 78 y.o. here for Medicare Annual Wellness Visit        Patient Active Problem List    Diagnosis Date Noted   • Recurrent pulmonary embolism (HCC) 11/02/2016     Priority: Medium   • History of fracture of left hip 10/05/2016     Priority: Medium   • Seizure disorder (HCC) 10/21/2015     Priority: Low   • HTN (hypertension), benign 01/09/2013     Priority: Low   • BMI 39.0-39.9,adult 11/14/2018   • Compression fracture of first lumbar vertebra with routine healing 07/16/2018   • Closed compression fracture of thoracic vertebra (HCC) 07/16/2018   • Gastroesophageal reflux disease without esophagitis 01/17/2018   • Chronic respiratory failure with hypoxia (HCC) 12/13/2017   • Obstructive sleep apnea 09/13/2017   • Osteopenia with high risk of fracture 07/20/2017   • Long term (current) use of anticoagulants [Z79.01] 04/11/2017   • Secondary pulmonary arterial hypertension (HCC) 03/15/2017   • Chronic anticoagulation 02/23/2017   • History of left rib fracture 09/26/2016   • Iron deficiency anemia 03/04/2016   • Diastolic dysfunction 03/04/2016   • Hiatal hernia 03/04/2016   • Acquired hypothyroidism 01/08/2016   • Personal history of pulmonary embolism 01/08/2016   • History of cataract removal with insertion of prosthetic lens 07/15/2014   • Chronic insomnia 01/09/2013       Current Outpatient Prescriptions   Medication Sig Dispense Refill   • primidone (MYSOLINE) 250 MG Tab Take 1 Tab by mouth 3 times a day. 270 Tab 3   • levothyroxine (SYNTHROID) 100 MCG Tab TAKE 1 TABLET BY MOUTH EVERY DAY 90 Tab 3   • warfarin (COUMADIN) 10 MG Tab Take 1 to 2 tablets by mouth daily as directed by the coumadin clinic (Patient taking differently: Take 20 mg by mouth every day. Take 1 to 2 tablets by mouth daily as directed by the coumadin clinic) 180 Tab 1   • ferrous sulfate 325 (65 Fe) MG EC tablet Take 1 Tab by mouth 2 times a day, with  meals. 180 Tab 3   • alendronate (FOSAMAX) 70 MG Tab TAKE 1 TABLET BY MOUTH EVERY 7 DAYS 12 Tab 3   • CALCIUM PO Take 1,000 mg by mouth every day.     • potassium chloride SA (KDUR) 20 MEQ Tab CR Take 1 Tab by mouth every day. 90 Tab 3   • omeprazole (PRILOSEC) 20 MG delayed-release capsule Take 1 Cap by mouth every day. 90 Cap 3   • furosemide (LASIX) 40 MG Tab TAKE 1 TABLET BY MOUTH EVERY DAY 90 Tab 3   • carvedilol (COREG) 6.25 MG Tab TAKE 1 TABLET BY MOUTH TWICE DAILY 360 Tab 3   • Probiotic Product (PROBIOTIC DAILY PO) Take  by mouth every day.     • Glucos-Chond-Hyal Ac-Ca Fructo (MOVE FREE JOINT HEALTH ADVANCE) Tab Take 1 Tab by mouth every day.     • Multiple Vitamins-Minerals (WOMENS MULTI VITAMIN & MINERAL) Tab Take 1 Tab by mouth every day.     • Melatonin 500 MCG TABLET DISPERSIBLE Take 1,000-2,000 mcg by mouth at bedtime as needed (sleep).     • Cholecalciferol (VITAMIN D-3) 1000 UNITS Cap Take 2,000 Units by mouth every day.       No current facility-administered medications for this visit.         Patient is taking medications as noted in medication list.  Current supplements as per medication list.     Allergies: Tape    Current social contact/activities: play cards/spend time with friends     Is patient current with immunizations? Yes.    She  reports that she has never smoked. She has never used smokeless tobacco. She reports that she drinks alcohol. She reports that she does not use drugs.  Counseling given: Yes        DPA/Advanced directive: Patient has Advanced Directive on file.     ROS:    Gait: Uses a cane   Ostomy: No   Other tubes: No   Amputations: No   Chronic oxygen use Yes   Last eye exam 11/18   Wears hearing aids: No   : Denies any urinary leakage during the last 6 months      Screening:        Depression Screening    Little interest or pleasure in doing things?  0 - not at all  Feeling down, depressed, or hopeless? 0 - not at all  Patient Health Questionnaire Score: 0    If  depressive symptoms identified deferred to follow up visit unless specifically addressed in assessment and plan.    Interpretation of PHQ-9 Total Score   Score Severity   1-4 No Depression   5-9 Mild Depression   10-14 Moderate Depression   15-19 Moderately Severe Depression   20-27 Severe Depression    Screening for Cognitive Impairment    Three Minute Recall (leader, season, table)  2/3    Brian clock face with all 12 numbers and set the hands to show 10 past 11.  Yes    If cognitive concerns identified, deferred for follow up unless specifically addressed in assessment and plan.    Fall Risk Assessment    Has the patient had two or more falls in the last year or any fall with injury in the last year?  No  If fall risk identified, deferred for follow up unless specifically addressed in assessment and plan.    Safety Assessment    Throw rugs on floor.  No  Handrails on all stairs.  No  Good lighting in all hallways.  Yes  Difficulty hearing.  No  Patient counseled about all safety risks that were identified.    Functional Assessment ADLs    Are there any barriers preventing you from cooking for yourself or meeting nutritional needs?  No.    Are there any barriers preventing you from driving safely or obtaining transportation?  No.    Are there any barriers preventing you from using a telephone or calling for help?  No.    Are there any barriers preventing you from shopping?  No.    Are there any barriers preventing you from taking care of your own finances?  No.    Are there any barriers preventing you from managing your medications?  No.    Are there any barriers preventing you from showering, bathing or dressing yourself?  No.    Are you currently engaging in any exercise or physical activity?  No.     What is your perception of your health?  Good.    Health Maintenance Summary                IMM ZOSTER VACCINES Overdue 2/25/2011      Done 12/31/2010 Imm Admin: Zoster Vaccine Live (ZVL) (Zostavax)    Annual  Wellness Visit Next Due 12/15/2018      Done 12/14/2017 Visit Dx: Medicare annual wellness visit, subsequent     Patient has more history with this topic...    BONE DENSITY Next Due 5/17/2022      Done 5/17/2017 DS-BONE DENSITY STUDY (DEXA)     Patient has more history with this topic...    IMM DTaP/Tdap/Td Vaccine Next Due 10/21/2025      Done 10/21/2015 Imm Admin: Tdap Vaccine    COLONOSCOPY Next Due 4/29/2026      Done 4/29/2016 AMB REFERRAL TO GI FOR COLONOSCOPY     Patient has more history with this topic...          Patient Care Team:  Ninfa Marcial M.D. as PCP - General (Internal Medicine)  [facility] as Consulting Physician (Ophthalmology)  Walt Nguyen M.D. (Orthopaedics)  [facility]  Isabella Irvin M.D. as Consulting Physician (Pulmonary Medicine)  Michael J Bloch, M.D. as Consulting Physician (Internal Medicine)  [facility]  as Medical Home Care Manager (Infusion Therapy)  Mikey Stanton M.D. (Gastroenterology)    Social History   Substance Use Topics   • Smoking status: Never Smoker   • Smokeless tobacco: Never Used   • Alcohol use Yes     Family History   Problem Relation Age of Onset   • Cancer Mother 67        Ovarian   • Alcohol/Drug Father 60        Appenditis   • Heart Disease Father    • Cancer Maternal Grandmother         colon cancer   • Heart Disease Maternal Grandfather    • Cancer Daughter 44        melonoma     She  has a past medical history of Arthritis; Bowel habit changes; Breath shortness; Cataract; Constipation; Epilepsy (HCC); Eye drainage; Hemorrhagic disorder (HCC); Hiatus hernia syndrome; Hypertension; Hyperthyroidism; Hypothyroid; Influenza; Pain; Personal history of venous thrombosis and embolism; Ringing in ears; Seizure (HCC) (1982); and Sore muscles.   Past Surgical History:   Procedure Laterality Date   • IRRIGATION & DEBRIDEMENT HIP Left 11/17/2016    Procedure: IRRIGATION & DEBRIDEMENT HIP;  Surgeon: Maximo Garnica M.D.;  Location:  "[facility];  Service:    • HIP NAILING INTRAMEDULLARY Left 10/6/2016    Procedure: HIP NAILING INTRAMEDULLARY;  Surgeon: Walt Nguyen M.D.;  Location: [facility];  Service:    • GASTROSCOPY-ENDO N/A 4/29/2016    Procedure: GASTROSCOPY-ENDO;  Surgeon: Mikey Stanton M.D.;  Location: [facility];  Service:    • COLONOSCOPY-FLEXIBLE N/A 4/29/2016    Procedure: COLONOSCOPY-FLEXIBLE;  Surgeon: Mikey Stanton M.D.;  Location: [facility];  Service:    • CATARACT PHACO WITH IOL  8/5/2014    Performed by Lorenzo Bello M.D. at [facility]   • CATARACT PHACO WITH IOL  7/15/2014    Performed by Lorenzo Bello M.D. at [facility]   • KNEE ARTHROPLASTY TOTAL  7/9/2012    Performed by DENIZ FREDERICK at [facility]   • JOSE BY LAPAROSCOPY  9/30/2011    Performed by JOHNATHAN CRISOSTOMO at [facility]   • ARTHROSCOPY, KNEE     • CARPAL TUNNEL RELEASE     • PB REMV 2ND CATARACT,CORN-SCLER SECTN     • TONSILLECTOMY     • TUBAL LIGATION             Exam:     Blood pressure 124/64, pulse 62, temperature 36.9 °C (98.5 °F), height 1.6 m (5' 3\"), weight 101.1 kg (222 lb 12.8 oz), SpO2 92 %, not currently breastfeeding. Body mass index is 39.47 kg/m².    Hearing good.    Dentition good  Alert, oriented in no acute distress.  Eye contact is good, speech goal directed, affect calm      Assessment and Plan. The following treatment and monitoring plan is recommended:    1. Medicare annual wellness visit, subsequent     2. Seizure disorder (HCC)      Well-controlled.  Continue primidone 250 mg 1 tab in the morning and 2 tabs in the evening.  She denies side effects from taking it.  No recurrent seizure.     3. Secondary pulmonary arterial hypertension (HCC)      Stable.  Continue Lasix 40 mg daily with potassium supplements, carvedilol 6.25 mg twice daily.  Follow-up with pulmonologist regularly.     4. Recurrent pulmonary embolism " (HCC)      Stable.  Continue Coumadin as managed by anticoagulation clinic.  She denies side effects from taking Coumadin.  INR at goal.     5. Osteopenia with high risk of fracture      Stable. Continue Fosamax 70 mg once a week and calcium and vitamin D supplements daily.  Encouraged to do weightbearing exercise regularly.     6. Obstructive sleep apnea      Stable.  Continue CPAP machine every night.  Follow with pulmonary sleep medicine regularly.     7. Iron deficiency anemia, unspecified iron deficiency anemia type      Improved. Continue ferrous sulfate 325 mg twice daily.  Advised to increase water intake and fiber intake to prevent constipation.     8. HTN (hypertension), benign      Well-controlled. Continue carvedilol 6.25 mg twice daily and Lasix 40 mg daily with potassium 20 mEq daily.  Advised to monitor blood pressure and pulse at home.     9. Hiatal hernia      Chronic and stable.  Continue to control with diet and omeprazole 20 mg daily as needed.     10. Gastroesophageal reflux disease without esophagitis      Well-controlled. Continue omeprazole 20 mg 1 tablet every morning before breakfast.  She is advised to avoid acidic food, spicy food or fatty meal.     11. Diastolic dysfunction      Well-controlled.  Continue Lasix 40 mg daily and potassium chloride 20 mEq daily.     12. Closed compression fracture of thoracic vertebra with routine healing, subsequent encounter      Asymptomatic.  Healing well.  Continue to monitor.     13. Compression fracture of first lumbar vertebra with routine healing      Stable.  Asymptomatic.  Continue to monitor.     14. Chronic respiratory failure with hypoxia (HCC)      Well-controlled.  Continue oxygen 2 L at night.     15. Chronic insomnia      Stable.  Continue melatonin 2000 mcg nightly as needed.     16. Acquired hypothyroidism      Well-controlled. Continue levothyroxine 100 mcg every morning on empty stomach.     17. BMI 39.0-39.9,adult      Counseled for  healthy diet and regular physical exercise to lose weight.            Services suggested: No services needed at this time  Health Care Screening recommendations as per orders if indicated.  Referrals offered: PT/OT/Nutrition counseling/Behavioral Health/Smoking cessation as per orders if indicated.    Discussion today about general wellness and lifestyle habits:    · Prevent falls and reduce trip hazards; Cautioned about securing or removing rugs.  · Have a working fire alarm and carbon monoxide detector;   · Engage in regular physical activity and social activities       Follow-up: Return in about 3 months (around 2/27/2019), or if symptoms worsen or fail to improve, for Hypothyroid, Hypertension, Anemia, GERD, Lab review.

## 2018-12-10 ENCOUNTER — ANTICOAGULATION VISIT (OUTPATIENT)
Dept: MEDICAL GROUP | Facility: MEDICAL CENTER | Age: 78
End: 2018-12-10
Payer: MEDICARE

## 2018-12-10 DIAGNOSIS — Z79.01 CHRONIC ANTICOAGULATION: ICD-10-CM

## 2018-12-10 DIAGNOSIS — Z86.711 PERSONAL HISTORY OF PULMONARY EMBOLISM: ICD-10-CM

## 2018-12-10 DIAGNOSIS — Z79.01 LONG TERM (CURRENT) USE OF ANTICOAGULANTS: Primary | ICD-10-CM

## 2018-12-10 LAB — INR PPP: 3.1 (ref 2–3.5)

## 2018-12-10 PROCEDURE — 99211 OFF/OP EST MAY X REQ PHY/QHP: CPT | Performed by: PHYSICIAN ASSISTANT

## 2018-12-10 PROCEDURE — 85610 PROTHROMBIN TIME: CPT | Performed by: PHYSICIAN ASSISTANT

## 2018-12-11 NOTE — PROGRESS NOTES
OP Anticoagulation Service Note    Date: 12/10/2018  There were no vitals filed for this visit.  The pt declined vitals    Anticoagulation Summary  As of 12/10/2018    INR goal:   2.0-3.0   TTR:   62.3 % (2.1 y)   Today's INR:   3.1!   Warfarin maintenance plan:   10 mg (10 mg x 1) on Sun, Tue; 20 mg (10 mg x 2) all other days   Weekly warfarin total:   120 mg   Plan last modified:   Bianca Capellan (12/10/2018)   Next INR check:   1/3/2019   Target end date:       Indications    Chronic anticoagulation [Z79.01]  Personal history of pulmonary embolism [Z86.711]  Long term (current) use of anticoagulants [Z79.01] [Z79.01]             Anticoagulation Episode Summary     INR check location:       Preferred lab:       Send INR reminders to:       Comments:         Anticoagulation Care Providers     Provider Role Specialty Phone number    Ninfa Marcial M.D. Referring Internal Medicine [phone]    [facility]tico[facility] Responsible  [phone]        Anticoagulation Patient Findings  Patient Findings     Negatives:   Signs/symptoms of thrombosis, Signs/symptoms of bleeding, Laboratory test error suspected, Change in health, Change in alcohol use, Change in activity, Upcoming invasive procedure, Emergency department visit, Upcoming dental procedure, Missed doses, Extra doses, Change in medications, Change in diet/appetite, Hospital admission, Bruising, Other complaints          HPI:   Natalie Anaya seen in clinic today, on anticoagulation therapy with warfarin (a high risk medication) for hx of PE.    Pt is here today to evaluate anticoagulation therapy    Previous INR was  2.1 on 11/26/18    Pt was instructed to continue with the current dosing regimen    Confirmed warfarin dosing regimen, 0 missed nor extra doses of coumadin.   Diet has been consistent with foods rich in vitamin K: Yes  Changes in ETOH:  No  Changes in smoking status: No  Changes in medication: No   Cost restriction:  No  S/s of bleeding:  No  Signs/symptoms  thrombosis since the last appt: No    A/P   INR slightly SUPRA-therapeutic today, at 3.1   Patient instructed to decrease dose to 15mg TONIGHT ONLY, then to resume her current regimen. Patient will follow up again in 3 weeks, when they are back in [place].    11/2019 check referral    Pt educated to contact our clinic with any changes in medications or s/s of bleeding or thrombosis. Pt is aware to seek immediate medical attention for falls, head injury or deep cuts    Follow up appointment in 3 week(s) to reduce risk of adverse events from warfarin    Next appt: Monday, Jan 7, 2019 3:30pm    Natividad Capellan PharmD

## 2019-01-07 ENCOUNTER — ANTICOAGULATION VISIT (OUTPATIENT)
Dept: MEDICAL GROUP | Facility: MEDICAL CENTER | Age: 79
End: 2019-01-07
Payer: MEDICARE

## 2019-01-07 DIAGNOSIS — Z79.01 LONG TERM (CURRENT) USE OF ANTICOAGULANTS: ICD-10-CM

## 2019-01-07 DIAGNOSIS — I51.89 DIASTOLIC DYSFUNCTION: ICD-10-CM

## 2019-01-07 DIAGNOSIS — Z79.01 CHRONIC ANTICOAGULATION: Primary | ICD-10-CM

## 2019-01-07 DIAGNOSIS — I10 HTN (HYPERTENSION), BENIGN: ICD-10-CM

## 2019-01-07 DIAGNOSIS — Z86.711 PERSONAL HISTORY OF PULMONARY EMBOLISM: ICD-10-CM

## 2019-01-07 LAB — INR PPP: 2.4 (ref 2–3.5)

## 2019-01-07 PROCEDURE — 99999 PR NO CHARGE: CPT | Performed by: FAMILY MEDICINE

## 2019-01-07 PROCEDURE — 85610 PROTHROMBIN TIME: CPT | Performed by: FAMILY MEDICINE

## 2019-01-07 RX ORDER — CARVEDILOL 6.25 MG/1
TABLET ORAL
Qty: 180 TAB | Refills: 3 | Status: SHIPPED | OUTPATIENT
Start: 2019-01-07 | End: 2019-12-09 | Stop reason: SDUPTHER

## 2019-01-07 RX ORDER — FUROSEMIDE 40 MG/1
TABLET ORAL
Qty: 90 TAB | Refills: 3 | Status: SHIPPED | OUTPATIENT
Start: 2019-01-07 | End: 2019-12-17 | Stop reason: SDUPTHER

## 2019-01-07 NOTE — PROGRESS NOTES
OP Anticoagulation Service Note    Date: 1/7/2019  There were no vitals filed for this visit.  The pt declined vitals    Anticoagulation Summary  As of 1/7/2019    INR goal:   2.0-3.0   TTR:   63.2 % (2.1 y)   Today's INR:   2.4   Warfarin maintenance plan:   10 mg (10 mg x 1) on Sun, Tue; 20 mg (10 mg x 2) all other days   Weekly warfarin total:   120 mg   No change documented:   Bianca Capellan   Plan last modified:   Bianca Capellan (12/10/2018)   Next INR check:   1/28/2019   Target end date:       Indications    Chronic anticoagulation [Z79.01]  Personal history of pulmonary embolism [Z86.711]  Long term (current) use of anticoagulants [Z79.01] [Z79.01]             Anticoagulation Episode Summary     INR check location:       Preferred lab:       Send INR reminders to:       Comments:         Anticoagulation Care Providers     Provider Role Specialty Phone number    Ninfa Marcial M.D. Referring Internal Medicine [phone]    [facility] Anticoagulation Services Responsible  [phone]        Anticoagulation Patient Findings  Patient Findings     Negatives:   Signs/symptoms of thrombosis, Signs/symptoms of bleeding, Laboratory test error suspected, Change in health, Change in alcohol use, Change in activity, Upcoming invasive procedure, Emergency department visit, Upcoming dental procedure, Missed doses, Extra doses, Change in medications, Change in diet/appetite, Hospital admission, Bruising, Other complaints          HPI:   Natalie Anaya seen in clinic today, on anticoagulation therapy with warfarin (a high risk medication) for hx of PE.    Pt is here today to evaluate anticoagulation therapy    Previous INR was  3.1 on 12//10/18    Pt was instructed to reduce dose x 1 then resume current dosing.    Confirmed warfarin dosing regimen, 0 missed 0 extra doses of coumadin.   Diet has been consistent with foods rich in vitamin K: Yes  Changes in ETOH:  No  Changes in smoking status:  No  Changes in medication: No   Cost restriction: No  S/s of bleeding:  No  Signs/symptoms  thrombosis since the last appt: No    A/P   INR therapeutic today, at 2.4.     Patient instructed to continue with the current warfarin dosing regimen, and asked to follow up again in 3 weeks.    11/2019 check referral    Pt educated to contact our clinic with any changes in medications or s/s of bleeding or thrombosis. Pt is aware to seek immediate medical attention for falls, head injury or deep cuts    Follow up appointment in 3 week(s) to reduce risk of adverse events from warfarin    Next appt: Monday, Jan 28, 2019  3:30pm    Natividad Capellan PharmD

## 2019-01-10 ENCOUNTER — SLEEP CENTER VISIT (OUTPATIENT)
Dept: SLEEP MEDICINE | Facility: MEDICAL CENTER | Age: 79
End: 2019-01-10
Payer: MEDICARE

## 2019-01-10 VITALS
WEIGHT: 223 LBS | DIASTOLIC BLOOD PRESSURE: 70 MMHG | HEIGHT: 63 IN | RESPIRATION RATE: 18 BRPM | BODY MASS INDEX: 39.51 KG/M2 | OXYGEN SATURATION: 94 % | HEART RATE: 67 BPM | SYSTOLIC BLOOD PRESSURE: 120 MMHG

## 2019-01-10 DIAGNOSIS — J96.11 CHRONIC RESPIRATORY FAILURE WITH HYPOXIA (HCC): ICD-10-CM

## 2019-01-10 DIAGNOSIS — G47.33 OBSTRUCTIVE SLEEP APNEA: ICD-10-CM

## 2019-01-10 PROCEDURE — 99213 OFFICE O/P EST LOW 20 MIN: CPT | Performed by: NURSE PRACTITIONER

## 2019-01-10 RX ORDER — CHOLECALCIFEROL (VITAMIN D3) 125 MCG
25 CAPSULE ORAL
COMMUNITY

## 2019-01-10 ASSESSMENT — ENCOUNTER SYMPTOMS
BRUISES/BLEEDS EASILY: 0
INSOMNIA: 1
NECK PAIN: 0
NERVOUS/ANXIOUS: 0
MYALGIAS: 0
BACK PAIN: 1
CONSTITUTIONAL NEGATIVE: 1
NEUROLOGICAL NEGATIVE: 1
MEMORY LOSS: 0
CARDIOVASCULAR NEGATIVE: 1
FALLS: 0
EYE DISCHARGE: 0
DEPRESSION: 0
EYE PAIN: 0
HALLUCINATIONS: 0
GASTROINTESTINAL NEGATIVE: 1

## 2019-01-10 ASSESSMENT — LIFESTYLE VARIABLES: SUBSTANCE_ABUSE: 0

## 2019-01-10 NOTE — PROGRESS NOTES
Chief Complaint   Patient presents with   • Follow-Up     AILYN         HPI: This patient is a 78 y.o. female, who presents for follow-up of AILYN.     She is followed through our pulmonary clinic for history of PE x2, pulmonary hypertension on lifetime anticoagulation.  She was last seen with Dr. Irvin November 14, 2018.  Please see his note for details.    In regards to sleep apnea, home sleep study October 2017 indicates severe obstructive sleep apnea with an POLO of 39, minimum oxygen saturation of 79 %. She is using with CPAP 12 cm H2O with 2 L O2 bleed.  Compliance download over the past 30 days indicates 73 % compliance, average use of 5 hours 15 minutes per night, AHI of 1.6. Patient reports she is gone back to using just nasal pillows without chinstrap.  Unfortunately for she finds no benefit with CPAP but understands the importance of use.  She uses melatonin for insomnia and is able to sleep well despite CPAP use.  She tends to take her machine off in the early morning hours and does not but this back on.  She denies a.m. headache or excessive daytime fatigue.      Past Medical History:   Diagnosis Date   • Arthritis     Knees.   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   • Ringing in ears    • Seizure (HCC) 1982   • Sore muscles        Social History   Substance Use Topics   • Smoking status: Never Smoker   • Smokeless tobacco: Never Used   • Alcohol use Yes       Family History   Problem Relation Age of Onset   • Cancer Mother 67        Ovarian   • Alcohol/Drug Father 60        Appenditis   • Heart Disease Father    • Cancer Maternal Grandmother         colon cancer   •  Heart Disease Maternal Grandfather    • Cancer Daughter 44        ron       Immunization History   Administered Date(s) Administered   • Hepatitis A Vaccine, Adult 10/18/2010   • Hepatitis A Vaccine, Ped/Adol 10/18/2010   • Hepatitis B Vaccine Non-Recombivax (Ped/Adol) 10/18/2010   • Hepatitis B Vaccine Recombivax (Adol/Adult) 10/18/2010   • Influenza (IM) Preservative Free 08/02/2011, 09/19/2015   • Influenza Seasonal Injectable 09/12/2012, 09/05/2013, 10/15/2017   • Influenza TIV (IM) 10/18/2010, 09/12/2012, 10/15/2017   • Influenza Vaccine Adult HD 10/07/2016, 11/22/2016, 10/15/2017, 09/24/2018   • Influenza Vaccine H1N1 02/22/2010, 10/09/2011   • Influenza Vaccine Pediatric Split 10/18/2010   • Influenza Vaccine Quad Inj (Pf) 09/19/2015, 09/19/2015   • Pneumococcal Conjugate Vaccine (Prevnar/PCV-13) 08/01/2016   • Pneumococcal Vaccine (UF)Historical Data 10/09/2011   • Pneumococcal polysaccharide vaccine (PPSV-23) 10/18/2010   • Tdap Vaccine 10/21/2015   • Zoster Vaccine Live (ZVL) (Zostavax) 12/31/2010       Current medications as of today   Current Outpatient Prescriptions   Medication Sig Dispense Refill   • Melatonin 10 MG Tab Take  by mouth.     • carvedilol (COREG) 6.25 MG Tab TAKE 1 TABLET BY MOUTH TWICE DAILY 180 Tab 3   • furosemide (LASIX) 40 MG Tab TAKE 1 TABLET BY MOUTH EVERY DAY 90 Tab 3   • primidone (MYSOLINE) 250 MG Tab Take 1 Tab by mouth 3 times a day. 270 Tab 3   • levothyroxine (SYNTHROID) 100 MCG Tab TAKE 1 TABLET BY MOUTH EVERY DAY 90 Tab 3   • warfarin (COUMADIN) 10 MG Tab Take 1 to 2 tablets by mouth daily as directed by the coumadin clinic (Patient taking differently: Take 20 mg by mouth every day. Take 1 to 2 tablets by mouth daily as directed by the coumadin clinic) 180 Tab 1   • ferrous sulfate 325 (65 Fe) MG EC tablet Take 1 Tab by mouth 2 times a day, with meals. (Patient taking differently: Take 325 mg by mouth every day.) 180 Tab 3   • alendronate (FOSAMAX) 70 MG Tab TAKE 1  "TABLET BY MOUTH EVERY 7 DAYS 12 Tab 3   • CALCIUM PO Take 1,000 mg by mouth every day.     • potassium chloride SA (KDUR) 20 MEQ Tab CR Take 1 Tab by mouth every day. 90 Tab 3   • omeprazole (PRILOSEC) 20 MG delayed-release capsule Take 1 Cap by mouth every day. 90 Cap 3   • Probiotic Product (PROBIOTIC DAILY PO) Take  by mouth every day.     • Glucos-Chond-Hyal Ac-Ca Fructo (MOVE FREE [facility] ADVANCE) Tab Take 1 Tab by mouth every day.     • Multiple Vitamins-Minerals (WOMENS MULTI VITAMIN & MINERAL) Tab Take 1 Tab by mouth every day.     • Cholecalciferol (VITAMIN D-3) 1000 UNITS Cap Take 2,000 Units by mouth every day.       No current facility-administered medications for this visit.        Allergies: Tape    Blood pressure 120/70, pulse 67, resp. rate 18, height 1.6 m (5' 3\"), weight 101.2 kg (223 lb), SpO2 94 %, not currently breastfeeding.      Review of Systems   Constitutional: Negative.    HENT: Negative.    Eyes: Negative for pain and discharge.   Cardiovascular: Negative.    Gastrointestinal: Negative.    Musculoskeletal: Positive for back pain and joint pain. Negative for falls, myalgias and neck pain.   Skin: Negative.    Neurological: Negative.    Endo/Heme/Allergies: Negative for environmental allergies. Does not bruise/bleed easily.   Psychiatric/Behavioral: Negative for depression, hallucinations, memory loss, substance abuse and suicidal ideas. The patient has insomnia. The patient is not nervous/anxious.        Physical Exam   Constitutional: She is oriented to person, place, and time.   No acute distress   HENT:   Head: Normocephalic and atraumatic.   Eyes: Pupils are equal, round, and reactive to light.   Neck: Normal range of motion. Neck supple. No tracheal deviation present.   Cardiovascular: Normal rate, regular rhythm and normal heart sounds.    Pulmonary/Chest: Effort normal and breath sounds normal. No respiratory distress. She has no wheezes. She has no rales.   Musculoskeletal: " Normal range of motion.   Neurological: She is alert and oriented to person, place, and time.   Unsteady gait, ambulates with cane   Skin: Skin is warm and dry.   Psychiatric: Mood, memory, affect and judgment normal.       Diagnoses/Plan:    1. Obstructive sleep apnea   Continue CPAP nightly, Clean mask & tubing weekly, Replace supplies as insurance will allow, RX for new supplies to DME      - DME Mask and Supplies    2. Chronic respiratory failure with hypoxia (HCC)  Continue oxygen nocturnally with CPAP    She will follow-up at the pulmonary clinic as scheduled with Dr. Irvin.    Follow-up at the sleep clinic annually with myself.        This dictation was created using voice recognition software. The accuracy of the dictation is limited to the abilities of the software. I expect there may be some errors of grammar and possibly content.

## 2019-01-28 ENCOUNTER — ANTICOAGULATION VISIT (OUTPATIENT)
Dept: MEDICAL GROUP | Facility: MEDICAL CENTER | Age: 79
End: 2019-01-28
Payer: MEDICARE

## 2019-01-28 DIAGNOSIS — Z79.01 CHRONIC ANTICOAGULATION: Primary | ICD-10-CM

## 2019-01-28 DIAGNOSIS — Z79.01 LONG TERM (CURRENT) USE OF ANTICOAGULANTS: ICD-10-CM

## 2019-01-28 DIAGNOSIS — Z86.711 PERSONAL HISTORY OF PULMONARY EMBOLISM: ICD-10-CM

## 2019-01-28 LAB — INR PPP: 2.2 (ref 2–3.5)

## 2019-01-28 PROCEDURE — 93793 ANTICOAG MGMT PT WARFARIN: CPT | Performed by: INTERNAL MEDICINE

## 2019-01-28 PROCEDURE — 85610 PROTHROMBIN TIME: CPT | Performed by: FAMILY MEDICINE

## 2019-01-28 NOTE — PROGRESS NOTES
OP Anticoagulation Service Note    Date: 1/28/2019  There were no vitals filed for this visit.  The pt declined vitals    Anticoagulation Summary  As of 1/28/2019    INR goal:   2.0-3.0   TTR:   64.1 % (2.2 y)   Today's INR:   2.2   Warfarin maintenance plan:   10 mg (10 mg x 1) on Sun, Tue; 20 mg (10 mg x 2) all other days   Weekly warfarin total:   120 mg   Plan last modified:   Bianca Capellan (12/10/2018)   Next INR check:   2/25/2019   Target end date:       Indications    Chronic anticoagulation [Z79.01]  Personal history of pulmonary embolism [Z86.711]  Long term (current) use of anticoagulants [Z79.01] [Z79.01]             Anticoagulation Episode Summary     INR check location:       Preferred lab:       Send INR reminders to:       Comments:         Anticoagulation Care Providers     Provider Role Specialty Phone number    Ninfa Marcial M.D. Referring Internal Medicine [phone]    [facility] Anticoagulation Services Responsible  [phone]        Anticoagulation Patient Findings  Patient Findings     Negatives:   Signs/symptoms of thrombosis, Signs/symptoms of bleeding, Laboratory test error suspected, Change in health, Change in alcohol use, Change in activity, Upcoming invasive procedure, Emergency department visit, Upcoming dental procedure, Missed doses, Extra doses, Change in medications, Change in diet/appetite, Hospital admission, Bruising, Other complaints          HPI:   Natalie Anaya seen in clinic today, on anticoagulation therapy with warfarin (a high risk medication) for hx of PE.     Pt is here today to evaluate anticoagulation therapy    Previous INR was  2.4 on 1/7/18    Pt was instructed to continue with the current regimen.    Confirmed warfarin dosing regimen, 0 missed doses of warfarin  0 extra doses of warfarin.  Diet has been consistent with foods rich in vitamin K: Yes  Changes in ETOH:  No  Changes in smoking status: No  Changes in medication: No   Cost  restriction: No  S/s of bleeding:  No  Signs/symptoms  thrombosis since the last appt: No    A/P   INR therapeutic today, at 2.2.    Patient instructed to continue with the current warfarin dosing regimen, and asked to follow up again in 4 weeks.    11/2019 check referral    Pt educated to contact our clinic with any changes in medications or s/s of bleeding or thrombosis. Pt is aware to seek immediate medical attention for falls, head injury or deep cuts    Follow up appointment in 4 week(s) to reduce risk of adverse events from warfarin    Next appt: Monday, Feb 25, 2019  4pm    Natividad Capellan PharmD

## 2019-02-06 DIAGNOSIS — I51.89 DIASTOLIC DYSFUNCTION: ICD-10-CM

## 2019-02-08 RX ORDER — POTASSIUM CHLORIDE 20 MEQ/1
TABLET, EXTENDED RELEASE ORAL
Qty: 90 TAB | Refills: 0 | Status: SHIPPED | OUTPATIENT
Start: 2019-02-08 | End: 2019-05-05 | Stop reason: SDUPTHER

## 2019-02-19 ENCOUNTER — HOSPITAL ENCOUNTER (OUTPATIENT)
Dept: LAB | Facility: MEDICAL CENTER | Age: 79
End: 2019-02-19
Attending: INTERNAL MEDICINE
Payer: MEDICARE

## 2019-02-19 DIAGNOSIS — D50.9 IRON DEFICIENCY ANEMIA, UNSPECIFIED IRON DEFICIENCY ANEMIA TYPE: ICD-10-CM

## 2019-02-19 DIAGNOSIS — M85.80 OSTEOPENIA WITH HIGH RISK OF FRACTURE: ICD-10-CM

## 2019-02-19 DIAGNOSIS — I10 HTN (HYPERTENSION), BENIGN: ICD-10-CM

## 2019-02-19 DIAGNOSIS — E03.9 ACQUIRED HYPOTHYROIDISM: ICD-10-CM

## 2019-02-19 LAB
25(OH)D3 SERPL-MCNC: 50 NG/ML (ref 30–100)
ALBUMIN SERPL BCP-MCNC: 4.1 G/DL (ref 3.2–4.9)
ALBUMIN/GLOB SERPL: 1.8 G/DL
ALP SERPL-CCNC: 59 U/L (ref 30–99)
ALT SERPL-CCNC: 25 U/L (ref 2–50)
ANION GAP SERPL CALC-SCNC: 7 MMOL/L (ref 0–11.9)
AST SERPL-CCNC: 21 U/L (ref 12–45)
BASOPHILS # BLD AUTO: 0.7 % (ref 0–1.8)
BASOPHILS # BLD: 0.03 K/UL (ref 0–0.12)
BILIRUB SERPL-MCNC: 0.3 MG/DL (ref 0.1–1.5)
BUN SERPL-MCNC: 22 MG/DL (ref 8–22)
CALCIUM SERPL-MCNC: 9 MG/DL (ref 8.5–10.5)
CHLORIDE SERPL-SCNC: 104 MMOL/L (ref 96–112)
CO2 SERPL-SCNC: 30 MMOL/L (ref 20–33)
CREAT SERPL-MCNC: 0.6 MG/DL (ref 0.5–1.4)
EOSINOPHIL # BLD AUTO: 0.08 K/UL (ref 0–0.51)
EOSINOPHIL NFR BLD: 1.8 % (ref 0–6.9)
ERYTHROCYTE [DISTWIDTH] IN BLOOD BY AUTOMATED COUNT: 46.5 FL (ref 35.9–50)
FASTING STATUS PATIENT QL REPORTED: NORMAL
GLOBULIN SER CALC-MCNC: 2.3 G/DL (ref 1.9–3.5)
GLUCOSE SERPL-MCNC: 102 MG/DL (ref 65–99)
HCT VFR BLD AUTO: 43.8 % (ref 37–47)
HGB BLD-MCNC: 14.8 G/DL (ref 12–16)
IMM GRANULOCYTES # BLD AUTO: 0.01 K/UL (ref 0–0.11)
IMM GRANULOCYTES NFR BLD AUTO: 0.2 % (ref 0–0.9)
LYMPHOCYTES # BLD AUTO: 1.12 K/UL (ref 1–4.8)
LYMPHOCYTES NFR BLD: 25.5 % (ref 22–41)
MCH RBC QN AUTO: 34.3 PG (ref 27–33)
MCHC RBC AUTO-ENTMCNC: 33.8 G/DL (ref 33.6–35)
MCV RBC AUTO: 101.6 FL (ref 81.4–97.8)
MONOCYTES # BLD AUTO: 0.39 K/UL (ref 0–0.85)
MONOCYTES NFR BLD AUTO: 8.9 % (ref 0–13.4)
NEUTROPHILS # BLD AUTO: 2.77 K/UL (ref 2–7.15)
NEUTROPHILS NFR BLD: 62.9 % (ref 44–72)
NRBC # BLD AUTO: 0 K/UL
NRBC BLD-RTO: 0 /100 WBC
PLATELET # BLD AUTO: 242 K/UL (ref 164–446)
PMV BLD AUTO: 9.3 FL (ref 9–12.9)
POTASSIUM SERPL-SCNC: 4.2 MMOL/L (ref 3.6–5.5)
PROT SERPL-MCNC: 6.4 G/DL (ref 6–8.2)
RBC # BLD AUTO: 4.31 M/UL (ref 4.2–5.4)
SODIUM SERPL-SCNC: 141 MMOL/L (ref 135–145)
T4 FREE SERPL-MCNC: 0.9 NG/DL (ref 0.53–1.43)
TSH SERPL DL<=0.005 MIU/L-ACNC: 1.19 UIU/ML (ref 0.38–5.33)
WBC # BLD AUTO: 4.4 K/UL (ref 4.8–10.8)

## 2019-02-19 PROCEDURE — 82306 VITAMIN D 25 HYDROXY: CPT

## 2019-02-19 PROCEDURE — 84443 ASSAY THYROID STIM HORMONE: CPT

## 2019-02-19 PROCEDURE — 85025 COMPLETE CBC W/AUTO DIFF WBC: CPT

## 2019-02-19 PROCEDURE — 80053 COMPREHEN METABOLIC PANEL: CPT

## 2019-02-19 PROCEDURE — 84439 ASSAY OF FREE THYROXINE: CPT

## 2019-02-19 PROCEDURE — 36415 COLL VENOUS BLD VENIPUNCTURE: CPT

## 2019-02-25 ENCOUNTER — ANTICOAGULATION VISIT (OUTPATIENT)
Dept: MEDICAL GROUP | Facility: MEDICAL CENTER | Age: 79
End: 2019-02-25
Payer: MEDICARE

## 2019-02-25 VITALS — HEART RATE: 79 BPM | SYSTOLIC BLOOD PRESSURE: 124 MMHG | DIASTOLIC BLOOD PRESSURE: 68 MMHG

## 2019-02-25 DIAGNOSIS — Z79.01 LONG TERM (CURRENT) USE OF ANTICOAGULANTS: Primary | ICD-10-CM

## 2019-02-25 DIAGNOSIS — Z86.711 PERSONAL HISTORY OF PULMONARY EMBOLISM: ICD-10-CM

## 2019-02-25 DIAGNOSIS — Z79.01 CHRONIC ANTICOAGULATION: ICD-10-CM

## 2019-02-25 LAB — INR PPP: 4.1 (ref 2–3.5)

## 2019-02-25 PROCEDURE — 99211 OFF/OP EST MAY X REQ PHY/QHP: CPT | Performed by: PHYSICIAN ASSISTANT

## 2019-02-25 PROCEDURE — 85610 PROTHROMBIN TIME: CPT | Performed by: PHYSICIAN ASSISTANT

## 2019-02-25 NOTE — PROGRESS NOTES
OP Anticoagulation Service Note    Date: 2019  There were no vitals filed for this visit.    Anticoagulation Summary  As of 2019    INR goal:   2.0-3.0   TTR:   63.4 % (2.3 y)   INR used for dosin.1! (2019)   Warfarin maintenance plan:   10 mg (10 mg x 1) every Sun, Tue; 20 mg (10 mg x 2) all other days   Weekly warfarin total:   120 mg   Plan last modified:   Bianca Capellan (12/10/2018)   Next INR check:   3/11/2019   Target end date:       Indications    Chronic anticoagulation [Z79.01]  Personal history of pulmonary embolism [Z86.711]  Long term (current) use of anticoagulants [Z79.01] [Z79.01]             Anticoagulation Episode Summary     INR check location:       Preferred lab:       Send INR reminders to:       Comments:         Anticoagulation Care Providers     Provider Role Specialty Phone number    Ninfa Marcial M.D. Referring Internal Medicine [phone]    [facility] Anticoagulation Services Responsible  [phone]        Anticoagulation Patient Findings      HPI:   Natalie Anaya seen in clinic today, on anticoagulation therapy with warfarin (a high risk medication) for hx of PE.     Pt is here today to evaluate anticoagulation therapy    Previous INR was  2.2 on 19    Pt was instructed to continue regimen    Confirmed warfarin dosing regimen, denies missed or extra doses of coumadin.   Diet has been consistent with foods rich in vitamin K: Yes  Changes in ETOH:  No  Changes in smoking status: No  Changes in medication: No   Cost restriction: No  S/s of bleeding:  No  Signs/symptoms  thrombosis since the last appt: No    A/P   INR  supra-therapeutic today, will require dose adjust ment today to prevent bleeding  Instructed pt to hold today's dose and resume normal dosing        check referral    Pt educated to contact our clinic with any changes in medications or s/s of bleeding or thrombosis. Pt is aware to seek immediate medical attention for falls,  head injury or deep cuts    Follow up appointment in 2 week(s) to reduce risk of adverse events from warfarin  Kera Hackett, PharmD

## 2019-02-26 ENCOUNTER — TELEPHONE (OUTPATIENT)
Dept: MEDICAL GROUP | Age: 79
End: 2019-02-26

## 2019-02-26 NOTE — TELEPHONE ENCOUNTER
ESTABLISHED PATIENT PRE-VISIT PLANNING     Patient was NOT contacted to complete PVP.     Note: Patient will not be contacted if there is no indication to call.     1.  Reviewed notes from the last few office visits within the medical group: Yes    2.  If any orders were placed at last visit or intended to be done for this visit (i.e. 6 mos follow-up), do we have Results/Consult Notes?        •  Labs - Labs ordered, completed on 2/19/19 and results are in chart.   Note: If patient appointment is for lab review and patient did not complete labs, check with provider if OK to reschedule patient until labs completed.       •  Imaging - Imaging was not ordered at last office visit.       •  Referrals - No referrals were ordered at last office visit.    3. Is this appointment scheduled as a Hospital Follow-Up? No    4.  Immunizations were updated in Epic using WebIZ?: Epic matches WebIZ       •  Web Iz Recommendations: SHINGRIX (Shingles)    5.  Patient is due for the following Health Maintenance Topics:   Health Maintenance Due   Topic Date Due   • IMM ZOSTER VACCINES (2 of 3) 02/25/2011       - Patient is up-to-date on all Health Maintenance topics. No records have been requested at this time.    6. Orders for overdue Health Maintenance topics pended in Pre-Charting? N\A    7.  AHA (MDX) form printed for Provider? YES    8.  Patient was NOT informed to arrive 15 min prior to their scheduled appointment and bring in their medication bottles.

## 2019-02-27 ENCOUNTER — OFFICE VISIT (OUTPATIENT)
Dept: MEDICAL GROUP | Age: 79
End: 2019-02-27
Payer: MEDICARE

## 2019-02-27 VITALS
DIASTOLIC BLOOD PRESSURE: 68 MMHG | SYSTOLIC BLOOD PRESSURE: 132 MMHG | HEART RATE: 66 BPM | HEIGHT: 63 IN | OXYGEN SATURATION: 93 % | BODY MASS INDEX: 40.18 KG/M2 | WEIGHT: 226.8 LBS | TEMPERATURE: 98.6 F

## 2019-02-27 DIAGNOSIS — E03.9 ACQUIRED HYPOTHYROIDISM: ICD-10-CM

## 2019-02-27 DIAGNOSIS — D50.9 IRON DEFICIENCY ANEMIA, UNSPECIFIED IRON DEFICIENCY ANEMIA TYPE: ICD-10-CM

## 2019-02-27 DIAGNOSIS — I26.99 RECURRENT PULMONARY EMBOLISM (HCC): ICD-10-CM

## 2019-02-27 DIAGNOSIS — G40.909 SEIZURE DISORDER (HCC): ICD-10-CM

## 2019-02-27 DIAGNOSIS — M85.80 OSTEOPENIA WITH HIGH RISK OF FRACTURE: ICD-10-CM

## 2019-02-27 DIAGNOSIS — I10 HTN (HYPERTENSION), BENIGN: ICD-10-CM

## 2019-02-27 DIAGNOSIS — J96.11 CHRONIC RESPIRATORY FAILURE WITH HYPOXIA (HCC): ICD-10-CM

## 2019-02-27 DIAGNOSIS — I27.21 SECONDARY PULMONARY ARTERIAL HYPERTENSION (HCC): ICD-10-CM

## 2019-02-27 DIAGNOSIS — E66.01 MORBID OBESITY WITH BMI OF 40.0-44.9, ADULT (HCC): ICD-10-CM

## 2019-02-27 PROCEDURE — 99215 OFFICE O/P EST HI 40 MIN: CPT | Performed by: INTERNAL MEDICINE

## 2019-02-27 PROCEDURE — 8041 PR SCP AHA: Performed by: INTERNAL MEDICINE

## 2019-02-27 RX ORDER — LANOLIN ALCOHOL/MO/W.PET/CERES
325 CREAM (GRAM) TOPICAL DAILY
Qty: 90 TAB | Refills: 3 | Status: SHIPPED | OUTPATIENT
Start: 2019-02-27 | End: 2019-10-09

## 2019-02-27 ASSESSMENT — PATIENT HEALTH QUESTIONNAIRE - PHQ9: CLINICAL INTERPRETATION OF PHQ2 SCORE: 0

## 2019-02-27 NOTE — PROGRESS NOTES
Annual Health Assessment Questions:    1.  Are you currently engaging in any exercise or physical activity? No    2.  How would you describe your mood or emotional well-being today? good    3.  Have you had any falls in the last year? No    4.  Have you noticed any problems with your balance or had difficulty walking? Yes    5.  In the last six months have you experienced any leakage of urine? Yes    6. DPA/Advanced Directive: Patient has Advanced Directive on file.

## 2019-02-28 NOTE — ASSESSMENT & PLAN NOTE
Her anemic level improved to normal.  Patient cut down the dose of iron sulfate 325 mg to once a day now.  She denied constipation from taking iron sulfate.  Her recent CBC showed normal hemoglobin on 2/19/19.

## 2019-02-28 NOTE — ASSESSMENT & PLAN NOTE
Patient is taking Coumadin as instructed by anticoagulation clinic.  She denies side effects from taking Coumadin and denies abnormal bleeding.

## 2019-02-28 NOTE — ASSESSMENT & PLAN NOTE
She has chronic pulmonary hypertension.  Her symptom is stable.  She is using oxygen 2 L at night and use CPAP machine at night for sleep apnea.  She is taking carvedilol 6.25 mg twice daily and Lasix 40 mg daily.  She does not have chest pain or shortness of breath.  She still has swelling on both lower extremities.

## 2019-02-28 NOTE — ASSESSMENT & PLAN NOTE
Patient is taking Fosamax 70 mg once a week.  She is taking calcium vitamin D supplements daily.  She does not have regular physical exercise.  Patient is encouraged her to weightbearing exercise regularly.  She started taking Fosamax in January 2018.

## 2019-02-28 NOTE — ASSESSMENT & PLAN NOTE
Patient has chronic respiratory failure and secondary pulmonary hypertension.  She is using oxygen 2 L at night and use CPAP machine for sleep apnea.  She follows with Dr. Irvin, pulmonologist regularly.

## 2019-02-28 NOTE — PROGRESS NOTES
Subjective:   Natalie Anaya is a 78 y.o. female here today for evaluation and management of:      Seizure disorder (HCC)  Patient is taking primidone 250 mg 3 tablets a day.  She is taking primidone to 50 mg 1 tab in the morning and 2 tabs in the evening time.  She does not have side effects from taking primidone.  She does not have a recurrent seizure.    Secondary pulmonary arterial hypertension (HCC)  She has chronic pulmonary hypertension.  Her symptom is stable.  She is using oxygen 2 L at night and use CPAP machine at night for sleep apnea.  She is taking carvedilol 6.25 mg twice daily and Lasix 40 mg daily.  She does not have chest pain or shortness of breath.  She still has swelling on both lower extremities.    Recurrent pulmonary embolism (HCC)  Patient is taking Coumadin as instructed by anticoagulation clinic.  She denies side effects from taking Coumadin and denies abnormal bleeding.      Osteopenia with high risk of fracture  Patient is taking Fosamax 70 mg once a week.  She is taking calcium vitamin D supplements daily.  She does not have regular physical exercise.  Patient is encouraged her to weightbearing exercise regularly.  She started taking Fosamax in January 2018.    Morbid obesity with BMI of 40.0-44.9, adult (HCC)  Patient does not have regular physical exercise.  She does not have any specific diet to lose weight.  However she tries to eat more vegetables and tries to eat less carbohydrate.  Patient reported that her weight increased during wintertime as she does not have exercise.    Iron deficiency anemia  Her anemic level improved to normal.  Patient cut down the dose of iron sulfate 325 mg to once a day now.  She denied constipation from taking iron sulfate.  Her recent CBC showed normal hemoglobin on 2/19/19.    HTN (hypertension), benign  Patient is taking Lasix 40 mg daily with potassium chloride 20 mEq daily, carvedilol 6.25 mg twice daily.  Her blood pressure is stable and  well controlled.  She does not have side effects from taking Lasix and carvedilol.    Chronic respiratory failure with hypoxia (CMS-HCC) (HCC)  Patient has chronic respiratory failure and secondary pulmonary hypertension.  She is using oxygen 2 L at night and use CPAP machine for sleep apnea.  She follows with Dr. Irvin, pulmonologist regularly.    Acquired hypothyroidism  Patient is taking levothyroxine 100 mcg every morning on empty stomach.  She does not have side effects from taking it.  Her recent thyroid function test within normal.    Results for DIVINA ZUNIAG (MRN [id number]) as of 2/27/2019 19:22   Ref. Range 2/19/2019 07:50   TSH Latest Ref Range: 0.380 - 5.330 uIU/mL 1.190   Free T-4 Latest Ref Range: 0.53 - 1.43 ng/dL 0.90          Current medicines (including changes today)  Current Outpatient Prescriptions   Medication Sig Dispense Refill   • ferrous sulfate 325 (65 Fe) MG EC tablet Take 1 Tab by mouth every day. 90 Tab 3   • potassium chloride SA (KDUR) 20 MEQ Tab CR TAKE 1 TABLET BY MOUTH EVERY DAY 90 Tab 0   • Melatonin 10 MG Tab Take  by mouth.     • carvedilol (COREG) 6.25 MG Tab TAKE 1 TABLET BY MOUTH TWICE DAILY 180 Tab 3   • furosemide (LASIX) 40 MG Tab TAKE 1 TABLET BY MOUTH EVERY DAY 90 Tab 3   • primidone (MYSOLINE) 250 MG Tab Take 1 Tab by mouth 3 times a day. 270 Tab 3   • levothyroxine (SYNTHROID) 100 MCG Tab TAKE 1 TABLET BY MOUTH EVERY DAY 90 Tab 3   • warfarin (COUMADIN) 10 MG Tab Take 1 to 2 tablets by mouth daily as directed by the coumadin clinic (Patient taking differently: Take 20 mg by mouth every day. Take 1 to 2 tablets by mouth daily as directed by the coumadin clinic) 180 Tab 1   • alendronate (FOSAMAX) 70 MG Tab TAKE 1 TABLET BY MOUTH EVERY 7 DAYS 12 Tab 3   • CALCIUM PO Take 1,000 mg by mouth every day.     • omeprazole (PRILOSEC) 20 MG delayed-release capsule Take 1 Cap by mouth every day. 90 Cap 3   • Probiotic Product (PROBIOTIC DAILY PO) Take  by mouth every day.   "   • Glucos-Chond-Hyal Ac-Ca Fructo (MOVE FREE [facility] ADVANCE) Tab Take 1 Tab by mouth every day.     • Multiple Vitamins-Minerals (WOMENS MULTI VITAMIN & MINERAL) Tab Take 1 Tab by mouth every day.     • Cholecalciferol (VITAMIN D-3) 1000 UNITS Cap Take 2,000 Units by mouth every day.       No current facility-administered medications for this visit.      She  has a past medical history of Arthritis; Bowel habit changes; Breath shortness; Cataract; Constipation; Epilepsy ([facility]); Eye drainage; Hemorrhagic disorder ([facility]); Hiatus hernia syndrome; Hypertension; Hyperthyroidism; Hypothyroid; Influenza; Pain; Personal history of venous thrombosis and embolism; Ringing in ears; Seizure ([facility]) (1982); and Sore muscles.    ROS   No chest pain, no shortness of breath, no abdominal pain       Objective:     Blood pressure 132/68, pulse 66, temperature 37 °C (98.6 °F), height 1.6 m (5' 3\"), weight 102.9 kg (226 lb 12.8 oz), SpO2 93 %, not currently breastfeeding. Body mass index is 40.18 kg/m².   Physical Exam:  General: Alert, oriented and no acute distress.  Eye contact is good, speech goal directed, affect calm  HEENT: conjunctiva non-injected, sclera non-icteric.  Oral mucous membranes pink and moist with no lesions.  Pinna normal.   Lungs: Normal respiratory effort, clear to auscultation bilaterally with good excursion.  CV: regular rate and rhythm. No murmurs.   Abdomen: soft, non distended, nontender, Bowel sound normal.  Ext: no edema, color normal, vascularity normal, temperature normal      Assessment and Plan:   The following treatment plan was discussed     1. Osteopenia with high risk of fracture  - Stable.  Continue Fosamax 70 mg once a week.  Discussed to continue vitamin D and calcium supplements daily.  Advised to do regular weightbearing exercise.  - Counseling for side effect of Fosamax.  - Advised to take Fosamax with plenty of water and to stay upright for at least 2 hour after taking medication to " prevent esophagitis.  - Comp Metabolic Panel; Future    2. Iron deficiency anemia, unspecified iron deficiency anemia type  - Stable.  Continue ferrous sulfate 325 mg 1 tablet daily.  Advised to increase water intake and fiber intake to prevent constipation.  - ferrous sulfate 325 (65 Fe) MG EC tablet; Take 1 Tab by mouth every day.  Dispense: 90 Tab; Refill: 3    3. Seizure disorder (HCC)  - Well-controlled. Continue current regimens, primidone 250 mg 1 tab in a.m and 2 tabs in p.m. Recheck lab 1-2 weeks before next follow up visit.  Reviewed potential side effects of primidone with patient.    4. Recurrent pulmonary embolism (HCC)  - Stable.  Continue Coumadin as managed by anticoagulation clinic.  No recurrent symptoms.    5. Secondary pulmonary arterial hypertension (HCC)  - Well-controlled.  Continue carvedilol 6.25 mg twice daily and furosemide 40 mg daily with potassium chloride 20 mEq daily.  Patient is advised to elevate legs at rest and wear compression stocking for bilateral legs swelling.  - Lipid Profile; Future    6. Chronic respiratory failure with hypoxia (HCC)  - Chronic and stable.  Continue oxygen 2 L at night.    7. Morbid obesity with BMI of 40.0-44.9, adult ([facility])  - Counseled for healthy diet and regular physical exercise to lose weight.  - Lipid Profile; Future    8. Acquired hypothyroidism  - Well-controlled. Continue current regimens, levothyroxine 100 mcg every morning on empty stomach. Recheck lab 1-2 weeks before next follow up visit.  - TSH; Future  - FREE THYROXINE; Future    9. HTN (hypertension), benign  - Well-controlled. Continue current regimens, carvedilol 6.25 mg twice daily and Lasix 40 mg daily. Recheck lab 1-2 weeks before next follow up visit.  - Reviewed the risks and benefits as well as potential side effects of medications with patient.  - Discussed to eat low-sodium diet and encouraged to do regular physical exercise.  - Recommend to monitor blood pressure and heart rate  at home.  - Lipid Profile; Future    Face-to-face time spent 40 minutes with patient and more than 50% of that time spent for counseling, discussing problems documented above, coordinating care for medical problems listed above, and answering patient's questions by provider.    AHA and MDX form are reviewed and completed in clinic today.  Appropriate discussion and counseling are provided based on annual health assessment questions.         Followup: Return in about 6 months (around 8/27/2019), or if symptoms worsen or fail to improve, for Hypertension, Hypothyroid, Osteopenia, iron deficiency, pulmonary hypertension, Lab review.      Please note that this dictation was created using voice recognition software. I have made every reasonable attempt to correct obvious errors, but I expect that there may have unintended errors in text, spelling, punctuation, or grammar that I did not discover.

## 2019-02-28 NOTE — ASSESSMENT & PLAN NOTE
Patient is taking levothyroxine 100 mcg every morning on empty stomach.  She does not have side effects from taking it.  Her recent thyroid function test within normal.    Results for DIVINA ZUNIGA (MRN [id number]) as of 2/27/2019 19:22   Ref. Range 2/19/2019 07:50   TSH Latest Ref Range: 0.380 - 5.330 uIU/mL 1.190   Free T-4 Latest Ref Range: 0.53 - 1.43 ng/dL 0.90

## 2019-02-28 NOTE — ASSESSMENT & PLAN NOTE
Patient is taking Lasix 40 mg daily with potassium chloride 20 mEq daily, carvedilol 6.25 mg twice daily.  Her blood pressure is stable and well controlled.  She does not have side effects from taking Lasix and carvedilol.

## 2019-03-11 ENCOUNTER — ANTICOAGULATION VISIT (OUTPATIENT)
Dept: MEDICAL GROUP | Facility: MEDICAL CENTER | Age: 79
End: 2019-03-11
Payer: MEDICARE

## 2019-03-11 VITALS — SYSTOLIC BLOOD PRESSURE: 129 MMHG | HEART RATE: 67 BPM | DIASTOLIC BLOOD PRESSURE: 56 MMHG

## 2019-03-11 DIAGNOSIS — Z79.01 CHRONIC ANTICOAGULATION: ICD-10-CM

## 2019-03-11 DIAGNOSIS — Z86.711 PERSONAL HISTORY OF PULMONARY EMBOLISM: ICD-10-CM

## 2019-03-11 DIAGNOSIS — Z79.01 LONG TERM (CURRENT) USE OF ANTICOAGULANTS: Primary | ICD-10-CM

## 2019-03-11 LAB — INR PPP: 3.2 (ref 2–3.5)

## 2019-03-11 PROCEDURE — 85610 PROTHROMBIN TIME: CPT | Performed by: FAMILY MEDICINE

## 2019-03-11 PROCEDURE — 99211 OFF/OP EST MAY X REQ PHY/QHP: CPT | Performed by: FAMILY MEDICINE

## 2019-03-11 NOTE — PROGRESS NOTES
OP Anticoagulation Service Note    Date: 3/11/2019  Vitals:    03/11/19 1517   BP: 129/56   Pulse: 67         Anticoagulation Summary  As of 3/11/2019    INR goal:   2.0-3.0   TTR:   62.3 % (2.3 y)   INR used for dosing:   3.2! (3/11/2019)   Warfarin maintenance plan:   10 mg (10 mg x 1) every Sun, Tue; 20 mg (10 mg x 2) all other days   Weekly warfarin total:   120 mg   Plan last modified:   Bianca Capellan (12/10/2018)   Next INR check:   3/25/2019   Target end date:       Indications    Chronic anticoagulation [Z79.01]  Personal history of pulmonary embolism [Z86.711]  Long term (current) use of anticoagulants [Z79.01] [Z79.01]             Anticoagulation Episode Summary     INR check location:       Preferred lab:       Send INR reminders to:       Comments:         Anticoagulation Care Providers     Provider Role Specialty Phone number    Ninfa Marcial M.D. Referring Internal Medicine [phone]    [facility] Anticoagulation Services Responsible  [phone]        Anticoagulation Patient Findings      HPI:   Natalie Anaya seen in clinic today, on anticoagulation therapy with warfarin (a high risk medication) for hx of PE      Pt is here today to evaluate anticoagulation therapy    Previous INR was  4.1 on 2-    Pt was instructed to HOLD then resume usual regimen    Confirmed warfarin dosing regimen, denies missed or extra doses of coumadin.   Diet has been consistent with foods rich in vitamin K: No- less green vegetables since its winter  Changes in ETOH:  No  Changes in smoking status: No  Changes in medication: No   Cost restriction: No  S/s of bleeding:  No  Falls or accidents since last visit No  Signs/symptoms  thrombosis since the last appt: No    A/P   INR  supratherapeutic today, will require dose adjust ment today to prevent bleeding complications  and closer follow up.   Tonight 10 mg then Continue current warfarin regimen. Increase green vegetables.         check  referral    Pt educated to contact our clinic with any changes in medications or s/s of bleeding or thrombosis. Pt is aware to seek immediate medical attention for falls, head injury or deep cuts    Follow up appointment in 2 week(s) to reduce risk of adverse events from warfarin   Moira Sheridan, PharmD

## 2019-03-13 ENCOUNTER — HOSPITAL ENCOUNTER (OUTPATIENT)
Dept: LAB | Facility: MEDICAL CENTER | Age: 79
End: 2019-03-13
Attending: FAMILY MEDICINE
Payer: MEDICARE

## 2019-03-13 LAB
CHOLEST SERPL-MCNC: 150 MG/DL (ref 100–199)
FASTING STATUS PATIENT QL REPORTED: NORMAL
HDLC SERPL-MCNC: 52 MG/DL
LDLC SERPL CALC-MCNC: 76 MG/DL
TRIGL SERPL-MCNC: 108 MG/DL (ref 0–149)

## 2019-03-13 PROCEDURE — 80061 LIPID PANEL: CPT

## 2019-03-13 PROCEDURE — 36415 COLL VENOUS BLD VENIPUNCTURE: CPT

## 2019-03-16 DIAGNOSIS — K21.9 GASTROESOPHAGEAL REFLUX DISEASE WITHOUT ESOPHAGITIS: ICD-10-CM

## 2019-03-18 RX ORDER — OMEPRAZOLE 20 MG/1
CAPSULE, DELAYED RELEASE ORAL
Qty: 90 CAP | Refills: 3 | Status: SHIPPED | OUTPATIENT
Start: 2019-03-18 | End: 2019-06-12

## 2019-03-25 ENCOUNTER — ANTICOAGULATION VISIT (OUTPATIENT)
Dept: MEDICAL GROUP | Facility: MEDICAL CENTER | Age: 79
End: 2019-03-25
Payer: MEDICARE

## 2019-03-25 VITALS — SYSTOLIC BLOOD PRESSURE: 146 MMHG | DIASTOLIC BLOOD PRESSURE: 66 MMHG | HEART RATE: 71 BPM

## 2019-03-25 DIAGNOSIS — Z79.01 CHRONIC ANTICOAGULATION: ICD-10-CM

## 2019-03-25 DIAGNOSIS — Z86.711 PERSONAL HISTORY OF PULMONARY EMBOLISM: ICD-10-CM

## 2019-03-25 DIAGNOSIS — Z79.01 LONG TERM (CURRENT) USE OF ANTICOAGULANTS: Primary | ICD-10-CM

## 2019-03-25 LAB — INR PPP: 2.7 (ref 2–3.5)

## 2019-03-25 PROCEDURE — 93793 ANTICOAG MGMT PT WARFARIN: CPT | Performed by: FAMILY MEDICINE

## 2019-03-25 PROCEDURE — 85610 PROTHROMBIN TIME: CPT | Performed by: PHYSICIAN ASSISTANT

## 2019-03-25 NOTE — PROGRESS NOTES
OP Anticoagulation Service Note    Date: 3/25/2019  Vitals:    19 1449   BP: 146/66   Pulse: 71       Anticoagulation Summary  As of 3/25/2019    INR goal:   2.0-3.0   TTR:   62.3 % (2.3 y)   INR used for dosin.7 (3/25/2019)   Warfarin maintenance plan:   10 mg (10 mg x 1) every Sun, Tue; 20 mg (10 mg x 2) all other days   Weekly warfarin total:   120 mg   Plan last modified:   Bianca Capellan (12/10/2018)   Next INR check:   2019   Target end date:       Indications    Chronic anticoagulation [Z79.01]  Personal history of pulmonary embolism [Z86.711]  Long term (current) use of anticoagulants [Z79.01] [Z79.01]             Anticoagulation Episode Summary     INR check location:       Preferred lab:       Send INR reminders to:       Comments:         Anticoagulation Care Providers     Provider Role Specialty Phone number    Ninfa Marcial M.D. Referring Internal Medicine [phone]    [facility] Anticoagulation Services Responsible  [phone]        Anticoagulation Patient Findings      HPI:   Natalie Anaya seen in clinic today, on anticoagulation therapy with warfarin (a high risk medication) for hx of PE       Pt is here today to evaluate anticoagulation therapy    Previous INR was  3.2 on 3-    Pt was instructed to decrease x 1 then resume usual regimen.    Confirmed warfarin dosing regimen, denies missed or extra doses of coumadin.   Diet has been consistent with foods rich in vitamin K: Yes but pt just started dieting, eating less to lose weight and counting calories  Changes in ETOH:  No  Changes in smoking status: No  Changes in medication: No   Cost restriction: No  S/s of bleeding:  No  Falls or accidents since last visit No  Signs/symptoms  thrombosis since the last appt: No    A/P   INR  therapeutic today,   Continue current warfarin regimen           check referral    Pt educated to contact our clinic with any changes in medications or s/s of bleeding or  thrombosis. Pt is aware to seek immediate medical attention for falls, head injury or deep cuts    Follow up appointment in 4 week(s) to reduce risk of adverse events from warfarin   Moira Sheridan, PharmD

## 2019-04-22 ENCOUNTER — ANTICOAGULATION VISIT (OUTPATIENT)
Dept: MEDICAL GROUP | Facility: MEDICAL CENTER | Age: 79
End: 2019-04-22
Payer: MEDICARE

## 2019-04-22 VITALS — HEART RATE: 70 BPM | DIASTOLIC BLOOD PRESSURE: 59 MMHG | SYSTOLIC BLOOD PRESSURE: 129 MMHG

## 2019-04-22 DIAGNOSIS — Z79.01 CHRONIC ANTICOAGULATION: ICD-10-CM

## 2019-04-22 DIAGNOSIS — Z86.711 PERSONAL HISTORY OF PULMONARY EMBOLISM: ICD-10-CM

## 2019-04-22 DIAGNOSIS — Z79.01 LONG TERM (CURRENT) USE OF ANTICOAGULANTS: Primary | ICD-10-CM

## 2019-04-22 LAB — INR PPP: 1.2 (ref 2–3.5)

## 2019-04-22 PROCEDURE — 99211 OFF/OP EST MAY X REQ PHY/QHP: CPT | Performed by: FAMILY MEDICINE

## 2019-04-22 PROCEDURE — 85610 PROTHROMBIN TIME: CPT | Performed by: FAMILY MEDICINE

## 2019-04-22 NOTE — PROGRESS NOTES
OP Anticoagulation Service Note    Date: 2019  Vitals:    19 1510   BP: 129/59   Pulse: 70       Anticoagulation Summary  As of 2019    INR goal:   2.0-3.0   TTR:   61.8 % (2.4 y)   INR used for dosin.2! (2019)   Warfarin maintenance plan:   10 mg (10 mg x 1) every Sun, Tue; 20 mg (10 mg x 2) all other days   Weekly warfarin total:   120 mg   Plan last modified:   Bianca Capellan (12/10/2018)   Next INR check:   2019   Target end date:       Indications    Chronic anticoagulation [Z79.01]  Personal history of pulmonary embolism [Z86.711]  Long term (current) use of anticoagulants [Z79.01] [Z79.01]             Anticoagulation Episode Summary     INR check location:       Preferred lab:       Send INR reminders to:       Comments:         Anticoagulation Care Providers     Provider Role Specialty Phone number    Ninfa Marcial M.D. Referring Internal Medicine [phone]    [facility] Anticoagulation Services Responsible  [phone]        Anticoagulation Patient Findings      HPI:   Natalie Tete [place] seen in clinic today, on anticoagulation therapy with warfarin (a high risk medication) for hx of PE       Pt is here today to evaluate anticoagulation therapy    Previous INR was  2.7 on 3-    Pt was instructed to Continue current warfarin regimen       Confirmed warfarin dosing regimen, she mixed up her pill box and missed at least 2 doses of warfarin  Diet has been consistent with foods rich in vitamin K: Yes  Changes in ETOH:  No  Changes in smoking status: No  Changes in medication: No   Cost restriction: No  S/s of bleeding:  No  Falls or accidents since last visit No  Signs/symptoms  thrombosis since the last appt: No    A/P   INR  sub-therapeutic today, will require dose adjust ment today to prevent recurrence of thrombosis  and closer follow up.    Tonight 30 mg, tomorrow 20 mg then resume usual regimen. Pt is going to start using a new pill box to prevent  missed doses         check referral    Pt educated to contact our clinic with any changes in medications or s/s of bleeding or thrombosis. Pt is aware to seek immediate medical attention for falls, head injury or deep cuts    Follow up appointment in 4 week(s) to reduce risk of adverse events from warfarin   Moira Sheridan, PharmD

## 2019-05-05 DIAGNOSIS — I51.89 DIASTOLIC DYSFUNCTION: ICD-10-CM

## 2019-05-06 ENCOUNTER — PATIENT OUTREACH (OUTPATIENT)
Dept: HEALTH INFORMATION MANAGEMENT | Facility: OTHER | Age: 79
End: 2019-05-06

## 2019-05-06 RX ORDER — POTASSIUM CHLORIDE 20 MEQ/1
TABLET, EXTENDED RELEASE ORAL
Qty: 100 TAB | Refills: 3 | Status: SHIPPED | OUTPATIENT
Start: 2019-05-06 | End: 2019-12-17 | Stop reason: SDUPTHER

## 2019-05-06 NOTE — PROGRESS NOTES
1. [facility]Connect Verified: yes    2. Verify PCP: yes    3. Review and add  to Care Team: yes    4. WebIZ Checked & Epic Updated: Yes    5. Reviewed/Updated the following with patient:       •   Communication Preference Obtained? YES  • MyChart Activation: already active       •   E-Mail Address Obtained? yes       •   Appointment Day and Time Preferences?yes       •   Preferred Pharmacy? yes       •   Preferred Lab? YES    6. Care Gap Scheduling (Attempt to Schedule EACH Overdue Care Gap!)

## 2019-05-20 ENCOUNTER — ANTICOAGULATION VISIT (OUTPATIENT)
Dept: MEDICAL GROUP | Facility: MEDICAL CENTER | Age: 79
End: 2019-05-20
Payer: MEDICARE

## 2019-05-20 VITALS — DIASTOLIC BLOOD PRESSURE: 57 MMHG | HEART RATE: 73 BPM | SYSTOLIC BLOOD PRESSURE: 126 MMHG

## 2019-05-20 DIAGNOSIS — Z86.711 PERSONAL HISTORY OF PULMONARY EMBOLISM: ICD-10-CM

## 2019-05-20 DIAGNOSIS — Z79.01 LONG TERM (CURRENT) USE OF ANTICOAGULANTS: Primary | ICD-10-CM

## 2019-05-20 DIAGNOSIS — Z79.01 CHRONIC ANTICOAGULATION: ICD-10-CM

## 2019-05-20 LAB — INR PPP: 2.4 (ref 2–3.5)

## 2019-05-20 PROCEDURE — 85610 PROTHROMBIN TIME: CPT | Performed by: INTERNAL MEDICINE

## 2019-05-20 PROCEDURE — 93793 ANTICOAG MGMT PT WARFARIN: CPT | Performed by: INTERNAL MEDICINE

## 2019-05-20 NOTE — PROGRESS NOTES
OP Anticoagulation Service Note    Date: 2019  Vitals:    19 1509   BP: 126/57   Pulse: 73       Anticoagulation Summary  As of 2019    INR goal:   2.0-3.0   TTR:   60.9 % (2.5 y)   INR used for dosin.40 (2019)   Warfarin maintenance plan:   10 mg (10 mg x 1) every Sun, e; 20 mg (10 mg x 2) all other days   Weekly warfarin total:   120 mg   Plan last modified:   Bianca Capellan (12/10/2018)   Next INR check:   2019   Target end date:       Indications    Chronic anticoagulation [Z79.01]  Personal history of pulmonary embolism [Z86.711]  Long term (current) use of anticoagulants [Z79.01] [Z79.01]             Anticoagulation Episode Summary     INR check location:       Preferred lab:       Send INR reminders to:       Comments:         Anticoagulation Care Providers     Provider Role Specialty Phone number    Ninfa Marcial M.D. Referring Internal Medicine [phone]    [facility] Anticoagulation Services Responsible  [phone]        Anticoagulation Patient Findings      HPI:   Natalie Anaya seen in clinic today, on anticoagulation therapy with warfarin (a high risk medication) for history of PE      Pt is here today to evaluate anticoagulation therapy    Previous INR was  1.2 on 19    Pt was instructed to increase x 2 days then resume usual regimen.     Confirmed warfarin dosing regimen, denies missed or extra doses of coumadin.   Diet has been consistent with foods rich in vitamin K: Yes  Changes in ETOH:  No  Changes in smoking status: No  Changes in medication: No   Cost restriction: No  S/s of bleeding:  No  Falls or accidents since last visit No  Signs/symptoms  thrombosis since the last appt: No    A/P   INR therapeutic today, will require close follow up as previous INR was sub-therapeutic   Continue current warfarin regimen           check referral    Pt educated to contact our clinic with any changes in medications or s/s of bleeding or  thrombosis. Pt is aware to seek immediate medical attention for falls, head injury or deep cuts    Follow up appointment in 4 week(s) to reduce risk of adverse events from warfarin   Moira Sheridan, PharmD

## 2019-06-03 RX ORDER — WARFARIN SODIUM 10 MG/1
TABLET ORAL
Qty: 180 TAB | Refills: 1 | Status: SHIPPED | OUTPATIENT
Start: 2019-06-03 | End: 2019-06-12

## 2019-06-12 ENCOUNTER — APPOINTMENT (OUTPATIENT)
Dept: RADIOLOGY | Facility: MEDICAL CENTER | Age: 79
DRG: 481 | End: 2019-06-12
Attending: EMERGENCY MEDICINE
Payer: MEDICARE

## 2019-06-12 ENCOUNTER — HOSPITAL ENCOUNTER (INPATIENT)
Facility: MEDICAL CENTER | Age: 79
LOS: 7 days | DRG: 481 | End: 2019-06-19
Attending: EMERGENCY MEDICINE | Admitting: HOSPITALIST
Payer: MEDICARE
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
## 2019-06-12 DIAGNOSIS — S72.001A CLOSED RIGHT HIP FRACTURE, INITIAL ENCOUNTER (HCC): ICD-10-CM

## 2019-06-12 DIAGNOSIS — S72.001A CLOSED FRACTURE OF RIGHT HIP, INITIAL ENCOUNTER (HCC): ICD-10-CM

## 2019-06-12 PROBLEM — L50.8: Status: ACTIVE | Noted: 2019-06-12

## 2019-06-12 PROBLEM — T80.89XA: Status: ACTIVE | Noted: 2019-06-12

## 2019-06-12 LAB
ALBUMIN SERPL BCP-MCNC: 3.6 G/DL (ref 3.2–4.9)
ALBUMIN/GLOB SERPL: 1.4 G/DL
ALP SERPL-CCNC: 59 U/L (ref 30–99)
ALT SERPL-CCNC: 25 U/L (ref 2–50)
ANION GAP SERPL CALC-SCNC: 8 MMOL/L (ref 0–11.9)
APTT PPP: 34.9 SEC (ref 24.7–36)
AST SERPL-CCNC: 20 U/L (ref 12–45)
BASOPHILS # BLD AUTO: 0.1 % (ref 0–1.8)
BASOPHILS # BLD: 0.01 K/UL (ref 0–0.12)
BILIRUB SERPL-MCNC: 0.6 MG/DL (ref 0.1–1.5)
BUN SERPL-MCNC: 18 MG/DL (ref 8–22)
CALCIUM SERPL-MCNC: 8.5 MG/DL (ref 8.4–10.2)
CHLORIDE SERPL-SCNC: 99 MMOL/L (ref 96–112)
CO2 SERPL-SCNC: 29 MMOL/L (ref 20–33)
CREAT SERPL-MCNC: 0.57 MG/DL (ref 0.5–1.4)
EOSINOPHIL # BLD AUTO: 0.04 K/UL (ref 0–0.51)
EOSINOPHIL NFR BLD: 0.4 % (ref 0–6.9)
ERYTHROCYTE [DISTWIDTH] IN BLOOD BY AUTOMATED COUNT: 45.1 FL (ref 35.9–50)
GLOBULIN SER CALC-MCNC: 2.6 G/DL (ref 1.9–3.5)
GLUCOSE SERPL-MCNC: 131 MG/DL (ref 65–99)
HCT VFR BLD AUTO: 40.1 % (ref 37–47)
HGB BLD-MCNC: 13.2 G/DL (ref 12–16)
IMM GRANULOCYTES # BLD AUTO: 0.04 K/UL (ref 0–0.11)
IMM GRANULOCYTES NFR BLD AUTO: 0.4 % (ref 0–0.9)
INR PPP: 1.89 (ref 0.87–1.13)
INR PPP: 2.23 (ref 0.87–1.13)
LYMPHOCYTES # BLD AUTO: 0.56 K/UL (ref 1–4.8)
LYMPHOCYTES NFR BLD: 6.2 % (ref 22–41)
MCH RBC QN AUTO: 31.8 PG (ref 27–33)
MCHC RBC AUTO-ENTMCNC: 32.9 G/DL (ref 33.6–35)
MCV RBC AUTO: 96.6 FL (ref 81.4–97.8)
MONOCYTES # BLD AUTO: 0.45 K/UL (ref 0–0.85)
MONOCYTES NFR BLD AUTO: 5 % (ref 0–13.4)
NEUTROPHILS # BLD AUTO: 7.96 K/UL (ref 2–7.15)
NEUTROPHILS NFR BLD: 87.9 % (ref 44–72)
NRBC # BLD AUTO: 0 K/UL
NRBC BLD-RTO: 0 /100 WBC
PLATELET # BLD AUTO: 239 K/UL (ref 164–446)
PMV BLD AUTO: 8.6 FL (ref 9–12.9)
POTASSIUM SERPL-SCNC: 3.9 MMOL/L (ref 3.6–5.5)
PROT SERPL-MCNC: 6.2 G/DL (ref 6–8.2)
PROTHROMBIN TIME: 22.1 SEC (ref 12–14.6)
PROTHROMBIN TIME: 25.3 SEC (ref 12–14.6)
RBC # BLD AUTO: 4.15 M/UL (ref 4.2–5.4)
SODIUM SERPL-SCNC: 136 MMOL/L (ref 135–145)
TROPONIN I SERPL-MCNC: <0.02 NG/ML (ref 0–0.04)
WBC # BLD AUTO: 9.1 K/UL (ref 4.8–10.8)

## 2019-06-12 PROCEDURE — P9017 PLASMA 1 DONOR FRZ W/IN 8 HR: HCPCS

## 2019-06-12 PROCEDURE — 71045 X-RAY EXAM CHEST 1 VIEW: CPT

## 2019-06-12 PROCEDURE — 85730 THROMBOPLASTIN TIME PARTIAL: CPT

## 2019-06-12 PROCEDURE — 36415 COLL VENOUS BLD VENIPUNCTURE: CPT

## 2019-06-12 PROCEDURE — 85025 COMPLETE CBC W/AUTO DIFF WBC: CPT

## 2019-06-12 PROCEDURE — 73552 X-RAY EXAM OF FEMUR 2/>: CPT | Mod: RT

## 2019-06-12 PROCEDURE — 99285 EMERGENCY DEPT VISIT HI MDM: CPT

## 2019-06-12 PROCEDURE — 72170 X-RAY EXAM OF PELVIS: CPT

## 2019-06-12 PROCEDURE — 93005 ELECTROCARDIOGRAM TRACING: CPT | Performed by: EMERGENCY MEDICINE

## 2019-06-12 PROCEDURE — 770006 HCHG ROOM/CARE - MED/SURG/GYN SEMI*

## 2019-06-12 PROCEDURE — 99223 1ST HOSP IP/OBS HIGH 75: CPT | Mod: AI | Performed by: HOSPITALIST

## 2019-06-12 PROCEDURE — 84597 ASSAY OF VITAMIN K: CPT

## 2019-06-12 PROCEDURE — 36430 TRANSFUSION BLD/BLD COMPNT: CPT

## 2019-06-12 PROCEDURE — 700111 HCHG RX REV CODE 636 W/ 250 OVERRIDE (IP): Performed by: HOSPITALIST

## 2019-06-12 PROCEDURE — 94760 N-INVAS EAR/PLS OXIMETRY 1: CPT

## 2019-06-12 PROCEDURE — 96375 TX/PRO/DX INJ NEW DRUG ADDON: CPT

## 2019-06-12 PROCEDURE — 700111 HCHG RX REV CODE 636 W/ 250 OVERRIDE (IP): Performed by: EMERGENCY MEDICINE

## 2019-06-12 PROCEDURE — 700102 HCHG RX REV CODE 250 W/ 637 OVERRIDE(OP): Performed by: HOSPITALIST

## 2019-06-12 PROCEDURE — 96374 THER/PROPH/DIAG INJ IV PUSH: CPT

## 2019-06-12 PROCEDURE — 700105 HCHG RX REV CODE 258: Performed by: HOSPITALIST

## 2019-06-12 PROCEDURE — 80053 COMPREHEN METABOLIC PANEL: CPT

## 2019-06-12 PROCEDURE — A9270 NON-COVERED ITEM OR SERVICE: HCPCS | Performed by: HOSPITALIST

## 2019-06-12 PROCEDURE — 30233K1 TRANSFUSION OF NONAUTOLOGOUS FROZEN PLASMA INTO PERIPHERAL VEIN, PERCUTANEOUS APPROACH: ICD-10-PCS | Performed by: HOSPITALIST

## 2019-06-12 PROCEDURE — 700105 HCHG RX REV CODE 258: Performed by: EMERGENCY MEDICINE

## 2019-06-12 PROCEDURE — 84484 ASSAY OF TROPONIN QUANT: CPT

## 2019-06-12 PROCEDURE — 85610 PROTHROMBIN TIME: CPT

## 2019-06-12 RX ORDER — PRIMIDONE 250 MG/1
250-500 TABLET ORAL 2 TIMES DAILY
Status: DISCONTINUED | OUTPATIENT
Start: 2019-06-12 | End: 2019-06-13

## 2019-06-12 RX ORDER — YEAST,DRIED (S. CEREVISIAE)
1 POWDER (GRAM) ORAL DAILY
Status: DISCONTINUED | OUTPATIENT
Start: 2019-06-12 | End: 2019-06-12

## 2019-06-12 RX ORDER — METHYLPREDNISOLONE SODIUM SUCCINATE 40 MG/ML
40 INJECTION, POWDER, LYOPHILIZED, FOR SOLUTION INTRAMUSCULAR; INTRAVENOUS ONCE
Status: COMPLETED | OUTPATIENT
Start: 2019-06-12 | End: 2019-06-12

## 2019-06-12 RX ORDER — ACETAMINOPHEN 325 MG/1
650 TABLET ORAL EVERY 6 HOURS PRN
Status: DISCONTINUED | OUTPATIENT
Start: 2019-06-12 | End: 2019-06-19 | Stop reason: HOSPADM

## 2019-06-12 RX ORDER — ONDANSETRON 2 MG/ML
4 INJECTION INTRAMUSCULAR; INTRAVENOUS ONCE
Status: COMPLETED | OUTPATIENT
Start: 2019-06-12 | End: 2019-06-12

## 2019-06-12 RX ORDER — AMOXICILLIN 250 MG
2 CAPSULE ORAL 2 TIMES DAILY
Status: DISCONTINUED | OUTPATIENT
Start: 2019-06-12 | End: 2019-06-19 | Stop reason: HOSPADM

## 2019-06-12 RX ORDER — OXYCODONE HYDROCHLORIDE 5 MG/1
2.5 TABLET ORAL
Status: DISCONTINUED | OUTPATIENT
Start: 2019-06-12 | End: 2019-06-19 | Stop reason: HOSPADM

## 2019-06-12 RX ORDER — SODIUM CHLORIDE, SODIUM LACTATE, POTASSIUM CHLORIDE, CALCIUM CHLORIDE 600; 310; 30; 20 MG/100ML; MG/100ML; MG/100ML; MG/100ML
1000 INJECTION, SOLUTION INTRAVENOUS ONCE
Status: COMPLETED | OUTPATIENT
Start: 2019-06-12 | End: 2019-06-12

## 2019-06-12 RX ORDER — ONDANSETRON 2 MG/ML
4 INJECTION INTRAMUSCULAR; INTRAVENOUS EVERY 4 HOURS PRN
Status: DISCONTINUED | OUTPATIENT
Start: 2019-06-12 | End: 2019-06-19 | Stop reason: HOSPADM

## 2019-06-12 RX ORDER — OMEPRAZOLE 20 MG/1
20 CAPSULE, DELAYED RELEASE ORAL EVERY EVENING
COMMUNITY
End: 2020-04-09

## 2019-06-12 RX ORDER — BISACODYL 10 MG
10 SUPPOSITORY, RECTAL RECTAL
Status: DISCONTINUED | OUTPATIENT
Start: 2019-06-12 | End: 2019-06-19 | Stop reason: HOSPADM

## 2019-06-12 RX ORDER — CARVEDILOL 6.25 MG/1
6.25 TABLET ORAL 2 TIMES DAILY WITH MEALS
Status: DISCONTINUED | OUTPATIENT
Start: 2019-06-12 | End: 2019-06-19 | Stop reason: HOSPADM

## 2019-06-12 RX ORDER — POTASSIUM CHLORIDE 20 MEQ/1
20 TABLET, EXTENDED RELEASE ORAL
Status: DISCONTINUED | OUTPATIENT
Start: 2019-06-12 | End: 2019-06-13

## 2019-06-12 RX ORDER — SODIUM CHLORIDE 9 MG/ML
INJECTION, SOLUTION INTRAVENOUS CONTINUOUS
Status: ACTIVE | OUTPATIENT
Start: 2019-06-12 | End: 2019-06-13

## 2019-06-12 RX ORDER — POLYETHYLENE GLYCOL 3350 17 G/17G
1 POWDER, FOR SOLUTION ORAL
Status: DISCONTINUED | OUTPATIENT
Start: 2019-06-12 | End: 2019-06-19 | Stop reason: HOSPADM

## 2019-06-12 RX ORDER — PRIMIDONE 250 MG/1
250-500 TABLET ORAL 2 TIMES DAILY
COMMUNITY
End: 2019-12-17 | Stop reason: SDUPTHER

## 2019-06-12 RX ORDER — WARFARIN SODIUM 10 MG/1
15 TABLET ORAL EVERY EVENING
COMMUNITY
End: 2020-01-06 | Stop reason: SDUPTHER

## 2019-06-12 RX ORDER — ONDANSETRON 4 MG/1
4 TABLET, ORALLY DISINTEGRATING ORAL EVERY 4 HOURS PRN
Status: DISCONTINUED | OUTPATIENT
Start: 2019-06-12 | End: 2019-06-19 | Stop reason: HOSPADM

## 2019-06-12 RX ORDER — LEVOTHYROXINE SODIUM 0.05 MG/1
100 TABLET ORAL
Status: DISCONTINUED | OUTPATIENT
Start: 2019-06-12 | End: 2019-06-13

## 2019-06-12 RX ORDER — FERROUS SULFATE 325(65) MG
325 TABLET ORAL
Status: DISCONTINUED | OUTPATIENT
Start: 2019-06-13 | End: 2019-06-19 | Stop reason: HOSPADM

## 2019-06-12 RX ORDER — DIPHENHYDRAMINE HYDROCHLORIDE 50 MG/ML
25 INJECTION INTRAMUSCULAR; INTRAVENOUS EVERY 6 HOURS PRN
Status: DISCONTINUED | OUTPATIENT
Start: 2019-06-12 | End: 2019-06-13

## 2019-06-12 RX ORDER — OMEPRAZOLE 20 MG/1
20 CAPSULE, DELAYED RELEASE ORAL EVERY EVENING
Status: DISCONTINUED | OUTPATIENT
Start: 2019-06-12 | End: 2019-06-19 | Stop reason: HOSPADM

## 2019-06-12 RX ORDER — MORPHINE SULFATE 4 MG/ML
2 INJECTION, SOLUTION INTRAMUSCULAR; INTRAVENOUS
Status: DISCONTINUED | OUTPATIENT
Start: 2019-06-12 | End: 2019-06-18

## 2019-06-12 RX ORDER — FUROSEMIDE 40 MG/1
40 TABLET ORAL
Status: DISCONTINUED | OUTPATIENT
Start: 2019-06-12 | End: 2019-06-13

## 2019-06-12 RX ORDER — M-VIT,TX,IRON,MINS/CALC/FOLIC 27MG-0.4MG
1 TABLET ORAL DAILY
Status: DISCONTINUED | OUTPATIENT
Start: 2019-06-12 | End: 2019-06-19 | Stop reason: HOSPADM

## 2019-06-12 RX ORDER — CALCIUM CARBONATE 500 MG/1
1000 TABLET, CHEWABLE ORAL DAILY
Status: DISCONTINUED | OUTPATIENT
Start: 2019-06-12 | End: 2019-06-19 | Stop reason: HOSPADM

## 2019-06-12 RX ORDER — PHYTONADIONE 5 MG/1
5 TABLET ORAL ONCE
Status: DISCONTINUED | OUTPATIENT
Start: 2019-06-12 | End: 2019-06-12

## 2019-06-12 RX ORDER — ALENDRONATE SODIUM 70 MG/1
70 TABLET ORAL
Status: DISCONTINUED | OUTPATIENT
Start: 2019-06-12 | End: 2019-06-19 | Stop reason: HOSPADM

## 2019-06-12 RX ORDER — PHENOL 1.4 %
10 AEROSOL, SPRAY (ML) MUCOUS MEMBRANE
Status: DISCONTINUED | OUTPATIENT
Start: 2019-06-12 | End: 2019-06-12

## 2019-06-12 RX ORDER — OXYCODONE HYDROCHLORIDE 5 MG/1
5 TABLET ORAL
Status: DISCONTINUED | OUTPATIENT
Start: 2019-06-12 | End: 2019-06-19 | Stop reason: HOSPADM

## 2019-06-12 RX ORDER — MORPHINE SULFATE 4 MG/ML
4 INJECTION, SOLUTION INTRAMUSCULAR; INTRAVENOUS ONCE
Status: COMPLETED | OUTPATIENT
Start: 2019-06-12 | End: 2019-06-12

## 2019-06-12 RX ADMIN — CARVEDILOL 6.25 MG: 6.25 TABLET, FILM COATED ORAL at 18:15

## 2019-06-12 RX ADMIN — OXYCODONE HYDROCHLORIDE 5 MG: 5 TABLET ORAL at 18:14

## 2019-06-12 RX ADMIN — MORPHINE SULFATE 4 MG: 4 INJECTION INTRAVENOUS at 11:44

## 2019-06-12 RX ADMIN — METHYLPREDNISOLONE SODIUM SUCCINATE 40 MG: 40 INJECTION, POWDER, FOR SOLUTION INTRAMUSCULAR; INTRAVENOUS at 17:55

## 2019-06-12 RX ADMIN — ONDANSETRON 4 MG: 2 INJECTION INTRAMUSCULAR; INTRAVENOUS at 11:44

## 2019-06-12 RX ADMIN — SODIUM CHLORIDE: 9 INJECTION, SOLUTION INTRAVENOUS at 16:19

## 2019-06-12 RX ADMIN — PRIMIDONE 250 MG: 250 TABLET ORAL at 18:15

## 2019-06-12 RX ADMIN — OMEPRAZOLE 20 MG: 20 CAPSULE, DELAYED RELEASE ORAL at 18:15

## 2019-06-12 RX ADMIN — SENNOSIDES,DOCUSATE SODIUM 2 TABLET: 8.6; 5 TABLET, FILM COATED ORAL at 18:15

## 2019-06-12 RX ADMIN — DIPHENHYDRAMINE HYDROCHLORIDE 25 MG: 50 INJECTION INTRAMUSCULAR; INTRAVENOUS at 17:55

## 2019-06-12 RX ADMIN — SODIUM CHLORIDE, POTASSIUM CHLORIDE, SODIUM LACTATE AND CALCIUM CHLORIDE 1000 ML: 600; 310; 30; 20 INJECTION, SOLUTION INTRAVENOUS at 11:42

## 2019-06-12 RX ADMIN — OXYCODONE HYDROCHLORIDE 5 MG: 5 TABLET ORAL at 13:05

## 2019-06-12 ASSESSMENT — COGNITIVE AND FUNCTIONAL STATUS - GENERAL
PERSONAL GROOMING: A LITTLE
EATING MEALS: A LITTLE
SUGGESTED CMS G CODE MODIFIER MOBILITY: CK
DRESSING REGULAR LOWER BODY CLOTHING: A LITTLE
DRESSING REGULAR UPPER BODY CLOTHING: A LITTLE
CLIMB 3 TO 5 STEPS WITH RAILING: A LITTLE
DAILY ACTIVITIY SCORE: 18
TURNING FROM BACK TO SIDE WHILE IN FLAT BAD: A LOT
HELP NEEDED FOR BATHING: A LITTLE
STANDING UP FROM CHAIR USING ARMS: A LITTLE
MOBILITY SCORE: 17
MOVING FROM LYING ON BACK TO SITTING ON SIDE OF FLAT BED: A LITTLE
MOVING TO AND FROM BED TO CHAIR: A LITTLE
TOILETING: A LITTLE
SUGGESTED CMS G CODE MODIFIER DAILY ACTIVITY: CK
WALKING IN HOSPITAL ROOM: A LITTLE

## 2019-06-12 ASSESSMENT — ENCOUNTER SYMPTOMS
CHILLS: 0
SPUTUM PRODUCTION: 0
NAUSEA: 0
SHORTNESS OF BREATH: 0
FLANK PAIN: 0
CLAUDICATION: 0
POLYDIPSIA: 0
BLOOD IN STOOL: 0
SINUS PAIN: 0
FEVER: 0
BRUISES/BLEEDS EASILY: 0
SORE THROAT: 0
HEMOPTYSIS: 0
BLURRED VISION: 0
HEARTBURN: 0
PALPITATIONS: 0
TINGLING: 0
COUGH: 0
DIARRHEA: 0
DOUBLE VISION: 0
WEAKNESS: 0
HEADACHES: 0
MYALGIAS: 0
PND: 0
PHOTOPHOBIA: 0
TREMORS: 0
DEPRESSION: 0
DIZZINESS: 0
NECK PAIN: 0
HALLUCINATIONS: 0
BACK PAIN: 0
EYE PAIN: 0
FALLS: 1
CONSTIPATION: 0
DIAPHORESIS: 0
VOMITING: 0
WHEEZING: 0
ABDOMINAL PAIN: 0
ORTHOPNEA: 0
STRIDOR: 0

## 2019-06-12 ASSESSMENT — COPD QUESTIONNAIRES
IN THE PAST 12 MONTHS DO YOU DO LESS THAN YOU USED TO BECAUSE OF YOUR BREATHING PROBLEMS: DISAGREE/UNSURE
DO YOU EVER COUGH UP ANY MUCUS OR PHLEGM?: NO/ONLY WITH OCCASIONAL COLDS OR INFECTIONS
DURING THE PAST 4 WEEKS HOW MUCH DID YOU FEEL SHORT OF BREATH: NONE/LITTLE OF THE TIME
COPD SCREENING SCORE: 2
HAVE YOU SMOKED AT LEAST 100 CIGARETTES IN YOUR ENTIRE LIFE: NO/DON'T KNOW

## 2019-06-12 ASSESSMENT — PATIENT HEALTH QUESTIONNAIRE - PHQ9
SUM OF ALL RESPONSES TO PHQ9 QUESTIONS 1 AND 2: 0
2. FEELING DOWN, DEPRESSED, IRRITABLE, OR HOPELESS: NOT AT ALL
1. LITTLE INTEREST OR PLEASURE IN DOING THINGS: NOT AT ALL

## 2019-06-12 ASSESSMENT — LIFESTYLE VARIABLES
ALCOHOL_USE: NO
EVER_SMOKED: NEVER
SUBSTANCE_ABUSE: 0

## 2019-06-12 NOTE — ED NOTES
assisting with care-hospitalist at bedside. Pt in no apparent distress though c/o rt hip pain with movement. Blood work obtained from exisiting iv from [facility]

## 2019-06-12 NOTE — ASSESSMENT & PLAN NOTE
S/p operative repair  Pain management, with oral oxycodone as needed  DC IV morphine  PT and OT recommending skilled, likely AM now that anemia stable

## 2019-06-12 NOTE — H&P
Hospital Medicine History & Physical Note    Date of Service  6/12/2019    Primary Care Physician  Ninfa Marcial M.D.    Consultants  Orthopedic    Code Status  Full    Chief Complaint  Right hip pain    History of Presenting Illness  78 y.o. female who presented 6/12/2019 with past medical history of PE on lifelong warfarin therapy, history of seizure disorder, history of left hip fracture, history of osteoporosis, history of hypothyroidism, history of sleep apnea comes into the emergency room after a ground-level fall and left hip pain.  Patient states that she had a mechanical fall in which she tripped over her sandals in the bathroom at her PCPs office.  She states that she never lost consciousness and did not have any presyncope symptoms.  She did hit her head against a door.  She complains but no focal deficits including muscle weakness or sensory loss.  The patient had a DEXA scan in 2017 which found osteopenia.  The patient also takes chronic Lasix therapy for pulmonary hypertension.  She denies any changes or adjustments in her medications.  The patient arrived to the emergency room with normal vital signs.  She was in severe pain and was given IV morphine.  The patient had an x-ray of the chest found increased vascular congestion  Head x-ray of the pelvis and femur that found right femoral fracture   EKG interpreted by me found normal sinus rhythm, Q waves in anterior leads, poor with R wave progression    She started to develop a rash on her arms.  She was given IV steroids and IV Benadryl.    Review of Systems  Review of Systems   Constitutional: Negative for chills, diaphoresis, fever and malaise/fatigue.   HENT: Negative for congestion, ear discharge, ear pain, hearing loss, nosebleeds, sinus pain, sore throat and tinnitus.    Eyes: Negative for blurred vision, double vision, photophobia and pain.   Respiratory: Negative for cough, hemoptysis, sputum production, shortness of breath, wheezing and  stridor.    Cardiovascular: Positive for leg swelling. Negative for chest pain, palpitations, orthopnea, claudication and PND.   Gastrointestinal: Negative for abdominal pain, blood in stool, constipation, diarrhea, heartburn, melena, nausea and vomiting.   Genitourinary: Negative for dysuria, flank pain, frequency, hematuria and urgency.   Musculoskeletal: Positive for falls and joint pain. Negative for back pain, myalgias and neck pain.   Skin: Negative for itching and rash.   Neurological: Negative for dizziness, tingling, tremors, weakness and headaches.   Endo/Heme/Allergies: Negative for environmental allergies and polydipsia. Does not bruise/bleed easily.   Psychiatric/Behavioral: Negative for depression, hallucinations, substance abuse and suicidal ideas.       Past Medical History   has a past medical history of Arthritis; Bowel habit changes; Breath shortness; Cataract; Constipation; Epilepsy ([facility]); Eye drainage; Hemorrhagic disorder ([facility]); Hiatus hernia syndrome; Hypertension; Hyperthyroidism; Hypothyroid; Influenza; Pain; Personal history of venous thrombosis and embolism; Ringing in ears; Seizure ([facility]) (1982); and Sore muscles.    Surgical History   has a past surgical history that includes tubal ligation; nohemy by laparoscopy (9/30/2011); knee arthroplasty total (7/9/2012); cataract phaco with iol (7/15/2014); cataract phaco with iol (8/5/2014); gastroscopy-endo (N/A, 4/29/2016); colonoscopy-flexible (N/A, 4/29/2016); hip nailing intramedullary (Left, 10/6/2016); irrigation & debridement hip (Left, 11/17/2016); carpal tunnel release; pr remv 2nd cataract,corn-scler sectn; arthroscopy, knee; and tonsillectomy.     Family History  family history includes Alcohol/Drug (age of onset: 60) in her father; Cancer in her maternal grandmother; Cancer (age of onset: 44) in her daughter; Cancer (age of onset: 67) in her mother; Heart Disease in her father and maternal grandfather.     Social History   reports that  she has never smoked. She has never used smokeless tobacco. She reports that she does not drink alcohol or use drugs.    Allergies  Allergies   Allergen Reactions   • Tape Rash     Adhesive band aids cause a rash  Paper tape ok       Medications  Prior to Admission Medications   Prescriptions Last Dose Informant Patient Reported? Taking?   CALCIUM PO 6/12/2019 at 0800 Patient Yes No   Sig: Take 1,000 mg by mouth every day.   Cholecalciferol (VITAMIN D-3) 1000 UNITS Cap 6/12/2019 at 0800 Patient Yes No   Sig: Take 1,000 Units by mouth every day.   Glucos-Chond-Hyal Ac-Ca Fructo (MOVE FREE JOINT HEALTH ADVANCE) Tab 6/12/2019 at 0800 Patient Yes No   Sig: Take 1 Tab by mouth every day.   Melatonin 10 MG Tab 6/11/2019 at  Patient Yes No   Sig: Take 10 mg by mouth every bedtime.   Multiple Vitamins-Minerals (WOMENS MULTI VITAMIN & MINERAL) Tab 6/12/2019 at 0800 Patient Yes No   Sig: Take 1 Tab by mouth every day.   Probiotic Product (PROBIOTIC DAILY PO) 6/12/2019 at 0800 Patient Yes No   Sig: Take 1 Cap by mouth every day.   alendronate (FOSAMAX) 70 MG Tab 6/9/2019 at unk Patient No No   Sig: TAKE 1 TABLET BY MOUTH EVERY 7 DAYS   carvedilol (COREG) 6.25 MG Tab 6/12/2019 at 0800 Patient No No   Sig: TAKE 1 TABLET BY MOUTH TWICE DAILY   ferrous sulfate 325 (65 Fe) MG EC tablet 6/12/2019 at 0800 Patient No No   Sig: Take 1 Tab by mouth every day.   furosemide (LASIX) 40 MG Tab 6/12/2019 at 0800 Patient No No   Sig: TAKE 1 TABLET BY MOUTH EVERY DAY   levothyroxine (SYNTHROID) 100 MCG Tab 6/12/2019 at 0800 Patient No No   Sig: TAKE 1 TABLET BY MOUTH EVERY DAY   omeprazole (PRILOSEC) 20 MG delayed-release capsule 6/11/2019 at pm Patient Yes Yes   Sig: Take 20 mg by mouth every evening.   potassium chloride SA (KDUR) 20 MEQ Tab CR 6/12/2019 at 0800 Patient No No   Sig: TAKE 1 TABLET BY MOUTH EVERY DAY   primidone (MYSOLINE) 250 MG Tab 6/12/2019 at 0800 Patient Yes Yes   Sig: Take 250-500 mg by mouth 2 Times a Day. 250mg in  the morning & 500mg at bedtime   warfarin (COUMADIN) 10 MG Tab 6/11/2019 at pm Patient Yes Yes   Sig: Take 10-20 mg by mouth every evening. 10mg on Sunday & Tuesday   20mg all other days      Facility-Administered Medications: None       Physical Exam  Temp:  [36.3 °C (97.3 °F)-37.3 °C (99.2 °F)] 36.3 °C (97.3 °F)  Pulse:  [60-92] 74  Resp:  [17-20] 18  BP: (130-169)/(49-92) 130/57  SpO2:  [88 %-99 %] 99 %    Physical Exam   Constitutional: She is oriented to person, place, and time. She appears well-developed and well-nourished.   HENT:   Head: Normocephalic and atraumatic.   Mouth/Throat: No oropharyngeal exudate.   Eyes: Pupils are equal, round, and reactive to light. EOM are normal. No scleral icterus.   Neck: Normal range of motion. Neck supple. JVD present. No tracheal deviation present. No thyromegaly present.   Cardiovascular: Normal rate, regular rhythm and intact distal pulses.  Exam reveals no gallop and no friction rub.    No murmur heard.  Pulmonary/Chest: Effort normal and breath sounds normal. No stridor. No respiratory distress. She has no wheezes. She has no rales. She exhibits no tenderness.   Abdominal: Soft. Bowel sounds are normal. She exhibits no distension and no mass. There is no tenderness. There is no rebound and no guarding.   Musculoskeletal: Normal range of motion. She exhibits edema.   Internally rotated right hip   Lymphadenopathy:     She has no cervical adenopathy.   Neurological: She is alert and oriented to person, place, and time. She has normal reflexes. No cranial nerve deficit.   Skin: No rash noted. No erythema. No pallor.   Nursing note and vitals reviewed.      Laboratory:  Recent Labs      06/12/19   1147   WBC  9.1   RBC  4.15*   HEMOGLOBIN  13.2   HEMATOCRIT  40.1   MCV  96.6   MCH  31.8   MCHC  32.9*   RDW  45.1   PLATELETCT  239   MPV  8.6*     Recent Labs      06/12/19   1147   SODIUM  136   POTASSIUM  3.9   CHLORIDE  99   CO2  29   GLUCOSE  131*   BUN  18   CREATININE   0.57   CALCIUM  8.5     Recent Labs      06/12/19   1147   ALTSGPT  25   ASTSGOT  20   ALKPHOSPHAT  59   TBILIRUBIN  0.6   GLUCOSE  131*     Recent Labs      06/12/19   1147  06/12/19   1801   APTT  34.9   --    INR  2.23*  1.89*             Recent Labs      06/12/19   1147   TROPONINI  <0.02       Urinalysis:    No results found     Imaging:  DX-FEMUR-2+ RIGHT   Final Result      1.  Right proximal femoral comminuted and angulated immature trochanteric fracture.      2.  Severe degenerative change of the right knee and mild degenerative change of the right hip.      DX-PELVIS-1 OR 2 VIEWS   Final Result      1.  Comminuted and angulated right proximal femoral intertrochanteric fracture.      2.  Old posttraumatic and surgical change of the left proximal femur.      DX-CHEST-LIMITED (1 VIEW)   Final Result      Cardiomegaly with mild interstitial edema.            Assessment/Plan:  I anticipate this patient will require at least two midnights for appropriate medical management, necessitating inpatient admission.    * Closed right hip fracture, initial encounter ([facility])   Assessment & Plan    Presents with right hip pain  X-ray found evidence of right hip fracture  Pain control with oral and IV opiate medications and will continue to monitor mental and respiratory status  Orthopedic surgery has been consulted     Age-related osteoporosis with current pathological fracture with routine healing   Assessment & Plan    Cont bisphosphates, calcium and vitamin D     Hives as manifestation of blood transfusion reaction   Assessment & Plan    Developed blistering rash on both arms  Give Benadryl and Solu-Medrol     Fall   Assessment & Plan    Ground-level fall  Found to have a hip fracture on the right  No focal deficits indicating traumatic brain injury  Alert and oriented x4  We will continue to monitor mental status     Gastroesophageal reflux disease without esophagitis- (present on admission)   Assessment & Plan     Continue omeprazole     Chronic respiratory failure with hypoxia (HCC)- (present on admission)   Assessment & Plan    Takes 1.5 L of oxygen at night     Obstructive sleep apnea syndrome- (present on admission)   Assessment & Plan    Continue CPAP at night     Secondary pulmonary arterial hypertension (HCC)- (present on admission)   Assessment & Plan    Likely secondary to long-standing sleep apnea  Monitor volume status   Continue oral Lasix     Chronic anticoagulation- (present on admission)   Assessment & Plan    On warfarin therapy for repeated Pes  Never been tested for blood disorder  Current INR is 2.4 normal monitor daily-hold warfarin  We will give a dose of oral vitamin K  Depending on timing surgery give FFP's     Diastolic dysfunction- (present on admission)   Assessment & Plan    Found on cardiac echo in 2017  Monitor volume status and continue Lasix oral     Iron deficiency anemia- (present on admission)   Assessment & Plan    Continue oral iron  Last EGD was 2016     Acquired hypothyroidism- (present on admission)   Assessment & Plan    Continue home Synthroid  Follow TSH in the morning     Seizure disorder (HCC)- (present on admission)   Assessment & Plan    Last seizure was in 1982  On primidone         VTE prophylaxis: SCD per surgery

## 2019-06-12 NOTE — ASSESSMENT & PLAN NOTE
On warfarin therapy for recurrent pulmonary embolism  History of multiple PEs  Lovenox stopped due to drop in hemoglobin, coumadin resumed and is now therapeutic, hgb stable.  Continue to monitor INR.

## 2019-06-12 NOTE — CARE PLAN
Problem: Skin Integrity  Goal: Risk for impaired skin integrity will decrease  Outcome: PROGRESSING AS EXPECTED  Pt with no open skin wounds or rashes. Pt limited in mobility d/t R hip fracture. Pt agrees to be turned Q2.    Problem: Pain Management  Goal: Pain level will decrease to patient's comfort goal  Outcome: PROGRESSING AS EXPECTED  Pt given oxycodone dose at 1245 for R hip pain, pt npo for procedure at 1800. Report given to preop nurse.

## 2019-06-12 NOTE — FLOWSHEET NOTE
06/12/19 1546   Events/Summary/Plan   Events/Summary/Plan Pt refused to use hosp. unit. Pt use 2L at night only.    Non-Invasive Resp Device Site Inspection Completed Intact   Education   Education Yes - Pt. / Family has been Instructed in use of Respiratory Equipment   Chest Exam   Respiration 18   Pulse 68   Oximetry   #Pulse Oximetry (Single Determination) Yes   Oxygen   Home O2 Use Prior To Admission? Yes   Home O2 LPM Flow 2 LPM   Home O2 Frequency of Use At Sleep   Pulse Oximetry 93 %   O2 (LPM) 0.5   O2 Daily Delivery Respiratory  Silicone Nasal Cannula

## 2019-06-12 NOTE — ED TRIAGE NOTES
Chief Complaint   Patient presents with   • GLF     Right hip pain with a pop after a fall in the bathroom at PCP office, uses a walker.   PIV per remsa, 100 of Fentanyl, pt is comfortable.

## 2019-06-12 NOTE — ED NOTES
Med Rec complete per pt at bedside  Ok per Pt to discuss medications with visitor/s present  Allergies have been verified and updated  No oral ABX within the last 30 days  Pt [facility]

## 2019-06-12 NOTE — ASSESSMENT & PLAN NOTE
Found on cardiac echo in 2017  Careful with fluids, watch for post surgery volume overload.  Resume lasix 40 daily with K

## 2019-06-12 NOTE — ED NOTES
Med per request for 2/10 pain, increased with movement. at bedside, call light in reach. States protein bar at 0900, npo since and encouraged in er

## 2019-06-12 NOTE — ED PROVIDER NOTES
ED Provider Note    CHIEF COMPLAINT  Chief Complaint   Patient presents with   • GLF     Right hip pain with a pop after a fall in the bathroom at PCP office, uses a walker.       HPI  Natalie Anaya is a 78 y.o. female who presents stating that she went to her primary care's office for a routine appointment for weight check and was in the bathroom and lost control of her footing and ended up sustaining a fall and now has right-sided hip pain and unable to bear weight on that side.  She has been nonambulatory since the accident this morning.  Last ate breakfast prior to going to the doctor at approximately 8 AM    REVIEW OF SYSTEMS  See HPI for further details. All other systems are negative.     PAST MEDICAL HISTORY  Past Medical History:   Diagnosis Date   • Arthritis     Knees.   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   • Ringing in ears    • Seizure (HCC) 1982   • Sore muscles        FAMILY HISTORY  Family History   Problem Relation Age of Onset   • Cancer Mother 67        Ovarian   • Alcohol/Drug Father 60        Appenditis   • Heart Disease Father    • Cancer Maternal Grandmother         colon cancer   • Heart Disease Maternal Grandfather    • Cancer Daughter 44        melonoma       SOCIAL HISTORY   reports that she has never smoked. She has never used smokeless tobacco. She reports that she drinks alcohol. She reports that she does not use drugs.    SURGICAL HISTORY  Past Surgical History:   Procedure Laterality Date   • IRRIGATION & DEBRIDEMENT HIP Left 11/17/2016    Procedure: IRRIGATION & DEBRIDEMENT HIP;  Surgeon: Maximo Garnica M.D.;  Location: SURGERY  [facility] ORS;  Service:    • HIP NAILING INTRAMEDULLARY Left 10/6/2016    Procedure: HIP NAILING INTRAMEDULLARY;  Surgeon: Walt Nguyen M.D.;  Location: [facility];  Service:    • GASTROSCOPY-ENDO N/A 4/29/2016    Procedure: GASTROSCOPY-ENDO;  Surgeon: Mikey Stanton M.D.;  Location: [facility];  Service:    • COLONOSCOPY-FLEXIBLE N/A 4/29/2016    Procedure: COLONOSCOPY-FLEXIBLE;  Surgeon: Mikey Stanton M.D.;  Location: [facility];  Service:    • CATARACT PHACO WITH IOL  8/5/2014    Performed by Lorenzo Bello M.D. at [facility]   • CATARACT PHACO WITH IOL  7/15/2014    Performed by Lorenzo Bello M.D. at [facility]   • KNEE ARTHROPLASTY TOTAL  7/9/2012    Performed by DENIZ FREDERICK at [facility]   • JOSE BY LAPAROSCOPY  9/30/2011    Performed by JOHNATHAN CRISOSTOMO at [facility]   • ARTHROSCOPY, KNEE     • CARPAL TUNNEL RELEASE     • PB REMV 2ND CATARACT,CORN-SCLER SECTN     • TONSILLECTOMY     • TUBAL LIGATION         CURRENT MEDICATIONS  Home Medications     Reviewed by Gio Swanson (Pharmacy Tech) on 06/12/19 at 1134  Med List Status: Complete   Medication Last Dose Status   alendronate (FOSAMAX) 70 MG Tab 6/9/2019 Active   CALCIUM PO 6/12/2019 Active   carvedilol (COREG) 6.25 MG Tab 6/12/2019 Active   Cholecalciferol (VITAMIN D-3) 1000 UNITS Cap 6/12/2019 Active   ferrous sulfate 325 (65 Fe) MG EC tablet 6/12/2019 Active   furosemide (LASIX) 40 MG Tab 6/12/2019 Active   Glucos-Chond-Hyal Ac-Ca Fructo (MOVE FREE JOINT HEALTH ADVANCE) Tab 6/12/2019 Active   levothyroxine (SYNTHROID) 100 MCG Tab 6/12/2019 Active   Melatonin 10 MG Tab 6/11/2019 Active   Multiple Vitamins-Minerals (WOMENS MULTI VITAMIN & MINERAL) Tab 6/12/2019 Active   omeprazole (PRILOSEC) 20 MG delayed-release capsule 6/11/2019 Active   potassium chloride SA (KDUR) 20 MEQ Tab CR 6/12/2019 Active   primidone (MYSOLINE) 250 MG Tab  "6/12/2019 Active   Probiotic Product (PROBIOTIC DAILY PO) 6/12/2019 Active   warfarin (COUMADIN) 10 MG Tab 6/11/2019 Active                ALLERGIES  Allergies   Allergen Reactions   • Tape Rash     Adhesive band aids cause a rash  Paper tape ok       PHYSICAL EXAM  VITAL SIGNS: /63   Pulse 60   Temp 36.6 °C (97.9 °F) (Temporal)   Resp 18   Ht 1.575 m (5' 2\")   Wt 99.8 kg (220 lb)   LMP  (LMP Unknown)   SpO2 94%   BMI 40.24 kg/m²    Constitutional: Well developed, Well nourished, No acute distress, Non-toxic appearance.   HENT: Normocephalic, Atraumatic, Bilateral external ears normal, Oropharynx is clear mucous membranes are moist. No oral exudates or nasal discharge.   Eyes: Pupils are equal round and reactive, EOMI, Conjunctiva normal, No discharge.   Neck: Normal range of motion, No tenderness, Supple, No stridor. No meningismus.  Lymphatic: No lymphadenopathy noted.   Cardiovascular: Regular rate and rhythm without murmur rub or gallop.  Thorax & Lungs: Clear breath sounds bilaterally without wheezes, rhonchi or rales. There is no chest wall tenderness.   Abdomen: Soft non-tender non-distended. There is no rebound or guarding. No organomegaly is appreciated. Bowel sounds are normal.  Skin: Normal without rash.   Back: No CVA or spinal tenderness.   Extremities: Intact distal pulses, No edema, right-sided hip tenderness, No cyanosis, No clubbing. Capillary refill is less than 2 seconds.  Patient is shortened and externally rotated  Musculoskeletal: Full range of motion in right hip limited by pain and suspected fracture. No tenderness to palpation or major deformities noted.   Neurologic: Alert & oriented x 3, Normal motor function, Normal sensory function, No focal deficits noted. Reflexes are normal.  Psychiatric: Affect normal, Judgment normal, Mood normal. There is no suicidal ideation or patient reported hallucinations.       RADIOLOGY/PROCEDURES  DX-FEMUR-2+ RIGHT   Final Result      1.  " Right proximal femoral comminuted and angulated immature trochanteric fracture.      2.  Severe degenerative change of the right knee and mild degenerative change of the right hip.      DX-PELVIS-1 OR 2 VIEWS   Final Result      1.  Comminuted and angulated right proximal femoral intertrochanteric fracture.      2.  Old posttraumatic and surgical change of the left proximal femur.      DX-CHEST-LIMITED (1 VIEW)   Final Result      Cardiomegaly with mild interstitial edema.        Results for orders placed or performed during the hospital encounter of 19   EKG   Result Value Ref Range    Report       [facility] Emergency Dept.    Test Date:  2019  Pt Name:    DIVINA ZUNIGA             Department: [facility]  MRN:        [id number]                      Room:       -ROOM 8  Gender:     Female                       Technician: PENNY  :        [id number]                   Requested By:LULI NORRIS  Order #:    [id number]                    Reading MD:    Measurements  Intervals                                Axis  Rate:       61                           P:          24  OH:         171                          QRS:        28  QRSD:       105                          T:          31  QT:         442  QTc:        446    Interpretive Statements  Sinus rhythm  Anterior infarct, old  Compared to ECG 2016 19:57:54  Myocardial infarct finding now present  Sinus tachycardia no longer present  Atrial premature complex(es) no longer present  Left ventricular hypertrophy no longer present  Q waves no longer present           COURSE & MEDICAL DECISION MAKING  Pertinent Labs & Imaging studies reviewed. (See chart for details)  I was concerned about hip fracture on the right side given the patient's mechanism and physical exam which shows shortening and external rotation of the right lower extremity.    I was concerned about her level of pain in order pain medication immediately after seeing the  patient but there was a delay in administration given that the department was overwhelmed by ambulance traffic for period of time.    The patient is on Coumadin and INR was sent and found to be 2.4 and oral vitamin K was administered by [facility] hospitalist who I involved early on in her care as there was delay in her lab work returning.  Screening EKG was performed which shows normal sinus rhythm with an old anterior infarct but no evidence of new acute ischemic changes or dysrhythmia    Imaging which was also delayed secondary to the condition of the department at the time shows a intertrochanteric fracture which is comminuted on the right side.  I have conveyed these findings through Tiger text to Dr. Garnica per here at his request after speaking with him prior to obtaining images conveying that she had a clinically clear right-sided hip fracture.    I received additional information for Dr. Garnica at 1 PM that the patient will go to the operating room tomorrow we will continue to prepare her for surgical intervention    FINAL IMPRESSION  1. Closed fracture of right hip, initial encounter ([facility])    2. Fall, initial encounter             Electronically signed by: Binu Perera, 6/12/2019 12:15 PM

## 2019-06-13 ENCOUNTER — APPOINTMENT (OUTPATIENT)
Dept: RADIOLOGY | Facility: MEDICAL CENTER | Age: 79
DRG: 481 | End: 2019-06-13
Attending: ORTHOPAEDIC SURGERY
Payer: MEDICARE

## 2019-06-13 ENCOUNTER — ANESTHESIA (OUTPATIENT)
Dept: SURGERY | Facility: MEDICAL CENTER | Age: 79
DRG: 481 | End: 2019-06-13
Payer: MEDICARE

## 2019-06-13 ENCOUNTER — ANESTHESIA EVENT (OUTPATIENT)
Dept: SURGERY | Facility: MEDICAL CENTER | Age: 79
DRG: 481 | End: 2019-06-13
Payer: MEDICARE

## 2019-06-13 PROBLEM — M80.00XD AGE-RELATED OSTEOPOROSIS WITH CURRENT PATHOLOGICAL FRACTURE WITH ROUTINE HEALING: Status: ACTIVE | Noted: 2019-06-13

## 2019-06-13 LAB
ABO GROUP BLD: NORMAL
ALBUMIN SERPL BCP-MCNC: 2.9 G/DL (ref 3.2–4.9)
ALBUMIN/GLOB SERPL: 1.3 G/DL
ALP SERPL-CCNC: 61 U/L (ref 30–99)
ALT SERPL-CCNC: 51 U/L (ref 2–50)
ANION GAP SERPL CALC-SCNC: 6 MMOL/L (ref 0–11.9)
APTT PPP: 33.8 SEC (ref 24.7–36)
AST SERPL-CCNC: 40 U/L (ref 12–45)
BARCODED ABORH UBTYP: 6200
BARCODED ABORH UBTYP: 6200
BARCODED PRD CODE UBPRD: NORMAL
BARCODED PRD CODE UBPRD: NORMAL
BARCODED UNIT NUM UBUNT: NORMAL
BARCODED UNIT NUM UBUNT: NORMAL
BASOPHILS # BLD AUTO: 0.2 % (ref 0–1.8)
BASOPHILS # BLD: 0.02 K/UL (ref 0–0.12)
BILIRUB SERPL-MCNC: 0.5 MG/DL (ref 0.1–1.5)
BLD GP AB SCN SERPL QL: NORMAL
BUN SERPL-MCNC: 9 MG/DL (ref 8–22)
CALCIUM SERPL-MCNC: 8.1 MG/DL (ref 8.4–10.2)
CHLORIDE SERPL-SCNC: 103 MMOL/L (ref 96–112)
CO2 SERPL-SCNC: 30 MMOL/L (ref 20–33)
COMPONENT R 8504R: NORMAL
COMPONENT R 8504R: NORMAL
CREAT SERPL-MCNC: 0.5 MG/DL (ref 0.5–1.4)
EOSINOPHIL # BLD AUTO: 0.02 K/UL (ref 0–0.51)
EOSINOPHIL NFR BLD: 0.2 % (ref 0–6.9)
ERYTHROCYTE [DISTWIDTH] IN BLOOD BY AUTOMATED COUNT: 45.5 FL (ref 35.9–50)
ERYTHROCYTE [DISTWIDTH] IN BLOOD BY AUTOMATED COUNT: 45.9 FL (ref 35.9–50)
GLOBULIN SER CALC-MCNC: 2.3 G/DL (ref 1.9–3.5)
GLUCOSE SERPL-MCNC: 99 MG/DL (ref 65–99)
HCT VFR BLD AUTO: 37.6 % (ref 37–47)
HCT VFR BLD AUTO: 37.7 % (ref 37–47)
HGB BLD-MCNC: 12.5 G/DL (ref 12–16)
HGB BLD-MCNC: 12.5 G/DL (ref 12–16)
IMM GRANULOCYTES # BLD AUTO: 0.07 K/UL (ref 0–0.11)
IMM GRANULOCYTES NFR BLD AUTO: 0.7 % (ref 0–0.9)
INR PPP: 1.89 (ref 0.87–1.13)
LYMPHOCYTES # BLD AUTO: 0.34 K/UL (ref 1–4.8)
LYMPHOCYTES NFR BLD: 3.4 % (ref 22–41)
MCH RBC QN AUTO: 32.6 PG (ref 27–33)
MCH RBC QN AUTO: 32.6 PG (ref 27–33)
MCHC RBC AUTO-ENTMCNC: 33.2 G/DL (ref 33.6–35)
MCHC RBC AUTO-ENTMCNC: 33.2 G/DL (ref 33.6–35)
MCV RBC AUTO: 97.9 FL (ref 81.4–97.8)
MCV RBC AUTO: 98.2 FL (ref 81.4–97.8)
MONOCYTES # BLD AUTO: 0.29 K/UL (ref 0–0.85)
MONOCYTES NFR BLD AUTO: 2.9 % (ref 0–13.4)
NEUTROPHILS # BLD AUTO: 9.37 K/UL (ref 2–7.15)
NEUTROPHILS NFR BLD: 92.6 % (ref 44–72)
NRBC # BLD AUTO: 0 K/UL
NRBC BLD-RTO: 0 /100 WBC
PLATELET # BLD AUTO: 209 K/UL (ref 164–446)
PLATELET # BLD AUTO: 213 K/UL (ref 164–446)
PMV BLD AUTO: 8.8 FL (ref 9–12.9)
PMV BLD AUTO: 8.9 FL (ref 9–12.9)
POTASSIUM SERPL-SCNC: 4 MMOL/L (ref 3.6–5.5)
PRODUCT TYPE UPROD: NORMAL
PRODUCT TYPE UPROD: NORMAL
PROT SERPL-MCNC: 5.2 G/DL (ref 6–8.2)
PROTHROMBIN TIME: 22.1 SEC (ref 12–14.6)
RBC # BLD AUTO: 3.84 M/UL (ref 4.2–5.4)
RBC # BLD AUTO: 3.84 M/UL (ref 4.2–5.4)
RH BLD: NORMAL
SODIUM SERPL-SCNC: 139 MMOL/L (ref 135–145)
TSH SERPL DL<=0.005 MIU/L-ACNC: 0.74 UIU/ML (ref 0.38–5.33)
UNIT STATUS USTAT: NORMAL
UNIT STATUS USTAT: NORMAL
WBC # BLD AUTO: 10.1 K/UL (ref 4.8–10.8)
WBC # BLD AUTO: 7 K/UL (ref 4.8–10.8)

## 2019-06-13 PROCEDURE — 99233 SBSQ HOSP IP/OBS HIGH 50: CPT | Performed by: HOSPITALIST

## 2019-06-13 PROCEDURE — 700102 HCHG RX REV CODE 250 W/ 637 OVERRIDE(OP): Performed by: ANESTHESIOLOGY

## 2019-06-13 PROCEDURE — 700111 HCHG RX REV CODE 636 W/ 250 OVERRIDE (IP): Performed by: ANESTHESIOLOGY

## 2019-06-13 PROCEDURE — 160036 HCHG PACU - EA ADDL 30 MINS PHASE I: Performed by: ORTHOPAEDIC SURGERY

## 2019-06-13 PROCEDURE — 80053 COMPREHEN METABOLIC PANEL: CPT

## 2019-06-13 PROCEDURE — 36415 COLL VENOUS BLD VENIPUNCTURE: CPT

## 2019-06-13 PROCEDURE — 700111 HCHG RX REV CODE 636 W/ 250 OVERRIDE (IP): Performed by: HOSPITALIST

## 2019-06-13 PROCEDURE — 85730 THROMBOPLASTIN TIME PARTIAL: CPT

## 2019-06-13 PROCEDURE — 86850 RBC ANTIBODY SCREEN: CPT

## 2019-06-13 PROCEDURE — 770006 HCHG ROOM/CARE - MED/SURG/GYN SEMI*

## 2019-06-13 PROCEDURE — A9270 NON-COVERED ITEM OR SERVICE: HCPCS | Performed by: HOSPITALIST

## 2019-06-13 PROCEDURE — 700105 HCHG RX REV CODE 258: Performed by: HOSPITALIST

## 2019-06-13 PROCEDURE — 700102 HCHG RX REV CODE 250 W/ 637 OVERRIDE(OP): Performed by: HOSPITALIST

## 2019-06-13 PROCEDURE — 160002 HCHG RECOVERY MINUTES (STAT): Performed by: ORTHOPAEDIC SURGERY

## 2019-06-13 PROCEDURE — 85025 COMPLETE CBC W/AUTO DIFF WBC: CPT

## 2019-06-13 PROCEDURE — 700105 HCHG RX REV CODE 258: Performed by: ORTHOPAEDIC SURGERY

## 2019-06-13 PROCEDURE — 85027 COMPLETE CBC AUTOMATED: CPT

## 2019-06-13 PROCEDURE — 160048 HCHG OR STATISTICAL LEVEL 1-5: Performed by: ORTHOPAEDIC SURGERY

## 2019-06-13 PROCEDURE — A9270 NON-COVERED ITEM OR SERVICE: HCPCS | Performed by: ANESTHESIOLOGY

## 2019-06-13 PROCEDURE — 0QS636Z REPOSITION RIGHT UPPER FEMUR WITH INTRAMEDULLARY INTERNAL FIXATION DEVICE, PERCUTANEOUS APPROACH: ICD-10-PCS | Performed by: ORTHOPAEDIC SURGERY

## 2019-06-13 PROCEDURE — 501838 HCHG SUTURE GENERAL: Performed by: ORTHOPAEDIC SURGERY

## 2019-06-13 PROCEDURE — 160029 HCHG SURGERY MINUTES - 1ST 30 MINS LEVEL 4: Performed by: ORTHOPAEDIC SURGERY

## 2019-06-13 PROCEDURE — 160041 HCHG SURGERY MINUTES - EA ADDL 1 MIN LEVEL 4: Performed by: ORTHOPAEDIC SURGERY

## 2019-06-13 PROCEDURE — 160035 HCHG PACU - 1ST 60 MINS PHASE I: Performed by: ORTHOPAEDIC SURGERY

## 2019-06-13 PROCEDURE — 500891 HCHG PACK, ORTHO MAJOR: Performed by: ORTHOPAEDIC SURGERY

## 2019-06-13 PROCEDURE — 73552 X-RAY EXAM OF FEMUR 2/>: CPT | Mod: RT

## 2019-06-13 PROCEDURE — 160009 HCHG ANES TIME/MIN: Performed by: ORTHOPAEDIC SURGERY

## 2019-06-13 PROCEDURE — 86923 COMPATIBILITY TEST ELECTRIC: CPT

## 2019-06-13 PROCEDURE — C1769 GUIDE WIRE: HCPCS | Performed by: ORTHOPAEDIC SURGERY

## 2019-06-13 PROCEDURE — 110371 HCHG SHELL REV 272: Performed by: ORTHOPAEDIC SURGERY

## 2019-06-13 PROCEDURE — 700111 HCHG RX REV CODE 636 W/ 250 OVERRIDE (IP)

## 2019-06-13 PROCEDURE — 700101 HCHG RX REV CODE 250: Performed by: ANESTHESIOLOGY

## 2019-06-13 PROCEDURE — 86900 BLOOD TYPING SEROLOGIC ABO: CPT

## 2019-06-13 PROCEDURE — 94760 N-INVAS EAR/PLS OXIMETRY 1: CPT

## 2019-06-13 PROCEDURE — C1713 ANCHOR/SCREW BN/BN,TIS/BN: HCPCS | Performed by: ORTHOPAEDIC SURGERY

## 2019-06-13 PROCEDURE — 86901 BLOOD TYPING SEROLOGIC RH(D): CPT

## 2019-06-13 PROCEDURE — 85610 PROTHROMBIN TIME: CPT

## 2019-06-13 PROCEDURE — 84443 ASSAY THYROID STIM HORMONE: CPT

## 2019-06-13 DEVICE — SCREW TRIGEN LOW PROFILE 5.0MM X 40MM: Type: IMPLANTABLE DEVICE | Site: HIP | Status: FUNCTIONAL

## 2019-06-13 DEVICE — SCREW TRIGEN LOW PROFILE 5.0MM X 45MM: Type: IMPLANTABLE DEVICE | Site: HIP | Status: FUNCTIONAL

## 2019-06-13 DEVICE — IMPLANTABLE DEVICE: Type: IMPLANTABLE DEVICE | Site: HIP | Status: FUNCTIONAL

## 2019-06-13 DEVICE — SCREW KIT INTERTAN LAG105/100 - COMPRESSION: Type: IMPLANTABLE DEVICE | Site: HIP | Status: FUNCTIONAL

## 2019-06-13 RX ORDER — PRIMIDONE 250 MG/1
500 TABLET ORAL
Status: DISCONTINUED | OUTPATIENT
Start: 2019-06-13 | End: 2019-06-19 | Stop reason: HOSPADM

## 2019-06-13 RX ORDER — FUROSEMIDE 40 MG/1
40 TABLET ORAL
Status: DISCONTINUED | OUTPATIENT
Start: 2019-06-14 | End: 2019-06-19 | Stop reason: HOSPADM

## 2019-06-13 RX ORDER — MEPERIDINE HYDROCHLORIDE 25 MG/ML
12.5 INJECTION INTRAMUSCULAR; INTRAVENOUS; SUBCUTANEOUS
Status: DISCONTINUED | OUTPATIENT
Start: 2019-06-13 | End: 2019-06-13 | Stop reason: HOSPADM

## 2019-06-13 RX ORDER — DEXAMETHASONE SODIUM PHOSPHATE 4 MG/ML
INJECTION, SOLUTION INTRA-ARTICULAR; INTRALESIONAL; INTRAMUSCULAR; INTRAVENOUS; SOFT TISSUE PRN
Status: DISCONTINUED | OUTPATIENT
Start: 2019-06-13 | End: 2019-06-13 | Stop reason: SURG

## 2019-06-13 RX ORDER — ONDANSETRON 2 MG/ML
4 INJECTION INTRAMUSCULAR; INTRAVENOUS
Status: DISCONTINUED | OUTPATIENT
Start: 2019-06-13 | End: 2019-06-13 | Stop reason: HOSPADM

## 2019-06-13 RX ORDER — POTASSIUM CHLORIDE 20 MEQ/1
20 TABLET, EXTENDED RELEASE ORAL DAILY
Status: DISCONTINUED | OUTPATIENT
Start: 2019-06-14 | End: 2019-06-19 | Stop reason: HOSPADM

## 2019-06-13 RX ORDER — HYDROMORPHONE HYDROCHLORIDE 1 MG/ML
0.1 INJECTION, SOLUTION INTRAMUSCULAR; INTRAVENOUS; SUBCUTANEOUS
Status: DISCONTINUED | OUTPATIENT
Start: 2019-06-13 | End: 2019-06-13 | Stop reason: HOSPADM

## 2019-06-13 RX ORDER — CEFAZOLIN SODIUM 1 G/3ML
INJECTION, POWDER, FOR SOLUTION INTRAMUSCULAR; INTRAVENOUS PRN
Status: DISCONTINUED | OUTPATIENT
Start: 2019-06-13 | End: 2019-06-13 | Stop reason: SURG

## 2019-06-13 RX ORDER — HYDROMORPHONE HYDROCHLORIDE 1 MG/ML
0.4 INJECTION, SOLUTION INTRAMUSCULAR; INTRAVENOUS; SUBCUTANEOUS
Status: DISCONTINUED | OUTPATIENT
Start: 2019-06-13 | End: 2019-06-13 | Stop reason: HOSPADM

## 2019-06-13 RX ORDER — CEFAZOLIN SODIUM IN 0.9 % NACL 2 G/100 ML
PLASTIC BAG, INJECTION (ML) INTRAVENOUS
Status: COMPLETED
Start: 2019-06-13 | End: 2019-06-13

## 2019-06-13 RX ORDER — HYDROMORPHONE HYDROCHLORIDE 1 MG/ML
0.2 INJECTION, SOLUTION INTRAMUSCULAR; INTRAVENOUS; SUBCUTANEOUS
Status: DISCONTINUED | OUTPATIENT
Start: 2019-06-13 | End: 2019-06-13 | Stop reason: HOSPADM

## 2019-06-13 RX ORDER — HYDRALAZINE HYDROCHLORIDE 20 MG/ML
5 INJECTION INTRAMUSCULAR; INTRAVENOUS
Status: DISCONTINUED | OUTPATIENT
Start: 2019-06-13 | End: 2019-06-13 | Stop reason: HOSPADM

## 2019-06-13 RX ORDER — DIPHENHYDRAMINE HYDROCHLORIDE 50 MG/ML
25 INJECTION INTRAMUSCULAR; INTRAVENOUS EVERY 6 HOURS PRN
Status: DISCONTINUED | OUTPATIENT
Start: 2019-06-13 | End: 2019-06-19 | Stop reason: HOSPADM

## 2019-06-13 RX ORDER — LEVOTHYROXINE SODIUM 0.1 MG/1
100 TABLET ORAL
Status: DISCONTINUED | OUTPATIENT
Start: 2019-06-14 | End: 2019-06-19 | Stop reason: HOSPADM

## 2019-06-13 RX ORDER — SODIUM CHLORIDE 9 MG/ML
INJECTION, SOLUTION INTRAVENOUS CONTINUOUS
Status: DISCONTINUED | OUTPATIENT
Start: 2019-06-13 | End: 2019-06-14

## 2019-06-13 RX ORDER — ONDANSETRON 2 MG/ML
INJECTION INTRAMUSCULAR; INTRAVENOUS PRN
Status: DISCONTINUED | OUTPATIENT
Start: 2019-06-13 | End: 2019-06-13 | Stop reason: SURG

## 2019-06-13 RX ORDER — METHYLPREDNISOLONE SODIUM SUCCINATE 125 MG/2ML
125 INJECTION, POWDER, LYOPHILIZED, FOR SOLUTION INTRAMUSCULAR; INTRAVENOUS ONCE
Status: COMPLETED | OUTPATIENT
Start: 2019-06-13 | End: 2019-06-13

## 2019-06-13 RX ORDER — DIPHENHYDRAMINE HYDROCHLORIDE 50 MG/ML
12.5 INJECTION INTRAMUSCULAR; INTRAVENOUS
Status: DISCONTINUED | OUTPATIENT
Start: 2019-06-13 | End: 2019-06-13 | Stop reason: HOSPADM

## 2019-06-13 RX ORDER — PHENYLEPHRINE HYDROCHLORIDE 10 MG/ML
INJECTION, SOLUTION INTRAMUSCULAR; INTRAVENOUS; SUBCUTANEOUS PRN
Status: DISCONTINUED | OUTPATIENT
Start: 2019-06-13 | End: 2019-06-13 | Stop reason: SURG

## 2019-06-13 RX ORDER — SODIUM CHLORIDE 9 MG/ML
1000 INJECTION, SOLUTION INTRAVENOUS
Status: COMPLETED | OUTPATIENT
Start: 2019-06-13 | End: 2019-06-13

## 2019-06-13 RX ORDER — PRIMIDONE 250 MG/1
250 TABLET ORAL DAILY
Status: DISCONTINUED | OUTPATIENT
Start: 2019-06-14 | End: 2019-06-19 | Stop reason: HOSPADM

## 2019-06-13 RX ADMIN — SODIUM CHLORIDE: 9 INJECTION, SOLUTION INTRAVENOUS at 16:27

## 2019-06-13 RX ADMIN — CEFAZOLIN 2 G: 1 INJECTION, POWDER, FOR SOLUTION INTRAVENOUS at 16:30

## 2019-06-13 RX ADMIN — SODIUM CHLORIDE: 9 INJECTION, SOLUTION INTRAVENOUS at 18:00

## 2019-06-13 RX ADMIN — SODIUM CHLORIDE: 9 INJECTION, SOLUTION INTRAVENOUS at 18:40

## 2019-06-13 RX ADMIN — HYDROMORPHONE HYDROCHLORIDE 0.2 MG: 1 INJECTION, SOLUTION INTRAMUSCULAR; INTRAVENOUS; SUBCUTANEOUS at 19:37

## 2019-06-13 RX ADMIN — HYDROMORPHONE HYDROCHLORIDE 0.2 MG: 1 INJECTION, SOLUTION INTRAMUSCULAR; INTRAVENOUS; SUBCUTANEOUS at 19:50

## 2019-06-13 RX ADMIN — PRIMIDONE 250 MG: 250 TABLET ORAL at 08:57

## 2019-06-13 RX ADMIN — EPHEDRINE SULFATE 10 MG: 50 INJECTION INTRAMUSCULAR; INTRAVENOUS; SUBCUTANEOUS at 17:12

## 2019-06-13 RX ADMIN — LIDOCAINE HYDROCHLORIDE 100 MG: 20 INJECTION, SOLUTION INFILTRATION; PERINEURAL at 16:30

## 2019-06-13 RX ADMIN — PROPOFOL 150 MG: 10 INJECTION, EMULSION INTRAVENOUS at 16:30

## 2019-06-13 RX ADMIN — EPHEDRINE SULFATE 10 MG: 50 INJECTION INTRAMUSCULAR; INTRAVENOUS; SUBCUTANEOUS at 17:57

## 2019-06-13 RX ADMIN — PHENYLEPHRINE HYDROCHLORIDE 100 MCG: 10 INJECTION INTRAVENOUS at 18:37

## 2019-06-13 RX ADMIN — DEXAMETHASONE SODIUM PHOSPHATE 6 MG: 4 INJECTION, SOLUTION INTRAMUSCULAR; INTRAVENOUS at 16:41

## 2019-06-13 RX ADMIN — METHYLPREDNISOLONE SODIUM SUCCINATE 125 MG: 125 INJECTION, POWDER, FOR SOLUTION INTRAMUSCULAR; INTRAVENOUS at 15:05

## 2019-06-13 RX ADMIN — PHENYLEPHRINE HYDROCHLORIDE 100 MCG: 10 INJECTION INTRAVENOUS at 18:04

## 2019-06-13 RX ADMIN — ONDANSETRON 4 MG: 2 INJECTION INTRAMUSCULAR; INTRAVENOUS at 16:41

## 2019-06-13 RX ADMIN — EPHEDRINE SULFATE 10 MG: 50 INJECTION INTRAMUSCULAR; INTRAVENOUS; SUBCUTANEOUS at 17:09

## 2019-06-13 RX ADMIN — FENTANYL CITRATE 25 MCG: 50 INJECTION INTRAMUSCULAR; INTRAVENOUS at 19:17

## 2019-06-13 RX ADMIN — OXYCODONE HYDROCHLORIDE 5 MG: 5 TABLET ORAL at 08:54

## 2019-06-13 RX ADMIN — HYDROMORPHONE HYDROCHLORIDE 0.2 MG: 1 INJECTION, SOLUTION INTRAMUSCULAR; INTRAVENOUS; SUBCUTANEOUS at 20:08

## 2019-06-13 RX ADMIN — POTASSIUM CHLORIDE 20 MEQ: 1500 TABLET, EXTENDED RELEASE ORAL at 13:23

## 2019-06-13 RX ADMIN — FENTANYL CITRATE 50 MCG: 50 INJECTION, SOLUTION INTRAMUSCULAR; INTRAVENOUS at 16:30

## 2019-06-13 RX ADMIN — PHENYLEPHRINE HYDROCHLORIDE 100 MCG: 10 INJECTION INTRAVENOUS at 18:35

## 2019-06-13 RX ADMIN — FENTANYL CITRATE 25 MCG: 50 INJECTION INTRAMUSCULAR; INTRAVENOUS at 19:12

## 2019-06-13 RX ADMIN — MORPHINE SULFATE 2 MG: 4 INJECTION INTRAVENOUS at 12:13

## 2019-06-13 RX ADMIN — PHENYLEPHRINE HYDROCHLORIDE 50 MCG: 10 INJECTION INTRAVENOUS at 17:36

## 2019-06-13 RX ADMIN — EPHEDRINE SULFATE 10 MG: 50 INJECTION INTRAMUSCULAR; INTRAVENOUS; SUBCUTANEOUS at 16:41

## 2019-06-13 RX ADMIN — CALCIUM CARBONATE (ANTACID) CHEW TAB 500 MG 1000 MG: 500 CHEW TAB at 08:58

## 2019-06-13 RX ADMIN — FUROSEMIDE 40 MG: 40 TABLET ORAL at 13:23

## 2019-06-13 RX ADMIN — VITAMIN D, TAB 1000IU (100/BT) 1000 UNITS: 25 TAB at 09:02

## 2019-06-13 RX ADMIN — SODIUM CHLORIDE 1000 ML: 9 INJECTION, SOLUTION INTRAVENOUS at 16:24

## 2019-06-13 RX ADMIN — ONDANSETRON 4 MG: 4 TABLET, ORALLY DISINTEGRATING ORAL at 22:24

## 2019-06-13 RX ADMIN — PHENYLEPHRINE HYDROCHLORIDE 100 MCG: 10 INJECTION INTRAVENOUS at 18:19

## 2019-06-13 RX ADMIN — CEFAZOLIN 2000 MG: 1 INJECTION, POWDER, FOR SOLUTION INTRAVENOUS at 23:15

## 2019-06-13 RX ADMIN — LEVOTHYROXINE SODIUM 100 MCG: 50 TABLET ORAL at 13:23

## 2019-06-13 RX ADMIN — SODIUM CHLORIDE: 9 INJECTION, SOLUTION INTRAVENOUS at 21:31

## 2019-06-13 RX ADMIN — PHENYLEPHRINE HYDROCHLORIDE 100 MCG: 10 INJECTION INTRAVENOUS at 18:09

## 2019-06-13 RX ADMIN — SENNOSIDES,DOCUSATE SODIUM 2 TABLET: 8.6; 5 TABLET, FILM COATED ORAL at 09:07

## 2019-06-13 RX ADMIN — OXYCODONE HYDROCHLORIDE 5 MG: 5 TABLET ORAL at 01:54

## 2019-06-13 RX ADMIN — EPHEDRINE SULFATE 10 MG: 50 INJECTION INTRAMUSCULAR; INTRAVENOUS; SUBCUTANEOUS at 17:50

## 2019-06-13 RX ADMIN — FERROUS SULFATE TAB 325 MG (65 MG ELEMENTAL FE) 325 MG: 325 (65 FE) TAB at 08:58

## 2019-06-13 RX ADMIN — PHENYLEPHRINE HYDROCHLORIDE 100 MCG: 10 INJECTION INTRAVENOUS at 18:00

## 2019-06-13 RX ADMIN — MULTIPLE VITAMINS W/ MINERALS TAB 1 TABLET: TAB at 09:03

## 2019-06-13 RX ADMIN — FENTANYL CITRATE 50 MCG: 50 INJECTION, SOLUTION INTRAMUSCULAR; INTRAVENOUS at 19:26

## 2019-06-13 RX ADMIN — PRIMIDONE 500 MG: 250 TABLET ORAL at 21:13

## 2019-06-13 RX ADMIN — OMEPRAZOLE 20 MG: 20 CAPSULE, DELAYED RELEASE ORAL at 21:24

## 2019-06-13 RX ADMIN — HYDROCODONE BITARTRATE AND ACETAMINOPHEN 15 ML: 7.5; 325 SOLUTION ORAL at 20:10

## 2019-06-13 ASSESSMENT — ENCOUNTER SYMPTOMS
ABDOMINAL PAIN: 0
DEPRESSION: 0
GASTROINTESTINAL NEGATIVE: 1
NAUSEA: 0
FLANK PAIN: 0
NEUROLOGICAL NEGATIVE: 1
SHORTNESS OF BREATH: 0
CHILLS: 0
CONSTITUTIONAL NEGATIVE: 1
MYALGIAS: 0
DIZZINESS: 0
COUGH: 0
FEVER: 0
LOSS OF CONSCIOUSNESS: 0
NERVOUS/ANXIOUS: 0
WEAKNESS: 0
BACK PAIN: 0
CARDIOVASCULAR NEGATIVE: 1
BRUISES/BLEEDS EASILY: 0
RESPIRATORY NEGATIVE: 1
WEIGHT LOSS: 0
VOMITING: 0
PSYCHIATRIC NEGATIVE: 1

## 2019-06-13 ASSESSMENT — PAIN SCALES - GENERAL: PAIN_LEVEL: 8

## 2019-06-13 NOTE — CONSULTS
6/12/2019    Reason for consultation: Right intertrochanteric femur fracture    [place] at the request of Dr. Perera for a right intertrochanteric femur fracture.  The patient is a 78 y.o. female who presents with a right hip fracture due to ground level fall.  The patient noted immediate pain and inability to move the affected extremity due to pain.  They were evaluated in the ER, and Orthopedics was consulted. Patient denies numbness, paresthesias, loss of consciousness or other symptoms.  She has a prior history of a left intertrochanteric femur fracture treated by Dr. Nguyen several years ago, which was complicated by coincident finding of a pulmonary embolism, and subsequent irrigation and debridement.    Past Medical History:   Diagnosis Date   • Arthritis     Knees.   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   • Ringing in ears    • Seizure (HCC) 1982   • Sore muscles        Past Surgical History:   Procedure Laterality Date   • IRRIGATION & DEBRIDEMENT HIP Left 11/17/2016    Procedure: IRRIGATION & DEBRIDEMENT HIP;  Surgeon: Maximo Garnica M.D.;  Location: [facility];  Service:    • HIP NAILING INTRAMEDULLARY Left 10/6/2016    Procedure: HIP NAILING INTRAMEDULLARY;  Surgeon: Walt Nguyen M.D.;  Location: [facility];  Service:    • GASTROSCOPY-ENDO N/A 4/29/2016    Procedure: GASTROSCOPY-ENDO;  Surgeon: Mikey Stanton M.D.;  Location: [facility];  Service:    • COLONOSCOPY-FLEXIBLE N/A 4/29/2016    Procedure: COLONOSCOPY-FLEXIBLE;  Surgeon: Mikey Stanton M.D.;  Location: [facility];   Service:    • CATARACT PHACO WITH IOL  8/5/2014    Performed by Lorenzo Bello M.D. at SUR[facility]ERY [facility]S   • CATARACT PHACO WITH IOL  7/15/2014    Performed by Lorenzo Bello M.D. at [facility]   • KNEE ARTHROPLASTY TOTAL  7/9/2012    Performed by DENIZ FREDERICK at SURGERY [facility] ORS   • JOSE BY LAPAROSCOPY  9/30/2011    Performed by JOHNATHAN CRISOSTOMO at SURGERY [facility] ORS   • ARTHROSCOPY, KNEE     • CARPAL TUNNEL RELEASE     • PB REMV 2ND CATARACT,CORN-SCLER SECTN     • TONSILLECTOMY     • TUBAL LIGATION         Medications  No current facility-administered medications on file prior to encounter.      Current Outpatient Prescriptions on File Prior to Encounter   Medication Sig Dispense Refill   • potassium chloride SA (KDUR) 20 MEQ Tab CR TAKE 1 TABLET BY MOUTH EVERY  Tab 3   • ferrous sulfate 325 (65 Fe) MG EC tablet Take 1 Tab by mouth every day. 90 Tab 3   • Melatonin 10 MG Tab Take 10 mg by mouth every bedtime.     • carvedilol (COREG) 6.25 MG Tab TAKE 1 TABLET BY MOUTH TWICE DAILY 180 Tab 3   • furosemide (LASIX) 40 MG Tab TAKE 1 TABLET BY MOUTH EVERY DAY 90 Tab 3   • levothyroxine (SYNTHROID) 100 MCG Tab TAKE 1 TABLET BY MOUTH EVERY DAY 90 Tab 3   • alendronate (FOSAMAX) 70 MG Tab TAKE 1 TABLET BY MOUTH EVERY 7 DAYS 12 Tab 3   • CALCIUM PO Take 1,000 mg by mouth every day.     • Probiotic Product (PROBIOTIC DAILY PO) Take 1 Cap by mouth every day.     • Glucos-Chond-Hyal Ac-Ca Fructo (MOVE FREE JOINT HEALTH ADVANCE) Tab Take 1 Tab by mouth every day.     • Multiple Vitamins-Minerals (WOMENS MULTI VITAMIN & MINERAL) Tab Take 1 Tab by mouth every day.     • Cholecalciferol (VITAMIN D-3) 1000 UNITS Cap Take 1,000 Units by mouth every day.         Allergies  Tape    ROS  Per HPI. All other systems were reviewed and found to be negative    Family History   Problem Relation Age of Onset   • Cancer Mother 67        Ovarian   • Alcohol/Drug Father 60        Appenditis  "  • Heart Disease Father    • Cancer Maternal Grandmother         colon cancer   • Heart Disease Maternal Grandfather    • Cancer Daughter 44        ron       Social History     Social History   • Marital status:      Spouse name: N/A   • Number of children: N/A   • Years of education: N/A     Social History Main Topics   • Smoking status: Never Smoker   • Smokeless tobacco: Never Used   • Alcohol use No   • Drug use: No   • Sexual activity: No     Other Topics Concern   • Not on file     Social History Narrative   • No narrative on file       Physical Exam  Vitals  BP (!) 169/67   Pulse 68   Temp 36.7 °C (98 °F) (Oral)   Resp 18   Ht 1.575 m (5' 2\")   Wt 99.8 kg (220 lb)   SpO2 92%   General: Well Developed, Well Nourished, no acute distress  Psychiatric: Alert and oriented x3, appropriate responses to questions, pleasant mood and affect.  HEENT: Normocephalic, atraumatic  Eyes: Anicteric, PERRLA, EOMI  Neck: Supple, nontender, no masses  Chest: Symmetric expansion of the chest wall, non-tender to palpation, no distress.  Heart: RRR, palpable peripheral pulses  Abdomen: Soft, NT, ND  Skin: Intact, no open wounds  Extremities: Pain with any attempt at movement of right hip  Neuro: Intact light touch sensation in right foot, intact motors TA/GS/EHL/P on right  Vascular: 2+ DP on right, Capillary refill <2 seconds    Radiographs:  DX-FEMUR-2+ RIGHT   Final Result      1.  Right proximal femoral comminuted and angulated immature trochanteric fracture.      2.  Severe degenerative change of the right knee and mild degenerative change of the right hip.      DX-PELVIS-1 OR 2 VIEWS   Final Result      1.  Comminuted and angulated right proximal femoral intertrochanteric fracture.      2.  Old posttraumatic and surgical change of the left proximal femur.      DX-CHEST-LIMITED (1 VIEW)   Final Result      Cardiomegaly with mild interstitial edema.          Laboratory Values  Recent Labs      06/12/19   1147 "   WBC  9.1   RBC  4.15*   HEMOGLOBIN  13.2   HEMATOCRIT  40.1   MCV  96.6   MCH  31.8   MCHC  32.9*   RDW  45.1   PLATELETCT  239   MPV  8.6*     Recent Labs      06/12/19   1147   SODIUM  136   POTASSIUM  3.9   CHLORIDE  99   CO2  29   GLUCOSE  131*   BUN  18     Recent Labs      06/12/19   1147  06/12/19   1801   APTT  34.9   --    INR  2.23*  1.89*         Impression:    #1 Right intertrochanteric femur fracture    Plan:    I recommended operative treatment of her fracture. Risks and benefits of surgery were discussed which include, but are not limited to bleeding, infection, neurovascular damage, malunion, nonunion, persistent hip pain, symptomatic hardware, DVT, PE, MI, Stroke and death.  Benefits of surgery discussed included improved chance of union in acceptable alignment and reduction in the medical risks of hip fractures.  We also discussed therapeutic alternatives to surgery, including non-operative management, which I did not recommend.  They understand these risks and benefits and wish to proceed.      Unfortunately, the patient had a possible reaction during FFP administration, and while her INR decreased, given her reaction and the possible risk of further FFP administration, anesthesia has requested that we proceed tomorrow when her INR is lower from vitamin K administration.    Please keep NPO after midnight pending surgery tomorrow.  Non-weightbearing affected extremity pending surgery.    Please see operative note for detailed post-operative plan, including post-op weightbearing status.

## 2019-06-13 NOTE — ASSESSMENT & PLAN NOTE
None reported on this admission  Had Developed blistering rash on both arms  Pretreat for second unit of FFP with Benadryl and a dose of steroids.

## 2019-06-13 NOTE — PROGRESS NOTES
Pt stable throughout transfusion of FFP. Post transfusion of FFP, pt complaining of itchiness on L forearm and back. Pt noted to be red with a blister on L forearm superior to IV site. Vitals stable, NS run. Dr. Olmos paged twice-still awating to hear back. Preop was notified of the event. Pharmacy was contacted- non s/s of transfusion reaction to FFP he is aware of but mentioned to have MD take a look. Will try to contact MD again.

## 2019-06-13 NOTE — PROGRESS NOTES
josé antonio,. IV prednisone and benadryl ordered and given. Stat PTT lab ordered. Rash and itching slowly residing per pt. Skin appears less irritated. Blister on L FA and back still present. Contacted pre op, surgery cancelled for time being. Pain med given to pt.

## 2019-06-13 NOTE — PROGRESS NOTES
Received report from Day RN and assumed care of patient.  Patient is alert and oriented.  IV in place and infusing.  Patient's previous bladder scan revealed 1000ml fluid.  Order for oleary received by previous nurse.  Oleary inserted and clear yellow urine draining.  1200ml received shortly after insertion.  Patient reports her pain controlled at this time.  Plan of care reviewed and questions answered.  Physician by to visit with patient and surgery will be scheduled for tomorrow when her INR returns to normal.  Q2h turn implemented.

## 2019-06-13 NOTE — PROGRESS NOTES
[facility] Medicine Daily Progress Note    Date of Service  6/13/2019    Chief Complaint  78 y.o. female admitted 6/12/2019 with ground-level fall right hip pain    Hospital Course    History of Presenting Illness per Dr. Olmos's H&P  78 y.o. female who presented 6/12/2019 with past medical history of PE on lifelong warfarin therapy, history of seizure disorder, history of left hip fracture, history of osteoporosis, history of hypothyroidism, history of sleep apnea comes into the emergency room after a ground-level fall and left hip pain.  Patient states that she had a mechanical fall in which she tripped over her sandals in the bathroom at her PCPs office.  She states that she never lost consciousness and did not have any presyncope symptoms.  She did hit her head against a door.  She complains but no focal deficits including muscle weakness or sensory loss.  The patient had a DEXA scan in 2017 which found osteopenia.  The patient also takes chronic Lasix therapy for pulmonary hypertension.  She denies any changes or adjustments in her medications.  The patient arrived to the emergency room with normal vital signs.  She was in severe pain and was given IV morphine.  The patient had an x-ray of the chest found increased vascular congestion  Head x-ray of the pelvis and femur that found right femoral fracture       Interval Problem Update  Reaction to FFP happened overnight has resolved she still has an INR 1.89, is willing to have FFP with steroids and Benadryl.    Consultants/Specialty  Orthopedics Maximo Garnica    Code Status  Full    Disposition  To be determined    Review of Systems  Review of Systems   Constitutional: Negative.  Negative for chills, fever, malaise/fatigue and weight loss.   HENT: Negative.    Respiratory: Negative.  Negative for cough and shortness of breath.    Cardiovascular: Negative.  Negative for chest pain and leg swelling.   Gastrointestinal: Negative.  Negative for abdominal pain, nausea  and vomiting.   Genitourinary: Negative.  Negative for dysuria and flank pain.   Musculoskeletal: Positive for joint pain. Negative for back pain and myalgias.   Neurological: Negative.  Negative for dizziness, loss of consciousness and weakness.   Endo/Heme/Allergies: Negative.  Does not bruise/bleed easily.   Psychiatric/Behavioral: Negative.  Negative for depression. The patient is not nervous/anxious.    All other systems reviewed and are negative.       Physical Exam  Temp:  [36.3 °C (97.3 °F)-37.3 °C (99.2 °F)] 37 °C (98.6 °F)  Pulse:  [65-92] 80  Resp:  [17-18] 18  BP: (130-169)/(43-86) 143/51  SpO2:  [90 %-100 %] 100 %    Physical Exam   Constitutional: She is oriented to person, place, and time. She appears well-developed and well-nourished. No distress.   Plan of care discussed with bedside RN.   HENT:   Nose: Nose normal.   Mouth/Throat: Oropharynx is clear and moist. No oropharyngeal exudate.   Eyes: Conjunctivae are normal. Right eye exhibits no discharge. Left eye exhibits no discharge. No scleral icterus.   Neck: No JVD present. No tracheal deviation present.   Cardiovascular: Normal rate, regular rhythm and normal heart sounds.    Pulmonary/Chest: Effort normal and breath sounds normal. No stridor. No respiratory distress. She has no wheezes. She has no rales. She exhibits no tenderness.   Abdominal: Soft. Bowel sounds are normal. She exhibits no distension. There is no tenderness.   Musculoskeletal: She exhibits tenderness and deformity (Right hip). She exhibits no edema.   Neurological: She is alert and oriented to person, place, and time. No cranial nerve deficit. She exhibits normal muscle tone.   Skin: Skin is warm and dry. She is not diaphoretic. No pallor.   Psychiatric: She has a normal mood and affect. Her behavior is normal. Judgment and thought content normal.   Nursing note and vitals reviewed.      Fluids    Intake/Output Summary (Last 24 hours) at 06/13/19 8689  Last data filed at  06/13/19 1300   Gross per 24 hour   Intake              370 ml   Output             3800 ml   Net            -3430 ml       Laboratory  Recent Labs      06/12/19   1147  06/13/19   0503   WBC  9.1  7.0   RBC  4.15*  3.84*   HEMOGLOBIN  13.2  12.5   HEMATOCRIT  40.1  37.6   MCV  96.6  97.9*   MCH  31.8  32.6   MCHC  32.9*  33.2*   RDW  45.1  45.5   PLATELETCT  239  209   MPV  8.6*  8.8*     Recent Labs      06/12/19   1147  06/13/19   0503   SODIUM  136  139   POTASSIUM  3.9  4.0   CHLORIDE  99  103   CO2  29  30   GLUCOSE  131*  99   BUN  18  9   CREATININE  0.57  0.50   CALCIUM  8.5  8.1*     Recent Labs      06/12/19   1147  06/12/19   1801  06/13/19   0503   APTT  34.9   --   33.8   INR  2.23*  1.89*  1.89*               Imaging  DX-FEMUR-2+ RIGHT   Final Result      1.  Right proximal femoral comminuted and angulated immature trochanteric fracture.      2.  Severe degenerative change of the right knee and mild degenerative change of the right hip.      DX-PELVIS-1 OR 2 VIEWS   Final Result      1.  Comminuted and angulated right proximal femoral intertrochanteric fracture.      2.  Old posttraumatic and surgical change of the left proximal femur.      DX-CHEST-LIMITED (1 VIEW)   Final Result      Cardiomegaly with mild interstitial edema.           Assessment/Plan  * Closed right hip fracture, initial encounter (HCC)   Assessment & Plan    Going to operating room today for repair, reversing Coumadin.  Pain management, IV Dilaudid oral oxycodone as needed peer     Age-related osteoporosis with current pathological fracture with routine healing   Assessment & Plan    Cont bisphosphates, calcium and vitamin D     Hives as manifestation of blood transfusion reaction   Assessment & Plan    Developed blistering rash on both arms  Pretreat for second unit of FFP with Benadryl and a dose of steroids.     Fall   Assessment & Plan    Ground-level fall  Right-sided hip fracture no syncope     Gastroesophageal reflux  disease without esophagitis- (present on admission)   Assessment & Plan    Continue omeprazole     Chronic respiratory failure with hypoxia (HCC)- (present on admission)   Assessment & Plan    On nighttime oxygen at home can continue     Obstructive sleep apnea syndrome- (present on admission)   Assessment & Plan    Continue CPAP at night     Secondary pulmonary arterial hypertension (HCC)- (present on admission)   Assessment & Plan    Appears secondary to long-standing sleep apnea  Watch for post surgery volume overload continue Lasix continue carvedilol     Chronic anticoagulation- (present on admission)   Assessment & Plan    On warfarin therapy for recurrent pulmonary embolism  Given vitamin K overnight, did receive 1 unit of FFP but developed hives.  INR down to 1.89, repeat FFP with Benadryl, patient is agreeable  Order 1 unit of blood on hold for operating room, type and cross.     Diastolic dysfunction- (present on admission)   Assessment & Plan    Found on cardiac echo in 2017  Careful with fluids, watch for post surgery volume overload.     Iron deficiency anemia- (present on admission)   Assessment & Plan    Continue oral iron  Last EGD was 2016     Acquired hypothyroidism- (present on admission)   Assessment & Plan    Continue home dose of levothyroxine at 100 mcg per day.       Seizure disorder (HCC)- (present on admission)   Assessment & Plan    Last seizure was in 1982  On primidone          VTE prophylaxis: SCDs, back on warfarin once cleared off surgery

## 2019-06-13 NOTE — DIETARY
NUTRITION SERVICES: BMI - Pt with BMI >40 (=Body mass index is 40.24 kg/m².), class III (extreme) obesity. Weight loss counseling not appropriate in acute care setting.   RECOMMEND - Referral to outpatient nutrition services for weight management after D/C.

## 2019-06-13 NOTE — PROGRESS NOTES
Transport told RN that they were to take pt to pre-op for surgery at 4pm, RN had not been told or informed by surgeon that pt was going to surgery. RN spoke with a RN earlier that got information from Dr. Garnica saying that the surgery was still pending, will page again

## 2019-06-13 NOTE — PROGRESS NOTES
Received report from Maria A MARINA. Assumed care. This pt is AOx4, reports pain, Patient and RN discussed plan of care including pain management, monitor INR, possible surgery today: questions answered. Chart reviewed. Call light in place, fall precautions in place, patient educated on importance of calling for assistance. No additional needs at this time.

## 2019-06-13 NOTE — PROGRESS NOTES
Spoke with hospitalist Dr. Zhao, wants RN to call and find out which anesthesiologist is on call with Dr. Garnica. Pt's INR is 1.89 still and trying to find out if they will have surgery with that lab value today. Paged control desk at surgery. They stated they will have anesthesia call RN back.

## 2019-06-13 NOTE — ANESTHESIA PROCEDURE NOTES
Airway  Date/Time: 6/13/2019 4:32 PM  Performed by: ISRAEL HINOJOSA  Authorized by: ISRAEL HINOJOSA     Location:  OR  Urgency:  Elective  Difficult Airway: No    Indications for Airway Management:  Anesthesia  Spontaneous Ventilation: absent    Sedation Level:  Deep  Preoxygenated: Yes    Final Airway Type:  Supraglottic airway  Final Supraglottic Airway:  Standard LMA  SGA Size:  4  Number of Attempts at Approach:  1  Number of Other Approaches Attempted:  0

## 2019-06-13 NOTE — PROGRESS NOTES
Dr. Olmos made aware of recent coagulopathies. Orders fro one time vit K dose. MD also made aware that pt has been struggling to void with hip injury. Per orders, okay to insert indwelling oleary if pt can not void.

## 2019-06-13 NOTE — PROGRESS NOTES
Paged Dr. Garnica's office, explained situation about increased INR. Spoke with MD on the phone, explained that pt started having blisters yesterday so was only given 1 unit FFP. Per MD, surgery is still pending at this time. Pt had already received vitamin K, so will not give again, no new orders received. Notified Dr. GEMA Zhao, that surgery is still pending at this time.

## 2019-06-13 NOTE — ANESTHESIA PREPROCEDURE EVALUATION
Relevant Problems   (+) Acquired hypothyroidism   (+) Chronic anticoagulation   (+) Chronic respiratory failure with hypoxia (HCC)   (+) Closed right hip fracture, initial encounter (HCC)   (+) Diastolic dysfunction   (+) Gastroesophageal reflux disease without esophagitis   (+) HTN (hypertension), benign   (+) Hiatal hernia   (+) Hives as manifestation of blood transfusion reaction   (+) Iron deficiency anemia   (+) Obstructive sleep apnea syndrome   (+) Recurrent pulmonary embolism (HCC)   (+) Secondary pulmonary arterial hypertension (HCC)   (+) Seizure disorder (HCC)       Physical Exam    Airway   Mallampati: II  TM distance: >3 FB  Neck ROM: full       Cardiovascular - normal exam  Rhythm: regular  Rate: normal  (-) murmur     Dental - normal exam         Pulmonary - normal exam  Breath sounds clear to auscultation     Abdominal    Neurological - normal exam                 Anesthesia Plan    ASA 3 (PE, coagulopathy, diastolic dysfunction)   ASA physical status 3 criteria: other (comment), COPD and hypertension - poorly controlled    Plan - general       Airway plan will be LMA      Plan Factors:   Patient was not previously instructed to abstain from smoking on day of procedure.  Patient did not smoke on day of procedure.    Induction: intravenous    Postoperative Plan: Postoperative administration of opioids is intended.    Pertinent diagnostic labs and testing reviewed    Informed Consent:    Anesthetic plan and risks discussed with patient.    Use of blood products discussed with: patient whom consented to blood products.

## 2019-06-13 NOTE — FLOWSHEET NOTE
"   06/13/19 0035   General Vent Information   Pulse Oximetry 96 %   CPAP/BiPAP [employer]   Nocturnal CPAP or BiPAP (No:pt refuses and states, \"I wear it at home but not here\")     "

## 2019-06-13 NOTE — PROGRESS NOTES
Spoke with pre-op to clarify if pt was going to surgery, will update pre-op RN when Dr. Garnica responds about if pt will have surgery

## 2019-06-13 NOTE — PROGRESS NOTES
Spoke with Adry OREILLY, AFIA verified that INR should be below 1.5 and below 1.2 is best. New orders received from Dr. Zhao to give 1 unit FFP and 1 unit to hold until OR. Orders also received to give steroid before adminstration and benadryl as needed if pt has another reaction to FFP.

## 2019-06-13 NOTE — CARE PLAN
Problem: Safety  Goal: Will remain free from falls    Intervention: Implement fall precautions  Patient encouraged to call for assistance with repositioning.      Problem: Pain Management  Goal: Pain level will decrease to patient's comfort goal    Intervention: Follow pain managment plan developed in collaboration with patient and Interdisciplinary Team  Patient encouraged to call before pain becomes overbearing.

## 2019-06-14 LAB
BARCODED ABORH UBTYP: 6200
BARCODED PRD CODE UBPRD: NORMAL
BARCODED UNIT NUM UBUNT: NORMAL
COMPONENT F 8504F: NORMAL
INR PPP: 1.57 (ref 0.87–1.13)
PRODUCT TYPE UPROD: NORMAL
PROTHROMBIN TIME: 19.1 SEC (ref 12–14.6)
UNIT STATUS USTAT: NORMAL

## 2019-06-14 PROCEDURE — 700111 HCHG RX REV CODE 636 W/ 250 OVERRIDE (IP): Performed by: HOSPITALIST

## 2019-06-14 PROCEDURE — 700111 HCHG RX REV CODE 636 W/ 250 OVERRIDE (IP): Performed by: ORTHOPAEDIC SURGERY

## 2019-06-14 PROCEDURE — 700102 HCHG RX REV CODE 250 W/ 637 OVERRIDE(OP): Performed by: HOSPITALIST

## 2019-06-14 PROCEDURE — A9270 NON-COVERED ITEM OR SERVICE: HCPCS | Performed by: HOSPITALIST

## 2019-06-14 PROCEDURE — 97163 PT EVAL HIGH COMPLEX 45 MIN: CPT

## 2019-06-14 PROCEDURE — 97166 OT EVAL MOD COMPLEX 45 MIN: CPT

## 2019-06-14 PROCEDURE — 36415 COLL VENOUS BLD VENIPUNCTURE: CPT

## 2019-06-14 PROCEDURE — 85610 PROTHROMBIN TIME: CPT

## 2019-06-14 PROCEDURE — 99232 SBSQ HOSP IP/OBS MODERATE 35: CPT | Performed by: HOSPITALIST

## 2019-06-14 PROCEDURE — 770006 HCHG ROOM/CARE - MED/SURG/GYN SEMI*

## 2019-06-14 PROCEDURE — 700105 HCHG RX REV CODE 258: Performed by: ORTHOPAEDIC SURGERY

## 2019-06-14 PROCEDURE — 94760 N-INVAS EAR/PLS OXIMETRY 1: CPT

## 2019-06-14 RX ORDER — ACETAMINOPHEN 500 MG
1000 TABLET ORAL ONCE
Status: DISPENSED | OUTPATIENT
Start: 2019-06-14 | End: 2019-06-15

## 2019-06-14 RX ORDER — WARFARIN SODIUM 5 MG/1
10 TABLET ORAL
Status: COMPLETED | OUTPATIENT
Start: 2019-06-14 | End: 2019-06-14

## 2019-06-14 RX ORDER — SODIUM CHLORIDE 9 MG/ML
INJECTION, SOLUTION INTRAVENOUS CONTINUOUS
Status: ACTIVE | OUTPATIENT
Start: 2019-06-14 | End: 2019-06-15

## 2019-06-14 RX ADMIN — OXYCODONE HYDROCHLORIDE 2.5 MG: 5 TABLET ORAL at 13:15

## 2019-06-14 RX ADMIN — MORPHINE SULFATE 2 MG: 4 INJECTION INTRAVENOUS at 04:17

## 2019-06-14 RX ADMIN — CARVEDILOL 6.25 MG: 6.25 TABLET, FILM COATED ORAL at 06:36

## 2019-06-14 RX ADMIN — OMEPRAZOLE 20 MG: 20 CAPSULE, DELAYED RELEASE ORAL at 18:14

## 2019-06-14 RX ADMIN — POTASSIUM CHLORIDE 20 MEQ: 1500 TABLET, EXTENDED RELEASE ORAL at 06:36

## 2019-06-14 RX ADMIN — SODIUM CHLORIDE 2 G: 9 INJECTION, SOLUTION INTRAVENOUS at 06:39

## 2019-06-14 RX ADMIN — ENOXAPARIN SODIUM 100 MG: 100 INJECTION SUBCUTANEOUS at 18:13

## 2019-06-14 RX ADMIN — FERROUS SULFATE TAB 325 MG (65 MG ELEMENTAL FE) 325 MG: 325 (65 FE) TAB at 06:38

## 2019-06-14 RX ADMIN — VITAMIN D, TAB 1000IU (100/BT) 1000 UNITS: 25 TAB at 06:37

## 2019-06-14 RX ADMIN — PRIMIDONE 250 MG: 250 TABLET ORAL at 06:36

## 2019-06-14 RX ADMIN — MULTIPLE VITAMINS W/ MINERALS TAB 1 TABLET: TAB at 06:36

## 2019-06-14 RX ADMIN — MORPHINE SULFATE 1 MG: 4 INJECTION INTRAVENOUS at 23:00

## 2019-06-14 RX ADMIN — ENOXAPARIN SODIUM 100 MG: 100 INJECTION SUBCUTANEOUS at 10:12

## 2019-06-14 RX ADMIN — SENNOSIDES,DOCUSATE SODIUM 2 TABLET: 8.6; 5 TABLET, FILM COATED ORAL at 18:14

## 2019-06-14 RX ADMIN — OXYCODONE HYDROCHLORIDE 2.5 MG: 5 TABLET ORAL at 11:56

## 2019-06-14 RX ADMIN — CALCIUM CARBONATE (ANTACID) CHEW TAB 500 MG 1000 MG: 500 CHEW TAB at 06:35

## 2019-06-14 RX ADMIN — LEVOTHYROXINE SODIUM 100 MCG: 100 TABLET ORAL at 06:37

## 2019-06-14 RX ADMIN — OXYCODONE HYDROCHLORIDE 5 MG: 5 TABLET ORAL at 02:04

## 2019-06-14 RX ADMIN — OXYCODONE HYDROCHLORIDE 5 MG: 5 TABLET ORAL at 22:19

## 2019-06-14 RX ADMIN — WARFARIN SODIUM 10 MG: 5 TABLET ORAL at 18:14

## 2019-06-14 RX ADMIN — PRIMIDONE 500 MG: 250 TABLET ORAL at 21:00

## 2019-06-14 RX ADMIN — DIPHENHYDRAMINE HYDROCHLORIDE 25 MG: 50 INJECTION, SOLUTION INTRAMUSCULAR; INTRAVENOUS at 22:00

## 2019-06-14 RX ADMIN — FUROSEMIDE 40 MG: 40 TABLET ORAL at 06:37

## 2019-06-14 ASSESSMENT — ENCOUNTER SYMPTOMS
CONSTITUTIONAL NEGATIVE: 1
CHILLS: 0
BRUISES/BLEEDS EASILY: 0
VOMITING: 0
DEPRESSION: 0
DIZZINESS: 0
FLANK PAIN: 0
WEAKNESS: 0
GASTROINTESTINAL NEGATIVE: 1
MYALGIAS: 0
FEVER: 0
LOSS OF CONSCIOUSNESS: 0
PSYCHIATRIC NEGATIVE: 1
SHORTNESS OF BREATH: 0
COUGH: 0
NERVOUS/ANXIOUS: 0
NEUROLOGICAL NEGATIVE: 1
WEIGHT LOSS: 0
ABDOMINAL PAIN: 0
RESPIRATORY NEGATIVE: 1
NAUSEA: 0
BACK PAIN: 0
CARDIOVASCULAR NEGATIVE: 1

## 2019-06-14 ASSESSMENT — COGNITIVE AND FUNCTIONAL STATUS - GENERAL
MOBILITY SCORE: 11
MOVING FROM LYING ON BACK TO SITTING ON SIDE OF FLAT BED: A LOT
TURNING FROM BACK TO SIDE WHILE IN FLAT BAD: A LITTLE
CLIMB 3 TO 5 STEPS WITH RAILING: TOTAL
MOVING TO AND FROM BED TO CHAIR: A LOT
WALKING IN HOSPITAL ROOM: TOTAL
SUGGESTED CMS G CODE MODIFIER MOBILITY: CL
STANDING UP FROM CHAIR USING ARMS: A LOT

## 2019-06-14 ASSESSMENT — GAIT ASSESSMENTS
DISTANCE (FEET): 3
ASSISTIVE DEVICE: FRONT WHEEL WALKER
DEVIATION: ANTALGIC;STEP TO;DECREASED BASE OF SUPPORT;SHUFFLED GAIT;DECREASED HEEL STRIKE;DECREASED TOE OFF
GAIT LEVEL OF ASSIST: MODERATE ASSIST

## 2019-06-14 ASSESSMENT — ACTIVITIES OF DAILY LIVING (ADL): TOILETING: INDEPENDENT

## 2019-06-14 NOTE — FLOWSHEET NOTE
06/14/19 0739   Events/Summary/Plan   Events/Summary/Plan On 2L NC   Non-Invasive Resp Device Site Inspection Completed Intact   Chest Exam   Respiration 18   Pulse 96   Oximetry   #Pulse Oximetry (Single Determination) Yes   Oxygen   Pulse Oximetry 95 %   O2 (LPM) 2   O2 Daily Delivery Respiratory  Silicone Nasal Cannula

## 2019-06-14 NOTE — PROGRESS NOTES
[facility] Medicine Daily Progress Note    Date of Service  6/14/2019    Chief Complaint  78 y.o. female admitted 6/12/2019 with ground-level fall right hip pain    Hospital Course    History of Presenting Illness per Dr. Olmos's H&P  78 y.o. female who presented 6/12/2019 with past medical history of PE on lifelong warfarin therapy, history of seizure disorder, history of left hip fracture, history of osteoporosis, history of hypothyroidism, history of sleep apnea comes into the emergency room after a ground-level fall and left hip pain.  Patient states that she had a mechanical fall in which she tripped over her sandals in the bathroom at her PCPs office.  She states that she never lost consciousness and did not have any presyncope symptoms.  She did hit her head against a door.  She complains but no focal deficits including muscle weakness or sensory loss.  The patient had a DEXA scan in 2017 which found osteopenia.  The patient also takes chronic Lasix therapy for pulmonary hypertension.  She denies any changes or adjustments in her medications.  The patient arrived to the emergency room with normal vital signs.  She was in severe pain and was given IV morphine.  The patient had an x-ray of the chest found increased vascular congestion  Head x-ray of the pelvis and femur that found right femoral fracture       Interval Problem Update  No reaction to 2nd ffp  Pain controlled, grandson at bedside    Consultants/Specialty  Orthopedics Maximo Garnica    Code Status  Full    Disposition  To be determined    Review of Systems  Review of Systems   Constitutional: Negative.  Negative for chills, fever, malaise/fatigue and weight loss.   HENT: Negative.    Respiratory: Negative.  Negative for cough and shortness of breath.    Cardiovascular: Negative.  Negative for chest pain and leg swelling.   Gastrointestinal: Negative.  Negative for abdominal pain, nausea and vomiting.   Genitourinary: Negative.  Negative for dysuria  and flank pain.   Musculoskeletal: Positive for joint pain. Negative for back pain and myalgias.   Neurological: Negative.  Negative for dizziness, loss of consciousness and weakness.   Endo/Heme/Allergies: Negative.  Does not bruise/bleed easily.   Psychiatric/Behavioral: Negative.  Negative for depression. The patient is not nervous/anxious.    All other systems reviewed and are negative.       Physical Exam  Temp:  [36.3 °C (97.3 °F)-36.8 °C (98.2 °F)] 36.8 °C (98.2 °F)  Pulse:  [] 91  Resp:  [16-20] 18  BP: ()/(30-73) 113/48  SpO2:  [90 %-98 %] 97 %    Physical Exam   Constitutional: She is oriented to person, place, and time. She appears well-developed and well-nourished. No distress.   HENT:   Head: Normocephalic and atraumatic.   Right Ear: External ear normal.   Left Ear: External ear normal.   Nose: Nose normal.   Eyes: Conjunctivae are normal. Right eye exhibits no discharge. Left eye exhibits no discharge. No scleral icterus.   Neck: No JVD present. No tracheal deviation present.   Cardiovascular: Normal rate and regular rhythm.    Pulmonary/Chest: Effort normal. No stridor. No respiratory distress.   Abdominal: Soft. She exhibits no distension.   Musculoskeletal: She exhibits edema and tenderness (right hip). She exhibits no deformity.   Neurological: She is alert and oriented to person, place, and time.   Skin: Skin is warm and dry. She is not diaphoretic.   Psychiatric: She has a normal mood and affect. Her behavior is normal. Judgment and thought content normal.   Nursing note and vitals reviewed.      Fluids    Intake/Output Summary (Last 24 hours) at 06/14/19 1612  Last data filed at 06/14/19 0800   Gross per 24 hour   Intake             3890 ml   Output             3300 ml   Net              590 ml       Laboratory  Recent Labs      06/12/19   1147  06/13/19   0503  06/13/19   1753   WBC  9.1  7.0  10.1   RBC  4.15*  3.84*  3.84*   HEMOGLOBIN  13.2  12.5  12.5   HEMATOCRIT  40.1  37.6   37.7   MCV  96.6  97.9*  98.2*   MCH  31.8  32.6  32.6   MCHC  32.9*  33.2*  33.2*   RDW  45.1  45.5  45.9   PLATELETCT  239  209  213   MPV  8.6*  8.8*  8.9*     Recent Labs      06/12/19   1147  06/13/19   0503   SODIUM  136  139   POTASSIUM  3.9  4.0   CHLORIDE  99  103   CO2  29  30   GLUCOSE  131*  99   BUN  18  9   CREATININE  0.57  0.50   CALCIUM  8.5  8.1*     Recent Labs      06/12/19   1147  06/12/19   1801  06/13/19   0503  06/14/19   0437   APTT  34.9   --   33.8   --    INR  2.23*  1.89*  1.89*  1.57*               Imaging  DX-PORTABLE FLUORO > 1 HOUR   Preliminary Result         Portable fluoroscopy utilized for 163.7 seconds.      DX-FEMUR-2+ RIGHT   Final Result      Fluoroscopic image(s) obtained during right femoral instrumentation. Please see the patient's chart for full procedural details.      Fluoroscopy time 163.7.         DX-FEMUR-2+ RIGHT   Final Result      1.  Right proximal femoral comminuted and angulated immature trochanteric fracture.      2.  Severe degenerative change of the right knee and mild degenerative change of the right hip.      DX-PELVIS-1 OR 2 VIEWS   Final Result      1.  Comminuted and angulated right proximal femoral intertrochanteric fracture.      2.  Old posttraumatic and surgical change of the left proximal femur.      DX-CHEST-LIMITED (1 VIEW)   Final Result      Cardiomegaly with mild interstitial edema.           Assessment/Plan  * Closed right hip fracture, initial encounter ([facility])   Assessment & Plan    S/p operative repair  Pain management, IV Dilaudid oral oxycodone as needed  PT and OT recommending skilled care prior to going home, Grandson at bedside in agreement, referral made     Age-related osteoporosis with current pathological fracture with routine healing   Assessment & Plan    Cont bisphosphates, calcium and vitamin D     Hives as manifestation of blood transfusion reaction   Assessment & Plan    Developed blistering rash on both arms  Pretreat for  second unit of FFP with Benadryl and a dose of steroids.     Fall   Assessment & Plan    Ground-level fall  Right-sided hip fracture no syncope     Gastroesophageal reflux disease without esophagitis- (present on admission)   Assessment & Plan    Continue omeprazole     Chronic respiratory failure with hypoxia (HCC)- (present on admission)   Assessment & Plan    On nighttime oxygen at home can continue     Obstructive sleep apnea syndrome- (present on admission)   Assessment & Plan    Continue CPAP at night     Secondary pulmonary arterial hypertension (HCC)- (present on admission)   Assessment & Plan    Appears secondary to long-standing sleep apnea  Watch for post surgery volume overload continue Lasix continue carvedilol     Chronic anticoagulation- (present on admission)   Assessment & Plan    On warfarin therapy for recurrent pulmonary embolism  History of multiple PEs  Bridging therapy with full dose lovenox 1mg/kg q 12 hours while INR coming back up  Warfarin per pharmacy protocol     Diastolic dysfunction- (present on admission)   Assessment & Plan    Found on cardiac echo in 2017  Careful with fluids, watch for post surgery volume overload.     Iron deficiency anemia- (present on admission)   Assessment & Plan    Continue oral iron  Last EGD was 2016     Acquired hypothyroidism- (present on admission)   Assessment & Plan    Continue home dose of levothyroxine at 100 mcg per day.       Seizure disorder (HCC)- (present on admission)   Assessment & Plan    Last seizure was in 1982  On primidone          VTE prophylaxis: lovenox, warfarin

## 2019-06-14 NOTE — OR NURSING
"1900 To PACU from OR via bed,side rails up x 3 for safety, lungs clear bilaterally, scds on patient and machine operational, 2 dressings to R hip and pt arouses to touch, breathing easy and unlabored with OPA in place. Ice pack to L hip and HOB elevated to 25 degrees. Juarez attached to L upper thigh and draining clear, yellow urine.   1904 +2 R pedal pulse with pink/warm toes and moves toes on command. Pt reports pain on arousal and rates it at 8/10 to R hip. Denies nausea. Instructed patient to DB/C; demonstrated understanding.   1915 Pt reports continued pain at 8/10 to R hip. CMS intact to RLE except for pt reports of baseline neuropathy to B feet. Denies nausea; requesting mouth swab; given at this time. Pt A+O x 4, moves all 4 extremities on command.   1925 Pt reports no improvement in pain. Pt remains awake and talking with RN and c/o pain to R hip. Pt able to DB/C as instructed. Denies nausea.   1940 Pt reports pain as \"maybe it's better\" and rates it as 7/10 to R hip. Denies nausea; given mouthswabs on patient request. Remains awake without stimulation.   1955 Pt resting quietly on bed; reports pain as barely tolerable; given add'l PRN pain medication. Pain rated as 5/10 to R hip.   2010 Call to Dr Garnica; TORB will change from NPO to regular diet.   2025 Discussed pain control and pt reports tolerable burning to R hip. Declines additional PRN pain medication. Pt agrees that she is ready to transfer to her room. Denies nausea.   2035 Meets criteria for transfer to [facility].   "

## 2019-06-14 NOTE — OP REPORT
DATE OF SERVICE:  06/13/2019    PREOPERATIVE DIAGNOSES:  1. Right displaced intertrochanteric femur fracture.  2. History of pulmonary embolism, on chronic anticoagulation.    POSTOPERATIVE DIAGNOSES:  1. Right displaced intertrochanteric femur fracture.  2. History of pulmonary embolism, on chronic anticoagulation.    PROCEDURE:  Surgical treatment of right intertrochanteric femur fracture with   cephalomedullary device.    SURGEON:  Maximo Garnica MD    ASSISTANT:  Navin Funes DO    ANESTHESIOLOGIST:  Paz Bowen MD    ANESTHESIA TYPE:  General.    SPECIMENS:  None.    ESTIMATED BLOOD LOSS:  150 mL.    COMPLICATIONS:  Intraoperative breech of sterility.  See details below.    OPERATIVE INDICATIONS:  The patient is a pleasant 78-year-old female with a   history of a left intertrochanteric femur fracture a few years ago and history   of a PE with that fracture who sustained a right intertrochanteric femur   fracture yesterday as a result of a fall.  She has a normal neurovascular   exam.  Her INR is improved to 1.89 with administration of vitamin K and FFP.    Radiographs demonstrated displaced intertrochanteric femur fracture.  Given   these findings, she is an appropriately indicated candidate for surgical   treatment of her intertrochanteric femur fracture.  I discussed the risks,   benefits and alternatives of the procedure including risk of infection, wound   healing complication, neurovascular injury, blood loss, DVT, PE, malunion,   nonunion, persistent hip pain, symptomatic hardware and the medical risks of   anesthesia.  We discussed benefits including improved chance of union,   acceptable alignment and reduction of medical risk of hip fractures.  We   discussed alternatives including nonoperative management, which was not   recommended.  Informed consent was signed and documented.  I met with him   preoperatively to lita the operative extremity.  Of note, we did proceed with   an elevated INR.   Given the desire to avoid full reversal of her   anticoagulation given her history of PEs.  We proceeded to the operating room   at [facility].    OPERATIVE COURSE:  She underwent general anesthesia, was positioned supine and   positioned on the HANA table.  All bony prominences were well padded.  Slight   traction, internal rotation were applied to the right lower extremity.    Preoperative radiographs demonstrate good reduction of the fracture using the   table.  The right hip and lower extremity were then prepped and draped in   sterile orthopedic fashion using ChloraPrep and the surgical team scrubbed in.    A procedural pause was conducted to verify correct patient, correct   extremity, presence of the surgeon's initials on the operative extremity and   administration of IV antibiotics, in this case Ancef.  Following generalized   agreement, starting guide pin for the [employer] Intertan nail was   advanced under start point.  Trochanter, we had difficulty obtaining a medial   start point due to her habitus into her hip and so an incision was made around   the pin and the awl was introduced.  This allowed improved trajectory of the   pin.  We then advanced the pin into the femur, confirmed its position on AP   and lateral fluoroscopy and then gained access to the femoral canal using the   entry reamer.  When that was complete, a long ball-tipped guidewire was placed   down to the knee, measured for and selected a size 42 cm nail.  We   sequentially reamed up to a size 13 mm reamer and then placed our size 42 cm x   11.5 mm x 125 degree Intertan nail over the wire.  This was advanced to the   appropriate position.  We then placed the guide for the lag screw through the   jig and advanced our guide pin within the femoral neck and head.  Guide pin   position was a bit high, so we attempted to impact the nail bit more distally.    Unfortunately, at this point, we noticed that we were at the distal  most   extent of the intramedullary canal and still needed few millimeters of depth   of the nail to achieve an appropriate start point.  As such, we elected to   place a shorter nail.  The 42 cm nail was removed and exchanged for a short 40   cm nail.  This was advanced to an appropriate depth.  The guide pin for the   lag screw was then placed in appropriate position within the femoral neck and   head.  We drilled for the anti-rotation device and placed this.  We then   prepared to ream for the lag screw.  It was noted that the lag screw reamer   had retained the guide pin from the prior patient within the cannulated   portion of the reamer rendering the entire set of instruments unsterile.  At   this point, we elected to obtain a different set of instruments from the   [facility].  This was secured under direct supervision ensuring   maintain sterility of the implants.  All instruments were removed.  The femur   was then thoroughly irrigated with copious amounts of normal saline using   pulse lavage and the femoral canal brush and the wounds were approximated with   staples.  All instruments were inserted.  We then torqued down our field and   set up a new sterile field with entirely new instruments.  A delay was   encountered at this point while we waited on sterile equipment.  When this   arrived, the hip was prepped and draped in sterile orthopedic fashion again   and the surgical team returned.  The wounds were then opened.  A 40 cm x   125x11.5 mm nail was then advanced over a new ball tip guidewire placed down   to the knee.  The jig for the lag screw was placed again through the same   lateral thigh incision, and we were able to replace the anti-rotation device   within the proximal femur as well as the guide pin in the femoral head in the   same trajectory that had been used previously.  We then reamed over the guide   pin and selected a size 105 mm lag screw.  This was advanced by hand to an    appropriate depth.  The compression screw was then placed on power and   tightened by hand achieving excellent reduction of the fracture.  The set   screw was locked proximally and all instruments were removed from the proximal   femur.  Final fluoroscopic imaging of the proximal femur demonstrated near   anatomic reduction of the fracture and excellent position of our implants.  We   then obtained perfect [place] imaging distally and drilled for and placed 2   statically interlocked distal interlocking screws.  Wounds were thoroughly   irrigated with copious amounts of normal saline and closed in layers with 0   Vicryl, 2-0 Vicryl, and staples.  Sterile dressings were applied.  The patient   was transferred to the recovery room in stable condition sensing no   additional complications.    POSTOPERATIVE PLAN:  1. Weightbearing as tolerated to the operative extremity.  2. DVT prophylaxis, SCDs and Lovenox.  The patient may resume warfarin.  3. IV antibiotics for 24 hours postop and Ancef.  4. Discharge planning.       ____________________________________     MD VIKTOR Saldana / ZORAIDA    DD:  06/13/2019 23:19:17  DT:  06/14/2019 01:54:09    D#:  [id number]  Job#:  [id number]

## 2019-06-14 NOTE — CARE PLAN
Problem: Infection  Goal: Will remain free from infection  Outcome: PROGRESSING AS EXPECTED      Problem: Knowledge Deficit  Goal: Knowledge of the prescribed therapeutic regimen will improve  Outcome: PROGRESSING AS EXPECTED

## 2019-06-14 NOTE — PROGRESS NOTES
Hospitalist at bedside. POC discussed with pt and family members. Plan for SNF. Grandson concerned about pt  and ability to get meals. Social work contacted by Dr. Zhao to give family information sheet on meals on wheels.

## 2019-06-14 NOTE — THERAPY
"Physical Therapy Evaluation completed.   Bed Mobility:  Supine to Sit: Maximal Assist (x2)  Transfers: Sit to Stand: Moderate Assist  Gait: Level Of Assist: Moderate Assist with Front-Wheel Walker       Plan of Care: Will benefit from Physical Therapy 4 times per week  Discharge Recommendations: Equipment: Will Continue to Assess for Equipment Needs. Recommend inpatient transitional care services for continued physical therapy services.        See \"Rehab Therapy-Acute\" Patient Summary Report for complete documentation.     Pt was recently admitted for GLF and presented with R hip fracture and is now s/p R hip repair with WBAT precautions in place for RLE. Pt presented with impaired balance, impaired coordination, impaired gait, weakness, pain, and poor activity tolerance. Pt was able to demonstrate Mod to Max A for all functional mobility at this time with 2 person assist and FWW use. Pt was primarily limited due to weakness and pain in RLE. Pt was able to tolerate transfer from bed to chair with Mod A and cues for side steps with use of FWW. Pt demonstrates with heavy use of UE on FWW to off load RLE. Pt will continue to benefit from skilled PT while in house, with recommendation for post acute therapy prior to d/c home given current objective findings, age, IPLOF, and limited social support.   "

## 2019-06-14 NOTE — ANESTHESIA QCDR
2019 [facility] Clinical Data Registry (for Quality Improvement)     Postoperative nausea/vomiting risk protocol (Adult = 18 yrs and Pediatric 3-17 yrs)- (430 and 463)  General inhalation anesthetic (NOT TIVA) with PONV risk factors: Yes  Provision of anti-emetic therapy with at least 2 different classes of agents: Yes   Patient DID NOT receive anti-emetic therapy and reason is documented in Medical Record:  N/A    Multimodal Pain Management- (AQI59)  Patient undergoing Elective Surgery (i.e. Outpatient, or ASC, or Prescheduled Surgery prior to Hospital Admission): No  Use of Multimodal Pain Management, two or more drugs and/or interventions, NOT including systemic opioids: N/A  Exception: Documented allergy to multiple classes of analgesics: N/A    PACU assessment of acute postoperative pain prior to Anesthesia Care End- Applies to Patients Age = 18- (ABG7)  Initial PACU pain score is which of the following: < 7/10  Patient unable to report pain score: N/A    Post-anesthetic transfer of care checklist/protocol to PACU/ICU- (426 and 427)  Upon conclusion of case, patient transferred to which of the following locations: PACU/Non-ICU  Use of transfer checklist/protocol: Yes  Exclusion: Service Performed in Patient Hospital Room (and thus did not require transfer): N/A    PACU Reintubation- (AQI31)  General anesthesia requiring endotracheal intubation (ETT) along with subsequent extubation in OR or PACU: No  Required reintubation in the PACU: N/A  Extubation was a planned trial documented in the medical record prior to removal of the original airway device: N/A    Unplanned admission to ICU related to anesthesia service up through end of PACU care- (MD51)  Unplanned admission to ICU (not initially anticipated at anesthesia start time): No

## 2019-06-14 NOTE — ANESTHESIA POSTPROCEDURE EVALUATION
Patient: Natalie Anaya    Procedure Summary     Date:  06/13/19 Room / Location:   OR  / SURGERY [facility]    Anesthesia Start:  1627 Anesthesia Stop:  1905    Procedure:  INSERTION, INTRAMEDULLARY MENDY, FEMUR, PROXIMAL (Right Hip) Diagnosis:  (right intertrochanteric femur fracture)    Surgeon:  Maximo Garnica M.D. Responsible Provider:  Paz Bowen M.D.    Anesthesia Type:  general ASA Status:  3          Final Anesthesia Type: general  Last vitals  BP   Blood Pressure : 146/68, NIBP: 152/71    Temp   36.9 °C (98.4 °F)    Pulse   Pulse: 73, Heart Rate (Monitored): 82   Resp   20    SpO2   95 %      Anesthesia Post Evaluation    Patient location during evaluation: PACU  Patient participation: complete - patient participated  Level of consciousness: awake and alert  Pain score: 8  Acute right hip pain prior to IV analgesia; anticipate improvement  Airway patency: patent  Anesthetic complications: no  Cardiovascular status: hemodynamically stable  Respiratory status: acceptable  Hydration status: euvolemic    PONV: none           Nurse Pain Score: 5 (NPRS)

## 2019-06-14 NOTE — PROGRESS NOTES
Pt arrived on floor. VSS. Dressings are clean, dry,and intact. No requests at this time. Fall precautions in place

## 2019-06-14 NOTE — ANESTHESIA TIME REPORT
Anesthesia Start and Stop Event Times     Date Time Event    6/13/2019 1627 Anesthesia Start     1905 Anesthesia Stop        Responsible Staff  06/13/19    Name Role Begin End    Paz Bowen M.D. Anesth 1627 1905        Preop Diagnosis (Free Text):  Pre-op Diagnosis     right intertrochanteric femur fracture        Preop Diagnosis (Codes):  Diagnosis Information     Diagnosis Code(s):         Post op Diagnosis  Closed 2-part intertrochanteric fracture of right femur (HCC)  right hip pain    Premium Reason  K. Alert    Comments: Right proximal femur intramedullary flakita

## 2019-06-14 NOTE — THERAPY
"Occupational Therapy Evaluation completed.   Functional Status:  Pt is an 79 y/o female admit after a GLF with R femur fx, s/p IM flakita. Pt lives with her , who is limited in his ability to care for Pt. PLOF- I with AMB ADLS. Pt presents with c/o pain with ADLS and functional mobility, has decreased activity tolerance, both of which are impacting her I . Pt requires Max A x2 for bed mobility, Mod A for functional transfers, requires Supervision for UB self care, Mod A for LB with the use of AE. Pt will benefit from Acute OT services to increase functional I. Pt would not be safe to d/c home at this time.   Plan of Care: Will benefit from Occupational Therapy 3 times per week  Discharge Recommendations:  Equipment: Will Continue to Assess for Equipment Needs. Post-acute therapy Discharge to a transitional care facility for continued skilled therapy services.    See \"Rehab Therapy-Acute\" Patient Summary Report for complete documentation.    "

## 2019-06-14 NOTE — PROGRESS NOTES
Inpatient Anticoagulation Service Note    Date: 6/14/2019    Reason for Anticoagulation: Pulmonary Embolism   Target INR: 2.0 to 3.0         Hemoglobin Value: 12.5  Hematocrit Value: 37.7    INR from last 7 days     Date/Time INR Value    06/14/19 0437 (!)  1.57    06/13/19 0503 (!)  1.89    06/12/19 1801 (!)  1.89    06/12/19 1147 (!)  2.23        Dose from last 7 days     Date/Time Dose (mg)    06/14/19 0857  10        Significant Interactions: Proton Pump Inhibitor  Bridge Therapy: Yes  Bridge Therapy Start Date: 06/14/19 (If less than 5 days and overlap therapy discontinued -- document reason (i.e. Bleed Risk))  INR Value Greater than 2 Prior to Discontinuation of Parenteral Anticoagulation: No (Must Comment) (If still on overlap therapy, if No -- document reason (i.e. Bleed Risk))    Comments: long term warfarin pt. held for hip surgery, restarted 6/14 with lovenox bridging, Home dose 10 mg on Tues, Sundays, 20 mg on all other days.- coumadin clinic pt.    Plan:  Will give warfarin 10 mg po tonight for INR of 1.57  Education Material Provided?: No  Pharmacist suggested discharge dosing: Resume home regimen with initial close monitoring.     Jamel Zambrano [facility]

## 2019-06-15 LAB
INR PPP: 1.42 (ref 0.87–1.13)
PROTHROMBIN TIME: 17.6 SEC (ref 12–14.6)

## 2019-06-15 PROCEDURE — 99232 SBSQ HOSP IP/OBS MODERATE 35: CPT | Performed by: INTERNAL MEDICINE

## 2019-06-15 PROCEDURE — 700102 HCHG RX REV CODE 250 W/ 637 OVERRIDE(OP): Performed by: INTERNAL MEDICINE

## 2019-06-15 PROCEDURE — A9270 NON-COVERED ITEM OR SERVICE: HCPCS | Performed by: HOSPITALIST

## 2019-06-15 PROCEDURE — 700102 HCHG RX REV CODE 250 W/ 637 OVERRIDE(OP): Performed by: HOSPITALIST

## 2019-06-15 PROCEDURE — A9270 NON-COVERED ITEM OR SERVICE: HCPCS | Performed by: INTERNAL MEDICINE

## 2019-06-15 PROCEDURE — 700111 HCHG RX REV CODE 636 W/ 250 OVERRIDE (IP): Performed by: HOSPITALIST

## 2019-06-15 PROCEDURE — 770006 HCHG ROOM/CARE - MED/SURG/GYN SEMI*

## 2019-06-15 PROCEDURE — 36415 COLL VENOUS BLD VENIPUNCTURE: CPT

## 2019-06-15 PROCEDURE — 85610 PROTHROMBIN TIME: CPT

## 2019-06-15 PROCEDURE — 94760 N-INVAS EAR/PLS OXIMETRY 1: CPT

## 2019-06-15 RX ADMIN — OMEPRAZOLE 20 MG: 20 CAPSULE, DELAYED RELEASE ORAL at 17:26

## 2019-06-15 RX ADMIN — SENNOSIDES,DOCUSATE SODIUM 2 TABLET: 8.6; 5 TABLET, FILM COATED ORAL at 17:27

## 2019-06-15 RX ADMIN — CARVEDILOL 6.25 MG: 6.25 TABLET, FILM COATED ORAL at 17:26

## 2019-06-15 RX ADMIN — CALCIUM CARBONATE (ANTACID) CHEW TAB 500 MG 1000 MG: 500 CHEW TAB at 06:10

## 2019-06-15 RX ADMIN — ENOXAPARIN SODIUM 100 MG: 100 INJECTION SUBCUTANEOUS at 06:11

## 2019-06-15 RX ADMIN — MORPHINE SULFATE 2 MG: 4 INJECTION INTRAVENOUS at 18:11

## 2019-06-15 RX ADMIN — PRIMIDONE 250 MG: 250 TABLET ORAL at 06:10

## 2019-06-15 RX ADMIN — LEVOTHYROXINE SODIUM 100 MCG: 100 TABLET ORAL at 06:11

## 2019-06-15 RX ADMIN — FUROSEMIDE 40 MG: 40 TABLET ORAL at 06:10

## 2019-06-15 RX ADMIN — WARFARIN SODIUM 20 MG: 7.5 TABLET ORAL at 17:27

## 2019-06-15 RX ADMIN — OXYCODONE HYDROCHLORIDE 5 MG: 5 TABLET ORAL at 11:22

## 2019-06-15 RX ADMIN — MORPHINE SULFATE 2 MG: 4 INJECTION INTRAVENOUS at 13:17

## 2019-06-15 RX ADMIN — VITAMIN D, TAB 1000IU (100/BT) 1000 UNITS: 25 TAB at 06:10

## 2019-06-15 RX ADMIN — MULTIPLE VITAMINS W/ MINERALS TAB 1 TABLET: TAB at 06:00

## 2019-06-15 RX ADMIN — DIPHENHYDRAMINE HYDROCHLORIDE 25 MG: 50 INJECTION, SOLUTION INTRAMUSCULAR; INTRAVENOUS at 21:46

## 2019-06-15 RX ADMIN — ENOXAPARIN SODIUM 100 MG: 100 INJECTION SUBCUTANEOUS at 17:25

## 2019-06-15 RX ADMIN — OXYCODONE HYDROCHLORIDE 5 MG: 5 TABLET ORAL at 21:46

## 2019-06-15 RX ADMIN — SENNOSIDES,DOCUSATE SODIUM 2 TABLET: 8.6; 5 TABLET, FILM COATED ORAL at 06:10

## 2019-06-15 RX ADMIN — POTASSIUM CHLORIDE 20 MEQ: 1500 TABLET, EXTENDED RELEASE ORAL at 06:11

## 2019-06-15 RX ADMIN — ACETAMINOPHEN 650 MG: 325 TABLET, FILM COATED ORAL at 15:29

## 2019-06-15 RX ADMIN — FERROUS SULFATE TAB 325 MG (65 MG ELEMENTAL FE) 325 MG: 325 (65 FE) TAB at 07:21

## 2019-06-15 RX ADMIN — PRIMIDONE 500 MG: 250 TABLET ORAL at 21:00

## 2019-06-15 ASSESSMENT — ENCOUNTER SYMPTOMS
MYALGIAS: 0
SORE THROAT: 0
SHORTNESS OF BREATH: 0
DEPRESSION: 0
CHILLS: 0
COUGH: 0
HEADACHES: 0
DIARRHEA: 0
NERVOUS/ANXIOUS: 0
CONSTIPATION: 0
SENSORY CHANGE: 0
FEVER: 0
HEARTBURN: 0
INSOMNIA: 0
PHOTOPHOBIA: 0
BLURRED VISION: 0
ABDOMINAL PAIN: 0
WEAKNESS: 0
DIZZINESS: 0
VOMITING: 0
CLAUDICATION: 0
SPEECH CHANGE: 0

## 2019-06-15 NOTE — PROGRESS NOTES
"Patient seen and examined  Pain tolerable  No new concerns per patient/RN    /45   Pulse 100   Temp 37.1 °C (98.7 °F) (Oral)   Resp 18   Ht 1.575 m (5' 2\")   Wt 99.8 kg (220 lb 0.3 oz)   SpO2 93%     Recent Labs      06/12/19   1147  06/13/19   0503  06/13/19   1753   WBC  9.1  7.0  10.1   RBC  4.15*  3.84*  3.84*   HEMOGLOBIN  13.2  12.5  12.5   HEMATOCRIT  40.1  37.6  37.7   MCV  96.6  97.9*  98.2*   MCH  31.8  32.6  32.6   MCHC  32.9*  33.2*  33.2*   RDW  45.1  45.5  45.9   PLATELETCT  239  209  213   MPV  8.6*  8.8*  8.9*       No acute distress  Dressing scant serous drainage, thigh soft  Neurovascularly intact    POD#2 right hip nailing    Plan:  DVT Prophylaxis- TEDS/SCDs, restart coumadin per hospitalist  Weight Bearing Status-WBAT  PT  Dressing changes prn  Case Coordination          "

## 2019-06-15 NOTE — DISCHARGE PLANNING
Care Transition Team Assessment    Met with pt at bedside to complete assessment and discuss discharge plan. Pt confirmed information listed on face sheet. She states she lives with her spouse and PTA was independent with ADL's and IADL's. She uses a cane at baseline and has a CPAP through [facility].   Pt agreeable to SNF placement and made choice for [facility] due to close proximity to her home. She also requested referral to Meals on Wheels for both she and her spouse. Spouse is 86 y/o and she provides a lot of care for him. Their grand son is currently staying with him and they have someone brining in meals for now.   Referral completed and faxed to [facility] at [phone]    Information Source  Orientation : Oriented x 4  Information Given By: Patient         Elopement Risk  Legal Hold: No  Ambulatory or Self Mobile in Wheelchair: No-Not an Elopement Risk  Elopement Risk: Not at Risk for Elopement    Interdisciplinary Discharge Planning  Lives with - Patient's Self Care Capacity: Spouse  Patient or legal guardian wants to designate a caregiver (see row info): No  Support Systems: Family Member(s)  Housing / Facility: [address]  Do You Take your Prescribed Medications Regularly: Yes  Able to Return to Previous ADL's: Future Time w/Therapy  Mobility Issues: Yes  Prior Services: None  Assistance Needed: No  Durable Medical Equipment: Not Applicable                   Vision / Hearing Impairment  Vision Impairment : Yes  Right Eye Vision: Wears Glasses  Left Eye Vision: Wears Glasses  Hearing Impairment : No              Domestic Abuse  Have you ever been the victim of abuse or violence?: No  Physical Abuse or Sexual Abuse: No  Verbal Abuse or Emotional Abuse: No  Possible Abuse Reported to:: Not Applicable

## 2019-06-15 NOTE — CARE PLAN
Problem: Safety  Goal: Will remain free from injury  Outcome: PROGRESSING AS EXPECTED      Problem: Infection  Goal: Will remain free from infection  Outcome: PROGRESSING AS EXPECTED      Problem: Respiratory:  Goal: Respiratory status will improve  Outcome: PROGRESSING AS EXPECTED      Problem: Mobility  Goal: Risk for activity intolerance will decrease  Outcome: PROGRESSING AS EXPECTED

## 2019-06-15 NOTE — CARE PLAN
Problem: Infection  Goal: Will remain free from infection  Outcome: PROGRESSING AS EXPECTED      Problem: Mobility  Goal: Risk for activity intolerance will decrease  Outcome: PROGRESSING SLOWER THAN EXPECTED      Problem: Pain Management  Goal: Pain level will decrease to patient's comfort goal  Outcome: PROGRESSING SLOWER THAN EXPECTED

## 2019-06-15 NOTE — FLOWSHEET NOTE
06/15/19 0755   Events/Summary/Plan   Events/Summary/Plan On 2L NC, pt didnt bring CPAP in   Non-Invasive Resp Device Site Inspection Completed Intact   Chest Exam   Respiration 18   Pulse 100   Oximetry   #Pulse Oximetry (Single Determination) Yes   Oxygen   Pulse Oximetry 93 %   O2 (LPM) 2   O2 Daily Delivery Respiratory  Silicone Nasal Cannula

## 2019-06-15 NOTE — PROGRESS NOTES
[facility] Medicine Daily Progress Note    Date of Service  6/15/2019    Chief Complaint  78 y.o. female admitted 6/12/2019 with right hip pain post fall    Hospital Course    History of Presenting Illness per Dr. Olmos's H&P  78 y.o. female who presented 6/12/2019 with past medical history of PE on lifelong warfarin therapy, history of seizure disorder, history of left hip fracture, history of osteoporosis, history of hypothyroidism, history of sleep apnea comes into the emergency room after a ground-level fall and left hip pain.  Patient states that she had a mechanical fall in which she tripped over her sandals in the bathroom at her PCPs office.  She states that she never lost consciousness and did not have any presyncope symptoms.  She did hit her head against a door.  She complains but no focal deficits including muscle weakness or sensory loss.  The patient had a DEXA scan in 2017 which found osteopenia.  The patient also takes chronic Lasix therapy for pulmonary hypertension.  She denies any changes or adjustments in her medications.  The patient arrived to the emergency room with normal vital signs.  She was in severe pain and was given IV morphine.  The patient had an x-ray of the chest found increased vascular congestion  Head x-ray of the pelvis and femur that found right femoral fracture      Interval Problem Update  Patient is POD #2 of placement of cephalomedullary device to stabilize her right displaced intertrochanteric femur fracture with Dr Garnica.  She states the pain is controlled with current interventions.  Her one concern is of low blood pressure following surgery - educated her that this is likely side effect of ongoing need of pain medication.  Therapy evaluations have recommended SNF for continued rehabilitation prior to return home.      Consultants/Specialty  Ortho - Walker    Code Status  Full    Disposition  SNF when accepted    Review of Systems  Review of Systems   Constitutional:  Negative for chills and fever.   HENT: Negative for congestion and sore throat.    Eyes: Negative for blurred vision and photophobia.   Respiratory: Negative for cough and shortness of breath.    Cardiovascular: Negative for chest pain, claudication and leg swelling.   Gastrointestinal: Negative for abdominal pain, constipation, diarrhea, heartburn and vomiting.   Genitourinary: Negative for dysuria and hematuria.   Musculoskeletal: Positive for joint pain (right hip pain post operative.). Negative for myalgias.   Skin: Negative for itching and rash.   Neurological: Negative for dizziness, sensory change, speech change, weakness and headaches.   Psychiatric/Behavioral: Negative for depression. The patient is not nervous/anxious and does not have insomnia.         Physical Exam  Temp:  [37.1 °C (98.7 °F)-37.7 °C (99.9 °F)] 37.1 °C (98.7 °F)  Pulse:  [] 100  Resp:  [18-20] 18  BP: (113-132)/(45-49) 113/45  SpO2:  [93 %-96 %] 93 %    Physical Exam    Fluids    Intake/Output Summary (Last 24 hours) at 06/15/19 1129  Last data filed at 06/15/19 0849   Gross per 24 hour   Intake              600 ml   Output              700 ml   Net             -100 ml       Laboratory  Recent Labs      06/12/19   1147 06/13/19   0503  06/13/19   1753   WBC  9.1  7.0  10.1   RBC  4.15*  3.84*  3.84*   HEMOGLOBIN  13.2  12.5  12.5   HEMATOCRIT  40.1  37.6  37.7   MCV  96.6  97.9*  98.2*   MCH  31.8  32.6  32.6   MCHC  32.9*  33.2*  33.2*   RDW  45.1  45.5  45.9   PLATELETCT  239  209  213   MPV  8.6*  8.8*  8.9*     Recent Labs      06/12/19   1147  06/13/19   0503   SODIUM  136  139   POTASSIUM  3.9  4.0   CHLORIDE  99  103   CO2  29  30   GLUCOSE  131*  99   BUN  18  9   CREATININE  0.57  0.50   CALCIUM  8.5  8.1*     Recent Labs      06/12/19   1147   06/13/19   0503  06/14/19   0437  06/15/19   0339   APTT  34.9   --   33.8   --    --    INR  2.23*   < >  1.89*  1.57*  1.42*    < > = values in this interval not displayed.                Imaging  DX-PORTABLE FLUORO > 1 HOUR   Final Result         Portable fluoroscopy utilized for 163.7 seconds.      DX-FEMUR-2+ RIGHT   Final Result      Fluoroscopic image(s) obtained during right femoral instrumentation. Please see the patient's chart for full procedural details.      Fluoroscopy time 163.7.         DX-FEMUR-2+ RIGHT   Final Result      1.  Right proximal femoral comminuted and angulated immature trochanteric fracture.      2.  Severe degenerative change of the right knee and mild degenerative change of the right hip.      DX-PELVIS-1 OR 2 VIEWS   Final Result      1.  Comminuted and angulated right proximal femoral intertrochanteric fracture.      2.  Old posttraumatic and surgical change of the left proximal femur.      DX-CHEST-LIMITED (1 VIEW)   Final Result      Cardiomegaly with mild interstitial edema.           Assessment/Plan  * Closed right hip fracture, initial encounter ([facility])   Assessment & Plan    S/p operative repair  Pain management, IV Dilaudid oral oxycodone as needed  PT and OT recommending skilled care prior to going home, Grandson at bedside in agreement, referral made     Age-related osteoporosis with current pathological fracture with routine healing   Assessment & Plan    Cont bisphosphates, calcium and vitamin D     Hives as manifestation of blood transfusion reaction   Assessment & Plan    None reported on this admission  Had Developed blistering rash on both arms  Pretreat for second unit of FFP with Benadryl and a dose of steroids.     Fall   Assessment & Plan    Ground-level fall  Right-sided hip fracture  no syncope     Gastroesophageal reflux disease without esophagitis- (present on admission)   Assessment & Plan    Continue omeprazole     Chronic respiratory failure with hypoxia ([facility])- (present on admission)   Assessment & Plan    On nighttime oxygen at home can continue     Obstructive sleep apnea syndrome- (present on admission)   Assessment & Plan     Continue CPAP at night     Secondary pulmonary arterial hypertension (HCC)- (present on admission)   Assessment & Plan    Appears secondary to long-standing sleep apnea  Watch for post surgery volume overload   Continue Lasix  Continue carvedilol     Chronic anticoagulation- (present on admission)   Assessment & Plan    On warfarin therapy for recurrent pulmonary embolism  History of multiple PEs  Bridging therapy with full dose lovenox 1mg/kg q 12 hours while INR coming back up  Warfarin per pharmacy protocol     Diastolic dysfunction- (present on admission)   Assessment & Plan    Found on cardiac echo in 2017  Careful with fluids, watch for post surgery volume overload.     Iron deficiency anemia- (present on admission)   Assessment & Plan    Continue oral iron supplementation  Last EGD was 2016     Acquired hypothyroidism- (present on admission)   Assessment & Plan    Continue home dose of levothyroxine at 100 mcg per day.       Seizure disorder (HCC)- (present on admission)   Assessment & Plan    Last seizure was in 1982  On primidone          VTE prophylaxis: lovenox bridge, warfarin

## 2019-06-15 NOTE — PROGRESS NOTES
Assumed care, pt compliant, pleasant. Alert and oriented x4. Sitting up in bed. Family at bedside. Able to make needs known. Pain under control at this time. Juarez patent and draining yellow urine. Safety maintained, bed locked and in low position.

## 2019-06-15 NOTE — DISCHARGE PLANNING
Received Choice form at [phone]  Agency/Facility Name: [facility]  Referral sent per Choice form @ [phone]

## 2019-06-15 NOTE — PROGRESS NOTES
Inpatient Anticoagulation Service Note    Date: 6/15/2019    Reason for Anticoagulation: Pulmonary Embolism   Target INR: 2.0 to 3.0         Hemoglobin Value: 12.5  Hematocrit Value: 37.7    INR from last 7 days     Date/Time INR Value    06/15/19 0339 (!)  1.42    06/14/19 0437 (!)  1.57    06/13/19 0503 (!)  1.89    06/12/19 1801 (!)  1.89    06/12/19 1147 (!)  2.23        Dose from last 7 days     Date/Time Dose (mg)    06/15/19 0900  20    06/14/19 0857  10        Significant Interactions: Proton Pump Inhibitor  Bridge Therapy: Yes  Bridge Therapy Start Date: 06/14/19 (If less than 5 days and overlap therapy discontinued -- document reason (i.e. Bleed Risk))  INR Value Greater than 2 Prior to Discontinuation of Parenteral Anticoagulation: No (Must Comment) (If still on overlap therapy, if No -- document reason (i.e. Bleed Risk))    Comments: long term warfarin pt. held for hip surgery, restarted 6/14 with lovenox bridging    Plan:  Will give warfarin 20 mg po tonight for INR of 1.42  Education Material Provided?: No  Pharmacist suggested discharge dosing: Resume home regimen with initial close monitoring.     Jamel Zambrano [facility]

## 2019-06-16 PROBLEM — L50.8: Status: RESOLVED | Noted: 2019-06-12 | Resolved: 2019-06-16

## 2019-06-16 PROBLEM — D62 ACUTE BLOOD LOSS ANEMIA: Status: ACTIVE | Noted: 2019-06-16

## 2019-06-16 PROBLEM — R33.9 URINARY RETENTION: Status: ACTIVE | Noted: 2019-06-16

## 2019-06-16 PROBLEM — T80.89XA: Status: RESOLVED | Noted: 2019-06-12 | Resolved: 2019-06-16

## 2019-06-16 LAB
ANION GAP SERPL CALC-SCNC: 5 MMOL/L (ref 0–11.9)
BASOPHILS # BLD AUTO: 0.3 % (ref 0–1.8)
BASOPHILS # BLD: 0.02 K/UL (ref 0–0.12)
BUN SERPL-MCNC: 9 MG/DL (ref 8–22)
CALCIUM SERPL-MCNC: 7.1 MG/DL (ref 8.4–10.2)
CHLORIDE SERPL-SCNC: 100 MMOL/L (ref 96–112)
CO2 SERPL-SCNC: 30 MMOL/L (ref 20–33)
CREAT SERPL-MCNC: 0.53 MG/DL (ref 0.5–1.4)
EOSINOPHIL # BLD AUTO: 0.03 K/UL (ref 0–0.51)
EOSINOPHIL NFR BLD: 0.4 % (ref 0–6.9)
ERYTHROCYTE [DISTWIDTH] IN BLOOD BY AUTOMATED COUNT: 45.1 FL (ref 35.9–50)
ERYTHROCYTE [DISTWIDTH] IN BLOOD BY AUTOMATED COUNT: 48.2 FL (ref 35.9–50)
GLUCOSE SERPL-MCNC: 134 MG/DL (ref 65–99)
HCT VFR BLD AUTO: 19.6 % (ref 37–47)
HCT VFR BLD AUTO: 22.9 % (ref 37–47)
HGB BLD-MCNC: 6.4 G/DL (ref 12–16)
HGB BLD-MCNC: 7.4 G/DL (ref 12–16)
IMM GRANULOCYTES # BLD AUTO: 0.03 K/UL (ref 0–0.11)
IMM GRANULOCYTES NFR BLD AUTO: 0.4 % (ref 0–0.9)
INR PPP: 1.58 (ref 0.87–1.13)
LYMPHOCYTES # BLD AUTO: 1.04 K/UL (ref 1–4.8)
LYMPHOCYTES NFR BLD: 14.8 % (ref 22–41)
MCH RBC QN AUTO: 31.6 PG (ref 27–33)
MCH RBC QN AUTO: 32 PG (ref 27–33)
MCHC RBC AUTO-ENTMCNC: 32.3 G/DL (ref 33.6–35)
MCHC RBC AUTO-ENTMCNC: 32.7 G/DL (ref 33.6–35)
MCV RBC AUTO: 97.9 FL (ref 81.4–97.8)
MCV RBC AUTO: 98 FL (ref 81.4–97.8)
MONOCYTES # BLD AUTO: 0.74 K/UL (ref 0–0.85)
MONOCYTES NFR BLD AUTO: 10.5 % (ref 0–13.4)
NEUTROPHILS # BLD AUTO: 5.16 K/UL (ref 2–7.15)
NEUTROPHILS NFR BLD: 73.6 % (ref 44–72)
NRBC # BLD AUTO: 0 K/UL
NRBC BLD-RTO: 0 /100 WBC
PLATELET # BLD AUTO: 165 K/UL (ref 164–446)
PLATELET # BLD AUTO: 185 K/UL (ref 164–446)
PMV BLD AUTO: 9.3 FL (ref 9–12.9)
PMV BLD AUTO: 9.5 FL (ref 9–12.9)
POTASSIUM SERPL-SCNC: 3.7 MMOL/L (ref 3.6–5.5)
PROTHROMBIN TIME: 19.2 SEC (ref 12–14.6)
RBC # BLD AUTO: 2 M/UL (ref 4.2–5.4)
RBC # BLD AUTO: 2.34 M/UL (ref 4.2–5.4)
SODIUM SERPL-SCNC: 135 MMOL/L (ref 135–145)
WBC # BLD AUTO: 7 K/UL (ref 4.8–10.8)
WBC # BLD AUTO: 7.1 K/UL (ref 4.8–10.8)

## 2019-06-16 PROCEDURE — P9016 RBC LEUKOCYTES REDUCED: HCPCS

## 2019-06-16 PROCEDURE — A9270 NON-COVERED ITEM OR SERVICE: HCPCS | Performed by: HOSPITALIST

## 2019-06-16 PROCEDURE — 700102 HCHG RX REV CODE 250 W/ 637 OVERRIDE(OP): Performed by: HOSPITALIST

## 2019-06-16 PROCEDURE — 86923 COMPATIBILITY TEST ELECTRIC: CPT

## 2019-06-16 PROCEDURE — 85027 COMPLETE CBC AUTOMATED: CPT

## 2019-06-16 PROCEDURE — 36415 COLL VENOUS BLD VENIPUNCTURE: CPT

## 2019-06-16 PROCEDURE — 80048 BASIC METABOLIC PNL TOTAL CA: CPT

## 2019-06-16 PROCEDURE — 36430 TRANSFUSION BLD/BLD COMPNT: CPT

## 2019-06-16 PROCEDURE — 85025 COMPLETE CBC W/AUTO DIFF WBC: CPT

## 2019-06-16 PROCEDURE — 770006 HCHG ROOM/CARE - MED/SURG/GYN SEMI*

## 2019-06-16 PROCEDURE — 85610 PROTHROMBIN TIME: CPT

## 2019-06-16 PROCEDURE — 30233N1 TRANSFUSION OF NONAUTOLOGOUS RED BLOOD CELLS INTO PERIPHERAL VEIN, PERCUTANEOUS APPROACH: ICD-10-PCS | Performed by: HOSPITALIST

## 2019-06-16 PROCEDURE — 99233 SBSQ HOSP IP/OBS HIGH 50: CPT | Performed by: HOSPITALIST

## 2019-06-16 PROCEDURE — 700111 HCHG RX REV CODE 636 W/ 250 OVERRIDE (IP): Performed by: HOSPITALIST

## 2019-06-16 RX ORDER — FUROSEMIDE 40 MG/1
40 TABLET ORAL ONCE
Status: COMPLETED | OUTPATIENT
Start: 2019-06-16 | End: 2019-06-16

## 2019-06-16 RX ADMIN — CALCIUM CARBONATE (ANTACID) CHEW TAB 500 MG 1000 MG: 500 CHEW TAB at 05:54

## 2019-06-16 RX ADMIN — CARVEDILOL 6.25 MG: 6.25 TABLET, FILM COATED ORAL at 07:51

## 2019-06-16 RX ADMIN — LEVOTHYROXINE SODIUM 100 MCG: 100 TABLET ORAL at 05:54

## 2019-06-16 RX ADMIN — ACETAMINOPHEN 650 MG: 325 TABLET, FILM COATED ORAL at 07:51

## 2019-06-16 RX ADMIN — FERROUS SULFATE TAB 325 MG (65 MG ELEMENTAL FE) 325 MG: 325 (65 FE) TAB at 07:51

## 2019-06-16 RX ADMIN — ACETAMINOPHEN 650 MG: 325 TABLET, FILM COATED ORAL at 18:23

## 2019-06-16 RX ADMIN — MULTIPLE VITAMINS W/ MINERALS TAB 1 TABLET: TAB at 05:55

## 2019-06-16 RX ADMIN — DIPHENHYDRAMINE HYDROCHLORIDE 25 MG: 50 INJECTION, SOLUTION INTRAMUSCULAR; INTRAVENOUS at 08:42

## 2019-06-16 RX ADMIN — ACETAMINOPHEN 650 MG: 325 TABLET, FILM COATED ORAL at 00:38

## 2019-06-16 RX ADMIN — OXYCODONE HYDROCHLORIDE 5 MG: 5 TABLET ORAL at 21:59

## 2019-06-16 RX ADMIN — OMEPRAZOLE 20 MG: 20 CAPSULE, DELAYED RELEASE ORAL at 18:23

## 2019-06-16 RX ADMIN — OXYCODONE HYDROCHLORIDE 5 MG: 5 TABLET ORAL at 03:32

## 2019-06-16 RX ADMIN — PRIMIDONE 250 MG: 250 TABLET ORAL at 05:55

## 2019-06-16 RX ADMIN — CARVEDILOL 6.25 MG: 6.25 TABLET, FILM COATED ORAL at 18:23

## 2019-06-16 RX ADMIN — POTASSIUM CHLORIDE 20 MEQ: 1500 TABLET, EXTENDED RELEASE ORAL at 05:55

## 2019-06-16 RX ADMIN — FUROSEMIDE 40 MG: 40 TABLET ORAL at 18:23

## 2019-06-16 RX ADMIN — WARFARIN SODIUM 20 MG: 7.5 TABLET ORAL at 18:23

## 2019-06-16 RX ADMIN — DIPHENHYDRAMINE HYDROCHLORIDE 25 MG: 50 INJECTION, SOLUTION INTRAMUSCULAR; INTRAVENOUS at 21:33

## 2019-06-16 RX ADMIN — VITAMIN D, TAB 1000IU (100/BT) 1000 UNITS: 25 TAB at 05:55

## 2019-06-16 RX ADMIN — SENNOSIDES,DOCUSATE SODIUM 2 TABLET: 8.6; 5 TABLET, FILM COATED ORAL at 05:55

## 2019-06-16 RX ADMIN — PRIMIDONE 500 MG: 250 TABLET ORAL at 21:00

## 2019-06-16 RX ADMIN — SENNOSIDES,DOCUSATE SODIUM 2 TABLET: 8.6; 5 TABLET, FILM COATED ORAL at 18:23

## 2019-06-16 RX ADMIN — ENOXAPARIN SODIUM 100 MG: 100 INJECTION SUBCUTANEOUS at 05:54

## 2019-06-16 ASSESSMENT — ENCOUNTER SYMPTOMS
CARDIOVASCULAR NEGATIVE: 1
CHILLS: 0
MYALGIAS: 0
CONSTIPATION: 0
DIZZINESS: 1
WEAKNESS: 0
FLANK PAIN: 0
VOMITING: 0
ABDOMINAL PAIN: 0
WEIGHT LOSS: 0
HEARTBURN: 0
NERVOUS/ANXIOUS: 0
FEVER: 0
LOSS OF CONSCIOUSNESS: 0
RESPIRATORY NEGATIVE: 1
DEPRESSION: 0
SHORTNESS OF BREATH: 0
BRUISES/BLEEDS EASILY: 0
BLOOD IN STOOL: 0
COUGH: 0
PSYCHIATRIC NEGATIVE: 1
GASTROINTESTINAL NEGATIVE: 1
BACK PAIN: 0
DIARRHEA: 0
NAUSEA: 0

## 2019-06-16 NOTE — PROGRESS NOTES
Bedside report received from night RN. Assumed care of patient. Daily plan of care discussed. Pt awake and alert, eating breakfast at this time. No complaints of pain. Hourly rounding in place.

## 2019-06-16 NOTE — FLOWSHEET NOTE
06/15/19 1943   Events/Summary/Plan   Events/Summary/Plan Patient using nc for stay in hospital and declines cpap   Non-Invasive Resp Device Site Inspection Completed Intact   Chest Exam   Respiration 20   Pulse 98   Oxygen   Home O2 Use Prior To Admission? Yes   Home O2 LPM Flow 2 LPM   Home O2 Frequency of Use At Sleep   Pulse Oximetry 96 %   O2 (LPM) 2   O2 Daily Delivery Respiratory  Silicone Nasal Cannula

## 2019-06-16 NOTE — PROGRESS NOTES
Assumed care. Pt sitting up in bed, reports pain is controlled by current meds. Cooperative, compliant with medications. Family at bedside. Monitoring bladder scanning to ensure pt is not retaining urine. Juarez removed today. Will continue to monitor.

## 2019-06-16 NOTE — PROGRESS NOTES
Bladder scan- 230ml. , will  continue to monitor. Pt continues to retain urine. Voided 100ml all shift. Straight cath x1 this shift. See flow sheet for volumes.

## 2019-06-16 NOTE — PROGRESS NOTES
POD#3  S/P Right hip nail  Hb 6.4, One unit being transfused  WBAT  Lovenox/SCDs in hospital, resume warfarin  PT/OT  Pain Control  Case coordination

## 2019-06-16 NOTE — PROGRESS NOTES
Inpatient Anticoagulation Service Note    Date: 6/16/2019    Reason for Anticoagulation: Pulmonary Embolism   Target INR: 2.0 to 3.0         Hemoglobin Value: (!) 7.4  Hematocrit Value: (!) 22.9    INR from last 7 days     Date/Time INR Value    06/16/19 0445 (!)  1.58    06/15/19 0339 (!)  1.42    06/14/19 0437 (!)  1.57    06/13/19 0503 (!)  1.89    06/12/19 1801 (!)  1.89    06/12/19 1147 (!)  2.23        Dose from last 7 days     Date/Time Dose (mg)    06/16/19 1500  20    06/15/19 0900  20    06/14/19 0857  10        Significant Interactions: Proton Pump Inhibitor, Thyroid Medications  Bridge Therapy: Yes  Bridge Therapy Start Date: 06/14/19 (If less than 5 days and overlap therapy discontinued -- document reason (i.e. Bleed Risk))  INR Value Greater than 2 Prior to Discontinuation of Parenteral Anticoagulation: Not Applicable (If still on overlap therapy, if No -- document reason (i.e. Bleed Risk))    Comments: long term warfarin pt. held for hip surgery, restarted 6/14; Lovenox held this evening due to drop in hemoglobin     Plan:  Warfarin 20 mg PO today  Education Material Provided?: No  Pharmacist suggested discharge dosing: resume home regimen     Benjamín Edwards, PharmD

## 2019-06-16 NOTE — PROGRESS NOTES
Pt blood pressure remains out of parameters. Lasix held due to low bp. Pt verbalizes understanding of meds and plan. Doctor paged for abnormal critical hgb 6.4, and hct 19.6. Awaiting further orders.

## 2019-06-16 NOTE — PROGRESS NOTES
[facility] Medicine Daily Progress Note    Date of Service  6/16/2019    Chief Complaint  78 y.o. female admitted 6/12/2019 with right hip pain post fall    Hospital Course    History of Presenting Illness per Dr. Olmos's H&P  78 y.o. female who presented 6/12/2019 with past medical history of PE on lifelong warfarin therapy, history of seizure disorder, history of left hip fracture, history of osteoporosis, history of hypothyroidism, history of sleep apnea comes into the emergency room after a ground-level fall and left hip pain.  Patient states that she had a mechanical fall in which she tripped over her sandals in the bathroom at her PCPs office.  She states that she never lost consciousness and did not have any presyncope symptoms.  She did hit her head against a door.  She complains but no focal deficits including muscle weakness or sensory loss.  The patient had a DEXA scan in 2017 which found osteopenia.  The patient also takes chronic Lasix therapy for pulmonary hypertension.  She denies any changes or adjustments in her medications.  The patient arrived to the emergency room with normal vital signs.  She was in severe pain and was given IV morphine.  The patient had an x-ray of the chest found increased vascular congestion  Head x-ray of the pelvis and femur that found right femoral fracture      Interval Problem Update  Patient is POD #3 from hip fixture.  Feels weak and tired, light headed. No melena or hematochezia, no hematemesis. right leg has post op swelling and there has been bleeding from the wound but not copious, patient denies increase in pain in right leg. Also having urinary retention, she states a prior history of this with prior surgeries.    Consultants/Specialty  Ortho - Walker    Code Status  Full    Disposition  SNF when medically stable.    Review of Systems  Review of Systems   Constitutional: Positive for malaise/fatigue. Negative for chills, fever and weight loss.   HENT: Negative.     Respiratory: Negative.  Negative for cough and shortness of breath.    Cardiovascular: Negative.  Negative for chest pain and leg swelling.   Gastrointestinal: Negative.  Negative for abdominal pain, blood in stool, constipation, diarrhea, heartburn, melena, nausea and vomiting.   Genitourinary: Negative.  Negative for dysuria and flank pain.   Musculoskeletal: Positive for joint pain. Negative for back pain and myalgias.   Neurological: Positive for dizziness. Negative for loss of consciousness and weakness.   Endo/Heme/Allergies: Negative.  Does not bruise/bleed easily.   Psychiatric/Behavioral: Negative.  Negative for depression. The patient is not nervous/anxious.    All other systems reviewed and are negative.       Physical Exam  Temp:  [36.7 °C (98 °F)-38.2 °C (100.8 °F)] 36.7 °C (98 °F)  Pulse:  [] 80  Resp:  [18-20] 18  BP: (113-143)/(41-63) 126/41  SpO2:  [94 %-99 %] 96 %    Physical Exam   Constitutional: She is oriented to person, place, and time. She appears well-developed and well-nourished. No distress.   Plan of care discussed with bedside RN.   HENT:   Nose: Nose normal.   Mouth/Throat: Oropharynx is clear and moist. No oropharyngeal exudate.   Eyes: Conjunctivae are normal. Right eye exhibits no discharge. Left eye exhibits no discharge. No scleral icterus.   Neck: No JVD present. No tracheal deviation present.   Cardiovascular: Normal rate, regular rhythm and normal heart sounds.    Pulmonary/Chest: Effort normal and breath sounds normal. No stridor. No respiratory distress. She has no wheezes. She has no rales. She exhibits no tenderness.   Abdominal: Soft. Bowel sounds are normal. She exhibits no distension. There is no tenderness.   Musculoskeletal: She exhibits edema, tenderness and deformity.   Right leg, blood on gauze, no active bleeding, leg is edematous but not tense or tender to palpation, no obvious hematoma   Neurological: She is alert and oriented to person, place, and time.  No cranial nerve deficit. She exhibits normal muscle tone.   Skin: Skin is warm and dry. She is not diaphoretic. No pallor.   Psychiatric: She has a normal mood and affect. Her behavior is normal. Judgment and thought content normal.   Nursing note and vitals reviewed.      Fluids    Intake/Output Summary (Last 24 hours) at 06/16/19 1335  Last data filed at 06/16/19 1145   Gross per 24 hour   Intake             1495 ml   Output             1294 ml   Net              201 ml       Laboratory  Recent Labs      06/13/19   1753  06/16/19   0540   WBC  10.1  7.0   RBC  3.84*  2.00*   HEMOGLOBIN  12.5  6.4*   HEMATOCRIT  37.7  19.6*   MCV  98.2*  98.0*   MCH  32.6  32.0   MCHC  33.2*  32.7*   RDW  45.9  45.1   PLATELETCT  213  165   MPV  8.9*  9.3     Recent Labs      06/16/19   0445   SODIUM  135   POTASSIUM  3.7   CHLORIDE  100   CO2  30   GLUCOSE  134*   BUN  9   CREATININE  0.53   CALCIUM  7.1*     Recent Labs      06/14/19   0437  06/15/19   0339  06/16/19   0445   INR  1.57*  1.42*  1.58*               Imaging  DX-PORTABLE FLUORO > 1 HOUR   Final Result         Portable fluoroscopy utilized for 163.7 seconds.      DX-FEMUR-2+ RIGHT   Final Result      Fluoroscopic image(s) obtained during right femoral instrumentation. Please see the patient's chart for full procedural details.      Fluoroscopy time 163.7.         DX-FEMUR-2+ RIGHT   Final Result      1.  Right proximal femoral comminuted and angulated immature trochanteric fracture.      2.  Severe degenerative change of the right knee and mild degenerative change of the right hip.      DX-PELVIS-1 OR 2 VIEWS   Final Result      1.  Comminuted and angulated right proximal femoral intertrochanteric fracture.      2.  Old posttraumatic and surgical change of the left proximal femur.      DX-CHEST-LIMITED (1 VIEW)   Final Result      Cardiomegaly with mild interstitial edema.           Assessment/Plan  * Closed right hip fracture, initial encounter (HCC)   Assessment &  Plan    S/p operative repair  Pain management, IV Dilaudid oral oxycodone as needed  PT and OT recommending skilled care prior to going home accepted but won't go until anemia stable     Urinary retention   Assessment & Plan    Has a history of this in the past in the post operative period.  Patient has been straight cathed over 4 times in the past 24 hours; still retaining now over 750 cc on bladder scan, place oleary catheter, remove when patient more mobile and weaned on narcotics.     Acute blood loss anemia   Assessment & Plan    Large drop in hemoglobin, possibly secondary to blood loss from wound?  No signs of large hematoma seen on exam  Transfuse one unit of prbc and repeat exam, if she is still symptomatic and under 8 will need a second unit of blood.  Hold lovenox tonight, monitor wound.    I have explained to the patient the risks and benefits of transfusion of blood products.  This includes, as appropriate, the risk of mild allergic reaction, hemolytic reaction, transfusion-associated lung injury, febrile reactions, circulatory or iron overload, and infection.    We discussed possible alternatives and their risks, including directed donation, autologous transfusion, and no transfusion, including IV or oral iron supplementation, as appropriate.  I believe the patient understands the risks and benefits and was able to express understanding.       Age-related osteoporosis with current pathological fracture with routine healing   Assessment & Plan    Cont bisphosphates, calcium and vitamin D     Fall   Assessment & Plan    Ground-level fall  Right-sided hip fracture  no syncope     Gastroesophageal reflux disease without esophagitis- (present on admission)   Assessment & Plan    Continue omeprazole     Chronic respiratory failure with hypoxia (HCC)- (present on admission)   Assessment & Plan    On nighttime oxygen at home can continue     Obstructive sleep apnea syndrome- (present on admission)   Assessment  & Plan    Continue CPAP at night     Secondary pulmonary arterial hypertension (HCC)- (present on admission)   Assessment & Plan    Appears secondary to long-standing sleep apnea  Watch for post surgery volume overload   Continue Lasix  Continue carvedilol     Chronic anticoagulation- (present on admission)   Assessment & Plan    On warfarin therapy for recurrent pulmonary embolism  History of multiple PEs  Hold lovenox tonight due to potential bleeding  Warfarin per pharmacy protocol inr 1.5     Diastolic dysfunction- (present on admission)   Assessment & Plan    Found on cardiac echo in 2017  Careful with fluids, watch for post surgery volume overload.     Iron deficiency anemia- (present on admission)   Assessment & Plan    Continue oral iron supplementation  Last EGD was 2016     Acquired hypothyroidism- (present on admission)   Assessment & Plan    Continue home dose of levothyroxine at 100 mcg per day.       Seizure disorder (HCC)- (present on admission)   Assessment & Plan    Last seizure was in 1982  On primidone          VTE prophylaxis: lovenox bridge, warfarin

## 2019-06-16 NOTE — CARE PLAN
Problem: Safety  Goal: Will remain free from injury  Outcome: PROGRESSING AS EXPECTED      Problem: Infection  Goal: Will remain free from infection  Outcome: PROGRESSING AS EXPECTED      Problem: Respiratory:  Goal: Respiratory status will improve  Outcome: PROGRESSING AS EXPECTED      Problem: Fluid Volume:  Goal: Will maintain balanced intake and output  Outcome: PROGRESSING AS EXPECTED

## 2019-06-16 NOTE — ASSESSMENT & PLAN NOTE
Has a history of this in the past in the post operative period.  Was unable to urinate so placed oleary catheter  Will remove when patient more mobile and weaned on narcotics.  Will keep in place for snf dc in AM

## 2019-06-16 NOTE — PROGRESS NOTES
Doc returned call. New orders for blood. One unit of blood to be administered. Will relay new orders to oncoming rn. Pt aware of plan, and requests benadryl to be given with blood. Again, will relay info to day oncoming rn.

## 2019-06-16 NOTE — PROGRESS NOTES
Pt bladder scanned - 419 ml. Pt able to void 100ml. Encouraged pt to try and void again to prevent straight cath. Pt reports she has chronic urinary retention and asks to not be straight cath at this time. Reinforced my plan, will reeval in 2 hours. Encouraged pt to call for assistance.

## 2019-06-16 NOTE — PROGRESS NOTES
Pt bladder scanned. Over 400 ml in bladder. Abdominal area soft, not distended, nor any pain present.Straight cath pt x1. 225 ml dark yellow urine out.tolerated cath without incident. Pt Continues to discourage straight cath and states she retains and this is her normal. Will address in am with doctor. Right leg middle hip area dressing intact but saturated with dark red blood. Pressure dressing remains intact at this time. Pain med effective early in shift,pt sleeping.

## 2019-06-16 NOTE — ASSESSMENT & PLAN NOTE
Large drop in hemoglobin, post op.  Likely intra-op loss in setting of coumadin  After 2 units of PRBC she is up to 7.4 and feeling better, stable now at 7.5  No signs of large hematoma seen on exam  Ortho, Dr. Garnica ok w coumadin  Prevena incisional wound vac

## 2019-06-17 ENCOUNTER — APPOINTMENT (OUTPATIENT)
Dept: MEDICAL GROUP | Facility: MEDICAL CENTER | Age: 79
End: 2019-06-17
Payer: MEDICARE

## 2019-06-17 PROBLEM — N30.00 ACUTE CYSTITIS WITHOUT HEMATURIA: Status: ACTIVE | Noted: 2019-06-17

## 2019-06-17 PROBLEM — R50.9 FEVER: Status: ACTIVE | Noted: 2019-06-17

## 2019-06-17 LAB
ALBUMIN SERPL BCP-MCNC: 2 G/DL (ref 3.2–4.9)
ALBUMIN/GLOB SERPL: 0.8 G/DL
ALP SERPL-CCNC: 35 U/L (ref 30–99)
ALT SERPL-CCNC: 16 U/L (ref 2–50)
ANION GAP SERPL CALC-SCNC: 7 MMOL/L (ref 0–11.9)
APPEARANCE UR: CLEAR
AST SERPL-CCNC: 18 U/L (ref 12–45)
BACTERIA #/AREA URNS HPF: ABNORMAL /HPF
BASOPHILS # BLD AUTO: 0.3 % (ref 0–1.8)
BASOPHILS # BLD: 0.02 K/UL (ref 0–0.12)
BILIRUB SERPL-MCNC: 0.6 MG/DL (ref 0.1–1.5)
BILIRUB UR QL STRIP.AUTO: NEGATIVE
BUN SERPL-MCNC: 10 MG/DL (ref 8–22)
CALCIUM SERPL-MCNC: 7.4 MG/DL (ref 8.4–10.2)
CHLORIDE SERPL-SCNC: 99 MMOL/L (ref 96–112)
CO2 SERPL-SCNC: 30 MMOL/L (ref 20–33)
COLOR UR: YELLOW
CREAT SERPL-MCNC: 0.46 MG/DL (ref 0.5–1.4)
EOSINOPHIL # BLD AUTO: 0.12 K/UL (ref 0–0.51)
EOSINOPHIL NFR BLD: 1.9 % (ref 0–6.9)
EPI CELLS #/AREA URNS HPF: ABNORMAL /HPF
ERYTHROCYTE [DISTWIDTH] IN BLOOD BY AUTOMATED COUNT: 53.8 FL (ref 35.9–50)
GLOBULIN SER CALC-MCNC: 2.6 G/DL (ref 1.9–3.5)
GLUCOSE SERPL-MCNC: 120 MG/DL (ref 65–99)
GLUCOSE UR STRIP.AUTO-MCNC: NEGATIVE MG/DL
HCT VFR BLD AUTO: 22.9 % (ref 37–47)
HGB BLD-MCNC: 7.4 G/DL (ref 12–16)
HYALINE CASTS #/AREA URNS LPF: ABNORMAL /LPF
IMM GRANULOCYTES # BLD AUTO: 0.04 K/UL (ref 0–0.11)
IMM GRANULOCYTES NFR BLD AUTO: 0.6 % (ref 0–0.9)
INR PPP: 2.02 (ref 0.87–1.13)
KETONES UR STRIP.AUTO-MCNC: NEGATIVE MG/DL
LEUKOCYTE ESTERASE UR QL STRIP.AUTO: NEGATIVE
LYMPHOCYTES # BLD AUTO: 1.14 K/UL (ref 1–4.8)
LYMPHOCYTES NFR BLD: 18.1 % (ref 22–41)
MCH RBC QN AUTO: 30.5 PG (ref 27–33)
MCHC RBC AUTO-ENTMCNC: 32.3 G/DL (ref 33.6–35)
MCV RBC AUTO: 94.2 FL (ref 81.4–97.8)
MICRO URNS: ABNORMAL
MONOCYTES # BLD AUTO: 0.62 K/UL (ref 0–0.85)
MONOCYTES NFR BLD AUTO: 9.8 % (ref 0–13.4)
MUCOUS THREADS #/AREA URNS HPF: ABNORMAL /HPF
NEUTROPHILS # BLD AUTO: 4.37 K/UL (ref 2–7.15)
NEUTROPHILS NFR BLD: 69.3 % (ref 44–72)
NITRITE UR QL STRIP.AUTO: POSITIVE
NRBC # BLD AUTO: 0 K/UL
NRBC BLD-RTO: 0 /100 WBC
PH UR STRIP.AUTO: 6 [PH]
PHYTONADIONE SERPL-MCNC: 0.83 NMOL/L (ref 0.22–4.88)
PLATELET # BLD AUTO: 180 K/UL (ref 164–446)
PMV BLD AUTO: 9.5 FL (ref 9–12.9)
POTASSIUM SERPL-SCNC: 3.7 MMOL/L (ref 3.6–5.5)
PROT SERPL-MCNC: 4.6 G/DL (ref 6–8.2)
PROT UR QL STRIP: NEGATIVE MG/DL
PROTHROMBIN TIME: 23.3 SEC (ref 12–14.6)
RBC # BLD AUTO: 2.43 M/UL (ref 4.2–5.4)
RBC # URNS HPF: ABNORMAL /HPF
RBC UR QL AUTO: ABNORMAL
SODIUM SERPL-SCNC: 136 MMOL/L (ref 135–145)
SP GR UR STRIP.AUTO: 1.02
WBC # BLD AUTO: 6.3 K/UL (ref 4.8–10.8)
WBC #/AREA URNS HPF: ABNORMAL /HPF

## 2019-06-17 PROCEDURE — 85025 COMPLETE CBC W/AUTO DIFF WBC: CPT

## 2019-06-17 PROCEDURE — 700102 HCHG RX REV CODE 250 W/ 637 OVERRIDE(OP): Performed by: HOSPITALIST

## 2019-06-17 PROCEDURE — A9270 NON-COVERED ITEM OR SERVICE: HCPCS | Performed by: HOSPITALIST

## 2019-06-17 PROCEDURE — 770006 HCHG ROOM/CARE - MED/SURG/GYN SEMI*

## 2019-06-17 PROCEDURE — 80053 COMPREHEN METABOLIC PANEL: CPT

## 2019-06-17 PROCEDURE — 81001 URINALYSIS AUTO W/SCOPE: CPT

## 2019-06-17 PROCEDURE — 97530 THERAPEUTIC ACTIVITIES: CPT

## 2019-06-17 PROCEDURE — 302299 SYSTEM PREVENA INCISION MNGM: Performed by: ORTHOPAEDIC SURGERY

## 2019-06-17 PROCEDURE — 97110 THERAPEUTIC EXERCISES: CPT

## 2019-06-17 PROCEDURE — 700111 HCHG RX REV CODE 636 W/ 250 OVERRIDE (IP): Performed by: HOSPITALIST

## 2019-06-17 PROCEDURE — 85610 PROTHROMBIN TIME: CPT

## 2019-06-17 PROCEDURE — 36415 COLL VENOUS BLD VENIPUNCTURE: CPT

## 2019-06-17 PROCEDURE — 99232 SBSQ HOSP IP/OBS MODERATE 35: CPT | Performed by: HOSPITALIST

## 2019-06-17 RX ORDER — WARFARIN SODIUM 5 MG/1
10 TABLET ORAL
Status: COMPLETED | OUTPATIENT
Start: 2019-06-17 | End: 2019-06-17

## 2019-06-17 RX ORDER — NITROFURANTOIN 25; 75 MG/1; MG/1
100 CAPSULE ORAL 2 TIMES DAILY WITH MEALS
Status: DISCONTINUED | OUTPATIENT
Start: 2019-06-17 | End: 2019-06-18

## 2019-06-17 RX ADMIN — DIPHENHYDRAMINE HYDROCHLORIDE 25 MG: 50 INJECTION, SOLUTION INTRAMUSCULAR; INTRAVENOUS at 20:44

## 2019-06-17 RX ADMIN — MULTIPLE VITAMINS W/ MINERALS TAB 1 TABLET: TAB at 05:25

## 2019-06-17 RX ADMIN — SENNOSIDES,DOCUSATE SODIUM 2 TABLET: 8.6; 5 TABLET, FILM COATED ORAL at 05:25

## 2019-06-17 RX ADMIN — FERROUS SULFATE TAB 325 MG (65 MG ELEMENTAL FE) 325 MG: 325 (65 FE) TAB at 08:27

## 2019-06-17 RX ADMIN — VITAMIN D, TAB 1000IU (100/BT) 1000 UNITS: 25 TAB at 05:25

## 2019-06-17 RX ADMIN — PRIMIDONE 500 MG: 250 TABLET ORAL at 21:00

## 2019-06-17 RX ADMIN — CARVEDILOL 6.25 MG: 6.25 TABLET, FILM COATED ORAL at 17:16

## 2019-06-17 RX ADMIN — POTASSIUM CHLORIDE 20 MEQ: 1500 TABLET, EXTENDED RELEASE ORAL at 05:26

## 2019-06-17 RX ADMIN — OMEPRAZOLE 20 MG: 20 CAPSULE, DELAYED RELEASE ORAL at 17:15

## 2019-06-17 RX ADMIN — OXYCODONE HYDROCHLORIDE 5 MG: 5 TABLET ORAL at 21:26

## 2019-06-17 RX ADMIN — WARFARIN SODIUM 10 MG: 5 TABLET ORAL at 17:15

## 2019-06-17 RX ADMIN — LEVOTHYROXINE SODIUM 100 MCG: 100 TABLET ORAL at 05:25

## 2019-06-17 RX ADMIN — FUROSEMIDE 40 MG: 40 TABLET ORAL at 05:25

## 2019-06-17 RX ADMIN — CALCIUM CARBONATE (ANTACID) CHEW TAB 500 MG 1000 MG: 500 CHEW TAB at 05:25

## 2019-06-17 RX ADMIN — ACETAMINOPHEN 650 MG: 325 TABLET, FILM COATED ORAL at 05:26

## 2019-06-17 RX ADMIN — PRIMIDONE 250 MG: 250 TABLET ORAL at 05:26

## 2019-06-17 RX ADMIN — NITROFURANTOIN MONOHYDRATE/MACROCRYSTALLINE 100 MG: 25; 75 CAPSULE ORAL at 17:16

## 2019-06-17 RX ADMIN — OXYCODONE HYDROCHLORIDE 2.5 MG: 5 TABLET ORAL at 13:10

## 2019-06-17 RX ADMIN — SENNOSIDES,DOCUSATE SODIUM 2 TABLET: 8.6; 5 TABLET, FILM COATED ORAL at 17:15

## 2019-06-17 ASSESSMENT — COGNITIVE AND FUNCTIONAL STATUS - GENERAL
MOVING FROM LYING ON BACK TO SITTING ON SIDE OF FLAT BED: A LOT
SUGGESTED CMS G CODE MODIFIER MOBILITY: CL
MOVING TO AND FROM BED TO CHAIR: UNABLE
TURNING FROM BACK TO SIDE WHILE IN FLAT BAD: A LOT
MOBILITY SCORE: 11
CLIMB 3 TO 5 STEPS WITH RAILING: TOTAL
WALKING IN HOSPITAL ROOM: A LOT
STANDING UP FROM CHAIR USING ARMS: A LITTLE

## 2019-06-17 ASSESSMENT — ENCOUNTER SYMPTOMS
WEIGHT LOSS: 0
ABDOMINAL PAIN: 0
NAUSEA: 0
SHORTNESS OF BREATH: 0
FLANK PAIN: 0
DEPRESSION: 0
CARDIOVASCULAR NEGATIVE: 1
MYALGIAS: 0
BACK PAIN: 0
PSYCHIATRIC NEGATIVE: 1
RESPIRATORY NEGATIVE: 1
DIZZINESS: 0
FEVER: 1
CHILLS: 0
GASTROINTESTINAL NEGATIVE: 1
VOMITING: 0
NERVOUS/ANXIOUS: 0
COUGH: 0
NEUROLOGICAL NEGATIVE: 1
LOSS OF CONSCIOUSNESS: 0
WEAKNESS: 0
BRUISES/BLEEDS EASILY: 0

## 2019-06-17 ASSESSMENT — GAIT ASSESSMENTS
DISTANCE (FEET): 6
ASSISTIVE DEVICE: FRONT WHEEL WALKER
GAIT LEVEL OF ASSIST: MODERATE ASSIST
DEVIATION: ANTALGIC;STEP TO;DECREASED BASE OF SUPPORT;SHUFFLED GAIT;DECREASED HEEL STRIKE;DECREASED TOE OFF

## 2019-06-17 NOTE — DISCHARGE PLANNING
Anticipated Discharge Disposition: [facility] SNF    Action: SW spoke with Hospitalist and was updated that this patient is not medically cleared today and she anticipates patient to be ready for discharge tomorrow.    Barriers to Discharge: Medical clearance.    Plan: SW will continue to monitor and assist as needed.

## 2019-06-17 NOTE — ASSESSMENT & PLAN NOTE
No clear infectious source, may have been from blood transfusion (2U on 6/16)  urinalysis does have a few white cells 5-10  Omnicef x5d  Wound from surgery not draining, orthopedics monitoring

## 2019-06-17 NOTE — PROGRESS NOTES
[facility] Medicine Daily Progress Note    Date of Service  6/17/2019    Chief Complaint  78 y.o. female admitted 6/12/2019 with right hip pain post fall    Hospital Course    History of Presenting Illness per Dr. Olmos's H&P  78 y.o. female who presented 6/12/2019 with past medical history of PE on lifelong warfarin therapy, history of seizure disorder, history of left hip fracture, history of osteoporosis, history of hypothyroidism, history of sleep apnea comes into the emergency room after a ground-level fall and left hip pain.  Patient states that she had a mechanical fall in which she tripped over her sandals in the bathroom at her PCPs office.  She states that she never lost consciousness and did not have any presyncope symptoms.  She did hit her head against a door.  She complains but no focal deficits including muscle weakness or sensory loss.  The patient had a DEXA scan in 2017 which found osteopenia.  The patient also takes chronic Lasix therapy for pulmonary hypertension.  She denies any changes or adjustments in her medications.  The patient arrived to the emergency room with normal vital signs.  She was in severe pain and was given IV morphine.  The patient had an x-ray of the chest found increased vascular congestion  Head x-ray of the pelvis and femur that found right femoral fracture      Interval Problem Update  Patient is POD #4 from hip fixture.  Feels better after the two units of blood given yesterday. Spiked fever overnight up to 102, no chills, has resolved. Wound vacs placed by orthopedics.    Consultants/Specialty  Ortho - Walker    Code Status  Full    Disposition  SNF when medically stable.    Review of Systems  Review of Systems   Constitutional: Positive for fever and malaise/fatigue. Negative for chills and weight loss.   HENT: Negative.    Respiratory: Negative.  Negative for cough and shortness of breath.    Cardiovascular: Negative.  Negative for chest pain and leg swelling.    Gastrointestinal: Negative.  Negative for abdominal pain, nausea and vomiting.   Genitourinary: Negative.  Negative for dysuria and flank pain.   Musculoskeletal: Positive for joint pain. Negative for back pain and myalgias.   Neurological: Negative.  Negative for dizziness, loss of consciousness and weakness.   Endo/Heme/Allergies: Negative.  Does not bruise/bleed easily.   Psychiatric/Behavioral: Negative.  Negative for depression. The patient is not nervous/anxious.    All other systems reviewed and are negative.       Physical Exam  Temp:  [36.8 °C (98.2 °F)-39.1 °C (102.3 °F)] 36.8 °C (98.2 °F)  Pulse:  [78-95] 78  Resp:  [17-18] 18  BP: (110-143)/(39-48) 119/44  SpO2:  [95 %-98 %] 98 %    Physical Exam   Constitutional: She is oriented to person, place, and time. She appears well-developed and well-nourished. No distress.   HENT:   Head: Normocephalic and atraumatic.   Right Ear: External ear normal.   Left Ear: External ear normal.   Nose: Nose normal.   Eyes: Conjunctivae are normal. Right eye exhibits no discharge. Left eye exhibits no discharge. No scleral icterus.   Neck: No JVD present. No tracheal deviation present.   Cardiovascular: Normal rate, regular rhythm and normal heart sounds.    Pulmonary/Chest: Effort normal. No stridor. No respiratory distress. She has no wheezes.   Abdominal: Soft. She exhibits no distension.   Musculoskeletal: She exhibits edema, tenderness and deformity.   Right leg wounds have prevena wound vacs, not firm, mildly tender no active bleeding seen   Neurological: She is alert and oriented to person, place, and time.   Skin: Skin is warm and dry. She is not diaphoretic. No pallor.   Psychiatric: She has a normal mood and affect. Her behavior is normal. Judgment and thought content normal.   Nursing note and vitals reviewed.      Fluids    Intake/Output Summary (Last 24 hours) at 06/17/19 1217  Last data filed at 06/17/19 1119   Gross per 24 hour   Intake             1060 ml    Output             2650 ml   Net            -1590 ml       Laboratory  Recent Labs      06/16/19   0540  06/16/19   1325  06/17/19   0414   WBC  7.0  7.1  6.3   RBC  2.00*  2.34*  2.43*   HEMOGLOBIN  6.4*  7.4*  7.4*   HEMATOCRIT  19.6*  22.9*  22.9*   MCV  98.0*  97.9*  94.2   MCH  32.0  31.6  30.5   MCHC  32.7*  32.3*  32.3*   RDW  45.1  48.2  53.8*   PLATELETCT  165  185  180   MPV  9.3  9.5  9.5     Recent Labs      06/16/19   0445  06/17/19   0414   SODIUM  135  136   POTASSIUM  3.7  3.7   CHLORIDE  100  99   CO2  30  30   GLUCOSE  134*  120*   BUN  9  10   CREATININE  0.53  0.46*   CALCIUM  7.1*  7.4*     Recent Labs      06/15/19   0339  06/16/19   0445  06/17/19   0414   INR  1.42*  1.58*  2.02*               Imaging  DX-PORTABLE FLUORO > 1 HOUR   Final Result         Portable fluoroscopy utilized for 163.7 seconds.      DX-FEMUR-2+ RIGHT   Final Result      Fluoroscopic image(s) obtained during right femoral instrumentation. Please see the patient's chart for full procedural details.      Fluoroscopy time 163.7.         DX-FEMUR-2+ RIGHT   Final Result      1.  Right proximal femoral comminuted and angulated immature trochanteric fracture.      2.  Severe degenerative change of the right knee and mild degenerative change of the right hip.      DX-PELVIS-1 OR 2 VIEWS   Final Result      1.  Comminuted and angulated right proximal femoral intertrochanteric fracture.      2.  Old posttraumatic and surgical change of the left proximal femur.      DX-CHEST-LIMITED (1 VIEW)   Final Result      Cardiomegaly with mild interstitial edema.           Assessment/Plan  * Closed right hip fracture, initial encounter (HCC)   Assessment & Plan    S/p operative repair  Pain management, IV Dilaudid oral oxycodone as needed  PT and OT recommending skilled care prior to going home accepted but won't go until anemia stable     Acute cystitis without hematuria   Assessment & Plan    Very mild with 5-10 WBC, may be  reactive  Empirically give macrobid 100mg BID x 3 days and follow cultures.     Fever   Assessment & Plan    No clear infectious source, urinalysis does have a few white cells 5-10  May be transfusion reaction after receiving 2 units of blood yesterday  Wound from surgery not draining, orthopedics monitoring     Urinary retention   Assessment & Plan    Has a history of this in the past in the post operative period.  Was unable to urinate placed oleary catheter, remove when patient more mobile and weaned on narcotics.     Acute blood loss anemia   Assessment & Plan    Large drop in hemoglobin, possibly secondary to blood loss from wound? After 2 units of PRBC she is up to 7.4 and feeling better, did have a fever overnight.  No signs of large hematoma seen on exam, ortho Dr Garnica has examined and agrees, oozing improved with holding lovenox, he has placed Prevena wound vacs, no bloody drainage seen.     Age-related osteoporosis with current pathological fracture with routine healing   Assessment & Plan    Cont bisphosphates, calcium and vitamin D     Fall   Assessment & Plan    Ground-level fall  Right-sided hip fracture  no syncope     Gastroesophageal reflux disease without esophagitis- (present on admission)   Assessment & Plan    Continue omeprazole     Chronic respiratory failure with hypoxia (HCC)- (present on admission)   Assessment & Plan    On nighttime oxygen at home can continue     Obstructive sleep apnea syndrome- (present on admission)   Assessment & Plan    Continue CPAP at night     Secondary pulmonary arterial hypertension (HCC)- (present on admission)   Assessment & Plan    Appears secondary to long-standing sleep apnea  Continue Lasix  Continue carvedilol     Chronic anticoagulation- (present on admission)   Assessment & Plan    On warfarin therapy for recurrent pulmonary embolism  History of multiple PEs  Lovenox stopped due to drop in hemoglobin, INR today 2.0 do not need bridging at this time  anyways.  Continue to monitor INR.     Diastolic dysfunction- (present on admission)   Assessment & Plan    Found on cardiac echo in 2017  Careful with fluids, watch for post surgery volume overload.     Iron deficiency anemia- (present on admission)   Assessment & Plan    Continue oral iron supplementation  Last EGD was 2016     Acquired hypothyroidism- (present on admission)   Assessment & Plan    Continue home dose of levothyroxine at 100 mcg per day.       Seizure disorder (HCC)- (present on admission)   Assessment & Plan    Last seizure was in 1982  On primidone          VTE prophylaxis:  warfarin

## 2019-06-17 NOTE — CARE PLAN
Problem: Safety  Goal: Will remain free from injury  Outcome: PROGRESSING AS EXPECTED  Call light within reach, bed locked in low position, hourly rounding, treaded socks on     Problem: Venous Thromboembolism (VTW)/Deep Vein Thrombosis (DVT) Prevention:  Goal: Patient will participate in Venous Thrombosis (VTE)/Deep Vein Thrombosis (DVT)Prevention Measures  Outcome: PROGRESSING AS EXPECTED  scds in place    Problem: Urinary Elimination:  Goal: Ability to reestablish a normal urinary elimination pattern will improve  Juarez in place, securement device in place, draining clear yellow urine

## 2019-06-17 NOTE — PROGRESS NOTES
"S:  Seen and examined.  POD #4 s/p R hip nail.  Doing well this morning.  No new complaints, pain controlled.  Yesterday she was febrile, and not out of bed at all.    O: /39   Pulse 81   Temp 37.2 °C (99 °F) (Oral)   Resp 17   Ht 1.575 m (5' 2.01\")   Wt 100 kg (220 lb 7.4 oz)   SpO2 97% .    Intake/Output Summary (Last 24 hours) at 06/17/19 0720  Last data filed at 06/17/19 0600   Gross per 24 hour   Intake             1795 ml   Output             1000 ml   Net              795 ml   .    Operative/injured extremity examined.  Compartments soft, distal light touch sensation intact, firing EHL/TA/GS/P.  Toes warm, well-perfused.  Wound with bloody drainage.    Recent Labs      06/16/19   0540  06/16/19   1325  06/17/19   0414   WBC  7.0  7.1  6.3   RBC  2.00*  2.34*  2.43*   HEMOGLOBIN  6.4*  7.4*  7.4*   HEMATOCRIT  19.6*  22.9*  22.9*   MCV  98.0*  97.9*  94.2   MCH  32.0  31.6  30.5   MCHC  32.7*  32.3*  32.3*   RDW  45.1  48.2  53.8*   PLATELETCT  165  185  180   MPV  9.3  9.5  9.5       A/P:    POD #4 s/p R hip nail    Antibiotics: None required  Activity: WBAT operative extremity.  PT today.  Dressing:  Place provena vac to proximal incisions  Diet: General  DVT: Mechanical (SCDs) + Pharmacologic (Per medicine)  Dispo: D/C planning    "

## 2019-06-17 NOTE — PROGRESS NOTES
Inpatient Anticoagulation Service Note    Date: 6/17/2019    Reason for Anticoagulation: Pulmonary Embolism   Target INR: 2.0 to 3.0         Hemoglobin Value: (!) 7.4  Hematocrit Value: (!) 22.9    INR from last 7 days     Date/Time INR Value    06/17/19 0414 (!)  2.02    06/16/19 0445 (!)  1.58    06/15/19 0339 (!)  1.42    06/14/19 0437 (!)  1.57    06/13/19 0503 (!)  1.89    06/12/19 1801 (!)  1.89    06/12/19 1147 (!)  2.23        Dose from last 7 days     Date/Time Dose (mg)    06/17/19 1028  10    06/16/19 1500  20    06/15/19 0900  20    06/14/19 0857  10        Significant Interactions: Proton Pump Inhibitor, Thyroid Medications, Antibiotics  Bridge Therapy: No  Bridge Therapy Start Date: 06/14/19-6/16/19 (If less than 5 days and overlap therapy discontinued -- document reason (i.e. Bleed Risk))  INR Value Greater than 2 Prior to Discontinuation of Parenteral Anticoagulation: Not Applicable (If still on overlap therapy, if No -- document reason (i.e. Bleed Risk))    Comments: long term warfarin pt. held for hip surgery, resumed 6/14/19    Plan:  Warfarin 10 mg PO today  Education Material Provided?: No  Pharmacist suggested discharge dosing: resume home regimen     Benjamín Edwards, PharmD

## 2019-06-17 NOTE — PROGRESS NOTES
Assumed care. Blood stopped at aprox 7:45pm. Infused 250 ml of ns after blood. Pt tolerated well. Temp normal at this time. Pt blood pressure 115/41. Denies sob, pain under control at this time. Middle incision of three is saturated with red blood. Changed per orders. Juarez draining clear dark yellow.

## 2019-06-17 NOTE — PROGRESS NOTES
2 prevena wound vacs placed to 2 proximal R hip incisions per order. Distal dressing of gauze and tegaderm left in place, CDI. Pt resting in bed, IV SL. SCDs in place. UA collected from oleary and sent to lab. INR precious information given including diet education with warfarin.

## 2019-06-17 NOTE — PROGRESS NOTES
Pt compliant with care and all meds. Slept 7 hours of shift. Blood pressures remain low. See flowsheet. Temp 99.0. Tylenol given for comfort. Resting quietly at this time. Dressing remain intact, middle dressing seeping but secure in place.

## 2019-06-18 ENCOUNTER — PATIENT OUTREACH (OUTPATIENT)
Dept: HEALTH INFORMATION MANAGEMENT | Facility: OTHER | Age: 79
End: 2019-06-18

## 2019-06-18 LAB
BASOPHILS # BLD AUTO: 0.4 % (ref 0–1.8)
BASOPHILS # BLD: 0.03 K/UL (ref 0–0.12)
EOSINOPHIL # BLD AUTO: 0.14 K/UL (ref 0–0.51)
EOSINOPHIL NFR BLD: 2 % (ref 0–6.9)
ERYTHROCYTE [DISTWIDTH] IN BLOOD BY AUTOMATED COUNT: 52.3 FL (ref 35.9–50)
HCT VFR BLD AUTO: 23.7 % (ref 37–47)
HGB BLD-MCNC: 7.5 G/DL (ref 12–16)
IMM GRANULOCYTES # BLD AUTO: 0.03 K/UL (ref 0–0.11)
IMM GRANULOCYTES NFR BLD AUTO: 0.4 % (ref 0–0.9)
INR PPP: 2.3 (ref 0.87–1.13)
LYMPHOCYTES # BLD AUTO: 0.94 K/UL (ref 1–4.8)
LYMPHOCYTES NFR BLD: 13.4 % (ref 22–41)
MCH RBC QN AUTO: 30.7 PG (ref 27–33)
MCHC RBC AUTO-ENTMCNC: 31.6 G/DL (ref 33.6–35)
MCV RBC AUTO: 97.1 FL (ref 81.4–97.8)
MONOCYTES # BLD AUTO: 0.73 K/UL (ref 0–0.85)
MONOCYTES NFR BLD AUTO: 10.4 % (ref 0–13.4)
NEUTROPHILS # BLD AUTO: 5.14 K/UL (ref 2–7.15)
NEUTROPHILS NFR BLD: 73.4 % (ref 44–72)
NRBC # BLD AUTO: 0.02 K/UL
NRBC BLD-RTO: 0.3 /100 WBC
PLATELET # BLD AUTO: 244 K/UL (ref 164–446)
PMV BLD AUTO: 9.5 FL (ref 9–12.9)
PROTHROMBIN TIME: 25.9 SEC (ref 12–14.6)
RBC # BLD AUTO: 2.44 M/UL (ref 4.2–5.4)
WBC # BLD AUTO: 7 K/UL (ref 4.8–10.8)

## 2019-06-18 PROCEDURE — 700102 HCHG RX REV CODE 250 W/ 637 OVERRIDE(OP): Performed by: HOSPITALIST

## 2019-06-18 PROCEDURE — A9270 NON-COVERED ITEM OR SERVICE: HCPCS | Performed by: HOSPITALIST

## 2019-06-18 PROCEDURE — 770006 HCHG ROOM/CARE - MED/SURG/GYN SEMI*

## 2019-06-18 PROCEDURE — 97116 GAIT TRAINING THERAPY: CPT

## 2019-06-18 PROCEDURE — 99232 SBSQ HOSP IP/OBS MODERATE 35: CPT | Performed by: INTERNAL MEDICINE

## 2019-06-18 PROCEDURE — 700111 HCHG RX REV CODE 636 W/ 250 OVERRIDE (IP): Performed by: HOSPITALIST

## 2019-06-18 PROCEDURE — 85610 PROTHROMBIN TIME: CPT

## 2019-06-18 PROCEDURE — 36415 COLL VENOUS BLD VENIPUNCTURE: CPT

## 2019-06-18 PROCEDURE — A9270 NON-COVERED ITEM OR SERVICE: HCPCS | Performed by: INTERNAL MEDICINE

## 2019-06-18 PROCEDURE — 85025 COMPLETE CBC W/AUTO DIFF WBC: CPT

## 2019-06-18 PROCEDURE — 97530 THERAPEUTIC ACTIVITIES: CPT

## 2019-06-18 PROCEDURE — 700102 HCHG RX REV CODE 250 W/ 637 OVERRIDE(OP): Performed by: INTERNAL MEDICINE

## 2019-06-18 RX ORDER — CEFDINIR 300 MG/1
300 CAPSULE ORAL EVERY 12 HOURS
Status: DISCONTINUED | OUTPATIENT
Start: 2019-06-18 | End: 2019-06-19 | Stop reason: HOSPADM

## 2019-06-18 RX ADMIN — ACETAMINOPHEN 650 MG: 325 TABLET, FILM COATED ORAL at 19:41

## 2019-06-18 RX ADMIN — SENNOSIDES,DOCUSATE SODIUM 2 TABLET: 8.6; 5 TABLET, FILM COATED ORAL at 06:10

## 2019-06-18 RX ADMIN — PRIMIDONE 250 MG: 250 TABLET ORAL at 06:10

## 2019-06-18 RX ADMIN — CEFDINIR 300 MG: 300 CAPSULE ORAL at 17:15

## 2019-06-18 RX ADMIN — NITROFURANTOIN MONOHYDRATE/MACROCRYSTALLINE 100 MG: 25; 75 CAPSULE ORAL at 08:38

## 2019-06-18 RX ADMIN — POLYETHYLENE GLYCOL 3350 1 PACKET: 17 POWDER, FOR SOLUTION ORAL at 08:53

## 2019-06-18 RX ADMIN — CALCIUM CARBONATE (ANTACID) CHEW TAB 500 MG 1000 MG: 500 CHEW TAB at 06:10

## 2019-06-18 RX ADMIN — DIPHENHYDRAMINE HYDROCHLORIDE 25 MG: 50 INJECTION, SOLUTION INTRAMUSCULAR; INTRAVENOUS at 21:07

## 2019-06-18 RX ADMIN — CARVEDILOL 6.25 MG: 6.25 TABLET, FILM COATED ORAL at 17:15

## 2019-06-18 RX ADMIN — PRIMIDONE 500 MG: 250 TABLET ORAL at 21:07

## 2019-06-18 RX ADMIN — LEVOTHYROXINE SODIUM 100 MCG: 100 TABLET ORAL at 06:10

## 2019-06-18 RX ADMIN — OMEPRAZOLE 20 MG: 20 CAPSULE, DELAYED RELEASE ORAL at 17:15

## 2019-06-18 RX ADMIN — WARFARIN SODIUM 20 MG: 7.5 TABLET ORAL at 17:14

## 2019-06-18 RX ADMIN — MULTIPLE VITAMINS W/ MINERALS TAB 1 TABLET: TAB at 06:10

## 2019-06-18 RX ADMIN — FUROSEMIDE 40 MG: 40 TABLET ORAL at 06:10

## 2019-06-18 RX ADMIN — OXYCODONE HYDROCHLORIDE 5 MG: 5 TABLET ORAL at 21:07

## 2019-06-18 RX ADMIN — FERROUS SULFATE TAB 325 MG (65 MG ELEMENTAL FE) 325 MG: 325 (65 FE) TAB at 08:38

## 2019-06-18 RX ADMIN — VITAMIN D, TAB 1000IU (100/BT) 1000 UNITS: 25 TAB at 06:10

## 2019-06-18 RX ADMIN — CARVEDILOL 6.25 MG: 6.25 TABLET, FILM COATED ORAL at 08:38

## 2019-06-18 RX ADMIN — POTASSIUM CHLORIDE 20 MEQ: 1500 TABLET, EXTENDED RELEASE ORAL at 06:00

## 2019-06-18 RX ADMIN — CEFDINIR 300 MG: 300 CAPSULE ORAL at 10:14

## 2019-06-18 RX ADMIN — SENNOSIDES,DOCUSATE SODIUM 2 TABLET: 8.6; 5 TABLET, FILM COATED ORAL at 17:15

## 2019-06-18 ASSESSMENT — ENCOUNTER SYMPTOMS
BACK PAIN: 0
NAUSEA: 0
NERVOUS/ANXIOUS: 0
ABDOMINAL PAIN: 0
VOMITING: 0
FEVER: 1
WEIGHT LOSS: 0
BRUISES/BLEEDS EASILY: 0
COUGH: 0
FLANK PAIN: 0
MYALGIAS: 0
SHORTNESS OF BREATH: 0
LOSS OF CONSCIOUSNESS: 0
DIZZINESS: 0
CHILLS: 0
WEAKNESS: 1
DEPRESSION: 0

## 2019-06-18 ASSESSMENT — GAIT ASSESSMENTS
ASSISTIVE DEVICE: FRONT WHEEL WALKER
GAIT LEVEL OF ASSIST: MINIMAL ASSIST
DEVIATION: ANTALGIC
DISTANCE (FEET): 10

## 2019-06-18 NOTE — DISCHARGE PLANNING
Agency/Facility Name: [facility]  Spoke To: Julio  Outcome: They are going to accept patient tomorrow 6/19 at 1300.

## 2019-06-18 NOTE — FLOWSHEET NOTE
06/17/19 2110   Events/Summary/Plan   Events/Summary/Plan Declined use of CPAP   General Vent Information   Pulse Oximetry 95 %   Heart Rate (Monitored) 91

## 2019-06-18 NOTE — PROGRESS NOTES
"Seen and examined, doing OK today.  Provena placed yesterday.    /45   Pulse 78   Temp 36.8 °C (98.2 °F) (Oral)   Resp 16   Ht 1.575 m (5' 2.01\")   Wt 100 kg (220 lb 7.4 oz)   SpO2 91%     Exam:  Vacs to suction, no output in canisters    #1 Right IT femur fracture s/p IM nail    Plan:  - WBAT, PT today  - SCD's, resume anticoagulation  - D/C planning for SNF  "

## 2019-06-18 NOTE — CARE PLAN
Problem: Infection  Goal: Will remain free from infection  Outcome: PROGRESSING AS EXPECTED  Patient being treated with PO antibiotics for UTI

## 2019-06-18 NOTE — PROGRESS NOTES
Pt slept the entire shift uninterrupted. Med compliant, both wound vacs remain intact with green light showing. o2 at 2 liters nc. Medicated with pain med x1 which resulted in pt sleeping. Med effective.

## 2019-06-18 NOTE — DISCHARGE PLANNING
Received Transport Form @ 1217  Spoke to Harry @ [facility]    Transport is scheduled for 6/18 @1600 going to [facility].

## 2019-06-18 NOTE — PROGRESS NOTES
Assumed care. Pt pleasant, lying in bed. Pain under cotrol at this time. Both wound vacs secure working with green light on. Med compliant. o2 at 2 liters nc. Juarez patent draining yellow urine.safety maintained.

## 2019-06-18 NOTE — PROGRESS NOTES
Inpatient Anticoagulation Service Note    Date: 6/18/2019    Reason for Anticoagulation: Pulmonary Embolism   Target INR: 2.0 to 3.0         Hemoglobin Value: (!) 7.5  Hematocrit Value: (!) 23.7    INR from last 7 days     Date/Time INR Value    06/18/19 0435 (!)  2.3    06/17/19 0414 (!)  2.02    06/16/19 0445 (!)  1.58    06/15/19 0339 (!)  1.42    06/14/19 0437 (!)  1.57    06/13/19 0503 (!)  1.89    06/12/19 1801 (!)  1.89    06/12/19 1147 (!)  2.23        Dose from last 7 days     Date/Time Dose (mg)    06/18/19 1100  20    06/17/19 1028  10    06/16/19 1500  20    06/15/19 0900  20    06/14/19 0857  10        Significant Interactions: Proton Pump Inhibitor, Thyroid Medications, Antibiotics  Bridge Therapy: No  Bridge Therapy Start Date: 06/14/19 (If less than 5 days and overlap therapy discontinued -- document reason (i.e. Bleed Risk))  INR Value Greater than 2 Prior to Discontinuation of Parenteral Anticoagulation: Not Applicable (If still on overlap therapy, if No -- document reason (i.e. Bleed Risk))    Comments: long term warfarin pt. held for hip surgery, resumed 6/14/19    Plan:  Will give warfarin 20 mg po tonight for INR of 2.30  Education Material Provided?: No  Pharmacist suggested discharge dosing: Resume home regimen.     Jamel Zambrano [facility]

## 2019-06-18 NOTE — THERAPY
"Physical Therapy Treatment completed.   Bed Mobility:  Supine to Sit: Moderate Assist  Transfers: Sit to Stand: Minimal Assist  Gait: Level Of Assist: Minimal Assist with Front-Wheel Walker  X 10 feet     Plan of Care: Will benefit from Physical Therapy 4 times per week  Discharge Recommendations: Equipment: Will Continue to Assess for Equipment Needs. Post-acute therapy Discharge to a transitional care facility for continued skilled therapy services.   Pt slowly improving in function bed mob,transfers and ambulation may go to SNF today  See \"Rehab Therapy-Acute\" Patient Summary Report for complete documentation.       "

## 2019-06-18 NOTE — CARE PLAN
Problem: Safety  Goal: Will remain free from injury  Outcome: PROGRESSING AS EXPECTED    Goal: Will remain free from falls  Outcome: PROGRESSING AS EXPECTED      Problem: Infection  Goal: Will remain free from infection  Outcome: PROGRESSING SLOWER THAN EXPECTED      Problem: Mobility  Goal: Risk for activity intolerance will decrease  Outcome: PROGRESSING SLOWER THAN EXPECTED

## 2019-06-18 NOTE — PROGRESS NOTES
[facility] Medicine Daily Progress Note    Date of Service  6/18/2019    Chief Complaint  78 y.o. female admitted 6/12/2019 with right hip pain post fall    Hospital Course  78 y.o. female who presented 6/12/2019 with past medical history of PE on lifelong warfarin therapy, history of seizure disorder, history of left hip fracture, OP, hypothyroidism, AILYN on 2L nightly, history of sleep apnea admitted after a mechanical GLF complaining of right hip pain found to have a right intertrochanteric femur fracture.  She underwent surgical repair on 6/13/2019.  The patient had an x-ray of the chest found increased vascular congestion, despite her home lasix which she takes for PAH      Interval Problem Update  6/16: 2U transfused  6/17: Fever overnight, resolved today.  Wound vac placed by ortho  6/18: Hgb stable at 7.5, INR therapeutic, still on 2L. Changed to omnicef for UTI (5d) accepted to SNF    Consultants/Specialty  Ortho - Walker    Code Status  Full    Disposition  SNF in AM    Review of Systems  Review of Systems   Constitutional: Positive for fever and malaise/fatigue. Negative for chills and weight loss.   Respiratory: Negative for cough and shortness of breath.    Cardiovascular: Positive for leg swelling. Negative for chest pain.   Gastrointestinal: Negative for abdominal pain, nausea and vomiting.   Genitourinary: Negative for dysuria and flank pain.   Musculoskeletal: Positive for joint pain. Negative for back pain and myalgias.   Neurological: Positive for weakness. Negative for dizziness and loss of consciousness.   Endo/Heme/Allergies: Does not bruise/bleed easily.   Psychiatric/Behavioral: Negative for depression. The patient is not nervous/anxious.    All other systems reviewed and are negative.       Physical Exam  Temp:  [36.8 °C (98.2 °F)-37.7 °C (99.8 °F)] 37.1 °C (98.7 °F)  Pulse:  [78-85] 82  Resp:  [16-18] 16  BP: (112-136)/(36-67) 122/36  SpO2:  [91 %-98 %] 98 %    Physical Exam   Constitutional:  She is oriented to person, place, and time. She appears well-developed and well-nourished. No distress.   HENT:   Head: Normocephalic and atraumatic.   Right Ear: External ear normal.   Left Ear: External ear normal.   Nose: Nose normal.   Eyes: Conjunctivae are normal. Right eye exhibits no discharge. Left eye exhibits no discharge. No scleral icterus.   Neck: No JVD present. No tracheal deviation present.   Cardiovascular: Normal rate, regular rhythm and normal heart sounds.    Pulmonary/Chest: Effort normal. No stridor. No respiratory distress. She has no wheezes.   Abdominal: Soft. She exhibits no distension.   Musculoskeletal: She exhibits edema, tenderness and deformity.   Right leg wounds have prevena wound vacs, no e/o bleeding, decreased ROM   Neurological: She is alert and oriented to person, place, and time.   Skin: Skin is warm and dry. She is not diaphoretic. No pallor.   Psychiatric: She has a normal mood and affect. Her behavior is normal.   Nursing note and vitals reviewed.      Fluids    Intake/Output Summary (Last 24 hours) at 06/18/19 1515  Last data filed at 06/18/19 1300   Gross per 24 hour   Intake             1440 ml   Output             1560 ml   Net             -120 ml       Laboratory  Recent Labs      06/16/19   1325  06/17/19   0414  06/18/19   0435   WBC  7.1  6.3  7.0   RBC  2.34*  2.43*  2.44*   HEMOGLOBIN  7.4*  7.4*  7.5*   HEMATOCRIT  22.9*  22.9*  23.7*   MCV  97.9*  94.2  97.1   MCH  31.6  30.5  30.7   MCHC  32.3*  32.3*  31.6*   RDW  48.2  53.8*  52.3*   PLATELETCT  185  180  244   MPV  9.5  9.5  9.5     Recent Labs      06/16/19   0445  06/17/19   0414   SODIUM  135  136   POTASSIUM  3.7  3.7   CHLORIDE  100  99   CO2  30  30   GLUCOSE  134*  120*   BUN  9  10   CREATININE  0.53  0.46*   CALCIUM  7.1*  7.4*     Recent Labs      06/16/19   0445  06/17/19   0414  06/18/19   0435   INR  1.58*  2.02*  2.30*               Imaging  DX-PORTABLE FLUORO > 1 HOUR   Final Result          Portable fluoroscopy utilized for 163.7 seconds.      DX-FEMUR-2+ RIGHT   Final Result      Fluoroscopic image(s) obtained during right femoral instrumentation. Please see the patient's chart for full procedural details.      Fluoroscopy time 163.7.         DX-FEMUR-2+ RIGHT   Final Result      1.  Right proximal femoral comminuted and angulated immature trochanteric fracture.      2.  Severe degenerative change of the right knee and mild degenerative change of the right hip.      DX-PELVIS-1 OR 2 VIEWS   Final Result      1.  Comminuted and angulated right proximal femoral intertrochanteric fracture.      2.  Old posttraumatic and surgical change of the left proximal femur.      DX-CHEST-LIMITED (1 VIEW)   Final Result      Cardiomegaly with mild interstitial edema.           Assessment/Plan  * Closed right hip fracture, initial encounter (HCC)   Assessment & Plan    S/p operative repair  Pain management, with oral oxycodone as needed  DC IV morphine  PT and OT recommending skilled, likely AM now that anemia stable     Acute cystitis without hematuria   Assessment & Plan    Very mild with 5-10 WBC, may be reactive  Empiric omnicef x5d.     Fever   Assessment & Plan    No clear infectious source, may have been from blood transfusion (2U on 6/16)  urinalysis does have a few white cells 5-10  Omnicef x5d  Wound from surgery not draining, orthopedics monitoring     Urinary retention   Assessment & Plan    Has a history of this in the past in the post operative period.  Was unable to urinate so placed oleary catheter  Will remove when patient more mobile and weaned on narcotics.  Will keep in place for snf dc in AM     Acute blood loss anemia   Assessment & Plan    Large drop in hemoglobin, post op.  Likely intra-op loss in setting of coumadin  After 2 units of PRBC she is up to 7.4 and feeling better, stable now at 7.5  No signs of large hematoma seen on exam  Ortho, Dr. Garnica ok w coumadin  Prevena incisional wound  vac     Age-related osteoporosis with current pathological fracture with routine healing   Assessment & Plan    Cont bisphosphates, calcium and vitamin D     Fall   Assessment & Plan    Ground-level fall  Right-sided hip fracture  no syncope     Gastroesophageal reflux disease without esophagitis- (present on admission)   Assessment & Plan    Continue omeprazole     Chronic respiratory failure with hypoxia (HCC)- (present on admission)   Assessment & Plan    On nighttime oxygen at home can continue  Wean during the day     Obstructive sleep apnea syndrome- (present on admission)   Assessment & Plan    Continue CPAP at night     Secondary pulmonary arterial hypertension (HCC)- (present on admission)   Assessment & Plan    Appears secondary to long-standing sleep apnea  Continue Lasix  Continue carvedilol     Chronic anticoagulation- (present on admission)   Assessment & Plan    On warfarin therapy for recurrent pulmonary embolism  History of multiple PEs  Lovenox stopped due to drop in hemoglobin, coumadin resumed and is now therapeutic, hgb stable.  Continue to monitor INR.     Diastolic dysfunction- (present on admission)   Assessment & Plan    Found on cardiac echo in 2017  Careful with fluids, watch for post surgery volume overload.  Resume lasix 40 daily with K     Iron deficiency anemia- (present on admission)   Assessment & Plan    Continue oral iron supplementation  Last EGD was 2016     Acquired hypothyroidism- (present on admission)   Assessment & Plan    Continue home dose of levothyroxine at 100 mcg per day.       Seizure disorder (HCC)- (present on admission)   Assessment & Plan    Last seizure was in 1982  On primidone          VTE prophylaxis:  warfarin

## 2019-06-18 NOTE — DISCHARGE PLANNING
Agency/Facility Name: [facility]  Spoke To: Julio  Outcome: They had to cancel room for today so it is back to 1300 tomorrow 6/19.

## 2019-06-19 VITALS
OXYGEN SATURATION: 86 % | HEIGHT: 62 IN | TEMPERATURE: 99 F | HEART RATE: 95 BPM | DIASTOLIC BLOOD PRESSURE: 48 MMHG | SYSTOLIC BLOOD PRESSURE: 123 MMHG | WEIGHT: 220.46 LBS | RESPIRATION RATE: 18 BRPM | BODY MASS INDEX: 40.57 KG/M2

## 2019-06-19 LAB
INR PPP: 2.51 (ref 0.87–1.13)
PROTHROMBIN TIME: 27.8 SEC (ref 12–14.6)

## 2019-06-19 PROCEDURE — 700102 HCHG RX REV CODE 250 W/ 637 OVERRIDE(OP): Performed by: HOSPITALIST

## 2019-06-19 PROCEDURE — A9270 NON-COVERED ITEM OR SERVICE: HCPCS | Performed by: HOSPITALIST

## 2019-06-19 PROCEDURE — A9270 NON-COVERED ITEM OR SERVICE: HCPCS | Performed by: INTERNAL MEDICINE

## 2019-06-19 PROCEDURE — 700102 HCHG RX REV CODE 250 W/ 637 OVERRIDE(OP): Performed by: INTERNAL MEDICINE

## 2019-06-19 PROCEDURE — 36415 COLL VENOUS BLD VENIPUNCTURE: CPT

## 2019-06-19 PROCEDURE — 99239 HOSP IP/OBS DSCHRG MGMT >30: CPT | Performed by: INTERNAL MEDICINE

## 2019-06-19 PROCEDURE — 85610 PROTHROMBIN TIME: CPT

## 2019-06-19 RX ORDER — OXYCODONE HYDROCHLORIDE 5 MG/1
5 TABLET ORAL EVERY 6 HOURS PRN
Qty: 15 TAB | Refills: 0 | Status: SHIPPED | OUTPATIENT
Start: 2019-06-19 | End: 2019-06-22

## 2019-06-19 RX ORDER — DIPHENHYDRAMINE HYDROCHLORIDE 50 MG/ML
25 INJECTION INTRAMUSCULAR; INTRAVENOUS EVERY 6 HOURS PRN
Refills: 0
Start: 2019-06-19 | End: 2019-06-19

## 2019-06-19 RX ORDER — CEFDINIR 300 MG/1
300 CAPSULE ORAL EVERY 12 HOURS
Qty: 8 CAP | Refills: 0 | Status: SHIPPED | OUTPATIENT
Start: 2019-06-19 | End: 2019-06-23

## 2019-06-19 RX ORDER — DIPHENHYDRAMINE HCL 25 MG
25 CAPSULE ORAL NIGHTLY PRN
Start: 2019-06-19 | End: 2019-08-19

## 2019-06-19 RX ADMIN — CALCIUM CARBONATE (ANTACID) CHEW TAB 500 MG 1000 MG: 500 CHEW TAB at 05:47

## 2019-06-19 RX ADMIN — MULTIPLE VITAMINS W/ MINERALS TAB 1 TABLET: TAB at 05:48

## 2019-06-19 RX ADMIN — POTASSIUM CHLORIDE 20 MEQ: 1500 TABLET, EXTENDED RELEASE ORAL at 05:48

## 2019-06-19 RX ADMIN — PRIMIDONE 250 MG: 250 TABLET ORAL at 05:49

## 2019-06-19 RX ADMIN — VITAMIN D, TAB 1000IU (100/BT) 1000 UNITS: 25 TAB at 05:49

## 2019-06-19 RX ADMIN — LEVOTHYROXINE SODIUM 100 MCG: 100 TABLET ORAL at 05:48

## 2019-06-19 RX ADMIN — FUROSEMIDE 40 MG: 40 TABLET ORAL at 05:48

## 2019-06-19 RX ADMIN — CEFDINIR 300 MG: 300 CAPSULE ORAL at 05:48

## 2019-06-19 RX ADMIN — ALENDRONATE SODIUM 70 MG: 70 TABLET ORAL at 12:00

## 2019-06-19 RX ADMIN — SENNOSIDES,DOCUSATE SODIUM 2 TABLET: 8.6; 5 TABLET, FILM COATED ORAL at 05:48

## 2019-06-19 RX ADMIN — FERROUS SULFATE TAB 325 MG (65 MG ELEMENTAL FE) 325 MG: 325 (65 FE) TAB at 07:38

## 2019-06-19 NOTE — PROGRESS NOTES
Inpatient Anticoagulation Service Note    Date: 6/19/2019    Reason for Anticoagulation: Pulmonary Embolism   Target INR: 2.0 to 3.0         Hemoglobin Value: (!) 7.5  Hematocrit Value: (!) 23.7    INR from last 7 days     Date/Time INR Value    06/19/19 0436 (!)  2.51    06/18/19 0435 (!)  2.3    06/17/19 0414 (!)  2.02    06/16/19 0445 (!)  1.58    06/15/19 0339 (!)  1.42    06/14/19 0437 (!)  1.57    06/13/19 0503 (!)  1.89    06/12/19 1801 (!)  1.89    06/12/19 1147 (!)  2.23        Dose from last 7 days     Date/Time Dose (mg)    06/19/19 1000  20    06/18/19 1100  20    06/17/19 1028  10    06/16/19 1500  20    06/15/19 0900  20    06/14/19 0857  10        Significant Interactions: Proton Pump Inhibitor, Thyroid Medications, Antibiotics  Bridge Therapy: No  Bridge Therapy Start Date: 06/14/19 (If less than 5 days and overlap therapy discontinued -- document reason (i.e. Bleed Risk))  INR Value Greater than 2 Prior to Discontinuation of Parenteral Anticoagulation: Not Applicable (If still on overlap therapy, if No -- document reason (i.e. Bleed Risk))    Comments: long term warfarin pt. held for hip surgery, resumed 6/14/19    Plan:  Will give warfarin 20 mg tonight for INR of 2.51  Education Material Provided?: No  Pharmacist suggested discharge dosing: Resume home regimen with initial close monitoring.     Jamel Zambrano [facility]

## 2019-06-19 NOTE — DISCHARGE SUMMARY
Discharge Summary    CHIEF COMPLAINT ON ADMISSION  Chief Complaint   Patient presents with   • GLF     Right hip pain with a pop after a fall in the bathroom at PCP office, uses a walker.       Reason for Admission  EMS     Admission Date  6/12/2019    CODE STATUS  Full Code    HPI & HOSPITAL COURSE  This is a 78 y.o. female who presented 6/12/2019 with past medical history of PE on lifelong warfarin therapy, history of seizure disorder, history of left hip fracture, OP, hypothyroidism, AILYN on 2L nightly, history of sleep apnea admitted after a mechanical GLF complaining of right hip pain found to have a right intertrochanteric femur fracture.  She underwent surgical repair on 6/13/2019 that was without complication and she was made weightbearing as tolerated.    She was admitted to the telemetry floor.  She was noted to have a slowly decreasing hemoglobin level however had no evidence of ongoing bleeding or excessive bruising.  She required 2 units of red blood cells on 6/16 and following this her hemoglobin stabilized.  Due to her history of PE in the past, her Coumadin was resumed.  She had no evidence of blood loss with this medication and her hemoglobin remained stable at 7.5.  Her INR increased to the therapeutic range.    She was noted to have fever overnight on 6/16 which was initially attributed to her blood transfusion.  She was evaluated for infection following this and her UA suggested infection.  She did have urine retention requiring placement of Juarez catheter therefore, due to catheterization and questionable UA she was placed on antibiotic therapy to complete 5 days in order to cover the cystitis.    At baseline she wears 2 L of oxygen at night.  She was needing 2 L during the day at the time of discharge which was attributed to atelectasis.  She had no evidence of pneumonia or respiratory failure during her stay.  She was encouraged to use incentive spirometer.  Her pain was controlled with minimal  oxycodone and she was mainly using this at night to help her sleep.  She did complain of constipation and received 1 dose of magnesium citrate prior to discharge.    Her x-ray of the chest found increased vascular congestion, likely due to her lasix being held perioperatively.  She was resumed on her home dose of Lasix which she takes for pulmonary hypertension.        Therefore, she is discharged in good and stable condition to skilled nursing facility.    The patient met 2-midnight criteria for an inpatient stay at the time of discharge.    Discharge Date  6/19/2019    FOLLOW UP ITEMS POST DISCHARGE  Follow-up with orthopedic surgery within 7-10 days of discharge, approximately 10-14 days postop    DISCHARGE DIAGNOSES  Principal Problem:    Closed right hip fracture, initial encounter ([facility]) POA: Unknown  Active Problems:    Seizure disorder ([facility]) POA: Yes      Overview: Last seizure in 1982. Was treated with dilantin and Primidone since       teenage. Discontinued dilantin for 20 years.       Wants to keep taking primidone as she concerns recurrent seizures if she       stopped all medications.            Acquired hypothyroidism POA: Yes    Iron deficiency anemia POA: Yes      Overview: Underwent EGD and Colonoscopy with Dr Stanton on 4/29/16. The biopsy report       showed that the night. Gastric mucosal or with mild chronic inflammation       and reactive epithelial cell at gastric antrum, benign hyperplastic polyp,       negative H. pylori, no evidence of celius spure on duodenal biopsy, benign       colon mucosal or with no significant lesion on ascending colon polyp.       Patient will follow-up with GI, Dr Stanton for Gastric polyp. Patient did not       do capsule endoscopy.      Hemoglobin level improved. Stopped iron supplement.    Diastolic dysfunction POA: Yes    Chronic anticoagulation POA: Yes    Secondary pulmonary arterial hypertension (HCC) POA: Yes    Obstructive sleep apnea syndrome POA: Yes    Chronic  respiratory failure with hypoxia (HCC) POA: Yes    Gastroesophageal reflux disease without esophagitis POA: Yes    Fall POA: Unknown    Age-related osteoporosis with current pathological fracture with routine healing POA: Unknown    Acute blood loss anemia POA: Unknown    Urinary retention POA: Unknown    Fever POA: Unknown    Acute cystitis without hematuria POA: Unknown  Resolved Problems:    Hives as manifestation of blood transfusion reaction POA: Unknown      FOLLOW UP  Future Appointments  Date Time Provider Department Center   7/15/2019 3:45 PM [facility] PHARMACIST BETSEY Nicholson   8/19/2019 5:00 PM Ninfa Marcial M.D. 23M EMis Marcial M.D.  [id number]  [place] [address]  [phone]    Schedule an appointment as soon as possible for a visit in 2 weeks  For follow up    [address]  [place] [address]  [phone]          MEDICATIONS ON DISCHARGE     Medication List      START taking these medications      Instructions   cefdinir 300 MG Caps  Commonly known as:  OMNICEF   Take 1 Cap by mouth every 12 hours for 4 days.  Dose:  300 mg     diphenhydrAMINE 50 MG/ML Soln  Commonly known as:  BENADRYL   0.5 mL by Intravenous route every 6 hours as needed.  Dose:  25 mg     oxyCODONE immediate-release 5 MG Tabs  Commonly known as:  ROXICODONE   Take 1 Tab by mouth every 6 hours as needed for up to 3 days.  Dose:  5 mg        CONTINUE taking these medications      Instructions   alendronate 70 MG Tabs  Commonly known as:  FOSAMAX   TAKE 1 TABLET BY MOUTH EVERY 7 DAYS     carvedilol 6.25 MG Tabs  Commonly known as:  COREG   TAKE 1 TABLET BY MOUTH TWICE DAILY     ferrous sulfate 325 (65 Fe) MG EC tablet   Take 1 Tab by mouth every day.  Dose:  325 mg     furosemide 40 MG Tabs  Commonly known as:  LASIX   TAKE 1 TABLET BY MOUTH EVERY DAY     levothyroxine 100 MCG Tabs  Commonly known as:  SYNTHROID   TAKE 1 TABLET BY MOUTH EVERY DAY     Melatonin 10  MG Tabs   Take 10 mg by mouth every bedtime.  Dose:  10 mg     MOVE FREE JOINT HEALTH ADVANCE Tabs   Take 1 Tab by mouth every day.  Dose:  1 Tab     omeprazole 20 MG delayed-release capsule  Commonly known as:  PRILOSEC   Take 20 mg by mouth every evening.  Dose:  20 mg     potassium chloride SA 20 MEQ Tbcr  Commonly known as:  Kdur   TAKE 1 TABLET BY MOUTH EVERY DAY     primidone 250 MG Tabs  Commonly known as:  MYSOLINE   Take 250-500 mg by mouth 2 Times a Day. 250mg in the morning & 500mg at bedtime  Dose:  250-500 mg     PROBIOTIC DAILY PO   Take 1 Cap by mouth every day.  Dose:  1 Cap     Vitamin D-3 1000 units Caps   Take 1,000 Units by mouth every day.  Dose:  1000 Units     warfarin 10 MG Tabs  Commonly known as:  COUMADIN   Take 10-20 mg by mouth every evening. 10mg on Sunday & Tuesday  20mg all other days  Dose:  10-20 mg     WOMENS MULTI VITAMIN & MINERAL Tabs   Take 1 Tab by mouth every day.  Dose:  1 Tab        STOP taking these medications    CALCIUM PO            Allergies  Allergies   Allergen Reactions   • Tape Rash     Adhesive band aids cause a rash  Paper tape ok       DIET  Orders Placed This Encounter   Procedures   • Diet Order Regular     Standing Status:   Standing     Number of Occurrences:   1     Order Specific Question:   Diet:     Answer:   Regular [1]       ACTIVITY  As tolerated.  Weight bearing as tolerated    CONSULTATIONS  Dr. Garnica - orthopedic surgery    PROCEDURES  6/13: Right displaced intertrochanteric femur fracture: Surgical repair of right fracture with cephalo-medullary device    LABORATORY  Lab Results   Component Value Date    SODIUM 136 06/17/2019    POTASSIUM 3.7 06/17/2019    CHLORIDE 99 06/17/2019    CO2 30 06/17/2019    GLUCOSE 120 (H) 06/17/2019    BUN 10 06/17/2019    CREATININE 0.46 (L) 06/17/2019        Lab Results   Component Value Date    WBC 7.0 06/18/2019    HEMOGLOBIN 7.5 (L) 06/18/2019    HEMATOCRIT 23.7 (L) 06/18/2019    PLATELETCT 244 06/18/2019         Total time of the discharge process exceeds 40 minutes.

## 2019-06-19 NOTE — CARE PLAN
Problem: Bowel/Gastric:  Goal: Normal bowel function is maintained or improved  Outcome: PROGRESSING AS EXPECTED      Problem: Pain Management  Goal: Pain level will decrease to patient's comfort goal  Outcome: PROGRESSING AS EXPECTED      Problem: Urinary Elimination:  Goal: Ability to reestablish a normal urinary elimination pattern will improve  Outcome: PROGRESSING SLOWER THAN EXPECTED

## 2019-06-19 NOTE — PROGRESS NOTES
Report received from Kari MARINA. Pt awake, alert, appropriate and pleasant. Pt reports no pain at rest but significant pain with movement. Denies need for pain intervention at this time. Pt reports having multiple PRN bowel meds and prune juice earlier. LBM 6/15/19. Pt denies needs at this time. Will continue to monitor.

## 2019-06-19 NOTE — CARE PLAN
Problem: Communication  Goal: The ability to communicate needs accurately and effectively will improve  Outcome: PROGRESSING AS EXPECTED  Pt calls appropriately, communicates needs to staff    Problem: Safety  Goal: Will remain free from injury  Outcome: PROGRESSING AS EXPECTED    Goal: Will remain free from falls  Outcome: PROGRESSING AS EXPECTED      Problem: Infection  Goal: Will remain free from infection  Outcome: PROGRESSING SLOWER THAN EXPECTED  100.8F temperature at start of shift    Problem: Pain Management  Goal: Pain level will decrease to patient's comfort goal  Outcome: PROGRESSING AS EXPECTED  Pain managed with minimal pain medication

## 2019-06-19 NOTE — PROGRESS NOTES
1438 Report given to Camilla TIM at [facility]. Pt VSS.    1548 Pt picked up by transport from [facility].

## 2019-06-19 NOTE — DISCHARGE INSTRUCTIONS
Discharge Instructions    Discharged to other by medical transportation with escort. Discharged via wheelchair, hospital escort: Yes.  Special equipment needed: Wound VAC    Be sure to schedule a follow-up appointment with your primary care doctor or any specialists as instructed.     Discharge Plan:   Influenza Vaccine Indication: Patient Refuses    I understand that a diet low in cholesterol, fat, and sodium is recommended for good health. Unless I have been given specific instructions below for another diet, I accept this instruction as my diet prescription.   Other diet: Resume home diet     Special Instructions: Discharge instructions for the Orthopedic Patient    Follow up with Primary Care Physician within 2 weeks of discharge to home, regarding:  Review of medications and diagnostic testing.  Surveillance for medical complications.  Workup and treatment of osteoporosis, if appropriate.     -Is this a Joint Replacement patient? No    -Is this patient being discharged with medication to prevent blood clots?   No       Hip Fracture  A hip fracture is a fracture of the upper part of your thigh bone (femur).  What are the causes?  A hip fracture is caused by a direct blow to the side of your hip. This is usually the result of a fall but can occur in other circumstances, such as an automobile accident.  What increases the risk?  There is an increased risk of hip fractures in people with:  · An unsteady walking pattern (gait) and those with conditions that contribute to poor balance, such as Parkinson's disease or dementia.  · Osteopenia and osteoporosis.  · Cancer that spreads to the leg bones.  · Certain metabolic diseases.  What are the signs or symptoms?  Symptoms of hip fracture include:  · Pain over the injured hip.  · Inability to put weight on the leg in which the fracture occurred (although, some patients are able to walk after a hip fracture).  · Toes and foot of the affected leg point outward when you  lie down.  How is this diagnosed?  A physical exam can determine if a hip fracture is likely to have occurred. X-ray exams are needed to confirm the fracture and to look for other injuries. The X-ray exam can help to determine the type of hip fracture. Rarely, the fracture is not visible on an X-ray image and a CT scan or MRI will have to be done.  How is this treated?  The treatment for a fracture is usually surgery. This means using a screw, nail, or flakita to hold the bones in place.  Follow these instructions at home:  Take all medicines as directed by your health care provider.  Contact a health care provider if:  Pain continues, even after taking pain medicine.  This information is not intended to replace advice given to you by your health care provider. Make sure you discuss any questions you have with your health care provider.  Document Released: 12/18/2006 Document Revised: 05/25/2017 Document Reviewed: 07/30/2014  [employer] Interactive Patient Education © 2017 [employer]      · Is patient discharged on Warfarin / Coumadin?   Yes    You are receiving the drug warfarin. Please understand the importance of monitoring warfarin with scheduled PT/INR blood draws.  Follow-up with the [facility] in one week for INR lab..    IMPORTANT: HOW TO USE THIS INFORMATION:  This is a summary and does NOT have all possible information about this product. This information does not assure that this product is safe, effective, or appropriate for you. This information is not individual medical advice and does not substitute for the advice of your health care professional. Always ask your health care professional for complete information about this product and your specific health needs.      WARFARIN - ORAL (WARF-uh-rin)      COMMON BRAND NAME(S): Coumadin      WARNING:  Warfarin can cause very serious (possibly fatal) bleeding. This is more likely to occur when you first start taking this medication or if you take too much  "warfarin. To decrease your risk for bleeding, your doctor or other health care provider will monitor you closely and check your lab results (INR test) to make sure you are not taking too much warfarin. Keep all medical and laboratory appointments. Tell your doctor right away if you notice any signs of serious bleeding. See also Side Effects section.      USES:  This medication is used to treat blood clots (such as in deep vein thrombosis-DVT or pulmonary embolus-PE) and/or to prevent new clots from forming in your body. Preventing harmful blood clots helps to reduce the risk of a stroke or heart attack. Conditions that increase your risk of developing blood clots include a certain type of irregular heart rhythm (atrial fibrillation), heart valve replacement, recent heart attack, and certain surgeries (such as hip/knee replacement). Warfarin is commonly called a \"blood thinner,\" but the more correct term is \"anticoagulant.\" It helps to keep blood flowing smoothly in your body by decreasing the amount of certain substances (clotting proteins) in your blood.      HOW TO USE:  Read the Medication Guide provided by your pharmacist before you start taking warfarin and each time you get a refill. If you have any questions, ask your doctor or pharmacist. Take this medication by mouth with or without food as directed by your doctor or other health care professional, usually once a day. It is very important to take it exactly as directed. Do not increase the dose, take it more frequently, or stop using it unless directed by your doctor. Dosage is based on your medical condition, laboratory tests (such as INR), and response to treatment. Your doctor or other health care provider will monitor you closely while you are taking this medication to determine the right dose for you. Use this medication regularly to get the most benefit from it. To help you remember, take it at the same time each day. It is important to eat a " balanced, consistent diet while taking warfarin. Some foods can affect how warfarin works in your body and may affect your treatment and dose. Avoid sudden large increases or decreases in your intake of foods high in vitamin K (such as broccoli, cauliflower, cabbage, brussels sprouts, kale, spinach, and other green leafy vegetables, liver, green tea, certain vitamin supplements). If you are trying to lose weight, check with your doctor before you try to go on a diet. Cranberry products may also affect how your warfarin works. Limit the amount of cranberry juice (16 ounces/480 milliliters a day) or other cranberry products you may drink or eat.      SIDE EFFECTS:  Nausea, loss of appetite, or stomach/abdominal pain may occur. If any of these effects persist or worsen, tell your doctor or pharmacist promptly. Remember that your doctor has prescribed this medication because he or she has judged that the benefit to you is greater than the risk of side effects. Many people using this medication do not have serious side effects. This medication can cause serious bleeding if it affects your blood clotting proteins too much (shown by unusually high INR lab results). Even if your doctor stops your medication, this risk of bleeding can continue for up to a week. Tell your doctor right away if you have any signs of serious bleeding, including: unusual pain/swelling/discomfort, unusual/easy bruising, prolonged bleeding from cuts or gums, persistent/frequent nosebleeds, unusually heavy/prolonged menstrual flow, pink/dark urine, coughing up blood, vomit that is bloody or looks like coffee grounds, severe headache, dizziness/fainting, unusual or persistent tiredness/weakness, bloody/black/tarry stools, chest pain, shortness of breath, difficulty swallowing. Tell your doctor right away if any of these unlikely but serious side effects occur: persistent nausea/vomiting, severe stomach/abdominal pain, yellowing eyes/skin. This drug  rarely has caused very serious (possibly fatal) problems if its effects lead to small blood clots (usually at the beginning of treatment). This can lead to severe skin/tissue damage that may require surgery or amputation if left untreated. Patients with certain blood conditions (protein C or S deficiency) may be at greater risk. Get medical help right away if any of these rare but serious side effects occur: painful/red/purplish patches on the skin (such as on the toe, breast, abdomen), change in the amount of urine, vision changes, confusion, slurred speech, weakness on one side of the body. A very serious allergic reaction to this drug is rare. However, get medical help right away if you notice any symptoms of a serious allergic reaction, including: rash, itching/swelling (especially of the face/tongue/throat), severe dizziness, trouble breathing. This is not a complete list of possible side effects. If you notice other effects not listed above, contact your doctor or pharmacist. In the [place] - Call your doctor for medical advice about side effects. You may report side effects to FDA at 9-172-NCF-2572. In [place] - Call your doctor for medical advice about side effects. You may report side effects to [facility] [place] at [phone].      PRECAUTIONS:  Before taking warfarin, tell your doctor or pharmacist if you are allergic to it; or if you have any other allergies. This product may contain inactive ingredients, which can cause allergic reactions or other problems. Talk to your pharmacist for more details. Before using this medication, tell your doctor or pharmacist your medical history, especially of: blood disorders (such as anemia, hemophilia), bleeding problems (such as bleeding of the stomach/intestines, bleeding in the brain), blood vessel disorders (such as aneurysms), recent major injury/surgery, liver disease, alcohol use, mental/mood disorders (including memory problems), frequent falls/injuries. It is  important that all your doctors and dentists know that you take warfarin. Before having surgery or any medical/dental procedures, tell your doctor or dentist that you are taking this medication and about all the products you use (including prescription drugs, nonprescription drugs, and herbal products). Avoid getting injections into the muscles. If you must have an injection into a muscle (for example, a flu shot), it should be given in the arm. This way, it will be easier to check for bleeding and/or apply pressure bandages. This medication may cause stomach bleeding. Daily use of alcohol while using this medicine will increase your risk for stomach bleeding and may also affect how this medication works. Limit or avoid alcoholic beverages. If you have not been eating well, if you have an illness or infection that causes fever, vomiting, or diarrhea for more than 2 days, or if you start using any antibiotic medications, contact your doctor or pharmacist immediately because these conditions can affect how warfarin works. This medication can cause heavy bleeding. To lower the chance of getting cut, bruised, or injured, use great caution with sharp objects like safety razors and nail cutters. Use an electric razor when shaving and a soft toothbrush when brushing your teeth. Avoid activities such as contact sports. If you fall or injure yourself, especially if you hit your head, call your doctor immediately. Your doctor may need to check you. The Food & Drug Administration has stated that generic warfarin products are interchangeable. However, consult your doctor or pharmacist before switching warfarin products. Be careful not to take more than one medication that contains warfarin unless specifically directed by the doctor or health care provider who is monitoring your warfarin treatment. Older adults may be at greater risk for bleeding while using this drug. This medication is not recommended for use during pregnancy  "because of serious (possibly fatal) harm to an unborn baby. Discuss the use of reliable forms of birth control with your doctor. If you become pregnant or think you may be pregnant, tell your doctor immediately. If you are planning pregnancy, discuss a plan for managing your condition with your doctor before you become pregnant. Your doctor may switch the type of medication you use during pregnancy. Very small amounts of this medication may pass into breast milk but is unlikely to harm a nursing infant. Consult your doctor before breast-feeding.      DRUG INTERACTIONS:  Drug interactions may change how your medications work or increase your risk for serious side effects. This document does not contain all possible drug interactions. Keep a list of all the products you use (including prescription/nonprescription drugs and herbal products) and share it with your doctor and pharmacist. Do not start, stop, or change the dosage of any medicines without your doctor's approval. Warfarin interacts with many prescription, nonprescription, vitamin, and herbal products. This includes medications that are applied to the skin or inside the vagina or rectum. The interactions with warfarin usually result in an increase or decrease in the \"blood-thinning\" (anticoagulant) effect. Your doctor or other health care professional should closely monitor you to prevent serious bleeding or clotting problems. While taking warfarin, it is very important to tell your doctor or pharmacist of any changes in medications, vitamins, or herbal products that you are taking. Some products that may interact with this drug include: capecitabine, imatinib, mifepristone. Aspirin, aspirin-like drugs (salicylates), and nonsteroidal anti-inflammatory drugs (NSAIDs such as ibuprofen, naproxen, celecoxib) may have effects similar to warfarin. These drugs may increase the risk of bleeding problems if taken during treatment with warfarin. Carefully check all " prescription/nonprescription product labels (including drugs applied to the skin such as pain-relieving creams) since the products may contain NSAIDs or salicylates. Talk to your doctor about using a different medication (such as acetaminophen) to treat pain/fever. Low-dose aspirin and related drugs (such as clopidogrel, ticlopidine) should be continued if prescribed by your doctor for specific medical reasons such as heart attack or stroke prevention. Consult your doctor or pharmacist for more details. Many herbal products interact with warfarin. Tell your doctor before taking any herbal products, especially bromelains, coenzyme Q10, cranberry, danshen, dong quai, fenugreek, garlic, ginkgo biloba, ginseng, and Java's wort, among others. This medication may interfere with a certain laboratory test to measure theophylline levels, possibly causing false test results. Make sure laboratory personnel and all your doctors know you use this drug.      OVERDOSE:  If overdose is suspected, contact a poison control center or emergency room immediately. [place] residents can call the [place] National Poison Hotline at 1-842.995.7077. [place] residents can call a provincial poison control center. Symptoms of overdose may include: bloody/black/tarry stools, pink/dark urine, unusual/prolonged bleeding.      NOTES:  Do not share this medication with others. Laboratory and/or medical tests (such as INR, complete blood count) must be performed periodically to monitor your progress or check for side effects. Consult your doctor for more details.      MISSED DOSE:  For the best possible benefit, do not miss any doses. If you do miss a dose and remember on the same day, take it as soon as you remember. If you remember on the next day, skip the missed dose and resume your usual dosing schedule. Do not double the dose to catch up because this could increase your risk for bleeding. Keep a record of missed doses to give to your doctor or  pharmacist. Contact your doctor or pharmacist if you miss 2 or more doses in a row.      STORAGE:  Store at room temperature away from light and moisture. Do not store in the bathroom. Keep all medications away from children and pets. Do not flush medications down the toilet or pour them into a drain unless instructed to do so. Properly discard this product when it is  or no longer needed. Consult your pharmacist or local waste disposal company for more details about how to safely discard your product.      MEDICAL ALERT:  Your condition and medication can cause complications in a medical emergency. For information about enrolling in MedicAlert, call 1-878.775.9477 ([place]) or 1-195.140.8833 ([place]).      Information last revised 2010 Copyright(c) 2010 [employer]         Cefdinir capsules  What is this medicine?  CEFDINIR (SEF di ner) is a cephalosporin antibiotic. It is used to treat certain kinds of bacterial infections. It will not work for colds, flu, or other viral infections.  This medicine may be used for other purposes; ask your health care provider or pharmacist if you have questions.  COMMON BRAND NAME(S): Omnicef  What should I tell my health care provider before I take this medicine?  They need to know if you have any of these conditions:  -bleeding problems  -kidney disease  -stomach or intestine problems (especially colitis)  -an unusual or allergic reaction to cefdinir, other cephalosporin antibiotics, penicillin, penicillamine, other foods, dyes or preservatives  -pregnant or trying to get pregnant  -breast-feeding  How should I use this medicine?  Take this medicine by mouth. Swallow it with a drink of water. Follow the directions on the prescription label. You can take it with or without food. If it upsets your stomach it may help to take it with food. Take your doses at regular intervals. Do not take it more often than directed. Finish all the medicine you are prescribed even  if you think your infection is better.  Talk to your pediatrician regarding the use of this medicine in children. Special care may be needed.  Overdosage: If you think you have taken too much of this medicine contact a poison control center or emergency room at once.  NOTE: This medicine is only for you. Do not share this medicine with others.  What if I miss a dose?  If you miss a dose, take it as soon as you can. If it is almost time for your next dose, take only that dose. Do not take double or extra doses.  What may interact with this medicine?  -antacids that contain aluminum or magnesium  -iron supplements  -other antibiotics  -probenecid  This list may not describe all possible interactions. Give your health care provider a list of all the medicines, herbs, non-prescription drugs, or dietary supplements you use. Also tell them if you smoke, drink alcohol, or use illegal drugs. Some items may interact with your medicine.  What should I watch for while using this medicine?  Tell your doctor or health care professional if your symptoms do not get better in a few days.  If you are diabetic you may get a false-positive result for sugar in your urine. Check with your doctor or health care professional before you change your diet or the dose of your diabetes medicine.  What side effects may I notice from receiving this medicine?  Side effects that you should report to your doctor or health care professional as soon as possible:  -allergic reactions like skin rash, itching or hives, swelling of the face, lips, or tongue  -bloody or watery diarrhea  -breathing problems  -fever  -redness, blistering, peeling or loosening of the skin, including inside the mouth  -seizures  -trouble passing urine or change in the amount of urine  -unusual bleeding or bruising  -unusually weak or tired  Side effects that usually do not require medical attention (report to your doctor or health care professional if they continue or are  bothersome):  -constipation  -diarrhea  -dizziness  -dry mouth  -headache  -loss of appetite  -nausea, vomiting  -stomach pain  -stool discoloration  -tiredness  -vaginal discharge, itching, or odor in women  This list may not describe all possible side effects. Call your doctor for medical advice about side effects. You may report side effects to FDA at 4-823-FDA-8780.  Where should I keep my medicine?  Keep out of the reach of children.  Store at room temperature between 15 and 30 degrees C (59 and 86 degrees F). Throw the medicine away after the expiration date.  NOTE: This sheet is a summary. It may not cover all possible information. If you have questions about this medicine, talk to your doctor, pharmacist, or health care provider.  © 2018 [employer] (2017-04-24 15:52:44)    Oxycodone tablets or capsules  What is this medicine?  OXYCODONE (ox i KOE done) is a pain reliever. It is used to treat moderate to severe pain.  This medicine may be used for other purposes; ask your health care provider or pharmacist if you have questions.  COMMON BRAND NAME(S): Dazidox, Endocodone, Oxaydo, OXECTA, OxyIR, Percolone, Roxicodone, ROXYBOND  What should I tell my health care provider before I take this medicine?  They need to know if you have any of these conditions:  -Amilcar's disease  -brain tumor  -head injury  -heart disease  -history of drug or alcohol abuse problem  -if you often drink alcohol  -kidney disease  -liver disease  -lung or breathing disease, like asthma  -mental illness  -pancreatic disease  -seizures  -thyroid disease  -an unusual or allergic reaction to oxycodone, codeine, hydrocodone, morphine, other medicines, foods, dyes, or preservatives  -pregnant or trying to get pregnant  -breast-feeding  How should I use this medicine?  Take this medicine by mouth with a glass of water. Follow the directions on the prescription label. You can take it with or without food. If it upsets your stomach,  take it with food. Take your medicine at regular intervals. Do not take it more often than directed. Do not stop taking except on your doctor's advice.  Some brands of this medicine, like Oxecta, have special instructions. Ask your doctor or pharmacist if these directions are for you: Do not cut, crush or chew this medicine. Swallow only one tablet at a time. Do not wet, soak, or lick the tablet before you take it.  A special MedGuide will be given to you by the pharmacist with each prescription and refill. Be sure to read this information carefully each time.  Talk to your pediatrician regarding the use of this medicine in children. Special care may be needed.  Overdosage: If you think you have taken too much of this medicine contact a poison control center or emergency room at once.  NOTE: This medicine is only for you. Do not share this medicine with others.  What if I miss a dose?  If you miss a dose, take it as soon as you can. If it is almost time for your next dose, take only that dose. Do not take double or extra doses.  What may interact with this medicine?  This medicine may interact with the following medications:  -alcohol  -antihistamines for allergy, cough and cold  -antiviral medicines for HIV or AIDS  -atropine  -certain antibiotics like clarithromycin, erythromycin, linezolid, rifampin  -certain medicines for anxiety or sleep  -certain medicines for bladder problems like oxybutynin, tolterodine  -certain medicines for depression like amitriptyline, fluoxetine, sertraline  -certain medicines for fungal infections like ketoconazole, itraconazole, voriconazole  -certain medicines for migraine headache like almotriptan, eletriptan, frovatriptan, naratriptan, rizatriptan, sumatriptan, zolmitriptan  -certain medicines for nausea or vomiting like dolasetron, ondansetron, palonosetron  -certain medicines for Parkinson's disease like benztropine, trihexyphenidyl  -certain medicines for seizures like  phenobarbital, phenytoin, primidone  -certain medicines for stomach problems like dicyclomine, hyoscyamine  -certain medicines for travel sickness like scopolamine  -diuretics  -general anesthetics like halothane, isoflurane, methoxyflurane, propofol  -ipratropium  -local anesthetics like lidocaine, pramoxine, tetracaine  -MAOIs like Carbex, Eldepryl, Marplan, Nardil, and Parnate  -medicines that relax muscles for surgery  -methylene blue  -nilotinib  -other narcotic medicines for pain or cough  -phenothiazines like chlorpromazine, mesoridazine, prochlorperazine, thioridazine  This list may not describe all possible interactions. Give your health care provider a list of all the medicines, herbs, non-prescription drugs, or dietary supplements you use. Also tell them if you smoke, drink alcohol, or use illegal drugs. Some items may interact with your medicine.  What should I watch for while using this medicine?  Tell your doctor or health care professional if your pain does not go away, if it gets worse, or if you have new or a different type of pain. You may develop tolerance to the medicine. Tolerance means that you will need a higher dose of the medicine for pain relief. Tolerance is normal and is expected if you take this medicine for a long time.  Do not suddenly stop taking your medicine because you may develop a severe reaction. Your body becomes used to the medicine. This does NOT mean you are addicted. Addiction is a behavior related to getting and using a drug for a non-medical reason. If you have pain, you have a medical reason to take pain medicine. Your doctor will tell you how much medicine to take. If your doctor wants you to stop the medicine, the dose will be slowly lowered over time to avoid any side effects.  There are different types of narcotic medicines (opiates). If you take more than one type at the same time or if you are taking another medicine that also causes drowsiness, you may have more  side effects. Give your health care provider a list of all medicines you use. Your doctor will tell you how much medicine to take. Do not take more medicine than directed. Call emergency for help if you have problems breathing or unusual sleepiness.  You may get drowsy or dizzy. Do not drive, use machinery, or do anything that needs mental alertness until you know how the medicine affects you. Do not stand or sit up quickly, especially if you are an older patient. This reduces the risk of dizzy or fainting spells. Alcohol may interfere with the effect of this medicine. Avoid alcoholic drinks.  This medicine will cause constipation. Try to have a bowel movement at least every 2 to 3 days. If you do not have a bowel movement for 3 days, call your doctor or health care professional.  Your mouth may get dry. Chewing sugarless gum or sucking hard candy, and drinking plenty of water may help. Contact your doctor if the problem does not go away or is severe.  What side effects may I notice from receiving this medicine?  Side effects that you should report to your doctor or health care professional as soon as possible:  -allergic reactions like skin rash, itching or hives, swelling of the face, lips, or tongue  -breathing problems  -confusion  -signs and symptoms of low blood pressure like dizziness; feeling faint or lightheaded, falls; unusually weak or tired  -trouble passing urine or change in the amount of urine  -trouble swallowing  Side effects that usually do not require medical attention (report to your doctor or health care professional if they continue or are bothersome):  -constipation  -dry mouth  -nausea, vomiting  -tiredness  This list may not describe all possible side effects. Call your doctor for medical advice about side effects. You may report side effects to FDA at 8-318-FDA-3414.  Where should I keep my medicine?  Keep out of the reach of children. This medicine can be abused. Keep your medicine in a  safe place to protect it from theft. Do not share this medicine with anyone. Selling or giving away this medicine is dangerous and against the law.  Store at room temperature between 15 and 30 degrees C (59 and 86 degrees F). Protect from light. Keep container tightly closed.  This medicine may cause accidental overdose and death if it is taken by other adults, children, or pets. Flush any unused medicine down the toilet to reduce the chance of harm. Do not use the medicine after the expiration date.  NOTE: This sheet is a summary. It may not cover all possible information. If you have questions about this medicine, talk to your doctor, pharmacist, or health care provider.  © 2018 Elsevier/Gold Standard (2016-11-01 16:55:57)      Depression / Suicide Risk    As you are discharged from this [facility] facility, it is important to learn how to keep safe from harming yourself.    Recognize the warning signs:  · Abrupt changes in personality, positive or negative- including increase in energy   · Giving away possessions  · Change in eating patterns- significant weight changes-  positive or negative  · Change in sleeping patterns- unable to sleep or sleeping all the time   · Unwillingness or inability to communicate  · Depression  · Unusual sadness, discouragement and loneliness  · Talk of wanting to die  · Neglect of personal appearance   · Rebelliousness- reckless behavior  · Withdrawal from people/activities they love  · Confusion- inability to concentrate     If you or a loved one observes any of these behaviors or has concerns about self-harm, here's what you can do:  · Talk about it- your feelings and reasons for harming yourself  · Remove any means that you might use to hurt yourself (examples: pills, rope, extension cords, firearm)  · Get professional help from the community (Mental Health, Substance Abuse, psychological counseling)  · Do not be alone:Call your Safe Contact- someone whom you trust who will be  there for you.  · Call your local CRISIS HOTLINE 781-6914 or 652-607-8409  · Call your local Children's Mobile Crisis Response Team [place] (646) 749-4773 or www.indoo.rs  · Call the toll free National Suicide Prevention Hotlines   · National Suicide Prevention Lifeline 728-316-DEGZ (5788)  · National [employer] 800-SUICIDE (936-6723)

## 2019-06-19 NOTE — PROGRESS NOTES
"Pt noted to be febrile at 1900 vitals. VS as follows.   /43   Pulse 98   Temp (!) 38.2 °C (100.8 °F) (Oral)   Resp 17   Ht 1.575 m (5' 2.01\")   Wt 100 kg (220 lb 7.4 oz)   LMP  (LMP Unknown)   SpO2 94%   Breastfeeding? No   BMI 40.31 kg/m²     Pt has concerns about being d/[facility] to SNF tomorrow d/t fever today. Will notify receiving RN in AM.   "

## 2019-06-20 ENCOUNTER — APPOINTMENT (OUTPATIENT)
Dept: PULMONOLOGY | Facility: HOSPICE | Age: 79
End: 2019-06-20
Payer: MEDICARE

## 2019-07-15 ENCOUNTER — APPOINTMENT (OUTPATIENT)
Dept: MEDICAL GROUP | Facility: MEDICAL CENTER | Age: 79
End: 2019-07-15
Payer: MEDICARE

## 2019-07-24 DIAGNOSIS — M85.80 OSTEOPENIA WITH HIGH RISK OF FRACTURE: ICD-10-CM

## 2019-07-24 RX ORDER — ALENDRONATE SODIUM 70 MG/1
TABLET ORAL
Qty: 12 TAB | Refills: 3 | Status: SHIPPED | OUTPATIENT
Start: 2019-07-24 | End: 2020-07-06

## 2019-07-26 ENCOUNTER — HOME HEALTH ADMISSION (OUTPATIENT)
Dept: HOME HEALTH SERVICES | Facility: HOME HEALTHCARE | Age: 79
End: 2019-07-26
Payer: MEDICARE

## 2019-07-29 ENCOUNTER — TELEPHONE (OUTPATIENT)
Dept: MEDICAL GROUP | Facility: MEDICAL CENTER | Age: 79
End: 2019-07-29

## 2019-07-29 ENCOUNTER — HOME CARE VISIT (OUTPATIENT)
Dept: HOME HEALTH SERVICES | Facility: HOME HEALTHCARE | Age: 79
End: 2019-07-29
Payer: MEDICARE

## 2019-07-29 ENCOUNTER — PATIENT OUTREACH (OUTPATIENT)
Dept: HEALTH INFORMATION MANAGEMENT | Facility: OTHER | Age: 79
End: 2019-07-29

## 2019-07-29 ENCOUNTER — ANTICOAGULATION MONITORING (OUTPATIENT)
Dept: MEDICAL GROUP | Facility: PHYSICIAN GROUP | Age: 79
End: 2019-07-29

## 2019-07-29 VITALS
SYSTOLIC BLOOD PRESSURE: 122 MMHG | HEIGHT: 72 IN | OXYGEN SATURATION: 91 % | WEIGHT: 226.13 LBS | TEMPERATURE: 98.4 F | RESPIRATION RATE: 18 BRPM | BODY MASS INDEX: 30.63 KG/M2 | HEART RATE: 78 BPM | DIASTOLIC BLOOD PRESSURE: 60 MMHG

## 2019-07-29 DIAGNOSIS — Z79.01 CHRONIC ANTICOAGULATION: ICD-10-CM

## 2019-07-29 DIAGNOSIS — Z86.711 PERSONAL HISTORY OF PULMONARY EMBOLISM: ICD-10-CM

## 2019-07-29 DIAGNOSIS — Z79.01 LONG TERM (CURRENT) USE OF ANTICOAGULANTS: ICD-10-CM

## 2019-07-29 PROCEDURE — 665001 SOC-HOME HEALTH

## 2019-07-29 PROCEDURE — G0493 RN CARE EA 15 MIN HH/HOSPICE: HCPCS

## 2019-07-29 SDOH — ECONOMIC STABILITY: HOUSING INSECURITY
HOME SAFETY: OXYGEN SAFETY RISK ASSESSMENT PERFORMED. PATIENT DOES NOT HAVE A NO SMOKING SIGN POSTED IN THE HOME. PATIENT INSTRUCTED PATIENT/CAREGIVER TO GET ONE AS SOON AS POSSIBLE. SMOKE ALARMS ARE PRESENT AND FUNCTIONAL ON EACH LEVEL OF THE HOME. PATIENT DOES

## 2019-07-29 SDOH — ECONOMIC STABILITY: HOUSING INSECURITY
HOME SAFETY: HAVE A FIRE ESCAPE PLAN DEVELOPED. PATIENT DOES NOT HAVE FLAMMABLE MATERIALS PRESENT IN THE HOME PRESENTING A FIRE HAZARD. EVIDENCE FOUND OF SMOKING MATERIALS PRESENT IN THE HOME.

## 2019-07-29 ASSESSMENT — PATIENT HEALTH QUESTIONNAIRE - PHQ9
2. FEELING DOWN, DEPRESSED, IRRITABLE, OR HOPELESS: 00
1. LITTLE INTEREST OR PLEASURE IN DOING THINGS: 00
CLINICAL INTERPRETATION OF PHQ2 SCORE: 0

## 2019-07-29 ASSESSMENT — ENCOUNTER SYMPTOMS
NAUSEA: DENIES
VOMITING: DENIES

## 2019-07-29 ASSESSMENT — ACTIVITIES OF DAILY LIVING (ADL): OASIS_M1830: 03

## 2019-07-29 NOTE — PROGRESS NOTES
Received referral from [facility]. Medications reviewed. No clinically significant interactions noted.     Due for a INR with Warfarin

## 2019-07-31 ENCOUNTER — HOME CARE VISIT (OUTPATIENT)
Dept: HOME HEALTH SERVICES | Facility: HOME HEALTHCARE | Age: 79
End: 2019-07-31
Payer: MEDICARE

## 2019-07-31 VITALS
OXYGEN SATURATION: 93 % | RESPIRATION RATE: 18 BRPM | DIASTOLIC BLOOD PRESSURE: 60 MMHG | BODY MASS INDEX: 28.5 KG/M2 | WEIGHT: 222 LBS | HEART RATE: 72 BPM | SYSTOLIC BLOOD PRESSURE: 128 MMHG | TEMPERATURE: 98.7 F

## 2019-07-31 LAB
INR PPP: 1.9 (ref 2–3.5)
INR PPP: 1.9 (ref 2–3.5)

## 2019-07-31 PROCEDURE — G0299 HHS/HOSPICE OF RN EA 15 MIN: HCPCS

## 2019-07-31 ASSESSMENT — ENCOUNTER SYMPTOMS
NAUSEA: DENIES
VOMITING: DENIES

## 2019-08-01 ENCOUNTER — HOME CARE VISIT (OUTPATIENT)
Dept: HOME HEALTH SERVICES | Facility: HOME HEALTHCARE | Age: 79
End: 2019-08-01
Payer: MEDICARE

## 2019-08-01 ENCOUNTER — ANTICOAGULATION MONITORING (OUTPATIENT)
Dept: MEDICAL GROUP | Facility: PHYSICIAN GROUP | Age: 79
End: 2019-08-01

## 2019-08-01 VITALS
TEMPERATURE: 97.8 F | DIASTOLIC BLOOD PRESSURE: 70 MMHG | HEART RATE: 64 BPM | SYSTOLIC BLOOD PRESSURE: 125 MMHG | OXYGEN SATURATION: 92 % | RESPIRATION RATE: 18 BRPM

## 2019-08-01 DIAGNOSIS — Z79.01 LONG TERM (CURRENT) USE OF ANTICOAGULANTS: ICD-10-CM

## 2019-08-01 DIAGNOSIS — Z79.01 CHRONIC ANTICOAGULATION: ICD-10-CM

## 2019-08-01 DIAGNOSIS — Z86.711 PERSONAL HISTORY OF PULMONARY EMBOLISM: ICD-10-CM

## 2019-08-01 LAB — INR PPP: 1.9 (ref 2–3.5)

## 2019-08-01 PROCEDURE — G0151 HHCP-SERV OF PT,EA 15 MIN: HCPCS

## 2019-08-01 ASSESSMENT — ACTIVITIES OF DAILY LIVING (ADL)
ADLS_COMMENTS: <!--EPICS-->PLEASE REFER TO OT EVALUATION.<!--EPICE-->
IADLS_COMMENTS: <!--EPICS-->PLEASE REFER TO OT EVALUATION.<BR><!--EPICE-->

## 2019-08-01 NOTE — PROGRESS NOTES
Anticoagulation Summary  As of 2019    INR goal:   2.0-3.0   TTR:   60.9 % (2.5 y)   INR used for dosin.9! (2019)   Warfarin maintenance plan:   10 mg (10 mg x 1) every Sun, e; 20 mg (10 mg x 2) all other days   Weekly warfarin total:   120 mg   Plan last modified:   Bianca Capellan (12/10/2018)   Next INR check:      Target end date:       Indications    Chronic anticoagulation [Z79.01]  Personal history of pulmonary embolism [Z86.711]  Long term (current) use of anticoagulants [Z79.01] [Z79.01]             Anticoagulation Episode Summary     INR check location:       Preferred lab:       Send INR reminders to:       Comments:         Anticoagulation Care Providers     Provider Role Specialty Phone number    Ninfa Marcial M.D. Referring Internal Medicine [phone]    [facility] Responsible  [phone]        Anticoagulation Patient Findings     Spoke with Natalie to report a sub therapeutic INR of 1.9.  Pt was recently discharged from [facility].  Pt reports that she was on 40-60mg of warfarin daily.  Pt wants to return to this dosage.  INR is sub therapeutic today, will bolus dose with 40mg tonight, and recheck in am.  Maty Perkins, Clinical Pharmacist, CDE, CACP      New order PLACED in Epic for HH to retest INR

## 2019-08-02 ENCOUNTER — HOME CARE VISIT (OUTPATIENT)
Dept: HOME HEALTH SERVICES | Facility: HOME HEALTHCARE | Age: 79
End: 2019-08-02
Payer: MEDICARE

## 2019-08-02 ENCOUNTER — ANTICOAGULATION MONITORING (OUTPATIENT)
Dept: MEDICAL GROUP | Facility: PHYSICIAN GROUP | Age: 79
End: 2019-08-02

## 2019-08-02 ENCOUNTER — ANTICOAGULATION MONITORING (OUTPATIENT)
Dept: VASCULAR LAB | Facility: MEDICAL CENTER | Age: 79
End: 2019-08-02

## 2019-08-02 VITALS
OXYGEN SATURATION: 92 % | TEMPERATURE: 97.8 F | SYSTOLIC BLOOD PRESSURE: 120 MMHG | DIASTOLIC BLOOD PRESSURE: 60 MMHG | RESPIRATION RATE: 16 BRPM | HEART RATE: 77 BPM

## 2019-08-02 DIAGNOSIS — Z86.711 PERSONAL HISTORY OF PULMONARY EMBOLISM: ICD-10-CM

## 2019-08-02 DIAGNOSIS — Z79.01 LONG TERM (CURRENT) USE OF ANTICOAGULANTS: ICD-10-CM

## 2019-08-02 DIAGNOSIS — Z79.01 CHRONIC ANTICOAGULATION: ICD-10-CM

## 2019-08-02 LAB
INR PPP: 1.5 (ref 2–3.5)

## 2019-08-02 PROCEDURE — G0299 HHS/HOSPICE OF RN EA 15 MIN: HCPCS

## 2019-08-02 ASSESSMENT — ENCOUNTER SYMPTOMS
NAUSEA: DENIES
VOMITING: DENIES

## 2019-08-02 NOTE — PROGRESS NOTES
Anticoagulation Summary  As of 2019    INR goal:   2.0-3.0   TTR:   60.8 % (2.5 y)   INR used for dosin.50! (2019)   Warfarin maintenance plan:   20 mg (10 mg x 2) every day   Weekly warfarin total:   140 mg   Plan last modified:   Jesica Muir, PharmD (2019)   Next INR check:   2019   Target end date:       Indications    Chronic anticoagulation [Z79.01]  Personal history of pulmonary embolism [Z86.711]  Long term (current) use of anticoagulants [Z79.01] [Z79.01]             Anticoagulation Episode Summary     INR check location:       Preferred lab:       Send INR reminders to:       Comments:         Anticoagulation Care Providers     Provider Role Specialty Phone number    Ninfa Marcial M.D. Referring Internal Medicine [phone]    [facility] Responsible  [phone]        Anticoagulation Patient Findings      Spoke with patient and [facility].  INR is sub therapeutic.   Pt denies any unusual s/s of bleeding, bruising, clotting or any changes to diet or medications. Denies any etoh, cranberries, supplements, or illness.     Pt states that she took 15mg daily on Mon-Wed. Thursday she took 40mg.   Pt states that her previous therapeutic dose was 20mg and 40mg doses through out the week (although I can't find this, I can find it being 20mg daily, or 15mg and 20mg daily dose)     Pt also states that her last VTE was in , however she's not quiet mobile right now due to her recent hip surgery. Offered to send in lovenox Rx for pt to get, pt declined. Offered to have HH RN go out over the weekend for INR draw, pt declined.     Will have pt take 40mg x2 days and then 20mg on .     Repeat INR in 3 days with [facility].    Jesica Muir, PharmD

## 2019-08-05 ENCOUNTER — ANTICOAGULATION MONITORING (OUTPATIENT)
Dept: MEDICAL GROUP | Facility: MEDICAL CENTER | Age: 79
End: 2019-08-05

## 2019-08-05 ENCOUNTER — HOME CARE VISIT (OUTPATIENT)
Dept: HOME HEALTH SERVICES | Facility: HOME HEALTHCARE | Age: 79
End: 2019-08-05
Payer: MEDICARE

## 2019-08-05 ENCOUNTER — ANTICOAGULATION MONITORING (OUTPATIENT)
Dept: VASCULAR LAB | Facility: MEDICAL CENTER | Age: 79
End: 2019-08-05

## 2019-08-05 VITALS
HEART RATE: 64 BPM | OXYGEN SATURATION: 93 % | TEMPERATURE: 97.9 F | RESPIRATION RATE: 16 BRPM | SYSTOLIC BLOOD PRESSURE: 116 MMHG | DIASTOLIC BLOOD PRESSURE: 62 MMHG

## 2019-08-05 DIAGNOSIS — Z79.01 CHRONIC ANTICOAGULATION: ICD-10-CM

## 2019-08-05 DIAGNOSIS — Z86.711 PERSONAL HISTORY OF PULMONARY EMBOLISM: ICD-10-CM

## 2019-08-05 DIAGNOSIS — Z79.01 LONG TERM (CURRENT) USE OF ANTICOAGULANTS: ICD-10-CM

## 2019-08-05 LAB
INR PPP: 6.5 (ref 2–3.5)
INR PPP: 6.5 (ref 2–3.5)

## 2019-08-05 PROCEDURE — 6650331 HCR  COAGCHECK STRIPS

## 2019-08-05 PROCEDURE — G0299 HHS/HOSPICE OF RN EA 15 MIN: HCPCS

## 2019-08-05 ASSESSMENT — ENCOUNTER SYMPTOMS
NAUSEA: DENIES
VOMITING: DENIES

## 2019-08-05 NOTE — PROGRESS NOTES
Anticoagulation Summary  As of 2019    INR goal:   2.0-3.0   TTR:   60.8 % (2.5 y)   INR used for dosin.5! (2019)   Warfarin maintenance plan:   20 mg (10 mg x 2) every day   Weekly warfarin total:   140 mg   Plan last modified:   Jesica Muir, PharmD (2019)   Next INR check:   2019   Target end date:       Indications    Chronic anticoagulation [Z79.01]  Personal history of pulmonary embolism [Z86.711]  Long term (current) use of anticoagulants [Z79.01] [Z79.01]             Anticoagulation Episode Summary     INR check location:       Preferred lab:       Send INR reminders to:       Comments:         Anticoagulation Care Providers     Provider Role Specialty Phone number    Ninfa Marcial M.D. Referring Internal Medicine [phone]    [facility] Responsible  [phone]        Anticoagulation Patient Findings        Spoke with Natalie to report a sub therapeutic INR of 1.5. Pt bolus dosed with 40mg po qhs x2 then resume current dosing regimen. Follow up in today, to reduce the risk of adverse events related to this high risk medication, warfarin.    Maty Perkins, Clinical Pharmacist

## 2019-08-05 NOTE — PROGRESS NOTES
Anticoagulation Summary  As of 2019    INR goal:   2.0-3.0   TTR:   60.6 % (2.5 y)   INR used for dosin.50! (2019)   Warfarin maintenance plan:   20 mg (10 mg x 2) every day   Weekly warfarin total:   140 mg   Plan last modified:   Jesica Muir, PharmD (2019)   Next INR check:   2019   Target end date:       Indications    Chronic anticoagulation [Z79.01]  Personal history of pulmonary embolism [Z86.711]  Long term (current) use of anticoagulants [Z79.01] [Z79.01]             Anticoagulation Episode Summary     INR check location:       Preferred lab:       Send INR reminders to:       Comments:         Anticoagulation Care Providers     Provider Role Specialty Phone number    Ninfa Marcial M.D. Referring Internal Medicine [phone]    [facility] Responsible  [phone]        Anticoagulation Patient Findings        spoke with Tae to report a supra therapeutic INR of 6.5.  Will HOLD for 3 doses and retest INR.    New order placed in Epic for  to retest INR.  Maty Perkins, Clinical Pharmacist, CDE, CACP

## 2019-08-08 ENCOUNTER — HOME CARE VISIT (OUTPATIENT)
Dept: HOME HEALTH SERVICES | Facility: HOME HEALTHCARE | Age: 79
End: 2019-08-08
Payer: MEDICARE

## 2019-08-08 ENCOUNTER — ANTICOAGULATION MONITORING (OUTPATIENT)
Dept: VASCULAR LAB | Facility: MEDICAL CENTER | Age: 79
End: 2019-08-08

## 2019-08-08 ENCOUNTER — ANTICOAGULATION MONITORING (OUTPATIENT)
Dept: MEDICAL GROUP | Facility: PHYSICIAN GROUP | Age: 79
End: 2019-08-08

## 2019-08-08 VITALS
HEART RATE: 74 BPM | RESPIRATION RATE: 16 BRPM | SYSTOLIC BLOOD PRESSURE: 120 MMHG | TEMPERATURE: 98.3 F | DIASTOLIC BLOOD PRESSURE: 75 MMHG | OXYGEN SATURATION: 92 %

## 2019-08-08 DIAGNOSIS — Z86.711 PERSONAL HISTORY OF PULMONARY EMBOLISM: ICD-10-CM

## 2019-08-08 DIAGNOSIS — Z79.01 CHRONIC ANTICOAGULATION: ICD-10-CM

## 2019-08-08 DIAGNOSIS — Z79.01 LONG TERM (CURRENT) USE OF ANTICOAGULANTS: ICD-10-CM

## 2019-08-08 LAB
INR PPP: 1.2 (ref 2–3.5)
INR PPP: 1.2 (ref 2–3.5)

## 2019-08-08 PROCEDURE — G0151 HHCP-SERV OF PT,EA 15 MIN: HCPCS

## 2019-08-08 PROCEDURE — G0299 HHS/HOSPICE OF RN EA 15 MIN: HCPCS

## 2019-08-08 ASSESSMENT — ENCOUNTER SYMPTOMS
NAUSEA: DENIES
DEBILITATING PAIN: 1
VOMITING: DENIES

## 2019-08-08 NOTE — PROGRESS NOTES
Anticoagulation Summary  As of 2019    INR goal:   2.0-3.0   TTR:   60.5 % (2.5 y)   INR used for dosin.2! (2019)   Warfarin maintenance plan:   20 mg (10 mg x 2) every day   Weekly warfarin total:   140 mg   Plan last modified:   Jesica Muir, PharmD (2019)   Next INR check:   2019   Target end date:       Indications    Chronic anticoagulation [Z79.01]  Personal history of pulmonary embolism [Z86.711]  Long term (current) use of anticoagulants [Z79.01] [Z79.01]             Anticoagulation Episode Summary     INR check location:       Preferred lab:       Send INR reminders to:       Comments:         Anticoagulation Care Providers     Provider Role Specialty Phone number    Ninfa Marcial M.D. Referring Internal Medicine [phone]    [facility]agulation [facility] Responsible  [phone]        Anticoagulation Patient Findings        spoke with pt to report sub therapeutic INR of 1.7.  Pt recently held two doses in response to critically supratherapeutic INR of 6.5.  Will continue 20mg po daily and retest INR in 3 days.\  Maty Perkins, Clinical Pharmacist, CDE, CACP      New order filed in Epic for HH to retest IN

## 2019-08-12 ENCOUNTER — HOME CARE VISIT (OUTPATIENT)
Dept: HOME HEALTH SERVICES | Facility: HOME HEALTHCARE | Age: 79
End: 2019-08-12
Payer: MEDICARE

## 2019-08-12 ENCOUNTER — HOSPITAL ENCOUNTER (OUTPATIENT)
Dept: LAB | Facility: MEDICAL CENTER | Age: 79
End: 2019-08-12
Attending: INTERNAL MEDICINE
Payer: MEDICARE

## 2019-08-12 ENCOUNTER — ANTICOAGULATION MONITORING (OUTPATIENT)
Dept: MEDICAL GROUP | Facility: PHYSICIAN GROUP | Age: 79
End: 2019-08-12

## 2019-08-12 VITALS
DIASTOLIC BLOOD PRESSURE: 64 MMHG | HEART RATE: 71 BPM | SYSTOLIC BLOOD PRESSURE: 126 MMHG | OXYGEN SATURATION: 91 % | RESPIRATION RATE: 16 BRPM | TEMPERATURE: 97.2 F

## 2019-08-12 DIAGNOSIS — E66.01 MORBID OBESITY WITH BMI OF 40.0-44.9, ADULT (HCC): ICD-10-CM

## 2019-08-12 DIAGNOSIS — Z79.01 CHRONIC ANTICOAGULATION: ICD-10-CM

## 2019-08-12 DIAGNOSIS — E03.9 ACQUIRED HYPOTHYROIDISM: ICD-10-CM

## 2019-08-12 DIAGNOSIS — Z79.01 LONG TERM (CURRENT) USE OF ANTICOAGULANTS: ICD-10-CM

## 2019-08-12 DIAGNOSIS — M85.80 OSTEOPENIA WITH HIGH RISK OF FRACTURE: ICD-10-CM

## 2019-08-12 DIAGNOSIS — Z86.711 PERSONAL HISTORY OF PULMONARY EMBOLISM: ICD-10-CM

## 2019-08-12 DIAGNOSIS — I10 HTN (HYPERTENSION), BENIGN: ICD-10-CM

## 2019-08-12 DIAGNOSIS — I27.21 SECONDARY PULMONARY ARTERIAL HYPERTENSION (HCC): ICD-10-CM

## 2019-08-12 LAB
ALBUMIN SERPL BCP-MCNC: 3.7 G/DL (ref 3.2–4.9)
ALBUMIN/GLOB SERPL: 1.3 G/DL
ALP SERPL-CCNC: 95 U/L (ref 30–99)
ALT SERPL-CCNC: 15 U/L (ref 2–50)
ANION GAP SERPL CALC-SCNC: 6 MMOL/L (ref 0–11.9)
AST SERPL-CCNC: 17 U/L (ref 12–45)
BILIRUB SERPL-MCNC: 0.3 MG/DL (ref 0.1–1.5)
BUN SERPL-MCNC: 17 MG/DL (ref 8–22)
CALCIUM SERPL-MCNC: 9.2 MG/DL (ref 8.5–10.5)
CHLORIDE SERPL-SCNC: 104 MMOL/L (ref 96–112)
CHOLEST SERPL-MCNC: 149 MG/DL (ref 100–199)
CO2 SERPL-SCNC: 33 MMOL/L (ref 20–33)
CREAT SERPL-MCNC: 0.55 MG/DL (ref 0.5–1.4)
FASTING STATUS PATIENT QL REPORTED: NORMAL
GLOBULIN SER CALC-MCNC: 2.9 G/DL (ref 1.9–3.5)
GLUCOSE SERPL-MCNC: 99 MG/DL (ref 65–99)
HDLC SERPL-MCNC: 54 MG/DL
INR PPP: 2.2 (ref 2–3.5)
INR PPP: 2.2 (ref 2–3.5)
LDLC SERPL CALC-MCNC: 76 MG/DL
POTASSIUM SERPL-SCNC: 4.1 MMOL/L (ref 3.6–5.5)
PROT SERPL-MCNC: 6.6 G/DL (ref 6–8.2)
SODIUM SERPL-SCNC: 143 MMOL/L (ref 135–145)
T4 FREE SERPL-MCNC: 0.89 NG/DL (ref 0.53–1.43)
TRIGL SERPL-MCNC: 95 MG/DL (ref 0–149)
TSH SERPL DL<=0.005 MIU/L-ACNC: 0.78 UIU/ML (ref 0.38–5.33)

## 2019-08-12 PROCEDURE — 36415 COLL VENOUS BLD VENIPUNCTURE: CPT

## 2019-08-12 PROCEDURE — 84443 ASSAY THYROID STIM HORMONE: CPT

## 2019-08-12 PROCEDURE — 84439 ASSAY OF FREE THYROXINE: CPT

## 2019-08-12 PROCEDURE — 80061 LIPID PANEL: CPT

## 2019-08-12 PROCEDURE — 80053 COMPREHEN METABOLIC PANEL: CPT

## 2019-08-12 PROCEDURE — G0299 HHS/HOSPICE OF RN EA 15 MIN: HCPCS

## 2019-08-12 SDOH — ECONOMIC STABILITY: HOUSING INSECURITY: EVIDENCE OF SMOKING MATERIAL: 0

## 2019-08-12 ASSESSMENT — ENCOUNTER SYMPTOMS
VOMITING: DENIES
NAUSEA: DENIES

## 2019-08-12 NOTE — PROGRESS NOTES
Anticoagulation Summary  As of 2019    INR goal:   2.0-3.0   TTR:   60.3 % (2.5 y)   INR used for dosin.20 (2019)   Warfarin maintenance plan:   10 mg (10 mg x 1) every Wed; 20 mg (10 mg x 2) all other days   Weekly warfarin total:   130 mg   Plan last modified:   Maximo Squires PharmD (2019)   Next INR check:   2019   Target end date:       Indications    Chronic anticoagulation [Z79.01]  Personal history of pulmonary embolism [Z86.711]  Long term (current) use of anticoagulants [Z79.01] [Z79.01]             Anticoagulation Episode Summary     INR check location:       Preferred lab:       Send INR reminders to:       Comments:         Anticoagulation Care Providers     Provider Role Specialty Phone number    Ninfa Marcial M.D. Referring Internal Medicine [phone]    [facility] Responsible  [phone]        Anticoagulation Patient Findings  Patient Findings     Negatives:   Signs/symptoms of thrombosis, Signs/symptoms of bleeding, Laboratory test error suspected, Change in health, Change in alcohol use, Change in activity, Upcoming invasive procedure, Emergency department visit, Upcoming dental procedure, Missed doses, Extra doses, Change in medications, Change in diet/appetite, Hospital admission, Bruising, Other complaints        Spoke with patient today regarding therapeutic INR of 2.2.  Patient denies any signs/symptoms of bruising or bleeding or any changes in diet and medications.  Instructed patient to call clinic with any questions or concerns.  Instructed patient to begin new weekly warfarin regimen as detailed above.  Follow up in 4 days, to reduce risk of adverse events related to this high risk medication,  Warfarin.    Maximo Squires, DarwinD, BCACP

## 2019-08-13 ENCOUNTER — HOME CARE VISIT (OUTPATIENT)
Dept: HOME HEALTH SERVICES | Facility: HOME HEALTHCARE | Age: 79
End: 2019-08-13
Payer: MEDICARE

## 2019-08-13 PROCEDURE — G0180 MD CERTIFICATION HHA PATIENT: HCPCS | Performed by: INTERNAL MEDICINE

## 2019-08-13 PROCEDURE — G0151 HHCP-SERV OF PT,EA 15 MIN: HCPCS

## 2019-08-14 VITALS
HEART RATE: 68 BPM | SYSTOLIC BLOOD PRESSURE: 125 MMHG | RESPIRATION RATE: 16 BRPM | DIASTOLIC BLOOD PRESSURE: 60 MMHG | TEMPERATURE: 98.2 F | OXYGEN SATURATION: 92 %

## 2019-08-14 ASSESSMENT — ENCOUNTER SYMPTOMS: DEBILITATING PAIN: 1

## 2019-08-15 ENCOUNTER — HOME CARE VISIT (OUTPATIENT)
Dept: HOME HEALTH SERVICES | Facility: HOME HEALTHCARE | Age: 79
End: 2019-08-15
Payer: MEDICARE

## 2019-08-15 VITALS
HEART RATE: 60 BPM | DIASTOLIC BLOOD PRESSURE: 62 MMHG | TEMPERATURE: 98.1 F | SYSTOLIC BLOOD PRESSURE: 120 MMHG | OXYGEN SATURATION: 93 % | RESPIRATION RATE: 16 BRPM

## 2019-08-15 PROCEDURE — G0151 HHCP-SERV OF PT,EA 15 MIN: HCPCS

## 2019-08-16 ENCOUNTER — TELEPHONE (OUTPATIENT)
Dept: MEDICAL GROUP | Age: 79
End: 2019-08-16

## 2019-08-16 ENCOUNTER — HOME CARE VISIT (OUTPATIENT)
Dept: HOME HEALTH SERVICES | Facility: HOME HEALTHCARE | Age: 79
End: 2019-08-16
Payer: MEDICARE

## 2019-08-16 ENCOUNTER — ANTICOAGULATION MONITORING (OUTPATIENT)
Dept: MEDICAL GROUP | Facility: PHYSICIAN GROUP | Age: 79
End: 2019-08-16

## 2019-08-16 VITALS
RESPIRATION RATE: 16 BRPM | OXYGEN SATURATION: 93 % | DIASTOLIC BLOOD PRESSURE: 60 MMHG | SYSTOLIC BLOOD PRESSURE: 110 MMHG | HEART RATE: 61 BPM | TEMPERATURE: 98.4 F

## 2019-08-16 DIAGNOSIS — Z86.711 PERSONAL HISTORY OF PULMONARY EMBOLISM: ICD-10-CM

## 2019-08-16 DIAGNOSIS — Z79.01 CHRONIC ANTICOAGULATION: ICD-10-CM

## 2019-08-16 DIAGNOSIS — Z79.01 LONG TERM (CURRENT) USE OF ANTICOAGULANTS: ICD-10-CM

## 2019-08-16 LAB — INR PPP: 2.1 (ref 2–3.5)

## 2019-08-16 PROCEDURE — G0299 HHS/HOSPICE OF RN EA 15 MIN: HCPCS

## 2019-08-16 ASSESSMENT — ENCOUNTER SYMPTOMS
VOMITING: DENIES
ADDITIONAL INFORMATION: RIGHT KNEE
NAUSEA: DENIES

## 2019-08-16 NOTE — TELEPHONE ENCOUNTER
ESTABLISHED PATIENT PRE-VISIT PLANNING     Patient was NOT contacted to complete PVP.     Note: Patient will not be contacted if there is no indication to call.     1.  Reviewed notes from the last few office visits within the medical group: Yes    2.  If any orders were placed at last visit or intended to be done for this visit (i.e. 6 mos follow-up), do we have Results/Consult Notes?        •  Labs - Labs ordered, completed on 8/12/19 and results are in chart.   Note: If patient appointment is for lab review and patient did not complete labs, check with provider if OK to reschedule patient until labs completed.       •  Imaging - Imaging was not ordered at last office visit.       •  Referrals - No referrals were ordered at last office visit.    3. Is this appointment scheduled as a Hospital Follow-Up? No    4.  Immunizations were updated in Epic using WebIZ?: Epic matches WebIZ       •  Web Iz Recommendations: SHINGRIX (Shingles)    5.  Patient is due for the following Health Maintenance Topics:   Health Maintenance Due   Topic Date Due   • IMM ZOSTER VACCINES (2 of 3) 02/25/2011           6. Orders for overdue Health Maintenance topics pended in Pre-Charting? N\A    7.  AHA (MDX) form printed for Provider? No, already completed    8.  Patient was NOT informed to arrive 15 min prior to their scheduled appointment and bring in their medication bottles.

## 2019-08-16 NOTE — PROGRESS NOTES
Anticoagulation Summary  As of 2019    INR goal:   2.0-3.0   TTR:   60.5 % (2.5 y)   INR used for dosin.1 (2019)   Warfarin maintenance plan:   10 mg (10 mg x 1) every Wed; 20 mg (10 mg x 2) all other days   Weekly warfarin total:   130 mg   Plan last modified:   Anders Fallon, Allyson (2019)   Next INR check:   2019   Target end date:       Indications    Chronic anticoagulation [Z79.01]  Personal history of pulmonary embolism [Z86.711]  Long term (current) use of anticoagulants [Z79.01] [Z79.01]             Anticoagulation Episode Summary     INR check location:       Preferred lab:       Send INR reminders to:       Comments:   [facility]       Anticoagulation Care Providers     Provider Role Specialty Phone number    Ninfa Marcial M.D. Referring Internal Medicine [phone]    [facility]ticoagulation [facility] Responsible  [phone]        Anticoagulation Patient Findings      INR  therapeutic.   Left a voice message for the patient, asked patient to please call the anticoagulation clinic if they have any signs/symptoms of bleeding and/or thrombosis or any changes to diet or medications.    Follow up appointment in 1 week(s)    Continue weekly warfarin dose as noted      Anders Fallon, PharmD

## 2019-08-19 ENCOUNTER — OFFICE VISIT (OUTPATIENT)
Dept: MEDICAL GROUP | Age: 79
End: 2019-08-19
Payer: MEDICARE

## 2019-08-19 VITALS
BODY MASS INDEX: 40.89 KG/M2 | HEART RATE: 70 BPM | TEMPERATURE: 99.8 F | WEIGHT: 222.2 LBS | DIASTOLIC BLOOD PRESSURE: 60 MMHG | OXYGEN SATURATION: 93 % | SYSTOLIC BLOOD PRESSURE: 118 MMHG | HEIGHT: 62 IN

## 2019-08-19 DIAGNOSIS — J96.11 CHRONIC RESPIRATORY FAILURE WITH HYPOXIA (HCC): ICD-10-CM

## 2019-08-19 DIAGNOSIS — D50.8 IRON DEFICIENCY ANEMIA SECONDARY TO INADEQUATE DIETARY IRON INTAKE: ICD-10-CM

## 2019-08-19 DIAGNOSIS — Z78.0 POSTMENOPAUSAL ESTROGEN DEFICIENCY: ICD-10-CM

## 2019-08-19 DIAGNOSIS — N64.4 PAIN OF LEFT BREAST: ICD-10-CM

## 2019-08-19 DIAGNOSIS — Z87.81 HISTORY OF FRACTURE OF RIGHT HIP: ICD-10-CM

## 2019-08-19 DIAGNOSIS — E03.9 ACQUIRED HYPOTHYROIDISM: ICD-10-CM

## 2019-08-19 DIAGNOSIS — M80.00XD AGE-RELATED OSTEOPOROSIS WITH CURRENT PATHOLOGICAL FRACTURE WITH ROUTINE HEALING: ICD-10-CM

## 2019-08-19 PROBLEM — D62 ACUTE BLOOD LOSS ANEMIA: Status: RESOLVED | Noted: 2019-06-16 | Resolved: 2019-08-19

## 2019-08-19 PROBLEM — R33.9 URINARY RETENTION: Status: RESOLVED | Noted: 2019-06-16 | Resolved: 2019-08-19

## 2019-08-19 PROBLEM — N30.00 ACUTE CYSTITIS WITHOUT HEMATURIA: Status: RESOLVED | Noted: 2019-06-17 | Resolved: 2019-08-19

## 2019-08-19 PROBLEM — R50.9 FEVER: Status: RESOLVED | Noted: 2019-06-17 | Resolved: 2019-08-19

## 2019-08-19 PROCEDURE — 99214 OFFICE O/P EST MOD 30 MIN: CPT | Performed by: INTERNAL MEDICINE

## 2019-08-19 ASSESSMENT — PAIN SCALES - GENERAL: PAINLEVEL: NO PAIN

## 2019-08-20 ENCOUNTER — HOME CARE VISIT (OUTPATIENT)
Dept: HOME HEALTH SERVICES | Facility: HOME HEALTHCARE | Age: 79
End: 2019-08-20
Payer: MEDICARE

## 2019-08-20 PROCEDURE — G0151 HHCP-SERV OF PT,EA 15 MIN: HCPCS

## 2019-08-20 NOTE — PROGRESS NOTES
Subjective:   Natalie Zuniga is a 78 y.o. female here today for evaluation and management of:    History of fracture of right hip  Acute. 6/12/19 patient fell while using a walker at Dr. Camarillo's weight loss clinic after her foot slipped out of her shoe. At that time she felt a pop in her right hip with sudden onset pain. She underwent surgical repair for right intertrochanteric femur fracture on 6/13/19 with weightbearing as tolerated. She is now using a walker to help with ambulation and has home health physical therapy twice a week and home nurse once a week. She is followed by Dr. Garnica (Orthopedics) and has her next appointment with him on 9/9/19.    She is followed at an anticoagulation clinic and she is on furosemide 40 mg QD due to history of pulmonary hypertension and recurrent pulmonary embolism. While in the hospital she had decreasing hemoglobin and INR levels (see below) and had a blood transfusion of 2 units of RBC's on 6/16.     Results for NATALIE ZUNIGA (MRN [id number]) as of 8/19/2019 17:20   Ref. Range 8/5/2019 13:20 8/8/2019 00:00 8/8/2019 09:53 8/12/2019 00:00 8/12/2019 13:26 8/16/2019 10:13   INR Unknown 6.5 1.20 1.2 2.20 2.2 2.1     Results for NATALIE ZUNIGA (MRN [id number]) as of 8/19/2019 19:01   Ref. Range 6/12/2019 11:47 6/13/2019 05:03 6/13/2019 17:53 6/16/2019 05:40 6/16/2019 13:25 6/17/2019 04:14 6/18/2019 04:35   RBC Latest Ref Range: 4.20 - 5.40 M/uL 4.15 (L) 3.84 (L) 3.84 (L) 2.00 (L) 2.34 (L) 2.43 (L) 2.44 (L)   Hemoglobin Latest Ref Range: 12.0 - 16.0 g/dL 13.2 12.5 12.5 6.4 (L) 7.4 (L) 7.4 (L) 7.5 (L)   Hematocrit Latest Ref Range: 37.0 - 47.0 % 40.1 37.6 37.7 19.6 (L) 22.9 (L) 22.9 (L) 23.7 (L)   MCV Latest Ref Range: 81.4 - 97.8 fL 96.6 97.9 (H) 98.2 (H) 98.0 (H) 97.9 (H) 94.2 97.1         Acquired hypothyroidism  Chronic. Patient reports compliancy with levothyroxine 100 mcg every morning on an empty stomach and denies any side effects. Her recent thyroid  function test was within normal, which I reviewed with her today.     Results for DIVINA ZUNIGA (MRN [id number]) as of 8/19/2019 17:20   Ref. Range 2/19/2019 07:50 6/13/2019 05:03 8/12/2019 09:50   TSH Latest Ref Range: 0.380 - 5.330 uIU/mL 1.190 0.740 0.780   Free T-4 Latest Ref Range: 0.53 - 1.43 ng/dL 0.90  0.89       Iron deficiency anemia secondary to inadequate dietary iron intake  Chronic. Due to recent hospitalization requiring blood transfusion, patient has been taking ferrous sulfate 325 mg daily. I reviewed recent blood work with her today.      Chronic respiratory failure with hypoxia (HCC)  Chronic. Patient was previously using CPAP machine for sleep apnea and uses oxygen 2 L at night for chronic respiratory failure secondary to pulmonary hypertension, however since hospital discharge on 6/14/19 she has not used CPAP machine. She has been using oxygen 2 L at night.  She denies shortness of breath or chest pain.    Age-related osteoporosis with current pathological fracture with routine healing  Postmenopausal estrogen deficiency  Chronic. Patient reports compliancy with alendronate 70 mg once a week. She denies any side effects from this medication. She did have recent right intertrochanteric femur fracture on 6/13/19. She additionally takes a daily over the counter vitamin D supplement.  Patient has DEXA scan done 2 years ago.  She agrees to repeat DEXA scan for follow-up.    Pain of left breast  Patient reports this morning she noticed a tenderness in her left breast while putting on her bra. She is unsure if she felt a true mass, or rather just noted tenderness. Most recent screening mammogram was normal on 5/18/17. She does not complete self exams at home. She denies familial history of breast cancer. She denies chest pain.      Current medicines (including changes today)  Current Outpatient Medications   Medication Sig Dispense Refill   • Home Care Oxygen Inhale 2 L/min by mouth every  "bedtime.     • alendronate (FOSAMAX) 70 MG Tab TAKE 1 TABLET BY MOUTH EVERY 7 DAYS 12 Tab 3   • omeprazole (PRILOSEC) 20 MG delayed-release capsule Take 20 mg by mouth every evening.     • primidone (MYSOLINE) 250 MG Tab Take 250-500 mg by mouth 2 Times a Day. 250mg in the morning & 500mg at bedtime     • warfarin (COUMADIN) 10 MG Tab Take 15 mg by mouth every evening.        • potassium chloride SA (KDUR) 20 MEQ Tab CR TAKE 1 TABLET BY MOUTH EVERY  Tab 3   • ferrous sulfate 325 (65 Fe) MG EC tablet Take 1 Tab by mouth every day. 90 Tab 3   • Melatonin 10 MG Tab Take 15 mg by mouth every bedtime.     • carvedilol (COREG) 6.25 MG Tab TAKE 1 TABLET BY MOUTH TWICE DAILY 180 Tab 3   • furosemide (LASIX) 40 MG Tab TAKE 1 TABLET BY MOUTH EVERY DAY 90 Tab 3   • levothyroxine (SYNTHROID) 100 MCG Tab TAKE 1 TABLET BY MOUTH EVERY DAY 90 Tab 3   • Probiotic Product (PROBIOTIC DAILY PO) Take 1 Cap by mouth every day.     • Glucos-Chond-Hyal Ac-Ca Fructo (MOVE FREE [facility] HEALTH ADVANCE) Tab Take 1 Tab by mouth every day.     • Multiple Vitamins-Minerals (WOMENS MULTI VITAMIN & MINERAL) Tab Take 1 Tab by mouth every day.     • Cholecalciferol (VITAMIN D-3) 1000 UNITS Cap Take 1,000 Units by mouth every day.       No current facility-administered medications for this visit.      She  has a past medical history of Arthritis, Bowel habit changes, Breath shortness, Cataract, Constipation, Epilepsy (HCC), Eye drainage, Hemorrhagic disorder (HCC), Hiatus hernia syndrome, Hypertension, Hyperthyroidism, Hypothyroid, Influenza, Pain, Personal history of venous thrombosis and embolism, Ringing in ears, Seizure ([facility]) (1982), and Sore muscles.    ROS   No chest pain, no shortness of breath, no abdominal pain  Positive for bilateral lower extremities edema.       Objective:     /60 (BP Location: Right arm, Patient Position: Sitting, BP Cuff Size: Adult)   Pulse 70   Temp 37.7 °C (99.8 °F) (Temporal)   Ht 1.575 m (5' 2\")   Wt " 100.8 kg (222 lb 3.2 oz)   SpO2 93%  Body mass index is 40.64 kg/m².   Physical Exam:  General: Alert, oriented and no acute distress.  Eye contact is good, speech goal directed, affect calm  HEENT: conjunctiva non-injected, sclera non-icteric.  Oral mucous membranes pink and moist with no lesions.  Pinna normal.   Lungs: Normal respiratory effort, clear to auscultation bilaterally with good excursion.  CV: regular rate and rhythm. No murmurs.   Abdomen: soft, non distended, nontender, Bowel sound normal.  Ext: Bilateral lower extremities edema, temperature normal  Musculoskeletal exam: moving all extremities freely  Breast exam: No palpable lump on exam on both breasts, no nipple discharge, no palpable axillary lymph nodes bilaterally.  Mild tender on palpation of the upper outer quadrant of the left breast.    Assessment and Plan:   The following treatment plan was discussed     1. History of fracture of right hip  - Patient with right intertrochanteric femur fracture on 6/13/19. She should continue to follow with Dr. Garnica (Ortho) as scheduled on 9/9/19.   - Counseling for use walker all the time, getting up slowly, turn on the light when she gets up at night, installing nightlight at home.    2. Acquired hypothyroidism  - Patient advised to continue levothyroxine 100 mcg every morning on an empty stomach.   - Advised patient to continue monitoring for signs of hyper- hypothyroidism.  - Plan to recheck with blood work.  - CBC WITH DIFFERENTIAL; Future  - TSH; Future  - FREE THYROXINE; Future    3. Iron deficiency anemia secondary to inadequate dietary iron intake  - Patient was advised to continue ferrous sulfate 325 mg once a day.  Will recheck CBC in 3 months.  - Advised to avoid NSAID's.  - Plan to recheck with blood work in the future.    4. Chronic respiratory failure with hypoxia (HCC)  - Patient advised to continue use of 2 L oxygen at night. She should additionally start using CPAP machine again at  night.  Patient is encouraged to restart CPAP machine for sleep apnea.  - She is advised to continue to follow with pulmonology.    5. Age-related osteoporosis with current pathological fracture with routine healing  - Ordered DEXA scan to follow-up on bone density.  Continue Fosamax 70 mg once a week.  - Counseling for side effect of Fosamax.  - Advised to take Fosamax with plenty of water and to stay upright for at least 2 hour after taking medication to prevent esophagitis.  - Counseling for use walker all the time, getting up slowly, turn on the light when she gets up at night, installing nightlight at home to prevent for.  - Reviewed side effects of medications with patient. Recommend to avoid sedating medication as much as possible.   - Comp Metabolic Panel; Future  - VITAMIN D,25 HYDROXY; Future  - DS-BONE DENSITY STUDY (DEXA); Future    6. Postmenopausal estrogen deficiency  - Patient with recent hip fracture and history of osteopenia.  Patient has multiple fractures including both hip fracture, compression fracture of lumbar spine and thoracic spine vertebral.  Patient has high risk of osteoporosis. Plan to recheck bone density with DEXA as ordered today.  - DS-BONE DENSITY STUDY (DEXA); Future    7. Pain of left breast  - Patient with tenderness and pain in her left breast and is not up to date on screening mammograms (2017).  No palpable lump on exam.  - Diagnostic mammogram ordered today.   - MA-DIAGNOSTIC MAMMO W/CAD-BILAT; Future    8. Health Maintenance:  - Patient due for Shingrix vaccines and we do not have vaccine available due to shortage of supply.  She will check with local pharmacy for Shingrix vaccine.    Follow up: Return in about 3 months (around 11/19/2019), or if symptoms worsen or fail to improve, for Anemia, Hypothyroid, Osteoporosis, GERD, Lab review.    I, Korin Nino (Scribe), am scribing for, and in the presence of, Ninfa Marcial M.D..  ?  Electronically signed by: Korin Nino  (Scribe), 8/19/2019  ?  I, Ninfa Marcial M.D., personally performed the services described in this documentation, as scribed by Korin Nino in my presence, and it is both accurate and complete.    Please note that this dictation was created using voice recognition software. I have made every reasonable attempt to correct obvious errors, but I expect that there may have unintended errors in text, spelling, punctuation, or grammar that I did not discover.

## 2019-08-21 VITALS
DIASTOLIC BLOOD PRESSURE: 60 MMHG | OXYGEN SATURATION: 93 % | SYSTOLIC BLOOD PRESSURE: 125 MMHG | RESPIRATION RATE: 18 BRPM | TEMPERATURE: 97.5 F | HEART RATE: 66 BPM

## 2019-08-22 ENCOUNTER — HOME CARE VISIT (OUTPATIENT)
Dept: HOME HEALTH SERVICES | Facility: HOME HEALTHCARE | Age: 79
End: 2019-08-22
Payer: MEDICARE

## 2019-08-22 ENCOUNTER — ANTICOAGULATION MONITORING (OUTPATIENT)
Dept: MEDICAL GROUP | Facility: PHYSICIAN GROUP | Age: 79
End: 2019-08-22

## 2019-08-22 VITALS
OXYGEN SATURATION: 95 % | SYSTOLIC BLOOD PRESSURE: 106 MMHG | TEMPERATURE: 97.5 F | RESPIRATION RATE: 16 BRPM | DIASTOLIC BLOOD PRESSURE: 62 MMHG | HEART RATE: 64 BPM

## 2019-08-22 DIAGNOSIS — Z79.01 LONG TERM (CURRENT) USE OF ANTICOAGULANTS: ICD-10-CM

## 2019-08-22 DIAGNOSIS — Z79.01 CHRONIC ANTICOAGULATION: ICD-10-CM

## 2019-08-22 DIAGNOSIS — Z86.711 PERSONAL HISTORY OF PULMONARY EMBOLISM: ICD-10-CM

## 2019-08-22 LAB
INR PPP: 3.1 (ref 2–3.5)
INR PPP: 3.1 (ref 2–3.5)

## 2019-08-22 PROCEDURE — 6650331 HCR  COAGCHECK STRIPS

## 2019-08-22 PROCEDURE — G0299 HHS/HOSPICE OF RN EA 15 MIN: HCPCS

## 2019-08-22 PROCEDURE — G0151 HHCP-SERV OF PT,EA 15 MIN: HCPCS

## 2019-08-22 ASSESSMENT — ENCOUNTER SYMPTOMS
NAUSEA: NO
VOMITING: NO
DEBILITATING PAIN: 1

## 2019-08-22 NOTE — PROGRESS NOTES
Anticoagulation Summary  As of 8/22/2019    INR goal:   2.0-3.0   TTR:   60.7 % (2.6 y)   INR used for dosing:   3.1! (8/22/2019)   Warfarin maintenance plan:   10 mg (10 mg x 1) every Wed; 20 mg (10 mg x 2) all other days   Weekly warfarin total:   130 mg   Plan last modified:   Darwin LoboD (8/16/2019)   Next INR check:   8/27/2019   Target end date:       Indications    Chronic anticoagulation [Z79.01]  Personal history of pulmonary embolism [Z86.711]  Long term (current) use of anticoagulants [Z79.01] [Z79.01]             Anticoagulation Episode Summary     INR check location:       Preferred lab:       Send INR reminders to:       Comments:   [facility]       Anticoagulation Care Providers     Provider Role Specialty Phone number    Ninfa Marcial M.D. Referring Internal Medicine [phone]    [facility] Anticoagulation Services Responsible  [phone]        Anticoagulation Patient Findings          HPI:  Natalie Anaya, on anticoagulation therapy with warfarin for PE.  Changes to current medical/health status since last appt: none  Denies signs/symptoms of bleeding and/or thrombosis since the last appt.    Denies any interval changes to diet  Denies any interval changes to medications since last appt.   Denies any complications or cost restrictions with current therapy.     A/P   INR  SUPRA-therapeutic.   INR only 0.1 out of range and she took a reduced dose yesterday. No need to change per Chest guidelines.     Next INR in 1 week(s).    Arvind Hernández, PharmD

## 2019-08-26 ENCOUNTER — PATIENT OUTREACH (OUTPATIENT)
Dept: HEALTH INFORMATION MANAGEMENT | Facility: OTHER | Age: 79
End: 2019-08-26

## 2019-08-27 ENCOUNTER — HOME CARE VISIT (OUTPATIENT)
Dept: HOME HEALTH SERVICES | Facility: HOME HEALTHCARE | Age: 79
End: 2019-08-27
Payer: MEDICARE

## 2019-08-27 ENCOUNTER — ANTICOAGULATION MONITORING (OUTPATIENT)
Dept: VASCULAR LAB | Facility: MEDICAL CENTER | Age: 79
End: 2019-08-27

## 2019-08-27 VITALS
RESPIRATION RATE: 16 BRPM | SYSTOLIC BLOOD PRESSURE: 138 MMHG | TEMPERATURE: 97.6 F | OXYGEN SATURATION: 92 % | DIASTOLIC BLOOD PRESSURE: 68 MMHG | HEART RATE: 68 BPM

## 2019-08-27 DIAGNOSIS — Z86.711 PERSONAL HISTORY OF PULMONARY EMBOLISM: ICD-10-CM

## 2019-08-27 DIAGNOSIS — Z79.01 CHRONIC ANTICOAGULATION: ICD-10-CM

## 2019-08-27 DIAGNOSIS — Z79.01 LONG TERM (CURRENT) USE OF ANTICOAGULANTS: ICD-10-CM

## 2019-08-27 LAB — INR PPP: 5.4 (ref 2–3.5)

## 2019-08-27 PROCEDURE — G0151 HHCP-SERV OF PT,EA 15 MIN: HCPCS

## 2019-08-27 PROCEDURE — G0299 HHS/HOSPICE OF RN EA 15 MIN: HCPCS

## 2019-08-27 ASSESSMENT — ENCOUNTER SYMPTOMS: DEBILITATING PAIN: 1

## 2019-08-27 NOTE — PROGRESS NOTES
Anticoagulation Summary  As of 2019    INR goal:   2.0-3.0   TTR:   60.3 % (2.6 y)   INR used for dosin.4! (2019)   Warfarin maintenance plan:   10 mg (10 mg x 1) every Wed, Fri; 20 mg (10 mg x 2) all other days   Weekly warfarin total:   120 mg   Plan last modified:   Anders Fallon, PharmD (2019)   Next INR check:   2019   Target end date:       Indications    Chronic anticoagulation [Z79.01]  Personal history of pulmonary embolism [Z86.711]  Long term (current) use of anticoagulants [Z79.01] [Z79.01]             Anticoagulation Episode Summary     INR check location:       Preferred lab:       Send INR reminders to:       Comments:   [facility]       Anticoagulation Care Providers     Provider Role Specialty Phone number    Ninfa Marcial M.D. Referring Internal Medicine [phone]    [facility] Anticoagulation Services Responsible  [phone]        Anticoagulation Patient Findings    Spoke to patient on the phone.   INR  supra-therapeutic.   Denies signs/symptoms of bleeding and/or thrombosis.   Denies changes to diet or medications.   Follow up appointment in 2 days     Hold x days then decrease weekly warfarin dose as noted    Anders Fallon, PharmD

## 2019-08-28 VITALS
SYSTOLIC BLOOD PRESSURE: 138 MMHG | RESPIRATION RATE: 16 BRPM | HEART RATE: 68 BPM | DIASTOLIC BLOOD PRESSURE: 68 MMHG | TEMPERATURE: 97.6 F | OXYGEN SATURATION: 92 %

## 2019-08-28 ASSESSMENT — ENCOUNTER SYMPTOMS: DEBILITATING PAIN: 1

## 2019-08-29 ENCOUNTER — HOME CARE VISIT (OUTPATIENT)
Dept: HOME HEALTH SERVICES | Facility: HOME HEALTHCARE | Age: 79
End: 2019-08-29
Payer: MEDICARE

## 2019-08-29 ENCOUNTER — ANTICOAGULATION MONITORING (OUTPATIENT)
Dept: VASCULAR LAB | Facility: MEDICAL CENTER | Age: 79
End: 2019-08-29

## 2019-08-29 VITALS
WEIGHT: 219 LBS | SYSTOLIC BLOOD PRESSURE: 106 MMHG | RESPIRATION RATE: 18 BRPM | BODY MASS INDEX: 40.06 KG/M2 | HEART RATE: 65 BPM | DIASTOLIC BLOOD PRESSURE: 60 MMHG | TEMPERATURE: 97.8 F | OXYGEN SATURATION: 92 %

## 2019-08-29 VITALS
OXYGEN SATURATION: 96 % | DIASTOLIC BLOOD PRESSURE: 70 MMHG | HEART RATE: 64 BPM | RESPIRATION RATE: 18 BRPM | SYSTOLIC BLOOD PRESSURE: 132 MMHG | TEMPERATURE: 97.8 F

## 2019-08-29 DIAGNOSIS — Z79.01 LONG TERM (CURRENT) USE OF ANTICOAGULANTS: ICD-10-CM

## 2019-08-29 DIAGNOSIS — Z86.711 PERSONAL HISTORY OF PULMONARY EMBOLISM: ICD-10-CM

## 2019-08-29 DIAGNOSIS — Z79.01 CHRONIC ANTICOAGULATION: ICD-10-CM

## 2019-08-29 LAB
INR PPP: 1.5 (ref 2–3.5)
INR PPP: 1.5 (ref 2–3.5)

## 2019-08-29 PROCEDURE — G0151 HHCP-SERV OF PT,EA 15 MIN: HCPCS

## 2019-08-29 PROCEDURE — G0299 HHS/HOSPICE OF RN EA 15 MIN: HCPCS

## 2019-08-29 SDOH — ECONOMIC STABILITY: HOUSING INSECURITY: EVIDENCE OF SMOKING MATERIAL: 0

## 2019-08-29 ASSESSMENT — PATIENT HEALTH QUESTIONNAIRE - PHQ9: CLINICAL INTERPRETATION OF PHQ2 SCORE: 0

## 2019-08-29 ASSESSMENT — ACTIVITIES OF DAILY LIVING (ADL)
OASIS_M1830: 01
HOME_HEALTH_OASIS: 00

## 2019-08-29 NOTE — PROGRESS NOTES
An 78-year-old female was admitted to [facility] from 6/12/19-6/19/19 for a ground level fall. Patient was then transferred to [facility] from 6/19/2019-7/26/2019. The patient was discharged home with [facility]. The patient was not under clinical case management, but does have a PAL from [facility].     Per discharge orders, patient was instructed to see her PCP and surgeon, Dr.Justin Garnica. Patient saw her PCP on 8/19/19. Patient saw  on 7/29/19, and [facility] patient advocate scheduled patient her next follow-up for 8/30/19. Patient started [facility] home health on 7/29/19. Patient utilizes 02 at night through [facility].    [facility] patient advocate was able to successfully engage with patient post-discharge and throughout the case. Advocate assisted patient with escalations to [facility] services, when patient was struggling with her INR readings, and wanted to get more physical therapy.  Patient also had some billing questions regarding her stay at [facility], and advocate was able to escalate to patient's PAL for assistance.    PPS screening was conducted where patient scored above 50%.

## 2019-08-29 NOTE — PROGRESS NOTES
I attempted to call this patient to report their INR.  Unfortunately I was not able to speak with them or leave a voice message.  We will need to call back and attempt to reach them at a later time.

## 2019-08-29 NOTE — PROGRESS NOTES
Anticoagulation Summary  As of 2019    INR goal:   2.0-3.0   TTR:   60.3 % (2.6 y)   INR used for dosin.50! (2019)   Warfarin maintenance plan:   10 mg (10 mg x 1) every Wed, Fri; 20 mg (10 mg x 2) all other days   Weekly warfarin total:   120 mg   Plan last modified:   Maty Perkins (2019)   Next INR check:   2019   Target end date:       Indications    Chronic anticoagulation [Z79.01]  Personal history of pulmonary embolism [Z86.711]  Long term (current) use of anticoagulants [Z79.01] [Z79.01]             Anticoagulation Episode Summary     INR check location:       Preferred lab:       Send INR reminders to:       Comments:   [facility]       Anticoagulation Care Providers     Provider Role Specialty Phone number    Ninfa Marcial M.D. Referring Internal Medicine [phone]    [facility] Anticoagulation Services Responsible  [phone]        Anticoagulation Patient Findings        History of Present Illness: follow up appointment for chronic anticoagulation with the high risk medication, warfarin for PE    Last INR was out of range, dosage adjusted: pt is now sub therapeutic today. Pt held two doses secondary to a supra therapeutic INR. Follow up in 1 weeks, to reduce the risk of adverse events related to this high risk medication, warfarin.    Maty Perkins, Clinical Pharmacist

## 2019-08-30 ENCOUNTER — ANTICOAGULATION MONITORING (OUTPATIENT)
Dept: MEDICAL GROUP | Facility: PHYSICIAN GROUP | Age: 79
End: 2019-08-30

## 2019-08-30 DIAGNOSIS — Z79.01 CHRONIC ANTICOAGULATION: ICD-10-CM

## 2019-08-30 DIAGNOSIS — Z86.711 PERSONAL HISTORY OF PULMONARY EMBOLISM: ICD-10-CM

## 2019-08-30 DIAGNOSIS — Z79.01 LONG TERM (CURRENT) USE OF ANTICOAGULANTS: ICD-10-CM

## 2019-09-05 ENCOUNTER — ANTICOAGULATION VISIT (OUTPATIENT)
Dept: MEDICAL GROUP | Facility: MEDICAL CENTER | Age: 79
End: 2019-09-05
Payer: MEDICARE

## 2019-09-05 VITALS — HEART RATE: 77 BPM | DIASTOLIC BLOOD PRESSURE: 53 MMHG | SYSTOLIC BLOOD PRESSURE: 117 MMHG

## 2019-09-05 DIAGNOSIS — Z86.711 PERSONAL HISTORY OF PULMONARY EMBOLISM: ICD-10-CM

## 2019-09-05 DIAGNOSIS — Z79.01 LONG TERM (CURRENT) USE OF ANTICOAGULANTS: Primary | ICD-10-CM

## 2019-09-05 DIAGNOSIS — Z79.01 CHRONIC ANTICOAGULATION: ICD-10-CM

## 2019-09-05 LAB — INR PPP: 3.5 (ref 2–3.5)

## 2019-09-05 PROCEDURE — 99211 OFF/OP EST MAY X REQ PHY/QHP: CPT | Performed by: INTERNAL MEDICINE

## 2019-09-05 PROCEDURE — 85610 PROTHROMBIN TIME: CPT | Performed by: INTERNAL MEDICINE

## 2019-09-06 NOTE — PROGRESS NOTES
OP Anticoagulation Service Note    Date: 9/5/2019  Vitals:    09/05/19 1532   BP: 117/53   Pulse: 77         Anticoagulation Summary  As of 9/5/2019    INR goal:   2.0-3.0   TTR:   60.2 % (2.6 y)   INR used for dosing:   3.50! (9/5/2019)   Warfarin maintenance plan:   15 mg (10 mg x 1.5) every day   Weekly warfarin total:   105 mg   Plan last modified:   Moira Sheridan, PharmD (9/5/2019)   Next INR check:   9/12/2019   Target end date:       Indications    Chronic anticoagulation [Z79.01]  Personal history of pulmonary embolism [Z86.711]  Long term (current) use of anticoagulants [Z79.01] [Z79.01]             Anticoagulation Episode Summary     INR check location:       Preferred lab:       Send INR reminders to:       Comments:   [facility]       Anticoagulation Care Providers     Provider Role Specialty Phone number    Ninfa Marcial M.D. Referring Internal Medicine [phone]    [facility] Anticoagulation Services Responsible  [phone]        Anticoagulation Patient Findings      HPI:   Natalie Anaya seen in clinic today, on anticoagulation therapy with warfarin (a high risk medication) for hx of PE       Pt is here today to evaluate anticoagulation therapy    Previous INR was  1.5 on 8-29-19    Pt was instructed to resume previous regimen. INR has been labile since discharge from SN[facility]    Confirmed warfarin dosing regimen, denies missed or extra doses of coumadin.   Diet has been consistent with foods rich in vitamin K: Yes  Changes in ETOH:  No  Changes in smoking status: No  Changes in medication: No   Cost restriction: No  S/s of bleeding:  No  Falls or accidents since last visit No  Signs/symptoms  thrombosis since the last appt: No    A/P   INR  SUPRAtherapeutic today,   Lower weekly regimen       11-19 check referral    Pt educated to contact our clinic with any changes in medications or s/s of bleeding or thrombosis. Pt is aware to seek immediate medical attention for falls, head injury  or deep cuts    Follow up appointment in 1 week(s) to reduce risk of adverse events from warfarin   Moira Sheridan, PharmD

## 2019-09-12 ENCOUNTER — ANTICOAGULATION VISIT (OUTPATIENT)
Dept: MEDICAL GROUP | Facility: MEDICAL CENTER | Age: 79
End: 2019-09-12
Payer: MEDICARE

## 2019-09-12 VITALS — HEART RATE: 68 BPM | SYSTOLIC BLOOD PRESSURE: 120 MMHG | DIASTOLIC BLOOD PRESSURE: 52 MMHG

## 2019-09-12 DIAGNOSIS — Z79.01 LONG TERM (CURRENT) USE OF ANTICOAGULANTS: ICD-10-CM

## 2019-09-12 DIAGNOSIS — Z79.01 CHRONIC ANTICOAGULATION: ICD-10-CM

## 2019-09-12 DIAGNOSIS — Z86.711 PERSONAL HISTORY OF PULMONARY EMBOLISM: ICD-10-CM

## 2019-09-12 LAB — INR PPP: 2.4 (ref 2–3.5)

## 2019-09-12 PROCEDURE — 93793 ANTICOAG MGMT PT WARFARIN: CPT | Performed by: INTERNAL MEDICINE

## 2019-09-12 NOTE — PROGRESS NOTES
Anticoagulation Summary  As of 2019    INR goal:   2.0-3.0   TTR:   60.2 % (2.6 y)   INR used for dosin.40 (2019)   Warfarin maintenance plan:   15 mg (10 mg x 1.5) every day   Weekly warfarin total:   105 mg   Plan last modified:   Moira Sheridan, PharmD (2019)   Next INR check:   2019   Target end date:       Indications    Chronic anticoagulation [Z79.01]  Personal history of pulmonary embolism [Z86.711]  Long term (current) use of anticoagulants [Z79.01] [Z79.01]             Anticoagulation Episode Summary     INR check location:       Preferred lab:       Send INR reminders to:       Comments:   [facility]       Anticoagulation Care Providers     Provider Role Specialty Phone number    Ninfa Marcial M.D. Referring Internal Medicine [phone]    [facility] Anticoagulation Services Responsible  [phone]        Anticoagulation Patient Findings  Patient Findings     Negatives:   Signs/symptoms of thrombosis, Signs/symptoms of bleeding, Laboratory test error suspected, Change in health, Change in alcohol use, Change in activity, Upcoming invasive procedure, Emergency department visit, Upcoming dental procedure, Missed doses, Extra doses, Change in medications, Change in diet/appetite, Hospital admission, Bruising, Other complaints            HPI:   Pt seen in clinic today, on anticoagulation therapy with warfarin for clot prevention due to history of PE    Patient's previous INR was supratherapeutic at 3.5 on 19, at which time patient was instructed to decrease regimen.  She returns to clinic today to recheck INR to ensure it is therapeutic and thus preventing possible clotting and/or bleeding/bruising complications.    CHADS-VASc = n/a    Does patient have any changes to current medical/health status since last appt (Y/N):  N  Does patient have any signs/symptoms of bleeding and/or thrombosis since the last appt (Y/N):  N  Does patient have any interval changes to diet or  medications since last appt (Y/N):  N  Are there any complications or cost restrictions with current therapy (Y/N):  N       Vitals:  /52  HR 68     Asssessment:      INR therapeutic at 2.4   Reason(s) for out of range INR today:  n/a      Plan:  Pt is to continue with current warfarin dosing regimen.     Follow up:  Because warfarin is a high risk medication and current CHEST guidelines recommend regular monitoring intervals (few days up to 12 weeks), will have patient return to clinic in 1 week to recheck INR.    Pt's [facility] PCP is Ninfa Marcial M.D.  Referral due 11/2019    Neelam Marks, PharmD

## 2019-09-19 ENCOUNTER — ANTICOAGULATION VISIT (OUTPATIENT)
Dept: MEDICAL GROUP | Facility: MEDICAL CENTER | Age: 79
End: 2019-09-19
Payer: MEDICARE

## 2019-09-19 DIAGNOSIS — Z86.711 PERSONAL HISTORY OF PULMONARY EMBOLISM: ICD-10-CM

## 2019-09-19 DIAGNOSIS — Z79.01 CHRONIC ANTICOAGULATION: ICD-10-CM

## 2019-09-19 DIAGNOSIS — Z79.01 LONG TERM (CURRENT) USE OF ANTICOAGULANTS: ICD-10-CM

## 2019-09-19 LAB
EKG IMPRESSION: NORMAL
INR PPP: 2.2 (ref 2–3.5)

## 2019-09-19 PROCEDURE — 93793 ANTICOAG MGMT PT WARFARIN: CPT | Performed by: INTERNAL MEDICINE

## 2019-09-19 PROCEDURE — 85610 PROTHROMBIN TIME: CPT | Performed by: FAMILY MEDICINE

## 2019-09-19 NOTE — PROGRESS NOTES
OP Anticoagulation Service Note    Date: 2019  There were no vitals filed for this visit.   pt declined vitals    Anticoagulation Summary  As of 2019    INR goal:   2.0-3.0   TTR:   60.5 % (2.6 y)   INR used for dosin.20 (2019)   Warfarin maintenance plan:   15 mg (10 mg x 1.5) every day   Weekly warfarin total:   105 mg   Plan last modified:   Moira Sheridan, PharmD (2019)   Next INR check:   10/3/2019   Target end date:       Indications    Chronic anticoagulation [Z79.01]  Personal history of pulmonary embolism [Z86.711]  Long term (current) use of anticoagulants [Z79.01] [Z79.01]             Anticoagulation Episode Summary     INR check location:       Preferred lab:       Send INR reminders to:       Comments:   [facility]       Anticoagulation Care Providers     Provider Role Specialty Phone number    Ninfa Marcial M.D. Referring Internal Medicine [phone]    [facility] Anticoagulation Services Responsible  [phone]        Anticoagulation Patient Findings      HPI:   Natalie Anaya seen in clinic today, on anticoagulation therapy with warfarin (a high risk medication) for hx of PE     Pt is here today to evaluate anticoagulation therapy    Previous INR was  2.4 on 19    Pt was instructed to continue current regimen    Confirmed warfarin dosing regimen, denies missed or extra doses of coumadin.   Diet has been consistent with foods rich in vitamin K: Yes  Changes in ETOH:  No  Changes in smoking status: No  Changes in medication: No   Cost restriction: No  S/s of bleeding:  No  Falls or accidents since last visit No  Signs/symptoms  thrombosis since the last appt: No    A/P   INR  therapeutic today,   Continue current warfarin regimen           check referral    Pt educated to contact our clinic with any changes in medications or s/s of bleeding or thrombosis. Pt is aware to seek immediate medical attention for falls, head injury or deep cuts    Follow  up appointment in 2 week(s) to reduce risk of adverse events from warfarin   Moira Sheridan, PharmD

## 2019-09-23 ENCOUNTER — IMMUNIZATION (OUTPATIENT)
Dept: SOCIAL WORK | Facility: CLINIC | Age: 79
End: 2019-09-23
Payer: MEDICARE

## 2019-09-23 DIAGNOSIS — Z23 NEED FOR VACCINATION: ICD-10-CM

## 2019-09-23 PROCEDURE — 90662 IIV NO PRSV INCREASED AG IM: CPT | Performed by: REGISTERED NURSE

## 2019-09-23 PROCEDURE — G0008 ADMIN INFLUENZA VIRUS VAC: HCPCS | Performed by: REGISTERED NURSE

## 2019-09-26 DIAGNOSIS — E03.9 ACQUIRED HYPOTHYROIDISM: ICD-10-CM

## 2019-09-26 RX ORDER — LEVOTHYROXINE SODIUM 0.1 MG/1
TABLET ORAL
Qty: 90 TAB | Refills: 3 | Status: SHIPPED | OUTPATIENT
Start: 2019-09-26 | End: 2020-09-28 | Stop reason: SDUPTHER

## 2019-10-03 ENCOUNTER — ANTICOAGULATION VISIT (OUTPATIENT)
Dept: MEDICAL GROUP | Facility: MEDICAL CENTER | Age: 79
End: 2019-10-03
Payer: MEDICARE

## 2019-10-03 DIAGNOSIS — Z86.711 PERSONAL HISTORY OF PULMONARY EMBOLISM: ICD-10-CM

## 2019-10-03 DIAGNOSIS — Z79.01 LONG TERM (CURRENT) USE OF ANTICOAGULANTS: Primary | ICD-10-CM

## 2019-10-03 DIAGNOSIS — Z79.01 CHRONIC ANTICOAGULATION: ICD-10-CM

## 2019-10-03 LAB — INR PPP: 2.9 (ref 2–3.5)

## 2019-10-03 PROCEDURE — 85610 PROTHROMBIN TIME: CPT | Performed by: FAMILY MEDICINE

## 2019-10-03 PROCEDURE — 93793 ANTICOAG MGMT PT WARFARIN: CPT | Performed by: INTERNAL MEDICINE

## 2019-10-04 NOTE — PROGRESS NOTES
OP Anticoagulation Service Note    Date: 10/3/2019  There were no vitals filed for this visit.   pt declined vitals    Anticoagulation Summary  As of 10/3/2019    INR goal:   2.0-3.0   TTR:   61.1 % (2.7 y)   INR used for dosin.90 (10/3/2019)   Warfarin maintenance plan:   15 mg (10 mg x 1.5) every day   Weekly warfarin total:   105 mg   Plan last modified:   Moira Sheridan, PharmD (2019)   Next INR check:   10/31/2019   Target end date:       Indications    Chronic anticoagulation [Z79.01]  Personal history of pulmonary embolism [Z86.711]  Long term (current) use of anticoagulants [Z79.01] [Z79.01]             Anticoagulation Episode Summary     INR check location:       Preferred lab:       Send INR reminders to:       Comments:   [facility]       Anticoagulation Care Providers     Provider Role Specialty Phone number    Ninfa Marcial M.D. Referring Internal Medicine [phone]    [facility] Anticoagulation Services Responsible  [phone]        Anticoagulation Patient Findings      HPI:   Natalie Anaya seen in clinic today, on anticoagulation therapy with warfarin (a high risk medication) for hx of PE and DVT   Pt is here today to evaluate anticoagulation therapy    Previous INR was  2.2 on 19    Pt was instructed to continue current regimen    Confirmed warfarin dosing regimen, denies missed or extra doses of coumadin.   Diet has been consistent with foods rich in vitamin K: Yes  Changes in ETOH:  No  Changes in smoking status: No  Changes in medication: No   Cost restriction: No  S/s of bleeding:  No  Falls or accidents since last visit No  Signs/symptoms  thrombosis since the last appt: No    A/P   INR  therapeutic today,   Continue current warfarin regimen              Pt educated to contact our clinic with any changes in medications or s/s of bleeding or thrombosis. Pt is aware to seek immediate medical attention for falls, head injury or deep cuts    Follow up appointment  in 3 week(s) to reduce risk of adverse events from warfarin   Moira Sheridan, PharmD

## 2019-10-09 DIAGNOSIS — D50.9 IRON DEFICIENCY ANEMIA, UNSPECIFIED IRON DEFICIENCY ANEMIA TYPE: ICD-10-CM

## 2019-10-09 RX ORDER — FERROUS SULFATE 325(65) MG
325 TABLET ORAL DAILY
Qty: 90 TAB | Refills: 3 | Status: SHIPPED | OUTPATIENT
Start: 2019-10-09 | End: 2020-05-19

## 2019-10-21 ENCOUNTER — HOSPITAL ENCOUNTER (OUTPATIENT)
Dept: RADIOLOGY | Facility: MEDICAL CENTER | Age: 79
End: 2019-10-21
Attending: INTERNAL MEDICINE
Payer: MEDICARE

## 2019-10-21 ENCOUNTER — ANTICOAGULATION VISIT (OUTPATIENT)
Dept: MEDICAL GROUP | Facility: MEDICAL CENTER | Age: 79
End: 2019-10-21
Payer: MEDICARE

## 2019-10-21 DIAGNOSIS — Z79.01 LONG TERM (CURRENT) USE OF ANTICOAGULANTS: Primary | ICD-10-CM

## 2019-10-21 DIAGNOSIS — Z79.01 CHRONIC ANTICOAGULATION: ICD-10-CM

## 2019-10-21 DIAGNOSIS — N64.4 PAIN OF LEFT BREAST: ICD-10-CM

## 2019-10-21 DIAGNOSIS — Z78.0 POSTMENOPAUSAL ESTROGEN DEFICIENCY: ICD-10-CM

## 2019-10-21 DIAGNOSIS — Z86.711 PERSONAL HISTORY OF PULMONARY EMBOLISM: ICD-10-CM

## 2019-10-21 DIAGNOSIS — M80.00XD AGE-RELATED OSTEOPOROSIS WITH CURRENT PATHOLOGICAL FRACTURE WITH ROUTINE HEALING: ICD-10-CM

## 2019-10-21 LAB — INR PPP: 6.8 (ref 2–3.5)

## 2019-10-21 PROCEDURE — 77080 DXA BONE DENSITY AXIAL: CPT

## 2019-10-21 PROCEDURE — 99211 OFF/OP EST MAY X REQ PHY/QHP: CPT | Performed by: INTERNAL MEDICINE

## 2019-10-21 PROCEDURE — G0279 TOMOSYNTHESIS, MAMMO: HCPCS

## 2019-10-21 PROCEDURE — 76642 ULTRASOUND BREAST LIMITED: CPT | Mod: LT

## 2019-10-21 PROCEDURE — 85610 PROTHROMBIN TIME: CPT | Performed by: INTERNAL MEDICINE

## 2019-10-21 NOTE — PROGRESS NOTES
OP Anticoagulation Service Note    Date: 10/21/2019  There were no vitals filed for this visit.   pt declined vitals    Anticoagulation Summary  As of 10/21/2019    INR goal:   2.0-3.0   TTR:   60.0 % (2.7 y)   INR used for dosin.80! (10/21/2019)   Warfarin maintenance plan:   15 mg (10 mg x 1.5) every day   Weekly warfarin total:   105 mg   Plan last modified:   Moira Sheridan, PharmD (2019)   Next INR check:      Target end date:       Indications    Chronic anticoagulation [Z79.01]  Personal history of pulmonary embolism [Z86.711]  Long term (current) use of anticoagulants [Z79.01] [Z79.01]             Anticoagulation Episode Summary     INR check location:       Preferred lab:       Send INR reminders to:       Comments:   [facility]       Anticoagulation Care Providers     Provider Role Specialty Phone number    Ninfa Marcila M.D. Referring Internal Medicine [phone]    [facility] Anticoagulation Services Responsible  [phone]        Anticoagulation Patient Findings      HPI:   Natalie Anaya seen in clinic today, on anticoagulation therapy with warfarin (a high risk medication) for hx of PE    Pt is here today to evaluate anticoagulation therapy    Previous INR was  2.90 on 10/3/19    Pt was instructed to continue current warfarin regimen     Confirmed warfarin dosing regimen, denies missed or extra doses of coumadin.   Diet has been consistent with foods rich in vitamin K: less meal replacement  Changes in ETOH:  One shira on Saturday  Changes in smoking status: No  Changes in medication: No   Cost restriction: No  S/s of bleeding:  No  Falls or accidents since last visit No  Signs/symptoms  thrombosis since the last appt: No    A/P   INR  supra-therapeutic today, will require dose adjust ment today to prevent bleeding complications and closer follow up.   Hold dose for two days, then resume normal dosing schedule       Pt educated to contact our clinic with any changes in  medications or s/s of bleeding or thrombosis. Pt is aware to seek immediate medical attention for falls, head injury or deep cuts    Follow up appointment in 1 week(s) to reduce risk of adverse events from warfarin.  Ana Seay, Pharmacy Intern  oMira Sheridan, PharmD

## 2019-10-23 ENCOUNTER — TELEPHONE (OUTPATIENT)
Dept: MEDICAL GROUP | Age: 79
End: 2019-10-23

## 2019-10-23 NOTE — TELEPHONE ENCOUNTER
----- Message from Ninfa Marcial M.D. sent at 10/21/2019 12:38 PM PDT -----  Please inform patient that her recent DEXA scan showed that she has normal bone density in lumbar spine and increase in bone density when compared to prior DEXA scan done on 5/17/2017.    Ninfa Marcial M.D.

## 2019-10-23 NOTE — TELEPHONE ENCOUNTER
Phone Number Called: [phone] (home)     Call outcome: spoke to patient regarding message below    Message: Pt. Notified no questions and or concerns

## 2019-10-23 NOTE — TELEPHONE ENCOUNTER
----- Message from Ninfa Marcial M.D. sent at 10/21/2019 12:35 PM PDT -----  Please call to inform patient that her recent bilateral diagnostic mammogram and left breast ultrasound are stable and do not show any new changes or tumor.  It is recommended to do self breast exam once a month and repeat annual screening mammogram in 1 year.    Ninfa Marcial M.D.

## 2019-10-23 NOTE — TELEPHONE ENCOUNTER
Phone Number Called: [phone] (home)     Call outcome: spoke to patient regarding message below    Message:  Pt. Notified no questions and or concerns

## 2019-10-28 ENCOUNTER — ANTICOAGULATION VISIT (OUTPATIENT)
Dept: MEDICAL GROUP | Facility: MEDICAL CENTER | Age: 79
End: 2019-10-28
Payer: MEDICARE

## 2019-10-28 DIAGNOSIS — Z79.01 CHRONIC ANTICOAGULATION: ICD-10-CM

## 2019-10-28 DIAGNOSIS — Z86.711 PERSONAL HISTORY OF PULMONARY EMBOLISM: ICD-10-CM

## 2019-10-28 DIAGNOSIS — Z79.01 LONG TERM (CURRENT) USE OF ANTICOAGULANTS: ICD-10-CM

## 2019-10-28 LAB — INR PPP: 1.9 (ref 2–3.5)

## 2019-10-28 PROCEDURE — 99211 OFF/OP EST MAY X REQ PHY/QHP: CPT | Performed by: PHYSICIAN ASSISTANT

## 2019-10-28 PROCEDURE — 85610 PROTHROMBIN TIME: CPT | Performed by: PHYSICIAN ASSISTANT

## 2019-10-28 NOTE — PROGRESS NOTES
OP Anticoagulation Service Note    Date: 10/28/2019  There were no vitals filed for this visit.   pt declined vitals    Anticoagulation Summary  As of 10/28/2019    INR goal:   2.0-3.0   TTR:   59.7 % (2.7 y)   INR used for dosin.90! (10/28/2019)   Warfarin maintenance plan:   15 mg (10 mg x 1.5) every day   Weekly warfarin total:   105 mg   Plan last modified:   Moira Sheridan, PharmD (2019)   Next INR check:      Target end date:       Indications    Chronic anticoagulation [Z79.01]  Personal history of pulmonary embolism [Z86.711]  Long term (current) use of anticoagulants [Z79.01] [Z79.01]             Anticoagulation Episode Summary     INR check location:       Preferred lab:       Send INR reminders to:       Comments:         Anticoagulation Care Providers     Provider Role Specialty Phone number    Ninfa Marcial M.D. Referring Internal Medicine [phone]    [facility] Responsible  [phone]        Anticoagulation Patient Findings      HPI:   Natalie Anaya seen in clinic today, on anticoagulation therapy with warfarin (a high risk medication) for hx of PE       Pt is here today to evaluate anticoagulation therapy    Previous INR was  6.8 on 10-21-19    Pt was instructed to HOLD x 2 doses    Confirmed warfarin dosing regimen, denies missed or extra doses of coumadin.   Diet has been consistent with foods rich in vitamin K: Yes  Changes in ETOH:  No  Changes in smoking status: No  Changes in medication: No - pt reports she got a steroid shot 2 weeks ago   Cost restriction: No  S/s of bleeding:  No  Falls or accidents since last visit No  Signs/symptoms  thrombosis since the last appt: No    A/P   INR  SUB therapeutic today, will require dose adjust ment today to prevent recurrence of thrombosis  and closer follow up.   Resume usual warfarin dose.          Pt educated to contact our clinic with any changes in medications or s/s of bleeding or thrombosis. Pt is  aware to seek immediate medical attention for falls, head injury or deep cuts    Follow up appointment in 3 week(s) per pt preference and  to reduce risk of adverse events from warfarin   Moira Sheridan, PharmD

## 2019-11-07 DIAGNOSIS — Z79.01 CHRONIC ANTICOAGULATION: ICD-10-CM

## 2019-11-15 ENCOUNTER — HOSPITAL ENCOUNTER (OUTPATIENT)
Dept: LAB | Facility: MEDICAL CENTER | Age: 79
End: 2019-11-15
Attending: INTERNAL MEDICINE
Payer: MEDICARE

## 2019-11-15 DIAGNOSIS — E03.9 ACQUIRED HYPOTHYROIDISM: ICD-10-CM

## 2019-11-15 DIAGNOSIS — M80.00XD AGE-RELATED OSTEOPOROSIS WITH CURRENT PATHOLOGICAL FRACTURE WITH ROUTINE HEALING: ICD-10-CM

## 2019-11-15 LAB
25(OH)D3 SERPL-MCNC: 48 NG/ML (ref 30–100)
ALBUMIN SERPL BCP-MCNC: 3.8 G/DL (ref 3.2–4.9)
ALBUMIN/GLOB SERPL: 1.5 G/DL
ALP SERPL-CCNC: 81 U/L (ref 30–99)
ALT SERPL-CCNC: 23 U/L (ref 2–50)
ANION GAP SERPL CALC-SCNC: 5 MMOL/L (ref 0–11.9)
AST SERPL-CCNC: 20 U/L (ref 12–45)
BASOPHILS # BLD AUTO: 0.7 % (ref 0–1.8)
BASOPHILS # BLD: 0.03 K/UL (ref 0–0.12)
BILIRUB SERPL-MCNC: 0.4 MG/DL (ref 0.1–1.5)
BUN SERPL-MCNC: 23 MG/DL (ref 8–22)
CALCIUM SERPL-MCNC: 8.9 MG/DL (ref 8.5–10.5)
CHLORIDE SERPL-SCNC: 104 MMOL/L (ref 96–112)
CO2 SERPL-SCNC: 33 MMOL/L (ref 20–33)
CREAT SERPL-MCNC: 0.58 MG/DL (ref 0.5–1.4)
EOSINOPHIL # BLD AUTO: 0.08 K/UL (ref 0–0.51)
EOSINOPHIL NFR BLD: 1.8 % (ref 0–6.9)
ERYTHROCYTE [DISTWIDTH] IN BLOOD BY AUTOMATED COUNT: 49.4 FL (ref 35.9–50)
GLOBULIN SER CALC-MCNC: 2.6 G/DL (ref 1.9–3.5)
GLUCOSE SERPL-MCNC: 99 MG/DL (ref 65–99)
HCT VFR BLD AUTO: 44.4 % (ref 37–47)
HGB BLD-MCNC: 14.2 G/DL (ref 12–16)
IMM GRANULOCYTES # BLD AUTO: 0.01 K/UL (ref 0–0.11)
IMM GRANULOCYTES NFR BLD AUTO: 0.2 % (ref 0–0.9)
LYMPHOCYTES # BLD AUTO: 0.84 K/UL (ref 1–4.8)
LYMPHOCYTES NFR BLD: 18.7 % (ref 22–41)
MCH RBC QN AUTO: 31.8 PG (ref 27–33)
MCHC RBC AUTO-ENTMCNC: 32 G/DL (ref 33.6–35)
MCV RBC AUTO: 99.6 FL (ref 81.4–97.8)
MONOCYTES # BLD AUTO: 0.3 K/UL (ref 0–0.85)
MONOCYTES NFR BLD AUTO: 6.7 % (ref 0–13.4)
NEUTROPHILS # BLD AUTO: 3.23 K/UL (ref 2–7.15)
NEUTROPHILS NFR BLD: 71.9 % (ref 44–72)
NRBC # BLD AUTO: 0 K/UL
NRBC BLD-RTO: 0 /100 WBC
PLATELET # BLD AUTO: 267 K/UL (ref 164–446)
PMV BLD AUTO: 9.4 FL (ref 9–12.9)
POTASSIUM SERPL-SCNC: 4.4 MMOL/L (ref 3.6–5.5)
PROT SERPL-MCNC: 6.4 G/DL (ref 6–8.2)
RBC # BLD AUTO: 4.46 M/UL (ref 4.2–5.4)
SODIUM SERPL-SCNC: 142 MMOL/L (ref 135–145)
T4 FREE SERPL-MCNC: 0.81 NG/DL (ref 0.53–1.43)
TSH SERPL DL<=0.005 MIU/L-ACNC: 0.82 UIU/ML (ref 0.38–5.33)
WBC # BLD AUTO: 4.5 K/UL (ref 4.8–10.8)

## 2019-11-15 PROCEDURE — 36415 COLL VENOUS BLD VENIPUNCTURE: CPT

## 2019-11-15 PROCEDURE — 84439 ASSAY OF FREE THYROXINE: CPT

## 2019-11-15 PROCEDURE — 85025 COMPLETE CBC W/AUTO DIFF WBC: CPT

## 2019-11-15 PROCEDURE — 82306 VITAMIN D 25 HYDROXY: CPT

## 2019-11-15 PROCEDURE — 84443 ASSAY THYROID STIM HORMONE: CPT

## 2019-11-15 PROCEDURE — 80053 COMPREHEN METABOLIC PANEL: CPT

## 2019-11-18 ENCOUNTER — ANTICOAGULATION VISIT (OUTPATIENT)
Dept: MEDICAL GROUP | Facility: MEDICAL CENTER | Age: 79
End: 2019-11-18
Payer: MEDICARE

## 2019-11-18 DIAGNOSIS — Z79.01 LONG TERM (CURRENT) USE OF ANTICOAGULANTS: Primary | ICD-10-CM

## 2019-11-18 DIAGNOSIS — Z86.711 PERSONAL HISTORY OF PULMONARY EMBOLISM: ICD-10-CM

## 2019-11-18 DIAGNOSIS — Z79.01 CHRONIC ANTICOAGULATION: ICD-10-CM

## 2019-11-18 LAB — INR PPP: 2.3 (ref 2–3.5)

## 2019-11-18 PROCEDURE — 93793 ANTICOAG MGMT PT WARFARIN: CPT | Performed by: INTERNAL MEDICINE

## 2019-11-18 PROCEDURE — 85610 PROTHROMBIN TIME: CPT | Performed by: PHYSICIAN ASSISTANT

## 2019-11-18 NOTE — PROGRESS NOTES
OP Anticoagulation Service Note    Date: 2019  There were no vitals filed for this visit.   pt declined vitals    Anticoagulation Summary  As of 2019    INR goal:   2.0-3.0   TTR:   60.0 % (2.8 y)   INR used for dosin.30 (2019)   Warfarin maintenance plan:   15 mg (10 mg x 1.5) every day   Weekly warfarin total:   105 mg   Plan last modified:   Moira Sheridan, PharmD (2019)   Next INR check:   2019   Target end date:       Indications    Chronic anticoagulation [Z79.01]  Personal history of pulmonary embolism [Z86.711]  Long term (current) use of anticoagulants [Z79.01] [Z79.01]             Anticoagulation Episode Summary     INR check location:       Preferred lab:       Send INR reminders to:       Comments:         Anticoagulation Care Providers     Provider Role Specialty Phone number    Ninfa Marcial M.D. Referring Internal Medicine [phone]    [facility] Anticoagulation Services Responsible  [phone]        Anticoagulation Patient Findings      HPI:   Natalie Anaya seen in clinic today, on anticoagulation therapy with warfarin (a high risk medication) for hx of PE      Pt is here today to evaluate anticoagulation therapy    Previous INR was  1.9 on 10-28-19    Pt was instructed to resume usual regimen    Confirmed warfarin dosing regimen, denies missed or extra doses of coumadin.   Diet has been consistent with foods rich in vitamin K: Yes  Changes in ETOH:  No  Changes in smoking status: No  Changes in medication: No   Cost restriction: No  S/s of bleeding:  No  Falls or accidents since last visit No  Signs/symptoms  thrombosis since the last appt: No    A/P   INR  therapeutic today   Continue current warfarin regimen           check referral    Pt educated to contact our clinic with any changes in medications or s/s of bleeding or thrombosis. Pt is aware to seek immediate medical attention for falls, head injury or deep cuts    Follow up appointment in 6  week(s) to reduce risk of adverse events from warfarin   Moira Sheridan, PharmD

## 2019-11-20 ENCOUNTER — TELEPHONE (OUTPATIENT)
Dept: MEDICAL GROUP | Age: 79
End: 2019-11-20

## 2019-11-20 NOTE — TELEPHONE ENCOUNTER
ESTABLISHED PATIENT PRE-VISIT PLANNING     Patient was NOT contacted to complete PVP.     Note: Patient will not be contacted if there is no indication to call.     1.  Reviewed notes from the last few office visits within the medical group: Yes    2.  If any orders were placed at last visit or intended to be done for this visit (i.e. 6 mos follow-up), do we have Results/Consult Notes?        •  Labs - Labs ordered, completed on 11/15/19 and results are in chart.   Note: If patient appointment is for lab review and patient did not complete labs, check with provider if OK to reschedule patient until labs completed.       •  Imaging - Imaging ordered, completed and results are in chart.       •  Referrals - Referral ordered, patient has NOT been seen.    3. Is this appointment scheduled as a Hospital Follow-Up? No    4.  Immunizations were updated in Epic using WebIZ?: Epic matches WebIZ       •  Web Iz Recommendations: SHINGRIX (Shingles)    5.  Patient is due for the following Health Maintenance Topics:   Health Maintenance Due   Topic Date Due   • IMM ZOSTER VACCINES (2 of 3) 02/25/2011           6. Orders for overdue Health Maintenance topics pended in Pre-Charting? N\A    7.  AHA (MDX) form printed for Provider? No, already completed    8.  Patient was NOT informed to arrive 15 min prior to their scheduled appointment and bring in their medication bottles.

## 2019-11-21 ENCOUNTER — OFFICE VISIT (OUTPATIENT)
Dept: MEDICAL GROUP | Age: 79
End: 2019-11-21
Payer: MEDICARE

## 2019-11-21 VITALS
TEMPERATURE: 99.6 F | WEIGHT: 214 LBS | DIASTOLIC BLOOD PRESSURE: 60 MMHG | HEIGHT: 62 IN | SYSTOLIC BLOOD PRESSURE: 112 MMHG | HEART RATE: 68 BPM | BODY MASS INDEX: 39.38 KG/M2 | OXYGEN SATURATION: 94 %

## 2019-11-21 DIAGNOSIS — E66.9 OBESITY (BMI 30-39.9): ICD-10-CM

## 2019-11-21 DIAGNOSIS — M80.00XD AGE-RELATED OSTEOPOROSIS WITH CURRENT PATHOLOGICAL FRACTURE WITH ROUTINE HEALING: ICD-10-CM

## 2019-11-21 DIAGNOSIS — D50.8 IRON DEFICIENCY ANEMIA SECONDARY TO INADEQUATE DIETARY IRON INTAKE: ICD-10-CM

## 2019-11-21 DIAGNOSIS — I10 HTN (HYPERTENSION), BENIGN: ICD-10-CM

## 2019-11-21 DIAGNOSIS — E03.9 ACQUIRED HYPOTHYROIDISM: ICD-10-CM

## 2019-11-21 PROBLEM — N64.4 PAIN OF LEFT BREAST: Status: RESOLVED | Noted: 2019-08-19 | Resolved: 2019-11-21

## 2019-11-21 PROBLEM — E66.01 MORBID OBESITY WITH BMI OF 40.0-44.9, ADULT (HCC): Status: RESOLVED | Noted: 2018-11-14 | Resolved: 2019-11-21

## 2019-11-21 PROCEDURE — 99214 OFFICE O/P EST MOD 30 MIN: CPT | Performed by: INTERNAL MEDICINE

## 2019-11-22 NOTE — ASSESSMENT & PLAN NOTE
Patient is taking Fosamax 70 mg once a week since January 2018.  Patient has history of compression fracture on thoracis spine and lumbar spine, history of both hips fracture and history of ribs fracture.  Patient is taking vitamin D and calcium supplements regularly.  She tolerates Fosamax without side effects.  Recent DEXA scan on 10/21/2019 showed improvement on her bone density on lumbar spine.  I reviewed DEXA scan report with patient in clinic today.

## 2019-11-22 NOTE — PROGRESS NOTES
Subjective:   Natalie Zuniga is a 79 y.o. female here today for evaluation and management of:      Acquired hypothyroidism  Stable. Currently taking levothyroxine 100 Mcg daily as prescribed.  Denies palpitations, skin changes, temperature intolerance, or changes in bowel habits.    Results for NATALIE ZUNIGA (MRN [id number]) as of 11/21/2019 18:50   Ref. Range 11/15/2019 10:21   TSH Latest Ref Range: 0.380 - 5.330 uIU/mL 0.820   Free T-4 Latest Ref Range: 0.53 - 1.43 ng/dL 0.81           Iron deficiency anemia  Patient is currently taking ferrous sulfate 325 mg once a day and multivitamins once a day.  She has improvement on anemia level in recent blood test.  Patient denies abdominal pain or black tarry stool or bloody bowel movement.  She does not take NSAIDs from over-the-counter.  She denies drinking alcohol.    Results for NATALIE ZUNIGA (MRN [id number]) as of 11/21/2019 18:50   Ref. Range 6/16/2019 13:25 6/17/2019 04:14 6/18/2019 04:35 11/15/2019 10:21   Hemoglobin Latest Ref Range: 12.0 - 16.0 g/dL 7.4 (L) 7.4 (L) 7.5 (L) 14.2   Hematocrit Latest Ref Range: 37.0 - 47.0 % 22.9 (L) 22.9 (L) 23.7 (L) 44.4   MCV Latest Ref Range: 81.4 - 97.8 fL 97.9 (H) 94.2 97.1 99.6 (H)     Results for NATALIE ZUNIGA (MRN [id number]) as of 11/21/2019 18:50   Ref. Range 7/27/2018 09:03 10/31/2018 08:39   Ferritin Latest Ref Range: 10.0 - 291.0 ng/mL 8.5 (L) 20.2     Results for NATALIE ZUNIGA (MRN [id number]) as of 11/21/2019 18:50   Ref. Range 7/27/2018 09:03   Folate -Folic Acid Latest Ref Range: >4.0 ng/mL 7.3   Vitamin B12 -True Cobalamin Latest Ref Range: 211 - 911 pg/mL 337       Age-related osteoporosis with current pathological fracture with routine healing  Patient is taking Fosamax 70 mg once a week since January 2018.  Patient has history of compression fracture on thoracis spine and lumbar spine, history of both hips fracture and history of ribs fracture.  Patient is taking vitamin D and  calcium supplements regularly.  She tolerates Fosamax without side effects.  Recent DEXA scan on 10/21/2019 showed improvement on her bone density on lumbar spine.  I reviewed DEXA scan report with patient in clinic today.    HTN (hypertension), benign  Patient is currently taking carvedilol 6.25 mg twice daily, furosemide 40 mg daily and potassium chloride 20 mEq once a day.  Recent blood pressure has been stable with current regimens.  Patient also noticed improvement on her leg swelling and decreased water weight gradually.  Recent CMP on 11/15/2019 showed normal electrolytes and kidney function except slightly high BUN at 23.         Current medicines (including changes today)  Current Outpatient Medications   Medication Sig Dispense Refill   • ferrous sulfate (FEROSUL) 325 (65 Fe) MG tablet Take 1 Tab by mouth every day. 90 Tab 3   • levothyroxine (SYNTHROID) 100 MCG Tab TAKE 1 TABLET BY MOUTH EVERY DAY 90 Tab 3   • Home Care Oxygen Inhale 2 L/min by mouth every bedtime.     • alendronate (FOSAMAX) 70 MG Tab TAKE 1 TABLET BY MOUTH EVERY 7 DAYS 12 Tab 3   • omeprazole (PRILOSEC) 20 MG delayed-release capsule Take 20 mg by mouth every evening.     • primidone (MYSOLINE) 250 MG Tab Take 250-500 mg by mouth 2 Times a Day. 250mg in the morning & 500mg at bedtime     • warfarin (COUMADIN) 10 MG Tab Take 15 mg by mouth every evening.        • potassium chloride SA (KDUR) 20 MEQ Tab CR TAKE 1 TABLET BY MOUTH EVERY  Tab 3   • Melatonin 10 MG Tab Take 15 mg by mouth every bedtime.     • carvedilol (COREG) 6.25 MG Tab TAKE 1 TABLET BY MOUTH TWICE DAILY 180 Tab 3   • furosemide (LASIX) 40 MG Tab TAKE 1 TABLET BY MOUTH EVERY DAY 90 Tab 3   • Probiotic Product (PROBIOTIC DAILY PO) Take 1 Cap by mouth every day.     • Glucos-Chond-Hyal Ac-Ca Fructo (MOVE FREE JOINT HEALTH ADVANCE) Tab Take 1 Tab by mouth every day.     • Multiple Vitamins-Minerals (WOMENS MULTI VITAMIN & MINERAL) Tab Take 1 Tab by mouth every day.    "  • Cholecalciferol (VITAMIN D-3) 1000 UNITS Cap Take 1,000 Units by mouth every day.       No current facility-administered medications for this visit.      She  has a past medical history of Arthritis, Bowel habit changes, Breath shortness, Cataract, Constipation, Epilepsy ([facility]), Eye drainage, Hemorrhagic disorder ([facility]), Hiatus hernia syndrome, Hypertension, Hyperthyroidism, Hypothyroid, Influenza, Pain, Personal history of venous thrombosis and embolism, Ringing in ears, Seizure ([facility]) (1982), and Sore muscles.    ROS   No chest pain, no shortness of breath, no abdominal pain       Objective:     /60 (BP Location: Left arm, Patient Position: Sitting, BP Cuff Size: Adult)   Pulse 68   Temp 37.6 °C (99.6 °F)   Ht 1.575 m (5' 2\")   Wt 97.1 kg (214 lb)   SpO2 94%  Body mass index is 39.14 kg/m².   Physical Exam:  General: Alert, oriented and no acute distress.  Eye contact is good, speech goal directed, affect calm  HEENT: conjunctiva non-injected, sclera non-icteric.  Oral mucous membranes pink and moist with no lesions.  Pinna normal.   Lungs: Normal respiratory effort, clear to auscultation bilaterally with good excursion.  CV: regular rate and rhythm. No murmurs.  Abdomen: soft, non distended, nontender, bowel sound normal.  Ext: Mild pitting edema on both lower extremities, color normal, vascularity normal, temperature normal      Assessment and Plan:   The following treatment plan was discussed     1. Acquired hypothyroidism  - Well-controlled. Continue current regimens, levothyroxine 100 mcg every morning on empty stomach.  Advised patient to recheck blood test in 6 months which will be ordered and reviewed by her new PCP.    2. HTN (hypertension), benign  - Well-controlled. Continue current regimens, carvedilol 6.25 mg twice daily, Lasix 40 mg daily and potassium chloride 20 mEq daily.  - Reviewed the risks and benefits as well as potential side effects of medications with patient.  - Discussed to " eat low-sodium diet and encouraged to do regular physical exercise.  - Recommend to monitor blood pressure and heart rate at home.    3. Age-related osteoporosis with current pathological fracture with routine healing  - Recent bone density showed improvement.  Patient started taking Fosamax in January 2018.  I discussed with patient to continue to taking Fosamax for 2 more years given her multiple history of fracture from osteoporosis.  However, she is advised to stop taking Fosamax if she has any side effects.  I advised patient to follow with dentist regularly as Fosamax can cause jaw osteonecrosis.  Patient understands and agrees with the plan.  - Counseling for side effect of Fosamax.  - Advised to take Fosamax with plenty of water and to stay upright for at least 2 hour after taking medication to prevent esophagitis.    4. Iron deficiency anemia secondary to inadequate dietary iron intake  - Improved.  The dose of ferrous sulfate was cut down from twice a day to once a day.  Continue ferrous sulfate 325 mg once a day and continue multivitamins once a day.  Advised patient to increase water intake and fiber intake to prevent constipation.  Advised to repeat blood test in 3 months with her new primary care provider.    5. Obesity (BMI 30-39.9)  - Counseled for healthy diet and regular physical exercise to lose weight.  - Patient identified as having weight management issue.  Appropriate orders and counseling given.    6. Health Maintenance   - Immunizations are up-to-date except shingles vaccine.  Patient is advised to receive shingles vaccine at local pharmacy due to clinic shortage.  I reviewed the risks and benefits of shingles vaccine with patient.      Followup: Return in about 3 months (around 2/21/2020), or if symptoms worsen or fail to improve, for Hypertension, Hypothyroid, Osteoporosis, AILYN.      Please note that this dictation was created using voice recognition software. I have made every reasonable  attempt to correct obvious errors, but I expect that there may have unintended errors in text, spelling, punctuation, or grammar that I did not discover.

## 2019-11-22 NOTE — ASSESSMENT & PLAN NOTE
Patient is currently taking carvedilol 6.25 mg twice daily, furosemide 40 mg daily and potassium chloride 20 mEq once a day.  Recent blood pressure has been stable with current regimens.  Patient also noticed improvement on her leg swelling and decreased water weight gradually.  Recent CMP on 11/15/2019 showed normal electrolytes and kidney function except slightly high BUN at 23.

## 2019-11-22 NOTE — ASSESSMENT & PLAN NOTE
Stable. Currently taking levothyroxine 100 Mcg daily as prescribed.  Denies palpitations, skin changes, temperature intolerance, or changes in bowel habits.    Results for DIVINA ZUNIGA (MRN [id number]) as of 11/21/2019 18:50   Ref. Range 11/15/2019 10:21   TSH Latest Ref Range: 0.380 - 5.330 uIU/mL 0.820   Free T-4 Latest Ref Range: 0.53 - 1.43 ng/dL 0.81

## 2019-11-22 NOTE — ASSESSMENT & PLAN NOTE
Patient is currently taking ferrous sulfate 325 mg once a day and multivitamins once a day.  She has improvement on anemia level in recent blood test.  Patient denies abdominal pain or black tarry stool or bloody bowel movement.  She does not take NSAIDs from over-the-counter.  She denies drinking alcohol.    Results for DIVINA ZUNIGA (MRN [id number]) as of 11/21/2019 18:50   Ref. Range 6/16/2019 13:25 6/17/2019 04:14 6/18/2019 04:35 11/15/2019 10:21   Hemoglobin Latest Ref Range: 12.0 - 16.0 g/dL 7.4 (L) 7.4 (L) 7.5 (L) 14.2   Hematocrit Latest Ref Range: 37.0 - 47.0 % 22.9 (L) 22.9 (L) 23.7 (L) 44.4   MCV Latest Ref Range: 81.4 - 97.8 fL 97.9 (H) 94.2 97.1 99.6 (H)     Results for DIVINA ZUNIGA (MRN [id number]) as of 11/21/2019 18:50   Ref. Range 7/27/2018 09:03 10/31/2018 08:39   Ferritin Latest Ref Range: 10.0 - 291.0 ng/mL 8.5 (L) 20.2     Results for DIVINA ZUNIGA (MRN [id number]) as of 11/21/2019 18:50   Ref. Range 7/27/2018 09:03   Folate -Folic Acid Latest Ref Range: >4.0 ng/mL 7.3   Vitamin B12 -True Cobalamin Latest Ref Range: 211 - 911 pg/mL 337

## 2019-12-09 DIAGNOSIS — I10 HTN (HYPERTENSION), BENIGN: ICD-10-CM

## 2019-12-09 RX ORDER — CARVEDILOL 6.25 MG/1
TABLET ORAL
Qty: 200 TAB | Refills: 3 | Status: SHIPPED | OUTPATIENT
Start: 2019-12-09 | End: 2020-05-19

## 2019-12-17 ENCOUNTER — OFFICE VISIT (OUTPATIENT)
Dept: MEDICAL GROUP | Age: 79
End: 2019-12-17
Payer: MEDICARE

## 2019-12-17 VITALS
TEMPERATURE: 97.8 F | BODY MASS INDEX: 33.55 KG/M2 | HEIGHT: 65 IN | OXYGEN SATURATION: 97 % | HEART RATE: 56 BPM | SYSTOLIC BLOOD PRESSURE: 114 MMHG | DIASTOLIC BLOOD PRESSURE: 66 MMHG | WEIGHT: 201.4 LBS

## 2019-12-17 DIAGNOSIS — Z23 NEED FOR VACCINATION: ICD-10-CM

## 2019-12-17 DIAGNOSIS — I51.89 DIASTOLIC DYSFUNCTION: ICD-10-CM

## 2019-12-17 DIAGNOSIS — Z91.81 AT HIGH RISK FOR FALLS: ICD-10-CM

## 2019-12-17 DIAGNOSIS — I26.99 RECURRENT PULMONARY EMBOLISM (HCC): ICD-10-CM

## 2019-12-17 DIAGNOSIS — I10 HTN (HYPERTENSION), BENIGN: ICD-10-CM

## 2019-12-17 PROCEDURE — 99214 OFFICE O/P EST MOD 30 MIN: CPT | Performed by: FAMILY MEDICINE

## 2019-12-17 RX ORDER — POTASSIUM CHLORIDE 20 MEQ/1
20 TABLET, EXTENDED RELEASE ORAL
Qty: 100 TAB | Refills: 3 | Status: SHIPPED | OUTPATIENT
Start: 2019-12-17 | End: 2020-12-22

## 2019-12-17 RX ORDER — FUROSEMIDE 40 MG/1
40 TABLET ORAL
Qty: 90 TAB | Refills: 3 | Status: SHIPPED | OUTPATIENT
Start: 2019-12-17 | End: 2020-12-22

## 2019-12-17 RX ORDER — PRIMIDONE 250 MG/1
250-500 TABLET ORAL 2 TIMES DAILY
Qty: 90 TAB | Refills: 2 | Status: SHIPPED | OUTPATIENT
Start: 2019-12-17 | End: 2020-02-26

## 2019-12-17 NOTE — PROGRESS NOTES
This medical record contains text that has been entered with the assistance of computer voice recognition and dictation software.  Therefore, it may contain unintended errors in text, spelling, punctuation, or grammar    Chief Complaint   Patient presents with   • Establish Care     pt is prone to falls prior [facility] pt          Natalie Tete Anaya is a 79 y.o. female here evaluation and management of: hypertension.       HPI:      1. At high risk for falls  Chronic history. The patient reports that she fell a week ago while coming back into the house. She was holding too much in her arms when she stumbled and could not catch herself. She had a fall in June 2019 that resulted in a hip fracture. She is currently in physical therapy. She additionally notes that physical therapy and exercise is difficult for her secondary to chronic right knee pain.      2. HTN (hypertension), benign  Patient has a history of chronic hypertension. She reportedly monitors her blood pressure regularly at home. Blood pressure is within goal today at 114/66.  She reports following a low sodium diet and denies any related complaints including chronic cough, chest pain, headaches or dizziness.     3. Recurrent pulmonary embolism (HCC)  Chronic, stable history. The patient is currently taking Coumadin 15 mg once daily. The patient does not report any acute pulmonary or cardiovascular symptoms.  Patient denies any blood in stool no dark tarry stool no vomiting of blood.      Current medicines (including changes today)  Current Outpatient Medications   Medication Sig Dispense Refill   • Zoster Vac Recomb Adjuvanted (SHINGRIX) 50 MCG/0.5ML Recon Susp 0.5 mL by Intramuscular route Once for 1 dose. 0.5 mL 0   • potassium chloride SA (KDUR) 20 MEQ Tab CR Take 1 Tab by mouth every day. 100 Tab 3   • furosemide (LASIX) 40 MG Tab Take 1 Tab by mouth every day. 90 Tab 3   • primidone (MYSOLINE) 250 MG Tab Take 1-2 Tabs by mouth 2 Times a Day. 250mg in  the morning & 500mg at bedtime 90 Tab 2   • carvedilol (COREG) 6.25 MG Tab TAKE 1 TABLET BY MOUTH TWICE DAILY 200 Tab 3   • ferrous sulfate (FEROSUL) 325 (65 Fe) MG tablet Take 1 Tab by mouth every day. 90 Tab 3   • levothyroxine (SYNTHROID) 100 MCG Tab TAKE 1 TABLET BY MOUTH EVERY DAY 90 Tab 3   • Home Care Oxygen Inhale 2 L/min by mouth every bedtime.     • alendronate (FOSAMAX) 70 MG Tab TAKE 1 TABLET BY MOUTH EVERY 7 DAYS 12 Tab 3   • omeprazole (PRILOSEC) 20 MG delayed-release capsule Take 20 mg by mouth every evening.     • warfarin (COUMADIN) 10 MG Tab Take 15 mg by mouth every evening.        • Melatonin 10 MG Tab Take 15 mg by mouth every bedtime.     • Probiotic Product (PROBIOTIC DAILY PO) Take 1 Cap by mouth every day.     • Glucos-Chond-Hyal Ac-Ca Fructo (MOVE FREE JOINT HEALTH ADVANCE) Tab Take 1 Tab by mouth every day.     • Multiple Vitamins-Minerals (WOMENS MULTI VITAMIN & MINERAL) Tab Take 1 Tab by mouth every day.     • Cholecalciferol (VITAMIN D-3) 1000 UNITS Cap Take 1,000 Units by mouth every day.       No current facility-administered medications for this visit.      She  has a past medical history of Arthritis, Bowel habit changes, Breath shortness, Cataract, Constipation, Epilepsy (HCC), Eye drainage, Hemorrhagic disorder (HCC), Hiatus hernia syndrome, Hypertension, Hyperthyroidism, Hypothyroid, Influenza, Pain, Personal history of venous thrombosis and embolism, Ringing in ears, Seizure (HCC) (1982), and Sore muscles.  She  has a past surgical history that includes tubal ligation; nohemy by laparoscopy (9/30/2011); knee arthroplasty total (7/9/2012); cataract phaco with iol (7/15/2014); cataract phaco with iol (8/5/2014); gastroscopy-endo (N/A, 4/29/2016); colonoscopy-flexible (N/A, 4/29/2016); hip nailing intramedullary (Left, 10/6/2016); irrigation & debridement hip (Left, 11/17/2016); carpal tunnel release; pr remv 2nd cataract,corn-scler sectn; arthroscopy, knee; tonsillectomy; and pr  "open fix inter/subtroch fx,implnt (Right, 2019).  Social History     Tobacco Use   • Smoking status: Never Smoker   • Smokeless tobacco: Never Used   Substance Use Topics   • Alcohol use: No   • Drug use: No     Social History     Patient does not qualify to have social determinant information on file (likely too young).   Social History Narrative   • Not on file     Family History   Problem Relation Age of Onset   • Cancer Mother 67        Ovarian   • Alcohol/Drug Father 60        Appenditis   • Heart Disease Father    • Cancer Maternal Grandmother         colon cancer   • Heart Disease Maternal Grandfather    • Cancer Daughter 44        melonoma     Family Status   Relation Name Status   • Mo     • Fa     • MGMo     • MGFa     • PGMo     • PGFa     • Gwen  (Not Specified)         ROS    Please see hpi     All other systems reviewed and are negative     Objective:     /66 (BP Location: Left arm, Patient Position: Sitting, BP Cuff Size: Adult)   Pulse (!) 56   Temp 36.6 °C (97.8 °F) (Temporal)   Ht 1.651 m (5' 5\")   Wt 91.4 kg (201 lb 6.4 oz)   SpO2 97%  Body mass index is 33.51 kg/m².  Physical Exam:    Constitutional: Alert, no distress.  Skin: Warm, dry, good turgor, no rashes in visible areas.  Eye: Equal, round and reactive, conjunctiva clear, lids normal.  ENMT: Lips without lesions, good dentition, oropharynx clear.  Neck: Trachea midline, no masses, no thyromegaly. No cervical or supraclavicular lymphadenopathy.  Respiratory: Unlabored respiratory effort, lungs clear to auscultation, no wheezes, no ronchi.  Cardiovascular: Normal S1, S2, no murmur, no edema.  Psych: Alert and oriented x3, normal affect and mood.      Assessment and Plan:   The following treatment plan was discussed      1. At high risk for falls  Chronic, stable history. I referred her to physical therapy for her injuries obtained during her falls and her right chronic knee pain. "   - REFERRAL TO PHYSICAL THERAPY Reason for Therapy: Eval/Treat/Report    2. HTN (hypertension), benign  Chronic, stable history. Under good control. No changes in dosage today. Blood pressure today was 114/66. Patient was asked to continue monitoring her blood pressure at home. She was encouraged to follow a low sodium diet.   - Repeat labs in 1-2 weeks prior to their next appointment.     3. Recurrent pulmonary embolism (HCC)  Chronic, stable history. Under good control. Continue same regimen.    4. Need for vaccination  The patient is on a list to receive the Shingrix vaccination.   - Zoster Vac Recomb Adjuvanted (SHINGRIX) 50 MCG/0.5ML Recon Susp; 0.5 mL by Intramuscular route Once for 1 dose.  Dispense: 0.5 mL; Refill: 0      Instructed to Follow up in clinic or ER for worsening symptoms, difficulty breathing, lack of expected recovery, or should new symptoms or problems arise.    Followup: Return in about 3 months (around 3/17/2020) for Reevaluation.      Once again this medical record contains text that has been entered with the assistance of computer voice recognition, dictation software, and medical scribes.  Therefore, it may contain unintended errors in text, spelling, punctuation, or grammar.    Acosta ARTEAGA (Scribe), am scribing for, and in the presence of, Cheko Sánchez M.D.    Electronically signed by: Acosta Bosch (Scribe), 12/17/2019     Cheko ARTEAGA M.D. personally performed the services described in this documentation, as scribed by Acosta Bosch in my presence, and it is both accurate and complete.

## 2019-12-30 ENCOUNTER — ANTICOAGULATION VISIT (OUTPATIENT)
Dept: MEDICAL GROUP | Facility: MEDICAL CENTER | Age: 79
End: 2019-12-30
Payer: MEDICARE

## 2019-12-30 DIAGNOSIS — Z79.01 LONG TERM (CURRENT) USE OF ANTICOAGULANTS: Primary | ICD-10-CM

## 2019-12-30 DIAGNOSIS — Z86.711 PERSONAL HISTORY OF PULMONARY EMBOLISM: ICD-10-CM

## 2019-12-30 DIAGNOSIS — Z79.01 CHRONIC ANTICOAGULATION: ICD-10-CM

## 2019-12-30 LAB — INR PPP: 3.2 (ref 2–3.5)

## 2019-12-30 PROCEDURE — 99211 OFF/OP EST MAY X REQ PHY/QHP: CPT | Performed by: INTERNAL MEDICINE

## 2019-12-30 PROCEDURE — 85610 PROTHROMBIN TIME: CPT | Performed by: INTERNAL MEDICINE

## 2019-12-31 NOTE — PROGRESS NOTES
OP Anticoagulation Service Note    Date: 12/30/2019  There were no vitals filed for this visit.   pt declined vitals    Anticoagulation Summary  As of 12/30/2019    INR goal:   2.0-3.0   TTR:   60.7 % (2.9 y)   INR used for dosing:   3.20! (12/30/2019)   Warfarin maintenance plan:   15 mg (10 mg x 1.5) every day   Weekly warfarin total:   105 mg   Plan last modified:   Moira Sheridan, PharmD (9/5/2019)   Next INR check:   1/27/2020   Target end date:       Indications    Chronic anticoagulation [Z79.01]  Personal history of pulmonary embolism [Z86.711]  Long term (current) use of anticoagulants [Z79.01] [Z79.01]             Anticoagulation Episode Summary     INR check location:       Preferred lab:       Send INR reminders to:       Comments:         Anticoagulation Care Providers     Provider Role Specialty Phone number    Ninfa Marcial M.D. Referring Internal Medicine [phone]    [facility] Anticoagulation Services Responsible  [phone]        Anticoagulation Patient Findings      HPI:   Natalie Anaya seen in clinic today, on anticoagulation therapy with warfarin (a high risk medication) for hx of PE       Pt is here today to evaluate anticoagulation therapy    Previous INR was  2.3 on 11-18-19    Pt was instructed to continue current regimen    Confirmed warfarin dosing regimen, denies missed or extra doses of coumadin.   Diet has been consistent with foods rich in vitamin K: On vacation, ate differently than normal  Changes in ETOH:  No  Changes in smoking status: No  Changes in medication: No   Cost restriction: No  S/s of bleeding:  No  Falls or accidents since last visit No  Signs/symptoms  thrombosis since the last appt: no    A/P   INR  supra-therapeutic today, will require dose adjust ment today to prevent bleeding complications  and closer follow up.   Tonight 10 mg then Continue current warfarin regimen          11/20 check referral    Pt educated to contact our clinic with any changes  in medications or s/s of bleeding or thrombosis. Pt is aware to seek immediate medical attention for falls, head injury or deep cuts    Follow up appointment in 4 week(s) to reduce risk of adverse events from warfarin   Moira Sheridan, PharmD

## 2020-01-07 RX ORDER — WARFARIN SODIUM 10 MG/1
15 TABLET ORAL EVERY EVENING
Qty: 135 TAB | Refills: 1 | Status: SHIPPED | OUTPATIENT
Start: 2020-01-07 | End: 2020-10-09 | Stop reason: SDUPTHER

## 2020-01-21 ENCOUNTER — OFFICE VISIT (OUTPATIENT)
Dept: MEDICAL GROUP | Facility: MEDICAL CENTER | Age: 80
End: 2020-01-21
Payer: MEDICARE

## 2020-01-21 VITALS
DIASTOLIC BLOOD PRESSURE: 60 MMHG | SYSTOLIC BLOOD PRESSURE: 112 MMHG | HEIGHT: 63 IN | HEART RATE: 67 BPM | WEIGHT: 210.32 LBS | OXYGEN SATURATION: 95 % | BODY MASS INDEX: 37.27 KG/M2 | TEMPERATURE: 97.1 F

## 2020-01-21 DIAGNOSIS — F51.04 CHRONIC INSOMNIA: ICD-10-CM

## 2020-01-21 DIAGNOSIS — Z79.01 CHRONIC ANTICOAGULATION: ICD-10-CM

## 2020-01-21 DIAGNOSIS — I51.89 DIASTOLIC DYSFUNCTION: ICD-10-CM

## 2020-01-21 DIAGNOSIS — M80.00XD AGE-RELATED OSTEOPOROSIS WITH CURRENT PATHOLOGICAL FRACTURE WITH ROUTINE HEALING: ICD-10-CM

## 2020-01-21 DIAGNOSIS — I27.21 SECONDARY PULMONARY ARTERIAL HYPERTENSION (HCC): ICD-10-CM

## 2020-01-21 DIAGNOSIS — J96.11 CHRONIC RESPIRATORY FAILURE WITH HYPOXIA (HCC): ICD-10-CM

## 2020-01-21 DIAGNOSIS — Z79.01 LONG TERM (CURRENT) USE OF ANTICOAGULANTS: ICD-10-CM

## 2020-01-21 DIAGNOSIS — I10 HTN (HYPERTENSION), BENIGN: ICD-10-CM

## 2020-01-21 DIAGNOSIS — G47.33 OBSTRUCTIVE SLEEP APNEA SYNDROME: ICD-10-CM

## 2020-01-21 DIAGNOSIS — D50.8 IRON DEFICIENCY ANEMIA SECONDARY TO INADEQUATE DIETARY IRON INTAKE: ICD-10-CM

## 2020-01-21 DIAGNOSIS — I26.99 RECURRENT PULMONARY EMBOLISM (HCC): ICD-10-CM

## 2020-01-21 DIAGNOSIS — G40.909 SEIZURE DISORDER (HCC): ICD-10-CM

## 2020-01-21 DIAGNOSIS — E03.9 ACQUIRED HYPOTHYROIDISM: ICD-10-CM

## 2020-01-21 PROCEDURE — 8041 PR SCP AHA: Performed by: INTERNAL MEDICINE

## 2020-01-21 PROCEDURE — 99214 OFFICE O/P EST MOD 30 MIN: CPT | Performed by: INTERNAL MEDICINE

## 2020-01-21 ASSESSMENT — PATIENT HEALTH QUESTIONNAIRE - PHQ9: CLINICAL INTERPRETATION OF PHQ2 SCORE: 0

## 2020-01-21 NOTE — PATIENT INSTRUCTIONS
1. You can try to decrease the Prilosec to every other day for 2 weeks before our next appointment and then we can see if we can stop it altogether.    2. Complete labs before our next visit.

## 2020-01-21 NOTE — PROGRESS NOTES
Subjective:     CC:  Diagnoses of Acquired hypothyroidism, Age-related osteoporosis with current pathological fracture with routine healing, Chronic anticoagulation, Chronic insomnia, Chronic respiratory failure with hypoxia (HCC), Obstructive sleep apnea syndrome, Secondary pulmonary arterial hypertension (HCC), HTN (hypertension), benign, Diastolic dysfunction, Recurrent pulmonary embolism (HCC), Long term (current) use of anticoagulants [Z79.01], Iron deficiency anemia secondary to inadequate dietary iron intake, and Seizure disorder (HCC) were pertinent to this visit.    HISTORY OF THE PRESENT ILLNESS: Patient is a 79 y.o. female. This pleasant patient is here today to establish care and discuss the below stated chronic medical conditions. She is accompanied by her , Marlo.    Problem   Recurrent Pulmonary Embolism (Hcc)    Was diagnosed in 2003 due to prolonged immobilization from right shoulder fracture and pain.   Was treated with coumadin for 6 months in 2003.  Recurrent PE on both lungs diagnosed on 10/4/16 after ground level fall and left 3rd -7 th ribs fracture and left hip fracture. On chronic anticoagulation since then.   Status post IVC filter removed on 2/15/17.  On oxygen 2L at night. She continues on systemic anticoagulation in the form of warfarin indefinitely. Last saw vascular clinic in 12/2016.     Seizure Disorder (Hcc)    Last seizure in 1982. Was treated with dilantin and Primidone since teenage. Discontinued dilantin for 20 years. Wants to keep taking primidone as she concerns recurrent seizures if she stopped all medications.  No concerns regarding this problem.       Htn (Hypertension), Benign    Echocardiogram (7/2017):  Normal left ventricular chamber size. Left ventricular ejection fraction is visually estimated to be 60%. Mild concentric left ventricular hypertrophy. Grade I diastolic dysfunction. Mildly dilated right ventricle. Normal right ventricular systolic function.  Moderate tricuspid regurgitation. Estimated right ventricular systolic pressure is 60 mmHg. Normal inferior vena cava size and inspiratory collapse. Compared to the report of the study done - there has been improvement   in RV size and function and a slight decrease in RVSP and TR.     Current regimen includes furosemide 40 mg daily and carvedilol 6.25 mg twice daily.  Several blood pressure readings have been well controlled between 112-114/60-66.  She denies signs or symptoms of orthostasis at this time.  Recent echocardiogram.  No chest pain, dyspnea on exertion, or palpitations.     Age-Related Osteoporosis With Current Pathological Fracture With Routine Healing       Ref. Range 11/15/2019 10:21   Bun Latest Ref Range: 8 - 22 mg/dL 23 (H)   Creatinine Latest Ref Range: 0.50 - 1.40 mg/dL 0.58   GFR If Non  Latest Ref Range: >60 mL/min/1.73 m 2 >60       Bone Density (5/2017):  FINDINGS:  lumbar spine T score of -1.4  proximal right femur T score of -2.3     IMPRESSION:  According to the World Health Organization classification, bone mineral density of this patient is consistent with osteopenia.     10-year Probability of Fracture:  Major Osteoporotic     40.3%  Hip     17.5%    Of note, she had a follow-up bone density in October 2019 however this only included lumbar spine is the patient is experience bilateral hip fractures in the interim.  Thus, she would be due for her next repeat bone density in October 2021.    Fosamax started in Jan 2018. She is taking Vitamin D 2,000 IU daily. She has a history of compression fracture in the thoracic and lumbar spine and bilateral hip fracture as well as rib fracture.  She is pleased that her latest bone density result had improved following initiation of the Fosamax.     Chronic Respiratory Failure With Hypoxia (Hcc)    Patient uses 2 L of supplemental oxygen at night when she is sleeping.  She reports that this is now in lieu of a CPAP as she is stop  using this device for approximately 6 to 7 months (since her hip fracture and recovery in July 2019).  She denies use of supplemental oxygen during the daytime or on exertion.     Obstructive Sleep Apnea Syndrome    Per sleep medicine:   Home sleep study October 2017 indicates severe obstructive sleep apnea with an POLO of 39, minimum oxygen saturation of 79 %. She is using with CPAP 12 cm H2O with 2 L O2 bleed.  Compliance download over the past 30 days indicates 73 % compliance, average use of 5 hours 15 minutes per night, AHI of 1.6. Patient reports she is gone back to using just nasal pillows without chinstrap.  Unfortunately for she finds no benefit with CPAP but understands the importance of use.  She uses melatonin for insomnia and is able to sleep well despite CPAP use.  She tends to take her machine off in the early morning hours and does not but this back on.  She denies a.m. headache or excessive daytime fatigue.    In discussion with the patient in January 2020 she admits that she stopped using her CPAP in approximately July 2019 and does not plan to use it again in the future.  She does wear supplemental oxygen via nasal cannula approximately 2 L nocturnally.  No oxygen requirements during the day.  She has not followed up with sleep medicine or pulmonary medicine in recent time.     Long term (current) use of anticoagulants [Z79.01]    She is on indefinite warfarin due to recurrent pulmonary emboli.  She denies any issues with this medication.     Secondary Pulmonary Arterial Hypertension (Hcc)    Echocardiogram (7/2017):  Estimated right ventricular systolic pressure is 60 mmHg. Compared to the report of the study done - there has been improvement in RV size and function and a slight decrease in RVSP and TR.     She has not had follow-up on her pulmonary artery pressures in recent time.  She does take diuretic therapy and antihypertensives.     Chronic Anticoagulation    She has a history of multiple  PE's for which she takes systemic anticoagulation in the form of warfarin.  This will be a lifelong treatment for her.  No anemia on most recent blood work.     Iron Deficiency Anemia       Ref. Range 7/27/2018 09:03 10/31/2018 08:39   Iron Latest Ref Range: 40 - 170 ug/dL 30 (L) 125   Total Iron Binding Latest Ref Range: 250 - 450 ug/dL 357 262   % Saturation Latest Ref Range: 15 - 55 % 8 (L) 48      Ref. Range 7/27/2018 09:03 10/31/2018 08:39   Ferritin Latest Ref Range: 10.0 - 291.0 ng/mL 8.5 (L) 20.2       She has had work-up in the past including upper and lower endoscopy with GI consultants (Dr. Stanton) in April 2016.  Biopsy demonstrated mild chronic inflammation and reactive epithelial cells in the gastric antrum with a benign hyperplastic polyp and negative H. Pylori.  Biopsy also negative for celiac sprue.  Colonoscopy was benign with negative pathology on ascending colon polyp. Patient did not do capsule endoscopy at that time.     It appears that her hemoglobin levels improved with time.  She has been on and off iron supplements since that time.  She is currently taking an iron supplement daily.  She denies any constipation.     Diastolic Dysfunction    Echocardiogram (7/2017):  Normal left ventricular chamber size. Left ventricular ejection fraction is visually estimated to be 60%. Mild concentric left ventricular hypertrophy. Grade I diastolic dysfunction. Mildly dilated right ventricle. Normal right ventricular systolic function.    She is taking furosemide 40 mg daily to help maintain euvolemia.  She also takes carvedilol 6.25 mg twice daily due to hypertension.     Acquired Hypothyroidism       Ref. Range 11/15/2019 10:21   TSH Latest Ref Range: 0.380 - 5.330 uIU/mL 0.820   Free T-4 Latest Ref Range: 0.53 - 1.43 ng/dL 0.81     She is taking levothyroxine 100 mcg daily.  No signs or symptoms of overtreatment or under treatment at this time.  She has been stable on this dose for many years and wonders  if she still truly requires this medication.     Chronic Insomnia    She has a longstanding history of insomnia. She has used Ambien in the past intermittently, but had transitioned to melatonin more recently due to concern of toxicity with this medication.          Allergies: Tape    Current Outpatient Medications Ordered in Epic   Medication Sig Dispense Refill   • warfarin (COUMADIN) 10 MG Tab Take 1.5 Tabs by mouth every evening. Indications: Blockage of Blood Vessel to Lung by a Particle 135 Tab 1   • potassium chloride SA (KDUR) 20 MEQ Tab CR Take 1 Tab by mouth every day. 100 Tab 3   • furosemide (LASIX) 40 MG Tab Take 1 Tab by mouth every day. 90 Tab 3   • primidone (MYSOLINE) 250 MG Tab Take 1-2 Tabs by mouth 2 Times a Day. 250mg in the morning & 500mg at bedtime (Patient taking differently: Take 250-500 mg by mouth 2 Times a Day. 250mg in the morning & 500mg at bedtime   Three a day) 90 Tab 2   • carvedilol (COREG) 6.25 MG Tab TAKE 1 TABLET BY MOUTH TWICE DAILY 200 Tab 3   • ferrous sulfate (FEROSUL) 325 (65 Fe) MG tablet Take 1 Tab by mouth every day. 90 Tab 3   • levothyroxine (SYNTHROID) 100 MCG Tab TAKE 1 TABLET BY MOUTH EVERY DAY 90 Tab 3   • Home Care Oxygen Inhale 2 L/min by mouth every bedtime.     • alendronate (FOSAMAX) 70 MG Tab TAKE 1 TABLET BY MOUTH EVERY 7 DAYS 12 Tab 3   • omeprazole (PRILOSEC) 20 MG delayed-release capsule Take 20 mg by mouth every evening.     • Melatonin 10 MG Tab Take 15 mg by mouth every bedtime.     • Probiotic Product (PROBIOTIC DAILY PO) Take 1 Cap by mouth every day.     • Glucos-Chond-Hyal Ac-Ca Fructo (MOVE FREE [facility] ADVANCE) Tab Take 1 Tab by mouth every day.     • Cholecalciferol (VITAMIN D-3) 1000 UNITS Cap Take 1,000 Units by mouth every day.     • Multiple Vitamins-Minerals (WOMENS MULTI VITAMIN & MINERAL) Tab Take 1 Tab by mouth every day.       No current [facility]-ordered facility-administered medications on file.        Past Medical History:    Diagnosis Date   • Arthritis     Knees.   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   • Ringing in ears    • Seizure (HCC) 1982   • Sore muscles        Past Surgical History:   Procedure Laterality Date   • PB OPEN FIX INTER/SUBTROCH FX,IMPLNT Right 6/13/2019    Procedure: INSERTION, INTRAMEDULLARY MENDY, FEMUR, PROXIMAL;  Surgeon: Maximo Garnica M.D.;  Location: [facility];  Service: Orthopedics   • IRRIGATION & DEBRIDEMENT HIP Left 11/17/2016    Procedure: IRRIGATION & DEBRIDEMENT HIP;  Surgeon: Maximo Garnica M.D.;  Location: [facility];  Service:    • HIP NAILING INTRAMEDULLARY Left 10/6/2016    Procedure: HIP NAILING INTRAMEDULLARY;  Surgeon: Walt Nguyen M.D.;  Location: [facility];  Service:    • GASTROSCOPY-ENDO N/A 4/29/2016    Procedure: GASTROSCOPY-ENDO;  Surgeon: Mikey Stanton M.D.;  Location: [facility];  Service:    • COLONOSCOPY-FLEXIBLE N/A 4/29/2016    Procedure: COLONOSCOPY-FLEXIBLE;  Surgeon: Mikey Stanton M.D.;  Location: [facility];  Service:    • CATARACT PHACO WITH IOL  8/5/2014    Performed by Lorenzo Bello M.D. at [facility]   • CATARACT PHACO WITH IOL  7/15/2014    Performed by Lorenzo Bello M.D. at [facility]   • KNEE ARTHROPLASTY TOTAL  7/9/2012    Performed by DENIZ FREDERICK at [facility]   • JOSE BY LAPAROSCOPY  9/30/2011    Performed by JOHNATHAN CRISOSTOMO at [facility]   • ARTHROSCOPY, KNEE     • CARPAL TUNNEL RELEASE     • PB REMV 2ND CATARACT,CORN-SCLER SECTN     • TONSILLECTOMY     • TUBAL LIGATION    "      Social History     Tobacco Use   • Smoking status: Never Smoker   • Smokeless tobacco: Never Used   Substance Use Topics   • Alcohol use: No   • Drug use: No       Social History     Patient does not qualify to have social determinant information on file (likely too young).   Social History Narrative   • Not on file       Family History   Problem Relation Age of Onset   • Cancer Mother 67        Ovarian   • Alcohol/Drug Father 60        Appenditis   • Heart Disease Father    • Cancer Maternal Grandmother         colon cancer   • Heart Disease Maternal Grandfather    • Cancer Daughter 44        melonoma       Health Maintenance: Completed    ROS:   As above in the HPI All Others Reviewed and Negative  Objective:     Exam: /60 (BP Location: Left arm, Patient Position: Sitting, BP Cuff Size: Adult)   Pulse 67   Temp 36.2 °C (97.1 °F) (Temporal)   Ht 1.588 m (5' 2.5\")   Wt 95.4 kg (210 lb 5.1 oz)   SpO2 95%  Body mass index is 37.85 kg/m².    General: Normal appearing. No distress. Appears stated age.  EENT: Eyes conjunctiva clear lids without ptosis, extraocular movements intact, glasses in place, ears normal shape and contour, small cerumen obstruction, tympanic membranes are benign, oropharynx is without erythema, edema or exudates. Fair dentition.   Neck: Supple without JVD. Thyroid is not enlarged.  Pulmonary: Clear to ausculation.  Normal effort. No rales, ronchi, or wheezing. No cough.  Cardiovascular: Regular rate and rhythm without murmur. Chronic pedal edema, 1+ bilaterally.  Abdomen: Soft, nontender, nondistended. Normal bowel sounds.   Neurologic: No resting tremor, no increased tone or rigidity.  Lymph: No cervical or supraclavicular lymph nodes are palpable  Skin: Warm and dry.  No obvious lesions on skin exposed areas.  Musculoskeletal: Antalgic gait. Right knee crepitus. No extremity cyanosis or clubbing. Patient ambulates with a cane.  Psych: Normal mood and affect. Alert and oriented " x3. Judgment and insight is normal.    Assessment & Plan:   79 y.o. female with the following -    Problem List Items Addressed This Visit     Recurrent pulmonary embolism (HCC)     Chronic and stable problem.  Appropriate to continue on warfarin indefinitely.  No bleeding complications at this time.  She is managed by the Coumadin clinic.  She does have pulmonary hypertension which is likely secondary to her history of pulmonary emboli.         Chronic insomnia     Chronic and stable problem.  Continue to use melatonin as needed to assist with sleep.  If she would need a new medication would recommend attempting trazodone or mirtazapine before going back to Ambien due to her advanced age.  Patient is agreeable and would like to stay off Ambien due to potential side effects.         Acquired hypothyroidism     Chronic and stable problem.  We will update her thyroid function panel to ensure ongoing stability.  In the meantime she is prepped to continue levothyroxine 100 mcg daily.         Relevant Orders    Comp Metabolic Panel    Iron deficiency anemia     Chronic problem that requires additional evaluation.  We will recheck her iron levels at this time as well as her blood counts to see if she still requires the ferrous sulfate supplementation and if any additional work-up would be indicated at this time.         Relevant Orders    IRON/TOTAL IRON BIND    FERRITIN    Diastolic dysfunction     I shared with her that maintaining euvolemia and a stable blood pressure are the best ways to prevent progression of diastolic dysfunction into diastolic heart failure.  I also think we need to update her echocardiogram so I can have more up-to-date data to advise her on her diuretic and anti-antihypertensive therapy.  She is agreeable and will get this completed and we will adjust her medications as needed.         Relevant Orders    EC-ECHOCARDIOGRAM COMPLETE W/O CONT    Comp Metabolic Panel    Chronic anticoagulation      Chronic and stable problem.  Appropriate to continue on warfarin for her history of recurrent pulmonary emboli.         Secondary pulmonary arterial hypertension (HCC)     Chronic problem that requires additional evaluation.  We will update her echocardiogram so we can see where her pulmonary pressures are at this time especially since she is stopped using her CPAP.  We can then adjust her medications as indicated.         Relevant Orders    EC-ECHOCARDIOGRAM COMPLETE W/O CONT    Long term (current) use of anticoagulants [Z79.01]     She follows with the Coumadin clinic for this problem, currently stable and well controlled.         Obstructive sleep apnea syndrome     Chronic problem, currently untreated, patient preference.  She stopped using her CPAP in July 2019.  I discussed with the patient the implications of untreated obstructive sleep apnea including worsening of diastolic dysfunction and pulmonary hypertension of which she has both.  She is not interested in going back on therapy at this time but would be willing to meet with pulmonary medicine again (not sleep medicine) to discuss ongoing use of nocturnal oxygen therapy.         Chronic respiratory failure with hypoxia (HCC)     Chronic and stable problem.  I recommend that she follow back up with pulmonary medicine as I do not think they are aware that she is stop using her CPAP device and is now only using supplemental oxygen at bedtime.         Age-related osteoporosis with current pathological fracture with routine healing     Chronic and stable problem.  Advised patient to continue on vitamin D 2000 international units daily and calcium 1200 mg daily.  Appropriate to continue on alendronate 70 mg weekly at this time, kidney function is stable.  Next bone density will be due in October 2021.         HTN (hypertension), benign     Chronic problem that requires additional evaluation.  We will update her lab work to ensure ongoing stability of  electrolytes and kidney function.  At this time she is appropriate to continue on Lasix and carvedilol as prescribed.  We will also recheck her echocardiogram to ensure ongoing stability.         Relevant Orders    CBC WITH DIFFERENTIAL    Comp Metabolic Panel    Seizure disorder (HCC)     Chronic and stable problem.  Hopefully to continue on primidone 250 mg in the morning and 500 mg at bedtime.  No recurrence of seizures.  This is a relatively low risk antiepileptic medication and after meeting with neurology they have decided that this is the best course of action for her moving forward.                Return in about 3 months (around 4/21/2020).    Please note that this dictation was created using voice recognition software. I have made every reasonable attempt to correct obvious errors, but I expect that there are errors of grammar and possibly content that I did not discover before finalizing the note.

## 2020-01-22 NOTE — ASSESSMENT & PLAN NOTE
Chronic problem that requires additional evaluation.  We will recheck her iron levels at this time as well as her blood counts to see if she still requires the ferrous sulfate supplementation and if any additional work-up would be indicated at this time.

## 2020-01-22 NOTE — ASSESSMENT & PLAN NOTE
Chronic and stable problem.  Continue to use melatonin as needed to assist with sleep.  If she would need a new medication would recommend attempting trazodone or mirtazapine before going back to Ambien due to her advanced age.  Patient is agreeable and would like to stay off Ambien due to potential side effects.

## 2020-01-22 NOTE — ASSESSMENT & PLAN NOTE
I shared with her that maintaining euvolemia and a stable blood pressure are the best ways to prevent progression of diastolic dysfunction into diastolic heart failure.  I also think we need to update her echocardiogram so I can have more up-to-date data to advise her on her diuretic and anti-antihypertensive therapy.  She is agreeable and will get this completed and we will adjust her medications as needed.

## 2020-01-22 NOTE — ASSESSMENT & PLAN NOTE
Chronic problem, currently untreated, patient preference.  She stopped using her CPAP in July 2019.  I discussed with the patient the implications of untreated obstructive sleep apnea including worsening of diastolic dysfunction and pulmonary hypertension of which she has both.  She is not interested in going back on therapy at this time but would be willing to meet with pulmonary medicine again (not sleep medicine) to discuss ongoing use of nocturnal oxygen therapy.

## 2020-01-22 NOTE — ASSESSMENT & PLAN NOTE
Chronic problem that requires additional evaluation.  We will update her echocardiogram so we can see where her pulmonary pressures are at this time especially since she is stopped using her CPAP.  We can then adjust her medications as indicated.

## 2020-01-22 NOTE — ASSESSMENT & PLAN NOTE
Chronic and stable problem.  I recommend that she follow back up with pulmonary medicine as I do not think they are aware that she is stop using her CPAP device and is now only using supplemental oxygen at bedtime.

## 2020-01-22 NOTE — ASSESSMENT & PLAN NOTE
Chronic and stable problem.  Appropriate to continue on warfarin indefinitely.  No bleeding complications at this time.  She is managed by the Coumadin clinic.  She does have pulmonary hypertension which is likely secondary to her history of pulmonary emboli.

## 2020-01-22 NOTE — ASSESSMENT & PLAN NOTE
Chronic and stable problem.  We will update her thyroid function panel to ensure ongoing stability.  In the meantime she is prepped to continue levothyroxine 100 mcg daily.

## 2020-01-22 NOTE — ASSESSMENT & PLAN NOTE
Chronic and stable problem.  Advised patient to continue on vitamin D 2000 international units daily and calcium 1200 mg daily.  Appropriate to continue on alendronate 70 mg weekly at this time, kidney function is stable.  Next bone density will be due in October 2021.

## 2020-01-22 NOTE — ASSESSMENT & PLAN NOTE
Chronic and stable problem.  Hopefully to continue on primidone 250 mg in the morning and 500 mg at bedtime.  No recurrence of seizures.  This is a relatively low risk antiepileptic medication and after meeting with neurology they have decided that this is the best course of action for her moving forward.

## 2020-01-22 NOTE — ASSESSMENT & PLAN NOTE
Chronic problem that requires additional evaluation.  We will update her lab work to ensure ongoing stability of electrolytes and kidney function.  At this time she is appropriate to continue on Lasix and carvedilol as prescribed.  We will also recheck her echocardiogram to ensure ongoing stability.

## 2020-01-22 NOTE — ASSESSMENT & PLAN NOTE
Chronic and stable problem.  Appropriate to continue on warfarin for her history of recurrent pulmonary emboli.

## 2020-01-27 ENCOUNTER — ANTICOAGULATION VISIT (OUTPATIENT)
Dept: MEDICAL GROUP | Facility: MEDICAL CENTER | Age: 80
End: 2020-01-27
Payer: MEDICARE

## 2020-01-27 DIAGNOSIS — Z79.01 LONG TERM (CURRENT) USE OF ANTICOAGULANTS: Primary | ICD-10-CM

## 2020-01-27 DIAGNOSIS — Z79.01 CHRONIC ANTICOAGULATION: ICD-10-CM

## 2020-01-27 LAB — INR PPP: 3.6 (ref 2–3.5)

## 2020-01-27 PROCEDURE — 99211 OFF/OP EST MAY X REQ PHY/QHP: CPT | Performed by: INTERNAL MEDICINE

## 2020-01-27 PROCEDURE — 85610 PROTHROMBIN TIME: CPT | Performed by: INTERNAL MEDICINE

## 2020-01-28 NOTE — PROGRESS NOTES
OP Anticoagulation Service Note    Date: 1/27/2020  There were no vitals filed for this visit.   pt declined vitals    Anticoagulation Summary  As of 1/27/2020    INR goal:   2.0-3.0   TTR:   59.2 % (3 y)   INR used for dosing:   3.60! (1/27/2020)   Warfarin maintenance plan:   10 mg (10 mg x 1) every Sun, Tue; 15 mg (10 mg x 1.5) all other days   Weekly warfarin total:   95 mg   Plan last modified:   Moira Sherdian, PharmD (1/27/2020)   Next INR check:   2/10/2020   Target end date:       Indications    Chronic anticoagulation [Z79.01]  Long term (current) use of anticoagulants [Z79.01] [Z79.01]             Anticoagulation Episode Summary     INR check location:       Preferred lab:       Send INR reminders to:       Comments:         Anticoagulation Care Providers     Provider Role Specialty Phone number    Ninfa Marcial M.D. Referring Internal Medicine [phone]    [facility] Responsible  [phone]        Anticoagulation Patient Findings  Patient Findings     Negatives:   Signs/symptoms of thrombosis, Signs/symptoms of bleeding, Laboratory test error suspected, Change in health, Change in alcohol use, Change in activity, Upcoming invasive procedure, Emergency department visit, Upcoming dental procedure, Missed doses, Extra doses, Change in medications, Change in diet/appetite, Hospital admission, Bruising, Other complaints          HPI:   Natalie Anaya seen in clinic today, on anticoagulation therapy with warfarin (a high risk medication) for hx of PE      Pt is here today to evaluate anticoagulation therapy    Previous INR was  3.2 on 12-30-19    Pt was instructed to lower x 1 then resume usual regimen    Confirmed warfarin dosing regimen, denies missed or extra doses of coumadin.   Diet has been consistent with foods rich in vitamin K: Yes  Changes in ETOH:  No  Changes in smoking status: No  Changes in medication: No   Cost restriction: No  S/s of bleeding:  No  Falls or  accidents since last visit No  Signs/symptoms  thrombosis since the last appt: No    A/P   INR  Supratherapeutic today, will require dose adjust ment today to prevent bleeding complications  and closer follow up.  Tonight 10 mg then lower weekly regimen.        11/20 check referral    Pt educated to contact our clinic with any changes in medications or s/s of bleeding or thrombosis. Pt is aware to seek immediate medical attention for falls, head injury or deep cuts    Follow up appointment in 2 week(s) to reduce risk of adverse events from warfarin   Moira Sheridan, PharmD

## 2020-02-03 ENCOUNTER — HOSPITAL ENCOUNTER (OUTPATIENT)
Dept: CARDIOLOGY | Facility: MEDICAL CENTER | Age: 80
End: 2020-02-03
Attending: INTERNAL MEDICINE
Payer: MEDICARE

## 2020-02-03 DIAGNOSIS — I27.21 SECONDARY PULMONARY ARTERIAL HYPERTENSION (HCC): ICD-10-CM

## 2020-02-03 DIAGNOSIS — I51.89 DIASTOLIC DYSFUNCTION: ICD-10-CM

## 2020-02-03 DIAGNOSIS — I26.99 RECURRENT PULMONARY EMBOLISM (HCC): ICD-10-CM

## 2020-02-03 DIAGNOSIS — J96.11 CHRONIC RESPIRATORY FAILURE WITH HYPOXIA (HCC): ICD-10-CM

## 2020-02-03 LAB
LV EJECT FRACT  99904: 70
LV EJECT FRACT MOD 2C 99903: 71.64
LV EJECT FRACT MOD 4C 99902: 71.36
LV EJECT FRACT MOD BP 99901: 71.88

## 2020-02-03 PROCEDURE — 93306 TTE W/DOPPLER COMPLETE: CPT | Mod: 26 | Performed by: INTERNAL MEDICINE

## 2020-02-03 PROCEDURE — 93306 TTE W/DOPPLER COMPLETE: CPT

## 2020-02-06 ENCOUNTER — TELEPHONE (OUTPATIENT)
Dept: MEDICAL GROUP | Facility: MEDICAL CENTER | Age: 80
End: 2020-02-06

## 2020-02-06 NOTE — TELEPHONE ENCOUNTER
1. Caller Name: Natalie Anaya                        Call Back Number: [phone] (home)         How would the patient prefer to be contacted with a response: Phone call OK to leave a detailed message    I spoke with Natalie  She saw results on My Chart last night.  We went over what Dr. Manrique reported on and confirmed her next appt in April

## 2020-02-06 NOTE — RESULT ENCOUNTER NOTE
Please notify Natalie of her echocardiogram results.  Overall things appear stable.  The pumping function in her heart is normal.  The heart is a little bit stiff, we call this diastolic dysfunction, and she is just slightly worse than what she was 2 and half years ago.  Also, the pressures in the lung and heart system have increased slightly from 60-65 in the past 2 and half years.  Both of these are treated by maintaining a normal blood pressure and a normal volume status, meaning not letting her retain too much fluid as both of these issues can increase stress on the heart.  Her diastolic dysfunction and elevated heart pressures are all likely due to her history of pulmonary embolism.  Let me know she has any follow-up questions for me. Thanks, MARI

## 2020-02-10 ENCOUNTER — ANTICOAGULATION VISIT (OUTPATIENT)
Dept: MEDICAL GROUP | Facility: MEDICAL CENTER | Age: 80
End: 2020-02-10
Payer: MEDICARE

## 2020-02-10 VITALS — DIASTOLIC BLOOD PRESSURE: 57 MMHG | SYSTOLIC BLOOD PRESSURE: 127 MMHG | HEART RATE: 64 BPM

## 2020-02-10 DIAGNOSIS — Z79.01 LONG TERM (CURRENT) USE OF ANTICOAGULANTS: Primary | ICD-10-CM

## 2020-02-10 DIAGNOSIS — Z79.01 CHRONIC ANTICOAGULATION: ICD-10-CM

## 2020-02-10 LAB — INR PPP: 2.4 (ref 2–3.5)

## 2020-02-10 PROCEDURE — 93793 ANTICOAG MGMT PT WARFARIN: CPT | Performed by: PHYSICIAN ASSISTANT

## 2020-02-10 PROCEDURE — 85610 PROTHROMBIN TIME: CPT | Performed by: PHYSICIAN ASSISTANT

## 2020-02-10 NOTE — PROGRESS NOTES
OP Anticoagulation Service Note    Date: 2/10/2020  Vitals:    02/10/20 1504   BP: 127/57   Pulse: 64      pt declined vitals    Anticoagulation Summary  As of 2/10/2020    INR goal:   2.0-3.0   TTR:   59.1 % (3 y)   INR used for dosin.40 (2/10/2020)   Warfarin maintenance plan:   10 mg (10 mg x 1) every Sun, Tue; 15 mg (10 mg x 1.5) all other days   Weekly warfarin total:   95 mg   Plan last modified:   Moira Sheridan, PharmD (2020)   Next INR check:   2020   Target end date:       Indications    Chronic anticoagulation [Z79.01]  Long term (current) use of anticoagulants [Z79.01] [Z79.01]             Anticoagulation Episode Summary     INR check location:       Preferred lab:       Send INR reminders to:       Comments:         Anticoagulation Care Providers     Provider Role Specialty Phone number    Ninfa Marcial M.D. Referring Internal Medicine [phone]    [facility] Responsible  [phone]        Anticoagulation Patient Findings      HPI:   Natalie Anaya seen in clinic today, on anticoagulation therapy with warfarin (a high risk medication) for hx of PE       Pt is here today to evaluate anticoagulation therapy    Previous INR was  3.6 on 20    Pt was instructed to 10 mg x 1 then resume usual regimen    Pt thinks her last INR may have been high from accidentally taking 15 mg daily    Confirmed warfarin dosing regimen, denies missed or extra doses of coumadin.   Diet has been consistent with foods rich in vitamin K: Yes  Changes in ETOH:  No  Changes in smoking status: No  Changes in medication: No   Cost restriction: No  S/s of bleeding:  No  Falls or accidents since last visit No  Signs/symptoms  thrombosis since the last appt: No    A/P   INR  therapeutic today,   Continue current warfarin regimen           check referral    Pt educated to contact our clinic with any changes in medications or s/s of bleeding or thrombosis. Pt is aware to seek  immediate medical attention for falls, head injury or deep cuts    Follow up appointment in 2 week(s) to reduce risk of adverse events from warfarin   Moira Sheridan, PharmD

## 2020-02-24 ENCOUNTER — ANTICOAGULATION VISIT (OUTPATIENT)
Dept: MEDICAL GROUP | Facility: MEDICAL CENTER | Age: 80
End: 2020-02-24
Payer: MEDICARE

## 2020-02-24 DIAGNOSIS — Z79.01 LONG TERM (CURRENT) USE OF ANTICOAGULANTS: ICD-10-CM

## 2020-02-24 DIAGNOSIS — Z79.01 CHRONIC ANTICOAGULATION: ICD-10-CM

## 2020-02-24 LAB — INR PPP: 2.5 (ref 2–3.5)

## 2020-02-24 PROCEDURE — 93793 ANTICOAG MGMT PT WARFARIN: CPT | Performed by: PHYSICIAN ASSISTANT

## 2020-02-24 PROCEDURE — 85610 PROTHROMBIN TIME: CPT | Performed by: PHYSICIAN ASSISTANT

## 2020-02-24 NOTE — PROGRESS NOTES
OP Anticoagulation Service Note    Date: 2020  There were no vitals filed for this visit.   pt declined vitals    Anticoagulation Summary  As of 2020    INR goal:   2.0-3.0   TTR:   59.6 % (3.1 y)   INR used for dosin.50 (2020)   Warfarin maintenance plan:   10 mg (10 mg x 1) every Sun, Tue; 15 mg (10 mg x 1.5) all other days   Weekly warfarin total:   95 mg   Plan last modified:   Moira Sheridan, PharmD (2020)   Next INR check:   3/23/2020   Target end date:   Indefinite    Indications    Chronic anticoagulation [Z79.01]  Long term (current) use of anticoagulants [Z79.01] [Z79.01]             Anticoagulation Episode Summary     INR check location:       Preferred lab:       Send INR reminders to:       Comments:         Anticoagulation Care Providers     Provider Role Specialty Phone number    Ninfa Marcial M.D. Referring Internal Medicine [phone]    [facility] Responsible  [phone]        Anticoagulation Patient Findings  Patient Findings     Negatives:   Signs/symptoms of thrombosis, Signs/symptoms of bleeding, Laboratory test error suspected, Change in health, Change in alcohol use, Change in activity, Upcoming invasive procedure, Emergency department visit, Upcoming dental procedure, Missed doses, Extra doses, Change in medications, Change in diet/appetite, Hospital admission, Bruising, Other complaints          HPI:   Natalie Tetegeorge Anaya seen in clinic today, on anticoagulation therapy with warfarin (a high risk medication) for hx of PE    Pt is here today to evaluate anticoagulation therapy    Previous INR was  2.4 on 2/10/2020    Pt was instructed to continue regimen    Confirmed warfarin dosing regimen, denies missed or extra doses of coumadin.   Diet has been consistent with foods rich in vitamin K: Yes  Changes in ETOH:  No  Changes in smoking status: No  Changes in medication: No   Cost restriction: No  S/s of bleeding:  No  Falls or accidents  since last visit No  Signs/symptoms  thrombosis since the last appt: No    A/P   INR  therapeutic today    Pt is to continue with current warfarin dosing regimen.     11/2020 check referral    Pt educated to contact our clinic with any changes in medications or s/s of bleeding or thrombosis. Pt is aware to seek immediate medical attention for falls, head injury or deep cuts    Follow up appointment in 4 week(s) to reduce risk of adverse events from warfarin   Neelam Marks, DarwinD

## 2020-03-23 ENCOUNTER — ANTICOAGULATION VISIT (OUTPATIENT)
Dept: MEDICAL GROUP | Facility: MEDICAL CENTER | Age: 80
End: 2020-03-23
Payer: MEDICARE

## 2020-03-23 DIAGNOSIS — Z79.01 CHRONIC ANTICOAGULATION: ICD-10-CM

## 2020-03-23 DIAGNOSIS — Z79.01 LONG TERM (CURRENT) USE OF ANTICOAGULANTS: Primary | ICD-10-CM

## 2020-03-23 LAB — INR PPP: 2.2 (ref 2–3.5)

## 2020-03-23 PROCEDURE — 93793 ANTICOAG MGMT PT WARFARIN: CPT | Performed by: INTERNAL MEDICINE

## 2020-03-23 PROCEDURE — 85610 PROTHROMBIN TIME: CPT | Performed by: INTERNAL MEDICINE

## 2020-03-23 NOTE — PROGRESS NOTES
OP Anticoagulation Service Note    Date: 3/23/2020  There were no vitals filed for this visit.   pt declined vitals    Anticoagulation Summary  As of 3/23/2020    INR goal:   2.0-3.0   TTR:   60.6 % (3.1 y)   INR used for dosin.20 (3/23/2020)   Warfarin maintenance plan:   10 mg (10 mg x 1) every Sun, Tue; 15 mg (10 mg x 1.5) all other days   Weekly warfarin total:   95 mg   Plan last modified:   Moira Sheridan, PharmD (2020)   Next INR check:   2020   Target end date:   Indefinite    Indications    Chronic anticoagulation [Z79.01]  Long term (current) use of anticoagulants [Z79.01] [Z79.01]             Anticoagulation Episode Summary     INR check location:       Preferred lab:       Send INR reminders to:       Comments:         Anticoagulation Care Providers     Provider Role Specialty Phone number    Ninfa Marcial M.D. Referring Internal Medicine [phone]    [facility]agulation [facility] Responsible  [phone]        Anticoagulation Patient Findings      HPI:   Natalie Anaya seen in clinic today, on anticoagulation therapy with warfarin (a high risk medication) for hx of PE       Pt is here today to evaluate anticoagulation therapy    Previous INR was  2.5 on 20    Pt was instructed to continue current regimen    Confirmed warfarin dosing regimen, denies missed or extra doses of coumadin.   Diet has been consistent with foods rich in vitamin K: Yes  Changes in ETOH:  No  Changes in smoking status: No  Changes in medication: No   Cost restriction: No  S/s of bleeding:  No  Falls or accidents since last visit No  Signs/symptoms  thrombosis since the last appt: No    A/P   INR  therapeutic today   Continue current warfarin regimen            Pt educated to contact our clinic with any changes in medications or s/s of bleeding or thrombosis. Pt is aware to seek immediate medical attention for falls, head injury or deep cuts    Follow up appointment in 6 week(s) to reduce risk  of adverse events from warfarin  Moira Sheridan, PharmD

## 2020-03-31 ENCOUNTER — TELEPHONE (OUTPATIENT)
Dept: HEALTH INFORMATION MANAGEMENT | Facility: OTHER | Age: 80
End: 2020-03-31

## 2020-03-31 NOTE — TELEPHONE ENCOUNTER
1. Caller Name: Natalie Anaya                        Call Back Number: [phone]  [facility] PCP or Specialty Provider: Yes         2.  Does patient have any active symptoms of respiratory illness (fever OR cough OR shortness of breath OR sore throat)? No. She says that she woke up on Saturday morning and one of her eyes was red.  She is on blood thinners and thinks it may be related.  She denies having any other symptoms and denies itchiness, pain, or discharge coming from the eye.     3.  Does patient have any comoribidities? None     4.  Has the patient traveled in the last 14 days OR had any known contact with someone who is suspected or confirmed to have COVID-19?  No.    5. Disposition: Cleared by RN Triage; OK to keep/schedule appointment    Note routed to [facility] Provider: CASEY only.

## 2020-04-01 ENCOUNTER — OFFICE VISIT (OUTPATIENT)
Dept: PULMONOLOGY | Facility: HOSPICE | Age: 80
End: 2020-04-01
Payer: MEDICARE

## 2020-04-01 ENCOUNTER — APPOINTMENT (OUTPATIENT)
Dept: PULMONOLOGY | Facility: HOSPICE | Age: 80
End: 2020-04-01
Payer: MEDICARE

## 2020-04-01 VITALS
BODY MASS INDEX: 35.17 KG/M2 | OXYGEN SATURATION: 97 % | WEIGHT: 206 LBS | TEMPERATURE: 98.2 F | HEIGHT: 64 IN | HEART RATE: 62 BPM | DIASTOLIC BLOOD PRESSURE: 80 MMHG | RESPIRATION RATE: 16 BRPM | SYSTOLIC BLOOD PRESSURE: 130 MMHG

## 2020-04-01 DIAGNOSIS — Z79.01 LONG TERM (CURRENT) USE OF ANTICOAGULANTS: ICD-10-CM

## 2020-04-01 DIAGNOSIS — Z79.01 CHRONIC ANTICOAGULATION: ICD-10-CM

## 2020-04-01 DIAGNOSIS — R06.09 DYSPNEA ON EXERTION: ICD-10-CM

## 2020-04-01 DIAGNOSIS — M80.00XD AGE-RELATED OSTEOPOROSIS WITH CURRENT PATHOLOGICAL FRACTURE WITH ROUTINE HEALING: ICD-10-CM

## 2020-04-01 DIAGNOSIS — Z86.711 PERSONAL HISTORY OF PULMONARY EMBOLISM: ICD-10-CM

## 2020-04-01 DIAGNOSIS — J96.11 CHRONIC RESPIRATORY FAILURE WITH HYPOXIA (HCC): ICD-10-CM

## 2020-04-01 PROCEDURE — 99214 OFFICE O/P EST MOD 30 MIN: CPT | Performed by: INTERNAL MEDICINE

## 2020-04-01 ASSESSMENT — PAIN SCALES - GENERAL: PAINLEVEL: 2=MINIMAL-SLIGHT

## 2020-04-01 ASSESSMENT — FIBROSIS 4 INDEX: FIB4 SCORE: 1.27

## 2020-04-01 NOTE — PATIENT INSTRUCTIONS
Natalie comes in today to follow-up on her pulmonary hypertension and recurrent pulmonary emboli.  She is on lifetime anticoagulation with some easy bruising but not significant, no significant bleeding, tolerates it fairly well.  She has had prior episodes of recurrent emboli, and as a result has secondary pulmonary hypertension.  I reviewed her recent echocardiogram, it does show stability, although the right ventricular systolic pressures are elevated they are unchanged compared to 2017 and the left heart function is excellent.    Sleep apnea was treated briefly with CPAP but she cannot tolerate the mask is absolutely unable to wear CPAP and I put an order in today to discontinue that.  She will continue on nighttime oxygen at 2 L with previously documented nighttime hypoxemia.    Health maintenance includes vaccinations current, body mass index at 35 she addresses with portion control is much as possible but her mobility is limited, she fractured her hip in June of last year required rehab, still utilizes a cane and cannot do any significant exercise.    She is , her  of 63 years is waiting politely in the car as the current virus situation is such that we are asking families to limit visits.  She is very understanding of  about this.  Currently status is stable, vaccinations are current, anticoagulation continues and this is monitored at the clinic.

## 2020-04-01 NOTE — PROGRESS NOTES
Natalie Anaya is a 79 y.o. female here for sleep apnea previously on CPAP, also pulmonary hypertension with recurrent pulmonary emboli, chronically anticoagulated. Patient was referred by her primary care.    History of Present Illness:      Natalie comes in today to follow-up on her pulmonary hypertension and recurrent pulmonary emboli.  She is on lifetime anticoagulation with some easy bruising but not significant, no significant bleeding, tolerates it fairly well.  She has had prior episodes of recurrent emboli, and as a result has secondary pulmonary hypertension.  I reviewed her recent echocardiogram, it does show stability, although the right ventricular systolic pressures are elevated they are unchanged compared to 2017 and the left heart function is excellent.    Sleep apnea was treated briefly with CPAP but she cannot tolerate the mask is absolutely unable to wear CPAP and I put an order in today to discontinue that.  She will continue on nighttime oxygen at 2 L with previously documented nighttime hypoxemia.    Health maintenance includes vaccinations current, body mass index at 35 she addresses with portion control is much as possible but her mobility is limited, she fractured her hip in June of last year required rehab, still utilizes a cane and cannot do any significant exercise.    She is , her  of 63 years is waiting politely in the car as the current virus situation is such that we are asking families to limit visits.  She is very understanding of  about this.  Currently status is stable, vaccinations are current, anticoagulation continues and this is monitored at the clinic.    Constitutional ROS: No unexpected change in weight, No unexplained fevers  Eyes: No change in vision or blurring or double vision  Mouth/Throat ROS: No sore throat, No recent change in voice or hoarseness  Pulmonary ROS: See present history for pertinent positives  Cardiovascular ROS: No chest pain to  suggest acute coronary syndrome  Gastrointestinal ROS: No abdominal pain to suggest peptic disease  Musculoskeletal/Extremities ROS: no acute artritis or unusual swelling  Hematologic/Lymphatic ROS: No easy bleeding or unusual lymph node swelling  Neurologic ROS: No new or unusual weakness  Psychiatric ROS: No hallucinations  Allergic/Immunologic: No  urticaria or allergic rash      Current Outpatient Medications   Medication Sig Dispense Refill   • primidone (MYSOLINE) 250 MG Tab TAKE 1 TABLET BY MOUTH THREE TIMES DAILY 270 Tab 3   • warfarin (COUMADIN) 10 MG Tab Take 1.5 Tabs by mouth every evening. Indications: Blockage of Blood Vessel to Lung by a Particle 135 Tab 1   • potassium chloride SA (KDUR) 20 MEQ Tab CR Take 1 Tab by mouth every day. 100 Tab 3   • furosemide (LASIX) 40 MG Tab Take 1 Tab by mouth every day. 90 Tab 3   • carvedilol (COREG) 6.25 MG Tab TAKE 1 TABLET BY MOUTH TWICE DAILY 200 Tab 3   • ferrous sulfate (FEROSUL) 325 (65 Fe) MG tablet Take 1 Tab by mouth every day. 90 Tab 3   • levothyroxine (SYNTHROID) 100 MCG Tab TAKE 1 TABLET BY MOUTH EVERY DAY 90 Tab 3   • alendronate (FOSAMAX) 70 MG Tab TAKE 1 TABLET BY MOUTH EVERY 7 DAYS 12 Tab 3   • omeprazole (PRILOSEC) 20 MG delayed-release capsule Take 20 mg by mouth every evening.     • Melatonin 10 MG Tab Take 15 mg by mouth every bedtime.     • Probiotic Product (PROBIOTIC DAILY PO) Take 1 Cap by mouth every day.     • Glucos-Chond-Hyal Ac-Ca Fructo (MOVE FREE [facility] HEALTH ADVANCE) Tab Take 1 Tab by mouth every day.     • Multiple Vitamins-Minerals (WOMENS MULTI VITAMIN & MINERAL) Tab Take 1 Tab by mouth every day.     • Cholecalciferol (VITAMIN D-3) 1000 UNITS Cap Take 1,000 Units by mouth every day.     • Home Care Oxygen Inhale 2 L/min by mouth every bedtime.       No current facility-administered medications for this visit.        Social History     Tobacco Use   • Smoking status: Never Smoker   • Smokeless tobacco: Never Used   Substance Use  Topics   • Alcohol use: No   • Drug use: No        Past Medical History:   Diagnosis Date   • Arthritis     Knees.   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   • Ringing in ears    • Seizure (HCC) 1982   • Sore muscles        Past Surgical History:   Procedure Laterality Date   • PB OPEN FIX INTER/SUBTROCH FX,IMPLNT Right 6/13/2019    Procedure: INSERTION, INTRAMEDULLARY MENDY, FEMUR, PROXIMAL;  Surgeon: Maximo Garnica M.D.;  Location: [facility];  Service: Orthopedics   • IRRIGATION & DEBRIDEMENT HIP Left 11/17/2016    Procedure: IRRIGATION & DEBRIDEMENT HIP;  Surgeon: Maximo Garnica M.D.;  Location: [facility];  Service:    • HIP NAILING INTRAMEDULLARY Left 10/6/2016    Procedure: HIP NAILING INTRAMEDULLARY;  Surgeon: Walt Nguyen M.D.;  Location: [facility];  Service:    • GASTROSCOPY-ENDO N/A 4/29/2016    Procedure: GASTROSCOPY-ENDO;  Surgeon: Mikey Stanton M.D.;  Location: [facility];  Service:    • COLONOSCOPY-FLEXIBLE N/A 4/29/2016    Procedure: COLONOSCOPY-FLEXIBLE;  Surgeon: Mikey Stanton M.D.;  Location: [facility];  Service:    • CATARACT PHACO WITH IOL  8/5/2014    Performed by Lorenzo Bello M.D. at [facility]   • CATARACT PHACO WITH IOL  7/15/2014    Performed by Lorenzo Bello M.D. at [facility]   • KNEE ARTHROPLASTY TOTAL  7/9/2012    Performed by DENIZ FREDERICK at [facility]   • JOSE BY LAPAROSCOPY  9/30/2011    Performed by JOHNATHAN CRISOSTOMO at [facility]   • ARTHROSCOPY, KNEE     • CARPAL TUNNEL RELEASE     • PB REMV 2ND  "CATARACT,CORN-SCLER SECTN     • TONSILLECTOMY     • TUBAL LIGATION         Allergies: Tape    Family History   Problem Relation Age of Onset   • Cancer Mother 67        Ovarian   • Alcohol/Drug Father 60        Appenditis   • Heart Disease Father    • Cancer Maternal Grandmother         colon cancer   • Heart Disease Maternal Grandfather    • Cancer Daughter 44        melonoma       Physical Examination    Vitals:    04/01/20 1251   Height: 1.626 m (5' 4\")   Weight: 93.4 kg (206 lb)   Weight % change since last entry.: 0 %   BP: 130/80   Pulse: 62   BMI (Calculated): 35.36   Resp: 16   Temp: 36.8 °C (98.2 °F)   TempSrc: Oral       General Appearance: alert, no distress  Skin: Skin color, texture, turgor normal. No rashes or lesions.  Eyes: negative  Oropharynx: Lips, mucosa, and tongue normal. Teeth and gums normal. Oropharynx moist and without lesion  Lungs: positive findings: Quiet and clear  Heart: negative. RRR without murmur, gallop, or rubs.  No ectopy.  Abdomen: Abdomen soft, non-tender. . No masses,  No organomegaly  Extremities:  No deformities, does have chronic pretibial edema, with skin discoloration  Joints: No acute arthritis  Peripheral Pulses:perfused  Neurologic: intact grossly  No peripheral clubbing or cyanosis    II (soft palate, uvula, fauces visible)    Imaging: None today    PFTS: Reconsider next visit      Assessment and Plan  1. Personal history of pulmonary embolism  Lifetime anticoagulation    2. Age-related osteoporosis with current pathological fracture with routine healing  Also prone to fractures, R hip fracture June last year    3. Chronic anticoagulation  Noted    4. Chronic respiratory failure with hypoxia (HCC)  Nighttime oxygen  - DME Other    5. Long term (current) use of anticoagulants [Z79.01]    6. BMI 39.0-39.9,adult  Patient's body mass index is 35.36 kg/m². Exercise and nutrition counseling were performed at this visit.    7. Dyspnea on exertion  mild    See discussion " above regarding stability of pulmonary hypertension, recent echo is encouraging, reassess in periodic intervals  Followup Return in about 6 months (around 10/1/2020) for follow up visit with Dr. Isabella Irvin.

## 2020-05-04 ENCOUNTER — ANTICOAGULATION VISIT (OUTPATIENT)
Dept: MEDICAL GROUP | Facility: MEDICAL CENTER | Age: 80
End: 2020-05-04
Payer: MEDICARE

## 2020-05-04 DIAGNOSIS — Z79.01 LONG TERM (CURRENT) USE OF ANTICOAGULANTS: Primary | ICD-10-CM

## 2020-05-04 DIAGNOSIS — Z79.01 CHRONIC ANTICOAGULATION: ICD-10-CM

## 2020-05-04 LAB — INR PPP: 1.6 (ref 2–3.5)

## 2020-05-04 PROCEDURE — 85610 PROTHROMBIN TIME: CPT | Performed by: INTERNAL MEDICINE

## 2020-05-04 PROCEDURE — 99211 OFF/OP EST MAY X REQ PHY/QHP: CPT | Performed by: INTERNAL MEDICINE

## 2020-05-04 NOTE — PROGRESS NOTES
OP Anticoagulation Service Note    Date: 2020  There were no vitals filed for this visit.   pt declined vitals    Anticoagulation Summary  As of 2020    INR goal:   2.0-3.0   TTR:   59.6 % (3.3 y)   INR used for dosin.60! (2020)   Warfarin maintenance plan:   10 mg (10 mg x 1) every Sun, Tue; 15 mg (10 mg x 1.5) all other days   Weekly warfarin total:   95 mg   Plan last modified:   Moira Sheridan, PharmD (2020)   Next INR check:   2020   Target end date:   Indefinite    Indications    Chronic anticoagulation [Z79.01]  Long term (current) use of anticoagulants [Z79.01] [Z79.01]             Anticoagulation Episode Summary     INR check location:       Preferred lab:       Send INR reminders to:       Comments:         Anticoagulation Care Providers     Provider Role Specialty Phone number    Ninfa Marcial M.D. Referring Internal Medicine [phone]    [facility] Anticoagulation Services Responsible  [phone]        Anticoagulation Patient Findings      HPI:   Natalie Anaya seen in clinic today, on anticoagulation therapy with warfarin (a high risk medication) for hx of PE       Pt is here today to evaluate anticoagulation therapy    Previous INR was  2.2 on 20    Pt was instructed to continue current regimen    She mixed up her dose and took 5 mg on Sun/Tue then 15 mg ROW  Diet has been consistent with foods rich in vitamin K: Yes  Changes in ETOH:  No  Changes in smoking status: No  Changes in medication: No   Cost restriction: No  S/s of bleeding:  No  Falls or accidents since last visit No  Signs/symptoms  thrombosis since the last appt: No    A/P   INR  SUB-therapeutic today, will require dose adjust ment today to prevent recurrence of thrombosis  and closer follow up.   Tonight 20 mg then Continue current warfarin regimen        Pt educated to contact our clinic with any changes in medications or s/s of bleeding or thrombosis. Pt is aware to seek immediate medical  attention for falls, head injury or deep cuts    Follow up appointment in 2 week(s) to reduce risk of adverse events from warfarin   Moira Sheridan, PharmD

## 2020-05-13 ENCOUNTER — HOSPITAL ENCOUNTER (OUTPATIENT)
Dept: LAB | Facility: MEDICAL CENTER | Age: 80
End: 2020-05-13
Attending: INTERNAL MEDICINE
Payer: MEDICARE

## 2020-05-13 DIAGNOSIS — I10 HTN (HYPERTENSION), BENIGN: ICD-10-CM

## 2020-05-13 DIAGNOSIS — D50.8 IRON DEFICIENCY ANEMIA SECONDARY TO INADEQUATE DIETARY IRON INTAKE: ICD-10-CM

## 2020-05-13 DIAGNOSIS — I51.89 DIASTOLIC DYSFUNCTION: ICD-10-CM

## 2020-05-13 DIAGNOSIS — E03.9 ACQUIRED HYPOTHYROIDISM: ICD-10-CM

## 2020-05-13 LAB
ALBUMIN SERPL BCP-MCNC: 4.1 G/DL (ref 3.2–4.9)
ALBUMIN/GLOB SERPL: 1.5 G/DL
ALP SERPL-CCNC: 78 U/L (ref 30–99)
ALT SERPL-CCNC: 18 U/L (ref 2–50)
ANION GAP SERPL CALC-SCNC: 10 MMOL/L (ref 7–16)
AST SERPL-CCNC: 18 U/L (ref 12–45)
BASOPHILS # BLD AUTO: 0.5 % (ref 0–1.8)
BASOPHILS # BLD: 0.03 K/UL (ref 0–0.12)
BILIRUB SERPL-MCNC: 0.3 MG/DL (ref 0.1–1.5)
BUN SERPL-MCNC: 27 MG/DL (ref 8–22)
CALCIUM SERPL-MCNC: 9.6 MG/DL (ref 8.5–10.5)
CHLORIDE SERPL-SCNC: 100 MMOL/L (ref 96–112)
CO2 SERPL-SCNC: 32 MMOL/L (ref 20–33)
CREAT SERPL-MCNC: 0.53 MG/DL (ref 0.5–1.4)
EOSINOPHIL # BLD AUTO: 0.06 K/UL (ref 0–0.51)
EOSINOPHIL NFR BLD: 1 % (ref 0–6.9)
ERYTHROCYTE [DISTWIDTH] IN BLOOD BY AUTOMATED COUNT: 47.2 FL (ref 35.9–50)
FASTING STATUS PATIENT QL REPORTED: NORMAL
FERRITIN SERPL-MCNC: 54.7 NG/ML (ref 10–291)
GLOBULIN SER CALC-MCNC: 2.7 G/DL (ref 1.9–3.5)
GLUCOSE SERPL-MCNC: 95 MG/DL (ref 65–99)
HCT VFR BLD AUTO: 44 % (ref 37–47)
HGB BLD-MCNC: 14.6 G/DL (ref 12–16)
IMM GRANULOCYTES # BLD AUTO: 0.01 K/UL (ref 0–0.11)
IMM GRANULOCYTES NFR BLD AUTO: 0.2 % (ref 0–0.9)
IRON SATN MFR SERPL: 79 % (ref 15–55)
IRON SERPL-MCNC: 179 UG/DL (ref 40–170)
LYMPHOCYTES # BLD AUTO: 1.09 K/UL (ref 1–4.8)
LYMPHOCYTES NFR BLD: 18.9 % (ref 22–41)
MCH RBC QN AUTO: 33.6 PG (ref 27–33)
MCHC RBC AUTO-ENTMCNC: 33.2 G/DL (ref 33.6–35)
MCV RBC AUTO: 101.1 FL (ref 81.4–97.8)
MONOCYTES # BLD AUTO: 0.37 K/UL (ref 0–0.85)
MONOCYTES NFR BLD AUTO: 6.4 % (ref 0–13.4)
NEUTROPHILS # BLD AUTO: 4.22 K/UL (ref 2–7.15)
NEUTROPHILS NFR BLD: 73 % (ref 44–72)
NRBC # BLD AUTO: 0 K/UL
NRBC BLD-RTO: 0 /100 WBC
PLATELET # BLD AUTO: 249 K/UL (ref 164–446)
PMV BLD AUTO: 9.2 FL (ref 9–12.9)
POTASSIUM SERPL-SCNC: 4 MMOL/L (ref 3.6–5.5)
PROT SERPL-MCNC: 6.8 G/DL (ref 6–8.2)
RBC # BLD AUTO: 4.35 M/UL (ref 4.2–5.4)
SODIUM SERPL-SCNC: 142 MMOL/L (ref 135–145)
TIBC SERPL-MCNC: 226 UG/DL (ref 250–450)
UIBC SERPL-MCNC: 47 UG/DL (ref 110–370)
WBC # BLD AUTO: 5.8 K/UL (ref 4.8–10.8)

## 2020-05-13 PROCEDURE — 83550 IRON BINDING TEST: CPT

## 2020-05-13 PROCEDURE — 85025 COMPLETE CBC W/AUTO DIFF WBC: CPT

## 2020-05-13 PROCEDURE — 82728 ASSAY OF FERRITIN: CPT

## 2020-05-13 PROCEDURE — 80053 COMPREHEN METABOLIC PANEL: CPT

## 2020-05-13 PROCEDURE — 36415 COLL VENOUS BLD VENIPUNCTURE: CPT

## 2020-05-13 PROCEDURE — 83540 ASSAY OF IRON: CPT

## 2020-05-18 ENCOUNTER — ANTICOAGULATION VISIT (OUTPATIENT)
Dept: MEDICAL GROUP | Facility: MEDICAL CENTER | Age: 80
End: 2020-05-18
Payer: MEDICARE

## 2020-05-18 DIAGNOSIS — Z79.01 CHRONIC ANTICOAGULATION: ICD-10-CM

## 2020-05-18 DIAGNOSIS — Z79.01 LONG TERM (CURRENT) USE OF ANTICOAGULANTS: Primary | ICD-10-CM

## 2020-05-18 LAB — INR PPP: 2.4 (ref 2–3.5)

## 2020-05-18 PROCEDURE — 85610 PROTHROMBIN TIME: CPT | Performed by: INTERNAL MEDICINE

## 2020-05-18 PROCEDURE — 99211 OFF/OP EST MAY X REQ PHY/QHP: CPT | Performed by: INTERNAL MEDICINE

## 2020-05-18 NOTE — PROGRESS NOTES
OP Anticoagulation Service Note    Date: 5/18/2020  There were no vitals filed for this visit.  pt declined vitals    Anticoagulation Summary  As of 5/18/2020    INR goal:   2.0-3.0   TTR:   59.6 % (3.3 y)   INR used for dosing:      Plan last modified:   Moira Sheridan PharmD (1/27/2020)   Next INR check:      Target end date:   Indefinite    Indications    Chronic anticoagulation [Z79.01]  Long term (current) use of anticoagulants [Z79.01] [Z79.01]             Anticoagulation Episode Summary     INR check location:       Preferred lab:       Send INR reminders to:       Comments:         Anticoagulation Care Providers     Provider Role Specialty Phone number    Ninfa Marcial M.D. Referring Internal Medicine [phone]    [facility] Anticoagulation Services Responsible  [phone]        Anticoagulation Patient Findings      HPI:   Natalie Epstein Héctor seen in clinic today, on anticoagulation therapy with warfarin (a high risk medication) for hx of PE      Pt is here today to evaluate anticoagulation therapy    Previous INR was 1.6 on 5/4/2020    Pt was instructed to take 20 mg on 5/4; otherwise 10 mg every Sun, Tue; 15 mg all other days    Confirmed warfarin dosing regimen, denies missed or extra doses of coumadin.   Diet has been consistent with foods rich in vitamin K: No  Changes in ETOH:  No  Changes in smoking status: No  Changes in medication: No   Cost restriction: No  S/s of bleeding:  No  Falls or accidents since last visit No  Signs/symptoms  thrombosis since the last appt: No    A/P   INR  therapeutic today. Instructed patient to continue current regimen of 10 mg every Sun, Tue; 15 mg all other days.     Pt educated to contact our clinic with any changes in medications or s/s of bleeding or thrombosis. Pt is aware to seek immediate medical attention for falls, head injury or deep cuts    Follow up appointment in 4 week(s).    Tristen Morejon, DarwinD

## 2020-05-19 ENCOUNTER — OFFICE VISIT (OUTPATIENT)
Dept: MEDICAL GROUP | Facility: MEDICAL CENTER | Age: 80
End: 2020-05-19
Payer: MEDICARE

## 2020-05-19 VITALS
DIASTOLIC BLOOD PRESSURE: 60 MMHG | HEIGHT: 64 IN | OXYGEN SATURATION: 94 % | WEIGHT: 205.47 LBS | TEMPERATURE: 97.8 F | SYSTOLIC BLOOD PRESSURE: 108 MMHG | HEART RATE: 74 BPM | BODY MASS INDEX: 35.08 KG/M2

## 2020-05-19 DIAGNOSIS — G47.33 OBSTRUCTIVE SLEEP APNEA SYNDROME: ICD-10-CM

## 2020-05-19 DIAGNOSIS — J96.11 CHRONIC RESPIRATORY FAILURE WITH HYPOXIA (HCC): ICD-10-CM

## 2020-05-19 DIAGNOSIS — D50.8 IRON DEFICIENCY ANEMIA SECONDARY TO INADEQUATE DIETARY IRON INTAKE: ICD-10-CM

## 2020-05-19 DIAGNOSIS — D50.9 IRON DEFICIENCY ANEMIA, UNSPECIFIED IRON DEFICIENCY ANEMIA TYPE: ICD-10-CM

## 2020-05-19 DIAGNOSIS — I10 HTN (HYPERTENSION), BENIGN: ICD-10-CM

## 2020-05-19 DIAGNOSIS — I27.21 SECONDARY PULMONARY ARTERIAL HYPERTENSION (HCC): ICD-10-CM

## 2020-05-19 PROCEDURE — 99214 OFFICE O/P EST MOD 30 MIN: CPT | Performed by: INTERNAL MEDICINE

## 2020-05-19 RX ORDER — FERROUS SULFATE 325(65) MG
325 TABLET ORAL
Qty: 90 TAB | Refills: 3 | Status: SHIPPED | OUTPATIENT
Start: 2020-05-19 | End: 2021-06-21

## 2020-05-19 ASSESSMENT — FIBROSIS 4 INDEX: FIB4 SCORE: 1.38

## 2020-05-19 NOTE — PATIENT INSTRUCTIONS
Stop the carvedilol/Coreg, this mainly affects your heart rate, but also your blood pressure a bit. Check your blood pressure once daily at different times of the day for the next 1-2 weeks and as long as your average BP remains <140/80 then you can stop this medicine altogether.     You can reduce iron to every other day or 3 times per week as your levels are sufficient at this time and you will likely not absorb the full dose you are taking at this time.

## 2020-05-20 NOTE — ASSESSMENT & PLAN NOTE
Chronic and ongoing issue.  She is intolerant of CPAP and continues with nocturnal oxygen 2 L via nasal cannula.  We will recheck overnight oximetry to see if she is still experiencing hypoxia despite the oxygen.

## 2020-05-20 NOTE — ASSESSMENT & PLAN NOTE
Chronic ongoing problem.  Continue 2 L supplemental oxygen nocturnally and we will update her nocturnal pulse oximetry to see if this is an appropriate amount for her to use.

## 2020-05-20 NOTE — ASSESSMENT & PLAN NOTE
Chronic problem that requires medication adjustment.  We will stop her carvedilol and continue with monotherapy with furosemide 40 mg daily.  Blood pressure is perhaps overtreated and I do not want cause orthostasis due to advanced age.

## 2020-05-20 NOTE — ASSESSMENT & PLAN NOTE
Previous problem, improved.  Her ferritin and iron studies have essentially normalized.  She no longer requires daily oral iron and I advised her to cut down to every other day or 3 times weekly dosing of the iron.  Fortunately, she is not experience any constipation.  Unclear what drove this issue for her as she had negative upper and lower endoscopy.  Perhaps she had a small intestinal bleed would have only been captured by pill endoscopy.

## 2020-05-20 NOTE — ASSESSMENT & PLAN NOTE
Chronic and ongoing problem.  Complicates care.  Unlikely to reverse her pulmonary hypertension as it is secondary to previous pulmonary emboli and untreated obstructive sleep apnea.  She would be curious as to whether she requires ongoing nocturnal oxygen and it would be reasonable to repeat an overnight pulse oximetry to see if her oxygen levels are adequately treated on supplemental O2.  She is adamant that she cannot go back on the CPAP mask if she does not tolerate it.  We will repeat her overnight pulse oximetry.

## 2020-05-20 NOTE — PROGRESS NOTES
Subjective:   Chief Complaint/History of Present Illness:  Natalie Anaya is a 79 y.o. female established patient who presents today to discuss medical problems as listed below. She is accompanied by her , Darryn.    Problem   Htn (Hypertension), Benign    Echocardiogram (2/2020):  Compared to the images of the prior study done 7/27/17 -  there has been no significant change.   Normal left ventricular systolic function, LVEF 70%. Grade II DD. The right ventricle was normal in size and function. Mild MR. Moderate TR. Estimated RVSP 65 mmHg.    Current regimen includes furosemide 40 mg daily and carvedilol 6.25 mg twice daily.  She has had some blood pressure readings that have dipped as low as 100/60.  There is no clear indication for her to be on carvedilol and thus I think would be reasonable for her to stop this.  Due to her pulmonary hypertension it would be appropriate to continue on the furosemide.       Chronic Respiratory Failure With Hypoxia (Hcc)    Patient uses 2 L of supplemental oxygen at night when she is sleeping.  She reports that this is now in lieu of a CPAP as she is stop using this device for approximately 6 to 7 months (since her hip fracture and recovery in July 2019).  She denies use of supplemental oxygen during the daytime or on exertion.     Obstructive sleep apnea syndrome- intolerant to CPAP    Per sleep medicine:   Home sleep study October 2017 indicates severe obstructive sleep apnea with an POLO of 39, minimum oxygen saturation of 79 %. She is using with CPAP 12 cm H2O with 2 L O2 bleed.  Compliance download over the past 30 days indicates 73 % compliance, average use of 5 hours 15 minutes per night, AHI of 1.6. Patient reports she is gone back to using just nasal pillows without chinstrap.  Unfortunately for she finds no benefit with CPAP but understands the importance of use.  She uses melatonin for insomnia and is able to sleep well despite CPAP use.  She tends to take  her machine off in the early morning hours and does not but this back on.  She denies a.m. headache or excessive daytime fatigue.    In discussion with the patient in January 2020 she admits that she stopped using her CPAP in approximately July 2019 and does not plan to use it again in the future.  She does wear supplemental oxygen via nasal cannula approximately 2 L nocturnally.  No oxygen requirements during the day.  She has not had follow-up nocturnal pulse oximetry since stopping her CPAP.     Secondary Pulmonary Arterial Hypertension (Hcc)    Echocardiogram (7/2017):  Estimated right ventricular systolic pressure is 60 mmHg. Compared to the report of the study done - there has been improvement in RV size and function and a slight decrease in RVSP and TR.     Echocardiogram (2/2020):  Normal LVEF 70%. Grade II diastolic dysfunction. Mild MR, moderate TR. Estimated RVSP is 65 mmHg.    She follows with pulmonary medicine, Dr. Irvin.  She is on Lasix 40 mg daily with potassium chloride 20 mEq daily.  Blood pressure is overtreated if anything running low 100s over 60s today.       Iron Deficiency Anemia       Ref. Range 7/27/2018  10/31/2018  5/13/2020   Iron Latest Ref Range: 40 - 170 ug/dL 30 (L) 125 179   Total Iron Binding Latest Ref Range: 250 - 450 ug/dL 357 262 226   % Saturation Latest Ref Range: 15 - 55 % 8 (L) 48 79      Ref. Range 7/27/2018  10/31/2018  5/13/2020   Ferritin Latest Ref Range: 10.0 - 291.0 ng/mL 8.5 (L) 20.2 54.7       She has had work-up in the past including upper and lower endoscopy with GI consultants (Dr. Stanton) in April 2016.  Biopsy demonstrated mild chronic inflammation and reactive epithelial cells in the gastric antrum with a benign hyperplastic polyp and negative H. Pylori.  Biopsy also negative for celiac sprue.  Colonoscopy was benign with negative pathology on ascending colon polyp. Patient did not do capsule endoscopy at that time.     It appears that her hemoglobin levels  improved with time.  She has been on and off iron supplements since that time.  She is currently taking an iron supplement daily.  She denies any constipation.          Additional History:   Allergies:    Tape     Current Medications:     Current Outpatient Medications   Medication Sig Dispense Refill   • omeprazole (PRILOSEC) 20 MG delayed-release capsule TAKE 1 CAPSULE BY MOUTH EVERY DAY 90 Cap 3   • primidone (MYSOLINE) 250 MG Tab TAKE 1 TABLET BY MOUTH THREE TIMES DAILY 270 Tab 3   • warfarin (COUMADIN) 10 MG Tab Take 1.5 Tabs by mouth every evening. Indications: Blockage of Blood Vessel to Lung by a Particle 135 Tab 1   • potassium chloride SA (KDUR) 20 MEQ Tab CR Take 1 Tab by mouth every day. 100 Tab 3   • furosemide (LASIX) 40 MG Tab Take 1 Tab by mouth every day. 90 Tab 3   • ferrous sulfate (FEROSUL) 325 (65 Fe) MG tablet Take 1 Tab by mouth every day. 90 Tab 3   • levothyroxine (SYNTHROID) 100 MCG Tab TAKE 1 TABLET BY MOUTH EVERY DAY 90 Tab 3   • Home Care Oxygen Inhale 2 L/min by mouth every bedtime.     • alendronate (FOSAMAX) 70 MG Tab TAKE 1 TABLET BY MOUTH EVERY 7 DAYS 12 Tab 3   • Melatonin 10 MG Tab Take 15 mg by mouth every bedtime.     • Probiotic Product (PROBIOTIC DAILY PO) Take 1 Cap by mouth every day.     • Glucos-Chond-Hyal Ac-Ca Fructo (MOVE FREE [facility] ADVANCE) Tab Take 1 Tab by mouth every day.     • Multiple Vitamins-Minerals (WOMENS MULTI VITAMIN & MINERAL) Tab Take 1 Tab by mouth every day.     • Cholecalciferol (VITAMIN D-3) 1000 UNITS Cap Take 1,000 Units by mouth every day.       No current facility-administered medications for this visit.         Social History:     Social History     Tobacco Use   • Smoking status: Never Smoker   • Smokeless tobacco: Never Used   Substance Use Topics   • Alcohol use: No   • Drug use: No       ROS: As above in the HPI      Objective:   Physical Exam:    Vitals: /60 (BP Location: Left arm, Patient Position: Sitting, BP Cuff Size: Adult)  "  Pulse 74   Temp 36.6 °C (97.8 °F) (Temporal)   Ht 1.626 m (5' 4\")   Wt 93.2 kg (205 lb 7.5 oz)   SpO2 94%    BMI: Body mass index is 35.27 kg/m².   General/Constitutional: Vitals as above, Well nourished, well developed female in no acute distress   Head/Eyes: Head is grossly normal & atraumatic, bilateral conjunctivae clear and not injected, bilateral EOMI, bilateral PERRLA   ENT: Bilateral external ears grossly normal in appearance, Hearing grossly intact, External nares normal in appearance and without discharge/bleeding   Respiratory: No respiratory distress, bilateral lungs are clear to ausculation in all lung fields, no wheezing/rhonchi/rales   Cardiovascular: Regular rate and rhythm without murmur/rubs, distal pulses are intact and equal bilaterally (radial), 1+ bilateral lower extremity edema, pretibial.   MSK: Antalgic gait, she ambulates with a single-point cane secondary to right knee pain.   Integumentary: No apparent rashes on skin exposed areas.   Psych: Judgment grossly appropriate, no apparent depression/anxiety    Health Maintenance:     - Completed      Assessment and Plan:     Problem List Items Addressed This Visit     Iron deficiency anemia     Previous problem, improved.  Her ferritin and iron studies have essentially normalized.  She no longer requires daily oral iron and I advised her to cut down to every other day or 3 times weekly dosing of the iron.  Fortunately, she is not experience any constipation.  Unclear what drove this issue for her as she had negative upper and lower endoscopy.  Perhaps she had a small intestinal bleed would have only been captured by pill endoscopy.         Secondary pulmonary arterial hypertension (HCC)     Chronic and ongoing problem.  Complicates care.  Unlikely to reverse her pulmonary hypertension as it is secondary to previous pulmonary emboli and untreated obstructive sleep apnea.  She would be curious as to whether she requires ongoing nocturnal " oxygen and it would be reasonable to repeat an overnight pulse oximetry to see if her oxygen levels are adequately treated on supplemental O2.  She is adamant that she cannot go back on the CPAP mask if she does not tolerate it.  We will repeat her overnight pulse oximetry.         Obstructive sleep apnea syndrome- intolerant to CPAP     Chronic and ongoing issue.  She is intolerant of CPAP and continues with nocturnal oxygen 2 L via nasal cannula.  We will recheck overnight oximetry to see if she is still experiencing hypoxia despite the oxygen.         Chronic respiratory failure with hypoxia (HCC)     Chronic ongoing problem.  Continue 2 L supplemental oxygen nocturnally and we will update her nocturnal pulse oximetry to see if this is an appropriate amount for her to use.         HTN (hypertension), benign     Chronic problem that requires medication adjustment.  We will stop her carvedilol and continue with monotherapy with furosemide 40 mg daily.  Blood pressure is perhaps overtreated and I do not want cause orthostasis due to advanced age.                RTC: 3 months.    PLEASE NOTE: This dictation was created using voice recognition software. I have made every reasonable attempt to correct obvious errors, but I expect that there are errors of grammar and possibly content that I did not discover before finalizing the note.

## 2020-06-15 ENCOUNTER — ANTICOAGULATION VISIT (OUTPATIENT)
Dept: MEDICAL GROUP | Facility: MEDICAL CENTER | Age: 80
End: 2020-06-15
Payer: MEDICARE

## 2020-06-15 DIAGNOSIS — Z79.01 CHRONIC ANTICOAGULATION: ICD-10-CM

## 2020-06-15 DIAGNOSIS — Z79.01 LONG TERM (CURRENT) USE OF ANTICOAGULANTS: Primary | ICD-10-CM

## 2020-06-15 LAB — INR PPP: 1.9 (ref 2–3.5)

## 2020-06-15 PROCEDURE — 93793 ANTICOAG MGMT PT WARFARIN: CPT | Performed by: INTERNAL MEDICINE

## 2020-06-15 PROCEDURE — 85610 PROTHROMBIN TIME: CPT | Performed by: INTERNAL MEDICINE

## 2020-06-15 NOTE — PROGRESS NOTES
OP Anticoagulation Service Note    Date: 6/15/2020  There were no vitals filed for this visit.   pt declined vitals    Anticoagulation Summary  As of 6/15/2020    INR goal:   2.0-3.0   TTR:   59.9 % (3.4 y)   INR used for dosin.90! (6/15/2020)   Warfarin maintenance plan:   10 mg (10 mg x 1) every Sun, Tue; 15 mg (10 mg x 1.5) all other days   Weekly warfarin total:   95 mg   Plan last modified:   Moira Sherdian, PharmD (2020)   Next INR check:   2020   Target end date:   Indefinite    Indications    Chronic anticoagulation [Z79.01]  Long term (current) use of anticoagulants [Z79.01] [Z79.01]             Anticoagulation Episode Summary     INR check location:       Preferred lab:       Send INR reminders to:       Comments:         Anticoagulation Care Providers     Provider Role Specialty Phone number    Ninfa Marcial M.D. Referring Internal Medicine [phone]    [facility] Anticoagulation Services Responsible  [phone]        Anticoagulation Patient Findings      HPI:   Natalie Anaya seen in clinic today, on anticoagulation therapy with warfarin (a high risk medication) for hx of PE       Pt is here today to evaluate anticoagulation therapy    Previous INR was  2.4 on 20    Pt was instructed to continue current regimen    Confirmed warfarin dosing regimen, denies missed or extra doses of coumadin.   Diet has been consistent with foods rich in vitamin K: Yes  Changes in ETOH:  No  Changes in smoking status: No  Changes in medication: No   Cost restriction: No  S/s of bleeding:  No  Falls or accidents since last visit No  Signs/symptoms  thrombosis since the last appt: No    A/P   INR  SUBtherapeutic today, but close to INR range.   Continue current warfarin regimen        Pt educated to contact our clinic with any changes in medications or s/s of bleeding or thrombosis. Pt is aware to seek immediate medical attention for falls, head injury or deep cuts    Follow up appointment in  4 week(s) to reduce risk of adverse events from warfarin  Moira Sheridan, PharmD

## 2020-07-13 ENCOUNTER — ANTICOAGULATION VISIT (OUTPATIENT)
Dept: MEDICAL GROUP | Facility: MEDICAL CENTER | Age: 80
End: 2020-07-13
Payer: MEDICARE

## 2020-07-13 DIAGNOSIS — Z79.01 LONG TERM (CURRENT) USE OF ANTICOAGULANTS: ICD-10-CM

## 2020-07-13 DIAGNOSIS — Z79.01 CHRONIC ANTICOAGULATION: Primary | ICD-10-CM

## 2020-07-13 LAB — INR PPP: 1.9 (ref 2–3.5)

## 2020-07-13 PROCEDURE — 85610 PROTHROMBIN TIME: CPT | Performed by: INTERNAL MEDICINE

## 2020-07-13 PROCEDURE — 99211 OFF/OP EST MAY X REQ PHY/QHP: CPT | Performed by: INTERNAL MEDICINE

## 2020-07-13 NOTE — PROGRESS NOTES
OP Anticoagulation Service Note    Date: 2020  There were no vitals filed for this visit.   pt declined vitals    Anticoagulation Summary  As of 2020    INR goal:   2.0-3.0   TTR:   58.6 % (3.5 y)   INR used for dosin.90! (2020)   Warfarin maintenance plan:   10 mg (10 mg x 1) every Sun; 15 mg (10 mg x 1.5) all other days   Weekly warfarin total:   100 mg   Plan last modified:   Moira Sheridan, PharmD (2020)   Next INR check:   2020   Target end date:   Indefinite    Indications    Chronic anticoagulation [Z79.01]  Long term (current) use of anticoagulants [Z79.01] [Z79.01]             Anticoagulation Episode Summary     INR check location:       Preferred lab:       Send INR reminders to:       Comments:         Anticoagulation Care Providers     Provider Role Specialty Phone number    Ninfa Marcial M.D. Referring Internal Medicine [phone]    [facility] Anticoagulation Services Responsible  [phone]        Anticoagulation Patient Findings      HPI:   Natalie Anaya seen in clinic today, on anticoagulation therapy with warfarin (a high risk medication) for hx of PE     Pt is here today to evaluate anticoagulation therapy    Previous INR was  1.9 on 6-15-20    Pt was instructed to continue current regimen    Confirmed warfarin dosing regimen, denies missed or extra doses of coumadin.   Diet has been consistent with foods rich in vitamin K: Yes  Changes in ETOH:  No  Changes in smoking status: No  Changes in medication: No   Cost restriction: No  S/s of bleeding:  No  Falls or accidents since last visit No  Signs/symptoms  thrombosis since the last appt: No    A/P   INR  Sub-therapeutic today, will require dose adjust ment today to prevent recurrence of thrombosis  and closer follow up.   Increase weekly regimen         Pt educated to contact our clinic with any changes in medications or s/s of bleeding or thrombosis. Pt is aware to seek immediate medical attention for  falls, head injury or deep cuts    Follow up appointment in 2 week(s) to reduce risk of adverse events from warfarin  Moira Sheridan, PharmD

## 2020-07-23 ENCOUNTER — ANTICOAGULATION VISIT (OUTPATIENT)
Dept: MEDICAL GROUP | Facility: MEDICAL CENTER | Age: 80
End: 2020-07-23
Payer: MEDICARE

## 2020-07-23 DIAGNOSIS — Z79.01 LONG TERM (CURRENT) USE OF ANTICOAGULANTS: ICD-10-CM

## 2020-07-23 DIAGNOSIS — Z79.01 CHRONIC ANTICOAGULATION: ICD-10-CM

## 2020-07-23 LAB — INR PPP: 2.1 (ref 2–3.5)

## 2020-07-23 PROCEDURE — 93793 ANTICOAG MGMT PT WARFARIN: CPT | Performed by: INTERNAL MEDICINE

## 2020-07-23 PROCEDURE — 85610 PROTHROMBIN TIME: CPT | Performed by: INTERNAL MEDICINE

## 2020-07-23 NOTE — PROGRESS NOTES
Anticoagulation Summary  As of 2020    INR goal:   2.0-3.0   TTR:   58.5 % (3.5 y)   INR used for dosin.10 (2020)   Warfarin maintenance plan:   10 mg (10 mg x 1) every Sun; 15 mg (10 mg x 1.5) all other days   Weekly warfarin total:   100 mg   Plan last modified:   Moira Sheridan, PharmD (2020)   Next INR check:      Target end date:   Indefinite    Indications    Chronic anticoagulation [Z79.01]  Long term (current) use of anticoagulants [Z79.01] [Z79.01]             Anticoagulation Episode Summary     INR check location:       Preferred lab:       Send INR reminders to:       Comments:         Anticoagulation Care Providers     Provider Role Specialty Phone number    Ninfa Marcial M.D. Referring Internal Medicine [phone]    [facility] Anticoagulation Services Responsible  [phone]        Anticoagulation Patient Findings  Patient Findings     Negatives:   Signs/symptoms of thrombosis, Signs/symptoms of bleeding, Laboratory test error suspected, Change in health, Change in alcohol use, Change in activity, Upcoming invasive procedure, Emergency department visit, Upcoming dental procedure, Missed doses, Extra doses, Change in medications, Change in diet/appetite, Hospital admission, Bruising, Other complaints              History of Present Illness: follow up appointment for chronic anticoagulation with the high risk medication, warfarin for PE    Last INR was out of range, dosage adjusted: pt is now at goal. Continue current dosing regimen.  Follow up in 4 weeks, to reduce the risk of adverse events related to this high risk medication, warfarin.  Pt declines vitals today    Maty Perkins, Clinical Pharmacist

## 2020-08-20 ENCOUNTER — ANTICOAGULATION VISIT (OUTPATIENT)
Dept: MEDICAL GROUP | Facility: MEDICAL CENTER | Age: 80
End: 2020-08-20
Payer: MEDICARE

## 2020-08-20 DIAGNOSIS — Z79.01 LONG TERM (CURRENT) USE OF ANTICOAGULANTS: ICD-10-CM

## 2020-08-20 DIAGNOSIS — Z79.01 CHRONIC ANTICOAGULATION: ICD-10-CM

## 2020-08-20 LAB — INR PPP: 1.9 (ref 2–3.5)

## 2020-08-20 PROCEDURE — 85610 PROTHROMBIN TIME: CPT | Performed by: INTERNAL MEDICINE

## 2020-08-20 PROCEDURE — 93793 ANTICOAG MGMT PT WARFARIN: CPT | Performed by: INTERNAL MEDICINE

## 2020-08-20 NOTE — PROGRESS NOTES
OP Anticoagulation Service Note    Date: 2020  There were no vitals filed for this visit.   pt declined vitals    Anticoagulation Summary  As of 2020    INR goal:  2.0-3.0   TTR:  58.4 % (3.6 y)   INR used for dosin.90 (2020)   Warfarin maintenance plan:  10 mg (10 mg x 1) every Sun; 15 mg (10 mg x 1.5) all other days   Weekly warfarin total:  100 mg   Plan last modified:  Moira Sheridan, PharmD (2020)   Next INR check:  2020   Target end date:  Indefinite    Indications    Chronic anticoagulation [Z79.01]  Long term (current) use of anticoagulants [Z79.01] [Z79.01]             Anticoagulation Episode Summary     INR check location:      Preferred lab:      Send INR reminders to:      Comments:        Anticoagulation Care Providers     Provider Role Specialty Phone number    Ninfa Marcial M.D. Referring Internal Medicine [phone]    [facility] Anticoagulation Services Responsible  [phone]        Anticoagulation Patient Findings      HPI:   Natalie Anaya seen in clinic today, on anticoagulation therapy with warfarin (a high risk medication) for hx of PE       Pt is here today to evaluate anticoagulation therapy    Previous INR was  2.1 on 20    Pt was instructed to continue current regimen    Confirmed warfarin dosing regimen, denies missed or extra doses of coumadin.   Diet has been consistent with foods rich in vitamin K: Yes  Changes in ETOH:  No  Changes in smoking status: No  Changes in medication: No   Cost restriction: No  S/s of bleeding:  No  Falls or accidents since last visit No  Signs/symptoms  thrombosis since the last appt: No    A/P   INR  subtherapeutic today,  But close to range  Continue current warfarin regimen       Pt educated to contact our clinic with any changes in medications or s/s of bleeding or thrombosis. Pt is aware to seek immediate medical attention for falls, head injury or deep cuts    Follow up appointment in 4 week(s) to reduce  risk of adverse events from warfarin   Moira Sheridan, PharmD

## 2020-09-01 ENCOUNTER — OFFICE VISIT (OUTPATIENT)
Dept: MEDICAL GROUP | Facility: MEDICAL CENTER | Age: 80
End: 2020-09-01
Payer: MEDICARE

## 2020-09-01 VITALS
TEMPERATURE: 97.6 F | HEART RATE: 74 BPM | RESPIRATION RATE: 16 BRPM | SYSTOLIC BLOOD PRESSURE: 132 MMHG | DIASTOLIC BLOOD PRESSURE: 74 MMHG | BODY MASS INDEX: 36.09 KG/M2 | OXYGEN SATURATION: 92 % | WEIGHT: 211.42 LBS | HEIGHT: 64 IN

## 2020-09-01 DIAGNOSIS — D50.8 IRON DEFICIENCY ANEMIA SECONDARY TO INADEQUATE DIETARY IRON INTAKE: ICD-10-CM

## 2020-09-01 DIAGNOSIS — E55.9 VITAMIN D DEFICIENCY: ICD-10-CM

## 2020-09-01 DIAGNOSIS — G62.9 NEUROPATHY: ICD-10-CM

## 2020-09-01 DIAGNOSIS — I51.89 DIASTOLIC DYSFUNCTION: ICD-10-CM

## 2020-09-01 DIAGNOSIS — E03.9 ACQUIRED HYPOTHYROIDISM: ICD-10-CM

## 2020-09-01 PROCEDURE — 99214 OFFICE O/P EST MOD 30 MIN: CPT | Performed by: INTERNAL MEDICINE

## 2020-09-01 RX ORDER — AMOXICILLIN 500 MG/1
500 CAPSULE ORAL
Status: ON HOLD | COMMUNITY
Start: 2020-08-06 | End: 2022-07-21

## 2020-09-01 ASSESSMENT — FIBROSIS 4 INDEX: FIB4 SCORE: 1.38

## 2020-09-01 NOTE — ASSESSMENT & PLAN NOTE
New problem.  Due to her advanced age we need to be very cautious about what medicines we would initiate for neuropathy.  It is starting to become more bothersome especially at night when she is sleeping.  It is mainly affecting the left foot over the heel at this time.  Would be reasonable to consider something like gabapentin to try to quiet down the nerve pain or possibly an SNRI like venlafaxine or duloxetine.  She has history of seizures and is also on anticoagulation due to prior history of PE.  Will run it by our clinic pharmacist to see what medication would be the safest with her current medication list.

## 2020-09-01 NOTE — ASSESSMENT & PLAN NOTE
Chronic and stable problem.  She has grade 2 diastolic dysfunction with evidence of lower extremity edema and pulmonary hypertension.  She is taking furosemide 40 mg daily to assist with euvolemia.  Blood pressure fairly well controlled ranging 108-132/60-80.  She does have weight gain of approximately 6 pounds between May and September, unclear how much of this is fluid.

## 2020-09-01 NOTE — ASSESSMENT & PLAN NOTE
Previous problem that requires follow-up.  We will update vitamin D level and adjust her supplementation as indicated.

## 2020-09-01 NOTE — ASSESSMENT & PLAN NOTE
Chronic and stable problem.  Continue levothyroxine 100 mcg daily.  She is about due to have her lab work updated, order provided.

## 2020-09-01 NOTE — PROGRESS NOTES
Subjective:   Chief Complaint/History of Present Illness:  Natalie Anaya is a 79 y.o. female established patient who presents today to discuss medical problems as listed below.  She is accompanied by her , Marlo.    Problem   Neuropathy    She endorses neuropathy for approximately 1 year.  Most predominant in the left foot over the heel.  Manifest with burning most notable in the evening when she is lying in bed.  She is not able to put a bedsheet over because of the intense burning.  Does not happen much during the day.  Occasionally has some associated pain but it is mainly burning.  She has tried Tylenol which helps with achy joints but has not had much effect on the burning pain.  She thinks she has neuropathy, she is not discussed this with anyone previously.  No known history of B12 deficiency.  She has thyroid disease on replacement.  No excessive alcohol use.  She has normal hemoglobin and kidney function and no known underlying bone marrow disease such as myeloma.  No history of type 2 diabetes.     Vitamin D Deficiency       Ref. Range 11/15/2019 10:21   25-Hydroxy   Vitamin D 25 Latest Ref Range: 30 - 100 ng/mL 48     She has prior history of vitamin D deficiency as well as osteoporosis.  She is currently taking vitamin D 1000 international units daily.  No known history of chronic kidney disease or parathyroid disorder.     Iron Deficiency Anemia       Ref. Range 7/27/2018  10/31/2018  5/13/2020   Iron Latest Ref Range: 40 - 170 ug/dL 30 (L) 125 179   Total Iron Binding Latest Ref Range: 250 - 450 ug/dL 357 262 226   % Saturation Latest Ref Range: 15 - 55 % 8 (L) 48 79      Ref. Range 7/27/2018  10/31/2018  5/13/2020   Ferritin Latest Ref Range: 10.0 - 291.0 ng/mL 8.5 (L) 20.2 54.7       She has had work-up in the past including upper and lower endoscopy with GI consultants (Dr. Stanton) in April 2016.  Biopsy demonstrated mild chronic inflammation and reactive epithelial cells in the gastric  antrum with a benign hyperplastic polyp and negative H. Pylori.  Biopsy also negative for celiac sprue.  Colonoscopy was benign with negative pathology on ascending colon polyp. Patient did not do capsule endoscopy at that time.     It appears that her hemoglobin levels improved with time.  She has been on and off iron supplements since that time.  She is currently taking an iron supplement daily.  She denies any constipation.     Diastolic Dysfunction    Echocardiogram (2/2020):  Compared to the images of the prior study done 7/27/17 -  there has been no significant change. Normal left ventricular systolic function. Left ventricular ejection fraction is visually estimated to be 70%. Grade II diastolic dysfunction. The right ventricle was normal in size and function. Mild mitral regurgitation. Moderate tricuspid regurgitation. Estimated right ventricular systolic pressure  is 65 mmHg.    She is taking furosemide 40 mg daily to help maintain euvolemia.       Acquired Hypothyroidism       Ref. Range 11/15/2019 10:21   TSH Latest Ref Range: 0.380 - 5.330 uIU/mL 0.820   Free T-4 Latest Ref Range: 0.53 - 1.43 ng/dL 0.81     She is taking levothyroxine 100 mcg daily.  No signs or symptoms of overtreatment or under treatment at this time.  She has been stable on this dose for many years..          Additional History:   Allergies:    Tape     Current Medications:     Current Outpatient Medications   Medication Sig Dispense Refill   • amoxicillin (AMOXIL) 500 MG Cap DNC     • alendronate (FOSAMAX) 70 MG Tab TAKE 1 TABLET BY MOUTH ONCE A WEEK 12 Tab 3   • ferrous sulfate (FEROSUL) 325 (65 Fe) MG tablet Take 1 Tab by mouth every 48 hours. 90 Tab 3   • omeprazole (PRILOSEC) 20 MG delayed-release capsule TAKE 1 CAPSULE BY MOUTH EVERY DAY 90 Cap 3   • primidone (MYSOLINE) 250 MG Tab TAKE 1 TABLET BY MOUTH THREE TIMES DAILY 270 Tab 3   • warfarin (COUMADIN) 10 MG Tab Take 1.5 Tabs by mouth every evening. Indications: Blockage of  "Blood Vessel to Lung by a Particle 135 Tab 1   • potassium chloride SA (KDUR) 20 MEQ Tab CR Take 1 Tab by mouth every day. 100 Tab 3   • furosemide (LASIX) 40 MG Tab Take 1 Tab by mouth every day. 90 Tab 3   • levothyroxine (SYNTHROID) 100 MCG Tab TAKE 1 TABLET BY MOUTH EVERY DAY 90 Tab 3   • Home Care Oxygen Inhale 2 L/min by mouth every bedtime.     • Melatonin 10 MG Tab Take 15 mg by mouth every bedtime.     • Probiotic Product (PROBIOTIC DAILY PO) Take 1 Cap by mouth every day.     • Glucos-Chond-Hyal Ac-Ca Fructo (MOVE FREE [facility] HEALTH ADVANCE) Tab Take 1 Tab by mouth every day.     • Multiple Vitamins-Minerals (WOMENS MULTI VITAMIN & MINERAL) Tab Take 1 Tab by mouth every day.     • Cholecalciferol (VITAMIN D-3) 1000 UNITS Cap Take 1,000 Units by mouth every day.       No current facility-administered medications for this visit.         Social History:     Social History     Tobacco Use   • Smoking status: Never Smoker   • Smokeless tobacco: Never Used   Substance Use Topics   • Alcohol use: No   • Drug use: No       ROS: As above in the HPI      Objective:   Physical Exam:    Vitals: /74 (BP Location: Left arm, Patient Position: Sitting, BP Cuff Size: Adult)   Pulse 74   Temp 36.4 °C (97.6 °F) (Temporal)   Resp 16   Ht 1.626 m (5' 4\")   Wt 95.9 kg (211 lb 6.7 oz)   SpO2 92%    BMI: Body mass index is 36.29 kg/m².   General/Constitutional: Vitals as above, Well nourished, well developed female in no acute distress   Head/Eyes: Head is grossly normal & atraumatic, bilateral conjunctivae clear and not injected, bilateral EOMI, glasses in place   ENT: Bilateral external ears grossly normal in appearance, Hearing grossly intact, External nares normal in appearance and without discharge/bleeding   Respiratory: No respiratory distress, bilateral lungs are clear to ausculation in all lung fields, no wheezing/rhonchi/rales   Cardiovascular: Regular rate and rhythm without murmur/rubs, distal pulses are " intact and equal bilaterally (radial), 1+ bilateral lower extremity edema with stasis dermatitis   MSK: Antalgic gait, ambulates with a cane   Integumentary: Purple skin discoloration of left > right foot   Psych: Judgment grossly appropriate, no apparent depression/anxiety    Health Maintenance:     - Completed      Assessment and Plan:     Problem List Items Addressed This Visit     Acquired hypothyroidism     Chronic and stable problem.  Continue levothyroxine 100 mcg daily.  She is about due to have her lab work updated, order provided.         Relevant Orders    TSH WITH REFLEX TO FT4    Iron deficiency anemia     Previous problem, improved.  Her iron studies have normalized.  She still uses ferrous sulfate periodically.  We will get her blood counts updated to ensure ongoing stability.         Relevant Orders    CBC WITH DIFFERENTIAL    Diastolic dysfunction     Chronic and stable problem.  She has grade 2 diastolic dysfunction with evidence of lower extremity edema and pulmonary hypertension.  She is taking furosemide 40 mg daily to assist with euvolemia.  Blood pressure fairly well controlled ranging 108-132/60-80.  She does have weight gain of approximately 6 pounds between May and September, unclear how much of this is fluid.         Relevant Orders    Comp Metabolic Panel    Neuropathy     New problem.  Due to her advanced age we need to be very cautious about what medicines we would initiate for neuropathy.  It is starting to become more bothersome especially at night when she is sleeping.  It is mainly affecting the left foot over the heel at this time.  Would be reasonable to consider something like gabapentin to try to quiet down the nerve pain or possibly an SNRI like venlafaxine or duloxetine.  She has history of seizures and is also on anticoagulation due to prior history of PE.  Will run it by our clinic pharmacist to see what medication would be the safest with her current medication list.          Relevant Orders    VITAMIN B12    Vitamin D deficiency     Previous problem that requires follow-up.  We will update vitamin D level and adjust her supplementation as indicated.         Relevant Orders    VITAMIN D,25 HYDROXY             RTC: 3 months.    PLEASE NOTE: This dictation was created using voice recognition software. I have made every reasonable attempt to correct obvious errors, but I expect that there are errors of grammar and possibly content that I did not discover before finalizing the note.

## 2020-09-01 NOTE — ASSESSMENT & PLAN NOTE
Previous problem, improved.  Her iron studies have normalized.  She still uses ferrous sulfate periodically.  We will get her blood counts updated to ensure ongoing stability.

## 2020-09-17 ENCOUNTER — ANTICOAGULATION VISIT (OUTPATIENT)
Dept: MEDICAL GROUP | Facility: MEDICAL CENTER | Age: 80
End: 2020-09-17
Payer: MEDICARE

## 2020-09-17 DIAGNOSIS — Z79.01 LONG TERM (CURRENT) USE OF ANTICOAGULANTS: Primary | ICD-10-CM

## 2020-09-17 DIAGNOSIS — Z79.01 CHRONIC ANTICOAGULATION: ICD-10-CM

## 2020-09-17 LAB — INR PPP: 2.7 (ref 2–3.5)

## 2020-09-17 PROCEDURE — 85610 PROTHROMBIN TIME: CPT | Performed by: INTERNAL MEDICINE

## 2020-09-17 PROCEDURE — 93793 ANTICOAG MGMT PT WARFARIN: CPT | Performed by: INTERNAL MEDICINE

## 2020-09-28 DIAGNOSIS — E03.9 ACQUIRED HYPOTHYROIDISM: ICD-10-CM

## 2020-09-28 RX ORDER — LEVOTHYROXINE SODIUM 0.1 MG/1
100 TABLET ORAL
Qty: 90 TAB | Refills: 3 | Status: SHIPPED | OUTPATIENT
Start: 2020-09-28 | End: 2021-09-22

## 2020-10-02 ENCOUNTER — OFFICE VISIT (OUTPATIENT)
Dept: PULMONOLOGY | Facility: HOSPICE | Age: 80
End: 2020-10-02
Payer: MEDICARE

## 2020-10-02 VITALS
HEIGHT: 64 IN | RESPIRATION RATE: 18 BRPM | DIASTOLIC BLOOD PRESSURE: 62 MMHG | BODY MASS INDEX: 36.12 KG/M2 | SYSTOLIC BLOOD PRESSURE: 134 MMHG | WEIGHT: 211.6 LBS | HEART RATE: 73 BPM | OXYGEN SATURATION: 93 %

## 2020-10-02 DIAGNOSIS — Z79.01 LONG TERM (CURRENT) USE OF ANTICOAGULANTS: ICD-10-CM

## 2020-10-02 DIAGNOSIS — I27.21 SECONDARY PULMONARY ARTERIAL HYPERTENSION (HCC): ICD-10-CM

## 2020-10-02 DIAGNOSIS — Z23 ENCOUNTER FOR IMMUNIZATION: ICD-10-CM

## 2020-10-02 DIAGNOSIS — G47.33 OBSTRUCTIVE SLEEP APNEA SYNDROME: ICD-10-CM

## 2020-10-02 DIAGNOSIS — J96.11 CHRONIC RESPIRATORY FAILURE WITH HYPOXIA (HCC): ICD-10-CM

## 2020-10-02 DIAGNOSIS — I26.99 RECURRENT PULMONARY EMBOLISM (HCC): ICD-10-CM

## 2020-10-02 PROCEDURE — 99214 OFFICE O/P EST MOD 30 MIN: CPT | Performed by: INTERNAL MEDICINE

## 2020-10-02 RX ORDER — A/SINGAPORE/GP1908/2015 IVR-180 (AN A/MICHIGAN/45/2015 (H1N1)PDM09-LIKE VIRUS, A/HONG KONG/4801/2014, NYMC X-263B (H3N2) (AN A/HONG KONG/4801/2014-LIKE VIRUS), AND B/BRISBANE/60/2008, WILD TYPE (A B/BRISBANE/60/2008-LIKE VIRUS) 15; 15; 15 UG/.5ML; UG/.5ML; UG/.5ML
0.5 INJECTION, SUSPENSION INTRAMUSCULAR
COMMUNITY
Start: 2020-09-28 | End: 2021-07-22

## 2020-10-02 ASSESSMENT — FIBROSIS 4 INDEX: FIB4 SCORE: 1.4

## 2020-10-02 NOTE — PATIENT INSTRUCTIONS
Natalie comes in today to follow-up on her prior pulmonary emboli, 2 episodes, is on lifetime anticoagulation.  She asks me about the need for nighttime oxygen, I explained that she has several reasons for utilizing this, the first is known sleep apnea and intolerance to CPAP with verified oxygen desaturation.  The second is that she has pulmonary hypertension, this was aggravated both by the pulmonary emboli but also certainly by chronic nighttime hypoxemia, no signs of right heart failure presently but she will need to stay on oxygen at night indefinitely.    She is in good spirits, gets out on Saturday nights to go to dinner, gives me a fascinating litany of options including Italian food type food local [employer] and she does very well.    Health maintenance includes reduced exercise tolerance with her cane, body mass index at 36, portion control was advised but her love of dining out is also noted.    Her flu vaccine is already been administered, I do not see a need for current lung function test, continue anticoagulation nighttime oxygen and I will check her again in 6 months, sooner if needed

## 2020-10-02 NOTE — PROGRESS NOTES
Natalie Anaya is a 80 y.o. female here for PE and hypoxemia. Patient was referred by Primary care.    History of Present Illness:    Natalie comes in today to follow-up on her prior pulmonary emboli, 2 episodes, is on lifetime anticoagulation.  She asks me about the need for nighttime oxygen, I explained that she has several reasons for utilizing this, the first is known sleep apnea and intolerance to CPAP with verified oxygen desaturation.  The second is that she has pulmonary hypertension, this was aggravated both by the pulmonary emboli but also certainly by chronic nighttime hypoxemia, no signs of right heart failure presently but she will need to stay on oxygen at night indefinitely.    She is in good spirits, gets out on Saturday nights to go to dinner, gives me a fascinating litany of options including Italian food type food local [employer] and she does very well.    Health maintenance includes reduced exercise tolerance with her cane, body mass index at 36, portion control was advised but her love of dining out is also noted.    Her flu vaccine is already been administered, I do not see a need for current lung function test, continue anticoagulation nighttime oxygen and I will check her again in 6 months, sooner if needed  Constitutional ROS: No unexpected change in weight, No unexplained fevers  Eyes: No change in vision or blurring or double vision  Mouth/Throat ROS: No sore throat, No recent change in voice or hoarseness  Pulmonary ROS: See present history for pertinent positives  Cardiovascular ROS: No chest pain to suggest acute coronary syndrome  Gastrointestinal ROS: No abdominal pain to suggest peptic disease  Musculoskeletal/Extremities ROS: no acute artritis or unusual swelling  Hematologic/Lymphatic ROS: No easy bleeding or unusual lymph node swelling  Neurologic ROS: No new or unusual weakness  Psychiatric ROS: No hallucinations  Allergic/Immunologic: No  urticaria or allergic  rash      Current Outpatient Medications   Medication Sig Dispense Refill   • levothyroxine (SYNTHROID) 100 MCG Tab Take 1 Tab by mouth every day. 90 Tab 3   • alendronate (FOSAMAX) 70 MG Tab TAKE 1 TABLET BY MOUTH ONCE A WEEK 12 Tab 3   • ferrous sulfate (FEROSUL) 325 (65 Fe) MG tablet Take 1 Tab by mouth every 48 hours. 90 Tab 3   • omeprazole (PRILOSEC) 20 MG delayed-release capsule TAKE 1 CAPSULE BY MOUTH EVERY DAY 90 Cap 3   • primidone (MYSOLINE) 250 MG Tab TAKE 1 TABLET BY MOUTH THREE TIMES DAILY 270 Tab 3   • warfarin (COUMADIN) 10 MG Tab Take 1.5 Tabs by mouth every evening. Indications: Blockage of Blood Vessel to Lung by a Particle 135 Tab 1   • potassium chloride SA (KDUR) 20 MEQ Tab CR Take 1 Tab by mouth every day. 100 Tab 3   • furosemide (LASIX) 40 MG Tab Take 1 Tab by mouth every day. 90 Tab 3   • Home Care Oxygen Inhale 2 L/min by mouth every bedtime.     • Melatonin 10 MG Tab Take 15 mg by mouth every bedtime.     • Probiotic Product (PROBIOTIC DAILY PO) Take 1 Cap by mouth every day.     • Multiple Vitamins-Minerals (WOMENS MULTI VITAMIN & MINERAL) Tab Take 1 Tab by mouth every day.     • Cholecalciferol (VITAMIN D-3) 1000 UNITS Cap Take 1,000 Units by mouth every day.     • FLUAD QUADRIVALENT 0.5 ML Prefilled Syringe 0.5 mL by Intramuscular route. Administer 0.5 mL intramuscularly as directed     • amoxicillin (AMOXIL) 500 MG Cap DNC     • Glucos-Chond-Hyal Ac-Ca Fructo (MOVE FREE [facility] HEALTH ADVANCE) Tab Take 1 Tab by mouth every day.       No current facility-administered medications for this visit.        Social History     Tobacco Use   • Smoking status: Never Smoker   • Smokeless tobacco: Never Used   Substance Use Topics   • Alcohol use: No   • Drug use: No        Past Medical History:   Diagnosis Date   • Arthritis     Knees.   • Bowel habit changes     Constipation   • Breath shortness    • Cataract     Bilat IOL   • Constipation    • Epilepsy (HCC)    • Eye drainage    • Hemorrhagic  disorder (HCC)    • Hiatus hernia syndrome    • Hypertension    • Hyperthyroidism    • Hypothyroid    • Influenza    • Pain     knees   • Personal history of venous thrombosis and embolism     PE from Right Shouler FX 2003.   • Ringing in ears    • Seizure (HCC) 1982   • Sore muscles        Past Surgical History:   Procedure Laterality Date   • PB OPEN FIX INTER/SUBTROCH FX,IMPLNT Right 6/13/2019    Procedure: INSERTION, INTRAMEDULLARY MENDY, FEMUR, PROXIMAL;  Surgeon: Maximo Garnica M.D.;  Location: [facility];  Service: Orthopedics   • IRRIGATION & DEBRIDEMENT HIP Left 11/17/2016    Procedure: IRRIGATION & DEBRIDEMENT HIP;  Surgeon: Maximo Garnica M.D.;  Location: SURGERY [facility];  Service:    • HIP NAILING INTRAMEDULLARY Left 10/6/2016    Procedure: HIP NAILING INTRAMEDULLARY;  Surgeon: Walt Nguyen M.D.;  Location: SURGERY [facility];  Service:    • GASTROSCOPY-ENDO N/A 4/29/2016    Procedure: GASTROSCOPY-ENDO;  Surgeon: Mikey Stanton M.D.;  Location: [facility];  Service:    • COLONOSCOPY-FLEXIBLE N/A 4/29/2016    Procedure: COLONOSCOPY-FLEXIBLE;  Surgeon: Mikey Stanton M.D.;  Location: [facility];  Service:    • CATARACT PHACO WITH IOL  8/5/2014    Performed by Lorenzo Bello M.D. at [facility]   • CATARACT PHACO WITH IOL  7/15/2014    Performed by Lorenzo Belol M.D. at [facility]   • KNEE ARTHROPLASTY TOTAL  7/9/2012    Performed by DENIZ FREDERICK at [facility]   • OJSE BY LAPAROSCOPY  9/30/2011    Performed by JOHNATHAN CRISOSTOMO at [facility]   • ARTHROSCOPY, KNEE     • CARPAL TUNNEL RELEASE     • PB REMV 2ND CATARACT,CORN-SCLER SECTN     • TONSILLECTOMY     • TUBAL LIGATION         Allergies: Tape    Family History   Problem Relation Age of Onset   • Cancer Mother 67        Ovarian   • Alcohol/Drug Father 60        Appenditis   • Heart Disease Father    • Cancer Maternal Grandmother   "       colon cancer   • Heart Disease Maternal Grandfather    • Cancer Daughter 44        melonoma       Physical Examination    Vitals:    10/02/20 1253 10/02/20 1257   Height: 1.626 m (5' 4\")    Weight: 96 kg (211 lb 9.6 oz)    Weight % change since last entry.: 0 %    BP: 134/62    Pulse: 73    BMI (Calculated): 36.32    Resp: 18    O2 sat % room air:  93 %       General Appearance: alert, no distress  Skin: Skin color, texture, turgor normal. No rashes or lesions.  Eyes: negative  Oropharynx: Lips, mucosa, and tongue normal. Teeth and gums normal. Oropharynx moist and without lesion  Lungs: positive findings: clear  Heart: negative. RRR without murmur, gallop, or rubs.  No ectopy.  Abdomen: Abdomen soft, non-tender. . No masses,  No organomegaly  Extremities:  No deformities, trace pre tibial edema, or skin discoloration  Joints: No acute arthritis  Peripheral Pulses:perfused  Neurologic: intact grossly  No clubbing    Imaging: None today    PFTS: Not needed      Assessment and Plan  1. Chronic respiratory failure with hypoxia (HCC)  On oxygen  - INFLUENZA VACCINE, HIGH DOSE (65+ ONLY)    2. Encounter for immunization   The administered  - INFLUENZA VACCINE, HIGH DOSE (65+ ONLY)    3. Recurrent pulmonary embolism (HCC)  Lifetime anticoagulation prevent pulmonary emboli and continue oxygen    4. Secondary pulmonary arterial hypertension (HCC)  Noted, prevent pulmonary emboli and continue oxygen    5. Long term (current) use of anticoagulants [Z79.01]  Noted    6. Obstructive sleep apnea syndrome- intolerant to CPAP  Nighttime oxygen    Followup Return in about 6 months (around 4/2/2021) for follow up visit with Dr. Isabella Irvin.    "

## 2020-10-09 DIAGNOSIS — Z79.01 CHRONIC ANTICOAGULATION: ICD-10-CM

## 2020-10-09 RX ORDER — WARFARIN SODIUM 10 MG/1
TABLET ORAL
Qty: 135 TAB | Refills: 1 | Status: SHIPPED | OUTPATIENT
Start: 2020-10-09 | End: 2021-03-22 | Stop reason: SDUPTHER

## 2020-10-29 ENCOUNTER — APPOINTMENT (OUTPATIENT)
Dept: MEDICAL GROUP | Facility: MEDICAL CENTER | Age: 80
End: 2020-10-29
Payer: MEDICARE

## 2020-11-02 ENCOUNTER — ANTICOAGULATION VISIT (OUTPATIENT)
Dept: MEDICAL GROUP | Facility: MEDICAL CENTER | Age: 80
End: 2020-11-02
Payer: MEDICARE

## 2020-11-02 DIAGNOSIS — Z79.01 LONG TERM (CURRENT) USE OF ANTICOAGULANTS: Primary | ICD-10-CM

## 2020-11-02 DIAGNOSIS — Z79.01 CHRONIC ANTICOAGULATION: ICD-10-CM

## 2020-11-02 LAB — INR PPP: 2.2 (ref 2–3.5)

## 2020-11-02 PROCEDURE — 93793 ANTICOAG MGMT PT WARFARIN: CPT | Performed by: INTERNAL MEDICINE

## 2020-11-02 PROCEDURE — 85610 PROTHROMBIN TIME: CPT | Performed by: INTERNAL MEDICINE

## 2020-11-03 NOTE — PROGRESS NOTES
OP Anticoagulation Service Note    Date: 2020  There were no vitals filed for this visit.   pt declined vitals    Anticoagulation Summary  As of 2020    INR goal:  2.0-3.0   TTR:  60.4 % (3.8 y)   INR used for dosin.20 (2020)   Warfarin maintenance plan:  10 mg (10 mg x 1) every Sun; 15 mg (10 mg x 1.5) all other days   Weekly warfarin total:  100 mg   Plan last modified:  Moira Sheridan, PharmD (2020)   Next INR check:  2020   Target end date:  Indefinite    Indications    Chronic anticoagulation [Z79.01]  Long term (current) use of anticoagulants [Z79.01] [Z79.01]             Anticoagulation Episode Summary     INR check location:      Preferred lab:      Send INR reminders to:      Comments:        Anticoagulation Care Providers     Provider Role Specialty Phone number    Ninfa Marcial M.D. Referring Internal Medicine [phone]    [facility] Responsible  [phone]        Anticoagulation Patient Findings      HPI:   Natalie Anaya seen in clinic today, on anticoagulation therapy with warfarin (a high risk medication) for hx of PE     Pt is here today to evaluate anticoagulation therapy    Previous INR was  2.7 on 2020    Pt was instructed to continue current regimen    Confirmed warfarin dosing regimen, denies missed or extra doses of coumadin.   Diet has been consistent with foods rich in vitamin K: Yes  Changes in ETOH:  No  Changes in smoking status: No  Changes in medication: No   Cost restriction: No  S/s of bleeding:  No  Falls or accidents since last visit No  Signs/symptoms  thrombosis since the last appt: No    A/P   INR  therapeutic today,   Continue current warfarin regimen           check referral    Pt educated to contact our clinic with any changes in medications or s/s of bleeding or thrombosis. Pt is aware to seek immediate medical attention for falls, head injury or deep cuts    Follow up appointment in 6 week(s) to  reduce risk of adverse events from warfarin  Moira Sheridan, PharmD

## 2020-11-25 NOTE — ASSESSMENT & PLAN NOTE
Left patient voicemail to call writer back at this location [facility] at .      Patient is taking primidone 250 mg 3 tablets a day.  She is taking primidone to 50 mg 1 tab in the morning and 2 tabs in the evening time.  She does not have side effects from taking primidone.  She does not have a recurrent seizure.

## 2020-12-01 ENCOUNTER — APPOINTMENT (OUTPATIENT)
Dept: MEDICAL GROUP | Facility: MEDICAL CENTER | Age: 80
End: 2020-12-01
Payer: MEDICARE

## 2020-12-14 ENCOUNTER — ANTICOAGULATION VISIT (OUTPATIENT)
Dept: MEDICAL GROUP | Facility: MEDICAL CENTER | Age: 80
End: 2020-12-14
Payer: MEDICARE

## 2020-12-14 DIAGNOSIS — Z79.01 LONG TERM (CURRENT) USE OF ANTICOAGULANTS: Primary | ICD-10-CM

## 2020-12-14 DIAGNOSIS — Z79.01 CHRONIC ANTICOAGULATION: ICD-10-CM

## 2020-12-14 LAB — INR PPP: 1.7 (ref 2–3.5)

## 2020-12-14 PROCEDURE — 99211 OFF/OP EST MAY X REQ PHY/QHP: CPT | Performed by: INTERNAL MEDICINE

## 2020-12-14 PROCEDURE — 85610 PROTHROMBIN TIME: CPT | Performed by: INTERNAL MEDICINE

## 2020-12-14 NOTE — PROGRESS NOTES
OP Anticoagulation Service Note    Date: 2020  There were no vitals filed for this visit.   pt declined vitals    Anticoagulation Summary  As of 2020    INR goal:  2.0-3.0   TTR:  59.8 % (3.9 y)   INR used for dosin.70 (2020)   Warfarin maintenance plan:  10 mg (10 mg x 1) every Sun; 15 mg (10 mg x 1.5) all other days   Weekly warfarin total:  100 mg   Plan last modified:  Moira Sheridan, PharmD (2020)   Next INR check:  2020   Target end date:  Indefinite    Indications    Chronic anticoagulation [Z79.01]  Long term (current) use of anticoagulants [Z79.01] [Z79.01]             Anticoagulation Episode Summary     INR check location:      Preferred lab:      Send INR reminders to:      Comments:        Anticoagulation Care Providers     Provider Role Specialty Phone number    Ninfa Marcial M.D. Referring Internal Medicine [phone]    [facility] Responsible  [phone]        Anticoagulation Patient Findings      HPI:   Natalie Anaya seen in clinic today, on anticoagulation therapy with warfarin (a high risk medication) for hx of PE       Pt is here today to evaluate anticoagulation therapy    Previous INR was  2.2 on 2020    Pt was instructed to continue current regimen    Confirmed warfarin dosing regimen, denies missed or extra doses of coumadin.   Diet has been consistent with foods rich in vitamin K: No diet is different,  has been in and out of hospital and is currently in SNF  Changes in ETOH:  No  Changes in smoking status: No  Changes in medication: No   Cost restriction: No  S/s of bleeding:  No  Falls or accidents since last visit No  Signs/symptoms  thrombosis since the last appt: No      A/P   INR  SUB-therapeutic today, will require dose adjust ment today to prevent  recurrence of thrombosisand closer follow up.   Tonight 20 mg then resume usual regimen.        Pt educated to contact our clinic with any changes in  medications or s/s of bleeding or thrombosis. Pt is aware to seek immediate medical attention for falls, head injury or deep cuts    Follow up appointment in 2 week(s) to reduce risk of adverse events from warfarin  Moira Sheridan, PharmD

## 2020-12-22 DIAGNOSIS — I51.89 DIASTOLIC DYSFUNCTION: ICD-10-CM

## 2020-12-22 RX ORDER — POTASSIUM CHLORIDE 20 MEQ/1
TABLET, EXTENDED RELEASE ORAL
Qty: 100 TAB | Refills: 0 | Status: SHIPPED | OUTPATIENT
Start: 2020-12-22 | End: 2021-03-09 | Stop reason: SDUPTHER

## 2020-12-22 RX ORDER — FUROSEMIDE 40 MG/1
TABLET ORAL
Qty: 90 TAB | Refills: 0 | Status: SHIPPED | OUTPATIENT
Start: 2020-12-22 | End: 2021-03-09 | Stop reason: SDUPTHER

## 2020-12-24 ENCOUNTER — TELEPHONE (OUTPATIENT)
Dept: MEDICAL GROUP | Facility: MEDICAL CENTER | Age: 80
End: 2020-12-24

## 2020-12-24 NOTE — TELEPHONE ENCOUNTER
"1. Caller Name: Natalie                        Call Back Number: [phone] (home)       How would the patient prefer to be contacted with a response: Phone call OK to leave a detailed message    Natalie called reporting her  (Marlo [birth year]) was released this week from SNF, after discharge they were notified another pt in the SNF was Covid positive. Marlo was tested and he is now Covid positive (SNF: [facility]). Natalie is requesting to receive an Rx of Ivermectin as a \"Covid protocol treatment\" for herself and Marlo. That Rx request was discussed with covering provider Dr. Orta and denied d/t no studies that have shown Invermectin as therapeutic for Covid pts. Natalie was informed of Dr. Orta's decision- although unhappy with denial she was accepting.   "

## 2020-12-28 ENCOUNTER — TELEPHONE (OUTPATIENT)
Dept: VASCULAR LAB | Facility: MEDICAL CENTER | Age: 80
End: 2020-12-28

## 2020-12-28 ENCOUNTER — APPOINTMENT (OUTPATIENT)
Dept: MEDICAL GROUP | Facility: MEDICAL CENTER | Age: 80
End: 2020-12-28
Payer: MEDICARE

## 2020-12-28 NOTE — TELEPHONE ENCOUNTER
Attempted to contact pt to reschedule appt for f/u INR. Noted pt's  tested positive for COVID per 12/24 note. Pt has not had a test done and states that her and her partner have no sx.     Offered to schedule pt for car visit this week at [facility], but pt declined. Pt stated that she would like to move the appt to 1/4.     Counseled pt on risks associated w/ large gaps in time between non-therapeutic INR's. Pt states she understands and wishes to proceed w/ 1/4 appt - Scheduled for 2pm.     Nikhil Kwong, PharmD

## 2020-12-29 ENCOUNTER — HOSPITAL ENCOUNTER (OUTPATIENT)
Facility: MEDICAL CENTER | Age: 80
End: 2020-12-29
Attending: NURSE PRACTITIONER
Payer: MEDICARE

## 2020-12-29 LAB — COVID ORDER STATUS COVID19: NORMAL

## 2020-12-29 PROCEDURE — U0003 INFECTIOUS AGENT DETECTION BY NUCLEIC ACID (DNA OR RNA); SEVERE ACUTE RESPIRATORY SYNDROME CORONAVIRUS 2 (SARS-COV-2) (CORONAVIRUS DISEASE [COVID-19]), AMPLIFIED PROBE TECHNIQUE, MAKING USE OF HIGH THROUGHPUT TECHNOLOGIES AS DESCRIBED BY CMS-2020-01-R: HCPCS

## 2020-12-30 LAB
SARS-COV-2 RNA RESP QL NAA+PROBE: NOTDETECTED
SPECIMEN SOURCE: NORMAL

## 2021-01-07 ENCOUNTER — ANTICOAGULATION VISIT (OUTPATIENT)
Dept: MEDICAL GROUP | Facility: MEDICAL CENTER | Age: 81
End: 2021-01-07
Payer: MEDICARE

## 2021-01-07 DIAGNOSIS — Z79.01 CHRONIC ANTICOAGULATION: ICD-10-CM

## 2021-01-07 DIAGNOSIS — Z79.01 LONG TERM (CURRENT) USE OF ANTICOAGULANTS: ICD-10-CM

## 2021-01-07 LAB — INR PPP: 1.9 (ref 2–3.5)

## 2021-01-07 PROCEDURE — 99211 OFF/OP EST MAY X REQ PHY/QHP: CPT | Performed by: INTERNAL MEDICINE

## 2021-01-07 PROCEDURE — 85610 PROTHROMBIN TIME: CPT | Performed by: INTERNAL MEDICINE

## 2021-01-08 NOTE — PROGRESS NOTES
Anticoagulation Summary  As of 2021    INR goal:  2.0-3.0   TTR:  58.8 % (3.9 y)   INR used for dosin.90 (2021)   Warfarin maintenance plan:  10 mg (10 mg x 1) every Sun; 15 mg (10 mg x 1.5) all other days   Weekly warfarin total:  100 mg   Plan last modified:  Moira Sheridan, PharmD (2020)   Next INR check:  2021   Target end date:  Indefinite    Indications    Chronic anticoagulation [Z79.01]  Long term (current) use of anticoagulants [Z79.01] [Z79.01]             Anticoagulation Episode Summary     INR check location:      Preferred lab:      Send INR reminders to:      Comments:        Anticoagulation Care Providers     Provider Role Specialty Phone number    Ninfa Marcial M.D. Referring Internal Medicine [phone]    [facility] Anticoagulation Services Responsible  [phone]        Anticoagulation Patient Findings          History of Present Illness: follow up appointment for chronic anticoagulation with the high risk medication, warfarin for PE    Last INR was at goal, pt is now sub therapeutic today.  Will bolus dose with 20mg po tonight, then resume current dosing regimen. Follow up in 4 weeks, to reduce the risk of adverse events related to this high risk medication, warfarin.    Maty Perkins, Clinical Pharmacist

## 2021-01-11 DIAGNOSIS — Z23 NEED FOR VACCINATION: ICD-10-CM

## 2021-01-17 ENCOUNTER — IMMUNIZATION (OUTPATIENT)
Dept: FAMILY PLANNING/WOMEN'S HEALTH CLINIC | Facility: IMMUNIZATION CENTER | Age: 81
End: 2021-01-17
Attending: INTERNAL MEDICINE
Payer: MEDICARE

## 2021-01-17 DIAGNOSIS — Z23 NEED FOR VACCINATION: ICD-10-CM

## 2021-01-17 DIAGNOSIS — Z23 ENCOUNTER FOR VACCINATION: Primary | ICD-10-CM

## 2021-01-17 PROCEDURE — 91301 MODERNA SARS-COV-2 VACCINE: CPT

## 2021-01-17 PROCEDURE — 0011A MODERNA SARS-COV-2 VACCINE: CPT

## 2021-02-01 ENCOUNTER — ANTICOAGULATION VISIT (OUTPATIENT)
Dept: MEDICAL GROUP | Facility: MEDICAL CENTER | Age: 81
End: 2021-02-01
Payer: MEDICARE

## 2021-02-01 DIAGNOSIS — I26.99 RECURRENT PULMONARY EMBOLISM (HCC): ICD-10-CM

## 2021-02-01 DIAGNOSIS — Z79.01 CHRONIC ANTICOAGULATION: ICD-10-CM

## 2021-02-01 DIAGNOSIS — Z79.01 LONG TERM (CURRENT) USE OF ANTICOAGULANTS: ICD-10-CM

## 2021-02-01 LAB — INR PPP: 1.7 (ref 2–3.5)

## 2021-02-01 PROCEDURE — 99211 OFF/OP EST MAY X REQ PHY/QHP: CPT | Performed by: INTERNAL MEDICINE

## 2021-02-01 PROCEDURE — 85610 PROTHROMBIN TIME: CPT | Performed by: INTERNAL MEDICINE

## 2021-02-01 NOTE — PROGRESS NOTES
OP Anticoagulation Service Note    Date: 2021  There were no vitals filed for this visit.   pt declined vitals    Anticoagulation Summary  As of 2021    INR goal:  2.0-3.0   TTR:  57.8 % (4 y)   INR used for dosin.70 (2021)   Warfarin maintenance plan:  15 mg (10 mg x 1.5) every day   Weekly warfarin total:  105 mg   Plan last modified:  Neelam Marks, PharmD (2021)   Next INR check:  3/4/2021   Target end date:  Indefinite    Indications    Long term (current) use of anticoagulants [Z79.01] [Z79.01]  Chronic anticoagulation [Z79.01]  Recurrent pulmonary embolism (HCC) [I26.99]             Anticoagulation Episode Summary     INR check location:      Preferred lab:      Send INR reminders to:      Comments:        Anticoagulation Care Providers     Provider Role Specialty Phone number    Ninfa Marcial M.D. Referring Internal Medicine [phone]    [facility]ticoagulation [facility] Responsible  [phone]        Anticoagulation Patient Findings  Patient Findings     Positives:  Change in diet/appetite (less vit K intake)    Negatives:  Signs/symptoms of thrombosis, Signs/symptoms of bleeding, Laboratory test error suspected, Change in health, Change in alcohol use, Change in activity, Upcoming invasive procedure, Emergency department visit, Upcoming dental procedure, Missed doses, Extra doses, Change in medications, Hospital admission, Bruising, Other complaints          HPI:   Natalie Anaya seen in clinic today, on anticoagulation therapy with warfarin (a high risk medication) for recurrent PE    Pt is here today to evaluate anticoagulation therapy    Previous INR was  1.9 on 21    Pt was instructed to bolus x 1 day then continue regimen    Confirmed warfarin dosing regimen, denies missed or extra doses of coumadin.   Diet has been consistent with foods rich in vitamin K: No  Changes in ETOH:  No  Changes in smoking status: No  Changes in medication: No   Cost restriction:  No  S/s of bleeding:  No  Falls or accidents since last visit No  Signs/symptoms  thrombosis since the last appt: No  =    A/P   INR  sub-therapeutic today, will require dose adjust ment today to prevent recurrence of thrombosis or stroke) and closer follow up    Bolus x 1 day then increase regimen by 5%     Our protocol suggests we test in 1-2 weeks.  Given the pt's risk factors and previous INR stability, it is reasonable to extend to 4 weeks to reduce Covid exposure.  This decision was made using shared decision making with the pt and benefits vs risks were discussed    11/21 check referral    Pt educated to contact our clinic with any changes in medications or s/s of bleeding or thrombosis. Pt is aware to seek immediate medical attention for falls, head injury or deep cuts    Follow up appointment in 4 week(s) to reduce risk of adverse events from warfarin   Neelam Marks, DarwinD

## 2021-02-03 DIAGNOSIS — G40.909 SEIZURE DISORDER (HCC): ICD-10-CM

## 2021-02-03 RX ORDER — PRIMIDONE 250 MG/1
250 TABLET ORAL 3 TIMES DAILY
Qty: 300 TAB | Refills: 3 | Status: SHIPPED | OUTPATIENT
Start: 2021-02-03 | End: 2022-03-14 | Stop reason: SDUPTHER

## 2021-02-03 NOTE — TELEPHONE ENCOUNTER
Received request via: Pharmacy    Was the patient seen in the last year in this department? Yes    Does the patient have an active prescription (recently filled or refills available) for medication(s) requested? No     Last OV 9/1/20.

## 2021-02-14 ENCOUNTER — IMMUNIZATION (OUTPATIENT)
Dept: FAMILY PLANNING/WOMEN'S HEALTH CLINIC | Facility: IMMUNIZATION CENTER | Age: 81
End: 2021-02-14
Attending: INTERNAL MEDICINE
Payer: MEDICARE

## 2021-02-14 DIAGNOSIS — Z23 ENCOUNTER FOR VACCINATION: Primary | ICD-10-CM

## 2021-02-14 PROCEDURE — 91301 MODERNA SARS-COV-2 VACCINE: CPT

## 2021-02-14 PROCEDURE — 0012A MODERNA SARS-COV-2 VACCINE: CPT

## 2021-03-08 ENCOUNTER — ANTICOAGULATION VISIT (OUTPATIENT)
Dept: VASCULAR LAB | Facility: MEDICAL CENTER | Age: 81
End: 2021-03-08
Attending: INTERNAL MEDICINE
Payer: MEDICARE

## 2021-03-08 DIAGNOSIS — Z79.01 CHRONIC ANTICOAGULATION: ICD-10-CM

## 2021-03-08 DIAGNOSIS — I26.99 RECURRENT PULMONARY EMBOLISM (HCC): ICD-10-CM

## 2021-03-08 DIAGNOSIS — Z79.01 LONG TERM (CURRENT) USE OF ANTICOAGULANTS: ICD-10-CM

## 2021-03-08 LAB — INR PPP: 2.1 (ref 2–3.5)

## 2021-03-08 PROCEDURE — 99211 OFF/OP EST MAY X REQ PHY/QHP: CPT | Performed by: NURSE PRACTITIONER

## 2021-03-08 PROCEDURE — 85610 PROTHROMBIN TIME: CPT

## 2021-03-08 NOTE — Clinical Note
Thanks for scheduling this one for [place] on 4/5/21. I charted and charged. You poked and schedule. Wish we could tag team EVERY patient. HA!

## 2021-03-08 NOTE — PROGRESS NOTES
Anticoagulation Summary  As of 3/8/2021    INR goal:  2.0-3.0   TTR:  57.0 % (4.1 y)   INR used for dosin.10 (3/8/2021)   Warfarin maintenance plan:  15 mg (10 mg x 1.5) every day   Weekly warfarin total:  105 mg   Plan last modified:  Neelam Marks, PharmD (2021)   Next INR check:  2021   Target end date:  Indefinite    Indications    Long term (current) use of anticoagulants [Z79.01] [Z79.01]  Chronic anticoagulation [Z79.01]  Recurrent pulmonary embolism (HCC) [I26.99]             Anticoagulation Episode Summary     INR check location:      Preferred lab:      Send INR reminders to:      Comments:        Anticoagulation Care Providers     Provider Role Specialty Phone number    Ninfa Marcial M.D. Referring Internal Medicine [phone]    [facility] Anticoagulation Services Responsible  [phone]        Anticoagulation Patient Findings      HPI:  Natalie Anaya seen in clinic today for follow up on anticoagulation therapy in the presence of PE.   Denies any changes to current medical/health status since last appointment.   Denies any medication or diet changes.   No current symptoms of bleeding or thrombosis reported.    Pt on antiplatelet therapy - none.    A/P:   INR therapeutic.   Continue current regimen.     Pt educated to contact our clinic with any changes in medications or s/s of bleeding or thrombosis. Pt is aware to seek immediate medical attention for falls, head injury or deep cuts    Follow up appointment in 4 week(s).    Next Appointment: 2021 at     Yesika CELESTE

## 2021-03-09 DIAGNOSIS — I51.89 DIASTOLIC DYSFUNCTION: ICD-10-CM

## 2021-03-09 DIAGNOSIS — K21.9 GASTROESOPHAGEAL REFLUX DISEASE WITHOUT ESOPHAGITIS: ICD-10-CM

## 2021-03-09 RX ORDER — OMEPRAZOLE 20 MG/1
20 CAPSULE, DELAYED RELEASE ORAL
Qty: 100 CAPSULE | Refills: 3 | Status: SHIPPED | OUTPATIENT
Start: 2021-03-09 | End: 2022-03-14 | Stop reason: SDUPTHER

## 2021-03-09 RX ORDER — POTASSIUM CHLORIDE 20 MEQ/1
20 TABLET, EXTENDED RELEASE ORAL
Qty: 100 TABLET | Refills: 3 | Status: SHIPPED | OUTPATIENT
Start: 2021-03-09 | End: 2022-03-14

## 2021-03-09 RX ORDER — FUROSEMIDE 40 MG/1
40 TABLET ORAL
Qty: 100 TABLET | Refills: 3 | Status: SHIPPED | OUTPATIENT
Start: 2021-03-09 | End: 2022-01-11

## 2021-03-11 DIAGNOSIS — I51.89 DIASTOLIC DYSFUNCTION: ICD-10-CM

## 2021-03-11 DIAGNOSIS — K21.9 GASTROESOPHAGEAL REFLUX DISEASE WITHOUT ESOPHAGITIS: ICD-10-CM

## 2021-03-11 RX ORDER — POTASSIUM CHLORIDE 20 MEQ/1
20 TABLET, EXTENDED RELEASE ORAL
Qty: 100 TABLET | Refills: 3 | OUTPATIENT
Start: 2021-03-11

## 2021-03-11 RX ORDER — FUROSEMIDE 40 MG/1
40 TABLET ORAL
Qty: 100 TABLET | Refills: 3 | OUTPATIENT
Start: 2021-03-11

## 2021-03-12 NOTE — TELEPHONE ENCOUNTER
Received request via: Pharmacy    Was the patient seen in the last year in this department? Yes    Does the patient have an active prescription (recently filled or refills available) for medication(s) requested? No       LOV 9/1/2020

## 2021-03-15 ENCOUNTER — HOSPITAL ENCOUNTER (OUTPATIENT)
Dept: LAB | Facility: MEDICAL CENTER | Age: 81
End: 2021-03-15
Attending: INTERNAL MEDICINE
Payer: MEDICARE

## 2021-03-15 DIAGNOSIS — I51.89 DIASTOLIC DYSFUNCTION: ICD-10-CM

## 2021-03-15 DIAGNOSIS — E55.9 VITAMIN D DEFICIENCY: ICD-10-CM

## 2021-03-15 DIAGNOSIS — G62.9 NEUROPATHY: ICD-10-CM

## 2021-03-15 DIAGNOSIS — D50.8 IRON DEFICIENCY ANEMIA SECONDARY TO INADEQUATE DIETARY IRON INTAKE: ICD-10-CM

## 2021-03-15 DIAGNOSIS — E03.9 ACQUIRED HYPOTHYROIDISM: ICD-10-CM

## 2021-03-15 LAB
25(OH)D3 SERPL-MCNC: 80 NG/ML (ref 30–100)
ALBUMIN SERPL BCP-MCNC: 4 G/DL (ref 3.2–4.9)
ALBUMIN/GLOB SERPL: 1.5 G/DL
ALP SERPL-CCNC: 77 U/L (ref 30–99)
ALT SERPL-CCNC: 33 U/L (ref 2–50)
ANION GAP SERPL CALC-SCNC: 10 MMOL/L (ref 7–16)
AST SERPL-CCNC: 21 U/L (ref 12–45)
BASOPHILS # BLD AUTO: 0.4 % (ref 0–1.8)
BASOPHILS # BLD: 0.02 K/UL (ref 0–0.12)
BILIRUB SERPL-MCNC: 0.3 MG/DL (ref 0.1–1.5)
BUN SERPL-MCNC: 30 MG/DL (ref 8–22)
CALCIUM SERPL-MCNC: 9.3 MG/DL (ref 8.5–10.5)
CHLORIDE SERPL-SCNC: 104 MMOL/L (ref 96–112)
CO2 SERPL-SCNC: 30 MMOL/L (ref 20–33)
CREAT SERPL-MCNC: 0.45 MG/DL (ref 0.5–1.4)
EOSINOPHIL # BLD AUTO: 0.09 K/UL (ref 0–0.51)
EOSINOPHIL NFR BLD: 2 % (ref 0–6.9)
ERYTHROCYTE [DISTWIDTH] IN BLOOD BY AUTOMATED COUNT: 47.4 FL (ref 35.9–50)
GLOBULIN SER CALC-MCNC: 2.7 G/DL (ref 1.9–3.5)
GLUCOSE SERPL-MCNC: 106 MG/DL (ref 65–99)
HCT VFR BLD AUTO: 43.7 % (ref 37–47)
HGB BLD-MCNC: 14.4 G/DL (ref 12–16)
IMM GRANULOCYTES # BLD AUTO: 0.01 K/UL (ref 0–0.11)
IMM GRANULOCYTES NFR BLD AUTO: 0.2 % (ref 0–0.9)
LYMPHOCYTES # BLD AUTO: 0.96 K/UL (ref 1–4.8)
LYMPHOCYTES NFR BLD: 21.6 % (ref 22–41)
MCH RBC QN AUTO: 34 PG (ref 27–33)
MCHC RBC AUTO-ENTMCNC: 33 G/DL (ref 33.6–35)
MCV RBC AUTO: 103.1 FL (ref 81.4–97.8)
MONOCYTES # BLD AUTO: 0.32 K/UL (ref 0–0.85)
MONOCYTES NFR BLD AUTO: 7.2 % (ref 0–13.4)
NEUTROPHILS # BLD AUTO: 3.05 K/UL (ref 2–7.15)
NEUTROPHILS NFR BLD: 68.6 % (ref 44–72)
NRBC # BLD AUTO: 0 K/UL
NRBC BLD-RTO: 0 /100 WBC
PLATELET # BLD AUTO: 237 K/UL (ref 164–446)
PMV BLD AUTO: 9.7 FL (ref 9–12.9)
POTASSIUM SERPL-SCNC: 3.7 MMOL/L (ref 3.6–5.5)
PROT SERPL-MCNC: 6.7 G/DL (ref 6–8.2)
RBC # BLD AUTO: 4.24 M/UL (ref 4.2–5.4)
SODIUM SERPL-SCNC: 144 MMOL/L (ref 135–145)
TSH SERPL DL<=0.005 MIU/L-ACNC: 0.71 UIU/ML (ref 0.38–5.33)
VIT B12 SERPL-MCNC: 318 PG/ML (ref 211–911)
WBC # BLD AUTO: 4.5 K/UL (ref 4.8–10.8)

## 2021-03-15 PROCEDURE — 85025 COMPLETE CBC W/AUTO DIFF WBC: CPT

## 2021-03-15 PROCEDURE — 36415 COLL VENOUS BLD VENIPUNCTURE: CPT

## 2021-03-15 PROCEDURE — 80053 COMPREHEN METABOLIC PANEL: CPT

## 2021-03-15 PROCEDURE — 82306 VITAMIN D 25 HYDROXY: CPT

## 2021-03-15 PROCEDURE — 84443 ASSAY THYROID STIM HORMONE: CPT

## 2021-03-15 PROCEDURE — 82607 VITAMIN B-12: CPT

## 2021-03-18 ENCOUNTER — OFFICE VISIT (OUTPATIENT)
Dept: MEDICAL GROUP | Facility: PHYSICIAN GROUP | Age: 81
End: 2021-03-18
Payer: MEDICARE

## 2021-03-18 VITALS
BODY MASS INDEX: 34.86 KG/M2 | TEMPERATURE: 99 F | DIASTOLIC BLOOD PRESSURE: 60 MMHG | OXYGEN SATURATION: 92 % | WEIGHT: 204.2 LBS | HEART RATE: 68 BPM | HEIGHT: 64 IN | SYSTOLIC BLOOD PRESSURE: 128 MMHG

## 2021-03-18 DIAGNOSIS — G40.909 SEIZURE DISORDER (HCC): ICD-10-CM

## 2021-03-18 DIAGNOSIS — J96.11 CHRONIC RESPIRATORY FAILURE WITH HYPOXIA (HCC): ICD-10-CM

## 2021-03-18 DIAGNOSIS — G62.9 NEUROPATHY: ICD-10-CM

## 2021-03-18 DIAGNOSIS — E53.8 B12 DEFICIENCY: ICD-10-CM

## 2021-03-18 DIAGNOSIS — I27.21 SECONDARY PULMONARY ARTERIAL HYPERTENSION (HCC): ICD-10-CM

## 2021-03-18 PROCEDURE — 99215 OFFICE O/P EST HI 40 MIN: CPT | Performed by: INTERNAL MEDICINE

## 2021-03-18 RX ORDER — CYANOCOBALAMIN 1000 UG/ML
1000 INJECTION, SOLUTION INTRAMUSCULAR; SUBCUTANEOUS ONCE
Status: COMPLETED | OUTPATIENT
Start: 2021-03-18 | End: 2021-03-18

## 2021-03-18 RX ADMIN — CYANOCOBALAMIN 1000 MCG: 1000 INJECTION, SOLUTION INTRAMUSCULAR; SUBCUTANEOUS at 15:02

## 2021-03-18 ASSESSMENT — PATIENT HEALTH QUESTIONNAIRE - PHQ9: CLINICAL INTERPRETATION OF PHQ2 SCORE: 0

## 2021-03-18 ASSESSMENT — FIBROSIS 4 INDEX: FIB4 SCORE: 1.26

## 2021-03-18 NOTE — PROGRESS NOTES
Subjective:   Chief Complaint/History of Present Illness:  Natalie Anaya is a 80 y.o. adult established patient who presents today to discuss medical problems as listed below. Natalie is accompanied by her , Marlo.    Problem   Seizure Disorder (Hcc)    Last seizure in 1982. Was treated with dilantin and Primidone since teenage. Discontinued dilantin for 20 years. Wants to keep taking primidone as she concerns recurrent seizures if she stopped all medications.  No concerns regarding this problem.       B12 Deficiency       Ref. Range 3/15/2021 08:49   Vitamin B12 -True Cobalamin Latest Ref Range: 211 - 911 pg/mL 318     She has neuropathy that is quite bothersome on her bilateral lower legs/feet. She thinks they have vitamin B12 at home but is not taking any at this time.     Neuropathy    She endorses neuropathy since September 2019.  Most predominant in the left foot over the heel.  Manifest with burning most notable in the evening when she is lying in bed.  She is not able to put a bedsheet over because of the intense burning.  Does not happen much during the day.  Occasionally has some associated pain but it is mainly burning.      She has tried Tylenol which helps with achy joints but has not had much effect on the burning pain.  She thinks she has neuropathy, she is not discussed this with anyone previously.      We noted borderline B12 deficiency on blood work. She has thyroid disease on replacement.  No excessive alcohol use.  She has normal hemoglobin and kidney function and no known underlying bone marrow disease such as myeloma.  No history of type 2 diabetes.     Chronic respiratory failure with hypoxia (HCC)- nocturnal    Patient uses 2 L of supplemental oxygen at night when she is sleeping.  She reports that this is now in lieu of a CPAP as she has stopped using this device (since her hip fracture and recovery in July 2019).  She denies use of supplemental oxygen during the daytime or on  exertion. Repeat OPO in June 2020 was sufficient, unclear if she was wearing supplemental oxygen that night for the test?     Secondary Pulmonary Arterial Hypertension (Hcc)    Echocardiogram (7/2017):  Estimated right ventricular systolic pressure is 60 mmHg. Compared to the report of the study done - there has been improvement in RV size and function and a slight decrease in RVSP and TR.     Echocardiogram (2/2020):  Normal LVEF 70%. Grade II diastolic dysfunction. Mild MR, moderate TR. Estimated RVSP is 65 mmHg.    She follows with pulmonary medicine, Dr. Irvin.  She is on Lasix 40 mg daily with potassium chloride 20 mEq daily.  Blood pressure is at goal running 128-134/60 74.  She continues to be hypervolemic especially notable in her bilateral lower extremities.            Current Medications:  Current Outpatient Medications Ordered in Epic   Medication Sig Dispense Refill   • Capsaicin in Lidocaine Vehicle 0.25 % Cream Apply 1 Application topically at bedtime as needed. Apply to areas of neuropathic pain on feet/lower legs 60 g 1   • omeprazole (PRILOSEC) 20 MG delayed-release capsule Take 1 capsule by mouth every day. 100 capsule 3   • furosemide (LASIX) 40 MG Tab Take 1 tablet by mouth every day. 100 tablet 3   • potassium chloride SA (KDUR) 20 MEQ Tab CR Take 1 tablet by mouth every day. 100 tablet 3   • primidone (MYSOLINE) 250 MG Tab Take 1 Tab by mouth 3 times a day. 300 Tab 3   • warfarin (COUMADIN) 10 MG Tab Take one to one and one-half (1-1.5) tablets daily as directed by [facility]  Indications: Blockage of Blood Vessel to Lung by a Particle 135 Tab 1   • levothyroxine (SYNTHROID) 100 MCG Tab Take 1 Tab by mouth every day. 90 Tab 3   • alendronate (FOSAMAX) 70 MG Tab TAKE 1 TABLET BY MOUTH ONCE A WEEK 12 Tab 3   • ferrous sulfate (FEROSUL) 325 (65 Fe) MG tablet Take 1 Tab by mouth every 48 hours. 90 Tab 3   • Home Care Oxygen Inhale 2 L/min by mouth every bedtime.     • Melatonin  "10 MG Tab Take 15 mg by mouth every bedtime.     • Probiotic Product (PROBIOTIC DAILY PO) Take 1 Cap by mouth every day.     • Glucos-Chond-Hyal Ac-Ca Fructo (MOVE FREE JOINT HEALTH ADVANCE) Tab Take 1 Tab by mouth every day.     • Multiple Vitamins-Minerals (WOMENS MULTI VITAMIN & MINERAL) Tab Take 1 Tab by mouth every day.     • Cholecalciferol (VITAMIN D-3) 1000 UNITS Cap Take 1,000 Units by mouth every day.     • FLUAD QUADRIVALENT 0.5 ML Prefilled Syringe 0.5 mL by Intramuscular route. Administer 0.5 mL intramuscularly as directed     • amoxicillin (AMOXIL) 500 MG Cap DNC       No current Epic-ordered facility-administered medications on file.          Objective:   Physical Exam:    Vitals: /60 (BP Location: Right arm, Patient Position: Sitting, BP Cuff Size: Adult)   Pulse 68   Temp 37.2 °C (99 °F) (Temporal)   Ht 1.626 m (5' 4\")   Wt 92.6 kg (204 lb 3.2 oz)   SpO2 92%    BMI: Body mass index is 35.05 kg/m².  Physical Exam   Constitutional: Natalie Anaya is well-developed, well-nourished, and in no distress.   Eyes: Conjunctivae are normal.   Cardiovascular: Normal rate and regular rhythm.   Pulmonary/Chest: Effort normal and breath sounds normal. No respiratory distress.   Musculoskeletal:         General: Edema present.      Comments: Bilateral lower extremities   Neurological: Natalie Anaya is alert.   Skin:   Venous stasis changes in lower legs bilaterally   Psychiatric: Mood, memory, affect and judgment normal.             Assessment and Plan:   Natalie is a 80 y.o. adult with the following:  Problem List Items Addressed This Visit     Secondary pulmonary arterial hypertension (HCC)     Chronic and ongoing problem.  Likely secondary to scarring from previous pulmonary emboli as well as untreated obstructive sleep apnea.  She wants to stay off the CPAP mask moving forward.  She will plan to continue with supplemental oxygen overnight. Repeat OPO from June 2020 was within normal " limits.         Chronic respiratory failure with hypoxia (HCC)- nocturnal     Chronic and stable problem.  Continue supplemental oxygen at night for nocturnal hypoxia.  Would recommend resuming CPAP device due to pulmonary hypertension but she declines as she does not tolerate the mask.         Neuropathy     Chronic and ongoing problem. She would be willing to try a topical formulation such as lidocaine-capsaicin. This has been sent to her pharmacy. She will keep me updated. Also discussed oral options such as gabapentin or duloxetine.          Relevant Medications    Capsaicin in Lidocaine Vehicle 0.25 % Cream    B12 deficiency     Due to low running B12, fatigue, and neuropathy we elected to proceed with B12 injection in clinic today. She will also check and see if they have B12 at home and start taking this.         Seizure disorder (HCC)     Chronic and stable problem.  She would like to continue on the primidone due to concern of recurrent seizures off all medicine.  Reasonable to continue as it is not giving her any issues.                  Annual Health Assessment Questions:    1.  Are you currently engaging in any exercise or physical activity? No    2.  How would you describe your mood or emotional well-being today? good    3.  Have you had any falls in the last year? No    4.  Have you noticed any problems with your balance or had difficulty walking? Yes  Has poor balance  Has and uses cane    5.  In the last six months have you experienced any leakage of urine? Yes    6. DPA/Advanced Directive: Patient has Advanced Directive on file.        RTC: Return in about 4 months (around 7/18/2021).    I spent a total of 40 minutes with record review, exam, communication with the patient, communication with other providers, and documentation of this encounter.    PLEASE NOTE: This dictation was created using voice recognition software. I have made every reasonable attempt to correct obvious errors, but I expect  that there are errors of grammar and possibly content that I did not discover before finalizing the note.      Kiley Manrique, DO  Geriatric and Internal Medicine  [facility]

## 2021-03-19 NOTE — ASSESSMENT & PLAN NOTE
Chronic and ongoing problem. She would be willing to try a topical formulation such as lidocaine-capsaicin. This has been sent to her pharmacy. She will keep me updated. Also discussed oral options such as gabapentin or duloxetine.

## 2021-03-19 NOTE — ASSESSMENT & PLAN NOTE
Chronic and stable problem.  She would like to continue on the primidone due to concern of recurrent seizures off all medicine.  Reasonable to continue as it is not giving her any issues.

## 2021-03-19 NOTE — ASSESSMENT & PLAN NOTE
Chronic and stable problem.  Continue supplemental oxygen at night for nocturnal hypoxia.  Would recommend resuming CPAP device due to pulmonary hypertension but she declines as she does not tolerate the mask.

## 2021-03-19 NOTE — ASSESSMENT & PLAN NOTE
Due to low running B12, fatigue, and neuropathy we elected to proceed with B12 injection in clinic today. She will also check and see if they have B12 at home and start taking this.

## 2021-03-19 NOTE — ASSESSMENT & PLAN NOTE
Chronic and ongoing problem.  Likely secondary to scarring from previous pulmonary emboli as well as untreated obstructive sleep apnea.  She wants to stay off the CPAP mask moving forward.  She will plan to continue with supplemental oxygen overnight. Repeat OPO from June 2020 was within normal limits.

## 2021-03-22 DIAGNOSIS — Z79.01 CHRONIC ANTICOAGULATION: ICD-10-CM

## 2021-03-22 RX ORDER — WARFARIN SODIUM 10 MG/1
TABLET ORAL
Qty: 135 TABLET | Refills: 1 | Status: SHIPPED | OUTPATIENT
Start: 2021-03-22 | End: 2021-03-31 | Stop reason: SDUPTHER

## 2021-03-31 DIAGNOSIS — Z79.01 CHRONIC ANTICOAGULATION: ICD-10-CM

## 2021-03-31 RX ORDER — WARFARIN SODIUM 10 MG/1
TABLET ORAL
Qty: 135 TABLET | Refills: 1 | Status: SHIPPED | OUTPATIENT
Start: 2021-03-31 | End: 2021-09-07

## 2021-04-05 ENCOUNTER — ANTICOAGULATION VISIT (OUTPATIENT)
Dept: MEDICAL GROUP | Facility: MEDICAL CENTER | Age: 81
End: 2021-04-05
Payer: MEDICARE

## 2021-04-05 DIAGNOSIS — Z79.01 LONG TERM (CURRENT) USE OF ANTICOAGULANTS: ICD-10-CM

## 2021-04-05 DIAGNOSIS — I26.99 RECURRENT PULMONARY EMBOLISM (HCC): ICD-10-CM

## 2021-04-05 DIAGNOSIS — Z79.01 CHRONIC ANTICOAGULATION: ICD-10-CM

## 2021-04-05 LAB — INR PPP: 2.3 (ref 2–3.5)

## 2021-04-05 PROCEDURE — 85610 PROTHROMBIN TIME: CPT | Performed by: INTERNAL MEDICINE

## 2021-04-05 PROCEDURE — 93793 ANTICOAG MGMT PT WARFARIN: CPT | Performed by: INTERNAL MEDICINE

## 2021-04-05 NOTE — PROGRESS NOTES
OP Anticoagulation Service Note    Date: 2021  There were no vitals filed for this visit.   pt declined vitals    Anticoagulation Summary  As of 2021    INR goal:  2.0-3.0   TTR:  57.8 % (4.2 y)   INR used for dosin.30 (2021)   Warfarin maintenance plan:  15 mg (10 mg x 1.5) every day   Weekly warfarin total:  105 mg   Plan last modified:  Neelam Marks, PharmD (2021)   Next INR check:  2021   Target end date:  Indefinite    Indications    Long term (current) use of anticoagulants [Z79.01] [Z79.01]  Chronic anticoagulation [Z79.01]  Recurrent pulmonary embolism (HCC) [I26.99]             Anticoagulation Episode Summary     INR check location:      Preferred lab:      Send INR reminders to:      Comments:        Anticoagulation Care Providers     Provider Role Specialty Phone number    Ninfa Marcial M.D. Referring Internal Medicine [phone]    [facility] Anticoagulation Services Responsible  [phone]        Anticoagulation Patient Findings      HPI:   Natalie Anaya seen in clinic today, on anticoagulation therapy with warfarin (a high risk medication) for hx of PE    Pt is here today to evaluate anticoagulation therapy    Previous INR was  2.1 on 3/8/21    Pt was instructed to continue current regimen    Confirmed warfarin dosing regimen, denies missed or extra doses of coumadin.   Diet has been consistent with foods rich in vitamin K: Yes  Changes in ETOH:  No  Changes in smoking status: No  Changes in medication: No   Pt is not on antiplatelet therapy  Cost restriction: No  S/s of bleeding:  No  Falls or accidents since last visit No  Signs/symptoms  thrombosis since the last appt: No      A/P   INR  therapeutic today   Continue current warfarin regimen        Pt educated to contact our clinic with any changes in medications or s/s of bleeding or thrombosis. Pt is aware to seek immediate medical attention for falls, head injury or deep cuts    Follow up appointment in  6 week(s) to reduce risk of adverse events from warfarin  Moira Sheridan, PharmD

## 2021-05-17 ENCOUNTER — APPOINTMENT (OUTPATIENT)
Dept: MEDICAL GROUP | Facility: MEDICAL CENTER | Age: 81
End: 2021-05-17
Payer: MEDICARE

## 2021-05-20 ENCOUNTER — ANTICOAGULATION VISIT (OUTPATIENT)
Dept: MEDICAL GROUP | Facility: MEDICAL CENTER | Age: 81
End: 2021-05-20
Payer: MEDICARE

## 2021-05-20 DIAGNOSIS — Z79.01 CHRONIC ANTICOAGULATION: Primary | ICD-10-CM

## 2021-05-20 DIAGNOSIS — Z79.01 LONG TERM (CURRENT) USE OF ANTICOAGULANTS: ICD-10-CM

## 2021-05-20 DIAGNOSIS — I26.99 RECURRENT PULMONARY EMBOLISM (HCC): ICD-10-CM

## 2021-05-20 LAB — INR PPP: 2.5 (ref 2–3.5)

## 2021-05-20 PROCEDURE — 85610 PROTHROMBIN TIME: CPT | Performed by: INTERNAL MEDICINE

## 2021-05-20 PROCEDURE — 93793 ANTICOAG MGMT PT WARFARIN: CPT | Performed by: INTERNAL MEDICINE

## 2021-05-20 NOTE — PROGRESS NOTES
OP Anticoagulation Service Note    Date: 2021  There were no vitals filed for this visit.   pt declined vitals    Anticoagulation Summary  As of 2021    INR goal:  2.0-3.0   TTR:  59.0 % (4.3 y)   INR used for dosin.50 (2021)   Warfarin maintenance plan:  15 mg (10 mg x 1.5) every day   Weekly warfarin total:  105 mg   Plan last modified:  Neelam Marks, PharmD (2021)   Next INR check:  2021   Target end date:  Indefinite    Indications    Long term (current) use of anticoagulants [Z79.01] [Z79.01]  Chronic anticoagulation [Z79.01]  Recurrent pulmonary embolism (HCC) [I26.99]             Anticoagulation Episode Summary     INR check location:      Preferred lab:      Send INR reminders to:      Comments:        Anticoagulation Care Providers     Provider Role Specialty Phone number    Ninfa Marcial M.D. Referring Internal Medicine [phone]    [facility]ticoagulation Services Responsible  [phone]        Anticoagulation Patient Findings  Patient Findings     Negatives:  Signs/symptoms of thrombosis, Signs/symptoms of bleeding, Laboratory test error suspected, Change in health, Change in alcohol use, Change in activity, Upcoming invasive procedure, Emergency department visit, Upcoming dental procedure, Missed doses, Extra doses, Change in medications, Change in diet/appetite, Hospital admission, Bruising, Other complaints          HPI:   Natalie Anaya seen in clinic today, on anticoagulation therapy with warfarin (a high risk medication) for hx of PE      Pt is here today to evaluate anticoagulation therapy    Previous INR was  2.3 on 21     Pt was instructed to continue current regimen    Confirmed warfarin dosing regimen, denies missed or extra doses of coumadin.   Diet has been consistent with foods rich in vitamin K: Yes  Changes in ETOH:  No  Changes in smoking status: No  Changes in medication: No   Pt is not on antiplatelet/NSAID therapy  Cost restriction:  No  S/s of bleeding:  No  Falls or accidents since last visit No  Signs/symptoms  thrombosis since the last appt: No      A/P   INR  therapeutic today,    Continue current warfarin regimen        11/21 check referral    Pt educated to contact our clinic with any changes in medications or s/s of bleeding or thrombosis. Pt is aware to seek immediate medical attention for falls, head injury or deep cuts    Follow up appointment in 8 week(s) to reduce risk of adverse events from warfarin  Moira Sheridan, PharmD

## 2021-06-19 DIAGNOSIS — M85.80 OSTEOPENIA WITH HIGH RISK OF FRACTURE: ICD-10-CM

## 2021-06-19 DIAGNOSIS — D50.9 IRON DEFICIENCY ANEMIA, UNSPECIFIED IRON DEFICIENCY ANEMIA TYPE: ICD-10-CM

## 2021-06-21 RX ORDER — ALENDRONATE SODIUM 70 MG/1
TABLET ORAL
Qty: 12 TABLET | Refills: 3 | Status: SHIPPED | OUTPATIENT
Start: 2021-06-21 | End: 2022-04-18

## 2021-06-21 RX ORDER — FERROUS SULFATE 325(65) MG
TABLET ORAL
Qty: 100 TABLET | Refills: 3 | Status: SHIPPED | OUTPATIENT
Start: 2021-06-21 | End: 2021-07-22

## 2021-06-22 NOTE — TELEPHONE ENCOUNTER
Received request via: Pharmacy    Was the patient seen in the last year in this department? Yes    Does the patient have an active prescription (recently filled or refills available) for medication(s) requested? No     Last OV 3/18/2021.

## 2021-07-08 ENCOUNTER — OFFICE VISIT (OUTPATIENT)
Dept: SLEEP MEDICINE | Facility: MEDICAL CENTER | Age: 81
End: 2021-07-08
Payer: MEDICARE

## 2021-07-08 VITALS
HEIGHT: 64 IN | SYSTOLIC BLOOD PRESSURE: 134 MMHG | HEART RATE: 70 BPM | WEIGHT: 195 LBS | DIASTOLIC BLOOD PRESSURE: 68 MMHG | TEMPERATURE: 97.7 F | OXYGEN SATURATION: 91 % | BODY MASS INDEX: 33.29 KG/M2 | RESPIRATION RATE: 16 BRPM

## 2021-07-08 DIAGNOSIS — E66.9 CLASS 1 OBESITY WITH BODY MASS INDEX (BMI) OF 33.0 TO 33.9 IN ADULT, UNSPECIFIED OBESITY TYPE, UNSPECIFIED WHETHER SERIOUS COMORBIDITY PRESENT: ICD-10-CM

## 2021-07-08 DIAGNOSIS — I26.99 RECURRENT PULMONARY EMBOLISM (HCC): ICD-10-CM

## 2021-07-08 DIAGNOSIS — G47.33 OBSTRUCTIVE SLEEP APNEA SYNDROME: ICD-10-CM

## 2021-07-08 DIAGNOSIS — J96.11 CHRONIC RESPIRATORY FAILURE WITH HYPOXIA (HCC): ICD-10-CM

## 2021-07-08 PROCEDURE — 99214 OFFICE O/P EST MOD 30 MIN: CPT | Performed by: INTERNAL MEDICINE

## 2021-07-08 ASSESSMENT — ENCOUNTER SYMPTOMS
SINUS PAIN: 0
VOMITING: 0
CONSTIPATION: 0
STRIDOR: 0
TREMORS: 0
FALLS: 0
BLURRED VISION: 0
FOCAL WEAKNESS: 0
DIARRHEA: 0
HEMOPTYSIS: 0
SPEECH CHANGE: 0
ORTHOPNEA: 0
ABDOMINAL PAIN: 0
WHEEZING: 0
WEIGHT LOSS: 0
EYE DISCHARGE: 0
PALPITATIONS: 0
HEADACHES: 0
PHOTOPHOBIA: 0
MYALGIAS: 0
DEPRESSION: 0
NAUSEA: 0
DIAPHORESIS: 0
SORE THROAT: 0
SHORTNESS OF BREATH: 0
NECK PAIN: 0
SPUTUM PRODUCTION: 0
DIZZINESS: 0
BACK PAIN: 0
CLAUDICATION: 0
FEVER: 0
EYE PAIN: 0
COUGH: 0
HEARTBURN: 0
CHILLS: 0
PND: 0
WEAKNESS: 0
EYE REDNESS: 0
DOUBLE VISION: 0

## 2021-07-08 ASSESSMENT — FIBROSIS 4 INDEX: FIB4 SCORE: 1.26

## 2021-07-12 ENCOUNTER — TELEPHONE (OUTPATIENT)
Dept: MEDICAL GROUP | Facility: PHYSICIAN GROUP | Age: 81
End: 2021-07-12

## 2021-07-12 NOTE — TELEPHONE ENCOUNTER
1. Caller Name: Natalie Anaya                          Call Back Number: [phone] (home)       How would the patient prefer to be contacted with a response: Phone call OK to leave a detailed message    Tae called about labs to do before 7/22/21 visit with you.  Do you need to order them?  (morgan too but separate message)

## 2021-07-13 DIAGNOSIS — I10 HTN (HYPERTENSION), BENIGN: ICD-10-CM

## 2021-07-13 DIAGNOSIS — E53.8 B12 DEFICIENCY: ICD-10-CM

## 2021-07-13 DIAGNOSIS — D50.8 IRON DEFICIENCY ANEMIA SECONDARY TO INADEQUATE DIETARY IRON INTAKE: ICD-10-CM

## 2021-07-13 DIAGNOSIS — E03.9 ACQUIRED HYPOTHYROIDISM: ICD-10-CM

## 2021-07-13 DIAGNOSIS — R73.01 ELEVATED FASTING BLOOD SUGAR: ICD-10-CM

## 2021-07-15 ENCOUNTER — APPOINTMENT (OUTPATIENT)
Dept: MEDICAL GROUP | Facility: MEDICAL CENTER | Age: 81
End: 2021-07-15
Payer: MEDICARE

## 2021-07-15 ENCOUNTER — ANTICOAGULATION MONITORING (OUTPATIENT)
Dept: MEDICAL GROUP | Facility: MEDICAL CENTER | Age: 81
End: 2021-07-15

## 2021-07-15 DIAGNOSIS — I26.99 RECURRENT PULMONARY EMBOLISM (HCC): ICD-10-CM

## 2021-07-15 DIAGNOSIS — Z79.01 CHRONIC ANTICOAGULATION: ICD-10-CM

## 2021-07-15 DIAGNOSIS — Z79.01 LONG TERM (CURRENT) USE OF ANTICOAGULANTS: ICD-10-CM

## 2021-07-15 LAB — INR PPP: 1.9 (ref 2–3.5)

## 2021-07-15 NOTE — PROGRESS NOTES
OP Telephone Anticoagulation Service Note    Date: 7/15/2021      Anticoagulation Summary  As of 7/15/2021    INR goal:  2.0-3.0   TTR:  59.8 % (4.5 y)   INR used for dosin.90 (7/15/2021)   Warfarin maintenance plan:  15 mg (10 mg x 1.5) every day   Weekly warfarin total:  105 mg   Plan last modified:  Neelam Marks PharmD (2021)   Next INR check:  2021   Target end date:  Indefinite    Indications    Long term (current) use of anticoagulants [Z79.01] [Z79.01]  Chronic anticoagulation [Z79.01]  Recurrent pulmonary embolism (HCC) [I26.99]             Anticoagulation Episode Summary     INR check location:      Preferred lab:      Send INR reminders to:      Comments:        Anticoagulation Care Providers     Provider Role Specialty Phone number    Ninfa Marcial M.D. Referring Internal Medicine [phone]    [facility] Anticoagulation Services Responsible  [phone]        Anticoagulation Patient Findings  Patient Findings     Negatives:  Signs/symptoms of thrombosis, Signs/symptoms of bleeding, Change in alcohol use, Upcoming invasive procedure, Upcoming dental procedure, Missed doses, Change in medications, Change in diet/appetite, Bruising            Plan: Spoke with patient on the phone.   Patient is  therapeutic today.   Confirmed dosing.   Instructed patient to call clinic if any unusual bleeding or bruising occurs.   Will continue dosing as outlined.   Will follow-up with patient in 4 week(s).          Moira Sheridan, Allyson

## 2021-07-16 ENCOUNTER — HOSPITAL ENCOUNTER (OUTPATIENT)
Dept: LAB | Facility: MEDICAL CENTER | Age: 81
End: 2021-07-16
Attending: INTERNAL MEDICINE
Payer: MEDICARE

## 2021-07-16 DIAGNOSIS — I10 HTN (HYPERTENSION), BENIGN: ICD-10-CM

## 2021-07-16 DIAGNOSIS — E03.9 ACQUIRED HYPOTHYROIDISM: ICD-10-CM

## 2021-07-16 DIAGNOSIS — R73.01 ELEVATED FASTING BLOOD SUGAR: ICD-10-CM

## 2021-07-16 DIAGNOSIS — D50.8 IRON DEFICIENCY ANEMIA SECONDARY TO INADEQUATE DIETARY IRON INTAKE: ICD-10-CM

## 2021-07-16 DIAGNOSIS — E53.8 B12 DEFICIENCY: ICD-10-CM

## 2021-07-16 LAB
ALBUMIN SERPL BCP-MCNC: 3.9 G/DL (ref 3.2–4.9)
ALBUMIN/GLOB SERPL: 1.6 G/DL
ALP SERPL-CCNC: 64 U/L (ref 30–99)
ALT SERPL-CCNC: 18 U/L (ref 2–50)
ANION GAP SERPL CALC-SCNC: 11 MMOL/L (ref 7–16)
AST SERPL-CCNC: 16 U/L (ref 12–45)
BASOPHILS # BLD AUTO: 0.6 % (ref 0–1.8)
BASOPHILS # BLD: 0.03 K/UL (ref 0–0.12)
BILIRUB SERPL-MCNC: 0.2 MG/DL (ref 0.1–1.5)
BUN SERPL-MCNC: 23 MG/DL (ref 8–22)
CALCIUM SERPL-MCNC: 9 MG/DL (ref 8.5–10.5)
CHLORIDE SERPL-SCNC: 109 MMOL/L (ref 96–112)
CO2 SERPL-SCNC: 28 MMOL/L (ref 20–33)
CREAT SERPL-MCNC: 0.52 MG/DL (ref 0.5–1.4)
EOSINOPHIL # BLD AUTO: 0.07 K/UL (ref 0–0.51)
EOSINOPHIL NFR BLD: 1.3 % (ref 0–6.9)
ERYTHROCYTE [DISTWIDTH] IN BLOOD BY AUTOMATED COUNT: 46.9 FL (ref 35.9–50)
EST. AVERAGE GLUCOSE BLD GHB EST-MCNC: 117 MG/DL
FASTING STATUS PATIENT QL REPORTED: NORMAL
FERRITIN SERPL-MCNC: 31.8 NG/ML (ref 10–291)
GLOBULIN SER CALC-MCNC: 2.5 G/DL (ref 1.9–3.5)
GLUCOSE SERPL-MCNC: 99 MG/DL (ref 65–99)
HBA1C MFR BLD: 5.7 % (ref 4–5.6)
HCT VFR BLD AUTO: 45.5 % (ref 37–47)
HGB BLD-MCNC: 14.9 G/DL (ref 12–16)
IMM GRANULOCYTES # BLD AUTO: 0.01 K/UL (ref 0–0.11)
IMM GRANULOCYTES NFR BLD AUTO: 0.2 % (ref 0–0.9)
IRON SATN MFR SERPL: 93 % (ref 15–55)
IRON SERPL-MCNC: 226 UG/DL (ref 40–170)
LYMPHOCYTES # BLD AUTO: 0.85 K/UL (ref 1–4.8)
LYMPHOCYTES NFR BLD: 16.3 % (ref 22–41)
MCH RBC QN AUTO: 33.2 PG (ref 27–33)
MCHC RBC AUTO-ENTMCNC: 32.7 G/DL (ref 33.6–35)
MCV RBC AUTO: 101.3 FL (ref 81.4–97.8)
MONOCYTES # BLD AUTO: 0.38 K/UL (ref 0–0.85)
MONOCYTES NFR BLD AUTO: 7.3 % (ref 0–13.4)
NEUTROPHILS # BLD AUTO: 3.87 K/UL (ref 2–7.15)
NEUTROPHILS NFR BLD: 74.3 % (ref 44–72)
NRBC # BLD AUTO: 0 K/UL
NRBC BLD-RTO: 0 /100 WBC
PLATELET # BLD AUTO: 246 K/UL (ref 164–446)
PMV BLD AUTO: 9.2 FL (ref 9–12.9)
POTASSIUM SERPL-SCNC: 4.2 MMOL/L (ref 3.6–5.5)
PROT SERPL-MCNC: 6.4 G/DL (ref 6–8.2)
RBC # BLD AUTO: 4.49 M/UL (ref 4.2–5.4)
SODIUM SERPL-SCNC: 148 MMOL/L (ref 135–145)
T4 FREE SERPL-MCNC: 1.52 NG/DL (ref 0.93–1.7)
TIBC SERPL-MCNC: 243 UG/DL (ref 250–450)
TSH SERPL DL<=0.005 MIU/L-ACNC: 0.21 UIU/ML (ref 0.38–5.33)
UIBC SERPL-MCNC: <17 UG/DL (ref 110–370)
VIT B12 SERPL-MCNC: 321 PG/ML (ref 211–911)
WBC # BLD AUTO: 5.2 K/UL (ref 4.8–10.8)

## 2021-07-16 PROCEDURE — 83036 HEMOGLOBIN GLYCOSYLATED A1C: CPT

## 2021-07-16 PROCEDURE — 82728 ASSAY OF FERRITIN: CPT

## 2021-07-16 PROCEDURE — 85025 COMPLETE CBC W/AUTO DIFF WBC: CPT

## 2021-07-16 PROCEDURE — 80053 COMPREHEN METABOLIC PANEL: CPT

## 2021-07-16 PROCEDURE — 84443 ASSAY THYROID STIM HORMONE: CPT

## 2021-07-16 PROCEDURE — 83550 IRON BINDING TEST: CPT

## 2021-07-16 PROCEDURE — 83540 ASSAY OF IRON: CPT

## 2021-07-16 PROCEDURE — 36415 COLL VENOUS BLD VENIPUNCTURE: CPT

## 2021-07-16 PROCEDURE — 82607 VITAMIN B-12: CPT

## 2021-07-16 PROCEDURE — 84439 ASSAY OF FREE THYROXINE: CPT

## 2021-07-17 ENCOUNTER — PATIENT MESSAGE (OUTPATIENT)
Dept: HEALTH INFORMATION MANAGEMENT | Facility: OTHER | Age: 81
End: 2021-07-17

## 2021-07-22 ENCOUNTER — OFFICE VISIT (OUTPATIENT)
Dept: MEDICAL GROUP | Facility: PHYSICIAN GROUP | Age: 81
End: 2021-07-22
Payer: MEDICARE

## 2021-07-22 VITALS
WEIGHT: 204.1 LBS | DIASTOLIC BLOOD PRESSURE: 60 MMHG | TEMPERATURE: 98.1 F | SYSTOLIC BLOOD PRESSURE: 120 MMHG | HEIGHT: 62 IN | RESPIRATION RATE: 12 BRPM | BODY MASS INDEX: 37.56 KG/M2 | HEART RATE: 70 BPM | OXYGEN SATURATION: 93 %

## 2021-07-22 DIAGNOSIS — E53.8 B12 DEFICIENCY: ICD-10-CM

## 2021-07-22 DIAGNOSIS — Z78.0 MENOPAUSE: ICD-10-CM

## 2021-07-22 DIAGNOSIS — E87.0 HYPERNATREMIA: ICD-10-CM

## 2021-07-22 DIAGNOSIS — G62.9 NEUROPATHY: ICD-10-CM

## 2021-07-22 DIAGNOSIS — D50.8 IRON DEFICIENCY ANEMIA SECONDARY TO INADEQUATE DIETARY IRON INTAKE: ICD-10-CM

## 2021-07-22 DIAGNOSIS — Z23 NEED FOR SHINGLES VACCINE: ICD-10-CM

## 2021-07-22 DIAGNOSIS — E03.9 ACQUIRED HYPOTHYROIDISM: ICD-10-CM

## 2021-07-22 PROCEDURE — 96372 THER/PROPH/DIAG INJ SC/IM: CPT | Performed by: INTERNAL MEDICINE

## 2021-07-22 PROCEDURE — 90471 IMMUNIZATION ADMIN: CPT | Performed by: INTERNAL MEDICINE

## 2021-07-22 PROCEDURE — 99215 OFFICE O/P EST HI 40 MIN: CPT | Mod: 25 | Performed by: INTERNAL MEDICINE

## 2021-07-22 PROCEDURE — 90750 HZV VACC RECOMBINANT IM: CPT | Performed by: INTERNAL MEDICINE

## 2021-07-22 RX ORDER — CYANOCOBALAMIN 1000 UG/ML
1000 INJECTION, SOLUTION INTRAMUSCULAR; SUBCUTANEOUS ONCE
Status: COMPLETED | OUTPATIENT
Start: 2021-07-22 | End: 2021-07-22

## 2021-07-22 RX ORDER — GABAPENTIN 100 MG/1
100 CAPSULE ORAL
Qty: 30 CAPSULE | Refills: 1 | Status: SHIPPED | OUTPATIENT
Start: 2021-07-22 | End: 2021-09-17

## 2021-07-22 RX ADMIN — CYANOCOBALAMIN 1000 MCG: 1000 INJECTION, SOLUTION INTRAMUSCULAR; SUBCUTANEOUS at 14:22

## 2021-07-22 ASSESSMENT — FIBROSIS 4 INDEX: FIB4 SCORE: 1.257078722109417821

## 2021-07-22 NOTE — PROGRESS NOTES
Subjective:   Chief Complaint/History of Present Illness:  Natalie Anaya is a 80 y.o. adult established patient who presents today to discuss medical problems as listed below. Natalie is accompanied by her , Marlo.    Problem   Hypernatremia       Ref. Range 7/16/2021 08:21   Sodium Latest Ref Range: 135 - 145 mmol/L 148 (H)     She has mild elevation of her sodium levels.  She admits that she is a poor water drinker and just does not have strong thirst reflex.  She is also on diuretics due to chronic lower extremity edema.     B12 Deficiency       Ref. Range 7/16/2021 08:21   Vitamin B12 -True Cobalamin Latest Ref Range: 211 - 911 pg/mL 321     She has neuropathy that is quite bothersome on her bilateral lower legs/feet. She thinks they have vitamin B12 at home but is not taking any at this time.  Her B12 levels will improve and then declined again.  She be interested in a trial of B12 injections monthly.      Neuropathy    She endorses neuropathy since September 2019.  Most predominant in the left foot over the heel.  Manifest with burning most notable in the evening when she is lying in bed.  She is not able to put a bedsheet over because of the intense burning.  Does not happen much during the day.  Occasionally has some associated pain but it is mainly burning.      She has tried Tylenol which helps with achy joints but has not had much effect on the burning pain.  She thinks she has neuropathy, she is not discussed this with anyone previously.      We noted borderline B12 deficiency on blood work, starting on monthly injections. She has thyroid disease on replacement, borderline overtreatment.  No excessive alcohol use.  She has normal hemoglobin and kidney function and no known underlying bone marrow disease such as myeloma.  No history of type 2 diabetes, only prediabetes.  Will try topical capsaicin with lidocaine however she was unable to wash her hands after applying it and touched her face  which caused burning so she stopped using it.  She be interested in trying gabapentin at this time.    Current regiment gabapentin 100 mg at bedtime  Previous regimen: Topical lidocaine/capsaicin     Iron Deficiency Anemia    She has had work-up in the past including upper and lower endoscopy with GI consultants (Dr. Stanton) in April 2016.  Biopsy demonstrated mild chronic inflammation and reactive epithelial cells in the gastric antrum with a benign hyperplastic polyp and negative H. Pylori.  Biopsy also negative for celiac sprue.  Colonoscopy was benign with negative pathology on ascending colon polyp. Patient did not do capsule endoscopy at that time.     It appears that her hemoglobin levels improved with time.  She has been on and off iron supplements since that time.  She is currently taking an iron supplement 3 times per week.  She denies any constipation.    Current regimen: Observation off iron  Previous regimen: Oral iron 3 times per week     Acquired Hypothyroidism       Ref. Range 7/16/2021 08:21   TSH Latest Ref Range: 0.380 - 5.330 uIU/mL 0.210 (L)   Free T-4 Latest Ref Range: 0.93 - 1.70 ng/dL 1.52     She is taking levothyroxine 100 mcg daily.  No signs or symptoms of overtreatment or under treatment at this time.  She has been stable on this dose for many years.          Current Medications:  Current Outpatient Medications Ordered in Epic   Medication Sig Dispense Refill   • Calcium 500-100 MG-UNIT Chew Tab Chew. Takes 2 a day     • gabapentin (NEURONTIN) 100 MG Cap Take 1 capsule by mouth at bedtime. 30 capsule 1   • alendronate (FOSAMAX) 70 MG Tab TAKE 1 TABLET BY MOUTH ONCE A WEEK 12 tablet 3   • warfarin (COUMADIN) 10 MG Tab Take one and one-half (1.5) tablets daily or as directed by [facility]  Indications: Blockage of Blood Vessel to Lung by a Particle 135 tablet 1   • omeprazole (PRILOSEC) 20 MG delayed-release capsule Take 1 capsule by mouth every day. 100 capsule 3   •  "furosemide (LASIX) 40 MG Tab Take 1 tablet by mouth every day. 100 tablet 3   • potassium chloride SA (KDUR) 20 MEQ Tab CR Take 1 tablet by mouth every day. 100 tablet 3   • primidone (MYSOLINE) 250 MG Tab Take 1 Tab by mouth 3 times a day. (Patient taking differently: Take 250 mg by mouth. Two tabs at night and one tab in the morning) 300 Tab 3   • levothyroxine (SYNTHROID) 100 MCG Tab Take 1 Tab by mouth every day. 90 Tab 3   • amoxicillin (AMOXIL) 500 MG Cap Take 500 mg by mouth. Dental work     • Home Care Oxygen Inhale 2 L/min by mouth every bedtime.     • Melatonin 10 MG Tab Take 15 mg by mouth every bedtime.     • Probiotic Product (PROBIOTIC DAILY PO) Take 1 Cap by mouth every day.     • Glucos-Chond-Hyal Ac-Ca Fructo (MOVE FREE JOINT HEALTH ADVANCE) Tab Take 1 Tab by mouth every day.     • Multiple Vitamins-Minerals (WOMENS MULTI VITAMIN & MINERAL) Tab Take 1 Tab by mouth every day.     • Cholecalciferol (VITAMIN D-3) 1000 UNITS Cap Take 1,000 Units by mouth every day.       No current [facility]-ordered facility-administered medications on file.          Objective:   Physical Exam:    Vitals: /60 (BP Location: Left arm, Patient Position: Sitting, BP Cuff Size: Adult)   Pulse 70   Temp 36.7 °C (98.1 °F) (Temporal)   Resp 12   Ht 1.575 m (5' 2\")   Wt 92.6 kg (204 lb 1.6 oz)   SpO2 93%    BMI: Body mass index is 37.33 kg/m².  Physical Exam  Constitutional:       General: Natalie Anaya is not in acute distress.     Appearance: Natalie Anaya is not ill-appearing.   HENT:      Nose: Nose normal.   Eyes:      Extraocular Movements: Extraocular movements intact.      Conjunctiva/sclera: Conjunctivae normal.   Cardiovascular:      Rate and Rhythm: Normal rate.      Heart sounds: No murmur heard.     Pulmonary:      Effort: Pulmonary effort is normal. No respiratory distress.      Breath sounds: Normal breath sounds. No wheezing or rhonchi.   Musculoskeletal:      Right lower leg: Edema " present.      Left lower leg: Edema present.   Skin:     General: Skin is warm and dry.      Findings: Bruising present. No rash.   Neurological:      Sensory: Sensory deficit present.   Psychiatric:         Mood and Affect: Mood normal.         Behavior: Behavior normal.         Thought Content: Thought content normal.         Judgment: Judgment normal.               Assessment and Plan:   Natalie is a 80 y.o. adult with the following:  Problem List Items Addressed This Visit     Acquired hypothyroidism     Chronic and ongoing problem.  Her TSH has trended down somewhat free T4 still in the normal range.  She denies use of biotin containing products.  She does not have any signs of overtreatment so we will continue with her current dose and recheck again in 3 months.  Continue levothyroxine 100 mcg daily.         Relevant Orders    TSH    FREE THYROXINE    Iron deficiency anemia     Previous problem, improved.  She has significant anemia in the past and was given iron supplementation.  Her iron levels are now oversupplemented on 3 day/week oral iron.  Advised her to hold it and we could recheck her levels in a few months to see if she still requires any iron.  No anemia at this time.         Relevant Orders    FERRITIN    CBC WITH DIFFERENTIAL    IRON/TOTAL IRON BIND    Neuropathy     Chronic and ongoing problem, most bothersome medical problems she deals with.  She is willing to try oral medications to see if we can get her neuropathy improved at nighttime.  She reports her feet are on fire and she cannot sleep with a sheet over the top.  She tried the capsaicin/lidocaine however she was unable to get up and wash her hands after applying it and touched her face which led to burning.  I think is very reasonable to start a trial of gabapentin and slowly titrate up.  Another option would be for her to purchase medical gloves to use to apply the capsaicin/lidocaine so she can take them off and not have to get back out  of bed after the creams been applied to her feet to go wash her hands.  We will have our RN call her on Monday to follow-up on the gabapentin and see if she would be ready for dose increase.  We will start at 100 mg with maximum dose of 600 mg monitoring closely for side effects and oversedation.         Relevant Medications    gabapentin (NEURONTIN) 100 MG Cap    B12 deficiency     Chronic and decompensated problem.  B12 1000 mcg administered in clinic today.  Will reach out to her Coumadin pharmacist to see if this can be given at her monthly visits with the Coumadin pharmacist to avoid extra clinic visits.  We will plan to recheck levels before next appointment in 3 months.         Relevant Orders    VITAMIN B12    Hypernatremia     New problem, mild severity.  Discussed that is likely due to dehydration as she really has to make herself drink water.  We will continue to monitor and check again in 3 months to ensure stability.         Relevant Orders    Basic Metabolic Panel      Other Visit Diagnoses     Need for shingles vaccine        Relevant Orders    Shingles Vaccine (Shingrix) (Completed)    Menopause        Relevant Orders    DS-BONE DENSITY STUDY (DEXA)           RTC: Return in about 3 months (around 10/22/2021).    I spent a total of 43 minutes with record review, exam, communication with the patient, communication with other providers, and documentation of this encounter.    PLEASE NOTE: This dictation was created using voice recognition software. I have made every reasonable attempt to correct obvious errors, but I expect that there are errors of grammar and possibly content that I did not discover before finalizing the note.      Kiley Manrique, DO  Geriatric and Internal Medicine  [facility]

## 2021-07-23 NOTE — ASSESSMENT & PLAN NOTE
Chronic and ongoing problem, most bothersome medical problems she deals with.  She is willing to try oral medications to see if we can get her neuropathy improved at nighttime.  She reports her feet are on fire and she cannot sleep with a sheet over the top.  She tried the capsaicin/lidocaine however she was unable to get up and wash her hands after applying it and touched her face which led to burning.  I think is very reasonable to start a trial of gabapentin and slowly titrate up.  Another option would be for her to purchase medical gloves to use to apply the capsaicin/lidocaine so she can take them off and not have to get back out of bed after the creams been applied to her feet to go wash her hands.  We will have our RN call her on Monday to follow-up on the gabapentin and see if she would be ready for dose increase.  We will start at 100 mg with maximum dose of 600 mg monitoring closely for side effects and oversedation.

## 2021-07-23 NOTE — ASSESSMENT & PLAN NOTE
Previous problem, improved.  She has significant anemia in the past and was given iron supplementation.  Her iron levels are now oversupplemented on 3 day/week oral iron.  Advised her to hold it and we could recheck her levels in a few months to see if she still requires any iron.  No anemia at this time.

## 2021-07-23 NOTE — ASSESSMENT & PLAN NOTE
New problem, mild severity.  Discussed that is likely due to dehydration as she really has to make herself drink water.  We will continue to monitor and check again in 3 months to ensure stability.

## 2021-07-23 NOTE — ASSESSMENT & PLAN NOTE
Chronic and decompensated problem.  B12 1000 mcg administered in clinic today.  Will reach out to her Coumadin pharmacist to see if this can be given at her monthly visits with the Coumadin pharmacist to avoid extra clinic visits.  We will plan to recheck levels before next appointment in 3 months.

## 2021-07-23 NOTE — ASSESSMENT & PLAN NOTE
Chronic and ongoing problem.  Her TSH has trended down somewhat free T4 still in the normal range.  She denies use of biotin containing products.  She does not have any signs of overtreatment so we will continue with her current dose and recheck again in 3 months.  Continue levothyroxine 100 mcg daily.

## 2021-07-26 ENCOUNTER — TELEPHONE (OUTPATIENT)
Dept: MEDICAL GROUP | Facility: PHYSICIAN GROUP | Age: 81
End: 2021-07-26

## 2021-07-26 NOTE — TELEPHONE ENCOUNTER
Called pt, phone rang more than 15 times- not answered by answering machine, after extended ring time the phone changed from a ring to a bust signal. Unable to LVM.    Pt has [employer], will send message also.

## 2021-08-02 ENCOUNTER — ANTICOAGULATION VISIT (OUTPATIENT)
Dept: MEDICAL GROUP | Facility: MEDICAL CENTER | Age: 81
End: 2021-08-02
Payer: MEDICARE

## 2021-08-02 DIAGNOSIS — I26.99 RECURRENT PULMONARY EMBOLISM (HCC): ICD-10-CM

## 2021-08-02 DIAGNOSIS — Z79.01 CHRONIC ANTICOAGULATION: ICD-10-CM

## 2021-08-02 DIAGNOSIS — Z79.01 LONG TERM (CURRENT) USE OF ANTICOAGULANTS: Primary | ICD-10-CM

## 2021-08-02 LAB — INR PPP: 2.6 (ref 2–3.5)

## 2021-08-02 PROCEDURE — 93793 ANTICOAG MGMT PT WARFARIN: CPT | Performed by: FAMILY MEDICINE

## 2021-08-02 PROCEDURE — 85610 PROTHROMBIN TIME: CPT | Performed by: FAMILY MEDICINE

## 2021-08-02 NOTE — PROGRESS NOTES
OP Anticoagulation Service Note    Date: 2021  There were no vitals filed for this visit.   pt declined vitals    Anticoagulation Summary  As of 2021    INR goal:  2.0-3.0   TTR:  60.1 % (4.5 y)   INR used for dosin.60 (2021)   Warfarin maintenance plan:  15 mg (10 mg x 1.5) every day   Weekly warfarin total:  105 mg   Plan last modified:  Neelam Marks, PharmD (2021)   Next INR check:  2021   Target end date:  Indefinite    Indications    Long term (current) use of anticoagulants [Z79.01] [Z79.01]  Chronic anticoagulation [Z79.01]  Recurrent pulmonary embolism (HCC) [I26.99]             Anticoagulation Episode Summary     INR check location:      Preferred lab:      Send INR reminders to:      Comments:        Anticoagulation Care Providers     Provider Role Specialty Phone number    Ninfa Marcial M.D. Referring Internal Medicine [phone]    [facility] Responsible  [phone]            HPI:   Natalie Anaya seen in clinic today, on anticoagulation therapy with warfarin (a high risk medication) for pulmonary embolism.      Pt is here today to evaluate anticoagulation therapy    Previous INR was  1.9 on 7/15    Pt was instructed to continue regimen.    Anticoagulation Patient Findings  Patient Findings     Negatives:  Signs/symptoms of thrombosis, Signs/symptoms of bleeding, Laboratory test error suspected, Change in health, Change in alcohol use, Change in activity, Upcoming invasive procedure, Emergency department visit, Upcoming dental procedure, Missed doses, Extra doses, Change in medications, Change in diet/appetite, Hospital admission, Bruising, Other complaints          Confirmed warfarin dosing regimen    Pt is not on antiplatelet/NSAID therapy    Falls or accidents since last visit No      A/P   INR  therapeutic today.    Continue warfarin regimen       Pt educated to contact our clinic with any changes in medications or s/s of bleeding  or thrombosis. Pt is aware to seek immediate medical attention for falls, head injury or deep cuts    Follow up appointment in 6 week(s) to reduce risk of adverse events from warfarin    Jessica Campa, Pharmacy Intern    Moira Sheridan, PharmD

## 2021-09-13 ENCOUNTER — APPOINTMENT (OUTPATIENT)
Dept: MEDICAL GROUP | Facility: MEDICAL CENTER | Age: 81
End: 2021-09-13
Payer: MEDICARE

## 2021-09-13 ENCOUNTER — ANTICOAGULATION VISIT (OUTPATIENT)
Dept: VASCULAR LAB | Facility: MEDICAL CENTER | Age: 81
End: 2021-09-13
Attending: INTERNAL MEDICINE
Payer: MEDICARE

## 2021-09-13 DIAGNOSIS — Z79.01 LONG TERM (CURRENT) USE OF ANTICOAGULANTS: ICD-10-CM

## 2021-09-13 DIAGNOSIS — I26.99 RECURRENT PULMONARY EMBOLISM (HCC): ICD-10-CM

## 2021-09-13 DIAGNOSIS — Z79.01 CHRONIC ANTICOAGULATION: ICD-10-CM

## 2021-09-13 LAB — INR PPP: 2 (ref 2–3.5)

## 2021-09-13 PROCEDURE — 99211 OFF/OP EST MAY X REQ PHY/QHP: CPT

## 2021-09-13 PROCEDURE — 85610 PROTHROMBIN TIME: CPT

## 2021-09-13 NOTE — PROGRESS NOTES
Anticoagulation Summary  As of 2021    INR goal:  2.0-3.0   TTR:  61.1 % (4.6 y)   INR used for dosin.00 (2021)   Warfarin maintenance plan:  15 mg (10 mg x 1.5) every day   Weekly warfarin total:  105 mg   Plan last modified:  Neelam Marks PharmD (2021)   Next INR check:  2021   Target end date:  Indefinite    Indications    Long term (current) use of anticoagulants [Z79.01] [Z79.01]  Chronic anticoagulation [Z79.01]  Recurrent pulmonary embolism (HCC) [I26.99]             Anticoagulation Episode Summary     INR check location:      Preferred lab:      Send INR reminders to:      Comments:        Anticoagulation Care Providers     Provider Role Specialty Phone number    Ninfa Marcial M.D. Referring Internal Medicine [phone]    [facility] Responsible  [phone]          Refer to Patient Findings for HPI:  Patient Findings     Negatives:  Signs/symptoms of thrombosis, Signs/symptoms of bleeding, Laboratory test error suspected, Change in health, Change in alcohol use, Change in activity, Upcoming invasive procedure, Emergency department visit, Upcoming dental procedure, Missed doses, Extra doses, Change in medications, Change in diet/appetite, Hospital admission, Bruising, Other complaints          There were no vitals filed for this visit.   pt declined vitals    Verified current warfarin dosing schedule.    Medications reconciled   Pt is not on antiplatelet therapy      A/P   INR -therapeutic.     Warfarin dosing recommendation: Pt is to continue with current warfarin dosing regimen.      Pt educated to contact our clinic with any changes in medications or s/s of bleeding or thrombosis. Pt is aware to seek immediate medical attention for falls, head injury or deep cuts.    Follow up appointment in 2 weeks    Neelam Marks, DarwinD

## 2021-09-17 DIAGNOSIS — G62.9 NEUROPATHY: ICD-10-CM

## 2021-09-17 RX ORDER — GABAPENTIN 100 MG/1
100 CAPSULE ORAL
Qty: 100 CAPSULE | Refills: 0 | Status: SHIPPED | OUTPATIENT
Start: 2021-09-17 | End: 2021-12-13

## 2021-09-27 ENCOUNTER — ANTICOAGULATION VISIT (OUTPATIENT)
Dept: MEDICAL GROUP | Facility: MEDICAL CENTER | Age: 81
End: 2021-09-27
Payer: MEDICARE

## 2021-09-27 DIAGNOSIS — Z79.01 CHRONIC ANTICOAGULATION: Primary | ICD-10-CM

## 2021-09-27 DIAGNOSIS — I26.99 RECURRENT PULMONARY EMBOLISM (HCC): ICD-10-CM

## 2021-09-27 DIAGNOSIS — Z79.01 LONG TERM (CURRENT) USE OF ANTICOAGULANTS: ICD-10-CM

## 2021-09-27 LAB — INR PPP: 2.7 (ref 2–3.5)

## 2021-09-27 PROCEDURE — 85610 PROTHROMBIN TIME: CPT | Performed by: INTERNAL MEDICINE

## 2021-09-27 PROCEDURE — 93793 ANTICOAG MGMT PT WARFARIN: CPT | Performed by: INTERNAL MEDICINE

## 2021-09-27 NOTE — PROGRESS NOTES
OP Anticoagulation Service Note    Date: 2021  There were no vitals filed for this visit.   pt declined vitals    Anticoagulation Summary  As of 2021    INR goal:  2.0-3.0   TTR:  61.4 % (4.7 y)   INR used for dosin.70 (2021)   Warfarin maintenance plan:  15 mg (10 mg x 1.5) every day   Weekly warfarin total:  105 mg   Plan last modified:  Neelam Marks PharmD (2021)   Next INR check:  2021   Target end date:  Indefinite    Indications    Long term (current) use of anticoagulants [Z79.01] [Z79.01]  Chronic anticoagulation [Z79.01]  Recurrent pulmonary embolism (HCC) [I26.99]             Anticoagulation Episode Summary     INR check location:      Preferred lab:      Send INR reminders to:      Comments:        Anticoagulation Care Providers     Provider Role Specialty Phone number    Ninfa Marcial M.D. Referring Internal Medicine [phone]    [facility] Responsible  [phone]            HPI:   Natalie Anaya seen in clinic today, on anticoagulation therapy with warfarin (a high risk medication) for hx of PE       Pt is here today to evaluate anticoagulation therapy    Previous INR was  2.0 on 21    Pt was instructed to continue current regimen    Anticoagulation Patient Findings      Confirmed warfarin dosing regimen    Pt is not on antiplatelet/NSAID therapy    Falls or accidents since last visit No        A/P   INR  therapeutic today,   Continue current warfarin regimen            Pt educated to contact our clinic with any changes in medications or s/s of bleeding or thrombosis. Pt is aware to seek immediate medical attention for falls, head injury or deep cuts    Follow up appointment in 4 week(s) to reduce risk of adverse events from warfarin  Moira Sheridan, PharmD

## 2021-10-18 ENCOUNTER — ANTICOAGULATION VISIT (OUTPATIENT)
Dept: MEDICAL GROUP | Facility: MEDICAL CENTER | Age: 81
End: 2021-10-18
Payer: MEDICARE

## 2021-10-18 DIAGNOSIS — Z79.01 LONG TERM (CURRENT) USE OF ANTICOAGULANTS: ICD-10-CM

## 2021-10-18 DIAGNOSIS — Z79.01 CHRONIC ANTICOAGULATION: Primary | ICD-10-CM

## 2021-10-18 DIAGNOSIS — I26.99 RECURRENT PULMONARY EMBOLISM (HCC): ICD-10-CM

## 2021-10-18 LAB — INR PPP: 3.8 (ref 2–3.5)

## 2021-10-18 PROCEDURE — 99211 OFF/OP EST MAY X REQ PHY/QHP: CPT | Performed by: INTERNAL MEDICINE

## 2021-10-18 PROCEDURE — 85610 PROTHROMBIN TIME: CPT | Performed by: INTERNAL MEDICINE

## 2021-10-19 ENCOUNTER — ANTICOAGULATION MONITORING (OUTPATIENT)
Dept: VASCULAR LAB | Facility: MEDICAL CENTER | Age: 81
End: 2021-10-19

## 2021-10-19 DIAGNOSIS — Z79.01 LONG TERM (CURRENT) USE OF ANTICOAGULANTS: ICD-10-CM

## 2021-10-19 DIAGNOSIS — I26.99 RECURRENT PULMONARY EMBOLISM (HCC): ICD-10-CM

## 2021-10-19 DIAGNOSIS — Z79.01 CHRONIC ANTICOAGULATION: ICD-10-CM

## 2021-10-19 NOTE — PROGRESS NOTES
OP Anticoagulation Service Note    Date: 10/18/2021  There were no vitals filed for this visit.   pt declined vitals    Anticoagulation Summary  As of 10/18/2021    INR goal:  2.0-3.0   TTR:  61.0 % (4.7 y)   INR used for dosing:  3.80 (10/18/2021)   Warfarin maintenance plan:  15 mg (10 mg x 1.5) every day   Weekly warfarin total:  105 mg   Plan last modified:  Neelam Marks, PharmD (2/1/2021)   Next INR check:  11/1/2021   Target end date:  Indefinite    Indications    Long term (current) use of anticoagulants [Z79.01] [Z79.01]  Chronic anticoagulation [Z79.01]  Recurrent pulmonary embolism (HCC) [I26.99]             Anticoagulation Episode Summary     INR check location:      Preferred lab:      Send INR reminders to:      Comments:        Anticoagulation Care Providers     Provider Role Specialty Phone number    Ninfa Marcial M.D. Referring Internal Medicine [phone]    [facility] Responsible  [phone]            HPI:   Natalie Anaya seen in clinic today, on anticoagulation therapy with warfarin (a high risk medication) for hx of PE       Pt is here today to evaluate anticoagulation therapy    Previous INR was  2.7 on 9/27/2021    Pt was instructed to continue current regimen    Anticoagulation Patient Findings  Patient Findings     Negatives:  Signs/symptoms of thrombosis, Signs/symptoms of bleeding, Laboratory test error suspected, Change in health, Change in alcohol use, Change in activity, Upcoming invasive procedure, Emergency department visit, Upcoming dental procedure, Missed doses, Extra doses, Change in medications, Change in diet/appetite, Hospital admission, Bruising, Other complaints          Confirmed warfarin dosing regimen    Pt is not on antiplatelet/NSAID therapy    Falls or accidents since last visit No        A/P   INR  supratherapeutic today, will require dose adjust ment today to prevent bleeding complications  and closer follow up.   HOLD x 1  then Continue current warfarin regimen          Pt educated to contact our clinic with any changes in medications or s/s of bleeding or thrombosis. Pt is aware to seek immediate medical attention for falls, head injury or deep cuts    Follow up appointment in 2 week(s) to reduce risk of adverse events from warfarin  Moira Sheridan, PharmD

## 2021-10-22 ENCOUNTER — TELEPHONE (OUTPATIENT)
Dept: MEDICAL GROUP | Facility: PHYSICIAN GROUP | Age: 81
End: 2021-10-22

## 2021-10-22 NOTE — TELEPHONE ENCOUNTER
ESTABLISHED PATIENT PRE-VISIT PLANNING     Patient was NOT contacted to complete PVP.     Note: Patient will not be contacted if there is no indication to call.     1.  Reviewed notes from the last few office visits within the medical group: Yes    2.  If any orders were placed at last visit or intended to be done for this visit (i.e. 6 mos follow-up), do we have Results/Consult Notes?         •  Labs - Labs were not ordered at last office visit.  Note: If patient appointment is for lab review and patient did not complete labs, check with provider if OK to reschedule patient until labs completed.       •  Imaging - Imaging ordered, NOT completed. Patient advised to complete prior to next appointment.       •  Referrals - Referral ordered, patient was seen and consult notes are in chart. Care Teams updated  YES.    3. Is this appointment scheduled as a Hospital Follow-Up? No    4.  Immunizations were updated in Epic using Reconcile Outside Information activity? Yes    5.  Patient is due for the following Health Maintenance Topics:   Health Maintenance Due   Topic Date Due   • Annual Wellness Visit  12/06/2019   • IMM ZOSTER VACCINES (3 of 3) 09/16/2021   • BONE DENSITY  10/21/2021     6.  AHA (Pulse8) form printed for Provider? No, already completed

## 2021-10-25 PROBLEM — M17.11 ARTHRITIS OF RIGHT KNEE: Status: ACTIVE | Noted: 2021-10-25

## 2021-10-26 ENCOUNTER — HOSPITAL ENCOUNTER (OUTPATIENT)
Facility: MEDICAL CENTER | Age: 81
End: 2021-10-26
Attending: INTERNAL MEDICINE
Payer: MEDICARE

## 2021-10-26 ENCOUNTER — OFFICE VISIT (OUTPATIENT)
Dept: MEDICAL GROUP | Facility: PHYSICIAN GROUP | Age: 81
End: 2021-10-26
Payer: MEDICARE

## 2021-10-26 VITALS
RESPIRATION RATE: 12 BRPM | DIASTOLIC BLOOD PRESSURE: 60 MMHG | HEART RATE: 62 BPM | OXYGEN SATURATION: 92 % | SYSTOLIC BLOOD PRESSURE: 118 MMHG | HEIGHT: 64 IN | WEIGHT: 209.6 LBS | BODY MASS INDEX: 35.78 KG/M2 | TEMPERATURE: 97.6 F

## 2021-10-26 DIAGNOSIS — M80.00XD AGE-RELATED OSTEOPOROSIS WITH CURRENT PATHOLOGICAL FRACTURE WITH ROUTINE HEALING: ICD-10-CM

## 2021-10-26 DIAGNOSIS — E03.9 ACQUIRED HYPOTHYROIDISM: ICD-10-CM

## 2021-10-26 DIAGNOSIS — G62.9 NEUROPATHY: ICD-10-CM

## 2021-10-26 DIAGNOSIS — E87.0 HYPERNATREMIA: ICD-10-CM

## 2021-10-26 DIAGNOSIS — Z23 NEED FOR VACCINATION: ICD-10-CM

## 2021-10-26 DIAGNOSIS — D50.8 IRON DEFICIENCY ANEMIA SECONDARY TO INADEQUATE DIETARY IRON INTAKE: ICD-10-CM

## 2021-10-26 DIAGNOSIS — E53.8 B12 DEFICIENCY: ICD-10-CM

## 2021-10-26 DIAGNOSIS — K21.9 GASTROESOPHAGEAL REFLUX DISEASE WITHOUT ESOPHAGITIS: ICD-10-CM

## 2021-10-26 PROCEDURE — 84439 ASSAY OF FREE THYROXINE: CPT

## 2021-10-26 PROCEDURE — 80048 BASIC METABOLIC PNL TOTAL CA: CPT

## 2021-10-26 PROCEDURE — 85025 COMPLETE CBC W/AUTO DIFF WBC: CPT

## 2021-10-26 PROCEDURE — 82728 ASSAY OF FERRITIN: CPT

## 2021-10-26 PROCEDURE — 99000 SPECIMEN HANDLING OFFICE-LAB: CPT | Performed by: INTERNAL MEDICINE

## 2021-10-26 PROCEDURE — 84443 ASSAY THYROID STIM HORMONE: CPT

## 2021-10-26 PROCEDURE — 82607 VITAMIN B-12: CPT

## 2021-10-26 PROCEDURE — 83540 ASSAY OF IRON: CPT

## 2021-10-26 PROCEDURE — 83550 IRON BINDING TEST: CPT

## 2021-10-26 PROCEDURE — 36415 COLL VENOUS BLD VENIPUNCTURE: CPT | Performed by: INTERNAL MEDICINE

## 2021-10-26 PROCEDURE — 90750 HZV VACC RECOMBINANT IM: CPT | Performed by: INTERNAL MEDICINE

## 2021-10-26 PROCEDURE — 99214 OFFICE O/P EST MOD 30 MIN: CPT | Mod: 25 | Performed by: INTERNAL MEDICINE

## 2021-10-26 PROCEDURE — 90471 IMMUNIZATION ADMIN: CPT | Performed by: INTERNAL MEDICINE

## 2021-10-26 ASSESSMENT — FIBROSIS 4 INDEX: FIB4 SCORE: 1.27

## 2021-10-27 LAB
ANION GAP SERPL CALC-SCNC: 13 MMOL/L (ref 7–16)
BASOPHILS # BLD AUTO: 0.4 % (ref 0–1.8)
BASOPHILS # BLD: 0.02 K/UL (ref 0–0.12)
BUN SERPL-MCNC: 25 MG/DL (ref 8–22)
CALCIUM SERPL-MCNC: 9 MG/DL (ref 8.5–10.5)
CHLORIDE SERPL-SCNC: 105 MMOL/L (ref 96–112)
CO2 SERPL-SCNC: 27 MMOL/L (ref 20–33)
CREAT SERPL-MCNC: 0.45 MG/DL (ref 0.5–1.4)
EOSINOPHIL # BLD AUTO: 0.06 K/UL (ref 0–0.51)
EOSINOPHIL NFR BLD: 1.3 % (ref 0–6.9)
ERYTHROCYTE [DISTWIDTH] IN BLOOD BY AUTOMATED COUNT: 51.8 FL (ref 35.9–50)
FERRITIN SERPL-MCNC: 23.5 NG/ML (ref 10–291)
GLUCOSE SERPL-MCNC: 68 MG/DL (ref 65–99)
HCT VFR BLD AUTO: 43.5 % (ref 37–47)
HGB BLD-MCNC: 13.9 G/DL (ref 12–16)
IMM GRANULOCYTES # BLD AUTO: 0.01 K/UL (ref 0–0.11)
IMM GRANULOCYTES NFR BLD AUTO: 0.2 % (ref 0–0.9)
IRON SATN MFR SERPL: 21 % (ref 15–55)
IRON SERPL-MCNC: 53 UG/DL (ref 40–170)
LYMPHOCYTES # BLD AUTO: 0.93 K/UL (ref 1–4.8)
LYMPHOCYTES NFR BLD: 20.1 % (ref 22–41)
MCH RBC QN AUTO: 32.8 PG (ref 27–33)
MCHC RBC AUTO-ENTMCNC: 32 G/DL (ref 33.6–35)
MCV RBC AUTO: 102.6 FL (ref 81.4–97.8)
MONOCYTES # BLD AUTO: 0.45 K/UL (ref 0–0.85)
MONOCYTES NFR BLD AUTO: 9.7 % (ref 0–13.4)
NEUTROPHILS # BLD AUTO: 3.16 K/UL (ref 2–7.15)
NEUTROPHILS NFR BLD: 68.3 % (ref 44–72)
NRBC # BLD AUTO: 0 K/UL
NRBC BLD-RTO: 0 /100 WBC
PLATELET # BLD AUTO: 265 K/UL (ref 164–446)
PMV BLD AUTO: 9.6 FL (ref 9–12.9)
POTASSIUM SERPL-SCNC: 4.2 MMOL/L (ref 3.6–5.5)
RBC # BLD AUTO: 4.24 M/UL (ref 4.2–5.4)
SODIUM SERPL-SCNC: 145 MMOL/L (ref 135–145)
T4 FREE SERPL-MCNC: 1.15 NG/DL (ref 0.93–1.7)
TIBC SERPL-MCNC: 249 UG/DL (ref 250–450)
TSH SERPL DL<=0.005 MIU/L-ACNC: 0.33 UIU/ML (ref 0.38–5.33)
UIBC SERPL-MCNC: 196 UG/DL (ref 110–370)
VIT B12 SERPL-MCNC: 339 PG/ML (ref 211–911)
WBC # BLD AUTO: 4.6 K/UL (ref 4.8–10.8)

## 2021-10-27 NOTE — ASSESSMENT & PLAN NOTE
Chronic and decompensated problem, discussed we could try holding oral bisphosphonate or potassium to see if that would improve cough. May need to also update barium swallow. Continue PPI therapy.

## 2021-10-27 NOTE — PROGRESS NOTES
Subjective:   Chief Complaint/History of Present Illness:  Natalie Anaya is a 81 y.o. adult established patient who presents today to discuss medical problems as listed below. Natalie is accompanied by her , Marlo.    Problem   Neuropathy    She endorses neuropathy since September 2019.  Most predominant in the left foot over the heel.  Manifest with burning most notable in the evening when she is lying in bed.  She is not able to put a bedsheet over because of the intense burning.  Does not happen much during the day.  Occasionally has some associated pain but it is mainly burning.      She has tried Tylenol which helps with achy joints but has not had much effect on the burning pain.  She thinks she has neuropathy, she is not discussed this with anyone previously.      We noted borderline B12 deficiency on blood work, starting on monthly injections. She has thyroid disease on replacement, borderline overtreatment.  No excessive alcohol use.  She has normal hemoglobin and kidney function and no known underlying bone marrow disease such as myeloma.  No history of type 2 diabetes, only prediabetes.  Will try topical capsaicin with lidocaine however she was unable to wash her hands after applying it and touched her face which caused burning so she stopped using it.  She be interested in trying gabapentin at this time.    Current regiment gabapentin 200 mg at bedtime  Previous regimen: Topical lidocaine/capsaicin     Age-Related Osteoporosis With Current Pathological Fracture With Routine Healing       Ref. Range 11/15/2019 10:21   Bun Latest Ref Range: 8 - 22 mg/dL 23 (H)   Creatinine Latest Ref Range: 0.50 - 1.40 mg/dL 0.58   GFR If Non  Latest Ref Range: >60 mL/min/1.73 m 2 >60       Bone Density (5/2017):  FINDINGS:  lumbar spine T score of -1.4  proximal right femur T score of -2.3     IMPRESSION:  According to the World Health Organization classification, bone mineral density of this  patient is consistent with osteopenia.     10-year Probability of Fracture:  Major Osteoporotic     40.3%  Hip     17.5%    Of note, she had a follow-up bone density in October 2019 however this only included lumbar spine is the patient is experience bilateral hip fractures in the interim.  Thus, she would be due for her next repeat bone density in October 2021.    Fosamax started in Jan 2018. She is taking Vitamin D 2,000 IU daily. She has a history of compression fracture in the thoracic and lumbar spine and bilateral hip fracture as well as rib fracture.  She is pleased that her latest bone density result had improved following initiation of the Fosamax.     Gastroesophageal Reflux Disease Without Esophagitis    She reports past history of cough and during evaluation she was found to have a hiatal hernia contributing. She was placed on PPI therapy with near resolution of cough. Of note she is on oral bisphosphonate and potassium which could be contributing to cough/throat clearing from pill esophagitis. No relationship of cough to supine position and no coughing or choking while eating.     Iron Deficiency Anemia    She has had work-up in the past including upper and lower endoscopy with GI consultants (Dr. Stanton) in April 2016.  Biopsy demonstrated mild chronic inflammation and reactive epithelial cells in the gastric antrum with a benign hyperplastic polyp and negative H. Pylori.  Biopsy also negative for celiac sprue.  Colonoscopy was benign with negative pathology on ascending colon polyp. Patient did not do capsule endoscopy at that time.     It appears that her hemoglobin levels improved with time.  She has been on and off iron supplements since that time.  She is currently taking an iron supplement 3 times per week.  She denies any constipation.    Current regimen: Observation off iron  Previous regimen: Oral iron 3 times per week          Current Medications:  Current Outpatient Medications Ordered in Epic  "  Medication Sig Dispense Refill   • levothyroxine (SYNTHROID) 100 MCG Tab TAKE 1 TABLET BY MOUTH EVERY  Tablet 3   • gabapentin (NEURONTIN) 100 MG Cap TAKE 1 CAPSULE BY MOUTH AT BEDTIME 100 Capsule 0   • warfarin (COUMADIN) 10 MG Tab TAKE 1 AND 1/2 TABLETS BY MOUTH DAILY OR AS DIRECTED BY [facility] 135 Tablet 1   • Calcium 500-100 MG-UNIT Chew Tab Chew. Takes 2 a day     • alendronate (FOSAMAX) 70 MG Tab TAKE 1 TABLET BY MOUTH ONCE A WEEK 12 tablet 3   • omeprazole (PRILOSEC) 20 MG delayed-release capsule Take 1 capsule by mouth every day. 100 capsule 3   • furosemide (LASIX) 40 MG Tab Take 1 tablet by mouth every day. 100 tablet 3   • potassium chloride SA (KDUR) 20 MEQ Tab CR Take 1 tablet by mouth every day. 100 tablet 3   • primidone (MYSOLINE) 250 MG Tab Take 1 Tab by mouth 3 times a day. (Patient taking differently: Take 250 mg by mouth. Two tabs at night and one tab in the morning) 300 Tab 3   • amoxicillin (AMOXIL) 500 MG Cap Take 500 mg by mouth. Dental work     • Home Care Oxygen Inhale 2 L/min by mouth every bedtime.     • Melatonin 10 MG Tab Take 15 mg by mouth every bedtime.     • Probiotic Product (PROBIOTIC DAILY PO) Take 1 Cap by mouth every day.     • Glucos-Chond-Hyal Ac-Ca Fructo (MOVE FREE JOINT HEALTH ADVANCE) Tab Take 1 Tab by mouth every day.     • Multiple Vitamins-Minerals (WOMENS MULTI VITAMIN & MINERAL) Tab Take 1 Tab by mouth every day.     • Cholecalciferol (VITAMIN D-3) 1000 UNITS Cap Take 1,000 Units by mouth every day.       No current [facility]-ordered facility-administered medications on file.          Objective:   Physical Exam:    Vitals: /60 (BP Location: Right arm, Patient Position: Sitting, BP Cuff Size: Adult)   Pulse 62   Temp 36.4 °C (97.6 °F) (Temporal)   Resp 12   Ht 1.626 m (5' 4\")   Wt 95.1 kg (209 lb 9.6 oz)   SpO2 92%    BMI: Body mass index is 35.98 kg/m².  Physical Exam  Constitutional:       General: Natalie Anaya is not " in acute distress.     Appearance: Normal appearance. Natalie Anaya is not ill-appearing.   HENT:      Right Ear: Tympanic membrane and ear canal normal. There is no impacted cerumen.      Left Ear: Tympanic membrane and ear canal normal. There is no impacted cerumen.   Eyes:      General: No scleral icterus.     Conjunctiva/sclera: Conjunctivae normal.   Cardiovascular:      Rate and Rhythm: Normal rate and regular rhythm.      Pulses: Normal pulses.      Heart sounds: No murmur heard.     Pulmonary:      Effort: Pulmonary effort is normal. No respiratory distress.      Breath sounds: Normal breath sounds. No wheezing or rhonchi.   Musculoskeletal:      Right lower leg: Edema present.      Left lower leg: Edema present.      Comments: Ambulating with a single point cane   Skin:     General: Skin is warm and dry.      Findings: No rash.      Comments: Small abrasion on right upper extremity               Assessment and Plan:   Natalie is a 81 y.o. adult with the following:  Problem List Items Addressed This Visit     Iron deficiency anemia     Chronic and ongoing problem, will update iron levels and blood counts to ensure stability since holding oral iron.         Gastroesophageal reflux disease without esophagitis     Chronic and decompensated problem, discussed we could try holding oral bisphosphonate or potassium to see if that would improve cough. May need to also update barium swallow. Continue PPI therapy.         Age-related osteoporosis with current pathological fracture with routine healing     Chronic and ongoing problem, she is entering her 5th year of treatment in 2022 and if this is contributing to cough/pill esophagitis then may consider holding sooner. Continue calcium and vitamin D.         Neuropathy     Chronic and ongoing problem, most bothersome at night. Some improvement with gabapentin, she would be interested in a trial on a higher dose. Will update prescription to gabapentin 200 mg at  bedtime and she will monitor for grogginess the following day.           Other Visit Diagnoses     Need for vaccination        Relevant Orders    Shingrix Vaccine (Completed)             RTC: Return in about 3 months (around 1/26/2022).    I spent a total of 31 minutes with record review, exam, communication with the patient, communication with other providers, and documentation of this encounter.    PLEASE NOTE: This dictation was created using voice recognition software. I have made every reasonable attempt to correct obvious errors, but I expect that there are errors of grammar and possibly content that I did not discover before finalizing the note.      Kiley Manrique, DO  Geriatric and Internal Medicine  [facility]

## 2021-10-27 NOTE — ASSESSMENT & PLAN NOTE
Chronic and ongoing problem, she is entering her 5th year of treatment in 2022 and if this is contributing to cough/pill esophagitis then may consider holding sooner. Continue calcium and vitamin D.

## 2021-10-27 NOTE — ASSESSMENT & PLAN NOTE
Chronic and ongoing problem, will update iron levels and blood counts to ensure stability since holding oral iron.

## 2021-10-27 NOTE — ASSESSMENT & PLAN NOTE
Chronic and ongoing problem, most bothersome at night. Some improvement with gabapentin, she would be interested in a trial on a higher dose. Will update prescription to gabapentin 200 mg at bedtime and she will monitor for grogginess the following day.

## 2021-11-01 ENCOUNTER — ANTICOAGULATION VISIT (OUTPATIENT)
Dept: MEDICAL GROUP | Facility: MEDICAL CENTER | Age: 81
End: 2021-11-01
Payer: MEDICARE

## 2021-11-01 DIAGNOSIS — Z79.01 LONG TERM (CURRENT) USE OF ANTICOAGULANTS: ICD-10-CM

## 2021-11-01 DIAGNOSIS — I26.99 RECURRENT PULMONARY EMBOLISM (HCC): ICD-10-CM

## 2021-11-01 DIAGNOSIS — Z79.01 CHRONIC ANTICOAGULATION: ICD-10-CM

## 2021-11-01 LAB — INR PPP: 2.3 (ref 2–3.5)

## 2021-11-01 PROCEDURE — 93793 ANTICOAG MGMT PT WARFARIN: CPT | Performed by: FAMILY MEDICINE

## 2021-11-01 PROCEDURE — 99999 PR NO CHARGE: CPT | Performed by: FAMILY MEDICINE

## 2021-11-01 PROCEDURE — 85610 PROTHROMBIN TIME: CPT | Performed by: FAMILY MEDICINE

## 2021-11-01 NOTE — PROGRESS NOTES
OP Anticoagulation Service Note    Date: 2021  There were no vitals filed for this visit.   pt declined vitals    Anticoagulation Summary  As of 2021    INR goal:  2.0-3.0   TTR:  60.9 % (4.8 y)   INR used for dosin.30 (2021)   Warfarin maintenance plan:  15 mg (10 mg x 1.5) every day   Weekly warfarin total:  105 mg   Plan last modified:  Neelam Marks, PharmD (2021)   Next INR check:  11/15/2021   Target end date:  Indefinite    Indications    Long term (current) use of anticoagulants [Z79.01] [Z79.01]  Chronic anticoagulation [Z79.01]  Recurrent pulmonary embolism (HCC) [I26.99]             Anticoagulation Episode Summary     INR check location:      Preferred lab:      Send INR reminders to:      Comments:        Anticoagulation Care Providers     Provider Role Specialty Phone number    Ninfa Marcial M.D. Referring Internal Medicine [phone]    [facility] Responsible  [phone]            HPI:   Natalie Anaya seen in clinic today, on anticoagulation therapy with warfarin (a high risk medication) for hx of PE    Pt is here today to evaluate anticoagulation therapy    Previous INR was  3.8 on 10/18/21    Pt was instructed to hold x 1 day then continue regimen    Anticoagulation Patient Findings  Patient Findings     Negatives:  Signs/symptoms of thrombosis, Signs/symptoms of bleeding, Laboratory test error suspected, Change in health, Change in alcohol use, Change in activity, Upcoming invasive procedure, Emergency department visit, Upcoming dental procedure, Missed doses, Extra doses, Change in medications, Change in diet/appetite, Hospital admission, Bruising, Other complaints          Confirmed warfarin dosing regimen    Pt is not on antiplatelet/NSAID therapy    Falls or accidents since last visit No        A/P   INR  therapeutic today    Pt is to continue with current warfarin dosing regimen.     10/22 check referral    Pt educated to  contact our clinic with any changes in medications or s/s of bleeding or thrombosis. Pt is aware to seek immediate medical attention for falls, head injury or deep cuts    Follow up appointment in 2-3 week(s) depending on when her 's INR appt is to reduce risk of adverse events from warfarin  Neelam Marks, DarwinD

## 2021-11-05 ENCOUNTER — TELEPHONE (OUTPATIENT)
Dept: MEDICAL GROUP | Facility: PHYSICIAN GROUP | Age: 81
End: 2021-11-05

## 2021-11-05 NOTE — TELEPHONE ENCOUNTER
Phone Number Called: [phone] (home)       Call outcome: Spoke to patient regarding message below.    Message: Spoke to the  and no further questions at this time.

## 2021-11-05 NOTE — TELEPHONE ENCOUNTER
----- Message from Kiley Manrique D.O. sent at 11/4/2021  6:06 PM PDT -----  Please call Natalie and let her know her lab work appears stable.  No iron deficiency seen at this time and kidney function and electrolytes are stable. Thyroid looks better compared to 3 months ago.  Can call her at the same time you are calling for her  Marlo's labs.

## 2021-11-11 ENCOUNTER — HOSPITAL ENCOUNTER (OUTPATIENT)
Dept: RADIOLOGY | Facility: MEDICAL CENTER | Age: 81
End: 2021-11-11
Attending: INTERNAL MEDICINE
Payer: MEDICARE

## 2021-11-11 DIAGNOSIS — Z78.0 MENOPAUSE: ICD-10-CM

## 2021-11-11 PROCEDURE — 77080 DXA BONE DENSITY AXIAL: CPT

## 2021-11-18 ENCOUNTER — ANTICOAGULATION VISIT (OUTPATIENT)
Dept: MEDICAL GROUP | Facility: MEDICAL CENTER | Age: 81
End: 2021-11-18
Payer: MEDICARE

## 2021-11-18 DIAGNOSIS — Z79.01 CHRONIC ANTICOAGULATION: Primary | ICD-10-CM

## 2021-11-18 DIAGNOSIS — Z79.01 LONG TERM (CURRENT) USE OF ANTICOAGULANTS: ICD-10-CM

## 2021-11-18 DIAGNOSIS — I26.99 RECURRENT PULMONARY EMBOLISM (HCC): ICD-10-CM

## 2021-11-18 LAB — INR PPP: 1.9 (ref 2–3.5)

## 2021-11-18 PROCEDURE — 85610 PROTHROMBIN TIME: CPT | Performed by: FAMILY MEDICINE

## 2021-11-18 PROCEDURE — 93793 ANTICOAG MGMT PT WARFARIN: CPT | Performed by: FAMILY MEDICINE

## 2021-11-19 NOTE — PROGRESS NOTES
OP Anticoagulation Service Note    Date: 2021  There were no vitals filed for this visit.   pt declined vitals    Anticoagulation Summary  As of 2021    INR goal:  2.0-3.0   TTR:  61.0 % (4.8 y)   INR used for dosin.90 (2021)   Warfarin maintenance plan:  15 mg (10 mg x 1.5) every day   Weekly warfarin total:  105 mg   Plan last modified:  Neelam Marks, PharmD (2021)   Next INR check:  2021   Target end date:  Indefinite    Indications    Long term (current) use of anticoagulants [Z79.01] [Z79.01]  Chronic anticoagulation [Z79.01]  Recurrent pulmonary embolism (HCC) [I26.99]             Anticoagulation Episode Summary     INR check location:      Preferred lab:      Send INR reminders to:      Comments:        Anticoagulation Care Providers     Provider Role Specialty Phone number    Ninfa Marcial M.D. Referring Internal Medicine [phone]    [facility] Responsible  [phone]            HPI:   Natalie Anaya seen in clinic today, on anticoagulation therapy with warfarin (a high risk medication) for hx of PE     Pt is here today to evaluate anticoagulation therapy    Previous INR was  2.3 on 2021    Pt was instructed to continue current regimen    Anticoagulation Patient Findings  Patient Findings     Negatives:  Signs/symptoms of thrombosis, Signs/symptoms of bleeding, Laboratory test error suspected, Change in health, Change in alcohol use, Change in activity, Upcoming invasive procedure, Emergency department visit, Upcoming dental procedure, Missed doses, Extra doses, Change in medications, Change in diet/appetite, Hospital admission, Bruising, Other complaints          Confirmed warfarin dosing regimen    Pt is not on antiplatelet/NSAID therapy  .  Falls or accidents since last visit No        A/P   INR  SUB-therapeutic today, but close to range.  Continue current warfarin regimen          Pt educated to contact our clinic with any  changes in medications or s/s of bleeding or thrombosis. Pt is aware to seek immediate medical attention for falls, head injury or deep cuts    Follow up appointment in 3 week(s) to reduce risk of adverse events from warfarin  Moira Sheridan, PharmD

## 2021-12-09 ENCOUNTER — ANTICOAGULATION VISIT (OUTPATIENT)
Dept: MEDICAL GROUP | Facility: MEDICAL CENTER | Age: 81
End: 2021-12-09
Payer: MEDICARE

## 2021-12-09 DIAGNOSIS — I26.99 RECURRENT PULMONARY EMBOLISM (HCC): ICD-10-CM

## 2021-12-09 DIAGNOSIS — Z79.01 LONG TERM (CURRENT) USE OF ANTICOAGULANTS: ICD-10-CM

## 2021-12-09 DIAGNOSIS — Z79.01 CHRONIC ANTICOAGULATION: Primary | ICD-10-CM

## 2021-12-09 LAB — INR PPP: 2.3 (ref 2–3.5)

## 2021-12-09 PROCEDURE — 85610 PROTHROMBIN TIME: CPT | Performed by: INTERNAL MEDICINE

## 2021-12-09 PROCEDURE — 93793 ANTICOAG MGMT PT WARFARIN: CPT | Performed by: INTERNAL MEDICINE

## 2021-12-09 NOTE — PROGRESS NOTES
OP Anticoagulation Service Note    Date: 2021  There were no vitals filed for this visit.   pt declined vitals    Anticoagulation Summary  As of 2021    INR goal:  2.0-3.0   TTR:  61.2 % (4.9 y)   INR used for dosin.30 (2021)   Warfarin maintenance plan:  15 mg (10 mg x 1.5) every day   Weekly warfarin total:  105 mg   Plan last modified:  Neelam Marks, PharmD (2021)   Next INR check:  2022   Target end date:  Indefinite    Indications    Long term (current) use of anticoagulants [Z79.01] [Z79.01]  Chronic anticoagulation [Z79.01]  Recurrent pulmonary embolism (HCC) [I26.99]             Anticoagulation Episode Summary     INR check location:      Preferred lab:      Send INR reminders to:      Comments:        Anticoagulation Care Providers     Provider Role Specialty Phone number    Ninfa Marcial M.D. Referring Internal Medicine [phone]    [facility] Anticoagulation [facility] Responsible  [phone]            HPI:   Natalie Anaya seen in clinic today, on anticoagulation therapy with warfarin (a high risk medication) for hx of PE       Pt is here today to evaluate anticoagulation therapy    Previous INR was  1.9 on 2021    Pt was instructed to continue current regimen    Anticoagulation Patient Findings  Patient Findings     Negatives:  Signs/symptoms of thrombosis, Signs/symptoms of bleeding, Laboratory test error suspected, Change in health, Change in alcohol use, Change in activity, Upcoming invasive procedure, Emergency department visit, Upcoming dental procedure, Missed doses, Extra doses, Change in medications, Change in diet/appetite, Hospital admission, Bruising, Other complaints          Confirmed warfarin dosing regimen    Pt is not on antiplatelet/NSAID therapy    Falls or accidents since last visit No        A/P   INR  -therapeutic today,    Continue current warfarin regimen        10/22 check referral    Pt educated to contact our clinic with any  changes in medications or s/s of bleeding or thrombosis. Pt is aware to seek immediate medical attention for falls, head injury or deep cuts    Follow up appointment in 6 week(s) to reduce risk of adverse events from warfarin  Moira Sheridan, PharmD

## 2021-12-16 ENCOUNTER — TELEPHONE (OUTPATIENT)
Dept: HEALTH INFORMATION MANAGEMENT | Facility: OTHER | Age: 81
End: 2021-12-16

## 2021-12-16 NOTE — TELEPHONE ENCOUNTER
Called patient to schedule a   COMPREHENSIVE HEALTH  ASSESSMENT. Left voicemail message requesting a call back.

## 2022-01-11 ENCOUNTER — OFFICE VISIT (OUTPATIENT)
Dept: MEDICAL GROUP | Facility: PHYSICIAN GROUP | Age: 82
End: 2022-01-11
Payer: MEDICARE

## 2022-01-11 ENCOUNTER — HOSPITAL ENCOUNTER (OUTPATIENT)
Facility: MEDICAL CENTER | Age: 82
End: 2022-01-11
Attending: INTERNAL MEDICINE
Payer: MEDICARE

## 2022-01-11 VITALS
HEIGHT: 64 IN | TEMPERATURE: 96.8 F | HEART RATE: 86 BPM | WEIGHT: 202.7 LBS | OXYGEN SATURATION: 97 % | DIASTOLIC BLOOD PRESSURE: 62 MMHG | RESPIRATION RATE: 16 BRPM | BODY MASS INDEX: 34.6 KG/M2 | SYSTOLIC BLOOD PRESSURE: 116 MMHG

## 2022-01-11 DIAGNOSIS — I27.21 SECONDARY PULMONARY ARTERIAL HYPERTENSION (HCC): ICD-10-CM

## 2022-01-11 DIAGNOSIS — I26.99 RECURRENT PULMONARY EMBOLISM (HCC): ICD-10-CM

## 2022-01-11 DIAGNOSIS — E83.42 HYPOMAGNESEMIA: ICD-10-CM

## 2022-01-11 DIAGNOSIS — I50.811 ACUTE RIGHT-SIDED CONGESTIVE HEART FAILURE (HCC): ICD-10-CM

## 2022-01-11 DIAGNOSIS — D68.69 SECONDARY HYPERCOAGULABLE STATE (HCC): ICD-10-CM

## 2022-01-11 DIAGNOSIS — J96.11 CHRONIC RESPIRATORY FAILURE WITH HYPOXIA (HCC): ICD-10-CM

## 2022-01-11 DIAGNOSIS — Z09 HOSPITAL DISCHARGE FOLLOW-UP: ICD-10-CM

## 2022-01-11 DIAGNOSIS — I50.33 ACUTE ON CHRONIC DIASTOLIC CONGESTIVE HEART FAILURE (HCC): ICD-10-CM

## 2022-01-11 PROBLEM — Z86.711 PERSONAL HISTORY OF PULMONARY EMBOLISM: Status: RESOLVED | Noted: 2020-04-01 | Resolved: 2022-01-11

## 2022-01-11 PROBLEM — Z79.01 LONG TERM (CURRENT) USE OF ANTICOAGULANTS: Status: RESOLVED | Noted: 2017-04-11 | Resolved: 2022-01-11

## 2022-01-11 LAB — INR PPP: 5.43 (ref 2–3.5)

## 2022-01-11 PROCEDURE — 83735 ASSAY OF MAGNESIUM: CPT

## 2022-01-11 PROCEDURE — 85025 COMPLETE CBC W/AUTO DIFF WBC: CPT

## 2022-01-11 PROCEDURE — 83880 ASSAY OF NATRIURETIC PEPTIDE: CPT

## 2022-01-11 PROCEDURE — 80048 BASIC METABOLIC PNL TOTAL CA: CPT

## 2022-01-11 PROCEDURE — 99214 OFFICE O/P EST MOD 30 MIN: CPT | Performed by: INTERNAL MEDICINE

## 2022-01-11 PROCEDURE — 85610 PROTHROMBIN TIME: CPT

## 2022-01-11 PROCEDURE — 84439 ASSAY OF FREE THYROXINE: CPT

## 2022-01-11 PROCEDURE — 84443 ASSAY THYROID STIM HORMONE: CPT

## 2022-01-11 RX ORDER — TORSEMIDE 20 MG/1
40 TABLET ORAL DAILY
COMMUNITY
Start: 2022-01-04 | End: 2022-01-14 | Stop reason: SDUPTHER

## 2022-01-11 ASSESSMENT — FIBROSIS 4 INDEX: FIB4 SCORE: 1.18

## 2022-01-11 ASSESSMENT — PATIENT HEALTH QUESTIONNAIRE - PHQ9: CLINICAL INTERPRETATION OF PHQ2 SCORE: 0

## 2022-01-12 LAB
ANION GAP SERPL CALC-SCNC: 14 MMOL/L (ref 7–16)
BASOPHILS # BLD AUTO: 0.8 % (ref 0–1.8)
BASOPHILS # BLD: 0.05 K/UL (ref 0–0.12)
BUN SERPL-MCNC: 21 MG/DL (ref 8–22)
CALCIUM SERPL-MCNC: 8.8 MG/DL (ref 8.5–10.5)
CHLORIDE SERPL-SCNC: 98 MMOL/L (ref 96–112)
CO2 SERPL-SCNC: 35 MMOL/L (ref 20–33)
CREAT SERPL-MCNC: 0.59 MG/DL (ref 0.5–1.4)
EOSINOPHIL # BLD AUTO: 0.08 K/UL (ref 0–0.51)
EOSINOPHIL NFR BLD: 1.3 % (ref 0–6.9)
ERYTHROCYTE [DISTWIDTH] IN BLOOD BY AUTOMATED COUNT: 50.5 FL (ref 35.9–50)
GLUCOSE SERPL-MCNC: 137 MG/DL (ref 65–99)
HCT VFR BLD AUTO: 45.8 % (ref 37–47)
HGB BLD-MCNC: 15 G/DL (ref 12–16)
IMM GRANULOCYTES # BLD AUTO: 0.02 K/UL (ref 0–0.11)
IMM GRANULOCYTES NFR BLD AUTO: 0.3 % (ref 0–0.9)
INR PPP: 5.43 (ref 0.87–1.13)
LYMPHOCYTES # BLD AUTO: 1.04 K/UL (ref 1–4.8)
LYMPHOCYTES NFR BLD: 16.7 % (ref 22–41)
MAGNESIUM SERPL-MCNC: 2 MG/DL (ref 1.5–2.5)
MCH RBC QN AUTO: 32.5 PG (ref 27–33)
MCHC RBC AUTO-ENTMCNC: 32.8 G/DL (ref 33.6–35)
MCV RBC AUTO: 99.3 FL (ref 81.4–97.8)
MONOCYTES # BLD AUTO: 0.52 K/UL (ref 0–0.85)
MONOCYTES NFR BLD AUTO: 8.4 % (ref 0–13.4)
MORPHOLOGY BLD-IMP: NORMAL
NEUTROPHILS # BLD AUTO: 4.51 K/UL (ref 2–7.15)
NEUTROPHILS NFR BLD: 72.5 % (ref 44–72)
NRBC # BLD AUTO: 0 K/UL
NRBC BLD-RTO: 0 /100 WBC
NT-PROBNP SERPL IA-MCNC: 211 PG/ML (ref 0–125)
PLATELET # BLD AUTO: 331 K/UL (ref 164–446)
PMV BLD AUTO: 10.1 FL (ref 9–12.9)
POTASSIUM SERPL-SCNC: 3.4 MMOL/L (ref 3.6–5.5)
PROTHROMBIN TIME: 47.9 SEC (ref 12–14.6)
RBC # BLD AUTO: 4.61 M/UL (ref 4.2–5.4)
SODIUM SERPL-SCNC: 147 MMOL/L (ref 135–145)
T4 FREE SERPL-MCNC: 1.34 NG/DL (ref 0.93–1.7)
TSH SERPL DL<=0.005 MIU/L-ACNC: 0.28 UIU/ML (ref 0.38–5.33)
WBC # BLD AUTO: 6.2 K/UL (ref 4.8–10.8)

## 2022-01-12 NOTE — ASSESSMENT & PLAN NOTE
Chronic and worsening problem.  Latest RVSP up to 79 mmHg on echocardiogram from last week.  She will see pulmonary medicine next week.  Fluid status much better, she was in a diastolic heart failure exacerbation at the time of this echocardiogram.

## 2022-01-12 NOTE — ASSESSMENT & PLAN NOTE
Chronic ongoing problem.  Indefinite anticoagulation recommended due to recurrent PE.  No recent VTE.  We will check with our anticoagulation pharmacist to see if she would be eligible for DOAC due to challenges with subtherapeutic INR and requiring high doses of warfarin up to 20 mg daily.  This problem unfortunately complicates her cardiac status with severe RV systolic dysfunction and pulmonary hypertension.

## 2022-01-12 NOTE — ASSESSMENT & PLAN NOTE
New problem, fluid status much better since transitioning from furosemide to torsemide.  She will double check that she is taking potassium appropriately.  We will recheck metabolic panel, blood counts, thyroid, magnesium, and BNP in clinic today so those values are available for her visit with cardiology later this week.  Advised her if she could turn her oxygen down to 1 L on exertion and if her saturations stay above 92% then she can take it off unless she feels short of breath and just go back to using it at night.  Suspect increased oxygen demand was related to heart failure exacerbation.  May be more mindful of salt and fluid restriction and use of compression.  Also, she was subtherapeutic for her INR and required doses between 18 and 20 mg of warfarin and wonders if this is due to an interaction with primidone or if she may benefit from switching to a DOAC.  Worsened pulmonary hypertension on echocardiogram, she will see pulmonary medicine next week.

## 2022-01-12 NOTE — ASSESSMENT & PLAN NOTE
New and decompensated problem.  Recent admission for heart failure exacerbation.  Diuretics have been switched to torsemide and she notes improvement in fluid status.  She will be establishing with cardiology later this week.  She has history of multiple pulmonary emboli with pulmonary hypertension and diastolic dysfunction likely contributing to her heart failure.  Still hypervolemic on exam, chronically has lower extremity edema.  Symptomatically feeling much better and blood pressure remains well controlled.  We will check her labs to ensure stability of electrolytes, kidney function, and improvement of BNP with above therapy.

## 2022-01-12 NOTE — ASSESSMENT & PLAN NOTE
Chronic and decompensated problem, increased oxygen needs during heart failure exacerbation, advised her to decrease from 2 to 1 L during the daytime and if oxygenation remains above 90% then could stop altogether unless she has shortness of breath.  Continue wearing at night as recommended.  She follows up with pulmonary medicine next week.

## 2022-01-12 NOTE — PROGRESS NOTES
Subjective:   Chief Complaint/History of Present Illness:  Natalie Anaya is a 81 y.o. adult established patient who presents today to discuss medical problems as listed below. Natalie is accompanied by her , Marlo.    Problem   Acute On Chronic Diastolic Congestive Heart Failure (Hcc)    New onset of diastolic heart failure with 17 pound gain and pulmonary edema requiring hospitalization while she was visiting family in [place], she was hospitalized from late December through early January 2022.  Her diuretics were switched from furosemide to torsemide with good improvement.  RVSP and RV systolic dysfunction noted on echocardiogram.  She will be meeting with cardiology later this week.  Blood pressure remains under good control.     Chronic respiratory failure with hypoxia (HCC)- nocturnal    Patient uses 2 L of supplemental oxygen at night when she is sleeping.  She reports that this is now in lieu of a CPAP as she has stopped using this device (since her hip fracture and recovery in July 2019).      She was hospitalized for heart failure exacerbation in late December/early January 2022.  She was discharged on 2 L supplemental oxygen due to hypoxia from her heart failure which we are working to wean down.     Secondary Pulmonary Arterial Hypertension (Hcc)    Echocardiogram (7/2017):  Estimated right ventricular systolic pressure is 60 mmHg. Compared to the report of the study done - there has been improvement in RV size and function and a slight decrease in RVSP and TR.     Echocardiogram (2/2020):  Normal LVEF 70%. Grade II diastolic dysfunction. Mild MR, moderate TR. Estimated RVSP is 65 mmHg.    Echocardiogram (1/2022):  Normal LV size and systolic function, EF 65 to 70%, severe LVH.  Grade 2 diastolic dysfunction.  Severe RV enlargement, severe systolic dysfunction.  Biatrial enlargement.  Severe TR, severe pulmonary hypertension.  RVSP 79 mmHg plus CVP.  Dilated pulmonary arteries.      She  follows with pulmonary medicine.  She is on torsemide 40 mg with potassium chloride 20 mEq daily. She continues to be hypervolemic especially notable in her bilateral lower extremities. Recent HFpEF exacerbation requiring hospitalization in Jan 2022.       Secondary Hypercoagulable State (Hcc)    She has a history of multiple PE's for which she takes systemic anticoagulation in the form of warfarin.  This will be a lifelong treatment for her.       Hospital Discharge Follow-Up    Natalie was admitted to [facility] from 12/30/2021-1/4/2022 after she developed worsening lower extremity edema, hypoxia, shortness of breath, and altered mental status.  Her chest x-ray was consistent with pulmonary edema and she was given IV Lasix.  CT head was negative for acute abnormality.  She had a left cheek hematoma related to a fall in mid December 2021.  Echocardiogram showed EF 65 to 70%, grade 2 diastolic dysfunction, severe RV systolic dysfunction with RVSP of 79.  She had missed 2 doses of her diuretic while traveling in [place] and also did not wear her nocturnal oxygen.  Patient was discharged on oxygen and cardiology consultation recommended transitioning from Lasix to torsemide and establishing with cardiology upon her return to [place], [place].  She was subtherapeutic on her INR and required dosing of warfarin 18 to 20 mg nightly, recommendation to discuss direct oral anticoagulant due to her subtherapeutic levels and high dose requirements.  She felt much better at the time of discharge.  She reports her weight at admission was approximately 219 pounds and she is currently at 202 pounds and feels like her fluid status is at baseline.    On January 4 INR was 1.63; BNP at admission 1630.     Recurrent Pulmonary Embolism (Hcc)    Was diagnosed in 2003 due to prolonged immobilization from right shoulder fracture and pain.   Was treated with coumadin for 6 months in 2003.  Recurrent PE on both lungs diagnosed on  10/4/16 after ground level fall and left 3rd -7 th ribs fracture and left hip fracture. On chronic anticoagulation since then.   Status post IVC filter removed on 2/15/17.  On oxygen 2L at night. She continues on systemic anticoagulation in the form of warfarin indefinitely. Recent challenges with subtherapeutic INR levels.       Current Medications:  Current Outpatient Medications Ordered in Epic   Medication Sig Dispense Refill   • torsemide (DEMADEX) 20 MG Tab Take 40 mg by mouth every day.     • gabapentin (NEURONTIN) 100 MG Cap TAKE 1 CAPSULE BY MOUTH AT BEDTIME 100 Capsule 3   • levothyroxine (SYNTHROID) 100 MCG Tab TAKE 1 TABLET BY MOUTH EVERY  Tablet 3   • warfarin (COUMADIN) 10 MG Tab TAKE 1 AND 1/2 TABLETS BY MOUTH DAILY OR AS DIRECTED BY [facility] 135 Tablet 1   • Calcium 500-100 MG-UNIT Chew Tab Chew. Takes 2 a day     • alendronate (FOSAMAX) 70 MG Tab TAKE 1 TABLET BY MOUTH ONCE A WEEK 12 tablet 3   • omeprazole (PRILOSEC) 20 MG delayed-release capsule Take 1 capsule by mouth every day. 100 capsule 3   • potassium chloride SA (KDUR) 20 MEQ Tab CR Take 1 tablet by mouth every day. 100 tablet 3   • primidone (MYSOLINE) 250 MG Tab Take 1 Tab by mouth 3 times a day. (Patient taking differently: Take 250 mg by mouth. Two tabs at night and one tab in the morning) 300 Tab 3   • amoxicillin (AMOXIL) 500 MG Cap Take 500 mg by mouth. Dental work     • Home Care Oxygen Inhale 2 L/min by mouth every bedtime.     • Melatonin 10 MG Tab Take 15 mg by mouth every bedtime.     • Probiotic Product (PROBIOTIC DAILY PO) Take 1 Cap by mouth every day.     • Glucos-Chond-Hyal Ac-Ca Fructo (MOVE FREE JOINT HEALTH ADVANCE) Tab Take 1 Tab by mouth every day.     • Multiple Vitamins-Minerals (WOMENS MULTI VITAMIN & MINERAL) Tab Take 1 Tab by mouth every day.     • Cholecalciferol (VITAMIN D-3) 1000 UNITS Cap Take 1,000 Units by mouth every day.       No current [facility]-ordered facility-administered  "medications on file.          Objective:   Physical Exam:    Vitals: /62 (BP Location: Left arm, Patient Position: Sitting, BP Cuff Size: Adult)   Pulse 86   Temp 36 °C (96.8 °F) (Temporal)   Resp 16   Ht 1.626 m (5' 4\")   Wt 91.9 kg (202 lb 11.2 oz)   SpO2 97%    BMI: Body mass index is 34.79 kg/m².  Physical Exam  Constitutional:       General: Natalie Anaya is not in acute distress.     Appearance: Natalie Anaya is not ill-appearing.      Comments: Frail phenotype, appears tired but not acutely ill   HENT:      Right Ear: Ear canal normal. There is no impacted cerumen.      Left Ear: Ear canal normal. There is no impacted cerumen.   Eyes:      General: No scleral icterus.     Conjunctiva/sclera: Conjunctivae normal.   Cardiovascular:      Rate and Rhythm: Normal rate and regular rhythm.   Pulmonary:      Effort: Pulmonary effort is normal. No respiratory distress.      Breath sounds: No wheezing or rales.   Musculoskeletal:      Right lower leg: Edema present.      Left lower leg: Edema present.      Comments: 2+ pretibial b/l   Skin:     General: Skin is warm and dry.      Findings: Bruising present.      Comments: Resolving bruising on left cheek   Psychiatric:         Mood and Affect: Mood normal.         Behavior: Behavior normal.         Thought Content: Thought content normal.         Judgment: Judgment normal.          Assessment and Plan:   Natalie is a 81 y.o. adult with the following:  Problem List Items Addressed This Visit     Recurrent pulmonary embolism (HCC)     Chronic ongoing problem.  Indefinite anticoagulation recommended due to recurrent PE.  No recent VTE.  We will check with our anticoagulation pharmacist to see if she would be eligible for DOAC due to challenges with subtherapeutic INR and requiring high doses of warfarin up to 20 mg daily.  This problem unfortunately complicates her cardiac status with severe RV systolic dysfunction and pulmonary hypertension.   "       Relevant Medications    torsemide (DEMADEX) 20 MG Tab    Other Relevant Orders    Prothrombin Time    CBC WITH DIFFERENTIAL    Hospital discharge follow-up     New problem, fluid status much better since transitioning from furosemide to torsemide.  She will double check that she is taking potassium appropriately.  We will recheck metabolic panel, blood counts, thyroid, magnesium, and BNP in clinic today so those values are available for her visit with cardiology later this week.  Advised her if she could turn her oxygen down to 1 L on exertion and if her saturations stay above 92% then she can take it off unless she feels short of breath and just go back to using it at night.  Suspect increased oxygen demand was related to heart failure exacerbation.  May be more mindful of salt and fluid restriction and use of compression.  Also, she was subtherapeutic for her INR and required doses between 18 and 20 mg of warfarin and wonders if this is due to an interaction with primidone or if she may benefit from switching to a DOAC.  Worsened pulmonary hypertension on echocardiogram, she will see pulmonary medicine next week.         Secondary hypercoagulable state (HCC)     Chronic and ongoing problem.  Will see if she would be eligible for DOAC due to challenges with high warfarin dosing and subtherapeutic INR recently.  She also was told during a hospitalization that her primidone could interact with warfarin.  We will reach out to our anticoagulation pharmacist so they can review.         Secondary pulmonary arterial hypertension (HCC)     Chronic and worsening problem.  Latest RVSP up to 79 mmHg on echocardiogram from last week.  She will see pulmonary medicine next week.  Fluid status much better, she was in a diastolic heart failure exacerbation at the time of this echocardiogram.         Relevant Medications    torsemide (DEMADEX) 20 MG Tab    Chronic respiratory failure with hypoxia (HCC)- nocturnal     Chronic  and decompensated problem, increased oxygen needs during heart failure exacerbation, advised her to decrease from 2 to 1 L during the daytime and if oxygenation remains above 90% then could stop altogether unless she has shortness of breath.  Continue wearing at night as recommended.  She follows up with pulmonary medicine next week.         Acute on chronic diastolic congestive heart failure (HCC)     New and decompensated problem.  Recent admission for heart failure exacerbation.  Diuretics have been switched to torsemide and she notes improvement in fluid status.  She will be establishing with cardiology later this week.  She has history of multiple pulmonary emboli with pulmonary hypertension and diastolic dysfunction likely contributing to her heart failure.  Still hypervolemic on exam, chronically has lower extremity edema.  Symptomatically feeling much better and blood pressure remains well controlled.  We will check her labs to ensure stability of electrolytes, kidney function, and improvement of BNP with above therapy.         Relevant Medications    torsemide (DEMADEX) 20 MG Tab      Other Visit Diagnoses     Hypomagnesemia        Relevant Orders    MAGNESIUM         Annual Health Assessment Questions:    1.  Are you currently engaging in any exercise or physical activity? No    2.  How would you describe your mood or emotional well-being today? fair    3.  Have you had any falls in the last year? Yes    4.  Have you noticed any problems with your balance or had difficulty walking? No    5.  In the last six months have you experienced any leakage of urine? No    6. DPA/Advanced Directive: Patient has Advanced Directive on file.     RTC: Return in about 2 weeks (around 1/25/2022).    I spent a total of 38 minutes with record review, exam, communication with the patient, communication with other providers, and documentation of this encounter.    PLEASE NOTE: This dictation was created using voice recognition  software. I have made every reasonable attempt to correct obvious errors, but I expect that there are errors of grammar and possibly content that I did not discover before finalizing the note.      Kiley Manrique, DO  Geriatric and Internal Medicine  [facility]

## 2022-01-12 NOTE — ASSESSMENT & PLAN NOTE
Chronic and ongoing problem.  Will see if she would be eligible for DOAC due to challenges with high warfarin dosing and subtherapeutic INR recently.  She also was told during a hospitalization that her primidone could interact with warfarin.  We will reach out to our anticoagulation pharmacist so they can review.

## 2022-01-13 ENCOUNTER — ANTICOAGULATION MONITORING (OUTPATIENT)
Dept: VASCULAR LAB | Facility: MEDICAL CENTER | Age: 82
End: 2022-01-13

## 2022-01-13 ENCOUNTER — TELEPHONE (OUTPATIENT)
Dept: MEDICAL GROUP | Facility: PHYSICIAN GROUP | Age: 82
End: 2022-01-13

## 2022-01-13 DIAGNOSIS — I26.99 RECURRENT PULMONARY EMBOLISM (HCC): ICD-10-CM

## 2022-01-13 DIAGNOSIS — D68.69 SECONDARY HYPERCOAGULABLE STATE (HCC): ICD-10-CM

## 2022-01-13 NOTE — TELEPHONE ENCOUNTER
Patient called following up on her potassium levels. She was taking 20 MG of potassium daily but the last time she took it was 01/05/2022. Please advise.

## 2022-01-13 NOTE — TELEPHONE ENCOUNTER
Spoke with patient and she has been contact by Anticoagulant clinic regarding INR. She will follow up with them after seeing Cardiology on 01/14/2022.

## 2022-01-13 NOTE — PROGRESS NOTES
Anticoagulation Summary  As of 2022    INR goal:  2.0-3.0   TTR:  60.5 % (5 y)   INR used for dosin.43 (2022)   Warfarin maintenance plan:  15 mg (10 mg x 1.5) every day   Weekly warfarin total:  105 mg   Plan last modified:  Neelam Ozuna PharmD (2021)   Next INR check:  2022   Target end date:  Indefinite    Indications    Long term (current) use of anticoagulants [Z79.01] (Resolved) [Z79.01]  Secondary hypercoagulable state (HCC) [D68.69]  Recurrent pulmonary embolism (HCC) [I26.99]             Anticoagulation Episode Summary     INR check location:      Preferred lab:      Send INR reminders to:      Comments:        Anticoagulation Care Providers     Provider Role Specialty Phone number    Ninfa Marcial M.D. Referring Internal Medicine [phone]    [facility] Responsible  [phone]          Refer to Anticoagulation Patient Findings for HPI  Patient Findings     Positives:  Change in medications (changed from furosemide to torsemide), Hospital admission (hospitalized for new onset hf)    Negatives:  Signs/symptoms of thrombosis, Signs/symptoms of bleeding, Laboratory test error suspected, Change in health, Change in alcohol use, Change in activity, Upcoming invasive procedure, Emergency department visit, Upcoming dental procedure, Missed doses, Extra doses, Change in diet/appetite, Bruising, Other complaints          Spoke with pt.  INR is supratherapeutic.     Pt verifies warfarin weekly dosing.     Will have pt hold x 2 days then continue regimen    Initially wanted to repeat INR in 4 days but pt preferred to be checked in 1 day at Vascular clinic    Neelam Ozuna, PharmD

## 2022-01-14 ENCOUNTER — OFFICE VISIT (OUTPATIENT)
Dept: CARDIOLOGY | Facility: MEDICAL CENTER | Age: 82
End: 2022-01-14
Payer: MEDICARE

## 2022-01-14 ENCOUNTER — ANTICOAGULATION MONITORING (OUTPATIENT)
Dept: VASCULAR LAB | Facility: MEDICAL CENTER | Age: 82
End: 2022-01-14

## 2022-01-14 VITALS
BODY MASS INDEX: 34.15 KG/M2 | OXYGEN SATURATION: 97 % | WEIGHT: 200 LBS | HEART RATE: 87 BPM | RESPIRATION RATE: 14 BRPM | SYSTOLIC BLOOD PRESSURE: 124 MMHG | DIASTOLIC BLOOD PRESSURE: 74 MMHG | HEIGHT: 64 IN

## 2022-01-14 DIAGNOSIS — I26.99 RECURRENT PULMONARY EMBOLISM (HCC): ICD-10-CM

## 2022-01-14 DIAGNOSIS — I50.813 ACUTE ON CHRONIC RIGHT-SIDED HEART FAILURE (HCC): ICD-10-CM

## 2022-01-14 DIAGNOSIS — D68.69 SECONDARY HYPERCOAGULABLE STATE (HCC): ICD-10-CM

## 2022-01-14 DIAGNOSIS — I27.21 PULMONARY ARTERY HYPERTENSION (HCC): ICD-10-CM

## 2022-01-14 DIAGNOSIS — Z79.01 CHRONIC ANTICOAGULATION: ICD-10-CM

## 2022-01-14 DIAGNOSIS — Z79.899 HIGH RISK MEDICATION USE: ICD-10-CM

## 2022-01-14 PROCEDURE — 99205 OFFICE O/P NEW HI 60 MIN: CPT | Performed by: INTERNAL MEDICINE

## 2022-01-14 RX ORDER — TORSEMIDE 20 MG/1
40 TABLET ORAL DAILY
Qty: 60 TABLET | Refills: 5 | Status: SHIPPED | OUTPATIENT
Start: 2022-01-14 | End: 2022-07-05

## 2022-01-14 RX ORDER — SPIRONOLACTONE 25 MG/1
25 TABLET ORAL DAILY
Qty: 30 TABLET | Refills: 3 | Status: SHIPPED | OUTPATIENT
Start: 2022-01-14 | End: 2022-02-22

## 2022-01-14 ASSESSMENT — ENCOUNTER SYMPTOMS
MYALGIAS: 0
LOSS OF BALANCE: 0
DOUBLE VISION: 0
PARESTHESIAS: 0
FLANK PAIN: 0
DIARRHEA: 0
DIZZINESS: 0
ORTHOPNEA: 0
WEAKNESS: 0
SHORTNESS OF BREATH: 0
NUMBNESS: 0
SYNCOPE: 0
BACK PAIN: 0
SORE THROAT: 0
VOMITING: 0
BLOATING: 0
NAUSEA: 0
LIGHT-HEADEDNESS: 0
DIAPHORESIS: 0
EXCESSIVE DAYTIME SLEEPINESS: 0
IRREGULAR HEARTBEAT: 0
PND: 0
SLEEP DISTURBANCES DUE TO BREATHING: 0
CONSTIPATION: 0
BLURRED VISION: 0
DYSPNEA ON EXERTION: 0
FALLS: 1
HEADACHES: 0
NEAR-SYNCOPE: 0
DECREASED APPETITE: 0
PALPITATIONS: 0
BRUISES/BLEEDS EASILY: 1
COUGH: 0
FEVER: 0
NIGHT SWEATS: 0
WHEEZING: 0

## 2022-01-14 ASSESSMENT — FIBROSIS 4 INDEX: FIB4 SCORE: 0.95

## 2022-01-15 NOTE — PROGRESS NOTES
Cardiology Initial Consultation Note    Date of note:    1/14/2022    Primary Care Provider: Kiley Manrique D.O.  Referring Provider: Kiley Manrique D.O    Patient Name: Natalie Anaya   YOB: 1940  MRN:              [id number]    Chief Complaint   Patient presents with   • Pulmonary Hypertension       History of Present Illness:   Natalie Anaya is a 81 y.o. adult whose current medical problems include chronic PE  who is here for cardiac consultation for pulmonary HTN.    Patient states that she was in [place] for the holidays and started having lower extremity swelling.  Wears oxygen at night but forgot to take her oxygen.  Felt better with oxygen but end up getting admitted to a hospital there due to dyspnea and volume overload.  Was diuresed and started on torsemide 40 mg daily.    Since returning back to [place], patient reports slight improvement in shortness of breath but is still on supplemental oxygen.  Is diuresing well with torsemide 40 mg and has noted significant improvement in her lower extremity edema.    Cardiovascular Risk Factors:  1. Smoking status: Never smoker  2. Type II Diabetes Mellitus: No  Lab Results   Component Value Date/Time    HBA1C 5.7 (H) 07/16/2021 08:21 AM    HBA1C 6.0 (H) 07/27/2018 09:03 AM     3. Hypertension: no  4. Dyslipidemia: no   Cholesterol,Tot   Date Value Ref Range Status   08/12/2019 149 100 - 199 mg/dL Final     LDL   Date Value Ref Range Status   08/12/2019 76 <100 mg/dL Final     HDL   Date Value Ref Range Status   08/12/2019 54 >=40 mg/dL Final     Triglycerides   Date Value Ref Range Status   08/12/2019 95 0 - 149 mg/dL Final     5. Family history of early Coronary Artery Disease in a first degree relative (Male less than 55 years of age; Female less than 65 years of age): denies  6.  Obesity and/or Metabolic Syndrome: BMI 34  7. Sedentary lifestyle: Yes      Review of Systems   Constitutional: Negative for decreased appetite,  diaphoresis, fever, malaise/fatigue and night sweats.   HENT: Positive for tinnitus. Negative for congestion and sore throat.    Eyes: Negative for blurred vision and double vision.   Cardiovascular: Positive for leg swelling. Negative for chest pain, cyanosis, dyspnea on exertion, irregular heartbeat, near-syncope, orthopnea, palpitations, paroxysmal nocturnal dyspnea and syncope.   Respiratory: Negative for cough, shortness of breath, sleep disturbances due to breathing and wheezing.    Endocrine: Negative for cold intolerance and heat intolerance.   Hematologic/Lymphatic: Bruises/bleeds easily.   Musculoskeletal: Positive for falls. Negative for back pain and myalgias.   Gastrointestinal: Negative for bloating, constipation, diarrhea, nausea and vomiting.   Genitourinary: Negative for dysuria and flank pain.   Neurological: Negative for excessive daytime sleepiness, dizziness, headaches, light-headedness, loss of balance, numbness, paresthesias and weakness.       Past Medical History:   Diagnosis Date   • Arthritis     Knees.   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   • Ringing in ears    • Seizure (HCC) 1982   • Sore muscles          Past Surgical History:   Procedure Laterality Date   • PB OPEN FIX INTER/SUBTROCH FX,IMPLNT Right 6/13/2019    Procedure: INSERTION, INTRAMEDULLARY MENDY, FEMUR, PROXIMAL;  Surgeon: Maximo Garnica M.D.;  Location: SURGERY [facility];  Service: Orthopedics   • IRRIGATION & DEBRIDEMENT HIP Left 11/17/2016    Procedure: IRRIGATION & DEBRIDEMENT HIP;  Surgeon: Maximo Garnica M.D.;  Location: SURGERY [facility]S;  Service:    • HIP  NAILING INTRAMEDULLARY Left 10/6/2016    Procedure: HIP NAILING INTRAMEDULLARY;  Surgeon: Walt Nguyen M.D.;  Location: [facility];  Service:    • GASTROSCOPY-ENDO N/A 4/29/2016    Procedure: GASTROSCOPY-ENDO;  Surgeon: Mikey Stanton M.D.;  Location: [facility];  Service:    • COLONOSCOPY-FLEXIBLE N/A 4/29/2016    Procedure: COLONOSCOPY-FLEXIBLE;  Surgeon: Mikey Stanton M.D.;  Location: [facility];  Service:    • CATARACT PHACO WITH IOL  8/5/2014    Performed by Lorenzo Bello M.D. at [facility]   • CATARACT PHACO WITH IOL  7/15/2014    Performed by Lorenzo Belol M.D. at [facility]   • KNEE ARTHROPLASTY TOTAL  7/9/2012    Performed by DENIZ FREDERICK at [facility]   • JOSE BY LAPAROSCOPY  9/30/2011    Performed by JOHNATHAN CRISOSTOMO at [facility]   • ARTHROSCOPY, KNEE     • CARPAL TUNNEL RELEASE     • IN REMV 2ND CATARACT,CORN-SCLER SECTN     • TONSILLECTOMY     • TUBAL LIGATION           Current Outpatient Medications   Medication Sig Dispense Refill   • spironolactone (ALDACTONE) 25 MG Tab Take 1 Tablet by mouth every day. 30 Tablet 3   • torsemide (DEMADEX) 20 MG Tab Take 2 Tablets by mouth every day. 60 Tablet 5   • gabapentin (NEURONTIN) 100 MG Cap TAKE 1 CAPSULE BY MOUTH AT BEDTIME 100 Capsule 3   • levothyroxine (SYNTHROID) 100 MCG Tab TAKE 1 TABLET BY MOUTH EVERY  Tablet 3   • warfarin (COUMADIN) 10 MG Tab TAKE 1 AND 1/2 TABLETS BY MOUTH DAILY OR AS DIRECTED BY [facility] 135 Tablet 1   • Calcium 500-100 MG-UNIT Chew Tab Chew. Takes 2 a day     • alendronate (FOSAMAX) 70 MG Tab TAKE 1 TABLET BY MOUTH ONCE A WEEK 12 tablet 3   • omeprazole (PRILOSEC) 20 MG delayed-release capsule Take 1 capsule by mouth every day. 100 capsule 3   • potassium chloride SA (KDUR) 20 MEQ Tab CR Take 1 tablet by mouth every day. 100 tablet 3   • primidone (MYSOLINE) 250 MG Tab Take 1 Tab by mouth 3  times a day. (Patient taking differently: Take 250 mg by mouth. Two tabs at night and one tab in the morning) 300 Tab 3   • amoxicillin (AMOXIL) 500 MG Cap Take 500 mg by mouth. Dental work     • Home Care Oxygen Inhale 2 L/min by mouth every bedtime.     • Melatonin 10 MG Tab Take 15 mg by mouth every bedtime.     • Probiotic Product (PROBIOTIC DAILY PO) Take 1 Cap by mouth every day.     • Glucos-Chond-Hyal Ac-Ca Fructo (MOVE FREE JOINT HEALTH ADVANCE) Tab Take 1 Tab by mouth every day.     • Multiple Vitamins-Minerals (WOMENS MULTI VITAMIN & MINERAL) Tab Take 1 Tab by mouth every day.     • Cholecalciferol (VITAMIN D-3) 1000 UNITS Cap Take 1,000 Units by mouth every day.       No current facility-administered medications for this visit.         Allergies   Allergen Reactions   • Tape Rash     Adhesive band aids cause a rash  Paper tape ok         Family History   Problem Relation Age of Onset   • Cancer Mother 67        Ovarian   • Alcohol/Drug Father 60        Appenditis   • Heart Disease Father    • Cancer Maternal Grandmother         colon cancer   • Heart Disease Maternal Grandfather    • Cancer Daughter 44        melonoma         Social History     Socioeconomic History   • Marital status:      Spouse name: Not on file   • Number of children: Not on file   • Years of education: Not on file   • Highest education level: Not on file   Occupational History   • Not on file   Tobacco Use   • Smoking status: Never Smoker   • Smokeless tobacco: Never Used   Vaping Use   • Vaping Use: Never used   Substance and Sexual Activity   • Alcohol use: No   • Drug use: No   • Sexual activity: Never     Partners: Male   Other Topics Concern   • Not on file   Social History Narrative   • Not on file     Social Determinants of Health     Financial Resource Strain:    • Difficulty of Paying Living Expenses: Not on file   Food Insecurity:    • Worried About Running Out of Food in the Last Year: Not on file   • Ran Out of  "Food in the Last Year: Not on file   Transportation Needs:    • Lack of Transportation (Medical): Not on file   • Lack of Transportation (Non-Medical): Not on file   Physical Activity:    • Days of Exercise per Week: Not on file   • Minutes of Exercise per Session: Not on file   Stress:    • Feeling of Stress : Not on file   Social Connections:    • Frequency of Communication with Friends and Family: Not on file   • Frequency of Social Gatherings with Friends and Family: Not on file   • Attends [employer] Services: Not on file   • Active Member of Clubs or Organizations: Not on file   • Attends Club or Organization Meetings: Not on file   • Marital Status: Not on file   Intimate Partner Violence:    • Fear of Current or Ex-Partner: Not on file   • Emotionally Abused: Not on file   • Physically Abused: Not on file   • Sexually Abused: Not on file   Housing Stability:    • Unable to Pay for Housing in the Last Year: Not on file   • Number of Places Lived in the Last Year: Not on file   • Unstable Housing in the Last Year: Not on file         Physical Exam:  Ambulatory Vitals  /74 (BP Location: Left arm, Patient Position: Sitting, BP Cuff Size: Adult)   Pulse 87   Resp 14   Ht 1.626 m (5' 4\")   Wt 90.7 kg (200 lb)   SpO2 97%    Oxygen Therapy:  Pulse Oximetry: 97 %, O2 Delivery Device: Nasal Cannula  BP Readings from Last 4 Encounters:   01/14/22 124/74   01/11/22 116/62   10/26/21 118/60   07/22/21 120/60       Weight/BMI: Body mass index is 34.33 kg/m².  Wt Readings from Last 4 Encounters:   01/14/22 90.7 kg (200 lb)   01/11/22 91.9 kg (202 lb 11.2 oz)   10/26/21 95.1 kg (209 lb 9.6 oz)   07/22/21 92.6 kg (204 lb 1.6 oz)       General: Well appearing and in no apparent distress  Eyes: nl conjunctiva, no icteric sclera  ENT: wearing a mask, normal external appearance of ears  Neck: + elevated JVP,  no carotid bruits  Lungs: normal respiratory effort on supplemental oxygen  Heart: RRR, no murmurs, no rubs or " gallops, +3 pitting edema bilateral lower extremities. No LV/RV heave on cardiac palpatation. 2+ bilateral radial pulses.  1+ bilateral dp pulses.   Abdomen: soft, non tender, non distended, no masses, normal bowel sounds.  No HSM.  Extremities/MSK: no clubbing, no cyanosis  Neurological: No focal sensory deficits  Psychiatric: Appropriate affect, A/O x 3, intact judgement and insight  Skin: Warm extremities    Lab Data Review:  Lab Results   Component Value Date/Time    CHOLSTRLTOT 149 08/12/2019 09:50 AM    LDL 76 08/12/2019 09:50 AM    HDL 54 08/12/2019 09:50 AM    TRIGLYCERIDE 95 08/12/2019 09:50 AM       Lab Results   Component Value Date/Time    SODIUM 147 (H) 01/11/2022 05:05 PM    POTASSIUM 3.4 (L) 01/11/2022 05:05 PM    CHLORIDE 98 01/11/2022 05:05 PM    CO2 35 (H) 01/11/2022 05:05 PM    GLUCOSE 137 (H) 01/11/2022 05:05 PM    BUN 21 01/11/2022 05:05 PM    CREATININE 0.59 01/11/2022 05:05 PM     Lab Results   Component Value Date/Time    ALKPHOSPHAT 64 07/16/2021 08:21 AM    ASTSGOT 16 07/16/2021 08:21 AM    ALTSGPT 18 07/16/2021 08:21 AM    TBILIRUBIN 0.2 07/16/2021 08:21 AM      Lab Results   Component Value Date/Time    WBC 6.2 01/11/2022 05:05 PM     Lab Results   Component Value Date/Time    HBA1C 5.7 (H) 07/16/2021 08:21 AM    HBA1C 6.0 (H) 07/27/2018 09:03 AM       Cardiac Imaging and Procedures Review:    EKG dated 6/12/2019: My personal interpretation is sinus rhythm    Echo dated 2/2020:   CONCLUSIONS  Compared to the images of the prior study done 7/27/17 -  there has   been no significant change.   Normal left ventricular systolic function.  Left ventricular ejection fraction is visually estimated to be 70%.  Grade II diastolic dysfunction.  The right ventricle was normal in size and function.  Mild mitral regurgitation.  Moderate tricuspid regurgitation.  Estimated right ventricular systolic pressure  is 65 mmHg.        Assessment & Plan     1. Pulmonary artery hypertension (HCC)  spironolactone  (ALDACTONE) 25 MG Tab    EC-ECHOCARDIOGRAM COMPLETE W/O CONT    torsemide (DEMADEX) 20 MG Tab    CANCELED: EKG   2. Acute on chronic right-sided heart failure (HCC)  spironolactone (ALDACTONE) 25 MG Tab    EC-ECHOCARDIOGRAM COMPLETE W/O CONT    torsemide (DEMADEX) 20 MG Tab   3. High risk medication use  Basic Metabolic Panel   4. Chronic anticoagulation           Shared Medical Decision Making:  Obtain transthoracic echocardiogram to evaluate right heart and pulmonary pressures.  Appears decompensated on exam today with lower extremity edema and JVD even though she reports significant improvement from before.  Continue diuresis with torsemide 40 mg daily.  Initiate aldactone 25 mg given acute on chronic right heart failure.    Discussed with the patient that this is a high risk medication.  Obtain BMP in 3 to 5 days after initiating to monitor electrolytes and kidney function.    Noted to have hypokalemia on recent labs.  Does have potassium supplement at home but has not been taking them.  Advised to start Aldactone, obtain repeat labs and if continues to have hypokalemia can initiate potassium supplements.    Has been on chronic anticoagulation with warfarin for chronic PE.  Discussed potential initiation of Eliquis given variable INRs and difficulty maintaining therapeutic range.  Patient is scheduled to follow-up with her pulmonary doctor on Monday and will double check with her if it is okay to switch from warfarin to Eliquis.    For pulmonary hypertension work-up, likely from [facility] given history of recurrent PE.  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      A total of 61 minutes of time was spent on day of encounter reviewing outside and prior [facility] medical records, performing history and examination, counseling, ordering medication/test/consults and documentation.    All of patient's excellent questions were answered to the best of my knowledge and to his satisfaction.  It was  a pleasure seeing   Nataliedominga Anaya in my clinic today. Return in about 3 months (around 4/14/2022). Patient is aware to call the cardiology clinic with any questions or concerns.      Óscar Hernandez MD  [facility]  [facility], [facility]  [address], [place] [address]  [place] [address]  Phone: [phone]  Fax: [phone]    Please note that this dictation was created using voice recognition software. I have made every reasonable attempt to correct obvious errors, but it is possible there are errors of grammar and possibly content that I did not discover before finalizing the note.

## 2022-01-17 ENCOUNTER — OFFICE VISIT (OUTPATIENT)
Dept: SLEEP MEDICINE | Facility: MEDICAL CENTER | Age: 82
End: 2022-01-17
Payer: MEDICARE

## 2022-01-17 ENCOUNTER — ANTICOAGULATION VISIT (OUTPATIENT)
Dept: VASCULAR LAB | Facility: MEDICAL CENTER | Age: 82
End: 2022-01-17
Attending: INTERNAL MEDICINE
Payer: MEDICARE

## 2022-01-17 VITALS
RESPIRATION RATE: 16 BRPM | OXYGEN SATURATION: 95 % | SYSTOLIC BLOOD PRESSURE: 114 MMHG | WEIGHT: 194 LBS | DIASTOLIC BLOOD PRESSURE: 58 MMHG | HEART RATE: 99 BPM | TEMPERATURE: 97.3 F | BODY MASS INDEX: 33.3 KG/M2

## 2022-01-17 DIAGNOSIS — G47.33 OBSTRUCTIVE SLEEP APNEA SYNDROME: ICD-10-CM

## 2022-01-17 DIAGNOSIS — I27.20 PULMONARY HYPERTENSION (HCC): ICD-10-CM

## 2022-01-17 DIAGNOSIS — I26.99 RECURRENT PULMONARY EMBOLISM (HCC): ICD-10-CM

## 2022-01-17 DIAGNOSIS — D68.69 SECONDARY HYPERCOAGULABLE STATE (HCC): ICD-10-CM

## 2022-01-17 DIAGNOSIS — J96.11 CHRONIC RESPIRATORY FAILURE WITH HYPOXIA (HCC): ICD-10-CM

## 2022-01-17 DIAGNOSIS — I50.33 ACUTE ON CHRONIC DIASTOLIC HEART FAILURE (HCC): ICD-10-CM

## 2022-01-17 LAB
INR BLD: 1.1 (ref 0.9–1.2)
INR PPP: 1.1 (ref 2–3.5)

## 2022-01-17 PROCEDURE — 99212 OFFICE O/P EST SF 10 MIN: CPT | Performed by: NURSE PRACTITIONER

## 2022-01-17 PROCEDURE — 99214 OFFICE O/P EST MOD 30 MIN: CPT | Performed by: INTERNAL MEDICINE

## 2022-01-17 PROCEDURE — 85610 PROTHROMBIN TIME: CPT

## 2022-01-17 ASSESSMENT — ENCOUNTER SYMPTOMS
EYE PAIN: 0
DIAPHORESIS: 0
SPUTUM PRODUCTION: 0
BLURRED VISION: 0
DIZZINESS: 0
VOMITING: 0
TREMORS: 0
STRIDOR: 0
FEVER: 0
EYE REDNESS: 0
COUGH: 0
SINUS PAIN: 0
DOUBLE VISION: 0
PHOTOPHOBIA: 0
NAUSEA: 0
DEPRESSION: 0
EYE DISCHARGE: 0
HEADACHES: 0
CHILLS: 0
WEAKNESS: 0
SORE THROAT: 0
PALPITATIONS: 0
MYALGIAS: 0
ORTHOPNEA: 0
FOCAL WEAKNESS: 0
WEIGHT LOSS: 0
DIARRHEA: 0
SPEECH CHANGE: 0
FALLS: 0
BACK PAIN: 0
PND: 0
NECK PAIN: 0
HEARTBURN: 0
CONSTIPATION: 0
CLAUDICATION: 0
ABDOMINAL PAIN: 0
WHEEZING: 0
SHORTNESS OF BREATH: 1
HEMOPTYSIS: 0

## 2022-01-17 ASSESSMENT — FIBROSIS 4 INDEX: FIB4 SCORE: 0.95

## 2022-01-17 NOTE — PATIENT INSTRUCTIONS
- stop warfarin  - began taking Eliquis 5 mg by mouth twice daily (only one dose today)  - check price of Eliquis and bring coupons to pharmacy

## 2022-01-17 NOTE — Clinical Note
Good afternoon ladies, Ms bell is being seen in anticoagulation clinic today and she has had some issues with coumadin dosing. I was hoping we could transition her to eliquis with your help?

## 2022-01-17 NOTE — PROGRESS NOTES
Anticoagulation Summary  As of 2022    INR goal:  2.0-3.0   TTR:  60.4 % (5 y)   INR used for dosin.10 (2022)   Warfarin maintenance plan:  No maintenance plan   Weekly warfarin total:  105 mg   Plan last modified:  Neelam Ozuna, PharmD (2021)   Next INR check:  2022   Target end date:  Indefinite    Indications    Long term (current) use of anticoagulants [Z79.01] (Resolved) [Z79.01]  Secondary hypercoagulable state (HCC) [D68.69]  Recurrent pulmonary embolism (HCC) [I26.99]             Anticoagulation Episode Summary     INR check location:      Preferred lab:      Send INR reminders to:      Comments:        Anticoagulation Care Providers     Provider Role Specialty Phone number    Ninfa Marcial M.D. Referring Internal Medicine [phone]    [facility] Responsible  [phone]                Refer to Patient Findings for HPI:      /58  99 HR     Verified current warfarin dosing schedule.    Medications reconciled   Pt is not on antiplatelet therapy     Patient discussed Eliquis with cardiology on Friday.  Per Dr Hernandez:  Discussed potential initiation of Eliquis given variable INRs and difficulty maintaining therapeutic range.  Patient is scheduled to follow-up with her pulmonary doctor on Monday and will double check with her if it is okay to switch from warfarin to Eliquis.    She saw Dr Morales today. Pt states that Dr Morales is okay with her switching to Eliquis. Patient aware that concurrent use of Eliquis and primidone may result in reduced apixaban exposure and increased risk of thrombotic events. She will monitor closely for s/sx of recurrent VTE and understands she needs seek emergent medical attention for any s/sx.    A/P   INR  1.1     - stop warfarin  - began taking Eliquis 5 mg by mouth twice daily (only one dose today)  - check price of Eliquis and bring coupons to pharmacy     Pt educated to contact our clinic with any changes in medications or  s/s of bleeding or thrombosis. Pt is aware to seek immediate medical attention for falls, head injury or deep cuts.    Follow up appointment in 1 week(s) to assess price and tolerability.    GILDARDO Martins.

## 2022-01-18 ENCOUNTER — PATIENT MESSAGE (OUTPATIENT)
Dept: CARDIOLOGY | Facility: MEDICAL CENTER | Age: 82
End: 2022-01-18

## 2022-01-19 NOTE — PATIENT COMMUNICATION
You  Óscar Hernandez M.D. 2 hours ago (9:37 AM)       Are you ok with her having 2 cups of regular coffee a day? And should she stick to a 2g sodium diet?   Thanks!      Óscar Hernandez M.D.  You 6 minutes ago (12:24 PM)          If she does not notice any worse symptoms with regular coffee then should be ok.  Would stick with low salt diet.     Thanks.

## 2022-01-19 NOTE — TELEPHONE ENCOUNTER
If she does not notice any worse symptoms with regular coffee then should be ok.  Would stick with low salt diet.    Thanks.

## 2022-01-20 ENCOUNTER — APPOINTMENT (OUTPATIENT)
Dept: MEDICAL GROUP | Facility: MEDICAL CENTER | Age: 82
End: 2022-01-20
Payer: MEDICARE

## 2022-01-21 ENCOUNTER — HOSPITAL ENCOUNTER (OUTPATIENT)
Dept: LAB | Facility: MEDICAL CENTER | Age: 82
End: 2022-01-21
Attending: INTERNAL MEDICINE
Payer: MEDICARE

## 2022-01-21 ENCOUNTER — TELEPHONE (OUTPATIENT)
Dept: VASCULAR LAB | Facility: MEDICAL CENTER | Age: 82
End: 2022-01-21

## 2022-01-21 DIAGNOSIS — Z79.899 HIGH RISK MEDICATION USE: ICD-10-CM

## 2022-01-21 LAB
ANION GAP SERPL CALC-SCNC: 14 MMOL/L (ref 7–16)
BUN SERPL-MCNC: 27 MG/DL (ref 8–22)
CALCIUM SERPL-MCNC: 9.5 MG/DL (ref 8.5–10.5)
CHLORIDE SERPL-SCNC: 94 MMOL/L (ref 96–112)
CO2 SERPL-SCNC: 40 MMOL/L (ref 20–33)
CREAT SERPL-MCNC: 0.6 MG/DL (ref 0.5–1.4)
FASTING STATUS PATIENT QL REPORTED: NORMAL
GLUCOSE SERPL-MCNC: 130 MG/DL (ref 65–99)
POTASSIUM SERPL-SCNC: 2.9 MMOL/L (ref 3.6–5.5)
SODIUM SERPL-SCNC: 148 MMOL/L (ref 135–145)

## 2022-01-21 PROCEDURE — 36415 COLL VENOUS BLD VENIPUNCTURE: CPT

## 2022-01-21 PROCEDURE — 80048 BASIC METABOLIC PNL TOTAL CA: CPT

## 2022-01-21 NOTE — TELEPHONE ENCOUNTER
I have spoken to the patient. She is interested in fill with [facility]. Would like to price compare with [facility] before deciding to fill with [facility].

## 2022-01-24 ENCOUNTER — PATIENT MESSAGE (OUTPATIENT)
Dept: CARDIOLOGY | Facility: MEDICAL CENTER | Age: 82
End: 2022-01-24

## 2022-01-24 ENCOUNTER — ANTICOAGULATION VISIT (OUTPATIENT)
Dept: MEDICAL GROUP | Facility: MEDICAL CENTER | Age: 82
End: 2022-01-24
Payer: MEDICARE

## 2022-01-24 VITALS — SYSTOLIC BLOOD PRESSURE: 139 MMHG | DIASTOLIC BLOOD PRESSURE: 67 MMHG | HEART RATE: 75 BPM

## 2022-01-24 DIAGNOSIS — I26.99 RECURRENT PULMONARY EMBOLISM (HCC): ICD-10-CM

## 2022-01-24 DIAGNOSIS — Z79.01 CHRONIC ANTICOAGULATION: Primary | ICD-10-CM

## 2022-01-24 DIAGNOSIS — D68.69 SECONDARY HYPERCOAGULABLE STATE (HCC): ICD-10-CM

## 2022-01-24 PROCEDURE — 99211 OFF/OP EST MAY X REQ PHY/QHP: CPT | Performed by: INTERNAL MEDICINE

## 2022-01-24 NOTE — PROGRESS NOTES
Target end date:indefinite     Indication: PE     Drug: Eliquis 5 mg BID        Health Status Since Last Assessment   Patient denies any new relevant medical problems, ED visits or hospitalizations   Patient denies any embolic events (stroke/tia/systemic embolism)    Adherence with DOAC Therapy   Pt has 0 missed any doses in the average week    Bleeding Risk Assessment     Denies Epistaxis   Pt denies any excessive or unusual bleeding/hematomas.  Pt denies any GI bleeds or hematemesis.  Pt denies any concerning daily headache or sub dural hematoma symptoms.     Pt denies any hematuria    Latest Hemoglobin 15   ETOH overuse denies     Creatinine Clearance/Renal Function     Latest SCr 0.6    Hepatic function   Latest LFTs WNL   Pt denies any history of liver dysfunction      Drug Interactions   Platelets: 331   ASA/other antiplatelets none   NSAID none   Other drug interactions none    Verified no anticonvulsant or azole therapy, education provided for future use.     Examination   Blood Pressure WNL   Symptomatic hypotension none   Significant gait impairment/imbalance/high risk for falls? Yes, pt uses walker    Final Assessment and Recommendations:   Patient appears stable from the anticoagulation standpoint.     Benefits of continued DOAC therapy outweigh risks for this patient   Recommend pt continue with current DOAC therapy    DOAC is  Affordable, insurance states it should be $40 per month. Provided 2 weeks of samples for pt and free 30 day supply card   Other Actions: pt has lab work ordered by other provider we will review    Follow up:   Will follow up with patient 6  months.

## 2022-01-26 DIAGNOSIS — E87.6 HYPOKALEMIA: ICD-10-CM

## 2022-01-26 NOTE — PATIENT COMMUNICATION
Steven Estrada,  Dr. Hernandez would like you to start on the Spironolactone asap.  This will help your body hold on to some of your potassium.  See Dr. Manrique tomorrow and get your lab work done (BMP) so we can see what your levels are.  Stay on the 2 tablets of potassium until we let you know those results. Call us on Friday or send a [employer] CHART message if you do not hear about the results by 3 pm.  Let us know if you are unable to make any of those appointments.  We may be able to arrange an Uber for you.  Alexandrea CARRASCO RN

## 2022-01-26 NOTE — PATIENT COMMUNICATION
Steven Estrada,  Dr. Hernandez would like you to start on the Spironolactone asap.  This will help your body hold on to some of your potassium.  See Dr. Manrique tomorrow and get your lab work done (BMP) so we can see what your levels are.  Stay on the 2 tablets of potassium until we let you know those results. Call us on Friday or send a [employer] CHART message if you do not hear about the results by 3 pm.  Let us know if you are unable to make any of those appointments.  We may be able to arrange an Uber for you.  Alexandrea CARRASCO RN

## 2022-01-27 ENCOUNTER — HOSPITAL ENCOUNTER (OUTPATIENT)
Dept: LAB | Facility: MEDICAL CENTER | Age: 82
End: 2022-01-27
Attending: INTERNAL MEDICINE
Payer: MEDICARE

## 2022-01-27 ENCOUNTER — OFFICE VISIT (OUTPATIENT)
Dept: MEDICAL GROUP | Facility: PHYSICIAN GROUP | Age: 82
End: 2022-01-27
Payer: MEDICARE

## 2022-01-27 VITALS
HEART RATE: 77 BPM | RESPIRATION RATE: 16 BRPM | OXYGEN SATURATION: 96 % | SYSTOLIC BLOOD PRESSURE: 100 MMHG | DIASTOLIC BLOOD PRESSURE: 56 MMHG | TEMPERATURE: 99.3 F | BODY MASS INDEX: 33.46 KG/M2 | WEIGHT: 196 LBS | HEIGHT: 64 IN

## 2022-01-27 DIAGNOSIS — I50.33 ACUTE ON CHRONIC DIASTOLIC CONGESTIVE HEART FAILURE (HCC): ICD-10-CM

## 2022-01-27 DIAGNOSIS — J96.11 CHRONIC RESPIRATORY FAILURE WITH HYPOXIA (HCC): ICD-10-CM

## 2022-01-27 DIAGNOSIS — I50.32 CHRONIC DIASTOLIC CONGESTIVE HEART FAILURE (HCC): ICD-10-CM

## 2022-01-27 DIAGNOSIS — M17.11 ARTHRITIS OF RIGHT KNEE: ICD-10-CM

## 2022-01-27 LAB
ANION GAP SERPL CALC-SCNC: 12 MMOL/L (ref 7–16)
BUN SERPL-MCNC: 23 MG/DL (ref 8–22)
CALCIUM SERPL-MCNC: 9.9 MG/DL (ref 8.5–10.5)
CHLORIDE SERPL-SCNC: 100 MMOL/L (ref 96–112)
CO2 SERPL-SCNC: 36 MMOL/L (ref 20–33)
CREAT SERPL-MCNC: 0.55 MG/DL (ref 0.5–1.4)
GLUCOSE SERPL-MCNC: 74 MG/DL (ref 65–99)
POTASSIUM SERPL-SCNC: 4 MMOL/L (ref 3.6–5.5)
SODIUM SERPL-SCNC: 148 MMOL/L (ref 135–145)

## 2022-01-27 PROCEDURE — 36415 COLL VENOUS BLD VENIPUNCTURE: CPT

## 2022-01-27 PROCEDURE — 80048 BASIC METABOLIC PNL TOTAL CA: CPT

## 2022-01-27 PROCEDURE — 99214 OFFICE O/P EST MOD 30 MIN: CPT | Performed by: INTERNAL MEDICINE

## 2022-01-27 ASSESSMENT — FIBROSIS 4 INDEX: FIB4 SCORE: 0.95

## 2022-01-27 NOTE — ASSESSMENT & PLAN NOTE
Chronic and ongoing problem.  Weights continue to trend down, she was as low as 192 pounds but then she ran out of her torsemide.  She will go  more torsemide today and continue at increased dose of 40 mg daily with potassium chloride 40 mEq daily as well as spironolactone 25 mg daily.  She unfortunately had hypokalemia recently as result of these medicines, no significant muscle weakness.  She is going over to the lab right now to recheck her any function electrolytes.  She has follow-up arranged with cardiology early April 2022

## 2022-01-27 NOTE — PROGRESS NOTES
Subjective:   Chief Complaint/History of Present Illness:  Natalie Anaya is a 81 y.o. adult established patient who presents today to discuss medical problems as listed below. Natalie is accompanied by her , Marlo.    Problem   Chronic Diastolic Congestive Heart Failure (Hcc)    She developed diastolic heart exacerbation leading to hospitalization in late December or early January 2022 while she was vacationing in [place].  She has since been optimized on her diuretics and has established with cardiology.  She continues to be on oxygen with hopes to wean off.  She has had some challenges with hypokalemia but otherwise feels like she is having right direction.     Current regimen: Torsemide 40 mg daily and potassium chloride 40 mEq daily, spironolactone 25 mg daily     Arthritis of Right Knee    She has longstanding orthopedic pain throughout her body but more recently in her knees.  She is recommend Dr. Don at orthopedics and plans to see him for follow-up for Monovisc injection, this is scheduled for early February 2022.     Chronic respiratory failure with hypoxia (HCC)- nocturnal    Patient uses 2 L of supplemental oxygen at night when she is sleeping.  She reports that this is now in lieu of a CPAP as she has stopped using this device (since her hip fracture and recovery in July 2019).      She was hospitalized for heart failure exacerbation in late December/early January 2022.  She was discharged on 2 L supplemental oxygen due to hypoxia from her heart failure which we are working to wean down.          Current Medications:  Current Outpatient Medications Ordered in Epic   Medication Sig Dispense Refill   • apixaban (ELIQUIS) 5mg Tab Take 1 Tablet by mouth 2 times a day. 180 Tablet 2   • spironolactone (ALDACTONE) 25 MG Tab Take 1 Tablet by mouth every day. 30 Tablet 3   • torsemide (DEMADEX) 20 MG Tab Take 2 Tablets by mouth every day. 60 Tablet 5   • gabapentin (NEURONTIN) 100 MG Cap TAKE 1  "CAPSULE BY MOUTH AT BEDTIME 100 Capsule 3   • levothyroxine (SYNTHROID) 100 MCG Tab TAKE 1 TABLET BY MOUTH EVERY  Tablet 3   • Calcium 500-100 MG-UNIT Chew Tab Chew. Takes 2 a day     • alendronate (FOSAMAX) 70 MG Tab TAKE 1 TABLET BY MOUTH ONCE A WEEK 12 tablet 3   • omeprazole (PRILOSEC) 20 MG delayed-release capsule Take 1 capsule by mouth every day. 100 capsule 3   • potassium chloride SA (KDUR) 20 MEQ Tab CR Take 1 tablet by mouth every day. 100 tablet 3   • primidone (MYSOLINE) 250 MG Tab Take 1 Tab by mouth 3 times a day. (Patient taking differently: Take 250 mg by mouth. Two tabs at night and one tab in the morning) 300 Tab 3   • amoxicillin (AMOXIL) 500 MG Cap Take 500 mg by mouth. Dental work     • Home Care Oxygen Inhale 2 L/min by mouth every bedtime.     • Melatonin 10 MG Tab Take 15 mg by mouth every bedtime.     • Probiotic Product (PROBIOTIC DAILY PO) Take 1 Cap by mouth every day.     • Glucos-Chond-Hyal Ac-Ca Fructo (MOVE FREE JOINT HEALTH ADVANCE) Tab Take 1 Tab by mouth every day.     • Multiple Vitamins-Minerals (WOMENS MULTI VITAMIN & MINERAL) Tab Take 1 Tab by mouth every day.     • Cholecalciferol (VITAMIN D-3) 1000 UNITS Cap Take 1,000 Units by mouth every day.       No current [facility]-ordered facility-administered medications on file.          Objective:   Physical Exam:    Vitals: /56 (BP Location: Left arm, Patient Position: Sitting, BP Cuff Size: Adult)   Pulse 77   Temp 37.4 °C (99.3 °F) (Temporal)   Resp 16   Ht 1.626 m (5' 4\")   Wt 88.9 kg (196 lb)   SpO2 96%    BMI: Body mass index is 33.64 kg/m².  Physical Exam  Constitutional:       General: Natalie Anaya is not in acute distress.     Appearance: Normal appearance. Natalie Anaya is obese. Natalie Anaya is not ill-appearing.   HENT:      Right Ear: Tympanic membrane and ear canal normal. There is no impacted cerumen.      Left Ear: Tympanic membrane and ear canal normal. There is no impacted " cerumen.      Mouth/Throat:      Pharynx: Oropharynx is clear.   Eyes:      General: No scleral icterus.     Conjunctiva/sclera: Conjunctivae normal.   Cardiovascular:      Rate and Rhythm: Normal rate.      Pulses: Normal pulses.   Pulmonary:      Effort: Pulmonary effort is normal. No respiratory distress.      Breath sounds: Normal breath sounds. No rales.      Comments: Supplemental oxygen in place  Abdominal:      General: Bowel sounds are normal.      Palpations: Abdomen is soft.      Tenderness: There is no abdominal tenderness.   Musculoskeletal:      Right lower leg: Edema present.      Left lower leg: Edema present.   Skin:     General: Skin is warm and dry.      Findings: No rash.   Neurological:      Gait: Gait abnormal.      Comments: Using FWW.   Psychiatric:         Mood and Affect: Mood normal.         Behavior: Behavior normal.         Thought Content: Thought content normal.         Judgment: Judgment normal.          Assessment and Plan:   Natalie is a 81 y.o. adult with the following:  Problem List Items Addressed This Visit     Chronic respiratory failure with hypoxia (HCC)- nocturnal     Chronic and ongoing problem.  She continues to wear the oxygen 24/7.  She has an Inogen with pulse 2 L as needed.  She was advised by pulmonary medicine to use her oxygen on exertion and at night.  She also has a goal to keep her levels above 88%.  We are hopeful if she continues to diurese off more fluid and improve her cardiac status that her oxygen needs will go down as well.  She has some questions about making the device more portable as it is quite heavy and cumbersome, will reach out to pulmonary medicine to see if they can give her more education on her portable Inogen.         Arthritis of right knee     Chronic and decompensated issue.  She will pursue Monovisc injection early February 2022 with Dr. Don at orthopedics.         Chronic diastolic congestive heart failure (HCC)     Chronic and ongoing  problem.  Weights continue to trend down, she was as low as 192 pounds but then she ran out of her torsemide.  She will go  more torsemide today and continue at increased dose of 40 mg daily with potassium chloride 40 mEq daily as well as spironolactone 25 mg daily.  She unfortunately had hypokalemia recently as result of these medicines, no significant muscle weakness.  She is going over to the lab right now to recheck her any function electrolytes.  She has follow-up arranged with cardiology early April 2022         Relevant Orders    Basic Metabolic Panel           RTC: Return in about 3 months (around 4/27/2022).    I spent a total of 36 minutes with record review, exam, communication with the patient, communication with other providers, and documentation of this encounter.    PLEASE NOTE: This dictation was created using voice recognition software. I have made every reasonable attempt to correct obvious errors, but I expect that there are errors of grammar and possibly content that I did not discover before finalizing the note.      Kiley Manrique, DO  Geriatric and Internal Medicine  [facility]

## 2022-01-27 NOTE — ASSESSMENT & PLAN NOTE
Chronic and decompensated issue.  She will pursue Monovisc injection early February 2022 with Dr. Don at orthopedics.

## 2022-01-27 NOTE — ASSESSMENT & PLAN NOTE
Chronic and ongoing problem.  She continues to wear the oxygen 24/7.  She has an Inogen with pulse 2 L as needed.  She was advised by pulmonary medicine to use her oxygen on exertion and at night.  She also has a goal to keep her levels above 88%.  We are hopeful if she continues to diurese off more fluid and improve her cardiac status that her oxygen needs will go down as well.  She has some questions about making the device more portable as it is quite heavy and cumbersome, will reach out to pulmonary medicine to see if they can give her more education on her portable Inogen.

## 2022-02-01 DIAGNOSIS — E87.6 HYPOKALEMIA: ICD-10-CM

## 2022-02-01 DIAGNOSIS — I51.89 DIASTOLIC DYSFUNCTION: ICD-10-CM

## 2022-02-09 ENCOUNTER — ANTICOAGULATION MONITORING (OUTPATIENT)
Dept: VASCULAR LAB | Facility: MEDICAL CENTER | Age: 82
End: 2022-02-09

## 2022-02-09 ENCOUNTER — NON-PROVIDER VISIT (OUTPATIENT)
Dept: MEDICAL GROUP | Facility: PHYSICIAN GROUP | Age: 82
End: 2022-02-09
Payer: MEDICARE

## 2022-02-09 ENCOUNTER — HOSPITAL ENCOUNTER (OUTPATIENT)
Facility: MEDICAL CENTER | Age: 82
End: 2022-02-09
Attending: INTERNAL MEDICINE
Payer: MEDICARE

## 2022-02-09 DIAGNOSIS — I26.99 RECURRENT PULMONARY EMBOLISM (HCC): ICD-10-CM

## 2022-02-09 DIAGNOSIS — D68.69 SECONDARY HYPERCOAGULABLE STATE (HCC): ICD-10-CM

## 2022-02-09 PROCEDURE — 80048 BASIC METABOLIC PNL TOTAL CA: CPT

## 2022-02-09 PROCEDURE — 36415 COLL VENOUS BLD VENIPUNCTURE: CPT | Performed by: INTERNAL MEDICINE

## 2022-02-09 PROCEDURE — 99000 SPECIMEN HANDLING OFFICE-LAB: CPT | Performed by: INTERNAL MEDICINE

## 2022-02-09 NOTE — PROGRESS NOTES
INR goal range removed, follow up date added.  Patient now taking DOAC.  Maximo Squires, DarwinD, BCACP

## 2022-02-10 DIAGNOSIS — E87.6 HYPOKALEMIA: ICD-10-CM

## 2022-02-10 LAB
ANION GAP SERPL CALC-SCNC: 14 MMOL/L (ref 7–16)
BUN SERPL-MCNC: 26 MG/DL (ref 8–22)
CALCIUM SERPL-MCNC: 9.4 MG/DL (ref 8.5–10.5)
CHLORIDE SERPL-SCNC: 98 MMOL/L (ref 96–112)
CO2 SERPL-SCNC: 30 MMOL/L (ref 20–33)
CREAT SERPL-MCNC: 0.63 MG/DL (ref 0.5–1.4)
GLUCOSE SERPL-MCNC: 131 MG/DL (ref 65–99)
POTASSIUM SERPL-SCNC: 3.9 MMOL/L (ref 3.6–5.5)
SODIUM SERPL-SCNC: 142 MMOL/L (ref 135–145)

## 2022-02-19 DIAGNOSIS — I50.813 ACUTE ON CHRONIC RIGHT-SIDED HEART FAILURE (HCC): ICD-10-CM

## 2022-02-19 DIAGNOSIS — I27.21 PULMONARY ARTERY HYPERTENSION (HCC): ICD-10-CM

## 2022-02-23 RX ORDER — SPIRONOLACTONE 25 MG/1
25 TABLET ORAL DAILY
Qty: 100 TABLET | Refills: 0 | Status: SHIPPED | OUTPATIENT
Start: 2022-02-23 | End: 2022-07-04 | Stop reason: SDUPTHER

## 2022-03-13 DIAGNOSIS — I51.89 DIASTOLIC DYSFUNCTION: ICD-10-CM

## 2022-03-14 RX ORDER — POTASSIUM CHLORIDE 20 MEQ/1
20 TABLET, EXTENDED RELEASE ORAL
Qty: 100 TABLET | Refills: 3 | Status: SHIPPED | OUTPATIENT
Start: 2022-03-14 | End: 2022-07-04 | Stop reason: SDUPTHER

## 2022-03-21 ENCOUNTER — HOSPITAL ENCOUNTER (OUTPATIENT)
Dept: CARDIOLOGY | Facility: MEDICAL CENTER | Age: 82
End: 2022-03-21
Attending: INTERNAL MEDICINE
Payer: MEDICARE

## 2022-03-21 DIAGNOSIS — I27.21 PULMONARY ARTERY HYPERTENSION (HCC): ICD-10-CM

## 2022-03-21 DIAGNOSIS — I50.813 ACUTE ON CHRONIC RIGHT-SIDED HEART FAILURE (HCC): ICD-10-CM

## 2022-03-21 LAB
LV EJECT FRACT  99904: 65
LV EJECT FRACT MOD 2C 99903: 73.43
LV EJECT FRACT MOD 4C 99902: 66.4
LV EJECT FRACT MOD BP 99901: 68.46

## 2022-03-21 PROCEDURE — 93306 TTE W/DOPPLER COMPLETE: CPT | Mod: 26 | Performed by: INTERNAL MEDICINE

## 2022-03-21 PROCEDURE — 93306 TTE W/DOPPLER COMPLETE: CPT

## 2022-03-24 ENCOUNTER — TELEPHONE (OUTPATIENT)
Dept: CARDIOLOGY | Facility: MEDICAL CENTER | Age: 82
End: 2022-03-24
Payer: MEDICARE

## 2022-03-24 NOTE — TELEPHONE ENCOUNTER
----- Message from Óscar Hernandez M.D. sent at 3/23/2022  3:12 PM PDT -----  Please let the patient know pulmonary pressures have improved from before.  No change to POC.  Thanks.

## 2022-03-30 ENCOUNTER — TELEPHONE (OUTPATIENT)
Dept: SLEEP MEDICINE | Facility: MEDICAL CENTER | Age: 82
End: 2022-03-30
Payer: MEDICARE

## 2022-03-30 NOTE — TELEPHONE ENCOUNTER
Spoke with pt and we agreed the Rx for OPO that was placed by Dr. Morales would be faxed to DME:  [facility] / ph [phone] / fax [phone] for her to complete through them. Pt notified to f/u with them within the week to check the status. Order, OV, Demo faxed.

## 2022-03-30 NOTE — TELEPHONE ENCOUNTER
VOICEMAIL  1. Caller Name: Natalie                      Call Back Number: [phone]    2. Message: Pt called and left VM on 03/18/2022. She stated that she missed her appt on 03/07, because her  was admitted to the hospital that day and later passed away. She stated that she would like to get the appt rescheduled, but has limited transportation so she is wondering if there is another option rather than her having to come into the office. Please call her back to discuss.     63}

## 2022-04-08 ENCOUNTER — OFFICE VISIT (OUTPATIENT)
Dept: CARDIOLOGY | Facility: MEDICAL CENTER | Age: 82
End: 2022-04-08
Payer: MEDICARE

## 2022-04-08 VITALS
SYSTOLIC BLOOD PRESSURE: 126 MMHG | OXYGEN SATURATION: 98 % | RESPIRATION RATE: 14 BRPM | BODY MASS INDEX: 33.49 KG/M2 | HEIGHT: 64 IN | WEIGHT: 196.2 LBS | HEART RATE: 78 BPM | DIASTOLIC BLOOD PRESSURE: 82 MMHG

## 2022-04-08 DIAGNOSIS — I27.82 OTHER CHRONIC PULMONARY EMBOLISM, UNSPECIFIED WHETHER ACUTE COR PULMONALE PRESENT (HCC): ICD-10-CM

## 2022-04-08 DIAGNOSIS — I27.20 PULMONARY HYPERTENSION (HCC): ICD-10-CM

## 2022-04-08 DIAGNOSIS — J96.11 CHRONIC RESPIRATORY FAILURE WITH HYPOXIA (HCC): ICD-10-CM

## 2022-04-08 DIAGNOSIS — I07.1 MODERATE TRICUSPID REGURGITATION: ICD-10-CM

## 2022-04-08 LAB — EKG IMPRESSION: NORMAL

## 2022-04-08 PROCEDURE — 99214 OFFICE O/P EST MOD 30 MIN: CPT | Performed by: INTERNAL MEDICINE

## 2022-04-08 PROCEDURE — 93000 ELECTROCARDIOGRAM COMPLETE: CPT | Performed by: INTERNAL MEDICINE

## 2022-04-08 ASSESSMENT — FIBROSIS 4 INDEX: FIB4 SCORE: 0.95

## 2022-04-08 NOTE — PROGRESS NOTES
Cardiology Follow-up Consultation Note    Date of note:    4/8/2022    Primary Care Provider: Kiley Manrique D.O.    Name:             Natalie Anaya   YOB: 1940  MRN:               [id number]    Chief Complaint   Patient presents with   • Hypertension     F/V Dx: Pulmonary artery hypertension (HCC)       HISTORY OF PRESENT ILLNESS  Natalie Anaya is a 81 y.o. adult who returns to see us for follow-up of pulmonary hypertension, tricuspid vegetation    Last clinic visit: 2/19/2022    Interim History:  Since our last visit, patient reports passing of her  last month.  Is currently dealing with downsizing her home and is in the process of moving to either [place] or [place].  Has had dietary indiscretion but denies excessive lower extremity edema or shortness of breath.    Is currently taking torsemide 40 mg daily along with potassium supplement and spironolactone.    Continues to be on 1 L supplemental oxygen during the day and 2 L at night.  Is wondering if she needs to be on oxygen during the day as her oxygen saturation is always above 90%.    Past Medical History:   Diagnosis Date   • Arthritis     Knees.   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   • Ringing in ears    • Seizure (HCC) 1982   • Sore muscles          Past Surgical History:   Procedure Laterality Date   • PB OPEN FIX INTER/SUBTROCH FX,IMPLNT Right 6/13/2019    Procedure: INSERTION, INTRAMEDULLARY MENDY, FEMUR, PROXIMAL;  Surgeon: Maximo Garnica M.D.;  Location: SURGERY [facility];  Service: Orthopedics   • IRRIGATION & DEBRIDEMENT HIP Left 11/17/2016     Procedure: IRRIGATION & DEBRIDEMENT HIP;  Surgeon: Maximo Garnica M.D.;  Location: SURGERY [facility];  Service:    • HIP NAILING INTRAMEDULLARY Left 10/6/2016    Procedure: HIP NAILING INTRAMEDULLARY;  Surgeon: Walt Nguyen M.D.;  Location: SURGERY [facility];  Service:    • GASTROSCOPY-ENDO N/A 4/29/2016    Procedure: GASTROSCOPY-ENDO;  Surgeon: Mikey Stanton M.D.;  Location: [facility];  Service:    • COLONOSCOPY-FLEXIBLE N/A 4/29/2016    Procedure: COLONOSCOPY-FLEXIBLE;  Surgeon: Mikey Stanton M.D.;  Location: [facility];  Service:    • CATARACT PHACO WITH IOL  8/5/2014    Performed by Lorenzo Bello M.D. at [facility]   • CATARACT PHACO WITH IOL  7/15/2014    Performed by Lorenzo Bello M.D. at [facility]   • KNEE ARTHROPLASTY TOTAL  7/9/2012    Performed by DENIZ FREDERICK at [facility]   • JOSE BY LAPAROSCOPY  9/30/2011    Performed by JOHNATHAN CRISOSTOMO at [facility]   • ARTHROSCOPY, KNEE     • CARPAL TUNNEL RELEASE     • MI REMV 2ND CATARACT,CORN-SCLER SECTN     • TONSILLECTOMY     • TUBAL LIGATION           Current Outpatient Medications   Medication Sig Dispense Refill   • potassium chloride SA (KDUR) 20 MEQ Tab CR TAKE 1 TABLET BY MOUTH EVERY  Tablet 3   • omeprazole (PRILOSEC) 20 MG delayed-release capsule Take 1 Capsule by mouth every day. 100 Capsule 3   • primidone (MYSOLINE) 250 MG Tab Take 1 Tablet by mouth 3 times a day. 300 Tablet 3   • spironolactone (ALDACTONE) 25 MG Tab Take 1 Tablet by mouth every day. 100 Tablet 0   • apixaban (ELIQUIS) 5mg Tab Take 1 Tablet by mouth 2 times a day. 180 Tablet 2   • torsemide (DEMADEX) 20 MG Tab Take 2 Tablets by mouth every day. 60 Tablet 5   • gabapentin (NEURONTIN) 100 MG Cap TAKE 1 CAPSULE BY MOUTH AT BEDTIME 100 Capsule 3   • levothyroxine (SYNTHROID) 100 MCG Tab TAKE 1 TABLET BY MOUTH EVERY  Tablet 3   • Calcium 500-100 MG-UNIT Chew Tab  Chew. Takes 2 a day     • alendronate (FOSAMAX) 70 MG Tab TAKE 1 TABLET BY MOUTH ONCE A WEEK 12 tablet 3   • amoxicillin (AMOXIL) 500 MG Cap Take 500 mg by mouth. Dental work     • Home Care Oxygen Inhale 2 L/min by mouth every bedtime.     • Melatonin 10 MG Tab Take 15 mg by mouth every bedtime.     • Probiotic Product (PROBIOTIC DAILY PO) Take 1 Cap by mouth every day.     • Glucos-Chond-Hyal Ac-Ca Fructo (MOVE FREE JOINT HEALTH ADVANCE) Tab Take 1 Tab by mouth every day.     • Multiple Vitamins-Minerals (WOMENS MULTI VITAMIN & MINERAL) Tab Take 1 Tab by mouth every day.     • Cholecalciferol (VITAMIN D-3) 1000 UNITS Cap Take 1,000 Units by mouth every day.       No current facility-administered medications for this visit.         Allergies   Allergen Reactions   • Tape Rash     Adhesive band aids cause a rash  Paper tape ok         Family History   Problem Relation Age of Onset   • Cancer Mother 67        Ovarian   • Alcohol/Drug Father 60        Appenditis   • Heart Disease Father    • Cancer Maternal Grandmother         colon cancer   • Heart Disease Maternal Grandfather    • Cancer Daughter 44        melonoma         Social History     Socioeconomic History   • Marital status:      Spouse name: Not on file   • Number of children: Not on file   • Years of education: Not on file   • Highest education level: Not on file   Occupational History   • Not on file   Tobacco Use   • Smoking status: Never Smoker   • Smokeless tobacco: Never Used   Vaping Use   • Vaping Use: Never used   Substance and Sexual Activity   • Alcohol use: No   • Drug use: No   • Sexual activity: Never     Partners: Male   Other Topics Concern   • Not on file   Social History Narrative   • Not on file     Social Determinants of Health     Financial Resource Strain: Low Risk    • Difficulty of Paying Living Expenses: Not very hard   Food Insecurity: Unknown   • Worried About Running Out of Food in the Last Year: Not on file   • Ran Out of  "Food in the Last Year: Never true   Transportation Needs: No Transportation Needs   • Lack of Transportation (Medical): No   • Lack of Transportation (Non-Medical): No   Physical Activity: Inactive   • Days of Exercise per Week: 0 days   • Minutes of Exercise per Session: 0 min   Stress: No Stress Concern Present   • Feeling of Stress : Only a little   Social Connections: Moderately Integrated   • Frequency of Communication with Friends and Family: More than three times a week   • Frequency of Social Gatherings with Friends and Family: More than three times a week   • Attends [employer] Services: 1 to 4 times per year   • Active Member of Clubs or Organizations: No   • Attends Club or Organization Meetings: Never   • Marital Status:    Intimate Partner Violence: Not At Risk   • Fear of Current or Ex-Partner: No   • Emotionally Abused: No   • Physically Abused: No   • Sexually Abused: No   Housing Stability: Low Risk    • Unable to Pay for Housing in the Last Year: No   • Number of Places Lived in the Last Year: 1   • Unstable Housing in the Last Year: No         Physical Exam:  Ambulatory Vitals  /82 (BP Location: Left arm, Patient Position: Sitting, BP Cuff Size: Adult)   Pulse 78   Resp 14   Ht 1.626 m (5' 4\")   Wt 89 kg (196 lb 3.2 oz)   SpO2 98%    Oxygen Therapy:  Pulse Oximetry: 98 %, O2 Delivery Device: Nasal Cannula  BP Readings from Last 4 Encounters:   04/08/22 126/82   01/27/22 100/56   01/24/22 139/67   01/17/22 114/58       Weight/BMI: Body mass index is 33.68 kg/m².  Wt Readings from Last 4 Encounters:   04/08/22 89 kg (196 lb 3.2 oz)   01/27/22 88.9 kg (196 lb)   01/17/22 88 kg (194 lb)   01/14/22 90.7 kg (200 lb)       GEN: In no acute distress.  HEART: no significant JVD, regular rate and rhythm, normal S1 and S2, no murmurs, no third heart sounds, normal cardiac palpation  LUNG: clear to auscultation bilaterally, no wheezing, on supplemental oxygen  ABDOMEN: soft, non-tender, " non-distended, normal bowel sounds throughout  EXTREMITIES: +1 pitting edema in BLE  VASCULAR: no significantly elevated jugular venous pressure, no carotid bruits noted, radial pulses 2+ and equal      Lab Data Review:  Lab Results   Component Value Date/Time    CHOLSTRLTOT 149 08/12/2019 09:50 AM    LDL 76 08/12/2019 09:50 AM    HDL 54 08/12/2019 09:50 AM    TRIGLYCERIDE 95 08/12/2019 09:50 AM       Lab Results   Component Value Date/Time    SODIUM 142 02/09/2022 04:45 PM    POTASSIUM 3.9 02/09/2022 04:45 PM    CHLORIDE 98 02/09/2022 04:45 PM    CO2 30 02/09/2022 04:45 PM    GLUCOSE 131 (H) 02/09/2022 04:45 PM    BUN 26 (H) 02/09/2022 04:45 PM    CREATININE 0.63 02/09/2022 04:45 PM     Lab Results   Component Value Date/Time    ALKPHOSPHAT 64 07/16/2021 08:21 AM    ASTSGOT 16 07/16/2021 08:21 AM    ALTSGPT 18 07/16/2021 08:21 AM    TBILIRUBIN 0.2 07/16/2021 08:21 AM      Lab Results   Component Value Date/Time    WBC 6.2 01/11/2022 05:05 PM     Lab Results   Component Value Date/Time    HBA1C 5.7 (H) 07/16/2021 08:21 AM    HBA1C 6.0 (H) 07/27/2018 09:03 AM       Cardiac Imaging and Procedures Review:    EKG dated 4/8/2022: My personal interpretation is sinus rhythm with APC    Echo dated 3/21/2022:   CONCLUSIONS  Compared to the prior echo on 02/03/2020, pulmonary pressure has   slightly improved.  Normal left ventricular size and systolic function.  Moderate tricuspid regurgitation.  Right ventricular systolic pressure is estimated to be 46 mmHg.    Echo dated 2/3/2020:   CONCLUSIONS  Compared to the images of the prior study done 7/27/17 -  there has   been no significant change.   Normal left ventricular systolic function.  Left ventricular ejection fraction is visually estimated to be 70%.  Grade II diastolic dysfunction.  The right ventricle was normal in size and function.  Mild mitral regurgitation.  Moderate tricuspid regurgitation.  Estimated right ventricular systolic pressure  is 65 mmHg.      Radiology  test Review:  VQ Scan 7/27/2017:   IMPRESSION:  Multiple matched defects within the lungs. No mismatched defects. Low probability of pulmonary emboli.         Assessment & Plan     1. Pulmonary hypertension (HCC)  EKG   2. Chronic respiratory failure with hypoxia (HCC)     3. Other chronic pulmonary embolism, unspecified whether acute cor pulmonale present (HCC)     4. Moderate tricuspid regurgitation         Patient is stable on torsemide 40 mg and spironolactone 25 mg daily.  Discussed doing extra dose of torsemide 20 mg if she notices lower extremity edema or 3 to 5 pounds weight gain in 1 day along with extra potassium supplement.    Discussed reviewed echo results with the patient which shows improvement in right ventricular systolic pressures.    In regards to supplemental oxygen, advised patient to make a follow-up appointment with Dr. Morales to discuss.    Is tolerating anticoagulation with Eliquis 5 mg twice daily for chronic PEs without any bleeding concerns.      All of patient's excellent questions were answered to the best of my knowledge and to her satisfaction.  It was a pleasure seeing   Natalie Anaya in my clinic today. Return in 3 months (on 7/8/2022), or if symptoms worsen or fail to improve. Patient is aware to call the cardiology clinic with any questions or concerns.      Óscar Hernandez MD  [facility]  [facility], [address]  [address] [address]  Phone: [phone]  Fax: [phone]    Please note that this dictation was created using voice recognition software. I have made every reasonable attempt to correct obvious errors, but it is possible there are errors of grammar and possibly content that I did not discover before finalizing the note.

## 2022-04-11 ENCOUNTER — OFFICE VISIT (OUTPATIENT)
Dept: SLEEP MEDICINE | Facility: MEDICAL CENTER | Age: 82
End: 2022-04-11
Payer: MEDICARE

## 2022-04-11 VITALS
BODY MASS INDEX: 32.61 KG/M2 | DIASTOLIC BLOOD PRESSURE: 76 MMHG | RESPIRATION RATE: 16 BRPM | HEART RATE: 79 BPM | SYSTOLIC BLOOD PRESSURE: 124 MMHG | WEIGHT: 191 LBS | OXYGEN SATURATION: 96 % | HEIGHT: 64 IN

## 2022-04-11 DIAGNOSIS — I27.20 PULMONARY HYPERTENSION (HCC): ICD-10-CM

## 2022-04-11 DIAGNOSIS — G47.33 OBSTRUCTIVE SLEEP APNEA SYNDROME: ICD-10-CM

## 2022-04-11 DIAGNOSIS — J96.11 CHRONIC RESPIRATORY FAILURE WITH HYPOXIA (HCC): ICD-10-CM

## 2022-04-11 DIAGNOSIS — I26.99 RECURRENT PULMONARY EMBOLISM (HCC): ICD-10-CM

## 2022-04-11 PROCEDURE — 99214 OFFICE O/P EST MOD 30 MIN: CPT | Performed by: INTERNAL MEDICINE

## 2022-04-11 ASSESSMENT — ENCOUNTER SYMPTOMS
EYE PAIN: 0
BLURRED VISION: 0
SHORTNESS OF BREATH: 0
SORE THROAT: 0
BACK PAIN: 0
FEVER: 0
FALLS: 0
DIAPHORESIS: 0
PHOTOPHOBIA: 0
STRIDOR: 0
MYALGIAS: 0
SINUS PAIN: 0
ORTHOPNEA: 0
HEMOPTYSIS: 0
NAUSEA: 0
DIZZINESS: 0
WEAKNESS: 0
EYE DISCHARGE: 0
HEADACHES: 0
DIARRHEA: 0
WEIGHT LOSS: 0
FOCAL WEAKNESS: 0
EYE REDNESS: 0
ABDOMINAL PAIN: 0
TREMORS: 0
WHEEZING: 0
DEPRESSION: 0
COUGH: 0
CLAUDICATION: 0
NECK PAIN: 0
SPUTUM PRODUCTION: 0
CONSTIPATION: 0
DOUBLE VISION: 0
PALPITATIONS: 0
HEARTBURN: 0
VOMITING: 0
CHILLS: 0
SPEECH CHANGE: 0
PND: 0

## 2022-04-11 ASSESSMENT — FIBROSIS 4 INDEX: FIB4 SCORE: 0.95

## 2022-04-11 NOTE — PROGRESS NOTES
Chief Complaint   Patient presents with   • Follow-Up     Last seen 1/17/22         HPI: This patient is a 81 y.o. adult whom is followed in our clinic for hx of recurrent PE, hypoxic respiratory failure and hx of AILYN intolerant to CPAP therapy last seen by me on 1/17/22. The patient has restrictive pattern on PFTs with normal DLCO on PFTs from 2017 consistent with kyphosis and musculoskeletal defect. Echocardiogram from 2017 showed pulmonary hypertension with RVSP of 65 mmHg. VQ scan to evaluate for chronic thromboembolic pulmonary hypertension from 2017 was low probability with MATCHED ventilation perfusion defects which is NOT consistent with CTEPH. She did have a sleep study in 2017 which showed respiratory disturbance index of 40. She was intolerant to CPAP therapy and thus uses supplemental oxygen at night at 2 L/min. A follow-up echo from February 2020 showed stable RVSP with normal RV size and function. The patient does have some chronic bilateral lower extremity edema for which she is on furosemide and is followed by cardiology. Prior to our last visit she was hospitalized while in [facility] visiting family w/o her O2. She was admitted for confusion and found to have combined hypoxic and hypercapnic respiratory failure. Chest imaging was s/o pulmonary edema and she was diuresed and d/c'd home on O2 at 2LPM continuous. She has since seen cardiology and lasix was continued at higher dose. Echo during her stay showed RVSP of 79 mmHg. Since adjusting diuretic therapy and compliance with O2 a repeat TTE here on 3/21 showed RVSP of 46 mmHg. She is on 1LPM during the day and 2LPM at night but SpO2 has been >95% on 1LPM during the day. OPO on 2LPM was ordered to ensure adequate O2 at night however pt was unable to schedule with us due to recent passing of her . She is planning to move to [place] or [place] in the next 6 mos.     Past Medical History:   Diagnosis Date   • Arthritis     Knees.   • Bowel habit  changes     Constipation   • Breath shortness    • Cataract     Bilat IOL   • Constipation    • Epilepsy (HCC)    • Eye drainage    • Hemorrhagic disorder (HCC)    • Hiatus hernia syndrome    • Hypertension    • Hyperthyroidism    • Hypothyroid    • Influenza    • Pain     knees   • Personal history of venous thrombosis and embolism     PE from [employer] 2003.   • Ringing in ears    • Seizure (HCC) 1982   • Sore muscles        Social History     Socioeconomic History   • Marital status:      Spouse name: Not on file   • Number of children: Not on file   • Years of education: Not on file   • Highest education level: Not on file   Occupational History   • Not on file   Tobacco Use   • Smoking status: Never Smoker   • Smokeless tobacco: Never Used   Vaping Use   • Vaping Use: Never used   Substance and Sexual Activity   • Alcohol use: No   • Drug use: No   • Sexual activity: Never     Partners: Male   Other Topics Concern   • Not on file   Social History Narrative   • Not on file     Social Determinants of Health     Financial Resource Strain: Low Risk    • Difficulty of Paying Living Expenses: Not very hard   Food Insecurity: Unknown   • Worried About Running Out of Food in the Last Year: Not on file   • Ran Out of Food in the Last Year: Never true   Transportation Needs: No Transportation Needs   • Lack of Transportation (Medical): No   • Lack of Transportation (Non-Medical): No   Physical Activity: Inactive   • Days of Exercise per Week: 0 days   • Minutes of Exercise per Session: 0 min   Stress: No Stress Concern Present   • Feeling of Stress : Only a little   Social Connections: Moderately Integrated   • Frequency of Communication with Friends and Family: More than three times a week   • Frequency of Social Gatherings with Friends and Family: More than three times a week   • Attends [employer] witness Services: 1 to 4 times per year   • Active Member of Clubs or Organizations: No   • Attends Club or  Organization Meetings: Never   • Marital Status:    Intimate Partner Violence: Not At Risk   • Fear of Current or Ex-Partner: No   • Emotionally Abused: No   • Physically Abused: No   • Sexually Abused: No   Housing Stability: Low Risk    • Unable to Pay for Housing in the Last Year: No   • Number of Places Lived in the Last Year: 1   • Unstable Housing in the Last Year: No       Family History   Problem Relation Age of Onset   • Cancer Mother 67        Ovarian   • Alcohol/Drug Father 60        Appenditis   • Heart Disease Father    • Cancer Maternal Grandmother         colon cancer   • Heart Disease Maternal Grandfather    • Cancer Daughter 44        melonoma       Current Outpatient Medications on File Prior to Visit   Medication Sig Dispense Refill   • potassium chloride SA (KDUR) 20 MEQ Tab CR TAKE 1 TABLET BY MOUTH EVERY  Tablet 3   • omeprazole (PRILOSEC) 20 MG delayed-release capsule Take 1 Capsule by mouth every day. 100 Capsule 3   • primidone (MYSOLINE) 250 MG Tab Take 1 Tablet by mouth 3 times a day. 300 Tablet 3   • spironolactone (ALDACTONE) 25 MG Tab Take 1 Tablet by mouth every day. 100 Tablet 0   • apixaban (ELIQUIS) 5mg Tab Take 1 Tablet by mouth 2 times a day. 180 Tablet 2   • torsemide (DEMADEX) 20 MG Tab Take 2 Tablets by mouth every day. 60 Tablet 5   • gabapentin (NEURONTIN) 100 MG Cap TAKE 1 CAPSULE BY MOUTH AT BEDTIME 100 Capsule 3   • levothyroxine (SYNTHROID) 100 MCG Tab TAKE 1 TABLET BY MOUTH EVERY  Tablet 3   • Calcium 500-100 MG-UNIT Chew Tab Chew. Takes 2 a day     • alendronate (FOSAMAX) 70 MG Tab TAKE 1 TABLET BY MOUTH ONCE A WEEK 12 tablet 3   • amoxicillin (AMOXIL) 500 MG Cap Take 500 mg by mouth. Dental work     • Home Care Oxygen Inhale 2 L/min by mouth every bedtime.     • Melatonin 10 MG Tab Take 15 mg by mouth every bedtime.     • Probiotic Product (PROBIOTIC DAILY PO) Take 1 Cap by mouth every day.     • Glucos-Chond-Hyal Ac-Ca Fructo (MOVE FREE JOINT  "HEALTH ADVANCE) Tab Take 1 Tab by mouth every day.     • Multiple Vitamins-Minerals (WOMENS MULTI VITAMIN & MINERAL) Tab Take 1 Tab by mouth every day.     • Cholecalciferol (VITAMIN D-3) 1000 UNITS Cap Take 1,000 Units by mouth every day.       No current facility-administered medications on file prior to visit.       Tape      ROS:   Review of Systems   Constitutional: Negative for chills, diaphoresis, fever, malaise/fatigue and weight loss.   HENT: Negative for congestion, ear discharge, ear pain, hearing loss, nosebleeds, sinus pain, sore throat and tinnitus.    Eyes: Negative for blurred vision, double vision, photophobia, pain, discharge and redness.   Respiratory: Negative for cough, hemoptysis, sputum production, shortness of breath, wheezing and stridor.    Cardiovascular: Negative for chest pain, palpitations, orthopnea, claudication, leg swelling and PND.   Gastrointestinal: Negative for abdominal pain, constipation, diarrhea, heartburn, nausea and vomiting.   Genitourinary: Negative for dysuria and urgency.   Musculoskeletal: Negative for back pain, falls, joint pain, myalgias and neck pain.   Skin: Negative for itching and rash.   Neurological: Negative for dizziness, tremors, speech change, focal weakness, weakness and headaches.   Endo/Heme/Allergies: Negative for environmental allergies.   Psychiatric/Behavioral: Negative for depression.       /76 (BP Location: Right arm, Patient Position: Sitting, BP Cuff Size: Adult)   Pulse 79   Resp 16   Ht 1.626 m (5' 4\")   Wt 86.6 kg (191 lb)   SpO2 96%   Physical Exam  Vitals reviewed.   Constitutional:       General: Natalie Anaya is not in acute distress.     Appearance: Normal appearance. Natalie Anaya is well-developed and normal weight.   HENT:      Head: Normocephalic and atraumatic.      Right Ear: External ear normal.      Left Ear: External ear normal.      Nose: Nose normal. No congestion.      Mouth/Throat:      Mouth: " Mucous membranes are moist.      Pharynx: Oropharynx is clear. No oropharyngeal exudate.   Eyes:      General: No scleral icterus.     Extraocular Movements: Extraocular movements intact.      Conjunctiva/sclera: Conjunctivae normal.      Pupils: Pupils are equal, round, and reactive to light.   Neck:      Vascular: No JVD.      Trachea: No tracheal deviation.   Cardiovascular:      Rate and Rhythm: Normal rate and regular rhythm.      Heart sounds: Normal heart sounds. No murmur heard.    No friction rub. No gallop.   Pulmonary:      Effort: Pulmonary effort is normal. No accessory muscle usage or respiratory distress.      Breath sounds: Normal breath sounds. No wheezing or rales.   Abdominal:      General: There is no distension.      Palpations: Abdomen is soft.      Tenderness: There is no abdominal tenderness.   Musculoskeletal:         General: No tenderness or deformity. Normal range of motion.      Cervical back: Normal range of motion and neck supple.      Right lower leg: Edema present.      Left lower leg: Edema present.      Comments: 1+ b/l pedal edema   Lymphadenopathy:      Cervical: No cervical adenopathy.   Skin:     General: Skin is warm and dry.      Findings: No rash.      Nails: There is no clubbing.   Neurological:      Mental Status: Natalie Anaya is alert and oriented to person, place, and time.      Cranial Nerves: No cranial nerve deficit.      Gait: Gait normal.   Psychiatric:         Mood and Affect: Mood normal.         Behavior: Behavior normal.         PFTs as reviewed by me personally: as per hPI    Imaging as reviewed by me personally:  As per HPI    Assessment:  1. Chronic respiratory failure with hypoxia (HCC)  PULMONARY FUNCTION TESTS -Test requested: Complete Pulmonary Function Test    Exercise Test for Bronchospasm / 6-Minute Walk   2. Recurrent pulmonary embolism (HCC)     3. Obstructive sleep apnea syndrome- intolerant to CPAP     4. Pulmonary hypertension (HCC)          Plan:  1. Combined hypoxic and hypercapnic. I agree she does not likely need O2 during the day. Have her check SpO2 and if >90% she can come off O2; will order formal 6 MWT on RA at f/u if we see her again before move. Will also obtain baseline PFTs. In the meantime, will order OPO on 2LPM with [facility] company to see if we need to increase nocturnal flow  2. On life-long anticoagulation. See my last note as to why her elevated RVSP IS NOT LIKELY CTEPH. RVSP is actually markedly improved  3. This is likely cause for PH and I suspect some noncompliance with O2 in the past. RVSP improved. OPO on 2LPM pending. If she still has significant desaturation will strongly recommend f/u with sleep   4. See discussion last note and above.   Return in about 5 months (around 9/11/2022) for PFTs and 6 MWT at time of f/u; OPO in the interim.

## 2022-04-18 DIAGNOSIS — M85.80 OSTEOPENIA WITH HIGH RISK OF FRACTURE: ICD-10-CM

## 2022-04-18 RX ORDER — ALENDRONATE SODIUM 70 MG/1
TABLET ORAL
Qty: 12 TABLET | Refills: 3 | Status: SHIPPED | OUTPATIENT
Start: 2022-04-18 | End: 2022-06-06 | Stop reason: SDUPTHER

## 2022-04-20 ENCOUNTER — OFFICE VISIT (OUTPATIENT)
Dept: MEDICAL GROUP | Facility: PHYSICIAN GROUP | Age: 82
End: 2022-04-20
Payer: MEDICARE

## 2022-04-20 VITALS
HEIGHT: 64 IN | BODY MASS INDEX: 34.19 KG/M2 | RESPIRATION RATE: 16 BRPM | OXYGEN SATURATION: 96 % | TEMPERATURE: 98.6 F | DIASTOLIC BLOOD PRESSURE: 58 MMHG | WEIGHT: 200.3 LBS | HEART RATE: 77 BPM | SYSTOLIC BLOOD PRESSURE: 122 MMHG

## 2022-04-20 DIAGNOSIS — I50.32 CHRONIC DIASTOLIC CONGESTIVE HEART FAILURE (HCC): ICD-10-CM

## 2022-04-20 DIAGNOSIS — Z02.89 ENCOUNTER FOR COMPLETION OF FORM WITH PATIENT: ICD-10-CM

## 2022-04-20 DIAGNOSIS — G40.909 SEIZURE DISORDER (HCC): ICD-10-CM

## 2022-04-20 DIAGNOSIS — J96.11 CHRONIC RESPIRATORY FAILURE WITH HYPOXIA (HCC): ICD-10-CM

## 2022-04-20 PROCEDURE — 99214 OFFICE O/P EST MOD 30 MIN: CPT | Performed by: INTERNAL MEDICINE

## 2022-04-20 ASSESSMENT — FIBROSIS 4 INDEX: FIB4 SCORE: 0.95

## 2022-04-20 NOTE — ASSESSMENT & PLAN NOTE
Chronic and stable problem, no seizures since the 1980's. continue primidone 250 mg three times daily.

## 2022-04-20 NOTE — PROGRESS NOTES
Subjective:   Chief Complaint/History of Present Illness:  Natalie Anaya is a 81 y.o. adult established patient who presents today to discuss medical problems as listed below. Natalie is unaccompanied for today's visit.    Problem   Encounter for Completion of Form With Patient    Natalie presents with paperwork to complete for both medical necessity bring oxygen onto her upcoming flight in May 2022 as well as assisted living admission paperwork.  She is to be traveling down to [facility] on May 11 and will be spending 3 nights in a local assisted living facility to see if she likes it and if so then she will likely relocate down to [place].  She has a grandson who lives down there who has been assisting her with the process.  Unfortunately the assisted living paperwork did not print off appropriately so there are a few areas that I cannot make out but we filled it out to the best of our ability.     Chronic Diastolic Congestive Heart Failure (Hcc)    She developed diastolic heart exacerbation leading to hospitalization in late December or early January 2022 while she was vacationing in [place].  She has since been optimized on her diuretics and has established with cardiology.  She continues to be on oxygen with hopes to wean off.  She lost her  in March 2022 and notes challenges with sodium consumption due to eating more processed foods and thus has had more fluctuations with her water weight.    Current regimen: Torsemide 40 mg daily and potassium chloride 20 mEq daily, spironolactone 25 mg daily     Chronic respiratory failure with hypoxia (HCC)- nocturnal    Patient uses 2 L of supplemental oxygen at night when she is sleeping.  She reports that this is now in lieu of a CPAP as she has stopped using this device (since her hip fracture and recovery in July 2019).      She was hospitalized for heart failure exacerbation in late December/early January 2022.  She was discharged on 2 L  supplemental oxygen due to hypoxia from her heart failure which we are working to wean down.    She has a 6-minute walk test scheduled in early May 2022 as well as an overnight pulse oximetry to see if she still requires oxygen during the daytime as well as nocturnally.     Seizure Disorder (Hcc)    Last seizure in 1982. Was treated with dilantin and Primidone since teenage. Discontinued dilantin for 20 years. Wants to keep taking primidone as she concerns recurrent seizures if she stopped all medications.  No concerns regarding this problem.            Current Medications:  Current Outpatient Medications Ordered in Epic   Medication Sig Dispense Refill   • alendronate (FOSAMAX) 70 MG Tab TAKE 1 TABLET BY MOUTH 1 TIME A WEEK 12 Tablet 3   • potassium chloride SA (KDUR) 20 MEQ Tab CR TAKE 1 TABLET BY MOUTH EVERY  Tablet 3   • omeprazole (PRILOSEC) 20 MG delayed-release capsule Take 1 Capsule by mouth every day. 100 Capsule 3   • primidone (MYSOLINE) 250 MG Tab Take 1 Tablet by mouth 3 times a day. 300 Tablet 3   • spironolactone (ALDACTONE) 25 MG Tab Take 1 Tablet by mouth every day. 100 Tablet 0   • apixaban (ELIQUIS) 5mg Tab Take 1 Tablet by mouth 2 times a day. 180 Tablet 2   • torsemide (DEMADEX) 20 MG Tab Take 2 Tablets by mouth every day. 60 Tablet 5   • gabapentin (NEURONTIN) 100 MG Cap TAKE 1 CAPSULE BY MOUTH AT BEDTIME 100 Capsule 3   • levothyroxine (SYNTHROID) 100 MCG Tab TAKE 1 TABLET BY MOUTH EVERY  Tablet 3   • Calcium 500-100 MG-UNIT Chew Tab Chew. Takes 2 a day     • amoxicillin (AMOXIL) 500 MG Cap Take 500 mg by mouth. Dental work     • Home Care Oxygen Inhale 2 L/min by mouth every bedtime.     • Melatonin 10 MG Tab Take 15 mg by mouth every bedtime.     • Probiotic Product (PROBIOTIC DAILY PO) Take 1 Cap by mouth every day.     • Glucos-Chond-Hyal Ac-Ca Fructo (MOVE FREE JOINT HEALTH ADVANCE) Tab Take 1 Tab by mouth every day.     • Multiple Vitamins-Minerals (WOMENS MULTI VITAMIN &  "MINERAL) Tab Take 1 Tab by mouth every day.     • Cholecalciferol (VITAMIN D-3) 1000 UNITS Cap Take 1,000 Units by mouth every day.       No current [facility]-ordered facility-administered medications on file.          Objective:   Physical Exam:    Vitals: /58 (BP Location: Left arm, Patient Position: Sitting, BP Cuff Size: Adult)   Pulse 77   Temp 37 °C (98.6 °F) (Temporal)   Resp 16   Ht 1.626 m (5' 4\")   Wt 90.9 kg (200 lb 4.8 oz)   SpO2 96%    BMI: Body mass index is 34.38 kg/m².  Physical Exam  Constitutional:       General: Natalie Anaya is not in acute distress.     Appearance: Natalie Anaya is obese. Natalie Anaya is not toxic-appearing.   HENT:      Right Ear: Ear canal normal. There is no impacted cerumen.      Left Ear: Ear canal normal. There is no impacted cerumen.   Eyes:      Conjunctiva/sclera: Conjunctivae normal.   Cardiovascular:      Rate and Rhythm: Normal rate and regular rhythm.   Pulmonary:      Effort: Pulmonary effort is normal. No respiratory distress.      Comments: Supplemental oxygen in place via nasal cannula. Decreased air movement bibasilar.  Musculoskeletal:      Right lower leg: Edema present.      Left lower leg: Edema present.   Skin:     General: Skin is warm and dry.   Neurological:      Mental Status: Natalie Anaya is alert.      Gait: Gait abnormal.      Comments: Ambulates with a 4WW   Psychiatric:         Mood and Affect: Mood normal.         Behavior: Behavior normal.         Thought Content: Thought content normal.         Judgment: Judgment normal.          Assessment and Plan:   Natalie is a 81 y.o. adult with the following:  Problem List Items Addressed This Visit     Seizure disorder (HCC)     Chronic and stable problem, no seizures since the 1980's. continue primidone 250 mg three times daily.         Chronic respiratory failure with hypoxia (HCC)- nocturnal     Chronic and ongoing problem.  She will complete 6-minute walk test " with the pulmonary department in early May 2022 to see if oxygen during the daytime is still required.  Completed paperwork for her upcoming flight with [employer] to give medical necessity for continuous oxygen use at this time.  She also has overnight pulse oximetry ordered to see if she requires oxygenation nocturnally.  She will continue 2 L continuously via nasal cannula at this time.  She has an [employer] device as well as a concentrator.         Chronic diastolic congestive heart failure (HCC)     Chronic and stable problem.  She is slightly fluid up today.  She is established with cardiology and is under instructions to take additional diuretics for weight gain greater than 5 pounds or worsening lower extremity edema.  Continue current regimen of torsemide 40 mg daily potassium chloride 20 mill equivalents daily as well as spironolactone 25 mg daily.  She may be moving to [place] in the coming months and will keep us updated, will make sure all of her prescriptions are converted appropriately.         Encounter for completion of form with patient     New problem.  Completed paperwork for medical necessity for oxygen during her flight, this will be faxed directly to the airline and a copy will be placed on her chart and another copy was given back to the patient.  Assisted living paperwork completed to the best of my ability, some of the rows did not print so I could not make out what the questions were but we will call get the fax number for the facility and send what we have completed and if they need any additional information they can let us know.  She will keep me updated on her plans for moving down to [place].                  Annual Health Assessment Questions:    1.  Are you currently engaging in any exercise or physical activity? No    2.  How would you describe your mood or emotional well-being today? good    3.  Have you had any falls in the last year? No    4.  Have you noticed any problems  with your balance or had difficulty walking? Yes   Uses walker     5.  In the last six months have you experienced any leakage of urine? Yes    6. DPA/Advanced Directive: Patient has Advanced Directive on file.        RTC: Return if symptoms worsen or fail to improve.    I spent a total of 32 minutes with record review, exam, communication with the patient, communication with other providers, and documentation of this encounter.    PLEASE NOTE: This dictation was created using voice recognition software. I have made every reasonable attempt to correct obvious errors, but I expect that there are errors of grammar and possibly content that I did not discover before finalizing the note.      Kiley Manrique, DO  Geriatric and Internal Medicine  [facility]

## 2022-04-20 NOTE — ASSESSMENT & PLAN NOTE
New problem.  Completed paperwork for medical necessity for oxygen during her flight, this will be faxed directly to the airline and a copy will be placed on her chart and another copy was given back to the patient.  Assisted living paperwork completed to the best of my ability, some of the rows did not print so I could not make out what the questions were but we will call get the fax number for the facility and send what we have completed and if they need any additional information they can let us know.  She will keep me updated on her plans for moving down to [place].

## 2022-04-20 NOTE — ASSESSMENT & PLAN NOTE
Chronic and stable problem.  She is slightly fluid up today.  She is established with cardiology and is under instructions to take additional diuretics for weight gain greater than 5 pounds or worsening lower extremity edema.  Continue current regimen of torsemide 40 mg daily potassium chloride 20 mill equivalents daily as well as spironolactone 25 mg daily.  She may be moving to [place] in the coming months and will keep us updated, will make sure all of her prescriptions are converted appropriately.

## 2022-04-20 NOTE — ASSESSMENT & PLAN NOTE
Chronic and ongoing problem.  She will complete 6-minute walk test with the pulmonary department in early May 2022 to see if oxygen during the daytime is still required.  Completed paperwork for her upcoming flight with [employer] to give medical necessity for continuous oxygen use at this time.  She also has overnight pulse oximetry ordered to see if she requires oxygenation nocturnally.  She will continue 2 L continuously via nasal cannula at this time.  She has an [employer] device as well as a concentrator.

## 2022-04-25 ENCOUNTER — TELEPHONE (OUTPATIENT)
Dept: MEDICAL GROUP | Facility: PHYSICIAN GROUP | Age: 82
End: 2022-04-25
Payer: MEDICARE

## 2022-04-25 PROBLEM — E66.01 MORBID OBESITY (HCC): Status: ACTIVE | Noted: 2022-04-25

## 2022-04-25 NOTE — TELEPHONE ENCOUNTER
1. Caller Name: Natalie Anaya                          Call Back Number: [phone] (home)         How would the patient prefer to be contacted with a response: Phone call OK to leave a detailed message    Yes she can get the covid booster  last one was 10/26/22

## 2022-06-06 DIAGNOSIS — M85.80 OSTEOPENIA WITH HIGH RISK OF FRACTURE: ICD-10-CM

## 2022-06-06 RX ORDER — ALENDRONATE SODIUM 70 MG/1
70 TABLET ORAL
Qty: 12 TABLET | Refills: 3 | Status: SHIPPED | OUTPATIENT
Start: 2022-06-06 | End: 2022-08-01 | Stop reason: SDUPTHER

## 2022-06-15 ENCOUNTER — APPOINTMENT (OUTPATIENT)
Dept: SLEEP MEDICINE | Facility: MEDICAL CENTER | Age: 82
End: 2022-06-15
Attending: INTERNAL MEDICINE
Payer: MEDICARE

## 2022-06-15 ENCOUNTER — TELEPHONE (OUTPATIENT)
Dept: MEDICAL GROUP | Facility: PHYSICIAN GROUP | Age: 82
End: 2022-06-15

## 2022-06-15 ENCOUNTER — PATIENT MESSAGE (OUTPATIENT)
Dept: SLEEP MEDICINE | Facility: MEDICAL CENTER | Age: 82
End: 2022-06-15

## 2022-06-15 DIAGNOSIS — J96.11 CHRONIC RESPIRATORY FAILURE WITH HYPOXIA (HCC): ICD-10-CM

## 2022-06-15 NOTE — TELEPHONE ENCOUNTER
Does she want to try higher dose of gabapentin or something else? I have 3 openings tomorrow morning.

## 2022-06-15 NOTE — TELEPHONE ENCOUNTER
1. Caller Name: Natalie Anaya                          Call Back Number: [phone] (home)         How would the patient prefer to be contacted with a response: Phone call OK to leave a detailed message    Natalie called at 10am this morning to say she is in a lot of pain on her left side and can barely walk. She thinks it is sciatic nerve pain.  She states she had an injection on her right knee and then today woke with this extreme pain.  She has a  today and Friday  She will not be available due to her ride until after 12 noon.  Call on cell phone if we can get her in today  [phone]  or can call in medicine.

## 2022-06-15 NOTE — PATIENT COMMUNICATION
Per chart review oxygen order from 9/6/2018 2 LPM was ordered. If patient is needing 5 LPM she will need a new order with visit and testing.

## 2022-06-16 ENCOUNTER — OFFICE VISIT (OUTPATIENT)
Dept: MEDICAL GROUP | Facility: PHYSICIAN GROUP | Age: 82
End: 2022-06-16
Payer: MEDICARE

## 2022-06-16 VITALS
TEMPERATURE: 99.7 F | OXYGEN SATURATION: 94 % | WEIGHT: 195 LBS | DIASTOLIC BLOOD PRESSURE: 60 MMHG | BODY MASS INDEX: 33.29 KG/M2 | HEART RATE: 100 BPM | HEIGHT: 64 IN | RESPIRATION RATE: 16 BRPM | SYSTOLIC BLOOD PRESSURE: 128 MMHG

## 2022-06-16 DIAGNOSIS — M54.42 ACUTE LEFT-SIDED LOW BACK PAIN WITH LEFT-SIDED SCIATICA: ICD-10-CM

## 2022-06-16 DIAGNOSIS — G62.9 NEUROPATHY: ICD-10-CM

## 2022-06-16 PROCEDURE — 99214 OFFICE O/P EST MOD 30 MIN: CPT | Performed by: INTERNAL MEDICINE

## 2022-06-16 RX ORDER — GABAPENTIN 100 MG/1
200 CAPSULE ORAL 3 TIMES DAILY
Qty: 300 CAPSULE | Refills: 3 | Status: SHIPPED | OUTPATIENT
Start: 2022-06-16

## 2022-06-16 RX ORDER — METHYLPREDNISOLONE 4 MG/1
TABLET ORAL
Qty: 21 TABLET | Refills: 0 | Status: SHIPPED | OUTPATIENT
Start: 2022-06-16 | End: 2022-06-27

## 2022-06-16 ASSESSMENT — FIBROSIS 4 INDEX: FIB4 SCORE: 0.95

## 2022-06-16 NOTE — ASSESSMENT & PLAN NOTE
Chronic and ongoing problem, continue gabapentin at night for neuropathy, currently on increased dose for flare up of sciatica. Will send in new prescription with increased quantity due to dose/frequency increase.

## 2022-06-16 NOTE — ASSESSMENT & PLAN NOTE
New and decompensated problem. Will have her increase gabapentin to 200 mg TID, monitor closely for side effects such as oversedation and balance impairment. Trial short course of steroids to help with inflammation, take with PPI while she is on DOAC. Monitor for melena, hematochezia, and epigastric pain, take medicine in the morning with food. Update me over the weekend and if pain ongoing can proceed with MRI imaging lumbar spine and urgent referral to physiatry for epidural. She plans to relocate to [place] in mid Aug 2022. Continue acetaminophen, heating pad, and topical analgesics.

## 2022-06-16 NOTE — PROGRESS NOTES
Subjective:   Chief Complaint/History of Present Illness:  Natalie Anaya is a 81 y.o. adult established patient who presents today to discuss medical problems as listed below. Natalie is unaccompanied for today's visit.    Problem   Acute Left-Sided Low Back Pain With Left-Sided Sciatica    She developed severe left sided low back, buttock, and leg pain last Sunday June 5th. No preceding injury or trauma. Pain continued to worsen and became quite severe especially with standing or getting on/off the toilet or in/out of bed. She has tried acetaminophen, heating pad, and topical capsacin. She increased her gabapentin and will continue to do so. She saw orthopedics on June 8th who completed right knee injection but recommended ongoing monitoring of the sciatica pain for at least 1 more week. She notes pain is ongoing and wants to try additional medical therapies and follow up with imaging or epidural if indicated. She notes worsening of pain with gas that improves when she passes gas. No bowel/bladder incontinence, weakness, inability to bear weight, or overlying skin changes.     Neuropathy    She endorses neuropathy since September 2019.  Most predominant in the left foot over the heel.  Manifest with burning most notable in the evening when she is lying in bed.  She is not able to put a bedsheet over because of the intense burning.  Does not happen much during the day.  Occasionally has some associated pain but it is mainly burning.      She has tried Tylenol which helps with achy joints but has not had much effect on the burning pain.  She thinks she has neuropathy, she is not discussed this with anyone previously.      We noted borderline B12 deficiency on blood work, starting on monthly injections. She has thyroid disease on replacement, borderline overtreatment.  No excessive alcohol use.  She has normal hemoglobin and kidney function and no known underlying bone marrow disease such as myeloma.  No  history of type 2 diabetes, only prediabetes.  Will try topical capsaicin with lidocaine however she was unable to wash her hands after applying it and touched her face which caused burning so she stopped using it.  She is taking gabapentin at bed times for neuropathy.    Current regiment gabapentin 200 mg at bedtime  Previous regimen: Topical lidocaine/capsaicin         Current Medications:  Current Outpatient Medications Ordered in Epic   Medication Sig Dispense Refill   • methylPREDNISolone (MEDROL DOSEPAK) 4 MG Tablet Therapy Pack As directed on the packaging label. 21 Tablet 0   • gabapentin (NEURONTIN) 100 MG Cap Take 2 Capsules by mouth 3 times a day. 300 Capsule 3   • alendronate (FOSAMAX) 70 MG Tab Take 1 Tablet by mouth every 7 days. 12 Tablet 3   • Acetaminophen 500 MG Cap Take 1,000 mg by mouth 2 times a day.     • potassium chloride SA (KDUR) 20 MEQ Tab CR TAKE 1 TABLET BY MOUTH EVERY  Tablet 3   • omeprazole (PRILOSEC) 20 MG delayed-release capsule Take 1 Capsule by mouth every day. 100 Capsule 3   • primidone (MYSOLINE) 250 MG Tab Take 1 Tablet by mouth 3 times a day. 300 Tablet 3   • spironolactone (ALDACTONE) 25 MG Tab Take 1 Tablet by mouth every day. 100 Tablet 0   • apixaban (ELIQUIS) 5mg Tab Take 1 Tablet by mouth 2 times a day. 180 Tablet 2   • torsemide (DEMADEX) 20 MG Tab Take 2 Tablets by mouth every day. 60 Tablet 5   • levothyroxine (SYNTHROID) 100 MCG Tab TAKE 1 TABLET BY MOUTH EVERY  Tablet 3   • Calcium 500-100 MG-UNIT Chew Tab Chew. Takes 2 a day     • amoxicillin (AMOXIL) 500 MG Cap Take 500 mg by mouth. Dental work     • Home Care Oxygen Inhale 2 L/min by mouth every bedtime.     • Melatonin 10 MG Tab Take 15 mg by mouth every bedtime.     • Probiotic Product (PROBIOTIC DAILY PO) Take 1 Cap by mouth every day.     • Glucos-Chond-Hyal Ac-Ca Fructo (MOVE FREE JOINT HEALTH ADVANCE) Tab Take 1 Tab by mouth every day.     • Multiple Vitamins-Minerals (WOMENS MULTI VITAMIN &  "MINERAL) Tab Take 1 Tab by mouth every day.     • Cholecalciferol (VITAMIN D-3) 1000 UNITS Cap Take 1,000 Units by mouth every day.       No current [facility]-ordered facility-administered medications on file.          Objective:   Physical Exam:    Vitals: /60 (BP Location: Left arm, Patient Position: Sitting, BP Cuff Size: Adult)   Pulse 100   Temp 37.6 °C (99.7 °F) (Temporal)   Resp 16   Ht 1.626 m (5' 4\")   Wt 88.5 kg (195 lb)   SpO2 94%    BMI: Body mass index is 33.47 kg/m².  Physical Exam  Constitutional:       Appearance: Normal appearance. Natalie Anaya is not ill-appearing or toxic-appearing.      Comments: Appears uncomfortable with standing/ambulating   HENT:      Ears:      Comments: Hearing grossly normal     Mouth/Throat:      Comments: Normal phonation  Eyes:      General: No scleral icterus.     Conjunctiva/sclera: Conjunctivae normal.      Comments: Glasses in place   Pulmonary:      Effort: Pulmonary effort is normal. No respiratory distress.      Comments: Supplemental oxygen in place  Musculoskeletal:         General: Tenderness present.      Right lower leg: Edema present.      Left lower leg: Edema present.   Neurological:      Gait: Gait abnormal.      Comments: Ambulates with a 4WW   Psychiatric:         Mood and Affect: Mood normal.         Behavior: Behavior normal.         Thought Content: Thought content normal.         Judgment: Judgment normal.          Assessment and Plan:   Natalie is a 81 y.o. adult with the following:  Problem List Items Addressed This Visit     Neuropathy     Chronic and ongoing problem, continue gabapentin at night for neuropathy, currently on increased dose for flare up of sciatica. Will send in new prescription with increased quantity due to dose/frequency increase.           Relevant Medications    gabapentin (NEURONTIN) 100 MG Cap    Acute left-sided low back pain with left-sided sciatica     New and decompensated problem. Will have her " increase gabapentin to 200 mg TID, monitor closely for side effects such as oversedation and balance impairment. Trial short course of steroids to help with inflammation, take with PPI while she is on DOAC. Monitor for melena, hematochezia, and epigastric pain, take medicine in the morning with food. Update me over the weekend and if pain ongoing can proceed with MRI imaging lumbar spine and urgent referral to physiatry for epidural. She plans to relocate to [place] in mid Aug 2022. Continue acetaminophen, heating pad, and topical analgesics.           Relevant Medications    methylPREDNISolone (MEDROL DOSEPAK) 4 MG Tablet Therapy Pack    gabapentin (NEURONTIN) 100 MG Cap           RTC: Return if symptoms worsen or fail to improve, for moving to [place] in August.    I spent a total of 32 minutes with record review, exam, communication with the patient, communication with other providers, and documentation of this encounter.    PLEASE NOTE: This dictation was created using voice recognition software. I have made every reasonable attempt to correct obvious errors, but I expect that there are errors of grammar and possibly content that I did not discover before finalizing the note.      Kiley Manrique, DO  Geriatric and Internal Medicine  [facility]

## 2022-06-20 ENCOUNTER — TELEPHONE (OUTPATIENT)
Dept: MEDICAL GROUP | Facility: PHYSICIAN GROUP | Age: 82
End: 2022-06-20
Payer: MEDICARE

## 2022-06-20 ENCOUNTER — HOSPITAL ENCOUNTER (EMERGENCY)
Facility: MEDICAL CENTER | Age: 82
End: 2022-06-20
Attending: EMERGENCY MEDICINE
Payer: MEDICARE

## 2022-06-20 ENCOUNTER — APPOINTMENT (OUTPATIENT)
Dept: RADIOLOGY | Facility: MEDICAL CENTER | Age: 82
End: 2022-06-20
Attending: EMERGENCY MEDICINE
Payer: MEDICARE

## 2022-06-20 VITALS
HEART RATE: 87 BPM | HEIGHT: 62 IN | WEIGHT: 192 LBS | TEMPERATURE: 98.3 F | DIASTOLIC BLOOD PRESSURE: 78 MMHG | RESPIRATION RATE: 15 BRPM | BODY MASS INDEX: 35.33 KG/M2 | SYSTOLIC BLOOD PRESSURE: 133 MMHG | OXYGEN SATURATION: 97 %

## 2022-06-20 DIAGNOSIS — R00.2 PALPITATIONS: ICD-10-CM

## 2022-06-20 LAB
ALBUMIN SERPL BCP-MCNC: 3.5 G/DL (ref 3.2–4.9)
ALBUMIN/GLOB SERPL: 1 G/DL
ALP SERPL-CCNC: 96 U/L (ref 30–99)
ALT SERPL-CCNC: 14 U/L (ref 2–50)
ANION GAP SERPL CALC-SCNC: 12 MMOL/L (ref 7–16)
AST SERPL-CCNC: 17 U/L (ref 12–45)
BASOPHILS # BLD AUTO: 0.4 % (ref 0–1.8)
BASOPHILS # BLD: 0.03 K/UL (ref 0–0.12)
BILIRUB SERPL-MCNC: <0.2 MG/DL (ref 0.1–1.5)
BUN SERPL-MCNC: 19 MG/DL (ref 8–22)
CALCIUM SERPL-MCNC: 8.8 MG/DL (ref 8.4–10.2)
CHLORIDE SERPL-SCNC: 97 MMOL/L (ref 96–112)
CO2 SERPL-SCNC: 31 MMOL/L (ref 20–33)
CREAT SERPL-MCNC: 0.58 MG/DL (ref 0.5–1.4)
EKG IMPRESSION: NORMAL
EOSINOPHIL # BLD AUTO: 0.13 K/UL (ref 0–0.51)
EOSINOPHIL NFR BLD: 1.6 % (ref 0–6.9)
ERYTHROCYTE [DISTWIDTH] IN BLOOD BY AUTOMATED COUNT: 47.6 FL (ref 35.9–50)
GFR SERPLBLD CREATININE-BSD FMLA CKD-EPI: 89 ML/MIN/1.73 M 2
GLOBULIN SER CALC-MCNC: 3.5 G/DL (ref 1.9–3.5)
GLUCOSE SERPL-MCNC: 131 MG/DL (ref 65–99)
HCT VFR BLD AUTO: 30 % (ref 37–47)
HGB BLD-MCNC: 9.1 G/DL (ref 12–16)
IMM GRANULOCYTES # BLD AUTO: 0.02 K/UL (ref 0–0.11)
IMM GRANULOCYTES NFR BLD AUTO: 0.3 % (ref 0–0.9)
LYMPHOCYTES # BLD AUTO: 0.82 K/UL (ref 1–4.8)
LYMPHOCYTES NFR BLD: 10.4 % (ref 22–41)
MAGNESIUM SERPL-MCNC: 2.1 MG/DL (ref 1.5–2.5)
MCH RBC QN AUTO: 27.7 PG (ref 27–33)
MCHC RBC AUTO-ENTMCNC: 30.3 G/DL (ref 33.6–35)
MCV RBC AUTO: 91.2 FL (ref 81.4–97.8)
MONOCYTES # BLD AUTO: 0.68 K/UL (ref 0–0.85)
MONOCYTES NFR BLD AUTO: 8.6 % (ref 0–13.4)
NEUTROPHILS # BLD AUTO: 6.2 K/UL (ref 2–7.15)
NEUTROPHILS NFR BLD: 78.7 % (ref 44–72)
NRBC # BLD AUTO: 0 K/UL
NRBC BLD-RTO: 0 /100 WBC
NT-PROBNP SERPL IA-MCNC: 545 PG/ML (ref 0–125)
PLATELET # BLD AUTO: 408 K/UL (ref 164–446)
PMV BLD AUTO: 8.5 FL (ref 9–12.9)
POTASSIUM SERPL-SCNC: 4 MMOL/L (ref 3.6–5.5)
PROT SERPL-MCNC: 7 G/DL (ref 6–8.2)
RBC # BLD AUTO: 3.29 M/UL (ref 4.2–5.4)
SODIUM SERPL-SCNC: 140 MMOL/L (ref 135–145)
T4 FREE SERPL-MCNC: 1.34 NG/DL (ref 0.93–1.7)
TROPONIN T SERPL-MCNC: 13 NG/L (ref 6–19)
TSH SERPL DL<=0.005 MIU/L-ACNC: 0.23 UIU/ML (ref 0.38–5.33)
WBC # BLD AUTO: 7.9 K/UL (ref 4.8–10.8)

## 2022-06-20 PROCEDURE — 71045 X-RAY EXAM CHEST 1 VIEW: CPT

## 2022-06-20 PROCEDURE — 84484 ASSAY OF TROPONIN QUANT: CPT

## 2022-06-20 PROCEDURE — 84439 ASSAY OF FREE THYROXINE: CPT

## 2022-06-20 PROCEDURE — 93005 ELECTROCARDIOGRAM TRACING: CPT

## 2022-06-20 PROCEDURE — 302449 STATCHG TRIAGE ONLY (STATISTIC)

## 2022-06-20 PROCEDURE — 84443 ASSAY THYROID STIM HORMONE: CPT

## 2022-06-20 PROCEDURE — 85025 COMPLETE CBC W/AUTO DIFF WBC: CPT

## 2022-06-20 PROCEDURE — 83880 ASSAY OF NATRIURETIC PEPTIDE: CPT

## 2022-06-20 PROCEDURE — 700105 HCHG RX REV CODE 258: Performed by: EMERGENCY MEDICINE

## 2022-06-20 PROCEDURE — 93005 ELECTROCARDIOGRAM TRACING: CPT | Performed by: EMERGENCY MEDICINE

## 2022-06-20 PROCEDURE — 80053 COMPREHEN METABOLIC PANEL: CPT

## 2022-06-20 PROCEDURE — 83735 ASSAY OF MAGNESIUM: CPT

## 2022-06-20 RX ORDER — SODIUM CHLORIDE 9 MG/ML
1000 INJECTION, SOLUTION INTRAVENOUS ONCE
Status: COMPLETED | OUTPATIENT
Start: 2022-06-20 | End: 2022-06-20

## 2022-06-20 RX ADMIN — SODIUM CHLORIDE 1000 ML: 9 INJECTION, SOLUTION INTRAVENOUS at 19:58

## 2022-06-20 ASSESSMENT — LIFESTYLE VARIABLES
TOTAL SCORE: 0
DO YOU DRINK ALCOHOL: NO
TOTAL SCORE: 0
HAVE YOU EVER FELT YOU SHOULD CUT DOWN ON YOUR DRINKING: NO
EVER HAD A DRINK FIRST THING IN THE MORNING TO STEADY YOUR NERVES TO GET RID OF A HANGOVER: NO
CONSUMPTION TOTAL: INCOMPLETE
HAVE PEOPLE ANNOYED YOU BY CRITICIZING YOUR DRINKING: NO
EVER FELT BAD OR GUILTY ABOUT YOUR DRINKING: NO
TOTAL SCORE: 0

## 2022-06-20 ASSESSMENT — FIBROSIS 4 INDEX: FIB4 SCORE: 0.95

## 2022-06-20 NOTE — TELEPHONE ENCOUNTER
1. Caller Name: Natalie Anaya                          Call Back Number: [phone] (home)         How would the patient prefer to be contacted with a response: Phone call OK to leave a detailed message    She is also very anxious  has showings for house and moving soon to [place]  CNA is helping her at home    The BP cuff machine just read 115/69 and 123 for Pulse

## 2022-06-21 NOTE — ED PROVIDER NOTES
"ED Provider Note    CHIEF COMPLAINT  Chief Complaint   Patient presents with   • Rapid Heart Beat     Pt. Reports \"since Friday my pulse rate has been high my watch keeps buzzing me. I have a sciatica that's making me out of my mind\". Pt. Rates this pain in L hip 7/10. Denies fever, palpitations, CP, SOB.        HPI    Primary care provider: Kiley Manrique D.O.  Means of arrival: ***  History obtained from: ***  History limited by: ***    Natalie Anaya is a 81 y.o. adult who presents with ***    REVIEW OF SYSTEMS  See HPI for further details. All other systems are negative.     PAST MEDICAL HISTORY   has a past medical history of Arthritis, Bowel habit changes, Breath shortness, Cataract, Constipation, Epilepsy (HCC), Eye drainage, Hemorrhagic disorder (HCC), Hiatus hernia syndrome, Hypertension, Hyperthyroidism, Hypothyroid, Influenza, Pain, Personal history of venous thrombosis and embolism, Ringing in ears, Seizure (HCC) (1982), and Sore muscles.    PAST FAMILY HISTORY  Family History   Problem Relation Age of Onset   • Cancer Mother 67        Ovarian   • Alcohol/Drug Father 60        Appenditis   • Heart Disease Father    • Cancer Maternal Grandmother         colon cancer   • Heart Disease Maternal Grandfather    • Cancer Daughter 44        melonoma       SOCIAL HISTORY  Social History     Tobacco Use   • Smoking status: Never Smoker   • Smokeless tobacco: Never Used   Vaping Use   • Vaping Use: Never used   Substance and Sexual Activity   • Alcohol use: No   • Drug use: No   • Sexual activity: Never     Partners: Male       SURGICAL HISTORY   has a past surgical history that includes tubal ligation; nohemy by laparoscopy (9/30/2011); knee arthroplasty total (7/9/2012); cataract phaco with iol (7/15/2014); cataract phaco with iol (8/5/2014); gastroscopy-endo (N/A, 4/29/2016); colonoscopy-flexible (N/A, 4/29/2016); hip nailing intramedullary (Left, 10/6/2016); irrigation & debridement hip (Left, " "11/17/2016); carpal tunnel release; remv 2nd cataract,corn-scler sectn; arthroscopy, knee; tonsillectomy; and open fix inter/subtroch fx,implnt (Right, 6/13/2019).    CURRENT MEDICATIONS  Home Medications    **Home medications have not yet been reviewed for this encounter**         ALLERGIES  Allergies   Allergen Reactions   • Tape Rash     Adhesive band aids cause a rash  Paper tape ok       PHYSICAL EXAM  VITAL SIGNS: /66   Pulse (!) 127   Temp 36.8 °C (98.2 °F) (Temporal)   Resp 18   Ht 1.575 m (5' 2\")   Wt 87.1 kg (192 lb)   LMP  (LMP Unknown)   SpO2 98%   BMI 35.12 kg/m²    Pulse ox interpretation: ***I interpret this pulse ox as normal.  ***    DIAGNOSTIC STUDIES / PROCEDURES    LABS & EKG  {thisvisit}    RADIOLOGY  No orders to display       PROCEDURES  ***    COURSE & MEDICAL DECISION MAKING    This is a 81 y.o. adult who presents with ***    Differential Diagnosis includes but is not limited to:  ***    ED Course:  ***    Medications - No data to display    ***emss     ***Results, exam findings, clinical impression, presumed diagnosis, treatment options, and strict return precautions were discussed with the {disc with:88277:o}, and they verbalized understanding, agreed with, and appreciated the plan of care.    ***Pertinent Labs & Imaging studies reviewed and verified by myself, as well as nursing notes and the patient's past medical, family, and social histories (See chart for details).    ***The patient is referred to a primary physician for blood pressure management, diabetic screening, and for all other preventative health concerns.     Portions of this record were made with voice recognition software.  Despite my review, spelling/grammar/context errors may still remain.  Interpretation of this chart should be taken in this context.    ***    "

## 2022-06-21 NOTE — DISCHARGE INSTRUCTIONS
You were seen for palpitations, thankfully medical work-up today is reassuring vital signs are stable, follow-up with your primary care provider and return immediately for any new or worsening symptoms.

## 2022-06-21 NOTE — PATIENT COMMUNICATION
Order with all supporting records faxed to DME:  [facility] /  [phone] / fax [phone]  Pt notified via "[employer]t

## 2022-06-22 ENCOUNTER — HOSPITAL ENCOUNTER (OUTPATIENT)
Dept: LAB | Facility: MEDICAL CENTER | Age: 82
End: 2022-06-22
Attending: INTERNAL MEDICINE
Payer: MEDICARE

## 2022-06-22 ENCOUNTER — OFFICE VISIT (OUTPATIENT)
Dept: MEDICAL GROUP | Facility: PHYSICIAN GROUP | Age: 82
End: 2022-06-22
Payer: MEDICARE

## 2022-06-22 VITALS
HEIGHT: 64 IN | TEMPERATURE: 98 F | SYSTOLIC BLOOD PRESSURE: 118 MMHG | HEART RATE: 84 BPM | WEIGHT: 196.1 LBS | RESPIRATION RATE: 12 BRPM | OXYGEN SATURATION: 92 % | BODY MASS INDEX: 33.48 KG/M2 | DIASTOLIC BLOOD PRESSURE: 62 MMHG

## 2022-06-22 DIAGNOSIS — D62 ACUTE BLOOD LOSS ANEMIA (ABLA): ICD-10-CM

## 2022-06-22 DIAGNOSIS — R91.8 MULTIPLE PULMONARY NODULES: ICD-10-CM

## 2022-06-22 LAB
BASOPHILS # BLD AUTO: 0.5 % (ref 0–1.8)
BASOPHILS # BLD: 0.04 K/UL (ref 0–0.12)
EOSINOPHIL # BLD AUTO: 0.11 K/UL (ref 0–0.51)
EOSINOPHIL NFR BLD: 1.3 % (ref 0–6.9)
ERYTHROCYTE [DISTWIDTH] IN BLOOD BY AUTOMATED COUNT: 46.5 FL (ref 35.9–50)
FERRITIN SERPL-MCNC: 24.6 NG/ML (ref 10–291)
HCT VFR BLD AUTO: 28.2 % (ref 37–47)
HGB BLD-MCNC: 8.8 G/DL (ref 12–16)
IMM GRANULOCYTES # BLD AUTO: 0.02 K/UL (ref 0–0.11)
IMM GRANULOCYTES NFR BLD AUTO: 0.2 % (ref 0–0.9)
IRON SATN MFR SERPL: 6 % (ref 15–55)
IRON SERPL-MCNC: 15 UG/DL (ref 40–170)
LYMPHOCYTES # BLD AUTO: 1.02 K/UL (ref 1–4.8)
LYMPHOCYTES NFR BLD: 12.3 % (ref 22–41)
MCH RBC QN AUTO: 28.3 PG (ref 27–33)
MCHC RBC AUTO-ENTMCNC: 31.2 G/DL (ref 33.6–35)
MCV RBC AUTO: 90.7 FL (ref 81.4–97.8)
MONOCYTES # BLD AUTO: 0.67 K/UL (ref 0–0.85)
MONOCYTES NFR BLD AUTO: 8.1 % (ref 0–13.4)
NEUTROPHILS # BLD AUTO: 6.4 K/UL (ref 2–7.15)
NEUTROPHILS NFR BLD: 77.6 % (ref 44–72)
NRBC # BLD AUTO: 0 K/UL
NRBC BLD-RTO: 0 /100 WBC
PLATELET # BLD AUTO: 435 K/UL (ref 164–446)
PMV BLD AUTO: 9 FL (ref 9–12.9)
RBC # BLD AUTO: 3.11 M/UL (ref 4.2–5.4)
TIBC SERPL-MCNC: 250 UG/DL (ref 250–450)
UIBC SERPL-MCNC: 235 UG/DL (ref 110–370)
WBC # BLD AUTO: 8.3 K/UL (ref 4.8–10.8)

## 2022-06-22 PROCEDURE — 99214 OFFICE O/P EST MOD 30 MIN: CPT | Performed by: INTERNAL MEDICINE

## 2022-06-22 PROCEDURE — 82728 ASSAY OF FERRITIN: CPT

## 2022-06-22 PROCEDURE — 83550 IRON BINDING TEST: CPT

## 2022-06-22 PROCEDURE — 83540 ASSAY OF IRON: CPT

## 2022-06-22 PROCEDURE — 85025 COMPLETE CBC W/AUTO DIFF WBC: CPT

## 2022-06-22 PROCEDURE — 36415 COLL VENOUS BLD VENIPUNCTURE: CPT

## 2022-06-22 ASSESSMENT — FIBROSIS 4 INDEX: FIB4 SCORE: 0.92

## 2022-06-22 NOTE — PROGRESS NOTES
Subjective:   Chief Complaint/History of Present Illness:  Natalie Anaya is a 81 y.o. adult established patient who presents today to discuss medical problems as listed below. Natalie is accompanied by her senior helper, Kendra.     Problem   Multiple Pulmonary Nodules    Chest xray (6/20/2022): Scattered bilateral pulmonary nodules, new since prior study, concerning for metastatic disease. Further workup and evaluation as clinically appropriate.    She had abnormal x-ray findings during ER evaluation for palpitations/tachycardia and left sciatica pain.  She was told to follow-up when she moves down to [place] on the pulmonary nodules.  She actually has a follow-up with pulmonary medicine later this week.     Acute Blood Loss Anemia (Abla)     Latest Reference Range & Units 01/11/22 17:05 06/20/22 19:55   Hemoglobin 12.0 - 16.0 g/dL 15.0 9.1 (L)     She has new findings of anemia concerning for acute blood loss due to associated sinus tachycardia.  She denies any signs of blood loss such as melena, hematochezia, epistaxis, etc.  She is on Eliquis due to history of pulmonary embolus x2.  She denies any abdominal pain.  She does not feel short of breath.  She is on diuretics which likely make her baseline hemoglobin slightly hyperconcentrated.  She reports previous use of oral iron, stopped couple years ago.  Does not recall what level of anemia or iron deficiency she had in the past.  She went to the ER for sinus tachycardia but was not aware that she also had significant anemia that was new.          Current Medications:  Current Outpatient Medications Ordered in Epic   Medication Sig Dispense Refill   • methylPREDNISolone (MEDROL DOSEPAK) 4 MG Tablet Therapy Pack As directed on the packaging label. 21 Tablet 0   • gabapentin (NEURONTIN) 100 MG Cap Take 2 Capsules by mouth 3 times a day. (Patient taking differently: Take 200 mg by mouth 3 times a day. Only taking one at night now since Friday) 300 Capsule 3  "  • alendronate (FOSAMAX) 70 MG Tab Take 1 Tablet by mouth every 7 days. 12 Tablet 3   • Acetaminophen 500 MG Cap Take 1,000 mg by mouth 2 times a day.     • potassium chloride SA (KDUR) 20 MEQ Tab CR TAKE 1 TABLET BY MOUTH EVERY  Tablet 3   • omeprazole (PRILOSEC) 20 MG delayed-release capsule Take 1 Capsule by mouth every day. 100 Capsule 3   • spironolactone (ALDACTONE) 25 MG Tab Take 1 Tablet by mouth every day. 100 Tablet 0   • apixaban (ELIQUIS) 5mg Tab Take 1 Tablet by mouth 2 times a day. 180 Tablet 2   • torsemide (DEMADEX) 20 MG Tab Take 2 Tablets by mouth every day. 60 Tablet 5   • levothyroxine (SYNTHROID) 100 MCG Tab TAKE 1 TABLET BY MOUTH EVERY  Tablet 3   • Calcium 500-100 MG-UNIT Chew Tab Chew. Takes 2 a day     • amoxicillin (AMOXIL) 500 MG Cap Take 500 mg by mouth. Dental work     • Melatonin 10 MG Tab Take 15 mg by mouth every bedtime.     • Probiotic Product (PROBIOTIC DAILY PO) Take 1 Cap by mouth every day.     • Glucos-Chond-Hyal Ac-Ca Fructo (MOVE FREE JOINT HEALTH ADVANCE) Tab Take 1 Tab by mouth every day.     • Multiple Vitamins-Minerals (WOMENS MULTI VITAMIN & MINERAL) Tab Take 1 Tab by mouth every day.     • Cholecalciferol (VITAMIN D-3) 1000 UNITS Cap Take 1,000 Units by mouth every day.     • primidone (MYSOLINE) 250 MG Tab Take 1 Tablet by mouth 3 times a day. 300 Tablet 3   • Home Care Oxygen Inhale 2 L/min by mouth every bedtime.       No current Epic-ordered facility-administered medications on file.          Objective:   Physical Exam:    Vitals: /62 (BP Location: Left arm, Patient Position: Sitting, BP Cuff Size: Adult)   Pulse 84   Temp 36.7 °C (98 °F) (Temporal)   Resp 12   Ht 1.626 m (5' 4\")   Wt 89 kg (196 lb 1.6 oz)   SpO2 92%    BMI: Body mass index is 33.66 kg/m².  Physical Exam  Constitutional:       General: Natalie Anaya is not in acute distress.     Appearance: Normal appearance. Natalie Anaya is normal weight. Natalie Anne " Héctor is not ill-appearing.   Eyes:      General: No scleral icterus.     Conjunctiva/sclera: Conjunctivae normal.   Cardiovascular:      Rate and Rhythm: Regular rhythm. Tachycardia present.      Pulses: Normal pulses.   Pulmonary:      Effort: Pulmonary effort is normal. No respiratory distress.      Breath sounds: No wheezing or rhonchi.   Abdominal:      General: Bowel sounds are normal.      Palpations: Abdomen is soft.      Tenderness: There is no abdominal tenderness.   Musculoskeletal:      Comments: Trace lower extremity edema   Skin:     General: Skin is warm and dry.      Findings: No bruising or rash.   Neurological:      Gait: Gait abnormal.   Psychiatric:         Mood and Affect: Mood normal.         Behavior: Behavior normal.         Thought Content: Thought content normal.         Judgment: Judgment normal.          Assessment and Plan:   Natalie is a 81 y.o. adult with the following:  Problem List Items Addressed This Visit     Acute blood loss anemia (ABLA)     New and decompensated problem.  Her hemoglobin is down approximately 6 g concerning for acute blood loss.  She has reactive sinus tachycardia.  She is on Eliquis due to history of pulmonary embolus x2 and is hesitant to hold it.  We will proceed with CT imaging of the chest, abdomen, and pelvis to evaluate for malignancy due to concerning chest x-ray findings of metastatic disease and then can come up with a game plan regarding referral to oncology and GI and holding her blood thinners if needed.  At this time she denies symptoms of abdominal pain, dyspnea, melena, or hematochezia but she will be mindful to monitor for these.  Encouraged her to drink plenty of water and reduce her diuretics to help limit the reactive tachycardia.  We will recheck her blood counts in 1 to 2 days to follow the trajectory.           Relevant Orders    CT-CHEST,ABDOMEN,PELVIS WITH    CBC WITH DIFFERENTIAL    IRON/TOTAL IRON BIND    FERRITIN    Multiple  pulmonary nodules     New and decompensated problem, requires urgent follow up imaging.  The concerning findings of new onset anemia in the setting of multiple pulmonary nodules concerning for metastatic disease, perhaps from a colon source. No known melena/hematochezia, no current pulmonary symptoms.  We will proceed with expedited work-up, CT chest abdomen and pelvis with contrast to be completed tomorrow afternoon.  She sees pulmonary medicine the following day, I also spoke with the intake oncology coordinator and pending her imaging findings we can get her seen by their clinic next week for additional work-up.            Relevant Orders    CT-CHEST,ABDOMEN,PELVIS WITH           RTC: Return if symptoms worsen or fail to improve.    I spent a total of 34 minutes with record review, exam, communication with the patient, communication with other providers (Arlen Calzada of hematology/oncology), and documentation of this encounter.    PLEASE NOTE: This dictation was created using voice recognition software. I have made every reasonable attempt to correct obvious errors, but I expect that there are errors of grammar and possibly content that I did not discover before finalizing the note.      Kiley Manrique, DO  Geriatric and Internal Medicine  [facility]

## 2022-06-23 ENCOUNTER — HOSPITAL ENCOUNTER (OUTPATIENT)
Dept: RADIOLOGY | Facility: MEDICAL CENTER | Age: 82
End: 2022-06-23
Attending: INTERNAL MEDICINE
Payer: MEDICARE

## 2022-06-23 ENCOUNTER — TELEPHONE (OUTPATIENT)
Dept: MEDICAL GROUP | Facility: PHYSICIAN GROUP | Age: 82
End: 2022-06-23
Payer: MEDICARE

## 2022-06-23 DIAGNOSIS — R91.8 MULTIPLE PULMONARY NODULES: ICD-10-CM

## 2022-06-23 DIAGNOSIS — D62 ACUTE BLOOD LOSS ANEMIA (ABLA): ICD-10-CM

## 2022-06-23 PROCEDURE — 700117 HCHG RX CONTRAST REV CODE 255: Performed by: INTERNAL MEDICINE

## 2022-06-23 PROCEDURE — 71260 CT THORAX DX C+: CPT | Mod: ME

## 2022-06-23 RX ADMIN — IOHEXOL 25 ML: 240 INJECTION, SOLUTION INTRATHECAL; INTRAVASCULAR; INTRAVENOUS; ORAL at 16:38

## 2022-06-23 RX ADMIN — IOHEXOL 100 ML: 300 INJECTION, SOLUTION INTRAVENOUS at 16:37

## 2022-06-23 NOTE — TELEPHONE ENCOUNTER
1. Caller Name: Natalie Anaya                          Call Back Number: [phone] (home)         How would the patient prefer to be contacted with a response: Phone call OK to leave a detailed message    Lab results Patient to take Eliquis today am and pm  stop if she notices black stools.  Dr. Manrique will update her after imaging.  She is not taking lasix twice a day now only once a day

## 2022-06-23 NOTE — ASSESSMENT & PLAN NOTE
New and decompensated problem.  Her hemoglobin is down approximately 6 g concerning for acute blood loss.  She has reactive sinus tachycardia.  She is on Eliquis due to history of pulmonary embolus x2 and is hesitant to hold it.  We will proceed with CT imaging of the chest, abdomen, and pelvis to evaluate for malignancy due to concerning chest x-ray findings of metastatic disease and then can come up with a game plan regarding referral to oncology and GI and holding her blood thinners if needed.  At this time she denies symptoms of abdominal pain, dyspnea, melena, or hematochezia but she will be mindful to monitor for these.  Encouraged her to drink plenty of water and reduce her diuretics to help limit the reactive tachycardia.  We will recheck her blood counts in 1 to 2 days to follow the trajectory.

## 2022-06-23 NOTE — ASSESSMENT & PLAN NOTE
New and decompensated problem, requires urgent follow up imaging.  The concerning findings of new onset anemia in the setting of multiple pulmonary nodules concerning for metastatic disease, perhaps from a colon source. No known melena/hematochezia, no current pulmonary symptoms.  We will proceed with expedited work-up, CT chest abdomen and pelvis with contrast to be completed tomorrow afternoon.  She sees pulmonary medicine the following day, I also spoke with the intake oncology coordinator and pending her imaging findings we can get her seen by their clinic next week for additional work-up.

## 2022-06-23 NOTE — TELEPHONE ENCOUNTER
----- Message from Kiley Manrique D.O. sent at 6/23/2022  6:54 AM PDT -----  Please check in with Natalie, blood counts have not changed dramatically, will update her when we have her imaging results either tonight or tomorrow morning.

## 2022-06-24 ENCOUNTER — TELEPHONE (OUTPATIENT)
Dept: VASCULAR LAB | Facility: MEDICAL CENTER | Age: 82
End: 2022-06-24

## 2022-06-24 ENCOUNTER — APPOINTMENT (OUTPATIENT)
Dept: SLEEP MEDICINE | Facility: MEDICAL CENTER | Age: 82
End: 2022-06-24
Payer: MEDICARE

## 2022-06-24 NOTE — TELEPHONE ENCOUNTER
Pt may have upcoming biopsoy of her lung on 6/28.  She is to hold Eliquis for 48 hours prior to the procedure per ACC guidelines below.  Her H/H has dropped significantly. I have requested her not to restart Eliquis until operating physician reports she has hemostasis.  In setting of her Anemia, it may be best to obtain another CBC before restarting Eliquis.  Will send message to our pool to check on this pt to see if she will obtain another CBC after her lung biopsy.    Per the 2017 ACC Expert Consensus Decision Pathway for Periprocedural Management of Anticoagulation in Patients With Nonvalvular Atrial Fibrillation (see document below) - it would be recommended to have pt hold Eliquis for 48 hours.                Arvind K. Dent, PharmD

## 2022-06-26 NOTE — PROGRESS NOTES
Pulmonary Clinic Note    Chief Complaint:  Chief Complaint   Patient presents with   • Follow-Up     Chronic respiratory failure with hypoxia. Last seen 04/11/22   • Other     CT-TAP 06/23/22         HPI:   This patient is a 81 y.o. adult whom is followed in our clinic for hx of recurrent PE, hypoxic respiratory failure and hx of AILYN intolerant to CPAP therapy last seen by Dr. Morales on 4/11/22.  At last visit she was to obtain a 6MWT and PFT. She is on lifelong anticoagulation.  She has AILYN with mild PH.    Patient comes to clinic today initially for 1 last follow-up with her pulmonary providers prior to moving in August.  However last week she felt her heart racing and found that her pulse was in the 130s to 140s, went to the emergency room and was found to be acutely anemic.  Her hemoglobin had dropped from 15 to approximately 9.  She has had another CBC since then with further drop to 8.  Pan CT scan of the chest abdomen and pelvis was done in the emergency room in an attempt to locate the source of bleeding, however no source of bleeding was found but evidence of metastatic disease was noted.  Patient has multiple mets to the lung as well as a large thyroid nodule and a mass in her liver.  She denies any symptoms of any of these findings and states that she feels well.  She denies any dizziness, headaches or lightheadedness and states she has had no evidence of bleeding.  She denies hemoptysis and denies hematochezia and melena.  We discussed the possibilities and routes of biopsy extensively.  She has an appointment with our cancer care coordinator on Wednesday.    PFTs and 6-minute walk test from prior visit have not been completed.  She states she has been using a little bit more oxygen lately and is gone from 1 L all the time to 2 L primarily with exertion.  She states when she was recently in [place] at the level she had no need for oxygen during the day.  Since the discovery of her bleeding she  has only been taking 1 Eliquis in the evening.      Past Medical History:   Diagnosis Date   • Arthritis     Knees.   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   • Ringing in ears    • Seizure (HCC) 1982   • Sore muscles        Social History     Socioeconomic History   • Marital status:      Spouse name: Not on file   • Number of children: Not on file   • Years of education: Not on file   • Highest education level: Not on file   Occupational History   • Not on file   Tobacco Use   • Smoking status: Never Smoker   • Smokeless tobacco: Never Used   Vaping Use   • Vaping Use: Never used   Substance and Sexual Activity   • Alcohol use: No   • Drug use: No   • Sexual activity: Never     Partners: Male   Other Topics Concern   • Not on file   Social History Narrative   • Not on file     Social Determinants of Health     Financial Resource Strain: Low Risk    • Difficulty of Paying Living Expenses: Not very hard   Food Insecurity: Unknown   • Worried About Running Out of Food in the Last Year: Not on file   • Ran Out of Food in the Last Year: Never true   Transportation Needs: No Transportation Needs   • Lack of Transportation (Medical): No   • Lack of Transportation (Non-Medical): No   Physical Activity: Inactive   • Days of Exercise per Week: 0 days   • Minutes of Exercise per Session: 0 min   Stress: No Stress Concern Present   • Feeling of Stress : Only a little   Social Connections: Moderately Integrated   • Frequency of Communication with Friends and Family: More than three times a week   • Frequency of Social Gatherings with Friends and Family: More than three times a week    • Attends Confucianist Services: 1 to 4 times per year   • Active Member of Clubs or Organizations: No   • Attends Club or Organization Meetings: Never   • Marital Status:    Intimate Partner Violence: Not At Risk   • Fear of Current or Ex-Partner: No   • Emotionally Abused: No   • Physically Abused: No   • Sexually Abused: No   Housing Stability: Low Risk    • Unable to Pay for Housing in the Last Year: No   • Number of Places Lived in the Last Year: 1   • Unstable Housing in the Last Year: No          Family History   Problem Relation Age of Onset   • Cancer Mother 67        Ovarian   • Alcohol/Drug Father 60        Appenditis   • Heart Disease Father    • Cancer Maternal Grandmother         colon cancer   • Heart Disease Maternal Grandfather    • Cancer Daughter 44        melonoma       Current Outpatient Medications on File Prior to Visit   Medication Sig Dispense Refill   • gabapentin (NEURONTIN) 100 MG Cap Take 2 Capsules by mouth 3 times a day. (Patient taking differently: Take 100 mg by mouth every evening. Only taking one at night now since Friday) 300 Capsule 3   • alendronate (FOSAMAX) 70 MG Tab Take 1 Tablet by mouth every 7 days. 12 Tablet 3   • Acetaminophen 500 MG Cap Take 1,000 mg by mouth 2 times a day.     • potassium chloride SA (KDUR) 20 MEQ Tab CR TAKE 1 TABLET BY MOUTH EVERY  Tablet 3   • omeprazole (PRILOSEC) 20 MG delayed-release capsule Take 1 Capsule by mouth every day. 100 Capsule 3   • primidone (MYSOLINE) 250 MG Tab Take 1 Tablet by mouth 3 times a day. 300 Tablet 3   • spironolactone (ALDACTONE) 25 MG Tab Take 1 Tablet by mouth every day. 100 Tablet 0   • apixaban (ELIQUIS) 5mg Tab Take 1 Tablet by mouth 2 times a day. (Patient taking differently: Take 5 mg by mouth every day.) 180 Tablet 2   • torsemide (DEMADEX) 20 MG Tab Take 2 Tablets by mouth every day. (Patient taking differently: Take 20 mg by mouth every day.) 60 Tablet 5   • levothyroxine (SYNTHROID) 100 MCG Tab  "TAKE 1 TABLET BY MOUTH EVERY  Tablet 3   • Calcium 500-100 MG-UNIT Chew Tab Chew. Takes 2 a day     • amoxicillin (AMOXIL) 500 MG Cap Take 500 mg by mouth. Dental work     • Home Care Oxygen Inhale 2 L/min by mouth every bedtime.     • Melatonin 10 MG Tab Take 15 mg by mouth every bedtime.     • Probiotic Product (PROBIOTIC DAILY PO) Take 1 Cap by mouth every day.     • Glucos-Chond-Hyal Ac-Ca Fructo (MOVE FREE JOINT HEALTH ADVANCE) Tab Take 1 Tab by mouth every day.     • Multiple Vitamins-Minerals (WOMENS MULTI VITAMIN & MINERAL) Tab Take 1 Tab by mouth every day.     • Cholecalciferol (VITAMIN D-3) 1000 UNITS Cap Take 1,000 Units by mouth every day.       No current facility-administered medications on file prior to visit.     Allergies:  Tape      ROS:   Review of Systems   Constitutional: Negative for chills, fever, malaise/fatigue and weight loss.   HENT: Negative for congestion.    Respiratory: Positive for shortness of breath. Negative for cough, hemoptysis, sputum production and wheezing.    Cardiovascular: Positive for palpitations. Negative for chest pain and leg swelling.   Gastrointestinal: Negative for abdominal pain, heartburn, nausea and vomiting.   Genitourinary: Negative for dysuria.   Musculoskeletal: Negative for myalgias.   Neurological: Negative for dizziness, loss of consciousness, weakness and headaches.   Endo/Heme/Allergies: Negative for environmental allergies.       Vitals:  /66 (BP Location: Right arm, Patient Position: Sitting, BP Cuff Size: Adult)   Pulse (!) 122   Ht 1.626 m (5' 4\")   Wt 90.3 kg (199 lb)   SpO2 93%     Physical Exam:  Physical Exam  Constitutional:       Appearance: Normal appearance.   HENT:      Head: Normocephalic and atraumatic.      Mouth/Throat:      Mouth: Mucous membranes are moist.   Eyes:      Extraocular Movements: Extraocular movements intact.   Cardiovascular:      Rate and Rhythm: Tachycardia present. Rhythm irregular.      Heart sounds: " Normal heart sounds.   Pulmonary:      Effort: Pulmonary effort is normal. No respiratory distress.      Breath sounds: Normal breath sounds. No wheezing or rales.   Musculoskeletal:         General: No swelling, tenderness or deformity.   Skin:     General: Skin is warm and dry.   Neurological:      General: No focal deficit present.      Mental Status: Natalie Anaya is alert and oriented to person, place, and time.         Pertinent Studies:    PFTs as reviewed by me personally show:    Imaging as reviewed by me personally show:    CT Chest abdomen and pelvis:    IMPRESSION:     1.  Multiple bilateral pulmonary nodules and masses as described. Findings are suspicious for metastases     2.  Interval enlargement in a right thyroid nodule, nonspecific.     3.  Atherosclerosis     4.  Dilated main pulmonary artery is suggestive of pulmonary arterial hypertension.     5.  Diverticulosis.     6.  Large hiatal hernia.     7.  Gallbladder contraction containing calcification, similar to the prior exam     8.  Low attenuation collection/mass along the posterior right hepatic lobe is similar to the prior exam and is of doubtful clinical significance    Echo:  3/21/22:  CONCLUSIONS  Compared to the prior echo on 02/03/2020, pulmonary pressure has   slightly improved.  Normal left ventricular size and systolic function.  Moderate tricuspid regurgitation.  Right ventricular systolic pressure is estimated to be 46 mmHg.    Assessment/Plan:  Problem List Items Addressed This Visit     Multiple pulmonary nodules     Pulmonary nodules are likely metastatic disease with possible primaries either liver or thyroid.  Patient has an appointment on Wednesday with Naida Calzada from oncology.  She has a CBC pending for today to assess for further drops in her hemoglobin.  Although metastatic disease to the lungs is accessible via Ion robotic bronchoscopy after discussion with my colleagues I have placed an order for CT-guided  biopsy of the liver which can be completed more safely given her current situation.  Plan:  - CT biopsy of the liver by IR  - Hold Eliquis if CBC drops further today, patient advised of this strictly  - Patient given very strict instructions to return to the emergency room if she experiences any dizziness, lightheadedness, signs of bleeding or sustained tachycardia.  - Follow-up scheduled for July to assure patient is able to obtain a diagnosis.             Other Visit Diagnoses     Pulmonary nodules        Relevant Orders    CT-NEEDLE BX-LIVER    Liver mass        Relevant Orders    CT-NEEDLE BX-LIVER            Return in about 4 weeks (around 7/25/2022).   (return to office in...)    Time spent in record review prior to patient arrival, reviewing results, and in face-to-face encounter totaled 60 min, excluding any procedures if performed.    Leticia Henson MD RD  Pulmonary and Critical Care

## 2022-06-27 ENCOUNTER — HOSPITAL ENCOUNTER (OUTPATIENT)
Dept: LAB | Facility: MEDICAL CENTER | Age: 82
End: 2022-06-27
Attending: INTERNAL MEDICINE
Payer: MEDICARE

## 2022-06-27 ENCOUNTER — OFFICE VISIT (OUTPATIENT)
Dept: SLEEP MEDICINE | Facility: MEDICAL CENTER | Age: 82
End: 2022-06-27
Payer: MEDICARE

## 2022-06-27 ENCOUNTER — TELEPHONE (OUTPATIENT)
Dept: SLEEP MEDICINE | Facility: MEDICAL CENTER | Age: 82
End: 2022-06-27

## 2022-06-27 VITALS
SYSTOLIC BLOOD PRESSURE: 126 MMHG | BODY MASS INDEX: 33.97 KG/M2 | HEIGHT: 64 IN | WEIGHT: 199 LBS | HEART RATE: 122 BPM | DIASTOLIC BLOOD PRESSURE: 66 MMHG | OXYGEN SATURATION: 93 %

## 2022-06-27 DIAGNOSIS — D50.8 IRON DEFICIENCY ANEMIA SECONDARY TO INADEQUATE DIETARY IRON INTAKE: ICD-10-CM

## 2022-06-27 DIAGNOSIS — R91.8 PULMONARY NODULES: ICD-10-CM

## 2022-06-27 DIAGNOSIS — R91.8 MULTIPLE PULMONARY NODULES: ICD-10-CM

## 2022-06-27 DIAGNOSIS — R16.0 LIVER MASS: ICD-10-CM

## 2022-06-27 LAB
BASOPHILS # BLD AUTO: 0.4 % (ref 0–1.8)
BASOPHILS # BLD: 0.03 K/UL (ref 0–0.12)
EOSINOPHIL # BLD AUTO: 0.05 K/UL (ref 0–0.51)
EOSINOPHIL NFR BLD: 0.6 % (ref 0–6.9)
ERYTHROCYTE [DISTWIDTH] IN BLOOD BY AUTOMATED COUNT: 49.9 FL (ref 35.9–50)
HCT VFR BLD AUTO: 28.4 % (ref 37–47)
HGB BLD-MCNC: 8.4 G/DL (ref 12–16)
IMM GRANULOCYTES # BLD AUTO: 0.02 K/UL (ref 0–0.11)
IMM GRANULOCYTES NFR BLD AUTO: 0.3 % (ref 0–0.9)
LYMPHOCYTES # BLD AUTO: 0.86 K/UL (ref 1–4.8)
LYMPHOCYTES NFR BLD: 11 % (ref 22–41)
MCH RBC QN AUTO: 26.8 PG (ref 27–33)
MCHC RBC AUTO-ENTMCNC: 29.6 G/DL (ref 33.6–35)
MCV RBC AUTO: 90.4 FL (ref 81.4–97.8)
MONOCYTES # BLD AUTO: 0.66 K/UL (ref 0–0.85)
MONOCYTES NFR BLD AUTO: 8.5 % (ref 0–13.4)
NEUTROPHILS # BLD AUTO: 6.17 K/UL (ref 2–7.15)
NEUTROPHILS NFR BLD: 79.2 % (ref 44–72)
NRBC # BLD AUTO: 0 K/UL
NRBC BLD-RTO: 0 /100 WBC
PLATELET # BLD AUTO: 470 K/UL (ref 164–446)
PMV BLD AUTO: 8.8 FL (ref 9–12.9)
RBC # BLD AUTO: 3.14 M/UL (ref 4.2–5.4)
WBC # BLD AUTO: 7.8 K/UL (ref 4.8–10.8)

## 2022-06-27 PROCEDURE — 99215 OFFICE O/P EST HI 40 MIN: CPT | Performed by: INTERNAL MEDICINE

## 2022-06-27 PROCEDURE — 85025 COMPLETE CBC W/AUTO DIFF WBC: CPT

## 2022-06-27 PROCEDURE — 36415 COLL VENOUS BLD VENIPUNCTURE: CPT

## 2022-06-27 ASSESSMENT — ENCOUNTER SYMPTOMS
MYALGIAS: 0
SPUTUM PRODUCTION: 0
WHEEZING: 0
WEAKNESS: 0
CHILLS: 0
DIZZINESS: 0
WEIGHT LOSS: 0
FEVER: 0
HEMOPTYSIS: 0
HEARTBURN: 0
LOSS OF CONSCIOUSNESS: 0
PALPITATIONS: 1
COUGH: 0
HEADACHES: 0
SHORTNESS OF BREATH: 1
ABDOMINAL PAIN: 0
NAUSEA: 0
VOMITING: 0

## 2022-06-27 ASSESSMENT — FIBROSIS 4 INDEX: FIB4 SCORE: 0.87

## 2022-06-27 NOTE — ASSESSMENT & PLAN NOTE
Pulmonary nodules are likely metastatic disease with possible primaries either liver or thyroid.  Patient has an appointment on Wednesday with Niada Calzada from oncology.  She has a CBC pending for today to assess for further drops in her hemoglobin.  Although metastatic disease to the lungs is accessible via Ion robotic bronchoscopy after discussion with my colleagues I have placed an order for CT-guided biopsy of the liver which can be completed more safely given her current situation.  Plan:  - CT biopsy of the liver by IR  - Hold Eliquis if CBC drops further today, patient advised of this strictly  - Patient given very strict instructions to return to the emergency room if she experiences any dizziness, lightheadedness, signs of bleeding or sustained tachycardia.  - Follow-up scheduled for July to assure patient is able to obtain a diagnosis.

## 2022-06-27 NOTE — PATIENT INSTRUCTIONS
There are nodules in the lungs, thyroid and a mass in the liver.  We need to figure out which site is primary so we can plan treatment and prognosis.  We may need to schedule a bronchoscopy for biopsy of lung nodules.  We will discuss this with Arlen Calzada the cancer coordinator regarding the plan and let you know.

## 2022-06-28 ENCOUNTER — TELEPHONE (OUTPATIENT)
Dept: SLEEP MEDICINE | Facility: MEDICAL CENTER | Age: 82
End: 2022-06-28

## 2022-06-28 NOTE — TELEPHONE ENCOUNTER
Provider requested CT Guided liver biopsy, see Dr. Solitario’s recommendation below.    Protocolled on 6/27/2022  4:29 PM by Jarrell Solitario M.D.  Procedure to be performed CT   Contrast Non-contrast   Bleeding Risk Category Low   Sedation Moderate   Admission Status Same Day Surgery   Schedule with Protocoling IR Radiologist? No radiologist needed   Special Considerations Unknown primary. Multiple pulmonary nodules. LLL probably easiest.   Approved? Approved for referral

## 2022-06-28 NOTE — TELEPHONE ENCOUNTER
Called patient to inform her of CT guided liver biopsy order.    Leticia Henson MD RD  Pulmonary and Critical Care    Available on Voalte

## 2022-06-29 ENCOUNTER — OFFICE VISIT (OUTPATIENT)
Dept: HEMATOLOGY ONCOLOGY | Facility: MEDICAL CENTER | Age: 82
End: 2022-06-29
Payer: MEDICARE

## 2022-06-29 ENCOUNTER — TELEPHONE (OUTPATIENT)
Dept: VASCULAR LAB | Facility: MEDICAL CENTER | Age: 82
End: 2022-06-29

## 2022-06-29 ENCOUNTER — HOSPITAL ENCOUNTER (OUTPATIENT)
Facility: MEDICAL CENTER | Age: 82
End: 2022-06-29
Attending: INTERNAL MEDICINE | Admitting: INTERNAL MEDICINE
Payer: MEDICARE

## 2022-06-29 VITALS
HEART RATE: 115 BPM | DIASTOLIC BLOOD PRESSURE: 73 MMHG | SYSTOLIC BLOOD PRESSURE: 117 MMHG | BODY MASS INDEX: 36.25 KG/M2 | HEIGHT: 62 IN | RESPIRATION RATE: 16 BRPM | TEMPERATURE: 98.7 F | WEIGHT: 197 LBS | OXYGEN SATURATION: 95 %

## 2022-06-29 DIAGNOSIS — E04.1 THYROID NODULE: ICD-10-CM

## 2022-06-29 DIAGNOSIS — R91.8 PULMONARY NODULES: ICD-10-CM

## 2022-06-29 DIAGNOSIS — R91.8 LUNG NODULES: ICD-10-CM

## 2022-06-29 PROCEDURE — 99204 OFFICE O/P NEW MOD 45 MIN: CPT | Performed by: NURSE PRACTITIONER

## 2022-06-29 ASSESSMENT — ENCOUNTER SYMPTOMS
FEVER: 0
COUGH: 1
SHORTNESS OF BREATH: 1
NERVOUS/ANXIOUS: 0
NAUSEA: 0
DIZZINESS: 0
INSOMNIA: 0
CONSTIPATION: 0
MYALGIAS: 0
WHEEZING: 0
WEIGHT LOSS: 1
CHILLS: 0
SORE THROAT: 0
DIAPHORESIS: 1
DEPRESSION: 0
PALPITATIONS: 0
DIARRHEA: 0
HEADACHES: 0
VOMITING: 0

## 2022-06-29 ASSESSMENT — PAIN SCALES - GENERAL: PAINLEVEL: NO PAIN

## 2022-06-29 ASSESSMENT — FIBROSIS 4 INDEX: FIB4 SCORE: 0.8

## 2022-06-29 NOTE — TELEPHONE ENCOUNTER
"Reviewed CBC drawn on 6/27. HGB has decreased to 8.4    Pt should have had a biopsy yesterday and was instructed to hold Eliquis 48 hrs prior.    Per previous conversation \"Her H/H has dropped significantly. I have requested her not to restart Eliquis until operating physician reports she has hemostasis.\"    LM to discuss if her operating provider instructed her to restart Eliquis post-op.    Neelam Ozuna, PharmD    "

## 2022-06-29 NOTE — TELEPHONE ENCOUNTER
"Pt returned my call.    She informed me her biopsy yesterday was cancelled and that pulmonology has requested her to continue holding her Eliquis.    Per pulmonology's note on 6/27: \"Hold Eliquis if CBC drops further today, patient advised of this strictly\"    Pt states her biopsy may be rescheduled to 7/5 but is awaiting confirmation    Will f/u with pt after tentatively scheduled biopsy ~7/6    Neelam Ozuna, PharmD    "

## 2022-06-29 NOTE — Clinical Note
Dr. Henson - Patient is aware that your team will reach out to her regarding biopsy. She is currently scheduled for IR CT guided biopsy on 7/8 so you may want to cancel that order. Unfortunately STAT PET right now is 2 weeks out but we are trying to do what we can to move that up. Patient is in agreement with the plan.   Arlen

## 2022-06-29 NOTE — PROGRESS NOTES
Subjective     Natalie Anaya is a 81 y.o. adult who presents as a New Patient (IOC - Multiple pulmonary nodules)          HPI    Patient referred to me, Intake Oncology Coordinator by her PCP Dr. Manrique for multiple bilateral lung nodules.  Patient is accompanied by her caregiver Jesica for today's visit.    Patient stated she was alerted on her apple watch that her heart rate was elevated.  She reached out to her PCP and she was requested to go to the emergency department.  She presented to the ED on 6/20/2022.  As part of her work-up she did have a chest x-ray completed which showed scattered bilateral pulmonary nodules which are new from prior study concerning for metastatic disease.  Patient discharged from ED and recommended to follow-up with her PCP.  PCP requested a CT chest, abdomen and pelvis completed on 6/23/2022.    CT showed multiple bilateral pulmonary nodules and masses concerning for metastatic disease.  She also had an interval enlargement of a right thyroid nodule.  There was a low-attenuation collection/mass along the posterior right hepatic lobe.  This CAT scan was compared to a previous CT completed in October 2016.  Of liver lesion noted appears to be stable at this time, therefore according to the reading radiologist very doubtful for any clinical significance.  When reviewing the past CT in 2016 patient did have a thyroid nodule that measured 2.5 cm and now most recently measures 3.8 cm.  I personally viewed the imaging report and images in detail, and I reviewed both of them with the patient and her caregiver today.    Patient also had labs completed during her ER visit which showed hemoglobin of 9.1.  This is decreased from 6 months prior when it was at 15.0.  Repeat labs continue to show decline of hemoglobin now 8.4.  Iron studies were completed which show iron deficiency with an iron at 15 and a percent saturation at 6.  This is consistent with blood loss, and patient denies any  blood in her urine or blood in her stool.  Patient appears to be asymptomatic of this.    Patient states that she continues to watch her heart rate on her apple watch.  She is on chronic oxygen which was started back in 2016 after she broke her hip.  She stated this morning she noticed her O2 saturations were low at 77%, and she increased her oxygen to 3 L with improvement.  She does continue on approximately 1-2 L continuously.  She denies any significant symptoms whatsoever.  However, she did state that her cough may be slightly worse noted within the last 2-3 months.  She has some shortness of breath at times but its not too significant.  She has lost some weight, approximately 20 pounds in the last few weeks.  She also notes that sometimes she may note some night sweats at times.  Patient stated she is experiencing bilateral lower extremity edema.  She currently is on Lasix and was recently requested to decrease down to 1 tablet/day.  She stated that this is causing significant distress and quite bothersome to her.    Patient recently sold her house this week and plan to move to [place] in mid August where her grandson lives.  However she did state after her most recent clinical findings and concerning findings she may end up moving to [place] instead to be with another family member.  Patient recently lost her  just this March 2022.  She also mentioned that she had a daughter diagnosed with melanoma who has passed away as well.    Please see past medical and surgical history below.    Patient does have a family history of cancer in her mother who was diagnosed with ovarian cancer.  Maternal grandmother with colon cancer, and daughter with melanoma.    Patient is a never smoker.    Allergies   Allergen Reactions   • Tape Rash     Adhesive band aids cause a rash  Paper tape ok     Current Outpatient Medications on File Prior to Visit   Medication Sig Dispense Refill   • gabapentin (NEURONTIN) 100 MG Cap  Take 2 Capsules by mouth 3 times a day. (Patient taking differently: Take 100 mg by mouth every evening. Only taking one at night now since Friday) 300 Capsule 3   • alendronate (FOSAMAX) 70 MG Tab Take 1 Tablet by mouth every 7 days. 12 Tablet 3   • Acetaminophen 500 MG Cap Take 1,000 mg by mouth 2 times a day.     • potassium chloride SA (KDUR) 20 MEQ Tab CR TAKE 1 TABLET BY MOUTH EVERY  Tablet 3   • omeprazole (PRILOSEC) 20 MG delayed-release capsule Take 1 Capsule by mouth every day. 100 Capsule 3   • primidone (MYSOLINE) 250 MG Tab Take 1 Tablet by mouth 3 times a day. 300 Tablet 3   • spironolactone (ALDACTONE) 25 MG Tab Take 1 Tablet by mouth every day. 100 Tablet 0   • apixaban (ELIQUIS) 5mg Tab Take 1 Tablet by mouth 2 times a day. 180 Tablet 2   • torsemide (DEMADEX) 20 MG Tab Take 2 Tablets by mouth every day. (Patient taking differently: Take 20 mg by mouth every day.) 60 Tablet 5   • levothyroxine (SYNTHROID) 100 MCG Tab TAKE 1 TABLET BY MOUTH EVERY  Tablet 3   • Calcium 500-100 MG-UNIT Chew Tab Chew. Takes 2 a day     • amoxicillin (AMOXIL) 500 MG Cap Take 500 mg by mouth. Dental work     • Home Care Oxygen Inhale 2 L/min by mouth every bedtime.     • Melatonin 10 MG Tab Take 15 mg by mouth every bedtime.     • Probiotic Product (PROBIOTIC DAILY PO) Take 1 Cap by mouth every day.     • Glucos-Chond-Hyal Ac-Ca Fructo (MOVE FREE JOINT HEALTH ADVANCE) Tab Take 1 Tab by mouth every day.     • Multiple Vitamins-Minerals (WOMENS MULTI VITAMIN & MINERAL) Tab Take 1 Tab by mouth every day.     • Cholecalciferol (VITAMIN D-3) 1000 UNITS Cap Take 1,000 Units by mouth every day.       No current facility-administered medications on file prior to visit.     Past Medical History:   Diagnosis Date   • Arthritis     Knees.   • Bowel habit changes     Constipation   • Breath shortness    • Cataract     Bilat IOL   • Constipation    • Epilepsy (HCC)    • Eye drainage    • Hemorrhagic disorder (HCC)    •  Hiatus hernia syndrome    • Hypertension    • Hyperthyroidism    • Hypothyroid    • Influenza    • Pain     knees   • Personal history of venous thrombosis and embolism     PE from Right Shouler FX 2003.   • Ringing in ears    • Seizure (HCC) 1982   • Sore muscles      Past Surgical History:   Procedure Laterality Date   • PB OPEN FIX INTER/SUBTROCH FX,IMPLNT Right 6/13/2019    Procedure: INSERTION, INTRAMEDULLARY MENDY, FEMUR, PROXIMAL;  Surgeon: Maximo Garnica M.D.;  Location: [facility];  Service: Orthopedics   • IRRIGATION & DEBRIDEMENT HIP Left 11/17/2016    Procedure: IRRIGATION & DEBRIDEMENT HIP;  Surgeon: Maximo Garnica M.D.;  Location: [facility];  Service:    • HIP NAILING INTRAMEDULLARY Left 10/6/2016    Procedure: HIP NAILING INTRAMEDULLARY;  Surgeon: Walt Nguyen M.D.;  Location: [facility];  Service:    • GASTROSCOPY-ENDO N/A 4/29/2016    Procedure: GASTROSCOPY-ENDO;  Surgeon: Mikey Stanton M.D.;  Location: [facility];  Service:    • COLONOSCOPY-FLEXIBLE N/A 4/29/2016    Procedure: COLONOSCOPY-FLEXIBLE;  Surgeon: Mikey Stanton M.D.;  Location: [facility];  Service:    • CATARACT PHACO WITH IOL  8/5/2014    Performed by Lorenzo Bello M.D. at [facility]   • CATARACT PHACO WITH IOL  7/15/2014    Performed by Lorenzo Bello M.D. at [facility]   • KNEE ARTHROPLASTY TOTAL  7/9/2012    Performed by DENIZ FREDERICK at [facility]   • JOSE BY LAPAROSCOPY  9/30/2011    Performed by JOHNATHAN CRISOSTOMO at [facility]   • ARTHROSCOPY, KNEE     • CARPAL TUNNEL RELEASE     • LA REMV 2ND CATARACT,CORN-SCLER SECTN     • TONSILLECTOMY     • TUBAL LIGATION         Family History   Problem Relation Age of Onset   • Cancer Mother 67        Ovarian   • Alcohol/Drug Father 60        Appenditis   • Heart Disease Father    • Cancer Maternal Grandmother         colon cancer   • Heart Disease  Maternal Grandfather    • Cancer Daughter 44        ron     Social History     Socioeconomic History   • Marital status:    Tobacco Use   • Smoking status: Never Smoker   • Smokeless tobacco: Never Used   Vaping Use   • Vaping Use: Never used   Substance and Sexual Activity   • Alcohol use: No   • Drug use: No   • Sexual activity: Not Currently     Partners: Male     Social Determinants of Health     Financial Resource Strain: Low Risk    • Difficulty of Paying Living Expenses: Not very hard   Food Insecurity: Unknown   • Ran Out of Food in the Last Year: Never true   Transportation Needs: No Transportation Needs   • Lack of Transportation (Medical): No   • Lack of Transportation (Non-Medical): No   Physical Activity: Inactive   • Days of Exercise per Week: 0 days   • Minutes of Exercise per Session: 0 min   Stress: No Stress Concern Present   • Feeling of Stress : Only a little   Social Connections: Moderately Integrated   • Frequency of Communication with Friends and Family: More than three times a week   • Frequency of Social Gatherings with Friends and Family: More than three times a week   • Attends Taoism Services: 1 to 4 times per year   • Active Member of Clubs or Organizations: No   • Attends Club or Organization Meetings: Never   • Marital Status:    Intimate Partner Violence: Not At Risk   • Fear of Current or Ex-Partner: No   • Emotionally Abused: No   • Physically Abused: No   • Sexually Abused: No   Housing Stability: Low Risk    • Unable to Pay for Housing in the Last Year: No   • Number of Places Lived in the Last Year: 1   • Unstable Housing in the Last Year: No         Review of Systems   Constitutional: Positive for diaphoresis (mild at times) and weight loss. Negative for chills, fever and malaise/fatigue.   HENT: Negative for congestion and sore throat.    Respiratory: Positive for cough and shortness of breath. Negative for wheezing.    Cardiovascular: Positive for leg  "swelling (bilateral and very concerning for patient - she is on Lasix taking 1 tab per day). Negative for chest pain and palpitations.   Gastrointestinal: Negative for constipation, diarrhea, nausea and vomiting.   Genitourinary: Negative for dysuria.   Musculoskeletal: Positive for joint pain (left sciatic issue with some discomfort). Negative for myalgias.   Skin: Negative for itching and rash.   Neurological: Negative for dizziness and headaches.   Psychiatric/Behavioral: Negative for depression. The patient is not nervous/anxious and does not have insomnia.               Objective     /73 (BP Location: Right arm, Patient Position: Sitting, BP Cuff Size: Adult)   Pulse (!) 115   Temp 37.1 °C (98.7 °F) (Temporal)   Resp 16   Ht 1.575 m (5' 2\")   Wt 89.4 kg (197 lb)   LMP  (LMP Unknown)   SpO2 95%   BMI 36.03 kg/m²      Physical Exam  Vitals reviewed.   Constitutional:       General: Natalie Anaya is not in acute distress.     Appearance: Normal appearance. Natalie Anaya is well-developed. Natalie Anaya is not diaphoretic.   HENT:      Head: Normocephalic and atraumatic.      Mouth/Throat:      Mouth: Mucous membranes are moist.      Pharynx: Oropharynx is clear. No oropharyngeal exudate or posterior oropharyngeal erythema.   Eyes:      General: No scleral icterus.        Right eye: No discharge.         Left eye: No discharge.      Conjunctiva/sclera: Conjunctivae normal.      Pupils: Pupils are equal, round, and reactive to light.   Neck:      Thyroid: Thyromegaly present.   Cardiovascular:      Rate and Rhythm: Normal rate and regular rhythm.      Pulses: Normal pulses.      Heart sounds: Normal heart sounds. No murmur heard.    No friction rub. No gallop.   Pulmonary:      Effort: Pulmonary effort is normal. No respiratory distress.      Breath sounds: No wheezing.      Comments: Inspiratory wheezing noted in the upper lobes  Chest:   Breasts:      Right: No supraclavicular " adenopathy.      Left: No supraclavicular adenopathy.       Abdominal:      General: Bowel sounds are normal. There is no distension.      Palpations: Abdomen is soft.      Tenderness: There is no abdominal tenderness.   Musculoskeletal:         General: No swelling or tenderness. Normal range of motion.      Cervical back: Normal range of motion and neck supple.      Right lower leg: Edema present.      Left lower leg: Edema present.      Comments: 2+ pitting edema lower bilateral extremities   Lymphadenopathy:      Head:      Right side of head: No submental, submandibular, tonsillar, preauricular, posterior auricular or occipital adenopathy.      Left side of head: No submental, submandibular, tonsillar, preauricular, posterior auricular or occipital adenopathy.      Cervical: No cervical adenopathy.      Right cervical: No superficial, deep or posterior cervical adenopathy.     Left cervical: No superficial, deep or posterior cervical adenopathy.      Upper Body:      Right upper body: No supraclavicular adenopathy.      Left upper body: No supraclavicular adenopathy.   Skin:     General: Skin is warm and dry.      Coloration: Skin is not pale.      Findings: No erythema or rash.   Neurological:      Mental Status: Natalie Anaya is alert and oriented to person, place, and time.   Psychiatric:         Mood and Affect: Mood normal.         Behavior: Behavior normal.            CT-CHEST,ABDOMEN,PELVIS WITH    Result Date: 6/23/2022 6/23/2022 4:06 PM HISTORY/REASON FOR EXAM:  Unknown primary, initial workup; new acute blood loss anemia, multiple pulmonary nodules concerning for metastatic disease. Multiple new bilateral pulmonary nodules seen on recent chest x-ray TECHNIQUE/EXAM DESCRIPTION: CT scan of the chest, abdomen and pelvis with contrast. Thin-section helical scanning was obtained with intravenous contrast from the lung apices through the pubic symphysis to include the chest, abdomen and pelvis. 100  mL of Omnipaque 350 nonionic contrast was administered intravenously without complication. Low dose optimization technique was utilized for this CT exam including automated exposure control and adjustment of the mA and/or kV according to patient size. COMPARISON: Chest x-ray 6/20/2022 and CT 10/4/2016 FINDINGS: CT Chest: Lungs: There are bilateral pulmonary nodules. Index lesions are as follows: Left upper lobe nodule image 27 measures 1.6 x 1.5 cm. Left lower lobe pleural-based mass on image 50 measures 2.5 x 1.7 cm. Right middle lobe nodule on image 64 measures 1.6 x 1.8 cm. Pleura: No pleural effusion. Mediastinum/Ena: No significant adenopathy. Cardiac: The heart is enlarged. There is calcification of the mitral annulus. There are coronary artery calcifications. There is calcification of the aortic valve. No significant pericardial effusion. Vascular: Scattered arterial calcifications. Soft tissues: Enlarged right thyroid lobe with a poorly defined nodule and a few tiny calcifications. The nodule measures approximately 3.8 cm, previously 2.5 cm in 2016. Bones: Degenerative changes are in the spine. There is bilateral glenohumeral joint arthropathy. Sclerotic foci in the spine are unchanged from the prior exam in 2016. CT Abdomen and Pelvis: Liver: A mass adjacent to the right hepatic lobe with underlying mass effect measures approximately 3.4 x 1.6 cm, similar to the prior exam. There is an additional hypodensity adjacent to the posterior hepatic dome, seen best on coronal images. Spleen: Unremarkable. Adrenal glands: Normal. Biliary: Common bile duct is within normal limits for patient's age. Gallbladder: The gallbladder is severely contracted with calcification, similar to the prior exam. Pancreas: Unremarkable. Kidneys: Symmetric nephrograms. No solid renal mass is identified.. Bowel: There is a large hiatal hernia. There are diverticula in the colon. A small ventral hernia in the upper abdominal wall  anterior to the liver contains fat. Lymph nodes: No adenopathy. Vasculature: There are dense scattered arterial calcifications. Ascites: None present.. Pelvis: The bladder is decompressed. The right ovary is prominent for a postmenopausal female. Musculoskeletal: Bilateral femoral repair is partially visualized. The bones appear osteopenic. Degenerative changes are in the spine. There is an age-indeterminate compression deformity in the L1 vertebral body.     1.  Multiple bilateral pulmonary nodules and masses as described. Findings are suspicious for metastases 2.  Interval enlargement in a right thyroid nodule, nonspecific. 3.  Atherosclerosis 4.  Dilated main pulmonary artery is suggestive of pulmonary arterial hypertension. 5.  Diverticulosis. 6.  Large hiatal hernia. 7.  Gallbladder contraction containing calcification, similar to the prior exam 8.  Low attenuation collection/mass along the posterior right hepatic lobe is similar to the prior exam and is of doubtful clinical significance     DX-CHEST-PORTABLE (1 VIEW)    Result Date: 6/20/2022 6/20/2022 7:54 PM HISTORY/REASON FOR EXAM: Shortness of Breath TECHNIQUE/EXAM DESCRIPTION:  Single AP view of the chest. COMPARISON: June 12, 2019 FINDINGS: Cardiomegaly is observed. Atherosclerotic calcification of the aorta is noted.  The central pulmonary vasculature appears normal. Bilateral lung volumes are diminished.  Scattered bilateral pulmonary nodular densities are seen. No significant pleural effusions are identified. The bony structures appear age-appropriate.     1.  Scattered bilateral pulmonary nodules, new since prior study, concerning for metastatic disease. Further workup and evaluation as clinically appropriate. 2.  Cardiomegaly 3.  Atherosclerosis These findings were discussed with the patient's clinician, Dionicio Caceres, on 6/20/2022 8:17 PM.       Latest Reference Range & Units 06/27/22 15:31   WBC 4.8 - 10.8 K/uL 7.8   RBC 4.20 - 5.40 M/uL  3.14 (L)   Hemoglobin 12.0 - 16.0 g/dL 8.4 (L)   Hematocrit 37.0 - 47.0 % 28.4 (L)   MCV 81.4 - 97.8 fL 90.4   MCH 27.0 - 33.0 pg 26.8 (L)   MCHC 33.6 - 35.0 g/dL 29.6 (L)   RDW 35.9 - 50.0 fL 49.9   Platelet Count 164 - 446 K/uL 470 (H)   MPV 9.0 - 12.9 fL 8.8 (L)   Neutrophils-Polys 44.00 - 72.00 % 79.20 (H)   Neutrophils (Absolute) 2.00 - 7.15 K/uL 6.17   Lymphocytes 22.00 - 41.00 % 11.00 (L)   Lymphs (Absolute) 1.00 - 4.80 K/uL 0.86 (L)   Monocytes 0.00 - 13.40 % 8.50   Monos (Absolute) 0.00 - 0.85 K/uL 0.66   Eosinophils 0.00 - 6.90 % 0.60   Eos (Absolute) 0.00 - 0.51 K/uL 0.05   Basophils 0.00 - 1.80 % 0.40   Baso (Absolute) 0.00 - 0.12 K/uL 0.03   Immature Granulocytes 0.00 - 0.90 % 0.30   Immature Granulocytes (abs) 0.00 - 0.11 K/uL 0.02   Nucleated RBC /100 WBC 0.00   NRBC (Absolute) K/uL 0.00      Latest Reference Range & Units 06/22/22 15:45   Iron 40 - 170 ug/dL 15 (L)   Total Iron Binding 250 - 450 ug/dL 250   % Saturation 15 - 55 % 6 (L)   Unsat Iron Binding 110 - 370 ug/dL 235      Latest Reference Range & Units 06/20/22 19:55   Sodium 135 - 145 mmol/L 140   Potassium 3.6 - 5.5 mmol/L 4.0   Chloride 96 - 112 mmol/L 97   Co2 20 - 33 mmol/L 31   Anion Gap 7.0 - 16.0  12.0   Glucose 65 - 99 mg/dL 131 (H)   Bun 8 - 22 mg/dL 19   Creatinine 0.50 - 1.40 mg/dL 0.58   GFR (CKD-EPI) >60 mL/min/1.73 m 2 89   Calcium 8.4 - 10.2 mg/dL 8.8   AST(SGOT) 12 - 45 U/L 17   ALT(SGPT) 2 - 50 U/L 14   Alkaline Phosphatase 30 - 99 U/L 96   Total Bilirubin 0.1 - 1.5 mg/dL <0.2   Albumin 3.2 - 4.9 g/dL 3.5   Total Protein 6.0 - 8.2 g/dL 7.0   Globulin 1.9 - 3.5 g/dL 3.5   A-G Ratio g/dL 1.0   Magnesium 1.5 - 2.5 mg/dL 2.1              Assessment & Plan        1. Lung nodules  [id number] BASE TO MID-THIGH   2. Thyroid nodule  [id number] BASE TO MID-THIGH       1.  Patient with multiple bilateral pulmonary nodules concerning for metastatic disease.  Patient also with an enlarging thyroid nodule now measuring 3.8 cm in  size which is nonspecific.    Patient was seen by pulmonologist, Dr. Henson earlier this week.  Initially requested liver biopsy however based on the no change in the liver the last 6 years this is not clinically significant.  Interventional radiology recommended CT-guided biopsy of the lung nodule.  However Dr. Henson is recommending proceeding with endoscopic ultrasound instead.  They are currently working on arranging this at this time.  I discussed with patient the plan of care.  Uncertain as to the primary site metastatic disease to lung, however may need to evaluate thyroid nodule in the future if needed.    Patient with significant iron deficiency anemia with hemoglobin that is continuing to trend down, last 8.4.  Patient is asymptomatic at this time.  No evidence of bleeding noted, however after reviewing the CBC this does not appear to be a hematologic issue with the anemia, but rather iron deficiency anemia associated with blood loss.  Discussed with patient that biopsy will confirm malignancy as well as primary site which may give us answers as to why she is bleeding from somewhere.  Patient is currently on Eliquis as she has a history of 2 pulmonary embolism, 1 in 2003 and 2016, but she currently is holding that at this time in preparation for an upcoming biopsy requested by pulmonary.    I also requested patient undergo a stat PET scan.  Unfortunately our stat imaging for this patient is unable to be completed until 7/13/2022.  She is on a cancellation list as well.  I discussed this plan of care in detail with the patient today.    Patient did verbalize understanding of the plan to proceed with biopsy and PET/CT.  I will have her follow-up with me in the clinic after biopsy to review the results and discuss further plan of care at that time.  Patient is interested in knowing as much information as quickly as possible as she will have to move out of her house as of 8/15/2022.  The question will be as to  where she will move to, [place] versus [place] versus staying here in an assisted living.  There is a lot of stress at this moment and we are attempting to get this completed as soon as possible.    2.  Patient with lower extremity edema bilaterally.  Physical examination does note 2+ pitting edema.  She is currently on 1 tablet of Lasix requested by her PCP and patient is requesting to increase this to 2 tablets/day.  I will defer to PCP for this.      Please note that this dictation was created using voice recognition software. I have made every reasonable attempt to correct obvious errors, but I expect that there are errors of grammar and possibly content that I did not discover before finalizing the note.

## 2022-06-30 ENCOUNTER — PATIENT MESSAGE (OUTPATIENT)
Dept: SLEEP MEDICINE | Facility: MEDICAL CENTER | Age: 82
End: 2022-06-30
Payer: MEDICARE

## 2022-07-04 DIAGNOSIS — I27.21 PULMONARY ARTERY HYPERTENSION (HCC): ICD-10-CM

## 2022-07-04 DIAGNOSIS — I50.813 ACUTE ON CHRONIC RIGHT-SIDED HEART FAILURE (HCC): ICD-10-CM

## 2022-07-04 DIAGNOSIS — I51.89 DIASTOLIC DYSFUNCTION: ICD-10-CM

## 2022-07-04 DIAGNOSIS — K21.9 GASTROESOPHAGEAL REFLUX DISEASE WITHOUT ESOPHAGITIS: ICD-10-CM

## 2022-07-04 DIAGNOSIS — G40.909 SEIZURE DISORDER (HCC): ICD-10-CM

## 2022-07-05 RX ORDER — POTASSIUM CHLORIDE 20 MEQ/1
20 TABLET, EXTENDED RELEASE ORAL
Qty: 100 TABLET | Refills: 3 | Status: SHIPPED | OUTPATIENT
Start: 2022-07-05 | End: 2022-08-01 | Stop reason: SDUPTHER

## 2022-07-05 RX ORDER — OMEPRAZOLE 20 MG/1
20 CAPSULE, DELAYED RELEASE ORAL
Qty: 100 CAPSULE | Refills: 3 | Status: SHIPPED | OUTPATIENT
Start: 2022-07-05 | End: 2022-08-01 | Stop reason: SDUPTHER

## 2022-07-05 RX ORDER — PRIMIDONE 250 MG/1
250 TABLET ORAL 3 TIMES DAILY
Qty: 300 TABLET | Refills: 3 | Status: SHIPPED | OUTPATIENT
Start: 2022-07-05 | End: 2022-08-01 | Stop reason: SDUPTHER

## 2022-07-05 NOTE — TELEPHONE ENCOUNTER
Med due for refill? Yes  Last OV: 04/08/2022   Next OV: No F/V scheduled, RTC: Return in 3 months (on 7/8/2022), or if symptoms worsen or fail to improve.  Plan requires 100 day supply. Thank you  Provider to Refill: Dr. Hernandez.

## 2022-07-05 NOTE — TELEPHONE ENCOUNTER
Med due for refill? Yes  Last OV: 04/08/2022  Next OV: No appt scheduled   Provider to Refill: ALEXIS

## 2022-07-06 ENCOUNTER — PRE-ADMISSION TESTING (OUTPATIENT)
Dept: ADMISSIONS | Facility: MEDICAL CENTER | Age: 82
End: 2022-07-06
Attending: INTERNAL MEDICINE
Payer: MEDICARE

## 2022-07-06 ENCOUNTER — DOCUMENTATION (OUTPATIENT)
Dept: VASCULAR LAB | Facility: MEDICAL CENTER | Age: 82
End: 2022-07-06
Payer: MEDICARE

## 2022-07-06 RX ORDER — TORSEMIDE 20 MG/1
40 TABLET ORAL DAILY
Qty: 180 TABLET | Refills: 3 | Status: SHIPPED | OUTPATIENT
Start: 2022-07-06 | End: 2022-08-01 | Stop reason: SDUPTHER

## 2022-07-06 RX ORDER — SPIRONOLACTONE 25 MG/1
25 TABLET ORAL DAILY
Qty: 100 TABLET | Refills: 3 | Status: SHIPPED | OUTPATIENT
Start: 2022-07-06 | End: 2022-08-01 | Stop reason: SDUPTHER

## 2022-07-06 NOTE — PREPROCEDURE INSTRUCTIONS
"Preadmit Phone appointment: \" Preparing for your Procedure information\" Instructions discussed with Patient.       Patient instructed to continue prescribed medications through the day before surgery, instructed to take the following medications the day of surgery per anesthesia protocol:  Tylenol PRN, Oxygen, Synthroid, Primidone.         Pt states, \"no issues with anesthesia\".      Fasting guidelines discussed with Patient,  NPO from solid food at midnight prior to surgery.  Pt encouraged to hydrate the day before surgery.with NPO from solid food at midnight prior to surgery and 3 hour cutoff for clear liquids.   Clear liquids defined.      All Pt's questions and concerns answered or addressed.  Emailed all instructions.    Pt told to be here at 1300 for CT pre-op  "

## 2022-07-06 NOTE — PROGRESS NOTES
Pt to have bronchoscopy on 7/7 instead of a biopsy.  Pt continues to hold Eliquis d/t bronchoscopy tomorrow    Will f/u with pt post op ~7/8 regarding anticoagulation disposition    Neelam Ozuna, DarwinD

## 2022-07-07 ENCOUNTER — HOSPITAL ENCOUNTER (OUTPATIENT)
Facility: MEDICAL CENTER | Age: 82
End: 2022-07-09
Attending: INTERNAL MEDICINE | Admitting: HOSPITALIST
Payer: MEDICARE

## 2022-07-07 ENCOUNTER — APPOINTMENT (OUTPATIENT)
Dept: RADIOLOGY | Facility: MEDICAL CENTER | Age: 82
End: 2022-07-07
Attending: INTERNAL MEDICINE
Payer: MEDICARE

## 2022-07-07 DIAGNOSIS — D64.9 ANEMIA REQUIRING TRANSFUSIONS: ICD-10-CM

## 2022-07-07 DIAGNOSIS — E03.9 HYPOTHYROIDISM, UNSPECIFIED TYPE: ICD-10-CM

## 2022-07-07 DIAGNOSIS — R00.0 TACHYCARDIA: ICD-10-CM

## 2022-07-07 DIAGNOSIS — R94.31 QT PROLONGATION: ICD-10-CM

## 2022-07-07 PROBLEM — R00.2 PALPITATIONS: Status: ACTIVE | Noted: 2022-07-07

## 2022-07-07 PROBLEM — I27.20 PULMONARY HYPERTENSION (HCC): Status: ACTIVE | Noted: 2022-07-07

## 2022-07-07 LAB
ABO GROUP BLD: NORMAL
BARCODED ABORH UBTYP: 6200
BARCODED PRD CODE UBPRD: NORMAL
BARCODED UNIT NUM UBUNT: NORMAL
BLD GP AB SCN SERPL QL: NORMAL
COMPONENT R 8504R: NORMAL
EKG IMPRESSION: NORMAL
ERYTHROCYTE [DISTWIDTH] IN BLOOD BY AUTOMATED COUNT: 50.6 FL (ref 35.9–50)
ERYTHROCYTE [DISTWIDTH] IN BLOOD BY AUTOMATED COUNT: 52.1 FL (ref 35.9–50)
HCT VFR BLD AUTO: 23.4 % (ref 37–47)
HCT VFR BLD AUTO: 28.7 % (ref 37–47)
HGB BLD-MCNC: 6.9 G/DL (ref 12–16)
HGB BLD-MCNC: 8.7 G/DL (ref 12–16)
HGB RETIC QN AUTO: 18.2 PG/CELL (ref 29–35)
IMM RETICS NFR: 23 % (ref 9.3–17.4)
MCH RBC QN AUTO: 25 PG (ref 27–33)
MCH RBC QN AUTO: 25.5 PG (ref 27–33)
MCHC RBC AUTO-ENTMCNC: 29.5 G/DL (ref 33.6–35)
MCHC RBC AUTO-ENTMCNC: 30.3 G/DL (ref 33.6–35)
MCV RBC AUTO: 84.2 FL (ref 81.4–97.8)
MCV RBC AUTO: 84.8 FL (ref 81.4–97.8)
PLATELET # BLD AUTO: 406 K/UL (ref 164–446)
PLATELET # BLD AUTO: 496 K/UL (ref 164–446)
PMV BLD AUTO: 8.4 FL (ref 9–12.9)
PMV BLD AUTO: 8.7 FL (ref 9–12.9)
PRODUCT TYPE UPROD: NORMAL
RBC # BLD AUTO: 2.76 M/UL (ref 4.2–5.4)
RBC # BLD AUTO: 3.41 M/UL (ref 4.2–5.4)
RETICS # AUTO: 0.06 M/UL (ref 0.04–0.06)
RETICS/RBC NFR: 1.7 % (ref 0.8–2.1)
RH BLD: NORMAL
UNIT STATUS USTAT: NORMAL
WBC # BLD AUTO: 5.7 K/UL (ref 4.8–10.8)
WBC # BLD AUTO: 6 K/UL (ref 4.8–10.8)

## 2022-07-07 PROCEDURE — 71250 CT THORAX DX C-: CPT | Mod: ME

## 2022-07-07 PROCEDURE — 700105 HCHG RX REV CODE 258: Performed by: INTERNAL MEDICINE

## 2022-07-07 PROCEDURE — G0378 HOSPITAL OBSERVATION PER HR: HCPCS

## 2022-07-07 PROCEDURE — 93005 ELECTROCARDIOGRAM TRACING: CPT | Performed by: STUDENT IN AN ORGANIZED HEALTH CARE EDUCATION/TRAINING PROGRAM

## 2022-07-07 PROCEDURE — 94760 N-INVAS EAR/PLS OXIMETRY 1: CPT

## 2022-07-07 PROCEDURE — 85027 COMPLETE CBC AUTOMATED: CPT

## 2022-07-07 PROCEDURE — 86850 RBC ANTIBODY SCREEN: CPT

## 2022-07-07 PROCEDURE — 86901 BLOOD TYPING SEROLOGIC RH(D): CPT

## 2022-07-07 PROCEDURE — 86900 BLOOD TYPING SEROLOGIC ABO: CPT

## 2022-07-07 PROCEDURE — 82728 ASSAY OF FERRITIN: CPT

## 2022-07-07 PROCEDURE — 99220 PR INITIAL OBSERVATION CARE,LEVL III: CPT | Performed by: HOSPITALIST

## 2022-07-07 PROCEDURE — 83540 ASSAY OF IRON: CPT

## 2022-07-07 PROCEDURE — 93010 ELECTROCARDIOGRAM REPORT: CPT | Performed by: INTERNAL MEDICINE

## 2022-07-07 PROCEDURE — 83550 IRON BINDING TEST: CPT

## 2022-07-07 PROCEDURE — P9016 RBC LEUKOCYTES REDUCED: HCPCS

## 2022-07-07 PROCEDURE — 700102 HCHG RX REV CODE 250 W/ 637 OVERRIDE(OP): Performed by: HOSPITALIST

## 2022-07-07 PROCEDURE — 85046 RETICYTE/HGB CONCENTRATE: CPT

## 2022-07-07 PROCEDURE — 86923 COMPATIBILITY TEST ELECTRIC: CPT

## 2022-07-07 PROCEDURE — 700101 HCHG RX REV CODE 250: Performed by: INTERNAL MEDICINE

## 2022-07-07 PROCEDURE — 36430 TRANSFUSION BLD/BLD COMPNT: CPT

## 2022-07-07 PROCEDURE — A9270 NON-COVERED ITEM OR SERVICE: HCPCS | Performed by: HOSPITALIST

## 2022-07-07 RX ORDER — ACETAMINOPHEN 325 MG/1
650 TABLET ORAL EVERY 6 HOURS PRN
Status: DISCONTINUED | OUTPATIENT
Start: 2022-07-07 | End: 2022-07-09 | Stop reason: HOSPADM

## 2022-07-07 RX ORDER — TORSEMIDE 20 MG/1
40 TABLET ORAL DAILY
Status: DISCONTINUED | OUTPATIENT
Start: 2022-07-07 | End: 2022-07-09 | Stop reason: HOSPADM

## 2022-07-07 RX ORDER — CHOLECALCIFEROL (VITAMIN D3) 125 MCG
15 CAPSULE ORAL
Status: DISCONTINUED | OUTPATIENT
Start: 2022-07-07 | End: 2022-07-08

## 2022-07-07 RX ORDER — PRIMIDONE 250 MG/1
250 TABLET ORAL 3 TIMES DAILY
Status: DISCONTINUED | OUTPATIENT
Start: 2022-07-07 | End: 2022-07-09 | Stop reason: HOSPADM

## 2022-07-07 RX ORDER — SPIRONOLACTONE 25 MG/1
25 TABLET ORAL DAILY
Status: DISCONTINUED | OUTPATIENT
Start: 2022-07-07 | End: 2022-07-09 | Stop reason: HOSPADM

## 2022-07-07 RX ORDER — AMOXICILLIN 250 MG
2 CAPSULE ORAL 2 TIMES DAILY
Status: DISCONTINUED | OUTPATIENT
Start: 2022-07-07 | End: 2022-07-09 | Stop reason: HOSPADM

## 2022-07-07 RX ORDER — SODIUM CHLORIDE, SODIUM LACTATE, POTASSIUM CHLORIDE, CALCIUM CHLORIDE 600; 310; 30; 20 MG/100ML; MG/100ML; MG/100ML; MG/100ML
INJECTION, SOLUTION INTRAVENOUS CONTINUOUS
Status: ACTIVE | OUTPATIENT
Start: 2022-07-07 | End: 2022-07-07

## 2022-07-07 RX ORDER — GABAPENTIN 100 MG/1
100 CAPSULE ORAL EVERY EVENING
Status: DISCONTINUED | OUTPATIENT
Start: 2022-07-07 | End: 2022-07-09 | Stop reason: HOSPADM

## 2022-07-07 RX ORDER — POLYETHYLENE GLYCOL 3350 17 G/17G
1 POWDER, FOR SOLUTION ORAL
Status: DISCONTINUED | OUTPATIENT
Start: 2022-07-07 | End: 2022-07-09 | Stop reason: HOSPADM

## 2022-07-07 RX ORDER — LEVOTHYROXINE SODIUM 0.1 MG/1
100 TABLET ORAL
Status: DISCONTINUED | OUTPATIENT
Start: 2022-07-08 | End: 2022-07-08

## 2022-07-07 RX ORDER — SODIUM CHLORIDE, SODIUM LACTATE, POTASSIUM CHLORIDE, CALCIUM CHLORIDE 600; 310; 30; 20 MG/100ML; MG/100ML; MG/100ML; MG/100ML
INJECTION, SOLUTION INTRAVENOUS CONTINUOUS
Status: DISCONTINUED | OUTPATIENT
Start: 2022-07-07 | End: 2022-07-07

## 2022-07-07 RX ORDER — BISACODYL 10 MG
10 SUPPOSITORY, RECTAL RECTAL
Status: DISCONTINUED | OUTPATIENT
Start: 2022-07-07 | End: 2022-07-09 | Stop reason: HOSPADM

## 2022-07-07 RX ORDER — OMEPRAZOLE 20 MG/1
20 CAPSULE, DELAYED RELEASE ORAL DAILY
Status: DISCONTINUED | OUTPATIENT
Start: 2022-07-08 | End: 2022-07-09 | Stop reason: HOSPADM

## 2022-07-07 RX ADMIN — LIDOCAINE HYDROCHLORIDE 0.5 ML: 10 INJECTION, SOLUTION EPIDURAL; INFILTRATION; INTRACAUDAL; PERINEURAL at 13:55

## 2022-07-07 RX ADMIN — TORSEMIDE 40 MG: 20 TABLET ORAL at 18:40

## 2022-07-07 RX ADMIN — Medication 15 MG: at 20:24

## 2022-07-07 RX ADMIN — SPIRONOLACTONE 25 MG: 25 TABLET ORAL at 18:39

## 2022-07-07 RX ADMIN — SODIUM CHLORIDE, POTASSIUM CHLORIDE, SODIUM LACTATE AND CALCIUM CHLORIDE: 600; 310; 30; 20 INJECTION, SOLUTION INTRAVENOUS at 13:55

## 2022-07-07 RX ADMIN — GABAPENTIN 100 MG: 100 CAPSULE ORAL at 18:39

## 2022-07-07 RX ADMIN — PRIMIDONE 250 MG: 250 TABLET ORAL at 20:24

## 2022-07-07 ASSESSMENT — COGNITIVE AND FUNCTIONAL STATUS - GENERAL
DAILY ACTIVITIY SCORE: 23
MOBILITY SCORE: 21
STANDING UP FROM CHAIR USING ARMS: A LITTLE
SUGGESTED CMS G CODE MODIFIER DAILY ACTIVITY: CI
CLIMB 3 TO 5 STEPS WITH RAILING: A LITTLE
TOILETING: A LITTLE
WALKING IN HOSPITAL ROOM: A LITTLE
SUGGESTED CMS G CODE MODIFIER MOBILITY: CJ

## 2022-07-07 ASSESSMENT — PAIN DESCRIPTION - PAIN TYPE
TYPE: ACUTE PAIN

## 2022-07-07 ASSESSMENT — LIFESTYLE VARIABLES
EVER HAD A DRINK FIRST THING IN THE MORNING TO STEADY YOUR NERVES TO GET RID OF A HANGOVER: NO
HOW MANY TIMES IN THE PAST YEAR HAVE YOU HAD 5 OR MORE DRINKS IN A DAY: 0
ALCOHOL_USE: NO
HAVE YOU EVER FELT YOU SHOULD CUT DOWN ON YOUR DRINKING: NO
TOTAL SCORE: 0
EVER FELT BAD OR GUILTY ABOUT YOUR DRINKING: NO
AVERAGE NUMBER OF DAYS PER WEEK YOU HAVE A DRINK CONTAINING ALCOHOL: 0
ON A TYPICAL DAY WHEN YOU DRINK ALCOHOL HOW MANY DRINKS DO YOU HAVE: 0
TOTAL SCORE: 0
CONSUMPTION TOTAL: NEGATIVE
HAVE PEOPLE ANNOYED YOU BY CRITICIZING YOUR DRINKING: NO
TOTAL SCORE: 0

## 2022-07-07 ASSESSMENT — COPD QUESTIONNAIRES
COPD SCREENING SCORE: 3
HAVE YOU SMOKED AT LEAST 100 CIGARETTES IN YOUR ENTIRE LIFE: NO/DON'T KNOW
DO YOU EVER COUGH UP ANY MUCUS OR PHLEGM?: NO/ONLY WITH OCCASIONAL COLDS OR INFECTIONS
DURING THE PAST 4 WEEKS HOW MUCH DID YOU FEEL SHORT OF BREATH: SOME OF THE TIME

## 2022-07-07 ASSESSMENT — ENCOUNTER SYMPTOMS
BRUISES/BLEEDS EASILY: 0
FOCAL WEAKNESS: 0
VOMITING: 0
FEVER: 0
CHILLS: 0
SHORTNESS OF BREATH: 0
EYE REDNESS: 0
ABDOMINAL PAIN: 0
COUGH: 0
STRIDOR: 0
MYALGIAS: 0
NERVOUS/ANXIOUS: 0
FLANK PAIN: 0
EYE DISCHARGE: 0

## 2022-07-07 ASSESSMENT — FIBROSIS 4 INDEX: FIB4 SCORE: 0.8

## 2022-07-07 NOTE — OR NURSING
CBC sent earlier-H/H 6.9/23, EKG in PreOp-sinus arrythmia/cont to be irregular, Anesthesia and Pulmonologist at bedside, Procedure has been cancelled by MD, Pt remains awake and alert, Pt will be admitted for work up/cardiology consult-MD updating pt now

## 2022-07-07 NOTE — H&P
Hospital Medicine History & Physical Note    Date of Service  7/7/2022    Primary Care Physician  Kiley Manrique D.O.    Consultants  None     Code Status  Full Code    Chief Complaint  Rapid heart rate/anemia requiring blood transfusion    History of Presenting Illness  Natalie Anaya is a 81 y.o. adult with a past medical history of progressive anemia and a likely metastatic thyroid cancer who was planning to have bronchoscopy with biopsy on 7/7/2022 where she was found to have tachycardia and anemia requiring blood transfusion.  Patient herself is asymptomatic other than noticing rapid heart rate and having progressively worsening generalized weakness over the past few weeks.  She denies noticing any blood in stool ecchymosis petechiae or blood in urine.  She reports that she had a colonoscopy around 2016 and was normal.    I discussed the plan of care with pulmonary, Dr Henson, the patient and preop nursing staff.    Review of Systems  Review of Systems   Constitutional: Positive for malaise/fatigue. Negative for chills and fever.   Eyes: Negative for discharge and redness.   Respiratory: Negative for cough, shortness of breath and stridor.    Cardiovascular: Negative for chest pain and leg swelling.   Gastrointestinal: Negative for abdominal pain and vomiting.   Genitourinary: Negative for flank pain.   Musculoskeletal: Negative for myalgias.   Skin: Negative.    Neurological: Negative for focal weakness.   Endo/Heme/Allergies: Does not bruise/bleed easily.   Psychiatric/Behavioral: The patient is not nervous/anxious.      Past Medical History   has a past medical history of Arthritis, Bowel habit changes, Breath shortness, Cataract, Constipation, Epilepsy ([facility]), Eye drainage, Hemorrhagic disorder ([facility]), Hiatus hernia syndrome, Hypertension, Hyperthyroidism, Hypothyroid, Influenza, Pain, Personal history of venous thrombosis and embolism, Ringing in ears, Seizure ([facility]) (1982), and Sore  muscles.    Surgical History   has a past surgical history that includes tubal ligation; nohemy by laparoscopy (9/30/2011); knee arthroplasty total (7/9/2012); cataract phaco with iol (7/15/2014); cataract phaco with iol (8/5/2014); gastroscopy-endo (N/A, 4/29/2016); colonoscopy-flexible (N/A, 4/29/2016); hip nailing intramedullary (Left, 10/6/2016); irrigation & debridement hip (Left, 11/17/2016); carpal tunnel release; pr remv 2nd cataract,corn-scler sectn; arthroscopy, knee; tonsillectomy; and pr open fix inter/subtroch fx,implnt (Right, 6/13/2019).     Family History  family history includes Alcohol/Drug (age of onset: 60) in Natalie Anaya's father; Cancer in Natalie Canaless maternal grandmother; Cancer (age of onset: 44) in Natalie Anaya's daughter; Cancer (age of onset: 67) in Natalie Anaya's mother; Heart Disease in Natalie Canaless father and maternal grandfather.      Social History   reports that Natalie Anaya has never smoked. Natalie Anaya has never used smokeless tobacco. Natalie Anaya reports that Natalie Anaya does not drink alcohol and does not use drugs.    Allergies  Allergies   Allergen Reactions   • Tape Rash     Adhesive band aids cause a rash  Paper tape ok     Medications  Prior to Admission Medications   Prescriptions Last Dose Informant Patient Reported? Taking?   Acetaminophen 500 MG Cap 7/7/2022 at Unknown time  Yes No   Sig: Take 1,000 mg by mouth 2 times a day.   Calcium 500-100 MG-UNIT Chew Tab 7/6/2022  Yes No   Sig: Chew. Takes 2 a day   Cholecalciferol (VITAMIN D-3) 1000 UNITS Cap 7/6/2022 Patient Yes No   Sig: Take 1,000 Units by mouth every day.   Glucos-Chond-Hyal Ac-Ca Fructo (MOVE FREE JOINT HEALTH ADVANCE) Tab 7/6/2022 Patient Yes No   Sig: Take 1 Tab by mouth every day.   Home Care Oxygen 7/7/2022 at Unknown time  Yes No   Sig: Inhale 2 L/min by mouth every bedtime.   Melatonin 10 MG Tab 7/6/2022 Patient Yes No   Sig: Take 15  mg by mouth every bedtime.   Multiple Vitamins-Minerals (WOMENS MULTI VITAMIN & MINERAL) Tab 7/6/2022 Patient Yes No   Sig: Take 1 Tab by mouth every day.   Probiotic Product (PROBIOTIC DAILY PO) 7/6/2022 Patient Yes No   Sig: Take 1 Cap by mouth every day.   alendronate (FOSAMAX) 70 MG Tab 7/2/2022  No No   Sig: Take 1 Tablet by mouth every 7 days.   amoxicillin (AMOXIL) 500 MG Cap 6/16/2022  Yes No   Sig: Take 500 mg by mouth. Dental work   apixaban (ELIQUIS) 5mg Tab 6/23/2022  No No   Sig: Take 1 Tablet by mouth 2 times a day.   gabapentin (NEURONTIN) 100 MG Cap 7/6/2022  No No   Sig: Take 2 Capsules by mouth 3 times a day.   Patient taking differently: Take 100 mg by mouth every evening. Only taking one at night now since Friday   levothyroxine (SYNTHROID) 100 MCG Tab 7/7/2022 at Unknown time  No No   Sig: TAKE 1 TABLET BY MOUTH EVERY DAY   omeprazole (PRILOSEC) 20 MG delayed-release capsule 6/30/2022  No No   Sig: Take 1 Capsule by mouth every day.   potassium chloride SA (KDUR) 20 MEQ Tab CR 7/6/2022  No No   Sig: Take 1 Tablet by mouth every day.   primidone (MYSOLINE) 250 MG Tab 7/7/2022  No No   Sig: Take 1 Tablet by mouth 3 times a day.   spironolactone (ALDACTONE) 25 MG Tab 7/6/2022  No No   Sig: Take 1 Tablet by mouth every day.   torsemide (DEMADEX) 20 MG Tab 7/6/2022  No No   Sig: TAKE 2 TABLETS BY MOUTH EVERY DAY      Facility-Administered Medications: None     Physical Exam  Temp:  [36.4 °C (97.5 °F)] 36.4 °C (97.5 °F)  Pulse:  [] 93  Resp:  [20] 20  BP: (126)/(63-72) 126/63  SpO2:  [97 %-100 %] 100 %  Blood Pressure : 126/72   Temperature: 36.4 °C (97.5 °F)   Pulse: 77   Respiration: 20   Pulse Oximetry: 97 %     Physical Exam  Constitutional:       General: Natalie Anaya is not in acute distress.  HENT:      Head: Normocephalic and atraumatic.      Right Ear: External ear normal.      Left Ear: External ear normal.      Nose: No congestion or rhinorrhea.      Mouth/Throat:      Mouth:  Mucous membranes are moist.      Pharynx: No oropharyngeal exudate or posterior oropharyngeal erythema.   Eyes:      General: No scleral icterus.        Right eye: No discharge.         Left eye: No discharge.      Conjunctiva/sclera: Conjunctivae normal.      Pupils: Pupils are equal, round, and reactive to light.   Cardiovascular:      Rate and Rhythm: Tachycardia present.      Heart sounds:     No friction rub. No gallop.   Pulmonary:      Effort: Pulmonary effort is normal.   Abdominal:      General: Abdomen is flat. There is no distension.      Tenderness: There is no guarding.   Musculoskeletal:         General: No swelling.      Cervical back: Neck supple. No rigidity. No muscular tenderness.      Right lower leg: No edema.      Left lower leg: No edema.   Skin:     Capillary Refill: Capillary refill takes 2 to 3 seconds.      Coloration: Skin is pale. Skin is not jaundiced.      Findings: No bruising or erythema.   Neurological:      Mental Status: Natalie Anaya is alert and oriented to person, place, and time.   Psychiatric:         Mood and Affect: Mood normal.         Judgment: Judgment normal.       Laboratory:  Recent Labs     07/07/22  1457   WBC 5.7   RBC 2.76*   HEMOGLOBIN 6.9*   HEMATOCRIT 23.4*   MCV 84.8   MCH 25.0*   MCHC 29.5*   RDW 52.1*   PLATELETCT 406   MPV 8.7*         No results for input(s): ALTSGPT, ASTSGOT, ALKPHOSPHAT, TBILIRUBIN, DBILIRUBIN, GAMMAGT, AMYLASE, LIPASE, ALB, PREALBUMIN, GLUCOSE in the last 72 hours.      No results for input(s): NTPROBNP in the last 72 hours.      No results for input(s): TROPONINT in the last 72 hours.    Imaging:  CT-CHEST (THORAX) W/O   Final Result      1.  Redemonstration of bilateral pulmonary metastases, with slight interval increase in size of largest lesion in the left lower lobe.   2.  No mediastinal lymphadenopathy. No new lesions in the chest.   3.  Stable 4 cm right thyroid nodule.   4.  Stable enlargement of pulmonary artery,  suggesting pulmonary hypertension.   5.  Stable cardiomegaly.   6.  Stable large hiatal hernia.   7.  Stable hypodense lesions along the periphery of the right hepatic capsule.        I personally reviewed patient EKG shows sinus tachycardia with a rate of 112, there is no ST elevation, there is no significant ST depression, QTc 549.    Assessment/Plan:  Justification for Admission Status  I anticipate this patient is appropriate for observation status at this time because Likely discharge after 1 midnight    * Anemia requiring transfusions- (present on admission)  Assessment & Plan  1 unit RBC ordered.  No evidence of gross bleeding so far  Continue to monitor, transfuse for hemoglobin of less than or equal to 7  We will check iron studies [ferritin, iron saturation], consider parenteral iron replacement according to results.     Tachycardia- (present on admission)  Assessment & Plan  Likely secondary to severe anemia.  Obtain EKG, check thyroid function, continuous cardiac monitoring  Anticipate tachycardia improved with correcting her anemia  Consider cardiology consult if tachycardia does not correct with correcting anemia    QT prolongation- (present on admission)  Assessment & Plan  EKG shows QTc 549.  Continuous cardiac monitoring.  Try to avoid QT prolonging medications as much as possible  Continue to monitor electrolytes, optimize potassium to be above 4 & magnesium to be above 2    Pulmonary hypertension (HCC)- (present on admission)  Assessment & Plan  Resume home torsemide and spironolactone with hold parameters    HTN (hypertension), benign- (present on admission)  Assessment & Plan  Resume home spironolactone and furosemide with hold parameters    Gastroesophageal reflux disease without esophagitis- (present on admission)  Assessment & Plan  Resume home omeprazole    VTE prophylaxis: SCDs/TEDs, anemia requiring blood transfusion

## 2022-07-07 NOTE — OR NURSING
1605 PT CONCERNED WITH FINANCIAL OBLIGATIONS. NO CASE MANAGEMENT AVAILABLE. REPORT WILL BE GIVEN TO RN FOR CASE MANAGEMENT TO BE CONSULTED TOMORROW.    1636 TEXT DR. BARCENAS VIA [facility] ABOUT BLOOD ADMINISTRATION.     1638 DR. BARCENAS ON WAY TO PRE OP.     1642 RA BARCENAS AT BEDSIDE.     1652 ROOM ASSIGNMENT BY RTOC     1744 REPORT GIVEN TO ROSHNI MARINA. PT TRANSPORTED TO [facility]IA [facility] BY [facility]. TELE VERIFIED BY KAYLA PRIOR TO TRANSFER.

## 2022-07-07 NOTE — OR NURSING
Pt brought back to PreOp, RN assumed care, All PreOp tasks complete-see Epic for further details, Baseline I.S. 1500, IV started, Respiratory here to take pt to CT prior to bronch

## 2022-07-08 ENCOUNTER — APPOINTMENT (OUTPATIENT)
Dept: RADIOLOGY | Facility: MEDICAL CENTER | Age: 82
End: 2022-07-08
Attending: INTERNAL MEDICINE
Payer: MEDICARE

## 2022-07-08 LAB
ALBUMIN SERPL BCP-MCNC: 3 G/DL (ref 3.2–4.9)
ALBUMIN/GLOB SERPL: 0.9 G/DL
ALP SERPL-CCNC: 104 U/L (ref 30–99)
ALT SERPL-CCNC: 10 U/L (ref 2–50)
ANION GAP SERPL CALC-SCNC: 10 MMOL/L (ref 7–16)
AST SERPL-CCNC: 13 U/L (ref 12–45)
BILIRUB SERPL-MCNC: 0.6 MG/DL (ref 0.1–1.5)
BUN SERPL-MCNC: 17 MG/DL (ref 8–22)
CALCIUM SERPL-MCNC: 8.7 MG/DL (ref 8.4–10.2)
CHLORIDE SERPL-SCNC: 97 MMOL/L (ref 96–112)
CO2 SERPL-SCNC: 33 MMOL/L (ref 20–33)
CREAT SERPL-MCNC: 0.43 MG/DL (ref 0.5–1.4)
ERYTHROCYTE [DISTWIDTH] IN BLOOD BY AUTOMATED COUNT: 50.5 FL (ref 35.9–50)
FERRITIN SERPL-MCNC: 13.6 NG/ML (ref 10–291)
GFR SERPLBLD CREATININE-BSD FMLA CKD-EPI: 96 ML/MIN/1.73 M 2
GLOBULIN SER CALC-MCNC: 3.2 G/DL (ref 1.9–3.5)
GLUCOSE SERPL-MCNC: 110 MG/DL (ref 65–99)
HCT VFR BLD AUTO: 29.9 % (ref 37–47)
HGB BLD-MCNC: 9 G/DL (ref 12–16)
IRON SATN MFR SERPL: 58 % (ref 15–55)
IRON SERPL-MCNC: 132 UG/DL (ref 40–170)
MAGNESIUM SERPL-MCNC: 1.8 MG/DL (ref 1.5–2.5)
MCH RBC QN AUTO: 25.1 PG (ref 27–33)
MCHC RBC AUTO-ENTMCNC: 30.1 G/DL (ref 33.6–35)
MCV RBC AUTO: 83.3 FL (ref 81.4–97.8)
PLATELET # BLD AUTO: 442 K/UL (ref 164–446)
PMV BLD AUTO: 8.7 FL (ref 9–12.9)
POTASSIUM SERPL-SCNC: 3.3 MMOL/L (ref 3.6–5.5)
PROT SERPL-MCNC: 6.2 G/DL (ref 6–8.2)
RBC # BLD AUTO: 3.59 M/UL (ref 4.2–5.4)
SODIUM SERPL-SCNC: 140 MMOL/L (ref 135–145)
T4 FREE SERPL-MCNC: 1.35 NG/DL (ref 0.93–1.7)
TIBC SERPL-MCNC: 229 UG/DL (ref 250–450)
TSH SERPL DL<=0.005 MIU/L-ACNC: 0.35 UIU/ML (ref 0.38–5.33)
UIBC SERPL-MCNC: 97 UG/DL (ref 110–370)
WBC # BLD AUTO: 5.6 K/UL (ref 4.8–10.8)

## 2022-07-08 PROCEDURE — 700102 HCHG RX REV CODE 250 W/ 637 OVERRIDE(OP): Performed by: INTERNAL MEDICINE

## 2022-07-08 PROCEDURE — A9270 NON-COVERED ITEM OR SERVICE: HCPCS | Performed by: HOSPITALIST

## 2022-07-08 PROCEDURE — 83735 ASSAY OF MAGNESIUM: CPT

## 2022-07-08 PROCEDURE — A9270 NON-COVERED ITEM OR SERVICE: HCPCS | Performed by: INTERNAL MEDICINE

## 2022-07-08 PROCEDURE — 99214 OFFICE O/P EST MOD 30 MIN: CPT | Performed by: INTERNAL MEDICINE

## 2022-07-08 PROCEDURE — G0378 HOSPITAL OBSERVATION PER HR: HCPCS

## 2022-07-08 PROCEDURE — 84443 ASSAY THYROID STIM HORMONE: CPT

## 2022-07-08 PROCEDURE — 84439 ASSAY OF FREE THYROXINE: CPT

## 2022-07-08 PROCEDURE — 700102 HCHG RX REV CODE 250 W/ 637 OVERRIDE(OP): Performed by: HOSPITALIST

## 2022-07-08 PROCEDURE — 80053 COMPREHEN METABOLIC PANEL: CPT

## 2022-07-08 PROCEDURE — 99226 PR SUBSEQUENT OBSERVATION CARE,LEVEL III: CPT | Performed by: HOSPITALIST

## 2022-07-08 PROCEDURE — 85027 COMPLETE CBC AUTOMATED: CPT

## 2022-07-08 RX ORDER — CHOLECALCIFEROL (VITAMIN D3) 125 MCG
15 CAPSULE ORAL ONCE
Status: COMPLETED | OUTPATIENT
Start: 2022-07-08 | End: 2022-07-08

## 2022-07-08 RX ORDER — CHOLECALCIFEROL (VITAMIN D3) 125 MCG
15 CAPSULE ORAL NIGHTLY
Status: DISCONTINUED | OUTPATIENT
Start: 2022-07-08 | End: 2022-07-08

## 2022-07-08 RX ORDER — LEVOTHYROXINE SODIUM 88 UG/1
88 TABLET ORAL
Status: DISCONTINUED | OUTPATIENT
Start: 2022-07-08 | End: 2022-07-08

## 2022-07-08 RX ORDER — LEVOTHYROXINE SODIUM 88 UG/1
88 TABLET ORAL
Status: DISCONTINUED | OUTPATIENT
Start: 2022-07-09 | End: 2022-07-09 | Stop reason: HOSPADM

## 2022-07-08 RX ORDER — POTASSIUM CHLORIDE 20 MEQ/1
20 TABLET, EXTENDED RELEASE ORAL DAILY
Status: DISCONTINUED | OUTPATIENT
Start: 2022-07-08 | End: 2022-07-09 | Stop reason: HOSPADM

## 2022-07-08 RX ORDER — FERROUS GLUCONATE 324(38)MG
324 TABLET ORAL 2 TIMES DAILY WITH MEALS
Status: DISCONTINUED | OUTPATIENT
Start: 2022-07-08 | End: 2022-07-09 | Stop reason: HOSPADM

## 2022-07-08 RX ORDER — CHOLECALCIFEROL (VITAMIN D3) 125 MCG
30 CAPSULE ORAL NIGHTLY
Status: DISCONTINUED | OUTPATIENT
Start: 2022-07-08 | End: 2022-07-09 | Stop reason: HOSPADM

## 2022-07-08 RX ORDER — LANOLIN ALCOHOL/MO/W.PET/CERES
400 CREAM (GRAM) TOPICAL 2 TIMES DAILY
Status: DISCONTINUED | OUTPATIENT
Start: 2022-07-08 | End: 2022-07-09 | Stop reason: HOSPADM

## 2022-07-08 RX ADMIN — Medication 400 MG: at 16:35

## 2022-07-08 RX ADMIN — FERROUS GLUCONATE TAB 324 MG (37.5 MG ELEMENTAL IRON) 324 MG: 324 (37.5 FE) TAB at 16:35

## 2022-07-08 RX ADMIN — ACETAMINOPHEN 650 MG: 325 TABLET ORAL at 06:03

## 2022-07-08 RX ADMIN — PRIMIDONE 250 MG: 250 TABLET ORAL at 12:00

## 2022-07-08 RX ADMIN — OMEPRAZOLE 20 MG: 20 CAPSULE, DELAYED RELEASE ORAL at 06:03

## 2022-07-08 RX ADMIN — SENNOSIDES AND DOCUSATE SODIUM 2 TABLET: 50; 8.6 TABLET ORAL at 16:35

## 2022-07-08 RX ADMIN — Medication 30 MG: at 20:52

## 2022-07-08 RX ADMIN — Medication 400 MG: at 08:49

## 2022-07-08 RX ADMIN — LEVOTHYROXINE SODIUM 100 MCG: 0.1 TABLET ORAL at 06:03

## 2022-07-08 RX ADMIN — PRIMIDONE 250 MG: 250 TABLET ORAL at 06:03

## 2022-07-08 RX ADMIN — TORSEMIDE 40 MG: 20 TABLET ORAL at 06:03

## 2022-07-08 RX ADMIN — METOPROLOL TARTRATE 25 MG: 25 TABLET, FILM COATED ORAL at 16:35

## 2022-07-08 RX ADMIN — SPIRONOLACTONE 25 MG: 25 TABLET ORAL at 06:03

## 2022-07-08 RX ADMIN — PRIMIDONE 250 MG: 250 TABLET ORAL at 16:35

## 2022-07-08 RX ADMIN — METOPROLOL TARTRATE 25 MG: 25 TABLET, FILM COATED ORAL at 13:00

## 2022-07-08 RX ADMIN — SENNOSIDES AND DOCUSATE SODIUM 2 TABLET: 50; 8.6 TABLET ORAL at 06:03

## 2022-07-08 RX ADMIN — ACETAMINOPHEN 650 MG: 325 TABLET ORAL at 20:51

## 2022-07-08 RX ADMIN — GABAPENTIN 100 MG: 100 CAPSULE ORAL at 20:51

## 2022-07-08 RX ADMIN — Medication 15 MG: at 01:31

## 2022-07-08 RX ADMIN — POTASSIUM CHLORIDE 20 MEQ: 1500 TABLET, EXTENDED RELEASE ORAL at 08:50

## 2022-07-08 ASSESSMENT — ENCOUNTER SYMPTOMS
CHEST TIGHTNESS: 0
CHILLS: 0
FATIGUE: 1
BACK PAIN: 0
WHEEZING: 0
BLURRED VISION: 0
FEVER: 0
WEAKNESS: 1
COUGH: 0
HEMOPTYSIS: 0
SHORTNESS OF BREATH: 0
DEPRESSION: 0
PALPITATIONS: 0
DIZZINESS: 0
DOUBLE VISION: 0
PND: 0
HEARTBURN: 0
HEADACHES: 0
CLAUDICATION: 0
MYALGIAS: 0
VOMITING: 0
ABDOMINAL PAIN: 0
BRUISES/BLEEDS EASILY: 0
NAUSEA: 0

## 2022-07-08 ASSESSMENT — PAIN DESCRIPTION - PAIN TYPE: TYPE: ACUTE PAIN

## 2022-07-08 NOTE — CONSULTS
"Cardiology Initial Consultation    Date of Service  7/8/2022    Referring Physician  YECENIA Robles.*    Reason for Consultation  Tachycardia and cardiology clearance for bronchoscopy    History of Presenting Illness  Natalie Anaya is a 81 y.o. adult with a past medical history of chronic respiratory failure, nocturnal O2 therapy, AILYN intolerant to CPAP, recurrent PE, severe pulmonary hypertension, chronic anticoagulation on apixaban, diastolic heart failure, hypertension, dyslipidemia, hypothyroidism on thyroid supplementation, remote seizure with recent findings of bilateral pulmonary nodules concerning for metastases, interval enlargement of right thyroid nodule of enlarging thyroid and anemia who was electively admitted to undergo a bronchoscopy on 7/7/2022 but was found to have worsening anemia with hemoglobin of 6.6 therefore the patient was admitted and received a blood transfusion.    At the time of this admission the patient was noted to have episodic \"tachycardia\" with heart rates up to 160-170 bpm for which the patient was asymptomatic.    The patient states that last month for about 3 days her smart watch device was alarming indicating that her heart rate was elevated up to 170 bpm.  She contacted her PCP Samantha Jarrett DO who told her to go to the ER.  In the ER EKG showed sinus rhythm.  CXR showed new bilateral pulmonary nodules.  Laboratory showed a hemoglobin of 9.1 previously 15.0.  The patient was discharged home with subsequent follow-up evaluation by [facility] and Pulmonary.    More recently the patient was on vacation in [place] in December and forgot to take her oxygen with her.  She was hospitalized from 12/30/2021-1/4/2022 with respiratory failure and \"acute diastolic heart failure\" treated with IV diuretics discharged on torsemide.  Echocardiogram showed LVEF of 70%, grade 2 diastolic dysfunction, normal right heart size and function and right ventricular pressure of 79 " mmHg.      After returning home she was seen at [facility] on 1/14/2022 by Dr. Óscar Hernandez MD who added spironolactone to her torsemide regiment.  A follow-up echocardiogram showed LVEF of 65%, grade 2 diastolic dysfunction normal right heart size and function with moderate tricuspid regurgitation and right ventricular systolic pressure 46 mmHg.  She was seen in follow-up on 4/8/2022 with follow-up as needed.  She is also followed in the Pulmonary Clinic by Gayathri Morales MD and of note is Dr. Morales does not feel that the patient's pulmonary hypertension and right heart failure are NOT related to CTEPH    The patient had had no prior history of heart disease.  No history of myocardial infarction, angina pectoris or atrial fibrillation.    In 2003 the patient the patient developed PE after prolonged immobilization from right shoulder surgery, was treated with Coumadin for 6 months and discontinued, had recurrent bilateral PE on 10/4/2016 after a ground-level fall with left rib fractures, IVC filter 10/5/2016 on chronic anticoagulation since then with subsequent IVC filter removal 2/15/2017.    Review of Systems  Review of Systems   Constitutional: Positive for fatigue.   Respiratory: Negative for chest tightness and shortness of breath.    Cardiovascular: Negative for palpitations.   Gastrointestinal: Negative for abdominal pain and nausea.   Neurological: Positive for weakness. Negative for dizziness and headaches.   All other systems reviewed and are negative.      Past Medical History   has a past medical history of Arthritis, Bowel habit changes, Breath shortness, Cataract, Constipation, Epilepsy (HCC), Eye drainage, Hemorrhagic disorder (HCC), Hiatus hernia syndrome, Hypertension, Hyperthyroidism, Hypothyroid, Influenza, Pain, Personal history of venous thrombosis and embolism, Ringing in ears, Seizure ([facility]) (1982), and Sore muscles.    Surgical History   has a past surgical history that includes tubal  ligation; nohemy by laparoscopy (9/30/2011); knee arthroplasty total (7/9/2012); cataract phaco with iol (7/15/2014); cataract phaco with iol (8/5/2014); gastroscopy-endo (N/A, 4/29/2016); colonoscopy-flexible (N/A, 4/29/2016); hip nailing intramedullary (Left, 10/6/2016); irrigation & debridement hip (Left, 11/17/2016); carpal tunnel release; pr remv 2nd cataract,corn-scler sectn; arthroscopy, knee; tonsillectomy; and pr open fix inter/subtroch fx,implnt (Right, 6/13/2019).    Family History  family history includes Alcohol/Drug (age of onset: 60) in Natalie Anaya's father; Cancer in Natalie Anaya's maternal grandmother; Cancer (age of onset: 44) in Natalie Anaya's daughter; Cancer (age of onset: 67) in Natalie Anaya's mother; Heart Disease in Natalie Anaya's father and maternal grandfather.    Social History   reports that Natalie Anaya has never smoked. Natalie Anaya has never used smokeless tobacco. Natalie nAaya reports that Natalie Anaya does not drink alcohol and does not use drugs.    Medications  Prior to Admission Medications   Prescriptions Last Dose Informant Patient Reported? Taking?   Acetaminophen 500 MG Cap 7/7/2022 at Unknown time  Yes No   Sig: Take 1,000 mg by mouth 2 times a day.   Calcium 500-100 MG-UNIT Chew Tab 7/6/2022  Yes No   Sig: Chew. Takes 2 a day   Cholecalciferol (VITAMIN D-3) 1000 UNITS Cap 7/6/2022 Patient Yes No   Sig: Take 1,000 Units by mouth every day.   Glucos-Chond-Hyal Ac-Ca Fructo (MOVE FREE JOINT HEALTH ADVANCE) Tab 7/6/2022 Patient Yes No   Sig: Take 1 Tab by mouth every day.   Home Care Oxygen 7/7/2022 at Unknown time  Yes No   Sig: Inhale 2 L/min by mouth every bedtime.   Melatonin 10 MG Tab 7/6/2022 Patient Yes No   Sig: Take 15 mg by mouth every bedtime.   Multiple Vitamins-Minerals (WOMENS MULTI VITAMIN & MINERAL) Tab 7/6/2022 Patient Yes No   Sig: Take 1 Tab by mouth every day.   Probiotic Product (PROBIOTIC DAILY  PO) 7/6/2022 Patient Yes No   Sig: Take 1 Cap by mouth every day.   alendronate (FOSAMAX) 70 MG Tab 7/2/2022  No No   Sig: Take 1 Tablet by mouth every 7 days.   amoxicillin (AMOXIL) 500 MG Cap 6/16/2022  Yes No   Sig: Take 500 mg by mouth. Dental work   apixaban (ELIQUIS) 5mg Tab 6/23/2022  No No   Sig: Take 1 Tablet by mouth 2 times a day.   gabapentin (NEURONTIN) 100 MG Cap 7/6/2022  No No   Sig: Take 2 Capsules by mouth 3 times a day.   Patient taking differently: Take 100 mg by mouth every evening. Only taking one at night now since Friday   levothyroxine (SYNTHROID) 100 MCG Tab 7/7/2022 at Unknown time  No No   Sig: TAKE 1 TABLET BY MOUTH EVERY DAY   omeprazole (PRILOSEC) 20 MG delayed-release capsule 6/30/2022  No No   Sig: Take 1 Capsule by mouth every day.   potassium chloride SA (KDUR) 20 MEQ Tab CR 7/6/2022  No No   Sig: Take 1 Tablet by mouth every day.   primidone (MYSOLINE) 250 MG Tab 7/7/2022  No No   Sig: Take 1 Tablet by mouth 3 times a day.   spironolactone (ALDACTONE) 25 MG Tab 7/6/2022  No No   Sig: Take 1 Tablet by mouth every day.   torsemide (DEMADEX) 20 MG Tab 7/6/2022  No No   Sig: TAKE 2 TABLETS BY MOUTH EVERY DAY      Facility-Administered Medications: None     potassium chloride SA, 20 mEq, Oral, DAILY  magnesium oxide, 400 mg, Oral, BID  [START ON 7/9/2022] levothyroxine, 88 mcg, Oral, AM ES  metoprolol tartrate, 25 mg, Oral, TID  melatonin, 15 mg, Oral, Nightly  senna-docusate, 2 Tablet, Oral, BID  gabapentin, 100 mg, Oral, Q EVENING  omeprazole, 20 mg, Oral, DAILY  primidone, 250 mg, Oral, TID  [Held by provider] spironolactone, 25 mg, Oral, DAILY  [Held by provider] torsemide, 40 mg, Oral, DAILY  MD Alert...Total Body Iron Replacement per Pharmacy, , Other, PHARMACY TO DOSE        Allergies  Allergies   Allergen Reactions   • Tape Rash     Adhesive band aids cause a rash  Paper tape ok       Vital signs in last 24 hours  Temp:  [36.4 °C (97.5 °F)-37.1 °C (98.7 °F)] 36.8 °C (98.2  °F)  Pulse:  [] 79  Resp:  [16-20] 18  BP: ()/(41-78) 123/58  SpO2:  [93 %-100 %] 94 %    Physical Exam  Physical Exam  Constitutional:       General: Natalie Anaya is not in acute distress.  HENT:      Head: Normocephalic.   Eyes:      General: No scleral icterus.     Conjunctiva/sclera: Conjunctivae normal.      Pupils: Pupils are equal, round, and reactive to light.   Neck:      Thyroid: No thyromegaly.      Vascular: No carotid bruit.      Comments: Normal jugular venous pressure.  Cardiovascular:      Rate and Rhythm: Normal rate and regular rhythm.      Pulses:           Carotid pulses are 1+ on the right side and 1+ on the left side.       Radial pulses are 1+ on the right side and 1+ on the left side.        Posterior tibial pulses are 1+ on the right side and 1+ on the left side.      Heart sounds: S1 normal and S2 normal. No murmur heard.    No friction rub. No gallop.   Pulmonary:      Effort: Pulmonary effort is normal.      Breath sounds: Decreased breath sounds present. No wheezing, rhonchi or rales.      Comments: Kyphosis  Increased AP diameter  Abdominal:      General: Bowel sounds are normal. There is no abdominal bruit.      Palpations: Abdomen is soft. There is no mass or pulsatile mass.      Tenderness: There is no abdominal tenderness.   Musculoskeletal:      Right lower leg: No edema.      Left lower leg: No edema.      Comments: Right knee scar   Lymphadenopathy:      Cervical: No cervical adenopathy.   Skin:     General: Skin is warm and dry.      Nails: There is no clubbing.   Neurological:      Mental Status: Natalie Anaya is alert and oriented to person, place, and time.   Psychiatric:         Behavior: Behavior normal.         Lab Review  Lab Results   Component Value Date/Time    WBC 5.6 07/08/2022 01:42 AM    RBC 3.59 (L) 07/08/2022 01:42 AM    HEMOGLOBIN 9.0 (L) 07/08/2022 01:42 AM    HEMATOCRIT 29.9 (L) 07/08/2022 01:42 AM    MCV 83.3 07/08/2022 01:42 AM     MCH 25.1 (L) 07/08/2022 01:42 AM    MCHC 30.1 (L) 07/08/2022 01:42 AM    MPV 8.7 (L) 07/08/2022 01:42 AM      Lab Results   Component Value Date/Time    SODIUM 140 07/08/2022 01:42 AM    POTASSIUM 3.3 (L) 07/08/2022 01:42 AM    CHLORIDE 97 07/08/2022 01:42 AM    CO2 33 07/08/2022 01:42 AM    GLUCOSE 110 (H) 07/08/2022 01:42 AM    BUN 17 07/08/2022 01:42 AM    CREATININE 0.43 (L) 07/08/2022 01:42 AM      Lab Results   Component Value Date/Time    ASTSGOT 13 07/08/2022 01:42 AM    ALTSGPT 10 07/08/2022 01:42 AM     Lab Results   Component Value Date/Time    CHOLSTRLTOT 149 08/12/2019 09:50 AM    LDL 76 08/12/2019 09:50 AM    HDL 54 08/12/2019 09:50 AM    TRIGLYCERIDE 95 08/12/2019 09:50 AM    TROPONINT 13 06/20/2022 07:55 PM       No results for input(s): NTPROBNP in the last 72 hours.    Cardiac Imaging and Procedures Review  EKG:  My personal interpretation of the EKG dated 7/7/2022 is sinus rhythm, paroxysmal atrial tachycardia.    ECHOCARDIOGRAM 2/3/2020  Compared to the images of the prior study done 7/27/17 -  there has   been no significant change.   Normal left ventricular systolic function.  Left ventricular ejection fraction is visually estimated to be 70%.  Grade II diastolic dysfunction.  The right ventricle was normal in size and function.  Mild mitral regurgitation.  Moderate tricuspid regurgitation.  Estimated right ventricular systolic pressure  is 65 mmHg.    ECHOCARDIOGRAM 12/30/2021  1.  Left ventricular ejection fraction 65-70%  2.  Severe left ventricular hypertrophy.  3.  Grade 2 diastolic dysfunction.  4.  Severe right ventricular enlargement, severe systolic dysfunction.  4.  Severe tricuspid regurgitation.  5.  Severe pulmonary hypertension, RVSP 79 mmHg.    ECHOCARDIOGRAM 3/21/2022  Compared to the prior echo on 02/03/2020, pulmonary pressure has   slightly improved.  Normal left ventricular size and systolic function.  Moderate tricuspid regurgitation.  Right ventricular systolic pressure  is estimated to be 46 mmHg.    Imaging  Chest X-Ray:/20/2022  1.  Scattered bilateral pulmonary nodules, new since prior study, concerning for metastatic disease. Further workup and evaluation as clinically appropriate.  2.  Cardiomegaly  3.  Atherosclerosis    CHEST CT SCAN 6/23/2022  1.  Multiple bilateral pulmonary nodules and masses as described. Findings are suspicious for metastases  2.  Interval enlargement in a right thyroid nodule, nonspecific.  3.  Atherosclerosis  4.  Dilated main pulmonary artery is suggestive of pulmonary arterial hypertension.  5.  Diverticulosis.  6.  Large hiatal hernia.  7.  Gallbladder contraction containing calcification, similar to the prior exam  8.  Low attenuation collection/mass along the posterior right hepatic lobe is similar to the prior exam and is of doubtful clinical significance     VQ scan 7/27/2017  Multiple matched defects within the lungs. No mismatched defects. Low probability of pulmonary emboli.     Laboratory data reviewed    Assessment  1.  Paroxysmal atrial tachycardia.  2.  Pulmonary hypertension moderate-severe, chronic.  3.  Bilateral pulmonary nodules concerning for metastatic disease  4.  Enlarging thyroid nodule.  5.  Chronic respiratory failure nocturnal O2.  6.  Right heart failure, chronic.  7.  Diastolic heart failure.  8.  Hypertension  9.  Hypothyroidism.  10.  Chronic anticoagulation.  11.  Anemia requiring blood transfusion.  12.  AILYN intolerant to CPAP.  13.  Kyphosis.    Recommendation Discussion  1.  I reviewed the patient's cardiac rhythm monitor data, EKG and echocardiogram images.  2.  Recommend starting metoprolol 25 mg 3 times daily, if unable to adequately control the patient's atrial tachycardia could consider amiodarone.  4.  Thyroid dose is being decreased.  5.  Otherwise the patient is cleared from a cardiac standpoint to undergo bronchoscopy at a low farooq procedure risk for primary cardiac events.  6.  Personally discussed  treatment plan with Eugenio Garay MD primary physician    Thank you for allowing me to participate in the care of this patient.    Please contact me with any questions.    Darryn Brewer M.D.   Cardiologist, [facility]  [phone]

## 2022-07-08 NOTE — PROGRESS NOTES
Pt transferred to [facility]. Assumed care of pt. Pt sitting up in bed, no complaints of pain, 2L O2 nasal cannula (baseline), A/Ox4. Fall and safety precautions in place, seizure precautions in place. Discussed POC with pt, pt verbalizes understanding. No further needs at this time.     All belongings with pt, portable concentrator placed in pt closet for safe keeping.

## 2022-07-08 NOTE — ASSESSMENT & PLAN NOTE
1 unit RBC ordered.  No evidence of gross bleeding so far  Continue to monitor, transfuse for hemoglobin of less than or equal to 7  Ferritin low  Will give po iron, discussed with pharmacist

## 2022-07-08 NOTE — ASSESSMENT & PLAN NOTE
EKG shows QTc 549.  Continuous cardiac monitoring.  Try to avoid QT prolonging medications as much as possible  Continue to monitor electrolytes, optimize potassium to be above 4 & magnesium to be above 2  Supplementing K and Mg as needed.

## 2022-07-08 NOTE — CARE PLAN
Problem: Pain - Standard  Goal: Alleviation of pain or a reduction in pain to the patient’s comfort goal  Outcome: Progressing     Problem: Knowledge Deficit - Standard  Goal: Patient and family/care givers will demonstrate understanding of plan of care, disease process/condition, diagnostic tests and medications  Outcome: Progressing   The patient is Watcher - Medium risk of patient condition declining or worsening         Progress made toward(s) clinical / shift goals:      Patient is not progressing towards the following goals:

## 2022-07-08 NOTE — PROGRESS NOTES
Telemetry Shift Summary     Rhythm NSR/ST, ST UP TO 180s  HR Range 81-140S  Ectopy rare PVC  Measurements 0.16/.08/.36               Normal Values  Rhythm SR  HR Range    Measurements 0.12-0.20 / 0.06-0.10  / 0.30-0.52

## 2022-07-08 NOTE — ASSESSMENT & PLAN NOTE
Likely secondary to severe anemia.  Obtain EKG, check thyroid function, continuous cardiac monitoring  Anticipate tachycardia improved with correcting her anemia    Cardio consulted possible SVT vs paroxysmal atrial taquicardia recommending to start metoprolol 25 mg tid, continue telemetry will need to monitor for today if hr stable will dc in am, if not stable might need amiodarone.

## 2022-07-08 NOTE — CARE PLAN
The patient is Stable - Low risk of patient condition declining or worsening    Shift Goals  Clinical Goals: Manage H&H  Patient Goals: Rest, receive Bronch    Progress made toward(s) clinical / shift goals:    Problem: Pain - Standard  Goal: Alleviation of pain or a reduction in pain to the patient’s comfort goal  Outcome: Progressing     Problem: Knowledge Deficit - Standard  Goal: Patient and family/care givers will demonstrate understanding of plan of care, disease process/condition, diagnostic tests and medications  Outcome: Progressing       Patient is not progressing towards the following goals:

## 2022-07-08 NOTE — PROGRESS NOTES
"Education provided regarding labs, blood transfusion, reaction S&S, plan of care, Bronchoscopy, falls and safety. Pt demonstrated proper use of call light.     Pt states she feels \"great\", eager to have bronch done. No further questions at this time regarding care. Pt states she is independent in ADL's and completed them prior to shift change.     Despite a low hemoglobin, her main concern is DC seizure precautions. She states she was diagnosed with epilepsy \"a long time ago\" but hasn't had a seizure in \"over 30 years\" while endorsing taking anti-seizure meds. I explained the precautions and their purpose despite the control of seizures over the years. She is still frustrated with the precautions and feels she may have been \"misunderstood\". She is also requesting an increased dosage of Melatonin to 30mg q HS. MD notified and orders placed for a x1 15mg in addition to scheduled dosage.     No acute events overnight     12-hour chart check complete.    Monitor Summary  Rhythm: SR-ST  Rate: 84  Ectopy: Rare PVC  Measurements: 0.20/0.10/0.40      "

## 2022-07-08 NOTE — PROGRESS NOTES
4 Eyes Skin Assessment Completed by Jose, LAINA and LAINA Hernandez.    Head WDL  Ears WDL  Nose WDL  Mouth WDL  Neck WDL  Breast/Chest WDL  Shoulder Blades moles/skin tags  Spine WDL  (R) Arm/Elbow/Hand WDL  (L) Arm/Elbow/Hand WDL  Abdomen WDL  Groin Redness  Scrotum/Coccyx/Buttocks Redness and Blanching  (R) Leg WDL  (L) Leg WDL  (R) Heel/Foot/Toe WDL  (L) Heel/Foot/Toe WDL          Devices In Places Tele Box, Blood Pressure Cuff and Pulse Ox      Interventions In Place N/A    Possible Skin Injury No    Pictures Uploaded Into Epic N/A  Wound Consult Placed N/A  RN Wound Prevention Protocol Ordered No

## 2022-07-08 NOTE — PROGRESS NOTES
Hospital Medicine Daily Progress Note    Date of Service  7/8/2022    Chief Complaint  Natalie Anaya is a 81 y.o. adult admitted 7/7/2022 with anemia, tachycardia      Interval Problem Update  7/8 in bed, feels ok, no palpitation, HR is in the 160-170 on tele monitor, cardiology consulted for possible SVT, per cardio possible paroxysmal atrial tachycardia, recommending to start metoprolol tid, continue telemetry monitoring if HR more stable possible dc home in am.   Discussed with pulm, bronchoscopy as outpatient hopefully next week.     I have discussed this patient's plan of care and discharge plan at IDT rounds today with Case Management, Nursing, Nursing leadership, and other members of the IDT team.    Consultants/Specialty  cardio    Code Status  Full Code    Disposition  Patient is not medically cleared for discharge.   Anticipate discharge to to home with close outpatient follow-up.  I have placed the appropriate orders for post-discharge needs.    Review of Systems  Review of Systems   Constitutional: Negative for chills and fever.   HENT: Negative for congestion and nosebleeds.    Eyes: Negative for blurred vision and double vision.   Respiratory: Negative for cough, hemoptysis and wheezing.    Cardiovascular: Negative for chest pain, palpitations, claudication, leg swelling and PND.   Gastrointestinal: Negative for heartburn, nausea and vomiting.   Genitourinary: Negative for hematuria and urgency.   Musculoskeletal: Negative for back pain and myalgias.   Skin: Negative for rash.   Neurological: Negative for dizziness and headaches.   Endo/Heme/Allergies: Does not bruise/bleed easily.   Psychiatric/Behavioral: Negative for depression.        Physical Exam  Temp:  [36.6 °C (97.9 °F)-37.1 °C (98.7 °F)] 36.8 °C (98.2 °F)  Pulse:  [] 79  Resp:  [16-20] 18  BP: ()/(41-78) 123/58  SpO2:  [93 %-100 %] 94 %    Physical Exam  Vitals and nursing note reviewed.   Constitutional:       General:  Natalie Anaya is not in acute distress.     Appearance: Normal appearance.   HENT:      Head: Normocephalic.   Eyes:      General: No scleral icterus.        Right eye: No discharge.         Left eye: No discharge.      Conjunctiva/sclera: Conjunctivae normal.   Cardiovascular:      Rate and Rhythm: Regular rhythm. Tachycardia present.      Pulses: Normal pulses.      Heart sounds: Normal heart sounds.   Pulmonary:      Effort: Pulmonary effort is normal. No respiratory distress.      Breath sounds: Normal breath sounds. No wheezing.   Abdominal:      General: Bowel sounds are normal. There is no distension.      Palpations: Abdomen is soft.      Tenderness: There is no abdominal tenderness. There is no guarding.   Musculoskeletal:         General: Normal range of motion.      Cervical back: Normal range of motion and neck supple.      Right lower leg: No edema.      Left lower leg: No edema.   Skin:     General: Skin is warm and dry.      Capillary Refill: Capillary refill takes less than 2 seconds.      Coloration: Skin is not jaundiced.   Neurological:      General: No focal deficit present.      Mental Status: Natalie Anaya is alert and oriented to person, place, and time.      Cranial Nerves: No cranial nerve deficit.   Psychiatric:         Mood and Affect: Mood normal.         Fluids    Intake/Output Summary (Last 24 hours) at 7/8/2022 1617  Last data filed at 7/8/2022 0900  Gross per 24 hour   Intake 678 ml   Output 400 ml   Net 278 ml       Laboratory  Recent Labs     07/07/22  1457 07/07/22  2250 07/08/22  0142   WBC 5.7 6.0 5.6   RBC 2.76* 3.41* 3.59*   HEMOGLOBIN 6.9* 8.7* 9.0*   HEMATOCRIT 23.4* 28.7* 29.9*   MCV 84.8 84.2 83.3   MCH 25.0* 25.5* 25.1*   MCHC 29.5* 30.3* 30.1*   RDW 52.1* 50.6* 50.5*   PLATELETCT 406 496* 442   MPV 8.7* 8.4* 8.7*     Recent Labs     07/08/22  0142   SODIUM 140   POTASSIUM 3.3*   CHLORIDE 97   CO2 33   GLUCOSE 110*   BUN 17   CREATININE 0.43*   CALCIUM 8.7                    Imaging  CT-CHEST (THORAX) W/O   Final Result      1.  Redemonstration of bilateral pulmonary metastases, with slight interval increase in size of largest lesion in the left lower lobe.   2.  No mediastinal lymphadenopathy. No new lesions in the chest.   3.  Stable 4 cm right thyroid nodule.   4.  Stable enlargement of pulmonary artery, suggesting pulmonary hypertension.   5.  Stable cardiomegaly.   6.  Stable large hiatal hernia.   7.  Stable hypodense lesions along the periphery of the right hepatic capsule.           Assessment/Plan  * Anemia requiring transfusions- (present on admission)  Assessment & Plan  1 unit RBC ordered.  No evidence of gross bleeding so far  Continue to monitor, transfuse for hemoglobin of less than or equal to 7  Ferritin low  Will give po iron, discussed with pharmacist      QT prolongation- (present on admission)  Assessment & Plan  EKG shows QTc 549.  Continuous cardiac monitoring.  Try to avoid QT prolonging medications as much as possible  Continue to monitor electrolytes, optimize potassium to be above 4 & magnesium to be above 2  Supplementing K and Mg as needed.     Pulmonary hypertension (HCC)- (present on admission)  Assessment & Plan  Monitor  O2 per protocol.     Tachycardia- (present on admission)  Assessment & Plan  Likely secondary to severe anemia.  Obtain EKG, check thyroid function, continuous cardiac monitoring  Anticipate tachycardia improved with correcting her anemia    Cardio consulted possible SVT vs paroxysmal atrial taquicardia recommending to start metoprolol 25 mg tid, continue telemetry will need to monitor for today if hr stable will dc in am, if not stable might need amiodarone.     Gastroesophageal reflux disease without esophagitis- (present on admission)  Assessment & Plan  Resume home omeprazole    Iron deficiency anemia- (present on admission)  Assessment & Plan  po iron.     Hypothyroidism- (present on admission)  Assessment &  Plan  tsh is low, normal t4, decreased supplement dose.     HTN (hypertension), benign- (present on admission)  Assessment & Plan  Hold diuretics  Started on metoprolol for HR which will help with bp too.        VTE prophylaxis: SCDs/TEDs    I have performed a physical exam and reviewed and updated ROS and Plan today (7/8/2022). In review of yesterday's note (7/7/2022), there are no changes except as documented above.

## 2022-07-08 NOTE — RESPIRATORY CARE
Pt states she is diagnosed with AILYN, however she has not used CPAP since 2019.  She uses 02 at night instead.  Due to her condition at this time she is using 02 day and night, and is currently on 2 l/m to keep sats >90%

## 2022-07-09 VITALS
RESPIRATION RATE: 18 BRPM | WEIGHT: 194.89 LBS | DIASTOLIC BLOOD PRESSURE: 78 MMHG | HEIGHT: 64 IN | BODY MASS INDEX: 33.27 KG/M2 | HEART RATE: 103 BPM | TEMPERATURE: 97.3 F | SYSTOLIC BLOOD PRESSURE: 107 MMHG | OXYGEN SATURATION: 98 %

## 2022-07-09 PROBLEM — D64.9 ANEMIA REQUIRING TRANSFUSIONS: Status: RESOLVED | Noted: 2022-07-07 | Resolved: 2022-07-09

## 2022-07-09 LAB
HCT VFR BLD AUTO: 29.4 % (ref 37–47)
HGB BLD-MCNC: 8.8 G/DL (ref 12–16)
MAGNESIUM SERPL-MCNC: 1.9 MG/DL (ref 1.5–2.5)

## 2022-07-09 PROCEDURE — 700102 HCHG RX REV CODE 250 W/ 637 OVERRIDE(OP): Performed by: HOSPITALIST

## 2022-07-09 PROCEDURE — 85014 HEMATOCRIT: CPT

## 2022-07-09 PROCEDURE — A9270 NON-COVERED ITEM OR SERVICE: HCPCS | Performed by: HOSPITALIST

## 2022-07-09 PROCEDURE — 85018 HEMOGLOBIN: CPT

## 2022-07-09 PROCEDURE — 700102 HCHG RX REV CODE 250 W/ 637 OVERRIDE(OP): Performed by: INTERNAL MEDICINE

## 2022-07-09 PROCEDURE — G0378 HOSPITAL OBSERVATION PER HR: HCPCS

## 2022-07-09 PROCEDURE — 83735 ASSAY OF MAGNESIUM: CPT

## 2022-07-09 PROCEDURE — A9270 NON-COVERED ITEM OR SERVICE: HCPCS | Performed by: INTERNAL MEDICINE

## 2022-07-09 PROCEDURE — 99217 PR OBSERVATION CARE DISCHARGE: CPT | Performed by: HOSPITALIST

## 2022-07-09 RX ORDER — LANOLIN ALCOHOL/MO/W.PET/CERES
400 CREAM (GRAM) TOPICAL 2 TIMES DAILY
Qty: 20 TABLET | Refills: 0 | Status: SHIPPED | OUTPATIENT
Start: 2022-07-09 | End: 2022-07-19

## 2022-07-09 RX ORDER — FERROUS GLUCONATE 324(38)MG
324 TABLET ORAL 2 TIMES DAILY WITH MEALS
Qty: 60 TABLET | Refills: 0 | Status: SHIPPED | OUTPATIENT
Start: 2022-07-09 | End: 2022-08-01 | Stop reason: SDUPTHER

## 2022-07-09 RX ORDER — LEVOTHYROXINE SODIUM 88 UG/1
88 TABLET ORAL
Qty: 30 TABLET | Refills: 0 | Status: SHIPPED | OUTPATIENT
Start: 2022-07-10 | End: 2022-07-18

## 2022-07-09 RX ADMIN — ACETAMINOPHEN 650 MG: 325 TABLET ORAL at 07:44

## 2022-07-09 RX ADMIN — FERROUS GLUCONATE TAB 324 MG (37.5 MG ELEMENTAL IRON) 324 MG: 324 (37.5 FE) TAB at 07:43

## 2022-07-09 RX ADMIN — LEVOTHYROXINE SODIUM 88 MCG: 88 TABLET ORAL at 05:58

## 2022-07-09 RX ADMIN — OMEPRAZOLE 20 MG: 20 CAPSULE, DELAYED RELEASE ORAL at 05:58

## 2022-07-09 RX ADMIN — POTASSIUM CHLORIDE 20 MEQ: 1500 TABLET, EXTENDED RELEASE ORAL at 05:58

## 2022-07-09 RX ADMIN — Medication 400 MG: at 05:58

## 2022-07-09 RX ADMIN — METOPROLOL TARTRATE 25 MG: 25 TABLET, FILM COATED ORAL at 05:58

## 2022-07-09 RX ADMIN — PRIMIDONE 250 MG: 250 TABLET ORAL at 11:59

## 2022-07-09 RX ADMIN — PRIMIDONE 250 MG: 250 TABLET ORAL at 05:58

## 2022-07-09 RX ADMIN — SENNOSIDES AND DOCUSATE SODIUM 2 TABLET: 50; 8.6 TABLET ORAL at 05:58

## 2022-07-09 RX ADMIN — METOPROLOL TARTRATE 25 MG: 25 TABLET, FILM COATED ORAL at 12:00

## 2022-07-09 ASSESSMENT — PAIN DESCRIPTION - PAIN TYPE: TYPE: ACUTE PAIN

## 2022-07-09 NOTE — PROGRESS NOTES
Report from off coming nurse. Pt resting in bed, offerings no complaint at this time.    0745: Pt assisted to bathroom x1 with walker. Pt brought back to chair. Sitting in chair comfortably.

## 2022-07-09 NOTE — DISCHARGE INSTRUCTIONS
Discharge Instructions    Discharged to home by car with friend. Discharged via wheelchair, hospital escort: Yes.  Special equipment needed: Not Applicable    Be sure to schedule a follow-up appointment with your primary care doctor or any specialists as instructed.     Discharge Plan:   Diet Plan: Discussed  Confirmed Follow up Appointment: Patient to Call and Schedule Appointment  Confirmed Symptoms Management: Discussed  Medication Reconciliation Updated: No (Comments)    I understand that a diet low in cholesterol, fat, and sodium is recommended for good health. Unless I have been given specific instructions below for another diet, I accept this instruction as my diet prescription.   Other diet: cardiac    Special Instructions: None    -Is this patient being discharged with medication to prevent blood clots?  No    Is patient discharged on Warfarin / Coumadin?   No

## 2022-07-09 NOTE — PROGRESS NOTES
Pt provided discharge teaching. Pt educated on all new medications, including metoprolol, when to take next dose and regime.  RN educated patient on Blood pressure and heart rate monitoring when taking medication. RN went over all follow up information, and all scheduled appointments listed in AVS. Pt aware of where to  prescriptions. PIV removed. Pt with all belongings. Pt taken downstairs via wheelchair and being met by family friend. End of care.

## 2022-07-09 NOTE — PROGRESS NOTES
Received bedside report from LAINA Lane, pt care assumed. VSS, pt assessment complete. Pt A&Ox4, pt c/o 3//10 aching back pain pain at this time. POC discussed with pt and verbalizes no questions. Pt denies any additional needs at this time. Bed locked and in lowest position, bed alarm active. Pt educated on fall risk and verbalized understanding, call light within reach, hourly rounding initiated.

## 2022-07-09 NOTE — PROGRESS NOTES
Telemetry Shift Summary     Rhythm SR/ ST/ SA  HR Range   Ectopy F-O PAC, R PVC  Measurements  0.18/ 0.10/ 0.40  Per strip printed 0400     Normal Values  Rhythm SR  HR Range    Measurements 0.12-0.20 / 0.06-0.10  / 0.30-0.52

## 2022-07-09 NOTE — CARE PLAN
The patient is Stable - Low risk of patient condition declining or worsening    Shift Goals  Clinical Goals: Monitor HR, new medications, HGB lvls  Patient Goals: sleep    Progress made toward(s) clinical / shift goals:      Problem: Pain - Standard  Goal: Alleviation of pain or a reduction in pain to the patient’s comfort goal  Outcome: Progressing     Problem: Knowledge Deficit - Standard  Goal: Patient and family/care givers will demonstrate understanding of plan of care, disease process/condition, diagnostic tests and medications  Outcome: Progressing  Note: Pt updated on POC, new medication, and what we are monitoring.      Problem: Skin Integrity  Goal: Skin integrity is maintained or improved  Outcome: Progressing       Patient is not progressing towards the following goals:

## 2022-07-10 NOTE — DISCHARGE SUMMARY
Discharge Summary    CHIEF COMPLAINT ON ADMISSION  No chief complaint on file.      Reason for Admission  PULMONARY NODULES     Admission Date  7/7/2022    CODE STATUS  Full code    HPI & HOSPITAL COURSE    Please schedule H&P and consult alternatives of information, patient was admitted after being found tachycardic and anemic, patient presented to hospital for elective bronchoscopy patient found to have heart rate in the 160s with hemoglobin 6.9, patient was admitted to telemetry for observation, patient received 1 unit of PRBC, cardiology was consulted, possible paroxysmal atrial tachycardia patient started on metoprolol 25 mg 3 times daily per cardiology patient is okay to have bronchoscopy done as outpatient, I have discussed with pulmonology who stated that bronchoscopy will be done as outpatient, today patient's heart rate is stable, mostly less than 100, I have discussed with cardiology on-call today Dr. Diego and he is stated that patient is okay to discharge home as long as heart rate is less than 130, patient is hemodynamically stable she is alert oriented follows commands there is no signs of active bleeding her chronic anemia likely related to chronic disease, patient is being followed by oncology team for possible metastatic disease and she is getting PET/CT as outpatient, iron levels showed mixed picture chronic disease/iron deficiency, patient is started on oral iron supplementation, patient has expressed understanding of her discharge plan and agree with it request have been answered  Patient's thyroid supplement was decreased due to low TSH having history of hypothyroidism with concern for possible thyroid cancer.    Therefore, Natalie Anaya is discharged in fair and stable condition to home with close outpatient follow-up.        Discharge Date  7/9/2022    FOLLOW UP ITEMS POST DISCHARGE  Primary care physician  Cardiology  Pulmonology    DISCHARGE DIAGNOSES  Principal Problem (Resolved):     Anemia requiring transfusions POA: Yes  Active Problems:    HTN (hypertension), benign POA: Yes      Overview: Echocardiogram (2/2020):      Compared to the images of the prior study done 7/27/17 -  there has been       no significant change.       Normal left ventricular systolic function, LVEF 70%. Grade II DD. The       right ventricle was normal in size and function. Mild MR. Moderate TR.       Estimated RVSP 65 mmHg.            Current regimen includes furosemide 40 mg daily and carvedilol 6.25 mg       twice daily.  She has had some blood pressure readings that have dipped as       low as 100/60.  There is no clear indication for her to be on carvedilol       and thus I think would be reasonable for her to stop this.  Due to her       pulmonary hypertension it would be appropriate to continue on the       furosemide.      Hypothyroidism POA: Yes      Overview:        Ref. Range 7/16/2021 08:21       TSH Latest Ref Range: 0.380 - 5.330 uIU/mL 0.210 (L)       Free T-4 Latest Ref Range: 0.93 - 1.70 ng/dL 1.52             She is taking levothyroxine 100 mcg daily.  No signs or symptoms of       overtreatment or under treatment at this time.  She has been stable on       this dose for many years.    Iron deficiency anemia POA: Yes      Overview: She has had work-up in the past including upper and lower endoscopy with       [facility] (Dr. Stanton) in April 2016.  Biopsy demonstrated mild chronic       inflammation and reactive epithelial cells in the gastric antrum with a       benign hyperplastic polyp and negative H. Pylori.  Biopsy also negative       for celiac sprue.  Colonoscopy was benign with negative pathology on       ascending colon polyp. Patient did not do capsule endoscopy at that time.             It appears that her hemoglobin levels improved with time.  She has been on       and off iron supplements since that time.  She is currently taking an iron       supplement 3 times per week.  She denies  any constipation.            Current regimen: Observation off iron      Previous regimen: Oral iron 3 times per week    Gastroesophageal reflux disease without esophagitis POA: Yes      Overview: She reports past history of cough and during evaluation she was found to       have a hiatal hernia contributing. She was placed on PPI therapy with near       resolution of cough. Of note she is on oral bisphosphonate and potassium       which could be contributing to cough/throat clearing from pill       esophagitis. No relationship of cough to supine position and no coughing       or choking while eating.    Tachycardia POA: Yes    Pulmonary hypertension (HCC) POA: Yes    QT prolongation POA: Yes      FOLLOW UP  Future Appointments   Date Time Provider Department Center   7/13/2022  2:00 PM [address] 1 [id number] 75 [facility]   7/14/2022 11:30 AM CARLIE Duran ONCRMO None   7/25/2022  3:00 PM [facility] PHARMACIST BETSEY Nicholson   7/25/2022  3:40 PM Leticia Henson M.D. PULMSM None     Kiley Manrique D.O.  [address] [facility] [address]  [place] [address] [phone]    Follow up in 1 week(s)      Leticia Henson M.D.  [address] [facility] [address]  [place] [address] [phone]    Follow up in 1 week(s)        MEDICATIONS ON DISCHARGE     Medication List      START taking these medications      Instructions   ferrous gluconate 324 (38 Fe) MG Tabs  Commonly known as: FERGON   Take 1 Tablet by mouth 2 times a day with meals for 30 days.  Dose: 324 mg     magnesium oxide 400 (240 Mg) MG Tabs   Take 1 Tablet by mouth 2 times a day for 10 days.  Dose: 400 mg     metoprolol tartrate 25 MG Tabs  Commonly known as: LOPRESSOR   Take 1 Tablet by mouth 3 times a day for 30 days.  Dose: 25 mg        CHANGE how you take these medications      Instructions   gabapentin 100 MG Caps  What changed:   · how much to take  · when to take this  · additional instructions  Commonly known as: NEURONTIN   Take 2 Capsules by mouth 3  times a day.  Dose: 200 mg     levothyroxine 88 MCG Tabs  Start taking on: July 10, 2022  What changed:   · medication strength  · how much to take  · when to take this  Commonly known as: SYNTHROID   Take 1 Tablet by mouth every morning on an empty stomach.  Dose: 88 mcg        CONTINUE taking these medications      Instructions   Acetaminophen 500 MG Caps   Take 1,000 mg by mouth 2 times a day.  Dose: 1,000 mg     alendronate 70 MG Tabs  Commonly known as: FOSAMAX   Doctor's comments: **Patient requests 90 days supply**  Take 1 Tablet by mouth every 7 days.  Dose: 70 mg     amoxicillin 500 MG Caps  Commonly known as: AMOXIL   Take 500 mg by mouth. Dental work  Dose: 500 mg     Calcium 500-100 MG-UNIT Chew   Chew. Takes 2 a day     Home Care Oxygen   Inhale 2 L/min by mouth every bedtime.  Dose: 2 L/min     Melatonin 10 MG Tabs   Take 15 mg by mouth every bedtime.  Dose: 15 mg     Move Free Joint Health Advance Tabs   Take 1 Tab by mouth every day.  Dose: 1 Tablet     omeprazole 20 MG delayed-release capsule  Commonly known as: PRILOSEC   Take 1 Capsule by mouth every day.  Dose: 20 mg     potassium chloride SA 20 MEQ Tbcr  Commonly known as: Kdur   Take 1 Tablet by mouth every day.  Dose: 20 mEq     primidone 250 MG Tabs  Commonly known as: MYSOLINE   Take 1 Tablet by mouth 3 times a day.  Dose: 250 mg     PROBIOTIC DAILY PO   Take 1 Cap by mouth every day.  Dose: 1 Capsule     spironolactone 25 MG Tabs  Commonly known as: ALDACTONE   Take 1 Tablet by mouth every day.  Dose: 25 mg     torsemide 20 MG Tabs  Commonly known as: DEMADEX   Doctor's comments: Please call to schedule follow up appointment for further refills. Please call [phone]. Thank you.  TAKE 2 TABLETS BY MOUTH EVERY DAY  Dose: 40 mg     Vitamin D-3 25 MCG (1000 UT) Caps   Take 1,000 Units by mouth every day.  Dose: 1,000 Units     Womens Multi Vitamin & Mineral Tabs   Take 1 Tab by mouth every day.  Dose: 1 Tablet        STOP taking these  medications    apixaban 5mg Tabs  Commonly known as: Eliquis            Allergies  Allergies   Allergen Reactions   • Tape Rash     Adhesive band aids cause a rash  Paper tape ok       DIET  Healthy diet    ACTIVITY  As tolerated.  Weight bearing as tolerated    CONSULTATIONS  Cardiology    PROCEDURES  None    LABORATORY  Lab Results   Component Value Date    SODIUM 140 07/08/2022    POTASSIUM 3.3 (L) 07/08/2022    CHLORIDE 97 07/08/2022    CO2 33 07/08/2022    GLUCOSE 110 (H) 07/08/2022    BUN 17 07/08/2022    CREATININE 0.43 (L) 07/08/2022        Lab Results   Component Value Date    WBC 5.6 07/08/2022    HEMOGLOBIN 8.8 (L) 07/09/2022    HEMATOCRIT 29.4 (L) 07/09/2022    PLATELETCT 442 07/08/2022        Total time of the discharge process exceeds 40 minutes.    Potassium was supplemented before discharge.

## 2022-07-11 ENCOUNTER — PATIENT OUTREACH (OUTPATIENT)
Dept: MEDICAL GROUP | Facility: PHYSICIAN GROUP | Age: 82
End: 2022-07-11
Payer: MEDICARE

## 2022-07-11 ENCOUNTER — PATIENT OUTREACH (OUTPATIENT)
Dept: HEALTH INFORMATION MANAGEMENT | Facility: OTHER | Age: 82
End: 2022-07-11
Payer: MEDICARE

## 2022-07-11 ENCOUNTER — DOCUMENTATION (OUTPATIENT)
Dept: VASCULAR LAB | Facility: MEDICAL CENTER | Age: 82
End: 2022-07-11
Payer: MEDICARE

## 2022-07-11 DIAGNOSIS — D50.8 IRON DEFICIENCY ANEMIA SECONDARY TO INADEQUATE DIETARY IRON INTAKE: ICD-10-CM

## 2022-07-11 DIAGNOSIS — Z09 HOSPITAL DISCHARGE FOLLOW-UP: ICD-10-CM

## 2022-07-11 SDOH — ECONOMIC STABILITY: FOOD INSECURITY: WITHIN THE PAST 12 MONTHS, YOU WORRIED THAT YOUR FOOD WOULD RUN OUT BEFORE YOU GOT MONEY TO BUY MORE.: NEVER TRUE

## 2022-07-11 SDOH — ECONOMIC STABILITY: FOOD INSECURITY: WITHIN THE PAST 12 MONTHS, THE FOOD YOU BOUGHT JUST DIDN'T LAST AND YOU DIDN'T HAVE MONEY TO GET MORE.: NEVER TRUE

## 2022-07-11 SDOH — ECONOMIC STABILITY: TRANSPORTATION INSECURITY
IN THE PAST 12 MONTHS, HAS THE LACK OF TRANSPORTATION KEPT YOU FROM MEDICAL APPOINTMENTS OR FROM GETTING MEDICATIONS?: NO

## 2022-07-11 SDOH — ECONOMIC STABILITY: TRANSPORTATION INSECURITY
IN THE PAST 12 MONTHS, HAS LACK OF TRANSPORTATION KEPT YOU FROM MEETINGS, WORK, OR FROM GETTING THINGS NEEDED FOR DAILY LIVING?: NO

## 2022-07-11 SDOH — ECONOMIC STABILITY: INCOME INSECURITY: HOW HARD IS IT FOR YOU TO PAY FOR THE VERY BASICS LIKE FOOD, HOUSING, MEDICAL CARE, AND HEATING?: NOT HARD AT ALL

## 2022-07-11 NOTE — PROGRESS NOTES
7/11/22 10:00 Nurse CM outreach call for follow-up on recent hospitalization. Nurse reviewed discharge instructions with patient. Assisted with scheduling hospital follow-up appt.

## 2022-07-11 NOTE — PROGRESS NOTES
7/11/2022- CLINTON Oliver contacted pt via TC post d/c to introduce [facility] services. Completed SDOH screening and outpatient assessment. Pt states she was just contacted by [facility] nurse who assisted with scheduling PCP follow up for 7/15. Pt mentions good support from friends and also has [facility] home care senior services coming in during the week. Pt is confident in ability to manage care post d/c. No issues keeping appointments or financial barriers to care. Completed AVS review/ medication/ questions. Pt denies need for resources such as food, transportation or housing. [facility] contact info left with pt. Encouraged pt to contact if needed. Pt declined additional 30 day outreach call..     Community Health Worker Intake  • Social determinates of health intake completed.   • Identified barriers to none.  • Contact information provided to Natalie Anaya. Yes   • Has PCP appointment scheduled for 7/15/22  • Scheduled Food Delivery/Home Visit/Outpatient Visit: No  • Accepted/Declined Med's-To-Beds. No  • Inpatient/Outpatient assessment completed. Outpatient   • Did the patient receive medications post discharge: Yes    Plan: Discharge pt from [facility] services as all needs met.

## 2022-07-11 NOTE — PROGRESS NOTES
[facility]      Contacted the patient to follow up with her and see if she was able to restart back on Eliquis.   Patient reports she was unable to have the bronchoscopy procedure and was hospitalized for PAF and anemia.   She was on the other line with another provider and requested we call back at a later time.       Natividad GranadosD

## 2022-07-12 ENCOUNTER — TELEPHONE (OUTPATIENT)
Dept: SLEEP MEDICINE | Facility: MEDICAL CENTER | Age: 82
End: 2022-07-12
Payer: MEDICARE

## 2022-07-12 DIAGNOSIS — I27.82 OTHER CHRONIC PULMONARY EMBOLISM WITHOUT ACUTE COR PULMONALE (HCC): ICD-10-CM

## 2022-07-12 NOTE — TELEPHONE ENCOUNTER
Dr. Henson     Please reach out to the patient, she had the following questions.     Will there be lab work ordered prior to this ION?  Will she need to keep/ cancel or reschedule the appointment with you on 07/25?  She also wanted it known she has been off her Eloquis for 2-3 weeks now. Should she stay off or start taking it again? Any advice on this issue.       Thank you   Gayathri

## 2022-07-13 ENCOUNTER — HOSPITAL ENCOUNTER (OUTPATIENT)
Dept: RADIOLOGY | Facility: MEDICAL CENTER | Age: 82
End: 2022-07-13
Attending: NURSE PRACTITIONER
Payer: MEDICARE

## 2022-07-13 ENCOUNTER — HOSPITAL ENCOUNTER (OUTPATIENT)
Facility: MEDICAL CENTER | Age: 82
End: 2022-07-13
Attending: INTERNAL MEDICINE | Admitting: INTERNAL MEDICINE
Payer: MEDICARE

## 2022-07-13 ENCOUNTER — TELEPHONE (OUTPATIENT)
Dept: MEDICAL GROUP | Facility: PHYSICIAN GROUP | Age: 82
End: 2022-07-13
Payer: MEDICARE

## 2022-07-13 DIAGNOSIS — E04.1 THYROID NODULE: ICD-10-CM

## 2022-07-13 DIAGNOSIS — R91.8 LUNG NODULES: ICD-10-CM

## 2022-07-13 PROCEDURE — A9552 F18 FDG: HCPCS

## 2022-07-13 NOTE — TELEPHONE ENCOUNTER
1. Caller Name: Natalie Anaya                          Call Back Number: [phone] (home)         How would the patient prefer to be contacted with a response: Phone call OK to leave a detailed message    changed appt  Berta sanches sick   Left message

## 2022-07-13 NOTE — PROGRESS NOTES
Ultrasound LE ordered as patient is off eliquis and too high risk to restart.  CBC ordered for 7/25 prior to bronchoscopy.  Patient has been cleared for bronchoscopy by cardiology.  I spoke with Natalie to update her on these changes.  We are working to schedule bronchoscopy for 7/26.     Leticia Henson MD RD  Pulmonary and Critical Care    Available on Voalte

## 2022-07-14 ENCOUNTER — HOSPITAL ENCOUNTER (OUTPATIENT)
Dept: HEMATOLOGY ONCOLOGY | Facility: MEDICAL CENTER | Age: 82
End: 2022-07-14
Attending: NURSE PRACTITIONER
Payer: MEDICARE

## 2022-07-14 ENCOUNTER — ANTICOAGULATION MONITORING (OUTPATIENT)
Dept: MEDICAL GROUP | Facility: MEDICAL CENTER | Age: 82
End: 2022-07-14
Payer: MEDICARE

## 2022-07-14 VITALS
OXYGEN SATURATION: 95 % | DIASTOLIC BLOOD PRESSURE: 60 MMHG | HEIGHT: 64 IN | WEIGHT: 190.81 LBS | HEART RATE: 123 BPM | TEMPERATURE: 99.3 F | BODY MASS INDEX: 32.58 KG/M2 | SYSTOLIC BLOOD PRESSURE: 118 MMHG

## 2022-07-14 DIAGNOSIS — Z79.01 LONG TERM (CURRENT) USE OF ANTICOAGULANTS: ICD-10-CM

## 2022-07-14 DIAGNOSIS — R91.8 LUNG NODULES: ICD-10-CM

## 2022-07-14 DIAGNOSIS — I26.99 RECURRENT PULMONARY EMBOLISM (HCC): ICD-10-CM

## 2022-07-14 DIAGNOSIS — M79.89 MASS OF SOFT TISSUE OF HIP: ICD-10-CM

## 2022-07-14 DIAGNOSIS — D68.69 SECONDARY HYPERCOAGULABLE STATE (HCC): ICD-10-CM

## 2022-07-14 PROCEDURE — 99212 OFFICE O/P EST SF 10 MIN: CPT | Performed by: NURSE PRACTITIONER

## 2022-07-14 PROCEDURE — 99214 OFFICE O/P EST MOD 30 MIN: CPT | Performed by: NURSE PRACTITIONER

## 2022-07-14 ASSESSMENT — ENCOUNTER SYMPTOMS
FEVER: 0
SHORTNESS OF BREATH: 1
PALPITATIONS: 0
WEIGHT LOSS: 1
COUGH: 0
CHILLS: 0
DIZZINESS: 0

## 2022-07-14 ASSESSMENT — FIBROSIS 4 INDEX: FIB4 SCORE: 0.77

## 2022-07-14 NOTE — ADDENDUM NOTE
Encounter addended by: Briseida Yu, Med Ass't on: 7/14/2022 4:36 PM   Actions taken: Charge Capture section accepted

## 2022-07-14 NOTE — PROGRESS NOTES
Pt called and left VM that she is still off her Eliquis. She has an appt with oncology.     Moira Sheridan, DarwinD

## 2022-07-14 NOTE — PROGRESS NOTES
Subjective     Natalie Anaya is a 81 y.o. adult who presents with Results (IC EST/ Biopsy & PET Results)          HPI    Patient seen today in follow-up for biopsy and PET/CT results.  She presents accompanied by her caregiver for today's visit.    Patient initially seen on 6/29/2022, referred by her PCP.  Patient initially was seen by her PCP for elevated heart rate.  She presented to the emergency department and as part of work-up she had a chest x-ray completed showing scattered bilateral pulmonary nodules.  She then had a CT chest, abdomen and pelvis completed on 6/23/2022 which did show multiple bilateral pulmonary nodules and masses concerning for metastatic disease.  She did have an interval enlargement of a right thyroid nodule.  There was a low-attenuation collection/mass along the posterior right lobe of the liver, and when compared to previous scans in October 2016 this was stable therefore doubtful to have any clinical significance at this time.  I did confirm with reading radiologist previously that the thyroid nodule now measures 3.8 cm and previously back in 2016 and had measured 2.5 cm.  Patient also noted to have hemoglobin of 9.1 during her ER visit.  This is decreased from 15 6 months ago.  She had a steady decline in her hemoglobin with iron studies showing iron deficiency anemia.  This is consistent with blood loss and patient was asymptomatic at time of initial visit.    Patient had been seen by pulmonary prior to initial visit with me.  Pulmonary had requested that patient move forward with bronchoscopy which was scheduled for 7/7/2022.  Unfortunately at time of biopsy she did have tachycardia as well as a hemoglobin of 6.9.  Biopsy was aborted and patient was admitted to the hospital for blood transfusion.  Patient did complete PET/CT on 7/13/2022 and is here to review those results.    Clinically patient stated she is feeling stable.  She denies any significant changes to her  respiratory status.  She is on continuous oxygen and denies any severe shortness of breath or coughing.  Patient does complain of some pain in that left hip which she equates to sciatic pain.  About a month ago she was seen by chiropractor and stated that he had done some adjustments and that has been feeling much better.  She noted pain was slightly coming back recently.  In preparation for her biopsy patient has been holding her Eliquis.  Due to high concern for possible clot, pulmonologist had requested patient complete an ultrasound of the lower extremities which is scheduled for next week.    PET/CT completed on 7/13/2022 shows a 9 cm hypermetabolic lytic lesion in the left ilium with a large soft tissue component.  Reading radiologist stated that this may represent the primary malignancy.  She does have multiple bilateral pulmonary metastasis again seen.  There is no other FDG avid lesions.  There is no hypermetabolic activity in the right thyroid nodule or renal lesions along the right hepatic lobe.  There is some intramuscular hypermetabolism in the right back, right upper arm which is likely physiologic.  I did personally review the imaging report images in detail, and I reviewed with the patient today.    Allergies   Allergen Reactions   • Tape Rash     Adhesive band aids cause a rash  Paper tape ok     Current Outpatient Medications on File Prior to Encounter   Medication Sig Dispense Refill   • levothyroxine (SYNTHROID) 88 MCG Tab Take 1 Tablet by mouth every morning on an empty stomach. 30 Tablet 0   • ferrous gluconate (FERGON) 324 (38 Fe) MG Tab Take 1 Tablet by mouth 2 times a day with meals for 30 days. 60 Tablet 0   • magnesium oxide 400 (240 Mg) MG Tab Take 1 Tablet by mouth 2 times a day for 10 days. 20 Tablet 0   • metoprolol tartrate (LOPRESSOR) 25 MG Tab Take 1 Tablet by mouth 3 times a day for 30 days. 90 Tablet 0   • torsemide (DEMADEX) 20 MG Tab TAKE 2 TABLETS BY MOUTH EVERY  Tablet  3   • spironolactone (ALDACTONE) 25 MG Tab Take 1 Tablet by mouth every day. 100 Tablet 3   • potassium chloride SA (KDUR) 20 MEQ Tab CR Take 1 Tablet by mouth every day. 100 Tablet 3   • omeprazole (PRILOSEC) 20 MG delayed-release capsule Take 1 Capsule by mouth every day. 100 Capsule 3   • primidone (MYSOLINE) 250 MG Tab Take 1 Tablet by mouth 3 times a day. 300 Tablet 3   • gabapentin (NEURONTIN) 100 MG Cap Take 2 Capsules by mouth 3 times a day. (Patient taking differently: Take 100 mg by mouth every evening. Only taking one at night now since Friday) 300 Capsule 3   • alendronate (FOSAMAX) 70 MG Tab Take 1 Tablet by mouth every 7 days. 12 Tablet 3   • Acetaminophen 500 MG Cap Take 1,000 mg by mouth 2 times a day.     • Calcium 500-100 MG-UNIT Chew Tab Chew. Takes 2 a day     • amoxicillin (AMOXIL) 500 MG Cap Take 500 mg by mouth. Dental work     • Home Care Oxygen Inhale 2 L/min by mouth every bedtime.     • Melatonin 10 MG Tab Take 15 mg by mouth every bedtime.     • Probiotic Product (PROBIOTIC DAILY PO) Take 1 Cap by mouth every day.     • Glucos-Chond-Hyal Ac-Ca Fructo (MOVE FREE JOINT HEALTH ADVANCE) Tab Take 1 Tab by mouth every day.     • Multiple Vitamins-Minerals (WOMENS MULTI VITAMIN & MINERAL) Tab Take 1 Tab by mouth every day.     • Cholecalciferol (VITAMIN D-3) 1000 UNITS Cap Take 1,000 Units by mouth every day.       No current facility-administered medications on file prior to encounter.         Review of Systems   Constitutional: Positive for malaise/fatigue and weight loss. Negative for chills and fever.   Respiratory: Positive for shortness of breath (with exertion). Negative for cough.    Cardiovascular: Negative for chest pain and palpitations.   Musculoskeletal: Positive for joint pain (left hip tenderness).   Neurological: Negative for dizziness.              Objective     /60 (BP Location: Right arm, Patient Position: Sitting, BP Cuff Size: Adult)   Pulse (!) 123   Temp 37.4 °C  "(99.3 °F) (Temporal)   Ht 1.626 m (5' 4\")   Wt 86.5 kg (190 lb 12.9 oz)   LMP  (LMP Unknown)   SpO2 95%   BMI 32.75 kg/m²      Physical Exam  Vitals reviewed.   Constitutional:       General: Natalie Anaya is not in acute distress.     Appearance: Normal appearance. Natalie Anaya is not diaphoretic.   Cardiovascular:      Rate and Rhythm: Regular rhythm. Tachycardia present.      Heart sounds: Normal heart sounds.   Pulmonary:      Effort: Pulmonary effort is normal. No respiratory distress.      Breath sounds: No wheezing.      Comments: Diminished throughout  Musculoskeletal:         General: Tenderness present.      Comments: Ambulates with a walker   Neurological:      Mental Status: Natalie Anaya is alert and oriented to person, place, and time.   Psychiatric:         Mood and Affect: Mood normal.         Behavior: Behavior normal.              [id number] BASE TO MID-THIGH    Result Date: 7/14/2022 7/13/2022 1:45 PM HISTORY/REASON FOR EXAM:  Multiple pulmonary nodules concerning for metastatic disease, further evaluation TECHNIQUE/EXAM DESCRIPTION AND NUMBER OF VIEWS: PET body imaging. Initially, 13.95 mCi F-18 FDG was administered intravenously under standardized conditions. Approximately 45 minutes after FDG administration, the patient was placed in the supine position on the PET CT table. Blood glucose level was 91 mg/dL. Low dose spiral CT imaging was performed from the skull base to the mid thighs. PET imaging was then performed from the skull base to the mid thighs. CT images, PET images, and PET/CT fused images were reviewed on a PACS 3D workstation. The limited non-contrast CT data are used primarily for attenuation correction and anatomic correlation.  Evaluation of solid organs and bowel are especially limited utilizing this technique. A low dose CT was obtained of the same area without IV contrast for attenuation correction and coregistration, not for a diagnostic " scan. COMPARISON: 6/23/2022 CT. FINDINGS: VISUALIZED BRAIN: Normal metabolic activity. HEAD AND NECK: Large right thyroid nodule measures 4.3 cm and is not hypermetabolic. No FDG avid lesions in the neck. CHEST: Redemonstration of bilateral pulmonary metastases. No interval change since recent CT study. They have an SUV max of 14.69 in the right lung. There is no hypermetabolic hilar, mediastinal, or axillary lymphadenopathy. Atherosclerosis. Coronary calcifications. Cardiomegaly. Enlarged pulmonary artery measures 4 cm, suggesting underlying pulmonary hypertension. ABDOMEN AND PELVIS: There is normal, uniform metabolic activity in the liver, spleen, adrenal glands, kidneys. Hypodense lesions along the posterior right hepatic lobe but not hypermetabolic, and are likely of no clinical significance. There is no hypermetabolic mesenteric, retroperitoneal, iliac, or inguinal lymphadenopathy. Nonspecific physiologic activity in the colon and intestine is noted. Moderate to large hiatal hernia, containing at least 50% of the stomach. Cholecystectomy. Adenomatoid hyperplasia of the left adrenal gland. Colonic diverticulosis. VISUALIZED MUSCULOSKELETAL SYSTEM: Hypermetabolic lytic lesion in the left ilium with a large soft tissue component measures approximately 9 x 5 cm. The lytic osseous component measures approximately 3 cm. The lesion is markedly hypermetabolic, with an SUV max of 31. Sequela of bilateral hip instrumentation. Mild, somewhat linear hypermetabolism in the right latissimus dorsi, likely physiologic. Similar mild metabolism in the right triceps musculature.     1.  A 9 cm hypermetabolic lytic lesion in the left ilium, with a large soft tissue component. May representing the primary malignancy. 2.  Multiple bilateral pulmonary metastases. 3.  No other FDG avid lesions. 4.  No hypermetabolism in the right thyroid nodule or in the lesions along the right hepatic lobe. 5.  Intramuscular hypermetabolism in the  right back in the right upper arm, likely physiologic.           Assessment & Plan     1. Lung nodules  IR-BIOPSY-GENERIC   2. Mass of soft tissue of hip  IR-BIOPSY-GENERIC         1.  Patient with a large 9 cm hypermetabolic lytic lesion in the left ilium with a soft tissue component concerning for malignancy.  Multiple bilateral pulmonary metastasis again noted.  Unfortunately she was unable to complete the bronchoscopy scheduled for last week and it is currently rescheduled for 7/26/2022.  However, with the large lytic lesion in the left ilium with a soft tissue component I am going to proceed with a CT-guided biopsy of this large mass.  I did speak with interventional radiologist who has agreed to proceed with biopsy.  I discussed this with the patient as well.  I am hopeful that we can get this scheduled ASAP for patient to confirm diagnosis and understand exactly what this is.  I will update pulmonologist to let her know if we are able to proceed with biopsy of the soft tissue mass or not.  At this time we will request to continue to hold the bronchoscopy appointment and can cancel if soft tissue mass biopsy is completed.    Patient to continue to hold Eliquis at this time until biopsy is completed.    Ultrasound is scheduled for next week per pulmonologist due to patient's high risk for blood clots as she has been off Eliquis for quite some time now.  She is not experiencing any symptoms, there is no swelling, no pain and no redness at this time.  Patient is to continue to monitor this.  She will present to the ultrasound at request of pulmonologist, unless biopsy is able to be completed on that same day, then we can reschedule the ultrasound.    I will have patient follow-up with me in the clinic to review the results once completed.    Patient recently sold her house and plans to move to [place] in mid August.  There is question as to whether she will actually moved to [place] depending upon her diagnosis  and prognosis.  She may end up moving to [place] to live with other family members if needed.  However she will be out of her house completely by mid August and hoping to have answers ASAP for her.      Please note that this dictation was created using voice recognition software. I have made every reasonable attempt to correct obvious errors, but I expect that there are errors of grammar and possibly content that I did not discover before finalizing the note.

## 2022-07-18 ENCOUNTER — HOSPITAL ENCOUNTER (OUTPATIENT)
Dept: LAB | Facility: MEDICAL CENTER | Age: 82
End: 2022-07-18
Attending: NURSE PRACTITIONER
Payer: MEDICARE

## 2022-07-18 ENCOUNTER — TELEPHONE (OUTPATIENT)
Dept: SLEEP MEDICINE | Facility: MEDICAL CENTER | Age: 82
End: 2022-07-18

## 2022-07-18 ENCOUNTER — PRE-ADMISSION TESTING (OUTPATIENT)
Dept: ADMISSIONS | Facility: MEDICAL CENTER | Age: 82
End: 2022-07-18
Attending: INTERNAL MEDICINE
Payer: MEDICARE

## 2022-07-18 ENCOUNTER — TELEPHONE (OUTPATIENT)
Dept: HEMATOLOGY ONCOLOGY | Facility: MEDICAL CENTER | Age: 82
End: 2022-07-18

## 2022-07-18 DIAGNOSIS — E03.9 HYPOTHYROIDISM, UNSPECIFIED TYPE: ICD-10-CM

## 2022-07-18 DIAGNOSIS — D64.9 ANEMIA, UNSPECIFIED TYPE: ICD-10-CM

## 2022-07-18 DIAGNOSIS — R91.8 PULMONARY NODULES: ICD-10-CM

## 2022-07-18 LAB
BASOPHILS # BLD AUTO: 1.7 % (ref 0–1.8)
BASOPHILS # BLD: 0.13 K/UL (ref 0–0.12)
EOSINOPHIL # BLD AUTO: 0 K/UL (ref 0–0.51)
EOSINOPHIL NFR BLD: 0 % (ref 0–6.9)
ERYTHROCYTE [DISTWIDTH] IN BLOOD BY AUTOMATED COUNT: 59.5 FL (ref 35.9–50)
HCT VFR BLD AUTO: 32.9 % (ref 37–47)
HGB BLD-MCNC: 9.5 G/DL (ref 12–16)
LYMPHOCYTES # BLD AUTO: 0.82 K/UL (ref 1–4.8)
LYMPHOCYTES NFR BLD: 10.4 % (ref 22–41)
MANUAL DIFF BLD: NORMAL
MCH RBC QN AUTO: 25.2 PG (ref 27–33)
MCHC RBC AUTO-ENTMCNC: 28.9 G/DL (ref 33.6–35)
MCV RBC AUTO: 87.3 FL (ref 81.4–97.8)
MONOCYTES # BLD AUTO: 0.35 K/UL (ref 0–0.85)
MONOCYTES NFR BLD AUTO: 4.4 % (ref 0–13.4)
MORPHOLOGY BLD-IMP: NORMAL
NEUTROPHILS # BLD AUTO: 6.6 K/UL (ref 2–7.15)
NEUTROPHILS NFR BLD: 83.5 % (ref 44–72)
NRBC # BLD AUTO: 0 K/UL
NRBC BLD-RTO: 0 /100 WBC
PLATELET # BLD AUTO: 496 K/UL (ref 164–446)
PLATELET BLD QL SMEAR: NORMAL
PMV BLD AUTO: 9.1 FL (ref 9–12.9)
RBC # BLD AUTO: 3.77 M/UL (ref 4.2–5.4)
RBC BLD AUTO: NORMAL
WBC # BLD AUTO: 7.9 K/UL (ref 4.8–10.8)

## 2022-07-18 PROCEDURE — 85007 BL SMEAR W/DIFF WBC COUNT: CPT

## 2022-07-18 PROCEDURE — 85025 COMPLETE CBC W/AUTO DIFF WBC: CPT

## 2022-07-18 PROCEDURE — 36415 COLL VENOUS BLD VENIPUNCTURE: CPT

## 2022-07-18 RX ORDER — LEVOTHYROXINE SODIUM 88 UG/1
88 TABLET ORAL
Qty: 90 TABLET | Refills: 0 | Status: SHIPPED | OUTPATIENT
Start: 2022-07-18 | End: 2022-08-01 | Stop reason: SDUPTHER

## 2022-07-18 NOTE — TELEPHONE ENCOUNTER
BMB is scheduled for Thursday, 7/21/22.  Patient is concerned that no blood work has been ordered prior.  (Last time she had a BMB scheduled, she had them test her hemoglobin, and it was low, so they cancelled it - at the time of the procedure)  She does not want this to happen again, and is asking that labs be ordered and completed prior to the procedure.

## 2022-07-18 NOTE — PREPROCEDURE INSTRUCTIONS
80 y/o female with extensive PMH.  Most recently admitted for tachycardia and anemia on 6/20/  CT scan revealed multiple pulmonary nodules and liver mass, possibly mets.  Source of bleeding has not been identified. Wears continuous 02.  METS,4.  Positive AILYN but cannot tolerate CPAP. Last H&H 7/9 = 8.8/29.4.  Last K+=3.3 on 7/8. Pt’s last procedure was cancelled DOS due to abnormal labs and she is concerned that this case will also be cancelled DOS if her labs have deteriorated.  Although it has been less than 28 days since her labs were done, could we repeat the CBC, BMP prior to DOS to avoid another cancellation?  Please advise, Leandra     Per anesthesia protocol instructed to take the following medications DOS: Tylenol, gabapentin, lopressor, primidone.

## 2022-07-18 NOTE — TELEPHONE ENCOUNTER
RE: labs    Received message from Preadmission nurse, Leandra.  Dr Bowen it's a good idea for pt to repeat labs.(Please refer to pre-admission testing note)    Arlen Calzada has ordered a CBC w/diff. Pt had it drawn today.  The nurse is going to check on results tomorrow AM. She will follow up with me tomorrow regarding this.

## 2022-07-19 ENCOUNTER — HOSPITAL ENCOUNTER (OUTPATIENT)
Dept: RADIOLOGY | Facility: MEDICAL CENTER | Age: 82
End: 2022-07-19
Attending: INTERNAL MEDICINE
Payer: MEDICARE

## 2022-07-19 ENCOUNTER — TELEPHONE (OUTPATIENT)
Dept: HEMATOLOGY ONCOLOGY | Facility: MEDICAL CENTER | Age: 82
End: 2022-07-19
Payer: MEDICARE

## 2022-07-19 ENCOUNTER — PRE-ADMISSION TESTING (OUTPATIENT)
Dept: ADMISSIONS | Facility: MEDICAL CENTER | Age: 82
End: 2022-07-19
Attending: INTERNAL MEDICINE
Payer: MEDICARE

## 2022-07-19 DIAGNOSIS — Z01.812 PRE-OPERATIVE LABORATORY EXAMINATION: ICD-10-CM

## 2022-07-19 DIAGNOSIS — I27.82 OTHER CHRONIC PULMONARY EMBOLISM WITHOUT ACUTE COR PULMONALE (HCC): ICD-10-CM

## 2022-07-19 LAB
ALBUMIN SERPL BCP-MCNC: 3.3 G/DL (ref 3.2–4.9)
ALBUMIN/GLOB SERPL: 0.9 G/DL
ALP SERPL-CCNC: 100 U/L (ref 30–99)
ALT SERPL-CCNC: 13 U/L (ref 2–50)
ANION GAP SERPL CALC-SCNC: 11 MMOL/L (ref 7–16)
AST SERPL-CCNC: 18 U/L (ref 12–45)
BILIRUB SERPL-MCNC: 0.2 MG/DL (ref 0.1–1.5)
BUN SERPL-MCNC: 15 MG/DL (ref 8–22)
CALCIUM SERPL-MCNC: 8.9 MG/DL (ref 8.4–10.2)
CHLORIDE SERPL-SCNC: 96 MMOL/L (ref 96–112)
CO2 SERPL-SCNC: 34 MMOL/L (ref 20–33)
CREAT SERPL-MCNC: 0.49 MG/DL (ref 0.5–1.4)
GFR SERPLBLD CREATININE-BSD FMLA CKD-EPI: 93 ML/MIN/1.73 M 2
GLOBULIN SER CALC-MCNC: 3.6 G/DL (ref 1.9–3.5)
GLUCOSE SERPL-MCNC: 69 MG/DL (ref 65–99)
POTASSIUM SERPL-SCNC: 3.9 MMOL/L (ref 3.6–5.5)
PROT SERPL-MCNC: 6.9 G/DL (ref 6–8.2)
SODIUM SERPL-SCNC: 141 MMOL/L (ref 135–145)

## 2022-07-19 PROCEDURE — 80053 COMPREHEN METABOLIC PANEL: CPT

## 2022-07-19 PROCEDURE — 93970 EXTREMITY STUDY: CPT

## 2022-07-19 NOTE — TELEPHONE ENCOUNTER
Spoke to patient.  Her hemoglobin is 9.5.  She was very pleased with the results.  Patient noted that she had had some abnormal output from her bowels which may be the location of where she is bleeding.  Otherwise no other concerns at this time.  Patient has been scheduled for follow-up visit with me on Monday to review biopsy results.

## 2022-07-20 ENCOUNTER — APPOINTMENT (OUTPATIENT)
Dept: ADMISSIONS | Facility: MEDICAL CENTER | Age: 82
End: 2022-07-20
Attending: INTERNAL MEDICINE
Payer: MEDICARE

## 2022-07-20 NOTE — TELEPHONE ENCOUNTER
July 20, 2022    Me       10:38 AM  Note   Arlen DINH and Dr Bowen has placed lab orders. Pt has completed those labs.      Please advise.

## 2022-07-21 ENCOUNTER — TELEPHONE (OUTPATIENT)
Dept: SLEEP MEDICINE | Facility: MEDICAL CENTER | Age: 82
End: 2022-07-21

## 2022-07-21 ENCOUNTER — HOSPITAL ENCOUNTER (OUTPATIENT)
Facility: MEDICAL CENTER | Age: 82
End: 2022-07-21
Attending: INTERNAL MEDICINE | Admitting: INTERNAL MEDICINE
Payer: MEDICARE

## 2022-07-21 ENCOUNTER — APPOINTMENT (OUTPATIENT)
Dept: RADIOLOGY | Facility: MEDICAL CENTER | Age: 82
End: 2022-07-21
Attending: NURSE PRACTITIONER
Payer: MEDICARE

## 2022-07-21 VITALS
TEMPERATURE: 97.5 F | RESPIRATION RATE: 22 BRPM | SYSTOLIC BLOOD PRESSURE: 114 MMHG | WEIGHT: 194 LBS | HEART RATE: 112 BPM | DIASTOLIC BLOOD PRESSURE: 55 MMHG | BODY MASS INDEX: 34.38 KG/M2 | HEIGHT: 63 IN | OXYGEN SATURATION: 97 %

## 2022-07-21 DIAGNOSIS — M79.89 MASS OF SOFT TISSUE OF HIP: ICD-10-CM

## 2022-07-21 DIAGNOSIS — R91.8 LUNG NODULES: ICD-10-CM

## 2022-07-21 LAB
INR PPP: 1.07 (ref 0.87–1.13)
PATHOLOGY CONSULT NOTE: NORMAL
PROTHROMBIN TIME: 13.8 SEC (ref 12–14.6)

## 2022-07-21 PROCEDURE — 20225 BONE BIOPSY TROCAR/NDL DEEP: CPT

## 2022-07-21 PROCEDURE — 700102 HCHG RX REV CODE 250 W/ 637 OVERRIDE(OP): Performed by: RADIOLOGY

## 2022-07-21 PROCEDURE — 88342 IMHCHEM/IMCYTCHM 1ST ANTB: CPT

## 2022-07-21 PROCEDURE — 700105 HCHG RX REV CODE 258: Performed by: RADIOLOGY

## 2022-07-21 PROCEDURE — 160046 HCHG PACU - 1ST 60 MINS PHASE II

## 2022-07-21 PROCEDURE — 85610 PROTHROMBIN TIME: CPT

## 2022-07-21 PROCEDURE — 88307 TISSUE EXAM BY PATHOLOGIST: CPT

## 2022-07-21 PROCEDURE — 160002 HCHG RECOVERY MINUTES (STAT)

## 2022-07-21 PROCEDURE — 700111 HCHG RX REV CODE 636 W/ 250 OVERRIDE (IP)

## 2022-07-21 PROCEDURE — 700101 HCHG RX REV CODE 250

## 2022-07-21 PROCEDURE — A9270 NON-COVERED ITEM OR SERVICE: HCPCS | Performed by: RADIOLOGY

## 2022-07-21 PROCEDURE — 160035 HCHG PACU - 1ST 60 MINS PHASE I

## 2022-07-21 PROCEDURE — 88341 IMHCHEM/IMCYTCHM EA ADD ANTB: CPT | Mod: 91

## 2022-07-21 RX ORDER — LIDOCAINE HYDROCHLORIDE 10 MG/ML
INJECTION, SOLUTION INFILTRATION; PERINEURAL
Status: COMPLETED
Start: 2022-07-21 | End: 2022-07-21

## 2022-07-21 RX ORDER — SODIUM CHLORIDE 9 MG/ML
500 INJECTION, SOLUTION INTRAVENOUS
Status: ACTIVE | OUTPATIENT
Start: 2022-07-21 | End: 2022-07-21

## 2022-07-21 RX ORDER — ONDANSETRON 2 MG/ML
4 INJECTION INTRAMUSCULAR; INTRAVENOUS PRN
Status: ACTIVE | OUTPATIENT
Start: 2022-07-21 | End: 2022-07-21

## 2022-07-21 RX ORDER — MIDAZOLAM HYDROCHLORIDE 1 MG/ML
.5-2 INJECTION INTRAMUSCULAR; INTRAVENOUS PRN
Status: ACTIVE | OUTPATIENT
Start: 2022-07-21 | End: 2022-07-21

## 2022-07-21 RX ORDER — OXYCODONE HYDROCHLORIDE 5 MG/1
2.5 TABLET ORAL
Status: DISCONTINUED | OUTPATIENT
Start: 2022-07-21 | End: 2022-07-21 | Stop reason: HOSPADM

## 2022-07-21 RX ORDER — SODIUM CHLORIDE 9 MG/ML
1000 INJECTION, SOLUTION INTRAVENOUS
Status: COMPLETED | OUTPATIENT
Start: 2022-07-21 | End: 2022-07-21

## 2022-07-21 RX ORDER — HYDROMORPHONE HYDROCHLORIDE 1 MG/ML
0.25 INJECTION, SOLUTION INTRAMUSCULAR; INTRAVENOUS; SUBCUTANEOUS
Status: DISCONTINUED | OUTPATIENT
Start: 2022-07-21 | End: 2022-07-21 | Stop reason: HOSPADM

## 2022-07-21 RX ORDER — MIDAZOLAM HYDROCHLORIDE 1 MG/ML
INJECTION INTRAMUSCULAR; INTRAVENOUS
Status: COMPLETED
Start: 2022-07-21 | End: 2022-07-21

## 2022-07-21 RX ADMIN — MIDAZOLAM HYDROCHLORIDE 1 MG: 1 INJECTION, SOLUTION INTRAMUSCULAR; INTRAVENOUS at 13:57

## 2022-07-21 RX ADMIN — SODIUM CHLORIDE 1000 ML: 9 INJECTION, SOLUTION INTRAVENOUS at 12:34

## 2022-07-21 RX ADMIN — METOPROLOL TARTRATE 25 MG: 25 TABLET, FILM COATED ORAL at 15:45

## 2022-07-21 RX ADMIN — FENTANYL CITRATE 25 MCG: 50 INJECTION, SOLUTION INTRAMUSCULAR; INTRAVENOUS at 13:57

## 2022-07-21 RX ADMIN — MIDAZOLAM HYDROCHLORIDE 1 MG: 1 INJECTION INTRAMUSCULAR; INTRAVENOUS at 13:57

## 2022-07-21 RX ADMIN — LIDOCAINE HYDROCHLORIDE: 10 INJECTION, SOLUTION INFILTRATION; PERINEURAL at 14:05

## 2022-07-21 ASSESSMENT — PAIN DESCRIPTION - PAIN TYPE: TYPE: CHRONIC PAIN

## 2022-07-21 ASSESSMENT — FIBROSIS 4 INDEX: FIB4 SCORE: 0.84

## 2022-07-21 NOTE — TELEPHONE ENCOUNTER
From: [facility]  Message: Can Dr Henson comment on pt's pulmonary hypertension issues so that we can plan appropriately for a general anesthetic?(Per Dr Bowen's request)

## 2022-07-21 NOTE — OR SURGEON
Immediate Post- Operative Note        Findings: NEEDLE CONFIRMED IN MASS AT LATERAL ASPECT LEFT ILIAC BONE      Procedure(s): CT BX LEFT ILIAC MASS    18G CORES X 8. FORMALIN X6. RPMI X 2.       Estimated Blood Loss: Less than 1 ml      Complications: None            7/21/2022     2:12 PM     Walt Alvarado M.D.

## 2022-07-21 NOTE — OR NURSING
1435: Pt arrived from OR post [facility] mass biopsy. Pt is awake, alert, and easy to reorient. LL abdomen sight is CDI. Cardiac rhythm appears to be  and then slows down to SR 80's when pt is resting. Pt reports she is due to take her metoprolol; RN texted Dr. Alvarado to see if a dose can be given now and to update about HR in the 120's. Pt denies pain or nausea.     1500: Dr. Alvarado okayed metoprolol. Pt resting; sleeping intermittently. HR in 80's when resting.     1545: Pt awake and alert; tolerating orals.     1635: Awaiting phase II room. Pt is awake, alert, and oriented. Saturating 98% on 2 L. Heart rate still variable from .    1639: Report to [facility]. Pt to phase II.

## 2022-07-21 NOTE — DISCHARGE INSTRUCTIONS
If any questions arise, call your provider.  If your provider is not available, please feel free to call the Surgical Center at [phone].    MEDICATIONS: Resume taking daily medication.  Take prescribed pain medication with food.  If no medication is prescribed, you may take non-aspirin pain medication if needed.  PAIN MEDICATION CAN BE VERY CONSTIPATING.  Take a stool softener or laxative such as senokot, pericolace, or milk of magnesia if needed.    Last pain medication given at N/A    If any questions arise, call your provider.  If your provider is not available, please feel free to call the Surgical Center at [phone].    MEDICATIONS: Resume taking daily medication.  Take prescribed pain medication with food.  If no medication is prescribed, you may take non-aspirin pain medication if needed.  PAIN MEDICATION CAN BE VERY CONSTIPATING.  Take a stool softener or laxative such as senokot, pericolace, or milk of magnesia if needed.    Last pain medication given at     Needle Biopsy, Care After  These instructions tell you how to care for yourself after your procedure. Your doctor may also give you more specific instructions. Call your doctor if you have any problems or questions.  What can I expect after the procedure?  After the procedure, it is common to have:  Soreness.  Bruising.  Mild pain.  Follow these instructions at home:  Return to your normal activities as told by your doctor. Ask your doctor what activities are safe for you.  Take over-the-counter and prescription medicines only as told by your doctor.  Wash your hands with soap and water before you change your bandage (dressing). If you cannot use soap and water, use hand .  Follow instructions from your doctor about:  How to take care of your puncture site.  When and how to change your bandage.  When to remove your bandage.  May remove dressing in 24 hours and shower. Do not submerge in water such as bath tubs, hot tubs, swimming  pools.   Check your puncture site every day for signs of infection. Watch for:  Redness, swelling, or pain.  Fluid or blood.   Pus or a bad smell.  Warmth.  Do not take baths, swim, or use a hot tub until your doctor approves. Ask your doctor if you may take showers. You may only be allowed to take sponge baths.  Keep all follow-up visits as told by your doctor. This is important.  Contact a doctor if you have:  A fever.  Redness, swelling, or pain at the puncture site, and it lasts longer than a few days.  Fluid, blood, or pus coming from the puncture site.  Warmth coming from the puncture site.  Get help right away if:  You have a lot of bleeding from the puncture site.  Summary  After the procedure, it is common to have soreness, bruising, or mild pain at the puncture site.  Check your puncture site every day for signs of infection, such as redness, swelling, or pain.  Get help right away if you have severe bleeding from your puncture site.  This information is not intended to replace advice given to you by your health care provider. Make sure you discuss any questions you have with your health care provider.  Document Released: 11/30/2009 Document Revised: 12/31/2018 Document Reviewed: 12/31/2018  [employer] Patient Education © 2020 [employer]

## 2022-07-21 NOTE — PROGRESS NOTES
Pt presents to IR for left iliac mass bx. Pt prepped in [place] preop pt consented by Dr. Alvarado     1357 1mg versed 25mcg fentanyl given IVP     1400 pt oxygen saturation dropped to 80% non rebreather mask placed on patient. Pt coached to take deep breaths saturation returned to 97% Dr. Alvarado aware     1405 time out procedure start.    1410 8 core needle obtained from left iliac mass. Specimens labeled and sent to lab. Dressing placed pt tolerated well.     1414 report called to PACU RN. Pt transported with RN.

## 2022-07-25 ENCOUNTER — DOCUMENTATION (OUTPATIENT)
Dept: MEDICAL GROUP | Facility: MEDICAL CENTER | Age: 82
End: 2022-07-25

## 2022-07-25 ENCOUNTER — TELEPHONE (OUTPATIENT)
Dept: HEMATOLOGY ONCOLOGY | Facility: MEDICAL CENTER | Age: 82
End: 2022-07-25

## 2022-07-25 ENCOUNTER — APPOINTMENT (OUTPATIENT)
Dept: MEDICAL GROUP | Facility: MEDICAL CENTER | Age: 82
End: 2022-07-25
Payer: MEDICARE

## 2022-07-25 ENCOUNTER — HOSPITAL ENCOUNTER (OUTPATIENT)
Dept: LAB | Facility: MEDICAL CENTER | Age: 82
End: 2022-07-25
Attending: NURSE PRACTITIONER
Payer: MEDICARE

## 2022-07-25 ENCOUNTER — APPOINTMENT (OUTPATIENT)
Dept: SLEEP MEDICINE | Facility: MEDICAL CENTER | Age: 82
End: 2022-07-25
Payer: MEDICARE

## 2022-07-25 ENCOUNTER — HOSPITAL ENCOUNTER (OUTPATIENT)
Dept: HEMATOLOGY ONCOLOGY | Facility: MEDICAL CENTER | Age: 82
End: 2022-07-25
Attending: NURSE PRACTITIONER
Payer: MEDICARE

## 2022-07-25 VITALS
SYSTOLIC BLOOD PRESSURE: 119 MMHG | HEIGHT: 63 IN | HEART RATE: 81 BPM | DIASTOLIC BLOOD PRESSURE: 59 MMHG | TEMPERATURE: 99.5 F | BODY MASS INDEX: 35.16 KG/M2 | WEIGHT: 198.41 LBS | RESPIRATION RATE: 19 BRPM | OXYGEN SATURATION: 96 %

## 2022-07-25 DIAGNOSIS — D50.9 IRON DEFICIENCY ANEMIA, UNSPECIFIED IRON DEFICIENCY ANEMIA TYPE: ICD-10-CM

## 2022-07-25 DIAGNOSIS — I26.99 RECURRENT PULMONARY EMBOLISM (HCC): ICD-10-CM

## 2022-07-25 DIAGNOSIS — D68.69 SECONDARY HYPERCOAGULABLE STATE (HCC): ICD-10-CM

## 2022-07-25 DIAGNOSIS — M79.89 MASS OF SOFT TISSUE OF HIP: ICD-10-CM

## 2022-07-25 DIAGNOSIS — D64.9 ANEMIA, UNSPECIFIED TYPE: ICD-10-CM

## 2022-07-25 DIAGNOSIS — Z79.01 LONG TERM (CURRENT) USE OF ANTICOAGULANTS: ICD-10-CM

## 2022-07-25 DIAGNOSIS — E04.1 THYROID NODULE: ICD-10-CM

## 2022-07-25 DIAGNOSIS — R91.8 LUNG NODULES: ICD-10-CM

## 2022-07-25 LAB
BASOPHILS # BLD AUTO: 0.4 % (ref 0–1.8)
BASOPHILS # BLD: 0.03 K/UL (ref 0–0.12)
COMMENT 1642: NORMAL
EOSINOPHIL # BLD AUTO: 0.05 K/UL (ref 0–0.51)
EOSINOPHIL NFR BLD: 0.6 % (ref 0–6.9)
ERYTHROCYTE [DISTWIDTH] IN BLOOD BY AUTOMATED COUNT: 62.4 FL (ref 35.9–50)
HCT VFR BLD AUTO: 31.8 % (ref 37–47)
HGB BLD-MCNC: 9.3 G/DL (ref 12–16)
IMM GRANULOCYTES # BLD AUTO: 0.03 K/UL (ref 0–0.11)
IMM GRANULOCYTES NFR BLD AUTO: 0.4 % (ref 0–0.9)
LYMPHOCYTES # BLD AUTO: 0.88 K/UL (ref 1–4.8)
LYMPHOCYTES NFR BLD: 10.8 % (ref 22–41)
MCH RBC QN AUTO: 25.3 PG (ref 27–33)
MCHC RBC AUTO-ENTMCNC: 29.2 G/DL (ref 33.6–35)
MCV RBC AUTO: 86.6 FL (ref 81.4–97.8)
MONOCYTES # BLD AUTO: 0.7 K/UL (ref 0–0.85)
MONOCYTES NFR BLD AUTO: 8.6 % (ref 0–13.4)
MORPHOLOGY BLD-IMP: NORMAL
NEUTROPHILS # BLD AUTO: 6.49 K/UL (ref 2–7.15)
NEUTROPHILS NFR BLD: 79.2 % (ref 44–72)
NRBC # BLD AUTO: 0 K/UL
NRBC BLD-RTO: 0 /100 WBC
PLATELET # BLD AUTO: 573 K/UL (ref 164–446)
PLATELET BLD QL SMEAR: NORMAL
PMV BLD AUTO: 8.7 FL (ref 9–12.9)
RBC # BLD AUTO: 3.67 M/UL (ref 4.2–5.4)
RBC BLD AUTO: NORMAL
WBC # BLD AUTO: 8.2 K/UL (ref 4.8–10.8)

## 2022-07-25 PROCEDURE — 99212 OFFICE O/P EST SF 10 MIN: CPT | Performed by: NURSE PRACTITIONER

## 2022-07-25 PROCEDURE — 99214 OFFICE O/P EST MOD 30 MIN: CPT | Performed by: NURSE PRACTITIONER

## 2022-07-25 PROCEDURE — 85025 COMPLETE CBC W/AUTO DIFF WBC: CPT

## 2022-07-25 PROCEDURE — 36415 COLL VENOUS BLD VENIPUNCTURE: CPT

## 2022-07-25 ASSESSMENT — PAIN SCALES - GENERAL: PAINLEVEL: 4=SLIGHT-MODERATE PAIN

## 2022-07-25 ASSESSMENT — ENCOUNTER SYMPTOMS
HEADACHES: 0
ABDOMINAL PAIN: 0
NAUSEA: 0
WEAKNESS: 0
HEMOPTYSIS: 0
DIZZINESS: 0
HEARTBURN: 0
SHORTNESS OF BREATH: 1
PALPITATIONS: 1
SPUTUM PRODUCTION: 0
WHEEZING: 0
FEVER: 0
VOMITING: 0
LOSS OF CONSCIOUSNESS: 0
CHILLS: 0
WEIGHT LOSS: 0
MYALGIAS: 0
COUGH: 0

## 2022-07-25 ASSESSMENT — FIBROSIS 4 INDEX: FIB4 SCORE: 0.84

## 2022-07-25 NOTE — PROGRESS NOTES
Subjective     Natalie Anaya is a 81 y.o. female who presents with Other (IC EST/Biopsy Results)          HPI    Patient seen today in follow-up for biopsy results.  She presents accompanied by her caregiver for today's visit.     Patient initially seen on 6/29/2022, referred by her PCP.  Patient initially was seen by her PCP for elevated heart rate.  She presented to the emergency department and as part of work-up she had a chest x-ray completed showing scattered bilateral pulmonary nodules.  She then had a CT chest, abdomen and pelvis completed on 6/23/2022 which did show multiple bilateral pulmonary nodules and masses concerning for metastatic disease.  She did have an interval enlargement of a right thyroid nodule.  There was a low-attenuation collection/mass along the posterior right lobe of the liver, and when compared to previous scans in October 2016 this was stable therefore doubtful to have any clinical significance at this time.  I did confirm with reading radiologist previously that the thyroid nodule now measures 3.8 cm and previously back in 2016 and had measured 2.5 cm.  Patient also noted to have hemoglobin of 9.1 during her ER visit.  This is decreased from 15 6 months ago.  She had a steady decline in her hemoglobin with iron studies showing iron deficiency anemia.  This is consistent with blood loss and patient was asymptomatic at time of initial visit.     Patient had been seen by pulmonary prior to initial visit with me.  Pulmonary had requested that patient move forward with bronchoscopy which was scheduled for 7/7/2022.  Unfortunately at time of biopsy she did have tachycardia as well as a hemoglobin of 6.9.  Biopsy was aborted and patient was admitted to the hospital for blood transfusion.  Patient did complete PET/CT on 7/13/2022 which showed a 9 cm hypermetabolic lytic lesion in the left ilium with a large soft tissue component.  Reading radiologist stated that this may represent  the primary malignancy.  She does have multiple bilateral pulmonary metastasis again seen.  There is no other FDG avid lesions.  There is no hypermetabolic activity in the right thyroid nodule or renal lesions along the right hepatic lobe.  There is some intramuscular hypermetabolism in the right back, right upper arm which is likely physiologic.  On PET/CT findings patient was sent for biopsy of the large soft tissue mass in the left ilium.    Patient is stable and not much change since last seen.  Tolerated biopsy well with minimal tenderness after the procedure.  Patient did state that at 1 point she did notice what she calls pink beads from her rectum which she thinks might be where the bleeding source is coming from.  She stated after she noticed the beads she had some minor bleeding on her pad but that has completely resolved.  She has not noticed any bleeding in the last week.  Patient did state that she was seen by GI physician Dr. Stanton back in 2016.    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  I did personally speak with Dr. Florez, pathologist on the case who confirmed that this looks highly suspicious for a sarcoma however specimen is being sent out to [facility] for additional studies.  I did discuss this result in detail with the patient today.    Allergies   Allergen Reactions   • Tape Rash     Adhesive band aids cause a rash  Paper tape ok     Current Outpatient Medications on File Prior to Encounter   Medication Sig Dispense Refill   • levothyroxine (SYNTHROID) 88 MCG Tab TAKE 1 TABLET BY MOUTH EVERY MORNING ON AN EMPTY STOMACH 90 Tablet 0   • ferrous gluconate (FERGON) 324 (38 Fe) MG Tab Take 1 Tablet by mouth 2 times a day with meals for 30 days. 60 Tablet 0   • metoprolol tartrate (LOPRESSOR) 25 MG Tab Take 1 Tablet by mouth 3 times a day for 30 days. 90 Tablet 0   • torsemide (DEMADEX) 20 MG Tab TAKE 2 TABLETS BY MOUTH EVERY  Tablet 3   • spironolactone  "(ALDACTONE) 25 MG Tab Take 1 Tablet by mouth every day. 100 Tablet 3   • potassium chloride SA (KDUR) 20 MEQ Tab CR Take 1 Tablet by mouth every day. 100 Tablet 3   • omeprazole (PRILOSEC) 20 MG delayed-release capsule Take 1 Capsule by mouth every day. (Patient taking differently: Take 20 mg by mouth every evening.) 100 Capsule 3   • primidone (MYSOLINE) 250 MG Tab Take 1 Tablet by mouth 3 times a day. 300 Tablet 3   • gabapentin (NEURONTIN) 100 MG Cap Take 2 Capsules by mouth 3 times a day. (Patient taking differently: Take 100 mg by mouth every evening.) 300 Capsule 3   • alendronate (FOSAMAX) 70 MG Tab Take 1 Tablet by mouth every 7 days. 12 Tablet 3   • Acetaminophen 500 MG Cap Take 1,000 mg by mouth 2 times a day.     • Calcium 500-100 MG-UNIT Chew Tab Chew 2 Tablets every evening.     • Home Care Oxygen Inhale 2 L/min by mouth every bedtime.     • melatonin 5 mg Tab Take 25 mg by mouth at bedtime. Indications: sleep     • Probiotic Product (PROBIOTIC DAILY PO) Take 1 Cap by mouth every day.     • Glucos-Chond-Hyal Ac-Ca Fructo (MOVE FREE JOINT HEALTH ADVANCE) Tab Take 1 Tab by mouth every day.     • Multiple Vitamins-Minerals (WOMENS MULTI VITAMIN & MINERAL) Tab Take 1 Tab by mouth every day.     • Cholecalciferol (VITAMIN D-3) 1000 UNITS Cap Take 1,000 Units by mouth every day.       No current facility-administered medications on file prior to encounter.         Review of Systems   Constitutional: Negative for malaise/fatigue.   Musculoskeletal: Positive for joint pain (mild left hip pain).              Objective     /59   Pulse 81   Temp 37.5 °C (99.5 °F) (Temporal)   Resp 19   Ht 1.6 m (5' 3\")   Wt 90 kg (198 lb 6.6 oz)   LMP  (LMP Unknown)   SpO2 96%   BMI 35.15 kg/m²      Physical Exam  Vitals reviewed.   Constitutional:       General: She is not in acute distress.     Appearance: Normal appearance. She is not diaphoretic.   Cardiovascular:      Rate and Rhythm: Normal rate.   Pulmonary: "      Effort: Pulmonary effort is normal.   Neurological:      Mental Status: She is alert and oriented to person, place, and time.   Psychiatric:         Mood and Affect: Mood normal.         Behavior: Behavior normal.              PRELIMINARY DIAGNOSIS:     A. Left ilium mass, biopsy:          Malignant spindle cell neoplasm.     Comment: Histologic features favor a sarcoma. The case is submitted to   [facility] for additional studies and consultation to more   definitively subtype the lesion. The case is also reviewed with Dr. Sutton who concurs.                  Assessment & Plan       1. Lung nodules  Referral to Hematology Oncology   2. Mass of soft tissue of hip  Referral to Hematology Oncology   3. Thyroid nodule  Referral to Hematology Oncology   4. Iron deficiency anemia, unspecified iron deficiency anemia type  CBC WITH DIFFERENTIAL   5. Anemia, unspecified type  CBC WITH DIFFERENTIAL        1.  Patient with a 9 cm lytic lesion with a surrounding soft tissue mass on the left ilium, status postbiopsy.  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  However pathology is being sent off to [facility] for further evaluation for a final pathology.  I discussed the case in detail with patient today.  She is aware there is no need for bronchoscopy to evaluate the lung nodules.  I will refer patient over to our medical oncologist and she will establish with Dr. Arango on Monday, 8/1/2022.    2.  It was severe anemia with noted iron deficiency consistent with blood loss.  She is bleeding somewhere.  She has required blood transfusion recently.  I requested a repeat CBC.  I will contact GI and attempt to get patient into be seen by her previous GI physician, Dr. Romy CORNEJO for further work-up.    3.  No further follow-up with I[facility] needed at this time.      Please note that this dictation was created using voice recognition software. I have made every reasonable attempt to correct obvious  errors, but I expect that there are errors of grammar and possibly content that I did not discover before finalizing the note.

## 2022-07-25 NOTE — PROGRESS NOTES
Patient reports she is still not back on anticoagulation. She see's oncology in 1 week who will determine if she will resume.     Moira Sheridan, PharmD

## 2022-07-25 NOTE — ADDENDUM NOTE
Encounter addended by: Shiloh Guevara, Med Ass't on: 7/25/2022 4:38 PM   Actions taken: Charge Capture section accepted

## 2022-07-25 NOTE — PROGRESS NOTES
Pulmonary Clinic Note    Chief Complaint:  No chief complaint on file.        HPI:   This patient is a 81 y.o. adult whom is followed in our clinic for hx of recurrent PE, hypoxic respiratory failure and hx of AILYN intolerant to CPAP therapy last seen by Dr. Morales on 4/11/22.  At last visit she was to obtain a 6MWT and PFT. She is on lifelong anticoagulation.  She has AILYN with mild PH.    Patient comes to clinic today initially for 1 last follow-up with her pulmonary providers prior to moving in August.  However last week she felt her heart racing and found that her pulse was in the 130s to 140s, went to the emergency room and was found to be acutely anemic.  Her hemoglobin had dropped from 15 to approximately 9.  She has had another CBC since then with further drop to 8.  Pan CT scan of the chest abdomen and pelvis was done in the emergency room in an attempt to locate the source of bleeding, however no source of bleeding was found but evidence of metastatic disease was noted.  Patient has multiple mets to the lung as well as a large thyroid nodule and a mass in her liver.  She denies any symptoms of any of these findings and states that she feels well.  She denies any dizziness, headaches or lightheadedness and states she has had no evidence of bleeding.  She denies hemoptysis and denies hematochezia and melena.  We discussed the possibilities and routes of biopsy extensively.  She has an appointment with our cancer care coordinator on Wednesday.    PFTs and 6-minute walk test from prior visit have not been completed.  She states she has been using a little bit more oxygen lately and is gone from 1 L all the time to 2 L primarily with exertion.  She states when she was recently in [place] at the level she had no need for oxygen during the day.  Since the discovery of her bleeding she has only been taking 1 Eliquis in the evening.      Past Medical History:   Diagnosis Date   • Arrhythmia     tachy d/t  anemia   • Arthritis     Knees.   • Breath shortness    • Cataract     Bilat IOL   • Constipation    • Epilepsy (HCC)    • Eye drainage    • Hemorrhagic disorder (HCC)    • Hiatus hernia syndrome    • Hyperthyroidism    • Hypothyroid    • Pain     knees   • Personal history of venous thrombosis and embolism     PE from Right [facility] FX 2003.   • Pulmonary hypertension (HCC)    • Ringing in ears    • Seizure (HCC) 01/01/1982    takes primidone   • Sore muscles    • Urinary incontinence        Social History     Socioeconomic History   • Marital status:      Spouse name: Not on file   • Number of children: Not on file   • Years of education: Not on file   • Highest education level: Not on file   Occupational History   • Not on file   Tobacco Use   • Smoking status: Never Smoker   • Smokeless tobacco: Never Used   Vaping Use   • Vaping Use: Never used   Substance and Sexual Activity   • Alcohol use: No   • Drug use: No   • Sexual activity: Not Currently     Partners: Male   Other Topics Concern   • Not on file   Social History Narrative   • Not on file     Social Determinants of Health     Financial Resource Strain: Low Risk    • Difficulty of Paying Living Expenses: Not hard at all   Food Insecurity: No Food Insecurity   • Worried About Running Out of Food in the Last Year: Never true   • Ran Out of Food in the Last Year: Never true   Transportation Needs: No Transportation Needs   • Lack of Transportation (Medical): No   • Lack of Transportation (Non-Medical): No   Physical Activity: Inactive   • Days of Exercise per Week: 0 days   • Minutes of Exercise per Session: 0 min   Stress: No Stress Concern Present   • Feeling of Stress : Only a little   Social Connections: Moderately Integrated   • Frequency of Communication with Friends and Family: More than three times a week   • Frequency of Social Gatherings with Friends and Family: More than three times a week   • Attends [employer] Services: 1 to 4 times per  year   • Active Member of Clubs or Organizations: No   • Attends Club or Organization Meetings: Never   • Marital Status:    Intimate Partner Violence: Not At Risk   • Fear of Current or Ex-Partner: No   • Emotionally Abused: No   • Physically Abused: No   • Sexually Abused: No   Housing Stability: Low Risk    • Unable to Pay for Housing in the Last Year: No   • Number of Places Lived in the Last Year: 1   • Unstable Housing in the Last Year: No          Family History   Problem Relation Age of Onset   • Cancer Mother 67        Ovarian   • Alcohol/Drug Father 60        Appenditis   • Heart Disease Father    • Cancer Maternal Grandmother         colon cancer   • Heart Disease Maternal Grandfather    • Cancer Daughter 44        melonoma       Current Outpatient Medications on File Prior to Visit   Medication Sig Dispense Refill   • levothyroxine (SYNTHROID) 88 MCG Tab TAKE 1 TABLET BY MOUTH EVERY MORNING ON AN EMPTY STOMACH 90 Tablet 0   • ferrous gluconate (FERGON) 324 (38 Fe) MG Tab Take 1 Tablet by mouth 2 times a day with meals for 30 days. 60 Tablet 0   • metoprolol tartrate (LOPRESSOR) 25 MG Tab Take 1 Tablet by mouth 3 times a day for 30 days. 90 Tablet 0   • torsemide (DEMADEX) 20 MG Tab TAKE 2 TABLETS BY MOUTH EVERY  Tablet 3   • spironolactone (ALDACTONE) 25 MG Tab Take 1 Tablet by mouth every day. 100 Tablet 3   • potassium chloride SA (KDUR) 20 MEQ Tab CR Take 1 Tablet by mouth every day. 100 Tablet 3   • omeprazole (PRILOSEC) 20 MG delayed-release capsule Take 1 Capsule by mouth every day. (Patient taking differently: Take 20 mg by mouth every evening.) 100 Capsule 3   • primidone (MYSOLINE) 250 MG Tab Take 1 Tablet by mouth 3 times a day. 300 Tablet 3   • gabapentin (NEURONTIN) 100 MG Cap Take 2 Capsules by mouth 3 times a day. (Patient taking differently: Take 100 mg by mouth every evening.) 300 Capsule 3   • alendronate (FOSAMAX) 70 MG Tab Take 1 Tablet by mouth every 7 days. 12 Tablet 3    • Acetaminophen 500 MG Cap Take 1,000 mg by mouth 2 times a day.     • Calcium 500-100 MG-UNIT Chew Tab Chew 2 Tablets every evening.     • Home Care Oxygen Inhale 2 L/min by mouth every bedtime.     • melatonin 5 mg Tab Take 25 mg by mouth at bedtime. Indications: sleep     • Probiotic Product (PROBIOTIC DAILY PO) Take 1 Cap by mouth every day.     • Glucos-Chond-Hyal Ac-Ca Fructo (MOVE FREE [facility] ADVANCE) Tab Take 1 Tab by mouth every day.     • Multiple Vitamins-Minerals (WOMENS MULTI VITAMIN & MINERAL) Tab Take 1 Tab by mouth every day.     • Cholecalciferol (VITAMIN D-3) 1000 UNITS Cap Take 1,000 Units by mouth every day.       No current facility-administered medications on file prior to visit.     Allergies:  Tape      ROS:   Review of Systems   Constitutional: Negative for chills, fever, malaise/fatigue and weight loss.   HENT: Negative for congestion.    Respiratory: Positive for shortness of breath. Negative for cough, hemoptysis, sputum production and wheezing.    Cardiovascular: Positive for palpitations. Negative for chest pain and leg swelling.   Gastrointestinal: Negative for abdominal pain, heartburn, nausea and vomiting.   Genitourinary: Negative for dysuria.   Musculoskeletal: Negative for myalgias.   Neurological: Negative for dizziness, loss of consciousness, weakness and headaches.   Endo/Heme/Allergies: Negative for environmental allergies.       Vitals:  There were no vitals taken for this visit.    Physical Exam:  Physical Exam  Constitutional:       Appearance: Normal appearance.   HENT:      Head: Normocephalic and atraumatic.      Mouth/Throat:      Mouth: Mucous membranes are moist.   Eyes:      Extraocular Movements: Extraocular movements intact.   Cardiovascular:      Rate and Rhythm: Tachycardia present. Rhythm irregular.      Heart sounds: Normal heart sounds.   Pulmonary:      Effort: Pulmonary effort is normal. No respiratory distress.      Breath sounds: Normal breath  sounds. No wheezing or rales.   Musculoskeletal:         General: No swelling, tenderness or deformity.   Skin:     General: Skin is warm and dry.   Neurological:      General: No focal deficit present.      Mental Status: She is alert and oriented to person, place, and time.         Pertinent Studies:    PFTs as reviewed by me personally show:    Imaging as reviewed by me personally show:    CT Chest abdomen and pelvis:    IMPRESSION:     1.  Multiple bilateral pulmonary nodules and masses as described. Findings are suspicious for metastases     2.  Interval enlargement in a right thyroid nodule, nonspecific.     3.  Atherosclerosis     4.  Dilated main pulmonary artery is suggestive of pulmonary arterial hypertension.     5.  Diverticulosis.     6.  Large hiatal hernia.     7.  Gallbladder contraction containing calcification, similar to the prior exam     8.  Low attenuation collection/mass along the posterior right hepatic lobe is similar to the prior exam and is of doubtful clinical significance    Echo:  3/21/22:  CONCLUSIONS  Compared to the prior echo on 02/03/2020, pulmonary pressure has   slightly improved.  Normal left ventricular size and systolic function.  Moderate tricuspid regurgitation.  Right ventricular systolic pressure is estimated to be 46 mmHg.    Assessment/Plan:  Problem List Items Addressed This Visit    None           No follow-ups on file.   (return to office in...)    Time spent in record review prior to patient arrival, reviewing results, and in face-to-face encounter totaled 60 min, excluding any procedures if performed.    Leticia Henson MD RD  Pulmonary and Critical Care

## 2022-07-26 ENCOUNTER — TELEPHONE (OUTPATIENT)
Dept: HEMATOLOGY ONCOLOGY | Facility: MEDICAL CENTER | Age: 82
End: 2022-07-26
Payer: MEDICARE

## 2022-07-26 NOTE — TELEPHONE ENCOUNTER
Amanda Merlino You 8 minutes ago (1:04 PM)         Per Dr. Henson no, we are not rescheduling

## 2022-07-26 NOTE — TELEPHONE ENCOUNTER
Attempted to contact patient's son, at the request of the patient, Marlo Anaya, to discuss her most recent biopsy results.  I did leave a voice message asking for him to call back.  Contacted him at [phone].

## 2022-07-26 NOTE — TELEPHONE ENCOUNTER
Spoke with pt yesterday 07/25/22. Pt said she has spoke with Dr Henson re this. Ion Bronch was cancelled

## 2022-07-26 NOTE — TELEPHONE ENCOUNTER
Contacted patient to let her know that her hemoglobin is stable at 9.3.  She was appreciative of the call.    She did confirm with me that she has spoken with [facility] and is scheduled for consultation tomorrow with Dr. Stanton.

## 2022-07-26 NOTE — TELEPHONE ENCOUNTER
Bentley Cifuetnes M.D.  You 2 days ago     FF    That would be better answered by Dr Henson who is doing the procedure.   Thanks   FF

## 2022-07-28 ENCOUNTER — TELEPHONE (OUTPATIENT)
Dept: MEDICAL GROUP | Facility: PHYSICIAN GROUP | Age: 82
End: 2022-07-28
Payer: MEDICARE

## 2022-07-28 NOTE — TELEPHONE ENCOUNTER
ESTABLISHED PATIENT PRE-VISIT PLANNING     Patient was NOT contacted to complete PVP.     Note: Patient will not be contacted if there is no indication to call.     1.  Reviewed notes from the last few office visits within the medical group: Yes    2.  If any orders were placed at last visit or intended to be done for this visit (i.e. 6 mos follow-up), do we have Results/Consult Notes?         •  Labs - Labs ordered, completed on 6/22/2022 and results are in chart.  Note: If patient appointment is for lab review and patient did not complete labs, check with provider if OK to reschedule patient until labs completed.       •  Imaging - Imaging ordered, completed and results are in chart.       •  Referrals - No referrals were ordered at last office visit.    3. Is this appointment scheduled as a Hospital Follow-Up? No    4.  Immunizations were updated in Epic using Reconcile Outside Information activity? Yes    5.  Patient is due for the following Health Maintenance Topics:   There are no preventive care reminders to display for this patient.      6.  AHA (Pulse[employer]) form printed for Provider? No, already completed

## 2022-08-01 ENCOUNTER — HOSPITAL ENCOUNTER (OUTPATIENT)
Dept: HEMATOLOGY ONCOLOGY | Facility: MEDICAL CENTER | Age: 82
End: 2022-08-01
Attending: STUDENT IN AN ORGANIZED HEALTH CARE EDUCATION/TRAINING PROGRAM
Payer: MEDICARE

## 2022-08-01 ENCOUNTER — OFFICE VISIT (OUTPATIENT)
Dept: MEDICAL GROUP | Facility: PHYSICIAN GROUP | Age: 82
End: 2022-08-01
Payer: MEDICARE

## 2022-08-01 VITALS
BODY MASS INDEX: 35.7 KG/M2 | WEIGHT: 201.5 LBS | RESPIRATION RATE: 16 BRPM | SYSTOLIC BLOOD PRESSURE: 118 MMHG | TEMPERATURE: 98 F | DIASTOLIC BLOOD PRESSURE: 60 MMHG | OXYGEN SATURATION: 95 % | HEART RATE: 94 BPM | HEIGHT: 63 IN

## 2022-08-01 VITALS
RESPIRATION RATE: 16 BRPM | BODY MASS INDEX: 34.73 KG/M2 | DIASTOLIC BLOOD PRESSURE: 62 MMHG | OXYGEN SATURATION: 94 % | WEIGHT: 196 LBS | HEART RATE: 106 BPM | TEMPERATURE: 98.8 F | HEIGHT: 63 IN | SYSTOLIC BLOOD PRESSURE: 118 MMHG

## 2022-08-01 DIAGNOSIS — R00.0 TACHYCARDIA: ICD-10-CM

## 2022-08-01 DIAGNOSIS — I50.813 ACUTE ON CHRONIC RIGHT-SIDED HEART FAILURE (HCC): ICD-10-CM

## 2022-08-01 DIAGNOSIS — I51.89 DIASTOLIC DYSFUNCTION: ICD-10-CM

## 2022-08-01 DIAGNOSIS — I27.21 PULMONARY ARTERY HYPERTENSION (HCC): ICD-10-CM

## 2022-08-01 DIAGNOSIS — E03.9 HYPOTHYROIDISM, UNSPECIFIED TYPE: ICD-10-CM

## 2022-08-01 DIAGNOSIS — C79.89 METASTATIC SARCOMA (HCC): ICD-10-CM

## 2022-08-01 DIAGNOSIS — K21.9 GASTROESOPHAGEAL REFLUX DISEASE WITHOUT ESOPHAGITIS: ICD-10-CM

## 2022-08-01 DIAGNOSIS — D64.9 ANEMIA REQUIRING TRANSFUSIONS: ICD-10-CM

## 2022-08-01 DIAGNOSIS — G40.909 SEIZURE DISORDER (HCC): ICD-10-CM

## 2022-08-01 DIAGNOSIS — G62.9 NEUROPATHY: ICD-10-CM

## 2022-08-01 DIAGNOSIS — M85.80 OSTEOPENIA WITH HIGH RISK OF FRACTURE: ICD-10-CM

## 2022-08-01 PROCEDURE — 99215 OFFICE O/P EST HI 40 MIN: CPT | Performed by: STUDENT IN AN ORGANIZED HEALTH CARE EDUCATION/TRAINING PROGRAM

## 2022-08-01 PROCEDURE — 99212 OFFICE O/P EST SF 10 MIN: CPT | Performed by: STUDENT IN AN ORGANIZED HEALTH CARE EDUCATION/TRAINING PROGRAM

## 2022-08-01 PROCEDURE — 99213 OFFICE O/P EST LOW 20 MIN: CPT | Performed by: FAMILY MEDICINE

## 2022-08-01 RX ORDER — PRIMIDONE 250 MG/1
250 TABLET ORAL 3 TIMES DAILY
Qty: 300 TABLET | Refills: 3 | Status: SHIPPED | OUTPATIENT
Start: 2022-08-01

## 2022-08-01 RX ORDER — LEVOTHYROXINE SODIUM 88 UG/1
88 TABLET ORAL
Qty: 90 TABLET | Refills: 0 | Status: SHIPPED | OUTPATIENT
Start: 2022-08-01

## 2022-08-01 RX ORDER — TORSEMIDE 20 MG/1
40 TABLET ORAL DAILY
Qty: 180 TABLET | Refills: 3 | Status: SHIPPED | OUTPATIENT
Start: 2022-08-01

## 2022-08-01 RX ORDER — ALENDRONATE SODIUM 70 MG/1
70 TABLET ORAL
Qty: 12 TABLET | Refills: 3 | Status: SHIPPED | OUTPATIENT
Start: 2022-08-01

## 2022-08-01 RX ORDER — FERROUS GLUCONATE 324(38)MG
324 TABLET ORAL 2 TIMES DAILY WITH MEALS
Qty: 60 TABLET | Refills: 0 | Status: SHIPPED | OUTPATIENT
Start: 2022-08-01 | End: 2022-08-31

## 2022-08-01 RX ORDER — POTASSIUM CHLORIDE 20 MEQ/1
20 TABLET, EXTENDED RELEASE ORAL
Qty: 100 TABLET | Refills: 3 | Status: SHIPPED | OUTPATIENT
Start: 2022-08-01

## 2022-08-01 RX ORDER — POLYETHYLENE GLYCOL 3350, SODIUM SULFATE ANHYDROUS, SODIUM BICARBONATE, SODIUM CHLORIDE, POTASSIUM CHLORIDE 236; 22.74; 6.74; 5.86; 2.97 G/4L; G/4L; G/4L; G/4L; G/4L
POWDER, FOR SOLUTION ORAL
COMMUNITY
Start: 2022-07-27 | End: 2022-08-01

## 2022-08-01 RX ORDER — OMEPRAZOLE 20 MG/1
20 CAPSULE, DELAYED RELEASE ORAL EVERY EVENING
Qty: 100 CAPSULE | Refills: 0 | Status: SHIPPED | OUTPATIENT
Start: 2022-08-01

## 2022-08-01 RX ORDER — SPIRONOLACTONE 25 MG/1
25 TABLET ORAL DAILY
Qty: 100 TABLET | Refills: 3 | Status: SHIPPED | OUTPATIENT
Start: 2022-08-01

## 2022-08-01 ASSESSMENT — FIBROSIS 4 INDEX
FIB4 SCORE: 0.72
FIB4 SCORE: 0.72

## 2022-08-01 ASSESSMENT — PAIN SCALES - GENERAL: PAINLEVEL: 3=SLIGHT PAIN

## 2022-08-01 NOTE — PROGRESS NOTES
Subjective:     CC:   Chief Complaint   Patient presents with   • Hospital Follow-up     Blood transfusion        HPI:   Natalie presents today with hospital follow up. She is accompanied to today's visit by her senior helper.  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 She did see Oncology today, Dr. Alvarez Arango will be ordering hospice for her.  States she was originally going to go to [place], but now will be going to [place] to live with her son and receive hospice services due to severity of her cancer.   She states this originally started with sciatic nerve pain, however her pain progressed and imaging showed some pulmonary nodules that were thought to be metastatic cancer.  States that she was told today by oncology that the metastatic sarcoma originated in her iliac bone which has now spread to her lungs and liver.  Denies any pain other than biopsy site. States she is feeling SOB now and keeping oxygen at 2L continuous as well as feeling more fatigued.  States today that she just wants refills of all of her medications so she can move next week.  No problem-specific Assessment & Plan notes found for this encounter.      Current Outpatient Medications Ordered in Epic   Medication Sig Dispense Refill   • torsemide (DEMADEX) 20 MG Tab Take 2 Tablets by mouth every day. 180 Tablet 3   • spironolactone (ALDACTONE) 25 MG Tab Take 1 Tablet by mouth every day. 100 Tablet 3   • primidone (MYSOLINE) 250 MG Tab Take 1 Tablet by mouth 3 times a day. 300 Tablet 3   • potassium chloride SA (KDUR) 20 MEQ Tab CR Take 1 Tablet by mouth every day. 100 Tablet 3   • omeprazole (PRILOSEC) 20 MG delayed-release capsule Take 1 Capsule by mouth every evening. 100 Capsule 0   • metoprolol tartrate (LOPRESSOR) 25 MG Tab Take 1 Tablet by mouth 3 times a day for 30 days. 90 Tablet 3   • levothyroxine (SYNTHROID) 88 MCG Tab Take 1 Tablet by mouth every morning  "on an empty stomach. 90 Tablet 0   • alendronate (FOSAMAX) 70 MG Tab Take 1 Tablet by mouth every 7 days. 12 Tablet 3   • ferrous gluconate (FERGON) 324 (38 Fe) MG Tab Take 1 Tablet by mouth 2 times a day with meals for 30 days. 60 Tablet 0   • gabapentin (NEURONTIN) 100 MG Cap Take 2 Capsules by mouth 3 times a day. (Patient taking differently: Take 100 mg by mouth every evening.) 300 Capsule 3   • Acetaminophen 500 MG Cap Take 1,000 mg by mouth 2 times a day.     • Calcium 500-100 MG-UNIT Chew Tab Chew 2 Tablets every evening.     • Home Care Oxygen Inhale 2 L/min by mouth every bedtime.     • melatonin 5 mg Tab Take 25 mg by mouth at bedtime. Indications: sleep     • Probiotic Product (PROBIOTIC DAILY PO) Take 1 Cap by mouth every day.     • Glucos-Chond-Hyal Ac-Ca Fructo (MOVE FREE JOINT HEALTH ADVANCE) Tab Take 1 Tab by mouth every day.     • Multiple Vitamins-Minerals (WOMENS MULTI VITAMIN & MINERAL) Tab Take 1 Tab by mouth every day.     • Cholecalciferol (VITAMIN D-3) 1000 UNITS Cap Take 1,000 Units by mouth every day.       No current [facility]-ordered facility-administered medications on file.       Health Maintenance: Completed      Objective:     Exam:  /60 (BP Location: Right arm, Patient Position: Sitting, BP Cuff Size: Adult)   Pulse 94   Temp 36.7 °C (98 °F) (Temporal)   Resp 16   Ht 1.6 m (5' 2.99\")   Wt 91.4 kg (201 lb 8 oz)   LMP  (LMP Unknown)   SpO2 95% Comment: 2L  BMI 35.70 kg/m²  Body mass index is 35.7 kg/m².    Physical Exam  Vitals reviewed.   Constitutional:       General: She is not in acute distress.     Appearance: Normal appearance.   Eyes:      Conjunctiva/sclera: Conjunctivae normal.      Pupils: Pupils are equal, round, and reactive to light.   Cardiovascular:      Rate and Rhythm: Normal rate.      Pulses: Normal pulses.      Heart sounds: Normal heart sounds. No murmur heard.  Pulmonary:      Effort: No respiratory distress.      Breath sounds: Normal breath sounds. No " stridor. No wheezing, rhonchi or rales.      Comments: On 2 L continuous via nasal cannula  Chest:      Chest wall: No tenderness.   Neurological:      Mental Status: She is alert and oriented to person, place, and time.   Psychiatric:         Mood and Affect: Mood normal.         Behavior: Behavior normal.          A chaperone was offered to the patient during today's exam. Patient declined chaperone.      Assessment & Plan:     81 y.o. female with the following -     1. Pulmonary artery hypertension (HCC)  - torsemide (DEMADEX) 20 MG Tab; Take 2 Tablets by mouth every day.  Dispense: 180 Tablet; Refill: 3  - spironolactone (ALDACTONE) 25 MG Tab; Take 1 Tablet by mouth every day.  Dispense: 100 Tablet; Refill: 3    2. Acute on chronic right-sided heart failure (HCC)  - torsemide (DEMADEX) 20 MG Tab; Take 2 Tablets by mouth every day.  Dispense: 180 Tablet; Refill: 3  - spironolactone (ALDACTONE) 25 MG Tab; Take 1 Tablet by mouth every day.  Dispense: 100 Tablet; Refill: 3    3. Seizure disorder (HCC)  - primidone (MYSOLINE) 250 MG Tab; Take 1 Tablet by mouth 3 times a day.  Dispense: 300 Tablet; Refill: 3    4. Diastolic dysfunction  - potassium chloride SA (KDUR) 20 MEQ Tab CR; Take 1 Tablet by mouth every day.  Dispense: 100 Tablet; Refill: 3    5. Gastroesophageal reflux disease without esophagitis  - omeprazole (PRILOSEC) 20 MG delayed-release capsule; Take 1 Capsule by mouth every evening.  Dispense: 100 Capsule; Refill: 0    6. Tachycardia  - metoprolol tartrate (LOPRESSOR) 25 MG Tab; Take 1 Tablet by mouth 3 times a day for 30 days.  Dispense: 90 Tablet; Refill: 3    7. Hypothyroidism, unspecified type  - levothyroxine (SYNTHROID) 88 MCG Tab; Take 1 Tablet by mouth every morning on an empty stomach.  Dispense: 90 Tablet; Refill: 0    8. Neuropathy    9. Osteopenia with high risk of fracture  - alendronate (FOSAMAX) 70 MG Tab; Take 1 Tablet by mouth every 7 days.  Dispense: 12 Tablet; Refill: 3    10. Anemia  requiring transfusions  - ferrous gluconate (FERGON) 324 (38 Fe) MG Tab; Take 1 Tablet by mouth 2 times a day with meals for 30 days.  Dispense: 60 Tablet; Refill: 0    11. Metastatic sarcoma (HCC)  New problem, states she was diagnosed today by oncology.  Does report that she will be moving next week to [place] to be with her son to go on hospice services.  Is needing any current resources as oncology has ordered them and she will be moving.    I spent a total of 21 minutes with record review, exam, communication with the patient, communication with other providers, and documentation of this encounter.      No follow-ups on file.    Please note that this dictation was created using voice recognition software. I have made every reasonable attempt to correct obvious errors, but I expect that there are errors of grammar and possibly content that I did not discover before finalizing the note.

## 2022-08-02 ASSESSMENT — ENCOUNTER SYMPTOMS
ABDOMINAL PAIN: 0
SPUTUM PRODUCTION: 0
NERVOUS/ANXIOUS: 0
DIZZINESS: 0
DIARRHEA: 0
VOMITING: 0
FEVER: 0
CHILLS: 0
COUGH: 0
TINGLING: 1
BLOOD IN STOOL: 0
NAUSEA: 0
DIAPHORESIS: 0
MYALGIAS: 0
BACK PAIN: 0
WEAKNESS: 1
ORTHOPNEA: 1
WEIGHT LOSS: 0
SHORTNESS OF BREATH: 1
SORE THROAT: 0
HEARTBURN: 0
INSOMNIA: 0
CONSTIPATION: 0
DOUBLE VISION: 0
HEADACHES: 0
WHEEZING: 0
BRUISES/BLEEDS EASILY: 0
BLURRED VISION: 0
PALPITATIONS: 1
SINUS PAIN: 0

## 2022-08-03 ENCOUNTER — TELEPHONE (OUTPATIENT)
Dept: VASCULAR LAB | Facility: MEDICAL CENTER | Age: 82
End: 2022-08-03
Payer: MEDICARE

## 2022-08-03 ENCOUNTER — ANTICOAGULATION MONITORING (OUTPATIENT)
Dept: VASCULAR LAB | Facility: MEDICAL CENTER | Age: 82
End: 2022-08-03
Payer: MEDICARE

## 2022-08-03 ENCOUNTER — TELEPHONE (OUTPATIENT)
Dept: HEMATOLOGY ONCOLOGY | Facility: MEDICAL CENTER | Age: 82
End: 2022-08-03
Payer: MEDICARE

## 2022-08-03 DIAGNOSIS — I26.99 RECURRENT PULMONARY EMBOLISM (HCC): ICD-10-CM

## 2022-08-03 DIAGNOSIS — Z79.01 LONG TERM (CURRENT) USE OF ANTICOAGULANTS: ICD-10-CM

## 2022-08-03 DIAGNOSIS — D68.69 SECONDARY HYPERCOAGULABLE STATE (HCC): ICD-10-CM

## 2022-08-03 NOTE — PROGRESS NOTES
Called pt to f/u on dispo of OAC per heme/onc - she was instructed to DC OAC moving forward.    Will [facility] accordingly.    Nikhil Kwong, PharmD, BCACP

## 2022-08-03 NOTE — PROGRESS NOTES
Consult:  Hematology/Oncology      Referring Physician: ROULA Duran  Primary Care:  Kiley Manrique D.O.    Diagnosis: Metastatic high grade spindle cell sarcoma    Chief Complaint:  Evaluation for systemic therapy    History of Presenting Illness:  Natalie Anaya is a 81 y.o.  woman who presents to the clinic for evaluation for systemic therapy for a new diagnosis of a spindle cell sarcoma with widespread metastases. She was having problems with her heart rate being elevated on June 20th of this year, and was sent for a chest X ray which revealed numerous bilateral pulmonary nodules. She then had a CT scan which confirmed widespread metastatic disease. She was seen by pulmonology and was set up for a bronchoscopy and biopsy, but that was aborted when she was found to be severely anemic and was transfused instead. She had been having significant issues with iron deficiency anemia and no cause for this has been identified as of yet. She then was seen by Arlen Calzada, and a PET scan was ordered. The scan revealed a large mass adjacent to the left hip (the patient had intermittently having shooting pains down her leg) and this was biopsied and found to be a spindle cell neoplasm. She was sent to me for further evaluation.     Of note, she was hospitalized in [place] in January of this year for congestive heart failure, and was inpatient for 5 nights. There was no mention to the patient of any pulmonary nodules at that time.     Interval History:  Patient is here for consultation. She is requiring O2 due to shortness of breath, which is exacerbated when she is at altitude (in [place], where she has spent a lot of time lately, she hasn't required O2, but in [place] she requires more O2). She has been feeling somewhat debilitated in recent weeks. She is with her son today. She is going to be leaving [place] in the next few weeks, and is debating about going to [place] (her original plan  was to live in assisted living near her grandson and family) or going to [place] (her son and the rest of the family are there).     Past Medical History:   Diagnosis Date   • Arrhythmia     tachy d/t anemia   • Arthritis     Knees.   • Breath shortness    • Cataract     Bilat IOL   • Constipation    • Epilepsy (HCC)    • Eye drainage    • Hemorrhagic disorder (HCC)    • Hiatus hernia syndrome    • Hyperthyroidism    • Hypothyroid    • Pain     knees   • Personal history of venous thrombosis and embolism     PE from Right Shouler FX 2003.   • Pulmonary hypertension (HCC)    • Ringing in ears    • Seizure (HCC) 01/01/1982    takes primidone   • Sore muscles    • Urinary incontinence        Past Surgical History:   Procedure Laterality Date   • PB OPEN FIX INTER/SUBTROCH FX,IMPLNT Right 6/13/2019    Procedure: INSERTION, INTRAMEDULLARY MENDY, FEMUR, PROXIMAL;  Surgeon: Maximo Garnica M.D.;  Location: [facility];  Service: Orthopedics   • IRRIGATION & DEBRIDEMENT HIP Left 11/17/2016    Procedure: IRRIGATION & DEBRIDEMENT HIP;  Surgeon: Maximo Garnica M.D.;  Location: [facility];  Service:    • HIP NAILING INTRAMEDULLARY Left 10/6/2016    Procedure: HIP NAILING INTRAMEDULLARY;  Surgeon: Walt Nguyen M.D.;  Location: [facility];  Service:    • GASTROSCOPY-ENDO N/A 4/29/2016    Procedure: GASTROSCOPY-ENDO;  Surgeon: Mikey Stanton M.D.;  Location: [facility];  Service:    • COLONOSCOPY-FLEXIBLE N/A 4/29/2016    Procedure: COLONOSCOPY-FLEXIBLE;  Surgeon: Mikey Stanton M.D.;  Location: [facility];  Service:    • CATARACT PHACO WITH IOL  8/5/2014    Performed by Lorenzo Bello M.D. at [facility]   • CATARACT PHACO WITH IOL  7/15/2014    Performed by Lorenzo Bello M.D. at [facility]   • KNEE ARTHROPLASTY TOTAL  7/9/2012    Performed by DENIZ FREDERICK at [facility]   • JOSE BY LAPAROSCOPY  9/30/2011     Performed by JOHNATHAN CRISOSTOMO at [facility]   • ARTHROSCOPY, KNEE     • CARPAL TUNNEL RELEASE     • TX REMV 2ND CATARACT,CORN-SCLER SECTN     • TONSILLECTOMY     • TUBAL LIGATION         Social History     Socioeconomic History   • Marital status:      Spouse name: Not on file   • Number of children: Not on file   • Years of education: Not on file   • Highest education level: Not on file   Occupational History   • Not on file   Tobacco Use   • Smoking status: Never Smoker   • Smokeless tobacco: Never Used   Vaping Use   • Vaping Use: Never used   Substance and Sexual Activity   • Alcohol use: No   • Drug use: No   • Sexual activity: Not Currently     Partners: Male   Other Topics Concern   • Not on file   Social History Narrative   • Not on file     Social Determinants of Health     Financial Resource Strain: Low Risk    • Difficulty of Paying Living Expenses: Not hard at all   Food Insecurity: No Food Insecurity   • Worried About Running Out of Food in the Last Year: Never true   • Ran Out of Food in the Last Year: Never true   Transportation Needs: No Transportation Needs   • Lack of Transportation (Medical): No   • Lack of Transportation (Non-Medical): No   Physical Activity: Inactive   • Days of Exercise per Week: 0 days   • Minutes of Exercise per Session: 0 min   Stress: No Stress Concern Present   • Feeling of Stress : Only a little   Social Connections: Moderately Integrated   • Frequency of Communication with Friends and Family: More than three times a week   • Frequency of Social Gatherings with Friends and Family: More than three times a week   • Attends Faith Services: 1 to 4 times per year   • Active Member of Clubs or Organizations: No   • Attends Club or Organization Meetings: Never   • Marital Status:    Intimate Partner Violence: Not At Risk   • Fear of Current or Ex-Partner: No   • Emotionally Abused: No   • Physically Abused: No   • Sexually Abused: No   Housing  Stability: Low Risk    • Unable to Pay for Housing in the Last Year: No   • Number of Places Lived in the Last Year: 1   • Unstable Housing in the Last Year: No       Family History   Problem Relation Age of Onset   • Cancer Mother 67        Ovarian   • Alcohol/Drug Father 60        Appenditis   • Heart Disease Father    • Cancer Maternal Grandmother         colon cancer   • Heart Disease Maternal Grandfather    • Cancer Daughter 44        melonoma       OB History   No obstetric history on file.       Allergies as of 08/01/2022 - Reviewed 08/01/2022   Allergen Reaction Noted   • Tape Rash 10/21/2015         Current Outpatient Medications:   •  gabapentin (NEURONTIN) 100 MG Cap, Take 2 Capsules by mouth 3 times a day. (Patient taking differently: Take 100 mg by mouth every evening.), Disp: 300 Capsule, Rfl: 3  •  Acetaminophen 500 MG Cap, Take 1,000 mg by mouth 2 times a day., Disp: , Rfl:   •  Calcium 500-100 MG-UNIT Chew Tab, Chew 2 Tablets every evening., Disp: , Rfl:   •  Home Care Oxygen, Inhale 2 L/min by mouth every bedtime., Disp: , Rfl:   •  melatonin 5 mg Tab, Take 25 mg by mouth at bedtime. Indications: sleep, Disp: , Rfl:   •  Probiotic Product (PROBIOTIC DAILY PO), Take 1 Cap by mouth every day., Disp: , Rfl:   •  Glucos-Chond-Hyal Ac-Ca Fructo (MOVE FREE JOINT HEALTH ADVANCE) Tab, Take 1 Tab by mouth every day., Disp: , Rfl:   •  Multiple Vitamins-Minerals (WOMENS MULTI VITAMIN & MINERAL) Tab, Take 1 Tab by mouth every day., Disp: , Rfl:   •  Cholecalciferol (VITAMIN D-3) 1000 UNITS Cap, Take 1,000 Units by mouth every day., Disp: , Rfl:   •  torsemide (DEMADEX) 20 MG Tab, Take 2 Tablets by mouth every day., Disp: 180 Tablet, Rfl: 3  •  spironolactone (ALDACTONE) 25 MG Tab, Take 1 Tablet by mouth every day., Disp: 100 Tablet, Rfl: 3  •  primidone (MYSOLINE) 250 MG Tab, Take 1 Tablet by mouth 3 times a day., Disp: 300 Tablet, Rfl: 3  •  potassium chloride SA (KDUR) 20 MEQ Tab CR, Take 1 Tablet by mouth  every day., Disp: 100 Tablet, Rfl: 3  •  omeprazole (PRILOSEC) 20 MG delayed-release capsule, Take 1 Capsule by mouth every evening., Disp: 100 Capsule, Rfl: 0  •  metoprolol tartrate (LOPRESSOR) 25 MG Tab, Take 1 Tablet by mouth 3 times a day for 30 days., Disp: 90 Tablet, Rfl: 3  •  levothyroxine (SYNTHROID) 88 MCG Tab, Take 1 Tablet by mouth every morning on an empty stomach., Disp: 90 Tablet, Rfl: 0  •  alendronate (FOSAMAX) 70 MG Tab, Take 1 Tablet by mouth every 7 days., Disp: 12 Tablet, Rfl: 3  •  ferrous gluconate (FERGON) 324 (38 Fe) MG Tab, Take 1 Tablet by mouth 2 times a day with meals for 30 days., Disp: 60 Tablet, Rfl: 0    Review of Systems:  Review of Systems   Constitutional: Positive for malaise/fatigue. Negative for chills, diaphoresis, fever and weight loss.   HENT: Negative for hearing loss, nosebleeds, sinus pain and sore throat.    Eyes: Negative for blurred vision and double vision.   Respiratory: Positive for shortness of breath. Negative for cough, sputum production and wheezing.    Cardiovascular: Positive for palpitations, orthopnea and leg swelling. Negative for chest pain.   Gastrointestinal: Negative for abdominal pain, blood in stool, constipation, diarrhea, heartburn, melena, nausea and vomiting.   Genitourinary: Negative for dysuria, frequency, hematuria and urgency.   Musculoskeletal: Negative for back pain, joint pain and myalgias.   Skin: Negative for rash.   Neurological: Positive for tingling (Down left leg at times) and weakness. Negative for dizziness and headaches.   Endo/Heme/Allergies: Does not bruise/bleed easily.   Psychiatric/Behavioral: The patient is not nervous/anxious and does not have insomnia.           Physical Exam:  Vitals:    08/01/22 1148   BP: 118/62   BP Location: Left arm   Patient Position: Sitting   BP Cuff Size: Adult   Pulse: (!) 106   Resp: 16   Temp: 37.1 °C (98.8 °F)   TempSrc: Temporal   SpO2: 94%   Weight: 88.9 kg (196 lb)   Height: 1.6 m (5'  "2.99\")       DESC; KARNOFSKY SCALE WITH ECOG EQUIVALENT: 50, Requires considerable assistance and frequent medical care (ECOG equivalent 2)    DISTRESS LEVEL: no acute distress    Physical Exam  Vitals and nursing note reviewed.   Constitutional:       General: She is awake. She is not in acute distress.     Appearance: Normal appearance. She is normal weight. She is not ill-appearing, toxic-appearing or diaphoretic.      Interventions: Nasal cannula in place.   HENT:      Head: Normocephalic and atraumatic.      Nose: Nose normal. No congestion.      Mouth/Throat:      Pharynx: Oropharynx is clear. No oropharyngeal exudate or posterior oropharyngeal erythema.   Eyes:      General: No scleral icterus.     Extraocular Movements: Extraocular movements intact.      Conjunctiva/sclera: Conjunctivae normal.      Pupils: Pupils are equal, round, and reactive to light.   Cardiovascular:      Rate and Rhythm: Tachycardia present. Rhythm irregularly irregular.      Pulses: Normal pulses.      Heart sounds: Normal heart sounds. No murmur heard.    No friction rub. No gallop.   Pulmonary:      Effort: Pulmonary effort is normal.      Breath sounds: Normal breath sounds. No decreased air movement. No wheezing, rhonchi or rales.   Chest:   Breasts:      Right: No axillary adenopathy.      Left: No axillary adenopathy.       Abdominal:      General: Bowel sounds are normal. There is no distension.      Tenderness: There is no abdominal tenderness.   Musculoskeletal:         General: No deformity. Normal range of motion.      Cervical back: Normal range of motion and neck supple. No tenderness.      Right lower le+ Pitting Edema present.      Left lower le+ Pitting Edema present.   Lymphadenopathy:      Cervical: No cervical adenopathy.      Upper Body:      Right upper body: No axillary adenopathy.      Left upper body: No axillary adenopathy.      Lower Body: No right inguinal adenopathy. No left inguinal adenopathy. "   Skin:     General: Skin is warm and dry.      Coloration: Skin is not jaundiced.      Findings: No erythema or rash.   Neurological:      General: No focal deficit present.      Mental Status: She is alert and oriented to person, place, and time.      Cranial Nerves: Cranial nerves are intact.      Sensory: Sensation is intact.      Motor: Weakness present.      Gait: Gait abnormal (Requiring wheelchair).   Psychiatric:         Attention and Perception: Attention normal.         Mood and Affect: Mood normal.         Behavior: Behavior normal. Behavior is cooperative.         Thought Content: Thought content normal.         Judgment: Judgment normal.          Depression Screening    Little interest or pleasure in doing things?      Feeling down, depressed , or hopeless?     Trouble falling or staying asleep, or sleeping too much?      Feeling tired or having little energy?      Poor appetite or overeating?      Feeling bad about yourself - or that you are a failure or have let yourself or your family down?     Trouble concentrating on things, such as reading the newspaper or watching television?     Moving or speaking so slowly that other people could have noticed.  Or the opposite - being so fidgety or restless that you have been moving around a lot more than usual?      Thoughts that you would be better off dead, or of hurting yourself?      Patient Health Questionnaire Score:         If depressive symptoms identified deferred to follow up visit unless specifically addressed in assesment and plan.    Interpretation of PHQ-9 Total Score   Score Severity   1-4 No Depression   5-9 Mild Depression   10-14 Moderate Depression   15-19 Moderately Severe Depression   20-27 Severe Depression    Labs:  No visits with results within 7 Day(s) from this visit.   Latest known visit with results is:   Hospital Outpatient Visit on 07/25/2022   Component Date Value Ref Range Status   • WBC 07/25/2022 8.2  4.8 - 10.8 K/uL Final    • RBC 07/25/2022 3.67 (A) 4.20 - 5.40 M/uL Final   • Hemoglobin 07/25/2022 9.3 (A) 12.0 - 16.0 g/dL Final   • Hematocrit 07/25/2022 31.8 (A) 37.0 - 47.0 % Final   • MCV 07/25/2022 86.6  81.4 - 97.8 fL Final   • MCH 07/25/2022 25.3 (A) 27.0 - 33.0 pg Final   • MCHC 07/25/2022 29.2 (A) 33.6 - 35.0 g/dL Final   • RDW 07/25/2022 62.4 (A) 35.9 - 50.0 fL Final   • Platelet Count 07/25/2022 573 (A) 164 - 446 K/uL Final   • MPV 07/25/2022 8.7 (A) 9.0 - 12.9 fL Final   • Neutrophils-Polys 07/25/2022 79.20 (A) 44.00 - 72.00 % Final   • Lymphocytes 07/25/2022 10.80 (A) 22.00 - 41.00 % Final   • Monocytes 07/25/2022 8.60  0.00 - 13.40 % Final   • Eosinophils 07/25/2022 0.60  0.00 - 6.90 % Final   • Basophils 07/25/2022 0.40  0.00 - 1.80 % Final   • Immature Granulocytes 07/25/2022 0.40  0.00 - 0.90 % Final   • Nucleated RBC 07/25/2022 0.00  /100 WBC Final   • Neutrophils (Absolute) 07/25/2022 6.49  2.00 - 7.15 K/uL Final    Includes immature neutrophils, if present.   • Lymphs (Absolute) 07/25/2022 0.88 (A) 1.00 - 4.80 K/uL Final   • Monos (Absolute) 07/25/2022 0.70  0.00 - 0.85 K/uL Final   • Eos (Absolute) 07/25/2022 0.05  0.00 - 0.51 K/uL Final   • Baso (Absolute) 07/25/2022 0.03  0.00 - 0.12 K/uL Final   • Immature Granulocytes (abs) 07/25/2022 0.03  0.00 - 0.11 K/uL Final   • NRBC (Absolute) 07/25/2022 0.00  K/uL Final   • Peripheral Smear Review 07/25/2022 see below   Final    Comment: Due to instrument suspect flags, further review of peripheral smear is  indicated on this patient sample. This review may or may not result in  abnormal findings.     • Plt Estimation 07/25/2022 Increased   Final   • RBC Morphology 07/25/2022 Normal   Final   • Comments-Diff 07/25/2022 see below   Final    Results have been verified by peripheral blood smear review.       Imaging:   All listed images below have been independently reviewed by me. I agree with the findings as summarized below:    CT-CHEST (THORAX) W/O    Result Date:  7/7/2022 7/7/2022 2:15 PM HISTORY/REASON FOR EXAM:  Pulmonary nodule evaluation. TECHNIQUE/EXAM DESCRIPTION: CT scan of the chest without contrast. Noncontrast helical scanning of the chest was obtained from the lung apices through the adrenal glands. Low dose optimization technique was utilized for this CT exam including automated exposure control and adjustment of the mA and/or kV according to patient size. COMPARISON: 6/23/2022. FINDINGS: Lungs: There are multiple pulmonary metastases bilaterally, involving all pulmonary lobes. Some larger lesions in the left lower lobe measure up to 2.9 cm, slightly larger than on the prior CT study. A lesion in the right middle lobe measures 2.5 cm. A 1.7 cm lesion in the left upper lobe. Mediastinum: A 4 x 3.1 cm right thyroid nodule. No mediastinal mass. Nodes: No enlarged lymph nodes. Heart: Enlarged. No pericardial effusion. Coronary calcifications. Aorta and great vessels: No aneurysm. Atherosclerosis. Enlarged pulmonary artery measures 3.8 cm. Musculoskeletal structures: No acute fracture or destructive lesion. Spondylosis. Stable bone island in one of the thoracic vertebral bodies. Stable thoracic vertebral body hemangioma. Bone demineralization. No osseous metastases detected. Upper abdomen: Stable large hiatal hernia, containing approximately 50% of the stomach. Stable hypodense lesions along the periphery of the right hepatic capsule.     1.  Redemonstration of bilateral pulmonary metastases, with slight interval increase in size of largest lesion in the left lower lobe. 2.  No mediastinal lymphadenopathy. No new lesions in the chest. 3.  Stable 4 cm right thyroid nodule. 4.  Stable enlargement of pulmonary artery, suggesting pulmonary hypertension. 5.  Stable cardiomegaly. 6.  Stable large hiatal hernia. 7.  Stable hypodense lesions along the periphery of the right hepatic capsule.    CT-NEEDLE BX-DEEP BONE    Result Date: 7/21/2022 7/21/2022 1:45 PM HISTORY/REASON  FOR EXAM:  PET avid soft tissue mass along the left lateral margin of the left iliac wing. Patient also has bilateral pulmonary nodules. TECHNIQUE/EXAM DESCRIPTION: LEFT iliac soft tissue biopsy of mass arising from the left iliac bone/iliac wing with CT guidance. Low dose optimization technique was utilized for this CT exam including automated exposure control and adjustment of the mA and/or kV according to patient size. ALL ELEMENTS OF MAXIMAL STERILE BARRIER TECHNIQUE WERE FOLLOWED. PROCEDURE: Informed consent was obtained. Moderate sedation was provided. Pulse oximetry and continuous cardiac monitoring by the nurse was performed throughout the exam. Intraservice time was 30 minutes. Localizing CT images were obtained with the patient in supine position. The skin was prepped with ChloraPrep and draped in a sterile fashion. Following local anesthesia with 1% Lidocaine, a 17 G guiding needle was placed and needle position confirmed with CT. Using coaxial technique, an 18 G core biopsy needle was placed into the target lesion in the soft tissue mass emanating from the lateral aspect of the left iliac bone/iliac wing and needle position confirmed with CT. A total of 8 samples were obtained in this fashion and submitted in formalin x6 and RPMI solution x2. The guiding needle was removed and limited CT images obtained through the biopsy site show no evidence of significant hemorrhage. The patient tolerated the procedure well. COMPARISON: CT PET scan 7/13/2022 FINDINGS: The localizing CT images show satisfactory needle position within the target lesion at the anterolateral aspect of the left iliac wing.     1.  CT GUIDED BIOPSY OF LEFT ILIAC SOFT TISSUE MASS ARISING FROM THE LEFT LATERAL ASPECT OF THE LEFT ILIAC WING.    US-EXTREMITY VENOUS LOWER BILAT    Result Date: 7/19/2022   Vascular Laboratory  CONCLUSIONS  No evidence of obvious acute deep venous thrombosis in bilateral lower  extremities.  DIVINA ZUNIGA   Exam Date:     2022 12:00  Room #:     Out Patient  Priority:     Routine  Ht (in):             Wt (lb):  Ordering Physician:        MAKENZIE COPPOLA  Referring Physician:       ANAT Armstrong  Sonographer:               Amadna Lozano RVT  Study Type:                Complete Bilateral  Technical Quality:         Fair  Age:    81    Gender:     F  MRN:    [id number]  :    1940      BSA:  Indications:     Pulmonary Embolus  CPT Codes:       77116  ICD Codes:       415.19  History:         Pulmonary embolism. Prior exam 17.  Limitations:     Body habitus  PROCEDURES:  Bilateral lower extremity venous duplex imaging.  The following venous structures were evaluated: common femoral, deep  femoral, proximal great saphenous, femoral, popliteal, peroneal, and  posterior tibial veins.  Serial compression, color, and spectral Doppler flow evaluations were  performed.  FINDINGS:  Right lower extremity-  No evidence of obvious acute deep venous thrombosis.  The peroneal and posterior tibial veins are difficult to assess for  compressibility, but flow response to augmentation is demonstrated.  All other veins demonstrate complete color filling and compressibility with  normal venous flow dynamics including spontaneous flow and respiratory  phasicity.  Left lower extremity-  No evidence of obvious acute deep venous thrombosis.  The peroneal and posterior tibial veins are difficult to assess for  compressibility, and flow response to augmentation is not demonstrated.  All other veins demonstrate complete color filling and compressibility with  normal venous flow dynamics including spontaneous flow and respiratory  phasicity.  Hilton Rosas MD  (Electronically Signed)  Final Date:      2022                   17:41    [id number] BASE TO MID-THIGH    Result Date: 2022 1:45 PM HISTORY/REASON FOR EXAM:  Multiple pulmonary nodules concerning for metastatic disease, further evaluation  TECHNIQUE/EXAM DESCRIPTION AND NUMBER OF VIEWS: PET body imaging. Initially, 13.95 mCi F-18 FDG was administered intravenously under standardized conditions. Approximately 45 minutes after FDG administration, the patient was placed in the supine position on the PET CT table. Blood glucose level was 91 mg/dL. Low dose spiral CT imaging was performed from the skull base to the mid thighs. PET imaging was then performed from the skull base to the mid thighs. CT images, PET images, and PET/CT fused images were reviewed on a PACS 3D workstation. The limited non-contrast CT data are used primarily for attenuation correction and anatomic correlation.  Evaluation of solid organs and bowel are especially limited utilizing this technique. A low dose CT was obtained of the same area without IV contrast for attenuation correction and coregistration, not for a diagnostic scan. COMPARISON: 6/23/2022 CT. FINDINGS: VISUALIZED BRAIN: Normal metabolic activity. HEAD AND NECK: Large right thyroid nodule measures 4.3 cm and is not hypermetabolic. No FDG avid lesions in the neck. CHEST: Redemonstration of bilateral pulmonary metastases. No interval change since recent CT study. They have an SUV max of 14.69 in the right lung. There is no hypermetabolic hilar, mediastinal, or axillary lymphadenopathy. Atherosclerosis. Coronary calcifications. Cardiomegaly. Enlarged pulmonary artery measures 4 cm, suggesting underlying pulmonary hypertension. ABDOMEN AND PELVIS: There is normal, uniform metabolic activity in the liver, spleen, adrenal glands, kidneys. Hypodense lesions along the posterior right hepatic lobe but not hypermetabolic, and are likely of no clinical significance. There is no hypermetabolic mesenteric, retroperitoneal, iliac, or inguinal lymphadenopathy. Nonspecific physiologic activity in the colon and intestine is noted. Moderate to large hiatal hernia, containing at least 50% of the stomach. Cholecystectomy. Adenomatoid  hyperplasia of the left adrenal gland. Colonic diverticulosis. VISUALIZED MUSCULOSKELETAL SYSTEM: Hypermetabolic lytic lesion in the left ilium with a large soft tissue component measures approximately 9 x 5 cm. The lytic osseous component measures approximately 3 cm. The lesion is markedly hypermetabolic, with an SUV max of 31. Sequela of bilateral hip instrumentation. Mild, somewhat linear hypermetabolism in the right latissimus dorsi, likely physiologic. Similar mild metabolism in the right triceps musculature.     1.  A 9 cm hypermetabolic lytic lesion in the left ilium, with a large soft tissue component. May representing the primary malignancy. 2.  Multiple bilateral pulmonary metastases. 3.  No other FDG avid lesions. 4.  No hypermetabolism in the right thyroid nodule or in the lesions along the right hepatic lobe. 5.  Intramuscular hypermetabolism in the right back in the right upper arm, likely physiologic.       Pathology:    L hip mass biopsy 07/21/22:        Assessment & Plan:  1. Tachycardia  DISCONTINUED: metoprolol tartrate (LOPRESSOR) 25 MG Tab   2. 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 She also has iron deficiency anemia, with concern that she has disease affecting her digestive system as well.     Current Diagnosis and Staging: Metastatic spindle cell sarcoma, stage IV    Treatment Plan: Recommend hospice care given comorbidities and severity of disease    Treatment Citation: NCCN    Plan of Care:    1. Primary Therapy: NA  2. Supportive Therapy: Recommend hospice care  3. Labs: NA  4. Imaging: NA  5. Treatment Planning: Referral placed for hospice after discussion with patient. She isn't a candidate for systemic therapy given the high-grade disease she is dealing with (it typically doesn't respond well to chemotherapy anyway, and TKIs such as pazopanib  likely wouldn't provide substantial benefit) and her performance status.   6. Consultations: Pulm (Dr. Henson), GI (Dr. Stanton)  7. Code Status: Full (needs to be addressed accordingly)  8. Miscellaneous: NA  9. Return for Follow Up: PRN    Any questions and concerns raised by the patient were answered to the best of my ability. Thank you for allowing me to participate in the care for this patient. Please feel free to contact me for any questions or concerns.     Total time spent on chart review, clinic encounter, and documentation: 68 minutes.

## 2022-08-03 NOTE — ADDENDUM NOTE
Encounter addended by: Taylor Patten, Med Ass't on: 8/3/2022 9:47 AM   Actions taken: Charge Capture section accepted

## 2022-08-04 NOTE — TELEPHONE ENCOUNTER
Pt called per the request of Dr. Arango to inform her the confirmation of her pathology is high-grade spindle cell sarcoma, and provider recommends hospice as per the conversation he previously had, referrals have been placed. [place], and has requested her records to be sent accordingly, expresses appreciation for phone call. Advised pt to contact this office if she has any further needs or concerns and she acknowledged understanding.     ----- Message from Alvarez Arango D.O. sent at 8/2/2022  5:18 PM PDT -----  Can you call Ms. Anaya tomorrow and let her know that her disease is a high-grade spindle cell sarcoma? This is a high grade sarcoma and it is confirmation of what we discussed in clinic yesterday. My recommendation would be hospice care, and we've already placed the referrals and what not, so she's expecting this. We didn't have confirmation of the pathology but we do now, so I told her we'd let her know.

## 2022-08-05 ENCOUNTER — TELEPHONE (OUTPATIENT)
Dept: HEMATOLOGY ONCOLOGY | Facility: MEDICAL CENTER | Age: 82
End: 2022-08-05
Payer: MEDICARE

## 2022-08-05 NOTE — TELEPHONE ENCOUNTER
Natalie's son Marlo would like to discuss her diagnosis and hospice referral with you while she is not around.  Please call Marlo at [phone].

## 2022-11-16 ENCOUNTER — DOCUMENTATION (OUTPATIENT)
Dept: HEALTH INFORMATION MANAGEMENT | Facility: OTHER | Age: 82
End: 2022-11-16

## 2024-01-28 NOTE — TELEPHONE ENCOUNTER
----- Message from Kiley Manrique D.O. 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 Started low at gabapentin 100 mg. She can continue to increase by 100 mg until she has maximum symptoms improvement and no side effects (would not go above 600 mg). Also please let her know I have lab orders in the system to recheck before our next visit in 3 months.   Home

## (undated) DEVICE — SUCTION INSTRUMENT YANKAUER BULBOUS TIP W/O VENT (50EA/CA)

## (undated) DEVICE — GLOVE BIOGEL INDICATOR SZ 8 SURGICAL PF LTX - (50/BX 4BX/CA)

## (undated) DEVICE — MASK ANESTHESIA ADULT  - (100/CA)

## (undated) DEVICE — WIRE GUIDE R-T TRIGEN 3.2MM (1EA)

## (undated) DEVICE — SENSOR SPO2 NEO LNCS ADHESIVE (20/BX) SEE USER NOTES

## (undated) DEVICE — LACTATED RINGERS INJ 1000 ML - (14EA/CA 60CA/PF)

## (undated) DEVICE — SUTURE GENERAL

## (undated) DEVICE — HUMID-VENT HEAT AND MOISTURE EXCHANGE- (50/BX)

## (undated) DEVICE — BAG, SPONGE COUNT 50600

## (undated) DEVICE — CANISTER SUCTION RIGID RED 1500CC (40EA/CA)

## (undated) DEVICE — TUBE CONNECTING SUCTION - CLEAR PLASTIC STERILE 72 IN (50EA/CA)

## (undated) DEVICE — ROD GUIDE R-T TRIGEN 3 X 1000 (1EA)

## (undated) DEVICE — KIT ROOM DECONTAMINATION

## (undated) DEVICE — ELECTRODE 850 FOAM ADHESIVE - HYDROGEL RADIOTRNSPRNT (50/PK)

## (undated) DEVICE — DRAPE SURGICAL U 77X120 - (10/CA)

## (undated) DEVICE — SET I.V. EXTENSION DIAL-A- - FLOW REGULATOR (48EA/CA)

## (undated) DEVICE — GLOVE BIOGEL SZ 6.5 SURGICAL PF LTX (50PR/BX 4BX/CA)

## (undated) DEVICE — GLOVE, LITE (PAIR)

## (undated) DEVICE — DRAPE LARGE 3 QUARTER - (20/CA)

## (undated) DEVICE — BIT DRILL INTERTAN 4.0 LONG

## (undated) DEVICE — GOWN WARMING STANDARD FLEX - (30/CA)

## (undated) DEVICE — BIT DRILL INTERTAN 4.0 SHORT

## (undated) DEVICE — WATER IRRIGATION STERILE 1000ML (12EA/CA)

## (undated) DEVICE — SUTURE 2-0 VICRYL PLUS CT-1 - 8 X 18 INCH(12/BX)

## (undated) DEVICE — PACK MAJOR ORTHO - (2EA/CA)

## (undated) DEVICE — DRAPE C ARMOR (12EA/CA)

## (undated) DEVICE — PROTECTOR ULNA NERVE - (36PR/CA)

## (undated) DEVICE — ELECTRODE DUAL RETURN W/ CORD - (50/PK)

## (undated) DEVICE — GLOVE BIOGEL SZ 7.5 SURGICAL PF LTX - (50PR/BX 4BX/CA)

## (undated) DEVICE — SODIUM CHL IRRIGATION 0.9% 1000ML (12EA/CA)

## (undated) DEVICE — CANNULA O2 COMFORT SOFT EAR ADULT 7 FT TUBING (50/CA)

## (undated) DEVICE — HEAD HOLDER JUNIOR/ADULT

## (undated) DEVICE — GLOVE BIOGEL INDICATOR SZ 6.5 SURGICAL PF LTX - (50PR/BX 4BX/CA)

## (undated) DEVICE — TOWEL STOP TIMEOUT SAFETY FLAG (40EA/CA)

## (undated) DEVICE — CHLORAPREP 26 ML APPLICATOR - ORANGE TINT(25/CA)

## (undated) DEVICE — KIT ANESTHESIA W/CIRCUIT & 3/LT BAG W/FILTER (20EA/CA)

## (undated) DEVICE — NEPTUNE 4 PORT MANIFOLD - (20/PK)

## (undated) DEVICE — SUTURE MONOCRYL CT-2 VIO - (36/BX)

## (undated) DEVICE — GLOVE BIOGEL INDICATOR SZ 7.5 SURGICAL PF LTX - (50PR/BX 4BX/CA)